# Patient Record
Sex: FEMALE | Race: WHITE | Employment: OTHER | URBAN - METROPOLITAN AREA
[De-identification: names, ages, dates, MRNs, and addresses within clinical notes are randomized per-mention and may not be internally consistent; named-entity substitution may affect disease eponyms.]

---

## 2017-02-08 ENCOUNTER — HOSPITAL ENCOUNTER (OUTPATIENT)
Age: 82
Discharge: HOME | End: 2017-02-08
Payer: MEDICARE

## 2017-02-08 LAB
INR BLD: 3.3
PROTHROMBIN TIME: 36.5 SEC (ref 9.7–12)

## 2017-02-08 PROCEDURE — 36415 COLL VENOUS BLD VENIPUNCTURE: CPT

## 2017-02-08 PROCEDURE — 85610 PROTHROMBIN TIME: CPT

## 2017-02-22 ENCOUNTER — HOSPITAL ENCOUNTER (OUTPATIENT)
Age: 82
Discharge: HOME | End: 2017-02-22
Payer: MEDICARE

## 2017-02-22 LAB
INR BLD: 2.8
PROTHROMBIN TIME: 31 SEC (ref 9.7–12)

## 2017-02-22 PROCEDURE — 36415 COLL VENOUS BLD VENIPUNCTURE: CPT

## 2017-02-22 PROCEDURE — 85610 PROTHROMBIN TIME: CPT

## 2017-03-21 ENCOUNTER — HOSPITAL ENCOUNTER (OUTPATIENT)
Age: 82
Discharge: HOME | End: 2017-03-21
Payer: MEDICARE

## 2017-03-21 LAB
INR BLD: 2.7
PROTHROMBIN TIME: 29.9 SEC (ref 9.7–12)

## 2017-03-21 PROCEDURE — 85610 PROTHROMBIN TIME: CPT

## 2017-03-21 PROCEDURE — 36415 COLL VENOUS BLD VENIPUNCTURE: CPT

## 2017-04-20 ENCOUNTER — HOSPITAL ENCOUNTER (OUTPATIENT)
Age: 82
Discharge: HOME | End: 2017-04-20
Payer: MEDICARE

## 2017-04-20 LAB
INR BLD: 2.4
PROTHROMBIN TIME: 27.1 SEC (ref 9.7–12)

## 2017-04-20 PROCEDURE — 85610 PROTHROMBIN TIME: CPT

## 2017-04-20 PROCEDURE — 36415 COLL VENOUS BLD VENIPUNCTURE: CPT

## 2017-05-25 ENCOUNTER — HOSPITAL ENCOUNTER (OUTPATIENT)
Age: 82
Discharge: HOME | End: 2017-05-25
Payer: MEDICARE

## 2017-05-25 LAB
INR BLD: 2.7
PROTHROMBIN TIME: 30.7 SEC (ref 9.7–12)

## 2017-05-25 PROCEDURE — 85610 PROTHROMBIN TIME: CPT

## 2017-05-25 PROCEDURE — 36415 COLL VENOUS BLD VENIPUNCTURE: CPT

## 2017-06-21 ENCOUNTER — HOSPITAL ENCOUNTER (OUTPATIENT)
Age: 82
Discharge: HOME | End: 2017-06-21
Payer: MEDICARE

## 2017-06-21 LAB
INR BLD: 2.6
PROTHROMBIN TIME: 29.6 SEC (ref 9.7–12)

## 2017-06-21 PROCEDURE — 36415 COLL VENOUS BLD VENIPUNCTURE: CPT

## 2017-06-21 PROCEDURE — 85610 PROTHROMBIN TIME: CPT

## 2017-06-24 ENCOUNTER — HOSPITAL ENCOUNTER (EMERGENCY)
Age: 82
Discharge: HOME | End: 2017-06-24
Attending: EMERGENCY MEDICINE
Payer: MEDICARE

## 2017-06-24 ENCOUNTER — APPOINTMENT (OUTPATIENT)
Dept: CT IMAGING | Age: 82
End: 2017-06-24
Payer: MEDICARE

## 2017-06-24 VITALS
DIASTOLIC BLOOD PRESSURE: 61 MMHG | RESPIRATION RATE: 16 BRPM | HEIGHT: 64 IN | HEART RATE: 78 BPM | TEMPERATURE: 97.5 F | WEIGHT: 158 LBS | BODY MASS INDEX: 26.98 KG/M2 | OXYGEN SATURATION: 98 % | SYSTOLIC BLOOD PRESSURE: 133 MMHG

## 2017-06-24 DIAGNOSIS — S09.90XA CLOSED HEAD INJURY, INITIAL ENCOUNTER: Primary | ICD-10-CM

## 2017-06-24 LAB
INR BLD: 1.9
PROTHROMBIN TIME: 20.6 SEC (ref 9.7–12)

## 2017-06-24 PROCEDURE — 99284 EMERGENCY DEPT VISIT MOD MDM: CPT

## 2017-06-24 PROCEDURE — 85610 PROTHROMBIN TIME: CPT

## 2017-06-24 PROCEDURE — 36415 COLL VENOUS BLD VENIPUNCTURE: CPT

## 2017-06-24 PROCEDURE — 70450 CT HEAD/BRAIN W/O DYE: CPT

## 2017-06-24 ASSESSMENT — ENCOUNTER SYMPTOMS
VOMITING: 0
NAUSEA: 0
SHORTNESS OF BREATH: 0
BACK PAIN: 0
ABDOMINAL PAIN: 0

## 2017-06-24 ASSESSMENT — PAIN DESCRIPTION - PAIN TYPE: TYPE: ACUTE PAIN;CHRONIC PAIN

## 2017-06-24 ASSESSMENT — PAIN DESCRIPTION - LOCATION: LOCATION: HEAD;NECK

## 2017-06-24 ASSESSMENT — PAIN SCALES - GENERAL: PAINLEVEL_OUTOF10: 7

## 2017-06-26 ENCOUNTER — HOSPITAL ENCOUNTER (OUTPATIENT)
Age: 82
Discharge: HOME | End: 2017-06-26
Payer: MEDICARE

## 2017-06-26 LAB
INR BLD: 1.3
PROTHROMBIN TIME: 13.8 SEC (ref 9.7–12)

## 2017-06-26 PROCEDURE — 85610 PROTHROMBIN TIME: CPT

## 2017-06-26 PROCEDURE — 36415 COLL VENOUS BLD VENIPUNCTURE: CPT

## 2017-07-03 ENCOUNTER — HOSPITAL ENCOUNTER (OUTPATIENT)
Age: 82
Discharge: HOME | End: 2017-07-03
Payer: MEDICARE

## 2017-07-03 LAB
INR BLD: 0.9
PROTHROMBIN TIME: 9.9 SEC (ref 9.7–12)

## 2017-07-03 PROCEDURE — 36415 COLL VENOUS BLD VENIPUNCTURE: CPT

## 2017-07-03 PROCEDURE — 85610 PROTHROMBIN TIME: CPT

## 2017-07-07 ENCOUNTER — HOSPITAL ENCOUNTER (OUTPATIENT)
Age: 82
Discharge: HOME | End: 2017-07-07
Payer: MEDICARE

## 2017-07-07 LAB
INR BLD: 1.3
PROTHROMBIN TIME: 14.3 SEC (ref 9.7–12)

## 2017-07-07 PROCEDURE — 36415 COLL VENOUS BLD VENIPUNCTURE: CPT

## 2017-07-07 PROCEDURE — 85610 PROTHROMBIN TIME: CPT

## 2017-07-12 ENCOUNTER — HOSPITAL ENCOUNTER (OUTPATIENT)
Age: 82
Discharge: HOME | End: 2017-07-12
Payer: MEDICARE

## 2017-07-12 LAB
INR BLD: 2.7
PROTHROMBIN TIME: 30.4 SEC (ref 9.7–12)

## 2017-07-12 PROCEDURE — 36415 COLL VENOUS BLD VENIPUNCTURE: CPT

## 2017-07-12 PROCEDURE — 85610 PROTHROMBIN TIME: CPT

## 2017-07-13 ENCOUNTER — APPOINTMENT (OUTPATIENT)
Dept: GENERAL RADIOLOGY | Age: 82
End: 2017-07-13
Payer: MEDICARE

## 2017-07-13 ENCOUNTER — HOSPITAL ENCOUNTER (EMERGENCY)
Age: 82
Discharge: HOME | End: 2017-07-13
Attending: EMERGENCY MEDICINE
Payer: MEDICARE

## 2017-07-13 VITALS
BODY MASS INDEX: 26.98 KG/M2 | HEIGHT: 64 IN | RESPIRATION RATE: 16 BRPM | DIASTOLIC BLOOD PRESSURE: 66 MMHG | TEMPERATURE: 98 F | OXYGEN SATURATION: 95 % | WEIGHT: 158 LBS | HEART RATE: 68 BPM | SYSTOLIC BLOOD PRESSURE: 136 MMHG

## 2017-07-13 DIAGNOSIS — M79.604 PAIN OF RIGHT LOWER EXTREMITY: Primary | ICD-10-CM

## 2017-07-13 PROCEDURE — 6370000000 HC RX 637 (ALT 250 FOR IP): Performed by: EMERGENCY MEDICINE

## 2017-07-13 PROCEDURE — 99283 EMERGENCY DEPT VISIT LOW MDM: CPT

## 2017-07-13 PROCEDURE — 73590 X-RAY EXAM OF LOWER LEG: CPT

## 2017-07-13 RX ORDER — CLINDAMYCIN HYDROCHLORIDE 300 MG/1
300 CAPSULE ORAL 4 TIMES DAILY
Qty: 28 CAPSULE | Refills: 0 | Status: SHIPPED | OUTPATIENT
Start: 2017-07-13 | End: 2017-07-20

## 2017-07-13 RX ORDER — OXYCODONE HYDROCHLORIDE AND ACETAMINOPHEN 5; 325 MG/1; MG/1
1 TABLET ORAL ONCE
Status: COMPLETED | OUTPATIENT
Start: 2017-07-13 | End: 2017-07-13

## 2017-07-13 RX ADMIN — OXYCODONE HYDROCHLORIDE AND ACETAMINOPHEN 1 TABLET: 5; 325 TABLET ORAL at 09:04

## 2017-07-13 ASSESSMENT — ENCOUNTER SYMPTOMS
GASTROINTESTINAL NEGATIVE: 1
SHORTNESS OF BREATH: 0
COUGH: 0
ABDOMINAL PAIN: 0
EYES NEGATIVE: 1
RESPIRATORY NEGATIVE: 1

## 2017-07-13 ASSESSMENT — PAIN DESCRIPTION - ORIENTATION: ORIENTATION: RIGHT

## 2017-07-13 ASSESSMENT — PAIN SCALES - GENERAL
PAINLEVEL_OUTOF10: 9
PAINLEVEL_OUTOF10: 9
PAINLEVEL_OUTOF10: 7

## 2017-07-13 ASSESSMENT — PAIN DESCRIPTION - LOCATION: LOCATION: ANKLE

## 2017-07-13 ASSESSMENT — PAIN DESCRIPTION - PAIN TYPE: TYPE: ACUTE PAIN

## 2017-07-14 ENCOUNTER — HOSPITAL ENCOUNTER (OUTPATIENT)
Age: 82
Discharge: HOME | End: 2017-07-14
Payer: MEDICARE

## 2017-07-14 LAB
INR BLD: 2
PROTHROMBIN TIME: 22.7 SEC (ref 9.7–12)

## 2017-07-14 PROCEDURE — 36415 COLL VENOUS BLD VENIPUNCTURE: CPT

## 2017-07-14 PROCEDURE — 85610 PROTHROMBIN TIME: CPT

## 2017-07-18 ENCOUNTER — HOSPITAL ENCOUNTER (OUTPATIENT)
Age: 82
Discharge: HOME | End: 2017-07-18
Payer: MEDICARE

## 2017-07-18 LAB
INR BLD: 2.1
PROTHROMBIN TIME: 23 SEC (ref 9.7–12)

## 2017-07-18 PROCEDURE — 36415 COLL VENOUS BLD VENIPUNCTURE: CPT

## 2017-07-18 PROCEDURE — 85610 PROTHROMBIN TIME: CPT

## 2017-07-25 ENCOUNTER — HOSPITAL ENCOUNTER (OUTPATIENT)
Age: 82
Discharge: HOME | End: 2017-07-25
Payer: MEDICARE

## 2017-07-25 LAB
INR BLD: 1.4
PROTHROMBIN TIME: 15.7 SEC (ref 9.7–12)

## 2017-07-25 PROCEDURE — 36415 COLL VENOUS BLD VENIPUNCTURE: CPT

## 2017-07-25 PROCEDURE — 85610 PROTHROMBIN TIME: CPT

## 2017-07-27 ENCOUNTER — OFFICE VISIT (OUTPATIENT)
Dept: OBGYN CLINIC | Age: 82
End: 2017-07-27
Payer: MEDICARE

## 2017-07-27 VITALS — WEIGHT: 161.6 LBS | BODY MASS INDEX: 27.59 KG/M2 | HEIGHT: 64 IN

## 2017-07-27 DIAGNOSIS — Z12.31 ENCOUNTER FOR SCREENING MAMMOGRAM FOR BREAST CANCER: Primary | ICD-10-CM

## 2017-07-27 DIAGNOSIS — Z01.419 VISIT FOR GYNECOLOGIC EXAMINATION: ICD-10-CM

## 2017-07-27 PROCEDURE — G0101 CA SCREEN;PELVIC/BREAST EXAM: HCPCS | Performed by: OBSTETRICS & GYNECOLOGY

## 2017-07-27 RX ORDER — POTASSIUM CHLORIDE 750 MG/1
TABLET, EXTENDED RELEASE ORAL
COMMUNITY
Start: 2017-07-26 | End: 2017-08-22 | Stop reason: ALTCHOICE

## 2017-07-27 RX ORDER — CLINDAMYCIN HYDROCHLORIDE 300 MG/1
CAPSULE ORAL
COMMUNITY
Start: 2017-07-13 | End: 2017-08-22 | Stop reason: ALTCHOICE

## 2017-07-27 ASSESSMENT — ENCOUNTER SYMPTOMS
SHORTNESS OF BREATH: 0
COUGH: 0
ABDOMINAL DISTENTION: 0
BACK PAIN: 0
ABDOMINAL PAIN: 0
DIARRHEA: 0
CONSTIPATION: 0

## 2017-08-01 ENCOUNTER — HOSPITAL ENCOUNTER (OUTPATIENT)
Dept: WOMENS IMAGING | Age: 82
Discharge: HOME | End: 2017-08-01
Payer: MEDICARE

## 2017-08-01 ENCOUNTER — HOSPITAL ENCOUNTER (OUTPATIENT)
Age: 82
Discharge: HOME | End: 2017-08-01
Payer: MEDICARE

## 2017-08-01 DIAGNOSIS — Z12.31 ENCOUNTER FOR SCREENING MAMMOGRAM FOR BREAST CANCER: ICD-10-CM

## 2017-08-01 LAB
INR BLD: 1.9
PROTHROMBIN TIME: 21 SEC (ref 9.7–12)

## 2017-08-01 PROCEDURE — 85610 PROTHROMBIN TIME: CPT

## 2017-08-01 PROCEDURE — 77063 BREAST TOMOSYNTHESIS BI: CPT

## 2017-08-01 PROCEDURE — 36415 COLL VENOUS BLD VENIPUNCTURE: CPT

## 2017-08-07 ENCOUNTER — OFFICE VISIT (OUTPATIENT)
Dept: ORTHOPEDIC SURGERY | Age: 82
End: 2017-08-07
Payer: MEDICARE

## 2017-08-07 VITALS
SYSTOLIC BLOOD PRESSURE: 142 MMHG | BODY MASS INDEX: 26.98 KG/M2 | HEART RATE: 88 BPM | HEIGHT: 64 IN | WEIGHT: 158 LBS | DIASTOLIC BLOOD PRESSURE: 76 MMHG

## 2017-08-07 DIAGNOSIS — M25.561 CHRONIC PAIN OF RIGHT KNEE: Primary | ICD-10-CM

## 2017-08-07 DIAGNOSIS — G89.29 CHRONIC PAIN OF RIGHT KNEE: Primary | ICD-10-CM

## 2017-08-07 PROCEDURE — 99203 OFFICE O/P NEW LOW 30 MIN: CPT | Performed by: ORTHOPAEDIC SURGERY

## 2017-08-07 PROCEDURE — 1123F ACP DISCUSS/DSCN MKR DOCD: CPT | Performed by: ORTHOPAEDIC SURGERY

## 2017-08-07 PROCEDURE — 1036F TOBACCO NON-USER: CPT | Performed by: ORTHOPAEDIC SURGERY

## 2017-08-07 PROCEDURE — 1090F PRES/ABSN URINE INCON ASSESS: CPT | Performed by: ORTHOPAEDIC SURGERY

## 2017-08-07 PROCEDURE — 4040F PNEUMOC VAC/ADMIN/RCVD: CPT | Performed by: ORTHOPAEDIC SURGERY

## 2017-08-07 PROCEDURE — G8419 CALC BMI OUT NRM PARAM NOF/U: HCPCS | Performed by: ORTHOPAEDIC SURGERY

## 2017-08-07 PROCEDURE — G8427 DOCREV CUR MEDS BY ELIG CLIN: HCPCS | Performed by: ORTHOPAEDIC SURGERY

## 2017-08-07 ASSESSMENT — PROMIS GLOBAL HEALTH SCALE
IN GENERAL, WOULD YOU SAY YOUR HEALTH IS...[ON A SCALE OF 1 (POOR) TO 5 (EXCELLENT)]: 3
TO WHAT EXTENT ARE YOU ABLE TO CARRY OUT YOUR EVERYDAY PHYSICAL ACTIVITIES SUCH AS WALKING, CLIMBING STAIRS, CARRYING GROCERIES, OR MOVING A CHAIR [ON A SCALE OF 1 (NOT AT ALL) TO 5 (COMPLETELY)]?: 2
SUM OF RESPONSES TO QUESTIONS 2, 4, 5, & 10: 14
IN GENERAL, HOW WOULD YOU RATE YOUR MENTAL HEALTH, INCLUDING YOUR MOOD AND YOUR ABILITY TO THINK [ON A SCALE OF 1 (POOR) TO 5 (EXCELLENT)]?: 3
IN GENERAL, PLEASE RATE HOW WELL YOU CARRY OUT YOUR USUAL SOCIAL ACTIVITIES (INCLUDES ACTIVITIES AT HOME, AT WORK, AND IN YOUR COMMUNITY, AND RESPONSIBILITIES AS A PARENT, CHILD, SPOUSE, EMPLOYEE, FRIEND, ETC) [ON A SCALE OF 1 (POOR) TO 5 (EXCELLENT)]?: 4
IN THE PAST 7 DAYS, HOW WOULD YOU RATE YOUR FATIGUE ON AVERAGE [ON A SCALE FROM 1 (NONE) TO 5 (VERY SEVERE)]?: 3
WHO IS THE PERSON COMPLETING THE PROMIS V1.1 SURVEY?: 0
IN GENERAL, HOW WOULD YOU RATE YOUR PHYSICAL HEALTH [ON A SCALE OF 1 (POOR) TO 5 (EXCELLENT)]?: 3
HOW IS THE PROMIS V1.1 BEING ADMINISTERED?: 0
IN THE PAST 7 DAYS, HOW OFTEN HAVE YOU BEEN BOTHERED BY EMOTIONAL PROBLEMS, SUCH AS FEELING ANXIOUS, DEPRESSED, OR IRRITABLE [ON A SCALE FROM 1 (NEVER) TO 5 (ALWAYS)]?: 3
IN GENERAL, WOULD YOU SAY YOUR QUALITY OF LIFE IS...[ON A SCALE OF 1 (POOR) TO 5 (EXCELLENT)]: 4
SUM OF RESPONSES TO QUESTIONS 3, 6, 7, & 8: 16
IN THE PAST 7 DAYS, HOW WOULD YOU RATE YOUR PAIN ON AVERAGE [ON A SCALE FROM 0 (NO PAIN) TO 10 (WORST IMAGINABLE PAIN)]?: 8
IN GENERAL, HOW WOULD YOU RATE YOUR SATISFACTION WITH YOUR SOCIAL ACTIVITIES AND RELATIONSHIPS [ON A SCALE OF 1 (POOR) TO 5 (EXCELLENT)]?: 4

## 2017-08-07 ASSESSMENT — KOOS JR
STANDING UPRIGHT: 2
HOW SEVERE IS YOUR KNEE STIFFNESS AFTER FIRST WAKING IN MORNING: 3
GOING UP OR DOWN STAIRS: 3
BENDING TO THE FLOOR TO PICK UP OBJECT: 2
RISING FROM SITTING: 3
TWISING OR PIVOTING ON KNEE: 3
STRAIGHTENING KNEE FULLY: 3

## 2017-08-14 ENCOUNTER — HOSPITAL ENCOUNTER (OUTPATIENT)
Age: 82
Discharge: HOME | End: 2017-08-14
Payer: MEDICARE

## 2017-08-14 LAB
INR BLD: 2.4
PROTHROMBIN TIME: 26.9 SEC (ref 9.7–12)

## 2017-08-14 PROCEDURE — 85610 PROTHROMBIN TIME: CPT

## 2017-08-14 PROCEDURE — 36415 COLL VENOUS BLD VENIPUNCTURE: CPT

## 2017-08-22 ENCOUNTER — HOSPITAL ENCOUNTER (OUTPATIENT)
Dept: PREADMISSION TESTING | Age: 82
Discharge: HOME | End: 2017-08-22
Payer: MEDICARE

## 2017-08-22 VITALS
HEIGHT: 64 IN | WEIGHT: 160 LBS | HEART RATE: 67 BPM | DIASTOLIC BLOOD PRESSURE: 63 MMHG | RESPIRATION RATE: 16 BRPM | SYSTOLIC BLOOD PRESSURE: 102 MMHG | TEMPERATURE: 98.8 F | BODY MASS INDEX: 27.31 KG/M2 | OXYGEN SATURATION: 96 %

## 2017-08-22 LAB
-: ABNORMAL
ABSOLUTE EOS #: 0.1 K/UL (ref 0–0.4)
ABSOLUTE LYMPH #: 1 K/UL (ref 1–4.8)
ABSOLUTE MONO #: 0.5 K/UL (ref 0.1–1.3)
AMORPHOUS: ABNORMAL
ANION GAP SERPL CALCULATED.3IONS-SCNC: 10 MMOL/L (ref 9–17)
BACTERIA: ABNORMAL
BASOPHILS # BLD: 0 %
BASOPHILS ABSOLUTE: 0 K/UL (ref 0–0.2)
BILIRUBIN URINE: NEGATIVE
BUN BLDV-MCNC: 22 MG/DL (ref 8–23)
BUN/CREAT BLD: NORMAL (ref 9–20)
CALCIUM SERPL-MCNC: 9 MG/DL (ref 8.6–10.4)
CASTS UA: ABNORMAL /LPF
CHLORIDE BLD-SCNC: 104 MMOL/L (ref 98–107)
CO2: 29 MMOL/L (ref 20–31)
COLOR: YELLOW
COMMENT UA: ABNORMAL
CREAT SERPL-MCNC: 0.74 MG/DL (ref 0.5–0.9)
CRYSTALS, UA: ABNORMAL /HPF
DIFFERENTIAL TYPE: NORMAL
EOSINOPHILS RELATIVE PERCENT: 3 %
EPITHELIAL CELLS UA: ABNORMAL /HPF
GFR AFRICAN AMERICAN: >60 ML/MIN
GFR NON-AFRICAN AMERICAN: >60 ML/MIN
GFR SERPL CREATININE-BSD FRML MDRD: NORMAL ML/MIN/{1.73_M2}
GFR SERPL CREATININE-BSD FRML MDRD: NORMAL ML/MIN/{1.73_M2}
GLUCOSE BLD-MCNC: 90 MG/DL (ref 70–99)
GLUCOSE URINE: NEGATIVE
HCT VFR BLD CALC: 40.1 % (ref 36–46)
HEMOGLOBIN: 13.1 G/DL (ref 12–16)
KETONES, URINE: NEGATIVE
LEUKOCYTE ESTERASE, URINE: NEGATIVE
LYMPHOCYTES # BLD: 23 %
MCH RBC QN AUTO: 29.5 PG (ref 26–34)
MCHC RBC AUTO-ENTMCNC: 32.7 G/DL (ref 31–37)
MCV RBC AUTO: 90.1 FL (ref 80–100)
MONOCYTES # BLD: 11 %
MUCUS: ABNORMAL
NITRITE, URINE: NEGATIVE
OTHER OBSERVATIONS UA: ABNORMAL
PDW BLD-RTO: 14 % (ref 11.5–14.9)
PH UA: 5 (ref 5–8)
PLATELET # BLD: 208 K/UL (ref 150–450)
PLATELET ESTIMATE: NORMAL
PMV BLD AUTO: 8.7 FL (ref 6–12)
POTASSIUM SERPL-SCNC: 4.6 MMOL/L (ref 3.7–5.3)
PROTEIN UA: NEGATIVE
RBC # BLD: 4.46 M/UL (ref 4–5.2)
RBC # BLD: NORMAL 10*6/UL
RBC UA: ABNORMAL /HPF
RENAL EPITHELIAL, UA: ABNORMAL /HPF
SEG NEUTROPHILS: 63 %
SEGMENTED NEUTROPHILS ABSOLUTE COUNT: 2.7 K/UL (ref 1.3–9.1)
SODIUM BLD-SCNC: 143 MMOL/L (ref 135–144)
SPECIFIC GRAVITY UA: 1.02 (ref 1–1.03)
TRICHOMONAS: ABNORMAL
TURBIDITY: CLEAR
URINE HGB: ABNORMAL
UROBILINOGEN, URINE: NORMAL
WBC # BLD: 4.4 K/UL (ref 3.5–11)
WBC # BLD: NORMAL 10*3/UL
WBC UA: ABNORMAL /HPF
YEAST: ABNORMAL

## 2017-08-22 PROCEDURE — 93005 ELECTROCARDIOGRAM TRACING: CPT

## 2017-08-22 PROCEDURE — 80048 BASIC METABOLIC PNL TOTAL CA: CPT

## 2017-08-22 PROCEDURE — 85025 COMPLETE CBC W/AUTO DIFF WBC: CPT

## 2017-08-22 PROCEDURE — 36415 COLL VENOUS BLD VENIPUNCTURE: CPT

## 2017-08-22 PROCEDURE — 81001 URINALYSIS AUTO W/SCOPE: CPT

## 2017-08-22 PROCEDURE — 87641 MR-STAPH DNA AMP PROBE: CPT

## 2017-08-22 RX ORDER — POLYETHYLENE GLYCOL 3350 17 G/17G
17 POWDER, FOR SOLUTION ORAL DAILY PRN
COMMUNITY

## 2017-08-22 ASSESSMENT — PAIN DESCRIPTION - ORIENTATION: ORIENTATION: RIGHT

## 2017-08-22 ASSESSMENT — PAIN DESCRIPTION - PAIN TYPE: TYPE: CHRONIC PAIN

## 2017-08-22 ASSESSMENT — PAIN SCALES - GENERAL: PAINLEVEL_OUTOF10: 7

## 2017-08-22 ASSESSMENT — PAIN DESCRIPTION - LOCATION: LOCATION: KNEE

## 2017-08-23 LAB
EKG ATRIAL RATE: 83 BPM
EKG P AXIS: -20 DEGREES
EKG P-R INTERVAL: 186 MS
EKG Q-T INTERVAL: 482 MS
EKG QRS DURATION: 188 MS
EKG QTC CALCULATION (BAZETT): 566 MS
EKG R AXIS: -80 DEGREES
EKG T AXIS: 78 DEGREES
EKG VENTRICULAR RATE: 83 BPM
MRSA, DNA, NASAL: NORMAL
SPECIMEN DESCRIPTION: NORMAL

## 2017-08-24 ENCOUNTER — HOSPITAL ENCOUNTER (OUTPATIENT)
Age: 82
Discharge: HOME | End: 2017-08-24
Payer: MEDICARE

## 2017-08-24 LAB
INR BLD: 2.9
PROTHROMBIN TIME: 33.7 SEC (ref 9.7–12)

## 2017-08-24 PROCEDURE — 36415 COLL VENOUS BLD VENIPUNCTURE: CPT

## 2017-08-24 PROCEDURE — 85610 PROTHROMBIN TIME: CPT

## 2017-09-02 ENCOUNTER — HOSPITAL ENCOUNTER (EMERGENCY)
Age: 82
Discharge: HOME | End: 2017-09-02
Attending: EMERGENCY MEDICINE
Payer: MEDICARE

## 2017-09-02 VITALS
HEART RATE: 82 BPM | BODY MASS INDEX: 26.98 KG/M2 | TEMPERATURE: 97.6 F | WEIGHT: 158 LBS | OXYGEN SATURATION: 98 % | RESPIRATION RATE: 16 BRPM | DIASTOLIC BLOOD PRESSURE: 77 MMHG | SYSTOLIC BLOOD PRESSURE: 155 MMHG | HEIGHT: 64 IN

## 2017-09-02 DIAGNOSIS — R58 BLEEDING: Primary | ICD-10-CM

## 2017-09-02 PROCEDURE — 99282 EMERGENCY DEPT VISIT SF MDM: CPT

## 2017-09-02 ASSESSMENT — ENCOUNTER SYMPTOMS
SORE THROAT: 0
DIARRHEA: 0
EYE REDNESS: 0
EYE DISCHARGE: 0
COLOR CHANGE: 0
SHORTNESS OF BREATH: 0
VOMITING: 0
NAUSEA: 0
COUGH: 0
RHINORRHEA: 0

## 2017-09-05 ENCOUNTER — APPOINTMENT (OUTPATIENT)
Dept: GENERAL RADIOLOGY | Age: 82
DRG: 470 | End: 2017-09-05
Attending: ORTHOPAEDIC SURGERY
Payer: MEDICARE

## 2017-09-05 ENCOUNTER — TELEPHONE (OUTPATIENT)
Dept: ORTHOPEDIC SURGERY | Age: 82
End: 2017-09-05

## 2017-09-05 ENCOUNTER — HOSPITAL ENCOUNTER (INPATIENT)
Age: 82
LOS: 2 days | Discharge: HOME HEALTH | DRG: 470 | End: 2017-09-07
Attending: ORTHOPAEDIC SURGERY | Admitting: ORTHOPAEDIC SURGERY
Payer: MEDICARE

## 2017-09-05 ENCOUNTER — ANESTHESIA (OUTPATIENT)
Dept: OPERATING ROOM | Age: 82
DRG: 470 | End: 2017-09-05
Payer: MEDICARE

## 2017-09-05 ENCOUNTER — ANESTHESIA EVENT (OUTPATIENT)
Dept: OPERATING ROOM | Age: 82
DRG: 470 | End: 2017-09-05
Payer: MEDICARE

## 2017-09-05 VITALS — DIASTOLIC BLOOD PRESSURE: 83 MMHG | OXYGEN SATURATION: 99 % | SYSTOLIC BLOOD PRESSURE: 149 MMHG | TEMPERATURE: 96.3 F

## 2017-09-05 DIAGNOSIS — Z01.818 PRE-OP EVALUATION: Primary | ICD-10-CM

## 2017-09-05 PROBLEM — Z96.651 STATUS POST TOTAL RIGHT KNEE REPLACEMENT: Status: ACTIVE | Noted: 2017-09-05

## 2017-09-05 LAB
INR BLD: 1
PROTHROMBIN TIME: 10.8 SEC (ref 9.7–12)

## 2017-09-05 PROCEDURE — 2580000003 HC RX 258: Performed by: ORTHOPAEDIC SURGERY

## 2017-09-05 PROCEDURE — 1200000000 HC SEMI PRIVATE

## 2017-09-05 PROCEDURE — 97161 PT EVAL LOW COMPLEX 20 MIN: CPT

## 2017-09-05 PROCEDURE — 97535 SELF CARE MNGMENT TRAINING: CPT

## 2017-09-05 PROCEDURE — 2500000003 HC RX 250 WO HCPCS: Performed by: ANESTHESIOLOGY

## 2017-09-05 PROCEDURE — C1713 ANCHOR/SCREW BN/BN,TIS/BN: HCPCS | Performed by: ORTHOPAEDIC SURGERY

## 2017-09-05 PROCEDURE — 7100000001 HC PACU RECOVERY - ADDTL 15 MIN: Performed by: ORTHOPAEDIC SURGERY

## 2017-09-05 PROCEDURE — 2500000003 HC RX 250 WO HCPCS: Performed by: ORTHOPAEDIC SURGERY

## 2017-09-05 PROCEDURE — 97165 OT EVAL LOW COMPLEX 30 MIN: CPT

## 2017-09-05 PROCEDURE — 6360000002 HC RX W HCPCS: Performed by: NURSE ANESTHETIST, CERTIFIED REGISTERED

## 2017-09-05 PROCEDURE — 99223 1ST HOSP IP/OBS HIGH 75: CPT | Performed by: ORTHOPAEDIC SURGERY

## 2017-09-05 PROCEDURE — 73560 X-RAY EXAM OF KNEE 1 OR 2: CPT

## 2017-09-05 PROCEDURE — 3600000015 HC SURGERY LEVEL 5 ADDTL 15MIN: Performed by: ORTHOPAEDIC SURGERY

## 2017-09-05 PROCEDURE — 64448 NJX AA&/STRD FEM NRV NFS IMG: CPT | Performed by: ANESTHESIOLOGY

## 2017-09-05 PROCEDURE — 6370000000 HC RX 637 (ALT 250 FOR IP): Performed by: ORTHOPAEDIC SURGERY

## 2017-09-05 PROCEDURE — 2720000010 HC SURG SUPPLY STERILE: Performed by: ORTHOPAEDIC SURGERY

## 2017-09-05 PROCEDURE — 7100000000 HC PACU RECOVERY - FIRST 15 MIN: Performed by: ORTHOPAEDIC SURGERY

## 2017-09-05 PROCEDURE — C1776 JOINT DEVICE (IMPLANTABLE): HCPCS | Performed by: ORTHOPAEDIC SURGERY

## 2017-09-05 PROCEDURE — 6360000002 HC RX W HCPCS: Performed by: ORTHOPAEDIC SURGERY

## 2017-09-05 PROCEDURE — 2500000003 HC RX 250 WO HCPCS: Performed by: NURSE ANESTHETIST, CERTIFIED REGISTERED

## 2017-09-05 PROCEDURE — 36415 COLL VENOUS BLD VENIPUNCTURE: CPT

## 2017-09-05 PROCEDURE — 2780000010 HC IMPLANT OTHER: Performed by: ORTHOPAEDIC SURGERY

## 2017-09-05 PROCEDURE — 3600000005 HC SURGERY LEVEL 5 BASE: Performed by: ORTHOPAEDIC SURGERY

## 2017-09-05 PROCEDURE — 94664 DEMO&/EVAL PT USE INHALER: CPT

## 2017-09-05 PROCEDURE — 6370000000 HC RX 637 (ALT 250 FOR IP): Performed by: INTERNAL MEDICINE

## 2017-09-05 PROCEDURE — A6197 ALGINATE DRSG >16 <=48 SQ IN: HCPCS | Performed by: ORTHOPAEDIC SURGERY

## 2017-09-05 PROCEDURE — 0SRC0J9 REPLACEMENT OF RIGHT KNEE JOINT WITH SYNTHETIC SUBSTITUTE, CEMENTED, OPEN APPROACH: ICD-10-PCS | Performed by: ORTHOPAEDIC SURGERY

## 2017-09-05 PROCEDURE — 99222 1ST HOSP IP/OBS MODERATE 55: CPT | Performed by: INTERNAL MEDICINE

## 2017-09-05 PROCEDURE — 3700000001 HC ADD 15 MINUTES (ANESTHESIA): Performed by: ORTHOPAEDIC SURGERY

## 2017-09-05 PROCEDURE — 3700000000 HC ANESTHESIA ATTENDED CARE: Performed by: ORTHOPAEDIC SURGERY

## 2017-09-05 PROCEDURE — 85610 PROTHROMBIN TIME: CPT

## 2017-09-05 DEVICE — DISCONTINUED USE 416978 CEMENT PALACOS R SING DOSE 40GR: Type: IMPLANTABLE DEVICE | Site: KNEE | Status: FUNCTIONAL

## 2017-09-05 DEVICE — COMPONENT PAT DIA37MM THK8.6MM THN KNEE POLY 3 PEG SER A: Type: IMPLANTABLE DEVICE | Site: KNEE | Status: FUNCTIONAL

## 2017-09-05 DEVICE — IMPLANTABLE DEVICE: Type: IMPLANTABLE DEVICE | Site: KNEE | Status: FUNCTIONAL

## 2017-09-05 DEVICE — BEARING TIB L71MM THK14MM KNEE ARCM ANT STBL MOD COMPLT: Type: IMPLANTABLE DEVICE | Site: KNEE | Status: FUNCTIONAL

## 2017-09-05 DEVICE — TRAY TIB L71MM KNEE CO CHROM FIN MOD INTLOK VANGUARD: Type: IMPLANTABLE DEVICE | Site: KNEE | Status: FUNCTIONAL

## 2017-09-05 RX ORDER — BACITRACIN 50000 [USP'U]/1
INJECTION, POWDER, LYOPHILIZED, FOR SOLUTION INTRAMUSCULAR PRN
Status: DISCONTINUED | OUTPATIENT
Start: 2017-09-05 | End: 2017-09-05 | Stop reason: HOSPADM

## 2017-09-05 RX ORDER — ROCURONIUM BROMIDE 10 MG/ML
INJECTION, SOLUTION INTRAVENOUS PRN
Status: DISCONTINUED | OUTPATIENT
Start: 2017-09-05 | End: 2017-09-05 | Stop reason: SDUPTHER

## 2017-09-05 RX ORDER — SODIUM CHLORIDE, SODIUM LACTATE, POTASSIUM CHLORIDE, CALCIUM CHLORIDE 600; 310; 30; 20 MG/100ML; MG/100ML; MG/100ML; MG/100ML
INJECTION, SOLUTION INTRAVENOUS CONTINUOUS
Status: DISCONTINUED | OUTPATIENT
Start: 2017-09-05 | End: 2017-09-07 | Stop reason: HOSPADM

## 2017-09-05 RX ORDER — TRANEXAMIC ACID 100 MG/ML
INJECTION, SOLUTION INTRAVENOUS PRN
Status: DISCONTINUED | OUTPATIENT
Start: 2017-09-05 | End: 2017-09-05 | Stop reason: SDUPTHER

## 2017-09-05 RX ORDER — WARFARIN SODIUM 5 MG/1
5 TABLET ORAL
Status: COMPLETED | OUTPATIENT
Start: 2017-09-05 | End: 2017-09-05

## 2017-09-05 RX ORDER — DIPHENHYDRAMINE HYDROCHLORIDE 50 MG/ML
12.5 INJECTION INTRAMUSCULAR; INTRAVENOUS
Status: DISCONTINUED | OUTPATIENT
Start: 2017-09-05 | End: 2017-09-05 | Stop reason: HOSPADM

## 2017-09-05 RX ORDER — AMIODARONE HYDROCHLORIDE 200 MG/1
200 TABLET ORAL 2 TIMES DAILY
Status: DISCONTINUED | OUTPATIENT
Start: 2017-09-05 | End: 2017-09-07 | Stop reason: HOSPADM

## 2017-09-05 RX ORDER — OXYCODONE HYDROCHLORIDE 5 MG/1
5 TABLET ORAL EVERY 4 HOURS PRN
Status: DISCONTINUED | OUTPATIENT
Start: 2017-09-05 | End: 2017-09-07 | Stop reason: HOSPADM

## 2017-09-05 RX ORDER — GLYCOPYRROLATE 0.2 MG/ML
INJECTION INTRAMUSCULAR; INTRAVENOUS PRN
Status: DISCONTINUED | OUTPATIENT
Start: 2017-09-05 | End: 2017-09-05 | Stop reason: SDUPTHER

## 2017-09-05 RX ORDER — ACETAMINOPHEN 500 MG
1000 TABLET ORAL EVERY 8 HOURS SCHEDULED
Status: COMPLETED | OUTPATIENT
Start: 2017-09-05 | End: 2017-09-06

## 2017-09-05 RX ORDER — ONDANSETRON 2 MG/ML
INJECTION INTRAMUSCULAR; INTRAVENOUS PRN
Status: DISCONTINUED | OUTPATIENT
Start: 2017-09-05 | End: 2017-09-05 | Stop reason: SDUPTHER

## 2017-09-05 RX ORDER — FENTANYL CITRATE 50 UG/ML
INJECTION, SOLUTION INTRAMUSCULAR; INTRAVENOUS PRN
Status: DISCONTINUED | OUTPATIENT
Start: 2017-09-05 | End: 2017-09-05 | Stop reason: SDUPTHER

## 2017-09-05 RX ORDER — ASPIRIN 81 MG/1
81 TABLET ORAL 2 TIMES DAILY
Status: DISCONTINUED | OUTPATIENT
Start: 2017-09-05 | End: 2017-09-07 | Stop reason: HOSPADM

## 2017-09-05 RX ORDER — MIDAZOLAM HYDROCHLORIDE 1 MG/ML
INJECTION INTRAMUSCULAR; INTRAVENOUS PRN
Status: DISCONTINUED | OUTPATIENT
Start: 2017-09-05 | End: 2017-09-05 | Stop reason: SDUPTHER

## 2017-09-05 RX ORDER — KETOROLAC TROMETHAMINE 30 MG/ML
15 INJECTION, SOLUTION INTRAMUSCULAR; INTRAVENOUS EVERY 8 HOURS SCHEDULED
Status: DISCONTINUED | OUTPATIENT
Start: 2017-09-05 | End: 2017-09-06

## 2017-09-05 RX ORDER — MORPHINE SULFATE 2 MG/ML
2 INJECTION, SOLUTION INTRAMUSCULAR; INTRAVENOUS EVERY 5 MIN PRN
Status: DISCONTINUED | OUTPATIENT
Start: 2017-09-05 | End: 2017-09-05 | Stop reason: HOSPADM

## 2017-09-05 RX ORDER — OXYCODONE HYDROCHLORIDE 5 MG/1
10 TABLET ORAL EVERY 4 HOURS PRN
Status: DISCONTINUED | OUTPATIENT
Start: 2017-09-05 | End: 2017-09-07 | Stop reason: HOSPADM

## 2017-09-05 RX ORDER — SODIUM CHLORIDE 0.9 % (FLUSH) 0.9 %
10 SYRINGE (ML) INJECTION EVERY 12 HOURS SCHEDULED
Status: DISCONTINUED | OUTPATIENT
Start: 2017-09-05 | End: 2017-09-07 | Stop reason: HOSPADM

## 2017-09-05 RX ORDER — DOCUSATE SODIUM 100 MG/1
100 CAPSULE, LIQUID FILLED ORAL 2 TIMES DAILY
Status: DISCONTINUED | OUTPATIENT
Start: 2017-09-05 | End: 2017-09-07 | Stop reason: HOSPADM

## 2017-09-05 RX ORDER — SODIUM CHLORIDE 0.9 % (FLUSH) 0.9 %
10 SYRINGE (ML) INJECTION PRN
Status: DISCONTINUED | OUTPATIENT
Start: 2017-09-05 | End: 2017-09-07 | Stop reason: HOSPADM

## 2017-09-05 RX ORDER — LABETALOL HYDROCHLORIDE 5 MG/ML
5 INJECTION, SOLUTION INTRAVENOUS EVERY 10 MIN PRN
Status: DISCONTINUED | OUTPATIENT
Start: 2017-09-05 | End: 2017-09-05 | Stop reason: HOSPADM

## 2017-09-05 RX ORDER — ROPINIROLE 0.25 MG/1
0.25 TABLET, FILM COATED ORAL NIGHTLY
Status: DISCONTINUED | OUTPATIENT
Start: 2017-09-05 | End: 2017-09-07 | Stop reason: HOSPADM

## 2017-09-05 RX ORDER — LORAZEPAM 0.5 MG/1
0.5 TABLET ORAL 3 TIMES DAILY PRN
Status: DISCONTINUED | OUTPATIENT
Start: 2017-09-05 | End: 2017-09-07 | Stop reason: HOSPADM

## 2017-09-05 RX ORDER — ONDANSETRON 2 MG/ML
4 INJECTION INTRAMUSCULAR; INTRAVENOUS
Status: DISCONTINUED | OUTPATIENT
Start: 2017-09-05 | End: 2017-09-05 | Stop reason: HOSPADM

## 2017-09-05 RX ORDER — ONDANSETRON 2 MG/ML
4 INJECTION INTRAMUSCULAR; INTRAVENOUS EVERY 6 HOURS PRN
Status: DISCONTINUED | OUTPATIENT
Start: 2017-09-05 | End: 2017-09-07 | Stop reason: HOSPADM

## 2017-09-05 RX ORDER — PROPOFOL 10 MG/ML
INJECTION, EMULSION INTRAVENOUS PRN
Status: DISCONTINUED | OUTPATIENT
Start: 2017-09-05 | End: 2017-09-05 | Stop reason: SDUPTHER

## 2017-09-05 RX ORDER — NEOSTIGMINE METHYLSULFATE 1 MG/ML
INJECTION, SOLUTION INTRAVENOUS PRN
Status: DISCONTINUED | OUTPATIENT
Start: 2017-09-05 | End: 2017-09-05 | Stop reason: SDUPTHER

## 2017-09-05 RX ORDER — ATORVASTATIN CALCIUM 10 MG/1
10 TABLET, FILM COATED ORAL NIGHTLY
Status: DISCONTINUED | OUTPATIENT
Start: 2017-09-05 | End: 2017-09-07 | Stop reason: HOSPADM

## 2017-09-05 RX ORDER — LIDOCAINE HYDROCHLORIDE 10 MG/ML
INJECTION, SOLUTION EPIDURAL; INFILTRATION; INTRACAUDAL; PERINEURAL PRN
Status: DISCONTINUED | OUTPATIENT
Start: 2017-09-05 | End: 2017-09-05 | Stop reason: SDUPTHER

## 2017-09-05 RX ADMIN — SODIUM CHLORIDE, POTASSIUM CHLORIDE, SODIUM LACTATE AND CALCIUM CHLORIDE: 600; 310; 30; 20 INJECTION, SOLUTION INTRAVENOUS at 13:37

## 2017-09-05 RX ADMIN — ACETAMINOPHEN 1000 MG: 500 TABLET ORAL at 21:24

## 2017-09-05 RX ADMIN — Medication 2 G: at 10:30

## 2017-09-05 RX ADMIN — ROCURONIUM BROMIDE 40 MG: 10 INJECTION INTRAVENOUS at 10:15

## 2017-09-05 RX ADMIN — MIDAZOLAM 2 MG: 1 INJECTION INTRAMUSCULAR; INTRAVENOUS at 09:50

## 2017-09-05 RX ADMIN — SODIUM CHLORIDE, POTASSIUM CHLORIDE, SODIUM LACTATE AND CALCIUM CHLORIDE: 600; 310; 30; 20 INJECTION, SOLUTION INTRAVENOUS at 13:38

## 2017-09-05 RX ADMIN — KETOROLAC TROMETHAMINE 15 MG: 30 INJECTION, SOLUTION INTRAMUSCULAR at 14:03

## 2017-09-05 RX ADMIN — AMIODARONE HYDROCHLORIDE 200 MG: 200 TABLET ORAL at 21:25

## 2017-09-05 RX ADMIN — WARFARIN SODIUM 5 MG: 5 TABLET ORAL at 21:28

## 2017-09-05 RX ADMIN — ROPINIROLE HYDROCHLORIDE 0.25 MG: 0.25 TABLET, FILM COATED ORAL at 21:31

## 2017-09-05 RX ADMIN — NEOSTIGMINE METHYLSULFATE 3 MG: 1 INJECTION, SOLUTION INTRAVENOUS at 11:20

## 2017-09-05 RX ADMIN — TRANEXAMIC ACID 1000 MG: 100 INJECTION, SOLUTION INTRAVENOUS at 11:21

## 2017-09-05 RX ADMIN — FENTANYL CITRATE 100 MCG: 50 INJECTION, SOLUTION INTRAMUSCULAR; INTRAVENOUS at 10:15

## 2017-09-05 RX ADMIN — DOCUSATE SODIUM 100 MG: 100 CAPSULE, LIQUID FILLED ORAL at 21:25

## 2017-09-05 RX ADMIN — SODIUM CHLORIDE, POTASSIUM CHLORIDE, SODIUM LACTATE AND CALCIUM CHLORIDE: 600; 310; 30; 20 INJECTION, SOLUTION INTRAVENOUS at 09:19

## 2017-09-05 RX ADMIN — ASPIRIN 81 MG: 81 TABLET, COATED ORAL at 21:24

## 2017-09-05 RX ADMIN — LIDOCAINE HYDROCHLORIDE 50 MG: 10 INJECTION, SOLUTION EPIDURAL; INFILTRATION; INTRACAUDAL; PERINEURAL at 10:15

## 2017-09-05 RX ADMIN — GLYCOPYRROLATE 0.6 MG: 0.2 INJECTION, SOLUTION INTRAMUSCULAR; INTRAVENOUS at 11:20

## 2017-09-05 RX ADMIN — KETOROLAC TROMETHAMINE 15 MG: 30 INJECTION, SOLUTION INTRAMUSCULAR at 21:25

## 2017-09-05 RX ADMIN — SODIUM CHLORIDE, POTASSIUM CHLORIDE, SODIUM LACTATE AND CALCIUM CHLORIDE: 600; 310; 30; 20 INJECTION, SOLUTION INTRAVENOUS at 10:10

## 2017-09-05 RX ADMIN — SODIUM CHLORIDE, POTASSIUM CHLORIDE, SODIUM LACTATE AND CALCIUM CHLORIDE: 600; 310; 30; 20 INJECTION, SOLUTION INTRAVENOUS at 13:36

## 2017-09-05 RX ADMIN — PROPOFOL 150 MG: 10 INJECTION, EMULSION INTRAVENOUS at 10:15

## 2017-09-05 RX ADMIN — DOCUSATE SODIUM 100 MG: 100 CAPSULE, LIQUID FILLED ORAL at 14:04

## 2017-09-05 RX ADMIN — ACETAMINOPHEN 1000 MG: 500 TABLET ORAL at 14:02

## 2017-09-05 RX ADMIN — ENOXAPARIN SODIUM 80 MG: 80 INJECTION SUBCUTANEOUS at 21:29

## 2017-09-05 RX ADMIN — ATORVASTATIN CALCIUM 10 MG: 10 TABLET, FILM COATED ORAL at 21:25

## 2017-09-05 RX ADMIN — Medication 750 ML: at 12:19

## 2017-09-05 RX ADMIN — ONDANSETRON 4 MG: 2 INJECTION INTRAMUSCULAR; INTRAVENOUS at 11:10

## 2017-09-05 RX ADMIN — TRANEXAMIC ACID 1000 MG: 100 INJECTION, SOLUTION INTRAVENOUS at 10:26

## 2017-09-05 RX ADMIN — Medication 2 G: at 17:34

## 2017-09-05 ASSESSMENT — PAIN SCALES - GENERAL
PAINLEVEL_OUTOF10: 0
PAINLEVEL_OUTOF10: 2
PAINLEVEL_OUTOF10: 0

## 2017-09-05 ASSESSMENT — ENCOUNTER SYMPTOMS: SHORTNESS OF BREATH: 1

## 2017-09-05 ASSESSMENT — PAIN - FUNCTIONAL ASSESSMENT: PAIN_FUNCTIONAL_ASSESSMENT: 0-10

## 2017-09-06 PROBLEM — R79.1 SUBTHERAPEUTIC INTERNATIONAL NORMALIZED RATIO (INR): Status: ACTIVE | Noted: 2017-09-06

## 2017-09-06 LAB
ANION GAP SERPL CALCULATED.3IONS-SCNC: 9 MMOL/L (ref 9–17)
BUN BLDV-MCNC: 12 MG/DL (ref 8–23)
BUN/CREAT BLD: ABNORMAL (ref 9–20)
CALCIUM SERPL-MCNC: 8.5 MG/DL (ref 8.6–10.4)
CHLORIDE BLD-SCNC: 103 MMOL/L (ref 98–107)
CO2: 27 MMOL/L (ref 20–31)
CREAT SERPL-MCNC: 0.61 MG/DL (ref 0.5–0.9)
GFR AFRICAN AMERICAN: >60 ML/MIN
GFR NON-AFRICAN AMERICAN: >60 ML/MIN
GFR SERPL CREATININE-BSD FRML MDRD: ABNORMAL ML/MIN/{1.73_M2}
GFR SERPL CREATININE-BSD FRML MDRD: ABNORMAL ML/MIN/{1.73_M2}
GLUCOSE BLD-MCNC: 129 MG/DL (ref 70–99)
HCT VFR BLD CALC: 30.7 % (ref 36–46)
HEMOGLOBIN: 10.1 G/DL (ref 12–16)
INR BLD: 1.1
POTASSIUM SERPL-SCNC: 4.3 MMOL/L (ref 3.7–5.3)
PROTHROMBIN TIME: 11.3 SEC (ref 9.7–12)
SODIUM BLD-SCNC: 139 MMOL/L (ref 135–144)
TSH SERPL DL<=0.05 MIU/L-ACNC: 1.64 MIU/L (ref 0.3–5)

## 2017-09-06 PROCEDURE — 6370000000 HC RX 637 (ALT 250 FOR IP): Performed by: INTERNAL MEDICINE

## 2017-09-06 PROCEDURE — 6370000000 HC RX 637 (ALT 250 FOR IP): Performed by: ORTHOPAEDIC SURGERY

## 2017-09-06 PROCEDURE — 97535 SELF CARE MNGMENT TRAINING: CPT

## 2017-09-06 PROCEDURE — 97110 THERAPEUTIC EXERCISES: CPT

## 2017-09-06 PROCEDURE — 85610 PROTHROMBIN TIME: CPT

## 2017-09-06 PROCEDURE — 85018 HEMOGLOBIN: CPT

## 2017-09-06 PROCEDURE — 97116 GAIT TRAINING THERAPY: CPT

## 2017-09-06 PROCEDURE — 1200000000 HC SEMI PRIVATE

## 2017-09-06 PROCEDURE — 97530 THERAPEUTIC ACTIVITIES: CPT

## 2017-09-06 PROCEDURE — 36415 COLL VENOUS BLD VENIPUNCTURE: CPT

## 2017-09-06 PROCEDURE — 80048 BASIC METABOLIC PNL TOTAL CA: CPT

## 2017-09-06 PROCEDURE — 2580000003 HC RX 258: Performed by: ORTHOPAEDIC SURGERY

## 2017-09-06 PROCEDURE — 85014 HEMATOCRIT: CPT

## 2017-09-06 PROCEDURE — 84443 ASSAY THYROID STIM HORMONE: CPT

## 2017-09-06 PROCEDURE — 97150 GROUP THERAPEUTIC PROCEDURES: CPT

## 2017-09-06 PROCEDURE — 99232 SBSQ HOSP IP/OBS MODERATE 35: CPT | Performed by: INTERNAL MEDICINE

## 2017-09-06 PROCEDURE — 99024 POSTOP FOLLOW-UP VISIT: CPT | Performed by: ORTHOPAEDIC SURGERY

## 2017-09-06 PROCEDURE — 6360000002 HC RX W HCPCS: Performed by: ORTHOPAEDIC SURGERY

## 2017-09-06 RX ORDER — KETOROLAC TROMETHAMINE 10 MG/1
10 TABLET, FILM COATED ORAL EVERY 6 HOURS SCHEDULED
Status: DISPENSED | OUTPATIENT
Start: 2017-09-06 | End: 2017-09-07

## 2017-09-06 RX ORDER — WARFARIN SODIUM 5 MG/1
5 TABLET ORAL
Status: COMPLETED | OUTPATIENT
Start: 2017-09-06 | End: 2017-09-06

## 2017-09-06 RX ORDER — OXYCODONE HYDROCHLORIDE 10 MG/1
10 TABLET ORAL EVERY 4 HOURS PRN
Qty: 40 TABLET | Refills: 0 | Status: SHIPPED | OUTPATIENT
Start: 2017-09-06 | End: 2017-09-13

## 2017-09-06 RX ADMIN — ACETAMINOPHEN 1000 MG: 500 TABLET ORAL at 20:22

## 2017-09-06 RX ADMIN — ACETAMINOPHEN 1000 MG: 500 TABLET ORAL at 13:05

## 2017-09-06 RX ADMIN — Medication 2 G: at 02:50

## 2017-09-06 RX ADMIN — OXYCODONE HYDROCHLORIDE 10 MG: 5 TABLET ORAL at 02:51

## 2017-09-06 RX ADMIN — DOCUSATE SODIUM 100 MG: 100 CAPSULE, LIQUID FILLED ORAL at 20:23

## 2017-09-06 RX ADMIN — KETOROLAC TROMETHAMINE 10 MG: 10 TABLET, FILM COATED ORAL at 16:03

## 2017-09-06 RX ADMIN — OXYCODONE HYDROCHLORIDE 5 MG: 5 TABLET ORAL at 13:05

## 2017-09-06 RX ADMIN — Medication 10 ML: at 08:08

## 2017-09-06 RX ADMIN — ACETAMINOPHEN 1000 MG: 500 TABLET ORAL at 05:51

## 2017-09-06 RX ADMIN — KETOROLAC TROMETHAMINE 15 MG: 30 INJECTION, SOLUTION INTRAMUSCULAR at 05:51

## 2017-09-06 RX ADMIN — WARFARIN SODIUM 5 MG: 5 TABLET ORAL at 17:55

## 2017-09-06 RX ADMIN — ASPIRIN 81 MG: 81 TABLET, COATED ORAL at 12:07

## 2017-09-06 RX ADMIN — OXYCODONE HYDROCHLORIDE 5 MG: 5 TABLET ORAL at 08:13

## 2017-09-06 RX ADMIN — ROPINIROLE HYDROCHLORIDE 0.25 MG: 0.25 TABLET, FILM COATED ORAL at 20:25

## 2017-09-06 RX ADMIN — ENOXAPARIN SODIUM 80 MG: 80 INJECTION SUBCUTANEOUS at 08:08

## 2017-09-06 RX ADMIN — AMIODARONE HYDROCHLORIDE 200 MG: 200 TABLET ORAL at 08:08

## 2017-09-06 RX ADMIN — ASPIRIN 81 MG: 81 TABLET, COATED ORAL at 20:22

## 2017-09-06 RX ADMIN — AMIODARONE HYDROCHLORIDE 200 MG: 200 TABLET ORAL at 20:23

## 2017-09-06 RX ADMIN — ATORVASTATIN CALCIUM 10 MG: 10 TABLET, FILM COATED ORAL at 20:23

## 2017-09-06 RX ADMIN — DOCUSATE SODIUM 100 MG: 100 CAPSULE, LIQUID FILLED ORAL at 08:08

## 2017-09-06 RX ADMIN — ENOXAPARIN SODIUM 80 MG: 80 INJECTION SUBCUTANEOUS at 20:23

## 2017-09-06 ASSESSMENT — PAIN SCALES - GENERAL
PAINLEVEL_OUTOF10: 5
PAINLEVEL_OUTOF10: 2
PAINLEVEL_OUTOF10: 5
PAINLEVEL_OUTOF10: 3
PAINLEVEL_OUTOF10: 0
PAINLEVEL_OUTOF10: 4
PAINLEVEL_OUTOF10: 5
PAINLEVEL_OUTOF10: 3
PAINLEVEL_OUTOF10: 5

## 2017-09-06 ASSESSMENT — PAIN DESCRIPTION - LOCATION
LOCATION: KNEE

## 2017-09-06 ASSESSMENT — PAIN DESCRIPTION - ORIENTATION
ORIENTATION: RIGHT

## 2017-09-06 ASSESSMENT — ENCOUNTER SYMPTOMS
ABDOMINAL PAIN: 0
SHORTNESS OF BREATH: 0

## 2017-09-06 ASSESSMENT — PAIN DESCRIPTION - DESCRIPTORS
DESCRIPTORS: ACHING;DISCOMFORT
DESCRIPTORS: ACHING
DESCRIPTORS: ACHING

## 2017-09-06 ASSESSMENT — PAIN DESCRIPTION - PAIN TYPE
TYPE: SURGICAL PAIN
TYPE: ACUTE PAIN;SURGICAL PAIN
TYPE: SURGICAL PAIN
TYPE: SURGICAL PAIN

## 2017-09-06 ASSESSMENT — PAIN DESCRIPTION - FREQUENCY: FREQUENCY: INTERMITTENT

## 2017-09-06 ASSESSMENT — PAIN DESCRIPTION - ONSET: ONSET: GRADUAL

## 2017-09-07 ENCOUNTER — CARE COORDINATOR VISIT (OUTPATIENT)
Dept: CASE MANAGEMENT | Age: 82
End: 2017-09-07

## 2017-09-07 VITALS
RESPIRATION RATE: 20 BRPM | DIASTOLIC BLOOD PRESSURE: 48 MMHG | HEIGHT: 64 IN | HEART RATE: 86 BPM | TEMPERATURE: 97.9 F | BODY MASS INDEX: 28.79 KG/M2 | SYSTOLIC BLOOD PRESSURE: 122 MMHG | WEIGHT: 168.65 LBS | OXYGEN SATURATION: 99 %

## 2017-09-07 LAB
ANION GAP SERPL CALCULATED.3IONS-SCNC: 10 MMOL/L (ref 9–17)
BUN BLDV-MCNC: 17 MG/DL (ref 8–23)
BUN/CREAT BLD: ABNORMAL (ref 9–20)
CALCIUM SERPL-MCNC: 8.7 MG/DL (ref 8.6–10.4)
CHLORIDE BLD-SCNC: 102 MMOL/L (ref 98–107)
CO2: 27 MMOL/L (ref 20–31)
CREAT SERPL-MCNC: 0.75 MG/DL (ref 0.5–0.9)
GFR AFRICAN AMERICAN: >60 ML/MIN
GFR NON-AFRICAN AMERICAN: >60 ML/MIN
GFR SERPL CREATININE-BSD FRML MDRD: ABNORMAL ML/MIN/{1.73_M2}
GFR SERPL CREATININE-BSD FRML MDRD: ABNORMAL ML/MIN/{1.73_M2}
GLUCOSE BLD-MCNC: 124 MG/DL (ref 70–99)
INR BLD: 1.4
POTASSIUM SERPL-SCNC: 4.6 MMOL/L (ref 3.7–5.3)
PROTHROMBIN TIME: 15.6 SEC (ref 9.7–12)
SODIUM BLD-SCNC: 139 MMOL/L (ref 135–144)

## 2017-09-07 PROCEDURE — 36415 COLL VENOUS BLD VENIPUNCTURE: CPT

## 2017-09-07 PROCEDURE — 97110 THERAPEUTIC EXERCISES: CPT

## 2017-09-07 PROCEDURE — 80048 BASIC METABOLIC PNL TOTAL CA: CPT

## 2017-09-07 PROCEDURE — 97530 THERAPEUTIC ACTIVITIES: CPT

## 2017-09-07 PROCEDURE — 85610 PROTHROMBIN TIME: CPT

## 2017-09-07 PROCEDURE — 6370000000 HC RX 637 (ALT 250 FOR IP): Performed by: ORTHOPAEDIC SURGERY

## 2017-09-07 PROCEDURE — 99024 POSTOP FOLLOW-UP VISIT: CPT | Performed by: ORTHOPAEDIC SURGERY

## 2017-09-07 PROCEDURE — 97116 GAIT TRAINING THERAPY: CPT

## 2017-09-07 PROCEDURE — 97150 GROUP THERAPEUTIC PROCEDURES: CPT

## 2017-09-07 PROCEDURE — 99232 SBSQ HOSP IP/OBS MODERATE 35: CPT | Performed by: INTERNAL MEDICINE

## 2017-09-07 PROCEDURE — 6360000002 HC RX W HCPCS: Performed by: ORTHOPAEDIC SURGERY

## 2017-09-07 PROCEDURE — 6370000000 HC RX 637 (ALT 250 FOR IP): Performed by: INTERNAL MEDICINE

## 2017-09-07 RX ORDER — WARFARIN SODIUM 5 MG/1
5 TABLET ORAL
Status: DISCONTINUED | OUTPATIENT
Start: 2017-09-07 | End: 2017-09-07 | Stop reason: HOSPADM

## 2017-09-07 RX ADMIN — AMIODARONE HYDROCHLORIDE 200 MG: 200 TABLET ORAL at 09:19

## 2017-09-07 RX ADMIN — ENOXAPARIN SODIUM 80 MG: 80 INJECTION SUBCUTANEOUS at 09:20

## 2017-09-07 RX ADMIN — KETOROLAC TROMETHAMINE 10 MG: 10 TABLET, FILM COATED ORAL at 05:46

## 2017-09-07 RX ADMIN — OXYCODONE HYDROCHLORIDE 5 MG: 5 TABLET ORAL at 13:38

## 2017-09-07 RX ADMIN — DOCUSATE SODIUM 100 MG: 100 CAPSULE, LIQUID FILLED ORAL at 09:19

## 2017-09-07 RX ADMIN — ASPIRIN 81 MG: 81 TABLET, COATED ORAL at 09:20

## 2017-09-07 RX ADMIN — OXYCODONE HYDROCHLORIDE 5 MG: 5 TABLET ORAL at 09:19

## 2017-09-07 ASSESSMENT — PAIN DESCRIPTION - LOCATION
LOCATION: KNEE
LOCATION: KNEE

## 2017-09-07 ASSESSMENT — PAIN DESCRIPTION - PAIN TYPE
TYPE: SURGICAL PAIN;ACUTE PAIN
TYPE: ACUTE PAIN;SURGICAL PAIN

## 2017-09-07 ASSESSMENT — PAIN SCALES - GENERAL
PAINLEVEL_OUTOF10: 4

## 2017-09-07 ASSESSMENT — PAIN DESCRIPTION - ORIENTATION
ORIENTATION: RIGHT
ORIENTATION: RIGHT

## 2017-09-08 ENCOUNTER — CARE COORDINATION (OUTPATIENT)
Dept: CASE MANAGEMENT | Age: 82
End: 2017-09-08

## 2017-09-11 ENCOUNTER — HOSPITAL ENCOUNTER (OUTPATIENT)
Age: 82
Setting detail: OBSERVATION
Discharge: HOME | End: 2017-09-12
Attending: EMERGENCY MEDICINE | Admitting: ORTHOPAEDIC SURGERY
Payer: COMMERCIAL

## 2017-09-11 ENCOUNTER — APPOINTMENT (OUTPATIENT)
Dept: GENERAL RADIOLOGY | Age: 82
End: 2017-09-11
Payer: COMMERCIAL

## 2017-09-11 DIAGNOSIS — I50.9 ACUTE CONGESTIVE HEART FAILURE, UNSPECIFIED CONGESTIVE HEART FAILURE TYPE: Primary | ICD-10-CM

## 2017-09-11 LAB
ABSOLUTE EOS #: 0.1 K/UL (ref 0–0.4)
ABSOLUTE LYMPH #: 0.7 K/UL (ref 1–4.8)
ABSOLUTE MONO #: 0.6 K/UL (ref 0.1–1.3)
ALBUMIN SERPL-MCNC: 3.2 G/DL (ref 3.5–5.2)
ALBUMIN/GLOBULIN RATIO: ABNORMAL (ref 1–2.5)
ALP BLD-CCNC: 63 U/L (ref 35–104)
ALT SERPL-CCNC: 24 U/L (ref 5–33)
ANION GAP SERPL CALCULATED.3IONS-SCNC: 12 MMOL/L (ref 9–17)
AST SERPL-CCNC: 27 U/L
BASOPHILS # BLD: 0 %
BASOPHILS ABSOLUTE: 0 K/UL (ref 0–0.2)
BILIRUB SERPL-MCNC: 1.13 MG/DL (ref 0.3–1.2)
BILIRUBIN DIRECT: 0.42 MG/DL
BILIRUBIN, INDIRECT: 0.71 MG/DL (ref 0–1)
BNP INTERPRETATION: ABNORMAL
BUN BLDV-MCNC: 11 MG/DL (ref 8–23)
BUN/CREAT BLD: ABNORMAL (ref 9–20)
CALCIUM SERPL-MCNC: 8.4 MG/DL (ref 8.6–10.4)
CHLORIDE BLD-SCNC: 102 MMOL/L (ref 98–107)
CO2: 28 MMOL/L (ref 20–31)
CREAT SERPL-MCNC: 0.46 MG/DL (ref 0.5–0.9)
DIFFERENTIAL TYPE: ABNORMAL
EOSINOPHILS RELATIVE PERCENT: 1 %
GFR AFRICAN AMERICAN: >60 ML/MIN
GFR NON-AFRICAN AMERICAN: >60 ML/MIN
GFR SERPL CREATININE-BSD FRML MDRD: ABNORMAL ML/MIN/{1.73_M2}
GFR SERPL CREATININE-BSD FRML MDRD: ABNORMAL ML/MIN/{1.73_M2}
GLOBULIN: ABNORMAL G/DL (ref 1.5–3.8)
GLUCOSE BLD-MCNC: 129 MG/DL (ref 70–99)
HCT VFR BLD CALC: 27.9 % (ref 36–46)
HEMOGLOBIN: 9.4 G/DL (ref 12–16)
INR BLD: 3
LACTIC ACID: 1 MMOL/L (ref 0.5–2.2)
LACTIC ACID: 1.9 MMOL/L (ref 0.5–2.2)
LIPASE: 27 U/L (ref 13–60)
LV EF: 55 %
LVEF MODALITY: NORMAL
LYMPHOCYTES # BLD: 12 %
MCH RBC QN AUTO: 30.6 PG (ref 26–34)
MCHC RBC AUTO-ENTMCNC: 33.8 G/DL (ref 31–37)
MCV RBC AUTO: 90.5 FL (ref 80–100)
MONOCYTES # BLD: 12 %
PDW BLD-RTO: 14.4 % (ref 11.5–14.9)
PLATELET # BLD: 366 K/UL (ref 150–450)
PLATELET ESTIMATE: ABNORMAL
PMV BLD AUTO: 8.1 FL (ref 6–12)
POTASSIUM SERPL-SCNC: 3.5 MMOL/L (ref 3.7–5.3)
PRO-BNP: 2900 PG/ML
PROTHROMBIN TIME: 34.5 SEC (ref 9.7–12)
RBC # BLD: 3.08 M/UL (ref 4–5.2)
RBC # BLD: ABNORMAL 10*6/UL
SEG NEUTROPHILS: 75 %
SEGMENTED NEUTROPHILS ABSOLUTE COUNT: 4.2 K/UL (ref 1.3–9.1)
SODIUM BLD-SCNC: 142 MMOL/L (ref 135–144)
TOTAL PROTEIN: 6 G/DL (ref 6.4–8.3)
TROPONIN INTERP: NORMAL
TROPONIN T: <0.03 NG/ML
WBC # BLD: 5.6 K/UL (ref 3.5–11)
WBC # BLD: ABNORMAL 10*3/UL

## 2017-09-11 PROCEDURE — 83690 ASSAY OF LIPASE: CPT

## 2017-09-11 PROCEDURE — 96374 THER/PROPH/DIAG INJ IV PUSH: CPT

## 2017-09-11 PROCEDURE — 99285 EMERGENCY DEPT VISIT HI MDM: CPT

## 2017-09-11 PROCEDURE — 2580000003 HC RX 258: Performed by: ORTHOPAEDIC SURGERY

## 2017-09-11 PROCEDURE — 96372 THER/PROPH/DIAG INJ SC/IM: CPT

## 2017-09-11 PROCEDURE — 84484 ASSAY OF TROPONIN QUANT: CPT

## 2017-09-11 PROCEDURE — 36415 COLL VENOUS BLD VENIPUNCTURE: CPT

## 2017-09-11 PROCEDURE — 85025 COMPLETE CBC W/AUTO DIFF WBC: CPT

## 2017-09-11 PROCEDURE — 83605 ASSAY OF LACTIC ACID: CPT

## 2017-09-11 PROCEDURE — G0378 HOSPITAL OBSERVATION PER HR: HCPCS

## 2017-09-11 PROCEDURE — 6370000000 HC RX 637 (ALT 250 FOR IP): Performed by: EMERGENCY MEDICINE

## 2017-09-11 PROCEDURE — 93005 ELECTROCARDIOGRAM TRACING: CPT

## 2017-09-11 PROCEDURE — 85610 PROTHROMBIN TIME: CPT

## 2017-09-11 PROCEDURE — 94664 DEMO&/EVAL PT USE INHALER: CPT

## 2017-09-11 PROCEDURE — 6360000002 HC RX W HCPCS: Performed by: INTERNAL MEDICINE

## 2017-09-11 PROCEDURE — 94640 AIRWAY INHALATION TREATMENT: CPT

## 2017-09-11 PROCEDURE — 6370000000 HC RX 637 (ALT 250 FOR IP): Performed by: ORTHOPAEDIC SURGERY

## 2017-09-11 PROCEDURE — 99223 1ST HOSP IP/OBS HIGH 75: CPT | Performed by: INTERNAL MEDICINE

## 2017-09-11 PROCEDURE — 80048 BASIC METABOLIC PNL TOTAL CA: CPT

## 2017-09-11 PROCEDURE — 71010 XR CHEST PORTABLE: CPT

## 2017-09-11 PROCEDURE — 83880 ASSAY OF NATRIURETIC PEPTIDE: CPT

## 2017-09-11 PROCEDURE — 6360000002 HC RX W HCPCS: Performed by: EMERGENCY MEDICINE

## 2017-09-11 PROCEDURE — 99024 POSTOP FOLLOW-UP VISIT: CPT | Performed by: ORTHOPAEDIC SURGERY

## 2017-09-11 PROCEDURE — 93306 TTE W/DOPPLER COMPLETE: CPT

## 2017-09-11 PROCEDURE — 6370000000 HC RX 637 (ALT 250 FOR IP): Performed by: INTERNAL MEDICINE

## 2017-09-11 PROCEDURE — 80076 HEPATIC FUNCTION PANEL: CPT

## 2017-09-11 RX ORDER — ONDANSETRON 2 MG/ML
4 INJECTION INTRAMUSCULAR; INTRAVENOUS EVERY 6 HOURS PRN
Status: DISCONTINUED | OUTPATIENT
Start: 2017-09-11 | End: 2017-09-12 | Stop reason: HOSPADM

## 2017-09-11 RX ORDER — IPRATROPIUM BROMIDE AND ALBUTEROL SULFATE 2.5; .5 MG/3ML; MG/3ML
1 SOLUTION RESPIRATORY (INHALATION) ONCE
Status: COMPLETED | OUTPATIENT
Start: 2017-09-11 | End: 2017-09-11

## 2017-09-11 RX ORDER — WARFARIN SODIUM 4 MG/1
2 TABLET ORAL
Status: COMPLETED | OUTPATIENT
Start: 2017-09-11 | End: 2017-09-11

## 2017-09-11 RX ORDER — LORAZEPAM 0.5 MG/1
0.5 TABLET ORAL 3 TIMES DAILY PRN
Status: DISCONTINUED | OUTPATIENT
Start: 2017-09-11 | End: 2017-09-12 | Stop reason: HOSPADM

## 2017-09-11 RX ORDER — CALCIUM CARBONATE/VITAMIN D3 600 MG-10
1 TABLET ORAL 2 TIMES DAILY
Status: DISCONTINUED | OUTPATIENT
Start: 2017-09-11 | End: 2017-09-12 | Stop reason: HOSPADM

## 2017-09-11 RX ORDER — SODIUM CHLORIDE 0.9 % (FLUSH) 0.9 %
10 SYRINGE (ML) INJECTION PRN
Status: DISCONTINUED | OUTPATIENT
Start: 2017-09-11 | End: 2017-09-12 | Stop reason: HOSPADM

## 2017-09-11 RX ORDER — M-VIT,TX,IRON,MINS/CALC/FOLIC 27MG-0.4MG
1 TABLET ORAL DAILY
Status: DISCONTINUED | OUTPATIENT
Start: 2017-09-11 | End: 2017-09-12 | Stop reason: HOSPADM

## 2017-09-11 RX ORDER — OXYCODONE HYDROCHLORIDE 10 MG/1
10 TABLET ORAL EVERY 4 HOURS PRN
Status: DISCONTINUED | OUTPATIENT
Start: 2017-09-11 | End: 2017-09-12 | Stop reason: HOSPADM

## 2017-09-11 RX ORDER — ALBUTEROL SULFATE 2.5 MG/3ML
2.5 SOLUTION RESPIRATORY (INHALATION) EVERY 10 MIN PRN
Status: DISCONTINUED | OUTPATIENT
Start: 2017-09-11 | End: 2017-09-12 | Stop reason: HOSPADM

## 2017-09-11 RX ORDER — SODIUM CHLORIDE 0.9 % (FLUSH) 0.9 %
10 SYRINGE (ML) INJECTION EVERY 12 HOURS SCHEDULED
Status: DISCONTINUED | OUTPATIENT
Start: 2017-09-11 | End: 2017-09-12 | Stop reason: HOSPADM

## 2017-09-11 RX ORDER — SENNA AND DOCUSATE SODIUM 50; 8.6 MG/1; MG/1
2 TABLET, FILM COATED ORAL 2 TIMES DAILY PRN
Status: DISCONTINUED | OUTPATIENT
Start: 2017-09-11 | End: 2017-09-12 | Stop reason: HOSPADM

## 2017-09-11 RX ORDER — ATORVASTATIN CALCIUM 10 MG/1
10 TABLET, FILM COATED ORAL DAILY
Status: DISCONTINUED | OUTPATIENT
Start: 2017-09-11 | End: 2017-09-12 | Stop reason: HOSPADM

## 2017-09-11 RX ORDER — ACETAMINOPHEN 325 MG/1
650 TABLET ORAL EVERY 4 HOURS PRN
Status: DISCONTINUED | OUTPATIENT
Start: 2017-09-11 | End: 2017-09-12 | Stop reason: HOSPADM

## 2017-09-11 RX ORDER — POLYETHYLENE GLYCOL 3350 17 G/17G
17 POWDER, FOR SOLUTION ORAL DAILY PRN
Status: DISCONTINUED | OUTPATIENT
Start: 2017-09-11 | End: 2017-09-12 | Stop reason: HOSPADM

## 2017-09-11 RX ORDER — AMIODARONE HYDROCHLORIDE 200 MG/1
200 TABLET ORAL 2 TIMES DAILY
Status: DISCONTINUED | OUTPATIENT
Start: 2017-09-11 | End: 2017-09-12 | Stop reason: HOSPADM

## 2017-09-11 RX ORDER — ROPINIROLE 0.25 MG/1
0.25 TABLET, FILM COATED ORAL NIGHTLY
Status: DISCONTINUED | OUTPATIENT
Start: 2017-09-11 | End: 2017-09-12 | Stop reason: HOSPADM

## 2017-09-11 RX ORDER — LATANOPROST 50 UG/ML
1 SOLUTION/ DROPS OPHTHALMIC NIGHTLY
Status: DISCONTINUED | OUTPATIENT
Start: 2017-09-11 | End: 2017-09-12 | Stop reason: HOSPADM

## 2017-09-11 RX ORDER — AMIODARONE HYDROCHLORIDE 200 MG/1
200 TABLET ORAL ONCE
Status: COMPLETED | OUTPATIENT
Start: 2017-09-11 | End: 2017-09-11

## 2017-09-11 RX ORDER — PANTOPRAZOLE SODIUM 40 MG/1
40 TABLET, DELAYED RELEASE ORAL DAILY
Status: DISCONTINUED | OUTPATIENT
Start: 2017-09-11 | End: 2017-09-12 | Stop reason: HOSPADM

## 2017-09-11 RX ORDER — POTASSIUM CHLORIDE 20 MEQ/1
40 TABLET, EXTENDED RELEASE ORAL PRN
Status: DISCONTINUED | OUTPATIENT
Start: 2017-09-11 | End: 2017-09-12 | Stop reason: HOSPADM

## 2017-09-11 RX ORDER — POTASSIUM CHLORIDE 20MEQ/15ML
40 LIQUID (ML) ORAL PRN
Status: DISCONTINUED | OUTPATIENT
Start: 2017-09-11 | End: 2017-09-12 | Stop reason: HOSPADM

## 2017-09-11 RX ORDER — FUROSEMIDE 10 MG/ML
40 INJECTION INTRAMUSCULAR; INTRAVENOUS ONCE
Status: COMPLETED | OUTPATIENT
Start: 2017-09-11 | End: 2017-09-11

## 2017-09-11 RX ORDER — ASPIRIN 81 MG/1
81 TABLET, CHEWABLE ORAL DAILY
Status: DISCONTINUED | OUTPATIENT
Start: 2017-09-11 | End: 2017-09-12 | Stop reason: HOSPADM

## 2017-09-11 RX ORDER — ASPIRIN 81 MG/1
81 TABLET ORAL 2 TIMES DAILY
Status: CANCELLED | OUTPATIENT
Start: 2017-09-11

## 2017-09-11 RX ORDER — POTASSIUM CHLORIDE 7.45 MG/ML
10 INJECTION INTRAVENOUS PRN
Status: DISCONTINUED | OUTPATIENT
Start: 2017-09-11 | End: 2017-09-12 | Stop reason: HOSPADM

## 2017-09-11 RX ORDER — SENNOSIDES 8.6 MG
650 CAPSULE ORAL EVERY 6 HOURS PRN
Status: CANCELLED | OUTPATIENT
Start: 2017-09-11

## 2017-09-11 RX ORDER — FUROSEMIDE 10 MG/ML
40 INJECTION INTRAMUSCULAR; INTRAVENOUS 2 TIMES DAILY
Status: DISCONTINUED | OUTPATIENT
Start: 2017-09-11 | End: 2017-09-12 | Stop reason: HOSPADM

## 2017-09-11 RX ORDER — OXYCODONE HYDROCHLORIDE 5 MG/1
10 TABLET ORAL EVERY 4 HOURS PRN
Status: DISCONTINUED | OUTPATIENT
Start: 2017-09-11 | End: 2017-09-11

## 2017-09-11 RX ADMIN — ATORVASTATIN CALCIUM 10 MG: 10 TABLET, FILM COATED ORAL at 14:25

## 2017-09-11 RX ADMIN — ROPINIROLE 0.25 MG: 0.25 TABLET, FILM COATED ORAL at 20:34

## 2017-09-11 RX ADMIN — AMIODARONE HYDROCHLORIDE 200 MG: 200 TABLET ORAL at 20:34

## 2017-09-11 RX ADMIN — Medication 10 ML: at 14:31

## 2017-09-11 RX ADMIN — PANTOPRAZOLE SODIUM 40 MG: 40 TABLET, DELAYED RELEASE ORAL at 14:25

## 2017-09-11 RX ADMIN — FUROSEMIDE 40 MG: 10 INJECTION, SOLUTION INTRAVENOUS at 14:30

## 2017-09-11 RX ADMIN — Medication 10 ML: at 20:34

## 2017-09-11 RX ADMIN — Medication 1 TABLET: at 20:33

## 2017-09-11 RX ADMIN — AMIODARONE HYDROCHLORIDE 200 MG: 200 TABLET ORAL at 10:44

## 2017-09-11 RX ADMIN — Medication 25 MG: at 10:44

## 2017-09-11 RX ADMIN — ASPIRIN 81 MG 81 MG: 81 TABLET ORAL at 14:30

## 2017-09-11 RX ADMIN — Medication 25 MG: at 20:34

## 2017-09-11 RX ADMIN — LATANOPROST 1 DROP: 50 SOLUTION OPHTHALMIC at 20:34

## 2017-09-11 RX ADMIN — IPRATROPIUM BROMIDE AND ALBUTEROL SULFATE 1 AMPULE: .5; 3 SOLUTION RESPIRATORY (INHALATION) at 04:32

## 2017-09-11 RX ADMIN — WARFARIN SODIUM 2 MG: 4 TABLET ORAL at 19:41

## 2017-09-11 RX ADMIN — FUROSEMIDE 40 MG: 10 INJECTION, SOLUTION INTRAVENOUS at 06:25

## 2017-09-11 RX ADMIN — Medication 10 ML: at 10:46

## 2017-09-11 ASSESSMENT — ENCOUNTER SYMPTOMS
RHINORRHEA: 0
COLOR CHANGE: 0
COUGH: 0
EYE DISCHARGE: 0
DIARRHEA: 0
EYE REDNESS: 0
NAUSEA: 0
SORE THROAT: 0
SHORTNESS OF BREATH: 1
VOMITING: 0

## 2017-09-11 ASSESSMENT — PAIN SCALES - GENERAL: PAINLEVEL_OUTOF10: 4

## 2017-09-11 ASSESSMENT — PAIN DESCRIPTION - LOCATION: LOCATION: KNEE

## 2017-09-11 ASSESSMENT — PAIN DESCRIPTION - ORIENTATION: ORIENTATION: RIGHT

## 2017-09-12 VITALS
TEMPERATURE: 97.9 F | HEART RATE: 71 BPM | OXYGEN SATURATION: 97 % | RESPIRATION RATE: 18 BRPM | HEIGHT: 64 IN | DIASTOLIC BLOOD PRESSURE: 52 MMHG | BODY MASS INDEX: 29.43 KG/M2 | SYSTOLIC BLOOD PRESSURE: 121 MMHG | WEIGHT: 172.4 LBS

## 2017-09-12 LAB
ANION GAP SERPL CALCULATED.3IONS-SCNC: 11 MMOL/L (ref 9–17)
BUN BLDV-MCNC: 13 MG/DL (ref 8–23)
BUN/CREAT BLD: ABNORMAL (ref 9–20)
CALCIUM SERPL-MCNC: 8.3 MG/DL (ref 8.6–10.4)
CHLORIDE BLD-SCNC: 100 MMOL/L (ref 98–107)
CO2: 31 MMOL/L (ref 20–31)
CREAT SERPL-MCNC: 0.48 MG/DL (ref 0.5–0.9)
GFR AFRICAN AMERICAN: >60 ML/MIN
GFR NON-AFRICAN AMERICAN: >60 ML/MIN
GFR SERPL CREATININE-BSD FRML MDRD: ABNORMAL ML/MIN/{1.73_M2}
GFR SERPL CREATININE-BSD FRML MDRD: ABNORMAL ML/MIN/{1.73_M2}
GLUCOSE BLD-MCNC: 102 MG/DL (ref 70–99)
HCT VFR BLD CALC: 25.8 % (ref 36–46)
HEMOGLOBIN: 8.7 G/DL (ref 12–16)
INR BLD: 3.1
MCH RBC QN AUTO: 30.2 PG (ref 26–34)
MCHC RBC AUTO-ENTMCNC: 33.8 G/DL (ref 31–37)
MCV RBC AUTO: 89.5 FL (ref 80–100)
PDW BLD-RTO: 14.1 % (ref 11.5–14.9)
PLATELET # BLD: 303 K/UL (ref 150–450)
PMV BLD AUTO: 7.8 FL (ref 6–12)
POTASSIUM SERPL-SCNC: 3.2 MMOL/L (ref 3.7–5.3)
PROTHROMBIN TIME: 35.6 SEC (ref 9.7–12)
RBC # BLD: 2.88 M/UL (ref 4–5.2)
SODIUM BLD-SCNC: 142 MMOL/L (ref 135–144)
WBC # BLD: 4.7 K/UL (ref 3.5–11)

## 2017-09-12 PROCEDURE — 2580000003 HC RX 258: Performed by: ORTHOPAEDIC SURGERY

## 2017-09-12 PROCEDURE — G8988 SELF CARE GOAL STATUS: HCPCS

## 2017-09-12 PROCEDURE — 96376 TX/PRO/DX INJ SAME DRUG ADON: CPT

## 2017-09-12 PROCEDURE — G8980 MOBILITY D/C STATUS: HCPCS

## 2017-09-12 PROCEDURE — 85610 PROTHROMBIN TIME: CPT

## 2017-09-12 PROCEDURE — G8978 MOBILITY CURRENT STATUS: HCPCS

## 2017-09-12 PROCEDURE — 6370000000 HC RX 637 (ALT 250 FOR IP): Performed by: ORTHOPAEDIC SURGERY

## 2017-09-12 PROCEDURE — 99232 SBSQ HOSP IP/OBS MODERATE 35: CPT | Performed by: INTERNAL MEDICINE

## 2017-09-12 PROCEDURE — 6360000002 HC RX W HCPCS: Performed by: INTERNAL MEDICINE

## 2017-09-12 PROCEDURE — G8989 SELF CARE D/C STATUS: HCPCS

## 2017-09-12 PROCEDURE — 97165 OT EVAL LOW COMPLEX 30 MIN: CPT

## 2017-09-12 PROCEDURE — G8979 MOBILITY GOAL STATUS: HCPCS

## 2017-09-12 PROCEDURE — 97110 THERAPEUTIC EXERCISES: CPT

## 2017-09-12 PROCEDURE — G0378 HOSPITAL OBSERVATION PER HR: HCPCS

## 2017-09-12 PROCEDURE — 99024 POSTOP FOLLOW-UP VISIT: CPT | Performed by: ORTHOPAEDIC SURGERY

## 2017-09-12 PROCEDURE — G8987 SELF CARE CURRENT STATUS: HCPCS

## 2017-09-12 PROCEDURE — 36415 COLL VENOUS BLD VENIPUNCTURE: CPT

## 2017-09-12 PROCEDURE — 6370000000 HC RX 637 (ALT 250 FOR IP): Performed by: INTERNAL MEDICINE

## 2017-09-12 PROCEDURE — 80048 BASIC METABOLIC PNL TOTAL CA: CPT

## 2017-09-12 PROCEDURE — 97161 PT EVAL LOW COMPLEX 20 MIN: CPT

## 2017-09-12 PROCEDURE — 85027 COMPLETE CBC AUTOMATED: CPT

## 2017-09-12 RX ORDER — FUROSEMIDE 20 MG/1
40 TABLET ORAL DAILY
Qty: 60 TABLET | Refills: 3 | Status: SHIPPED | OUTPATIENT
Start: 2017-09-12 | End: 2019-04-16 | Stop reason: DRUGHIGH

## 2017-09-12 RX ORDER — SPIRONOLACTONE 25 MG/1
25 TABLET ORAL DAILY
Qty: 30 TABLET | Refills: 3 | Status: SHIPPED | OUTPATIENT
Start: 2017-09-12 | End: 2017-09-12

## 2017-09-12 RX ORDER — WARFARIN SODIUM 1 MG/1
1 TABLET ORAL
Status: DISCONTINUED | OUTPATIENT
Start: 2017-09-12 | End: 2017-09-12 | Stop reason: HOSPADM

## 2017-09-12 RX ORDER — SPIRONOLACTONE 25 MG/1
25 TABLET ORAL DAILY
Qty: 30 TABLET | Refills: 3 | Status: SHIPPED | OUTPATIENT
Start: 2017-09-12 | End: 2018-04-12 | Stop reason: ALTCHOICE

## 2017-09-12 RX ADMIN — ATORVASTATIN CALCIUM 10 MG: 10 TABLET, FILM COATED ORAL at 09:55

## 2017-09-12 RX ADMIN — MULTIPLE VITAMINS W/ MINERALS TAB 1 TABLET: TAB at 09:53

## 2017-09-12 RX ADMIN — OXYCODONE HYDROCHLORIDE 10 MG: 10 TABLET ORAL at 10:11

## 2017-09-12 RX ADMIN — Medication 10 ML: at 09:55

## 2017-09-12 RX ADMIN — Medication 25 MG: at 09:55

## 2017-09-12 RX ADMIN — FUROSEMIDE 40 MG: 10 INJECTION, SOLUTION INTRAVENOUS at 09:53

## 2017-09-12 RX ADMIN — ASPIRIN 81 MG 81 MG: 81 TABLET ORAL at 09:53

## 2017-09-12 RX ADMIN — Medication 1 TABLET: at 09:52

## 2017-09-12 RX ADMIN — AMIODARONE HYDROCHLORIDE 200 MG: 200 TABLET ORAL at 09:55

## 2017-09-12 RX ADMIN — OXYCODONE HYDROCHLORIDE 10 MG: 10 TABLET ORAL at 01:30

## 2017-09-12 RX ADMIN — PANTOPRAZOLE SODIUM 40 MG: 40 TABLET, DELAYED RELEASE ORAL at 09:54

## 2017-09-12 ASSESSMENT — PAIN SCALES - GENERAL
PAINLEVEL_OUTOF10: 0
PAINLEVEL_OUTOF10: 4
PAINLEVEL_OUTOF10: 1
PAINLEVEL_OUTOF10: 4

## 2017-09-12 ASSESSMENT — PAIN DESCRIPTION - LOCATION: LOCATION: KNEE

## 2017-09-12 ASSESSMENT — PAIN DESCRIPTION - PAIN TYPE: TYPE: SURGICAL PAIN

## 2017-09-13 ENCOUNTER — CARE COORDINATION (OUTPATIENT)
Dept: CASE MANAGEMENT | Age: 82
End: 2017-09-13

## 2017-09-13 LAB
EKG ATRIAL RATE: 80 BPM
EKG P AXIS: 61 DEGREES
EKG Q-T INTERVAL: 448 MS
EKG QRS DURATION: 154 MS
EKG QTC CALCULATION (BAZETT): 609 MS
EKG R AXIS: -77 DEGREES
EKG T AXIS: 89 DEGREES
EKG VENTRICULAR RATE: 111 BPM

## 2017-09-17 ENCOUNTER — APPOINTMENT (OUTPATIENT)
Dept: CT IMAGING | Age: 82
End: 2017-09-17
Payer: MEDICARE

## 2017-09-17 ENCOUNTER — HOSPITAL ENCOUNTER (EMERGENCY)
Age: 82
Discharge: HOME | End: 2017-09-17
Attending: EMERGENCY MEDICINE
Payer: MEDICARE

## 2017-09-17 VITALS
DIASTOLIC BLOOD PRESSURE: 51 MMHG | TEMPERATURE: 98.5 F | HEIGHT: 64 IN | BODY MASS INDEX: 29.02 KG/M2 | RESPIRATION RATE: 25 BRPM | WEIGHT: 170 LBS | OXYGEN SATURATION: 93 % | SYSTOLIC BLOOD PRESSURE: 112 MMHG | HEART RATE: 94 BPM

## 2017-09-17 DIAGNOSIS — R53.83 FATIGUE, UNSPECIFIED TYPE: Primary | ICD-10-CM

## 2017-09-17 LAB
-: NORMAL
ABSOLUTE EOS #: 0 K/UL (ref 0–0.4)
ABSOLUTE LYMPH #: 0.08 K/UL (ref 1–4.8)
ABSOLUTE MONO #: 0.08 K/UL (ref 0.1–1.3)
AMORPHOUS: NORMAL
ANION GAP SERPL CALCULATED.3IONS-SCNC: 14 MMOL/L (ref 9–17)
BACTERIA: NORMAL
BASOPHILS # BLD: 0 %
BASOPHILS ABSOLUTE: 0 K/UL (ref 0–0.2)
BILIRUBIN URINE: ABNORMAL
BUN BLDV-MCNC: 13 MG/DL (ref 8–23)
BUN/CREAT BLD: ABNORMAL (ref 9–20)
CALCIUM SERPL-MCNC: 8.9 MG/DL (ref 8.6–10.4)
CASTS UA: NORMAL /LPF
CHLORIDE BLD-SCNC: 98 MMOL/L (ref 98–107)
CO2: 29 MMOL/L (ref 20–31)
COLOR: YELLOW
COMMENT UA: ABNORMAL
CREAT SERPL-MCNC: 0.74 MG/DL (ref 0.5–0.9)
CRYSTALS, UA: NORMAL /HPF
DIFFERENTIAL TYPE: ABNORMAL
EOSINOPHILS RELATIVE PERCENT: 0 %
EPITHELIAL CELLS UA: NORMAL /HPF
GFR AFRICAN AMERICAN: >60 ML/MIN
GFR NON-AFRICAN AMERICAN: >60 ML/MIN
GFR SERPL CREATININE-BSD FRML MDRD: ABNORMAL ML/MIN/{1.73_M2}
GFR SERPL CREATININE-BSD FRML MDRD: ABNORMAL ML/MIN/{1.73_M2}
GLUCOSE BLD-MCNC: 127 MG/DL (ref 70–99)
GLUCOSE URINE: NEGATIVE
HCT VFR BLD CALC: 31.5 % (ref 36–46)
HEMOGLOBIN: 10.4 G/DL (ref 12–16)
INR BLD: 4.5
KETONES, URINE: NEGATIVE
LEUKOCYTE ESTERASE, URINE: NEGATIVE
LYMPHOCYTES # BLD: 1 %
MCH RBC QN AUTO: 30 PG (ref 26–34)
MCHC RBC AUTO-ENTMCNC: 33 G/DL (ref 31–37)
MCV RBC AUTO: 90.7 FL (ref 80–100)
MONOCYTES # BLD: 1 %
MORPHOLOGY: NORMAL
MUCUS: NORMAL
MYOGLOBIN: <21 NG/ML (ref 25–58)
NITRITE, URINE: NEGATIVE
OTHER OBSERVATIONS UA: NORMAL
PDW BLD-RTO: 14.8 % (ref 11.5–14.9)
PH UA: 7.5 (ref 5–8)
PLATELET # BLD: 338 K/UL (ref 150–450)
PLATELET ESTIMATE: ABNORMAL
PMV BLD AUTO: 7.6 FL (ref 6–12)
POTASSIUM SERPL-SCNC: 3.7 MMOL/L (ref 3.7–5.3)
PROTEIN UA: ABNORMAL
PROTHROMBIN TIME: 52.4 SEC (ref 9.7–12)
RBC # BLD: 3.48 M/UL (ref 4–5.2)
RBC # BLD: ABNORMAL 10*6/UL
RBC UA: NORMAL /HPF
RENAL EPITHELIAL, UA: NORMAL /HPF
SEG NEUTROPHILS: 98 %
SEGMENTED NEUTROPHILS ABSOLUTE COUNT: 7.74 K/UL (ref 1.3–9.1)
SODIUM BLD-SCNC: 141 MMOL/L (ref 135–144)
SPECIFIC GRAVITY UA: 1.01 (ref 1–1.03)
TRICHOMONAS: NORMAL
TROPONIN INTERP: ABNORMAL
TROPONIN T: <0.03 NG/ML
TURBIDITY: CLEAR
URINE HGB: ABNORMAL
UROBILINOGEN, URINE: ABNORMAL
WBC # BLD: 7.9 K/UL (ref 3.5–11)
WBC # BLD: ABNORMAL 10*3/UL
WBC UA: NORMAL /HPF
YEAST: NORMAL

## 2017-09-17 PROCEDURE — 85610 PROTHROMBIN TIME: CPT

## 2017-09-17 PROCEDURE — 81001 URINALYSIS AUTO W/SCOPE: CPT

## 2017-09-17 PROCEDURE — 83874 ASSAY OF MYOGLOBIN: CPT

## 2017-09-17 PROCEDURE — 70450 CT HEAD/BRAIN W/O DYE: CPT

## 2017-09-17 PROCEDURE — 85025 COMPLETE CBC W/AUTO DIFF WBC: CPT

## 2017-09-17 PROCEDURE — 99284 EMERGENCY DEPT VISIT MOD MDM: CPT

## 2017-09-17 PROCEDURE — 93005 ELECTROCARDIOGRAM TRACING: CPT

## 2017-09-17 PROCEDURE — P9612 CATHETERIZE FOR URINE SPEC: HCPCS

## 2017-09-17 PROCEDURE — 80048 BASIC METABOLIC PNL TOTAL CA: CPT

## 2017-09-17 PROCEDURE — 36415 COLL VENOUS BLD VENIPUNCTURE: CPT

## 2017-09-17 PROCEDURE — 84484 ASSAY OF TROPONIN QUANT: CPT

## 2017-09-17 ASSESSMENT — ENCOUNTER SYMPTOMS
ABDOMINAL PAIN: 0
VOMITING: 0
EYE PAIN: 0
COUGH: 0
RHINORRHEA: 0
NAUSEA: 0
EYE REDNESS: 0
SHORTNESS OF BREATH: 0
BACK PAIN: 0

## 2017-09-18 ENCOUNTER — TELEPHONE (OUTPATIENT)
Dept: ORTHOPEDIC SURGERY | Age: 82
End: 2017-09-18

## 2017-09-20 ENCOUNTER — OFFICE VISIT (OUTPATIENT)
Dept: ORTHOPEDIC SURGERY | Age: 82
End: 2017-09-20

## 2017-09-20 DIAGNOSIS — Z48.02: ICD-10-CM

## 2017-09-20 DIAGNOSIS — Z96.651 STATUS POST TOTAL RIGHT KNEE REPLACEMENT: Primary | ICD-10-CM

## 2017-09-20 PROCEDURE — 99024 POSTOP FOLLOW-UP VISIT: CPT | Performed by: ORTHOPAEDIC SURGERY

## 2017-09-21 ENCOUNTER — HOSPITAL ENCOUNTER (OUTPATIENT)
Age: 82
Discharge: HOME | End: 2017-09-21
Payer: MEDICARE

## 2017-09-21 LAB
ANION GAP SERPL CALCULATED.3IONS-SCNC: 14 MMOL/L (ref 9–17)
BNP INTERPRETATION: ABNORMAL
BUN BLDV-MCNC: 15 MG/DL (ref 8–23)
BUN/CREAT BLD: ABNORMAL (ref 9–20)
CALCIUM SERPL-MCNC: 8.7 MG/DL (ref 8.6–10.4)
CHLORIDE BLD-SCNC: 104 MMOL/L (ref 98–107)
CO2: 27 MMOL/L (ref 20–31)
CREAT SERPL-MCNC: 0.69 MG/DL (ref 0.5–0.9)
EKG ATRIAL RATE: 86 BPM
EKG P-R INTERVAL: 184 MS
EKG Q-T INTERVAL: 454 MS
EKG QRS DURATION: 176 MS
EKG QTC CALCULATION (BAZETT): 543 MS
EKG R AXIS: -79 DEGREES
EKG T AXIS: 74 DEGREES
EKG VENTRICULAR RATE: 86 BPM
GFR AFRICAN AMERICAN: >60 ML/MIN
GFR NON-AFRICAN AMERICAN: >60 ML/MIN
GFR SERPL CREATININE-BSD FRML MDRD: ABNORMAL ML/MIN/{1.73_M2}
GFR SERPL CREATININE-BSD FRML MDRD: ABNORMAL ML/MIN/{1.73_M2}
GLUCOSE BLD-MCNC: 123 MG/DL (ref 70–99)
HCT VFR BLD CALC: 30.6 % (ref 36–46)
HEMOGLOBIN: 10.1 G/DL (ref 12–16)
POTASSIUM SERPL-SCNC: 4.1 MMOL/L (ref 3.7–5.3)
PRO-BNP: 823 PG/ML
SODIUM BLD-SCNC: 145 MMOL/L (ref 135–144)

## 2017-09-21 PROCEDURE — 83036 HEMOGLOBIN GLYCOSYLATED A1C: CPT

## 2017-09-21 PROCEDURE — 83880 ASSAY OF NATRIURETIC PEPTIDE: CPT

## 2017-09-21 PROCEDURE — 85014 HEMATOCRIT: CPT

## 2017-09-21 PROCEDURE — 80048 BASIC METABOLIC PNL TOTAL CA: CPT

## 2017-09-21 PROCEDURE — 85018 HEMOGLOBIN: CPT

## 2017-09-22 LAB
ESTIMATED AVERAGE GLUCOSE: 105 MG/DL
HBA1C MFR BLD: 5.3 % (ref 4–6)

## 2017-09-25 ENCOUNTER — HOSPITAL ENCOUNTER (OUTPATIENT)
Dept: PHYSICAL THERAPY | Age: 82
Setting detail: THERAPIES SERIES
Discharge: HOME | End: 2017-09-25
Payer: MEDICARE

## 2017-09-25 PROCEDURE — G8978 MOBILITY CURRENT STATUS: HCPCS

## 2017-09-25 PROCEDURE — G8979 MOBILITY GOAL STATUS: HCPCS

## 2017-09-25 PROCEDURE — 97162 PT EVAL MOD COMPLEX 30 MIN: CPT

## 2017-09-25 PROCEDURE — 97110 THERAPEUTIC EXERCISES: CPT

## 2017-09-25 ASSESSMENT — PAIN DESCRIPTION - LOCATION: LOCATION: KNEE

## 2017-09-25 ASSESSMENT — PAIN DESCRIPTION - FREQUENCY: FREQUENCY: CONTINUOUS

## 2017-09-25 ASSESSMENT — PAIN SCALES - GENERAL: PAINLEVEL_OUTOF10: 4

## 2017-09-25 ASSESSMENT — PAIN DESCRIPTION - ORIENTATION: ORIENTATION: RIGHT

## 2017-09-25 ASSESSMENT — PAIN DESCRIPTION - DESCRIPTORS: DESCRIPTORS: CONSTANT

## 2017-09-27 ENCOUNTER — HOSPITAL ENCOUNTER (OUTPATIENT)
Dept: PHYSICAL THERAPY | Age: 82
Setting detail: THERAPIES SERIES
Discharge: HOME | End: 2017-09-27
Payer: MEDICARE

## 2017-09-27 PROCEDURE — 97110 THERAPEUTIC EXERCISES: CPT

## 2017-09-27 ASSESSMENT — PAIN DESCRIPTION - FREQUENCY: FREQUENCY: CONTINUOUS

## 2017-09-27 ASSESSMENT — PAIN DESCRIPTION - DESCRIPTORS: DESCRIPTORS: CONSTANT

## 2017-09-27 ASSESSMENT — PAIN DESCRIPTION - LOCATION: LOCATION: KNEE

## 2017-09-27 ASSESSMENT — PAIN DESCRIPTION - ORIENTATION: ORIENTATION: RIGHT

## 2017-09-27 ASSESSMENT — PAIN SCALES - GENERAL: PAINLEVEL_OUTOF10: 3

## 2017-09-29 ENCOUNTER — HOSPITAL ENCOUNTER (OUTPATIENT)
Dept: PHYSICAL THERAPY | Age: 82
Setting detail: THERAPIES SERIES
Discharge: HOME | End: 2017-09-29
Payer: MEDICARE

## 2017-09-29 PROCEDURE — 97110 THERAPEUTIC EXERCISES: CPT

## 2017-09-29 ASSESSMENT — PAIN DESCRIPTION - ORIENTATION: ORIENTATION: RIGHT

## 2017-09-29 ASSESSMENT — PAIN DESCRIPTION - FREQUENCY: FREQUENCY: CONTINUOUS

## 2017-09-29 ASSESSMENT — PAIN DESCRIPTION - DESCRIPTORS: DESCRIPTORS: ACHING;CONSTANT

## 2017-09-29 ASSESSMENT — PAIN SCALES - GENERAL: PAINLEVEL_OUTOF10: 3

## 2017-09-29 ASSESSMENT — PAIN DESCRIPTION - LOCATION: LOCATION: KNEE

## 2017-10-02 ENCOUNTER — HOSPITAL ENCOUNTER (OUTPATIENT)
Dept: PHYSICAL THERAPY | Age: 82
Setting detail: THERAPIES SERIES
Discharge: HOME | End: 2017-10-02
Payer: MEDICARE

## 2017-10-02 PROCEDURE — 97110 THERAPEUTIC EXERCISES: CPT

## 2017-10-02 ASSESSMENT — PAIN SCALES - GENERAL: PAINLEVEL_OUTOF10: 3

## 2017-10-02 ASSESSMENT — PAIN DESCRIPTION - FREQUENCY: FREQUENCY: CONTINUOUS

## 2017-10-02 ASSESSMENT — PAIN DESCRIPTION - ORIENTATION: ORIENTATION: RIGHT

## 2017-10-02 ASSESSMENT — PAIN DESCRIPTION - DESCRIPTORS: DESCRIPTORS: ACHING;THROBBING

## 2017-10-02 ASSESSMENT — PAIN DESCRIPTION - LOCATION: LOCATION: KNEE

## 2017-10-02 NOTE — PROGRESS NOTES
Physical Therapy  Daily Treatment Note  Date: 10/2/2017  Patient Name: Reji Champion  MRN: [id number]     :   1930    Subjective:   General  Chart Reviewed: Yes  Additional Pertinent Hx: 17 RTKR  Family / Caregiver Present: No  Referring Practitioner: Mortimer Slovak  PT Visit Information  Onset Date: 07  PT Insurance Information: medicare/[employer]  Total # of Visits Approved: 12  Total # of Visits to Date: 4  Subjective  Subjective: R knee ache from healing  General Comment  Comments: Cues and demo for TKE, Calf Stretch   Pain Screening  Patient Currently in Pain: Yes  Pain Assessment  Pain Assessment: 0-10  Pain Level: 3  Pain Location: Knee  Pain Orientation: Right  Pain Descriptors: Aching; Throbbing  Pain Frequency: Continuous  Vital Signs  Patient Currently in Pain: Yes       Treatment Activities:      Exercises  Exercise 1: nustep L4 10'  Exercise 2: slant board 3x30\"  Exercise 3: step up 6\"F/L 10x  Exercise 4: Yellow Tband BLE 4 way hip 10x  Exercise 5: TKE R Yellow Tband 3x30  Exercise 6: shallow squats  Exercise 7: seated flex stretch  Exercise 8: seated ext stretch 3x30\"  Exercise 10: HEP  Exercise 11: Supine SLR   Exercise 12: SAQ/LAQ 2x10      Assessment:   Conditions Requiring Skilled Therapeutic Intervention  Body structures, Functions, Activity limitations: Decreased functional mobility ; Decreased ADL status; Decreased ROM; Decreased strength  Assessment: R knee pain limiting function  Treatment Diagnosis: stiffness/weakness R knee difficulty walking  Prognosis: Good  Patient Education: flex/wxt stretch  REQUIRES PT FOLLOW UP: Yes  Treatment Initiated : therapeutic ex to R knee  Discharge Recommendations: Home independently  Activity Tolerance  Activity Tolerance: Patient Tolerated treatment well  Comments: Patient demo mild unsteadiness with pivoting and directional changes using cane, reviewed safety and maintaining MILLA to ensure safety      Plan:  cont with POC     Timed Code Treatment Minutes: 35 Minutes  Total Treatment Time: 45     Therapy Time   Individual Concurrent Group Co-treatment   Time In  2:20         Time Out  3:05         Minutes  45         Timed Code Treatment Minutes: Zachariah Betts 92, PTA

## 2017-10-04 ENCOUNTER — HOSPITAL ENCOUNTER (OUTPATIENT)
Dept: PHYSICAL THERAPY | Age: 82
Setting detail: THERAPIES SERIES
Discharge: HOME | End: 2017-10-04
Payer: MEDICARE

## 2017-10-04 PROCEDURE — 97110 THERAPEUTIC EXERCISES: CPT

## 2017-10-04 ASSESSMENT — PAIN DESCRIPTION - LOCATION: LOCATION: KNEE

## 2017-10-04 ASSESSMENT — PAIN DESCRIPTION - FREQUENCY: FREQUENCY: CONTINUOUS

## 2017-10-04 ASSESSMENT — PAIN DESCRIPTION - ORIENTATION: ORIENTATION: RIGHT

## 2017-10-04 ASSESSMENT — PAIN SCALES - GENERAL: PAINLEVEL_OUTOF10: 2

## 2017-10-04 ASSESSMENT — PAIN DESCRIPTION - DESCRIPTORS: DESCRIPTORS: ACHING

## 2017-10-06 ENCOUNTER — HOSPITAL ENCOUNTER (OUTPATIENT)
Dept: PHYSICAL THERAPY | Age: 82
Setting detail: THERAPIES SERIES
Discharge: HOME | End: 2017-10-06
Payer: MEDICARE

## 2017-10-06 PROCEDURE — 97110 THERAPEUTIC EXERCISES: CPT

## 2017-10-06 PROCEDURE — G8979 MOBILITY GOAL STATUS: HCPCS

## 2017-10-06 PROCEDURE — G8978 MOBILITY CURRENT STATUS: HCPCS

## 2017-10-06 ASSESSMENT — PAIN DESCRIPTION - DESCRIPTORS: DESCRIPTORS: THROBBING

## 2017-10-06 ASSESSMENT — PAIN DESCRIPTION - LOCATION: LOCATION: KNEE

## 2017-10-06 ASSESSMENT — PAIN DESCRIPTION - FREQUENCY: FREQUENCY: INTERMITTENT

## 2017-10-06 ASSESSMENT — PAIN DESCRIPTION - ORIENTATION: ORIENTATION: RIGHT

## 2017-10-06 ASSESSMENT — PAIN SCALES - GENERAL: PAINLEVEL_OUTOF10: 2

## 2017-10-06 NOTE — PROGRESS NOTES
Physical Therapy Re-evaluation Note    Date: 10/6/2017  Patient Name: Debra Vera  MRN: [id number]  : 1930     Treatment Diagnosis: stiffness/weakness R knee difficulty walking    Subjective   General  Referring Practitioner: Yin Jacobo  Referral Date : 17  Diagnosis: S/P OWPT Z96.651  PT Visit Information  Onset Date: 07  PT Insurance Information: medicare/[employer]  Total # of Visits Approved: 12  Total # of Visits to Date: 6  Subjective  Subjective: pain noted on right knee when walking or going from sitting to standing  Pain Screening  Patient Currently in Pain: Yes  Pain Assessment  Pain Assessment: 0-10  Pain Level: 2  Pain Location: Knee  Pain Orientation: Right  Pain Descriptors:  Throbbing  Pain Frequency: Intermittent  Vital Signs  Patient Currently in Pain: Yes  Objective  AROM RLE (degrees)  R Knee Flexion 0-145: 107  R Knee Extension 0: 0  Strength RLE  R Hip Flexion: 4-/5  R Hip ABduction: 4+/5  R Knee Flexion: 4/5  R Knee Extension: 4/5  Ambulation 1  Surface: level tile  Device: Single point cane  Assistance: Stand by assistance  Comments: TUG 15 secs     Exercises  Exercise 1: nustep L4 10'  Exercise 2: slant board 3x30\"  Exercise 3: step up 6\"F/L 10x  Exercise 4: Red Tband BLE 4 way hip 10x  Exercise 5: TKE R Red Tband 3x30  Exercise 6: shallow squats 10x  Exercise 7: heel/toe raises 10x  Exercise 8: seated ext stretch 3x30\"  Exercise 9: PROM R Knee  Exercise 10: leg press 15# BLE 3x10  Exercise 11: leg curls BLE 15# 3x10  Exercise 12: leg ext BLE 5# 3x10    Assessment   Conditions Requiring Skilled Therapeutic Intervention  Assessment: SHORT TERM GOALS MET 2/4 LONG TERM GOAL MET 0/1  Treatment Diagnosis: stiffness/weakness R knee difficulty walking  REQUIRES PT FOLLOW UP: Yes  Activity Tolerance  Activity Tolerance: Patient Tolerated treatment well         Plan   Plan  Times per week: 3x/week  Current Treatment Recommendations: Strengthening, ROM, Home Exercise Program  Plan Comment: TO FINISH LAST 6 PT VISITS THEN DISCONTINUE PT WITH PATIENT TO CONTINUE HEP    G-Code  PT G-Codes  Functional Assessment Tool Used: TUG  Score: 15 secs  Functional Limitation: Mobility: Walking and moving around  Mobility: Walking and Moving Around Current Status ():  At least 20 percent but less than 40 percent impaired, limited or restricted  Mobility: Walking and Moving Around Goal Status (): 0 percent impaired, limited or restricted    Goals  Short term goals  Short term goal 1: decrease pain on R knee 0-1/10 so patient can tolerate walking better(partly met)  Short term goal 2: increase AROM R knee 0-110(partly met)  Short term goal 3: increase strength RLE by 1/2 grade(met)  Short term goal 4: indep with HEP(met)  Long term goals  Long term goal 1: improve TUG from 24 secs to 10 secs to prevent falls(partly met TUG 15 secs)       Therapy Time   Individual Concurrent Group Co-treatment   Time In 1520         Time Out 1550         Minutes 30         Timed Code Treatment Minutes: 30 Minutes    Electronically signed by: Mayo Chavez PT

## 2017-10-09 ENCOUNTER — HOSPITAL ENCOUNTER (OUTPATIENT)
Age: 82
Discharge: HOME | End: 2017-10-09
Payer: MEDICARE

## 2017-10-09 ENCOUNTER — HOSPITAL ENCOUNTER (OUTPATIENT)
Dept: PHYSICAL THERAPY | Age: 82
Setting detail: THERAPIES SERIES
Discharge: HOME | End: 2017-10-09
Payer: MEDICARE

## 2017-10-09 LAB
INR BLD: 4.6
PROTHROMBIN TIME: 54.4 SEC (ref 9.7–12)

## 2017-10-09 PROCEDURE — 97110 THERAPEUTIC EXERCISES: CPT

## 2017-10-09 PROCEDURE — 36415 COLL VENOUS BLD VENIPUNCTURE: CPT

## 2017-10-09 PROCEDURE — 85610 PROTHROMBIN TIME: CPT

## 2017-10-09 ASSESSMENT — PAIN DESCRIPTION - LOCATION: LOCATION: KNEE

## 2017-10-09 ASSESSMENT — PAIN SCALES - GENERAL: PAINLEVEL_OUTOF10: 1

## 2017-10-09 ASSESSMENT — PAIN DESCRIPTION - FREQUENCY: FREQUENCY: INTERMITTENT

## 2017-10-09 ASSESSMENT — PAIN DESCRIPTION - ORIENTATION: ORIENTATION: RIGHT

## 2017-10-09 ASSESSMENT — PAIN DESCRIPTION - DESCRIPTORS: DESCRIPTORS: TIGHTNESS

## 2017-10-09 NOTE — PROGRESS NOTES
Physical Therapy  [address]   Outpatient Physical Therapy  [address] [facility]. [address]  Phone: [phone]  Fax: [phone]  Daily Progress Note    Date: 10/9/17    Patient Name: Ollie Dickens        MRN: [id number]  Account: [de-identified] : 1930      General Information:  Additional Pertinent Hx: 17 RTKR  Referring Practitioner: Ina Holt  Referral Date : 17  Diagnosis: S/P RTKR A82.509  Follows Commands: Within Functional Limits  Onset Date: 07  PT Insurance Information: medicare/[employer]  Total # of Visits Approved: 12  Total # of Visits to Date: 7    Subjective:  Subjective: Patient states she felt good after last treatment and is compliant with HEP. Pain:  Patient Currently in Pain: Yes  Pain Assessment: 0-10  Pain Level: 1  Pain Location: Knee  Pain Orientation: Right  Pain Descriptors: Tightness  Pain Frequency: Intermittent       Objective: Right Knee PROM Flexion: 115 degrees  Exercise 1: nustep L4 10'  Exercise 2: slant board 3x30\"  Exercise 3: step up 6\"F/L 10x  Exercise 4: Red Tband BLE 4 way hip 10x  Exercise 5: TKE R Red Tband 3x30  Exercise 6: shallow squats 10x  Exercise 7: heel/toe raises 10x  Exercise 8: seated ext stretch 3x30\"  Exercise 9: PROM R Knee  Exercise 10: leg press 15# BLE 3x10  Exercise 11: leg curls BLE 15# 3x10  Exercise 12: leg ext BLE 5# 3x10          Comment:       Assessment: Body structures, Functions, Activity limitations: Decreased functional mobility   Assessment: Patient required multiple v/c's for correct exercise technqiue with no complaints of increased pain during treatment this date.   Treatment Diagnosis: stiffness/weakness R knee difficulty walking  Prognosis: Good  REQUIRES PT FOLLOW UP: Yes  Activity Tolerance: Patient Tolerated treatment well    Plan:  Plan: Continue with current plan    Therapy Time:  Time In: 1530  Time Out: 1620  Minutes: 50  Timed Code Treatment Minutes: 40 Minutes    Treatment Charges: Minutes Units

## 2017-10-11 ENCOUNTER — HOSPITAL ENCOUNTER (OUTPATIENT)
Dept: PHYSICAL THERAPY | Age: 82
Setting detail: THERAPIES SERIES
Discharge: HOME | End: 2017-10-11
Payer: MEDICARE

## 2017-10-11 PROCEDURE — 97110 THERAPEUTIC EXERCISES: CPT

## 2017-10-11 NOTE — PROGRESS NOTES
visit)    Therapy Time:     Timed Code Treatment Minutes: 45 Minutes    Treatment Charges: Minutes Units   []  Ultrasound     []  Electrical-Stim     []  Iontophoresis     []  Traction     []  Massage       []  Eval     []  Gait     [x]  Ther Exercise  45 3    []  Manual Therapy       []  Ther Activities       []  Aquatics     []  Neuro Re-Ed       []  Other       Total Treatment Time: 39 [place], PT

## 2017-10-13 ENCOUNTER — HOSPITAL ENCOUNTER (OUTPATIENT)
Dept: PHYSICAL THERAPY | Age: 82
Setting detail: THERAPIES SERIES
Discharge: HOME | End: 2017-10-13
Payer: MEDICARE

## 2017-10-13 PROCEDURE — 97110 THERAPEUTIC EXERCISES: CPT

## 2017-10-16 ENCOUNTER — HOSPITAL ENCOUNTER (OUTPATIENT)
Dept: PHYSICAL THERAPY | Age: 82
Setting detail: THERAPIES SERIES
Discharge: HOME | End: 2017-10-16
Payer: MEDICARE

## 2017-10-16 PROCEDURE — 97110 THERAPEUTIC EXERCISES: CPT

## 2017-10-16 ASSESSMENT — PAIN DESCRIPTION - ORIENTATION: ORIENTATION: RIGHT

## 2017-10-16 ASSESSMENT — PAIN SCALES - GENERAL: PAINLEVEL_OUTOF10: 1

## 2017-10-16 ASSESSMENT — PAIN DESCRIPTION - LOCATION: LOCATION: KNEE

## 2017-10-16 NOTE — PROGRESS NOTES
[address]   Outpatient Physical Therapy  [address] [facility]. [address]  Phone: [phone]  Fax: [phone]  Daily Progress Note    Date: 10/16/17    Patient Name: Paula Catherine        MRN: [id number]  Account: [de-identified] : 1930      General Information:  Chart Reviewed: Yes  Patient assessed for rehabilitation services?: Yes  Additional Pertinent Hx: 17 RTKR  Referring Practitioner: Shaq Lujan  Referral Date : 17  Diagnosis: S/P RTKR B44.740  Follows Commands: Within Functional Limits  Onset Date: 07  PT Insurance Information: medicare/[employer]  Total # of Visits Approved: 12  Total # of Visits to Date: 10    Subjective:  Subjective: Pt expressed concerned with incision site bruising. Pain:  Patient Currently in Pain: Yes  Pain Assessment: 0-10  Pain Level: 1  Pain Location: Knee  Pain Orientation: Right       Objective:  Exercise 1: nustep L4 10'  Exercise 3: step up 6\"F/L 10x  Exercise 10: leg press 20# BLE 3x10  Exercise 11: leg curls BLE 25# 3x10  Exercise 12: leg ext BLE 10# 4x10  Exercise 13: Heel taps 2\" (Continues needing v/c's)  Exercise 14: SLS in // bars 2x30\"  Exercise 15: Golfers lift to chair 10x  Exercise 16: Rockerboard 2 way 10x ea  Exercise 17: Tandem stance in // bars 2x30\"          Comment:  Comments: Inspected incision site secondary to patient concen. Insicion healing well, slight bruising toward distal end of scar, no s/sx of infection present this date. Assessment: Body structures, Functions, Activity limitations: Decreased functional mobility   Assessment: added stabilization and balance exercises to improve proprioception of B LE's.  Pt required SBA and UE support for all balance activities but did not c/o of any increased symptoms  REQUIRES PT FOLLOW UP: Yes  Activity Tolerance: Patient Tolerated treatment well    Plan:  Plan: Continue with current plan    Therapy Time:  Time In: 1525  Time Out: 17 [address]  Minutes: 42  Timed Code Treatment Minutes: 32 Minutes    Treatment Charges: Minutes Units   []  Ultrasound     []  Electrical-Stim     []  Iontophoresis     []  Traction     []  Massage       []  Eval     []  Gait     [x]  Ther Exercise 32 2   []  Manual Therapy       []  Ther Activities       []  Aquatics     []  Neuro Re-Ed       []  Other       Total Treatment Time: 28 2       Lux Ortiz PTA

## 2017-10-18 ENCOUNTER — HOSPITAL ENCOUNTER (OUTPATIENT)
Dept: PHYSICAL THERAPY | Age: 82
Setting detail: THERAPIES SERIES
Discharge: HOME | End: 2017-10-18
Payer: MEDICARE

## 2017-10-18 PROCEDURE — 97110 THERAPEUTIC EXERCISES: CPT

## 2017-10-18 PROCEDURE — G8978 MOBILITY CURRENT STATUS: HCPCS

## 2017-10-18 PROCEDURE — G8979 MOBILITY GOAL STATUS: HCPCS

## 2017-10-18 ASSESSMENT — PAIN DESCRIPTION - DESCRIPTORS: DESCRIPTORS: TIGHTNESS

## 2017-10-18 ASSESSMENT — PAIN DESCRIPTION - ORIENTATION: ORIENTATION: RIGHT

## 2017-10-18 ASSESSMENT — PAIN DESCRIPTION - LOCATION: LOCATION: KNEE

## 2017-10-18 ASSESSMENT — PAIN DESCRIPTION - FREQUENCY: FREQUENCY: INTERMITTENT

## 2017-10-18 ASSESSMENT — PAIN SCALES - GENERAL: PAINLEVEL_OUTOF10: 2

## 2017-10-18 NOTE — PROGRESS NOTES
restricted  Mobility: Walking and Moving Around Goal Status (): 0 percent impaired, limited or restricted     Goals  Short term goals  Short term goal 1: decrease pain on R knee 0-1/10 so patient can tolerate walking better(partly met)  Short term goal 2: increase AROM R knee 0-110(partly met)  Short term goal 3: increase strength RLE by 1/2 grade(partly met)  Short term goal 4: indep with HEP(met)  Long term goals  Long term goal 1: improve TUG from 24 secs to 10 secs to prevent falls(partly met)       Therapy Time   Individual Concurrent Group Co-treatment   Time In 1315         Time Out 1345         Minutes 30         Timed Code Treatment Minutes: 30 Minutes      Treatment Charges: Minutes Units   []  Ultrasound     []  Electrical-Stim     []  Iontophoresis     []  Traction     []  Massage       []  Eval     []  Gait     []  Ther Exercise       []  Manual Therapy       [x]  Ther Activities 30  2    []  Aquatics     []  Neuro Re-Ed       []  Other       Total Treatment Time: 30  2     Treatment Plan:  [x] Therapeutic Exercise    [] Modalities:  [] Therapeutic Activity    [] Ultrasound  [] Electrical Stimulation  [] Gait Training     []Massage       [] Lumbar/Cervical Traction  [] Neuromuscular Re-education [] Cold/hotpack [] Other:  [x] Instruction in HEP     [] Work Conditioning                                         [] Manual Therapy             [] Aquatic Therapy               [] Iontophoresis: 4 mg/mL      Dexamethasone Sodium      Phosphate 40-80mAmin     Frequency:       2    X/wk x      4    wk's      PLEASE SIGN BELOW IF OKAY TO CONTINUE PT. Medicare/Regulatory Requirements:  I have reviewed this plan of care and certify a need for   Medically necessary rehabilitation services.   [] Physician Signature    Date:     Electronically signed by: Jesusita Woodard, [address] [facility] [address] [facility]  [address] [place] [address] [address]  [place], [address] [facility] Hospital[facility]eb[facility]  Phone [phone]  Fax [phone]

## 2017-10-20 ENCOUNTER — HOSPITAL ENCOUNTER (OUTPATIENT)
Age: 82
Discharge: HOME | End: 2017-10-20
Payer: MEDICARE

## 2017-10-20 ENCOUNTER — HOSPITAL ENCOUNTER (OUTPATIENT)
Dept: PHYSICAL THERAPY | Age: 82
Setting detail: THERAPIES SERIES
Discharge: HOME | End: 2017-10-20
Payer: MEDICARE

## 2017-10-20 LAB
INR BLD: 1.9
PROTHROMBIN TIME: 20.7 SEC (ref 9.7–12)

## 2017-10-20 PROCEDURE — 85610 PROTHROMBIN TIME: CPT

## 2017-10-20 PROCEDURE — 97110 THERAPEUTIC EXERCISES: CPT

## 2017-10-20 PROCEDURE — 36415 COLL VENOUS BLD VENIPUNCTURE: CPT

## 2017-10-20 ASSESSMENT — PAIN DESCRIPTION - ORIENTATION: ORIENTATION: RIGHT

## 2017-10-20 ASSESSMENT — PAIN DESCRIPTION - LOCATION: LOCATION: KNEE

## 2017-10-20 ASSESSMENT — PAIN DESCRIPTION - DESCRIPTORS: DESCRIPTORS: TIGHTNESS

## 2017-10-20 ASSESSMENT — PAIN SCALES - GENERAL: PAINLEVEL_OUTOF10: 2

## 2017-10-20 NOTE — PROGRESS NOTES
[facility]   Outpatient Physical Therapy  [address] [facility]. [address]  Phone: [phone]  Fax: [phone]  Daily Progress Note    Date: 10/20/17    Patient Name: Maia Short        MRN: [id number]  Account: [de-identified] : 1930      General Information:  Referring Practitioner: Ludmila Montgomery  Referral Date : 17  Diagnosis: S/P RTKR Z96.651  Onset Date: 07  PT Insurance Information: medicare/C  Total # of Visits Approved: 20  Total # of Visits to Date: 12    Subjective:  Subjective: Patient continues to note pain at distal incision site, and states she has been trying to perform cross fricition massage it as instructed by PT. Pt also reports she feels her balance needs work. Pt also states she did not like walking on the treadmill last visit. Pain:  Patient Currently in Pain: Yes  Pain Assessment: 0-10  Pain Level: 2  Pain Location: Knee  Pain Orientation: Right  Pain Descriptors: Tightness       Objective:  Exercise 1: Nu Step 10'  Exercise 2: slant board 3x30\"  Exercise 3: step up 6\"F/L 10x  Exercise 5: sit to stand 10x  Exercise 8: seated ext stretch 3x30\"  Exercise 10: leg press 20# BLE 3x10  Exercise 11: leg curls BLE 25# 3x10  Exercise 12: leg ext BLE 10# 4x10  Exercise 14: SLS in // bars 2x30\"  Exercise 16: Rockerboard 2 way 10x ea  Exercise 17: Tandem stance in // bars 2x30\"          Comment:  Comments: to see  17    Assessment: Body structures, Functions, Activity limitations: Decreased functional mobility   Assessment: Patient able to complete balance activities with decreased UE support this date.    Activity Tolerance: Patient Tolerated treatment well    Plan:  Plan: Continue with current plan    Therapy Time:  Time In: 1325  Time Out: 1400  Minutes: 35  Timed Code Treatment Minutes: 25 Minutes    Treatment Charges: Minutes Units   []  Ultrasound     []  Electrical-Stim     []  Iontophoresis     []  Traction     []  Massage       []  Eval     []

## 2017-10-23 ENCOUNTER — HOSPITAL ENCOUNTER (OUTPATIENT)
Dept: PHYSICAL THERAPY | Age: 82
Setting detail: THERAPIES SERIES
Discharge: HOME | End: 2017-10-23
Payer: MEDICARE

## 2017-10-23 PROCEDURE — 97110 THERAPEUTIC EXERCISES: CPT

## 2017-10-23 PROCEDURE — 97140 MANUAL THERAPY 1/> REGIONS: CPT

## 2017-10-23 ASSESSMENT — PAIN DESCRIPTION - DESCRIPTORS: DESCRIPTORS: TIGHTNESS

## 2017-10-23 ASSESSMENT — PAIN SCALES - GENERAL: PAINLEVEL_OUTOF10: 2

## 2017-10-23 ASSESSMENT — PAIN DESCRIPTION - LOCATION: LOCATION: KNEE

## 2017-10-23 ASSESSMENT — PAIN DESCRIPTION - ORIENTATION: ORIENTATION: RIGHT

## 2017-10-23 NOTE — PROGRESS NOTES
[]  Electrical-Stim     []  Iontophoresis     []  Traction     []  Massage       []  Eval     []  Gait     [x]  Ther Exercise 34  2   [x]  Manual Therapy 8  1   []  Ther Activities       []  Aquatics     []  Neuro Re-Ed       []  Other       Total Treatment Time: 42 3       Edd Matt, PTA

## 2017-10-27 ENCOUNTER — HOSPITAL ENCOUNTER (OUTPATIENT)
Dept: PHYSICAL THERAPY | Age: 82
Setting detail: THERAPIES SERIES
Discharge: HOME | End: 2017-10-27
Payer: MEDICARE

## 2017-10-27 PROCEDURE — 97110 THERAPEUTIC EXERCISES: CPT

## 2017-10-27 ASSESSMENT — PAIN DESCRIPTION - LOCATION: LOCATION: KNEE

## 2017-10-27 ASSESSMENT — PAIN DESCRIPTION - ORIENTATION: ORIENTATION: RIGHT

## 2017-10-27 ASSESSMENT — PAIN SCALES - GENERAL: PAINLEVEL_OUTOF10: 2

## 2017-10-27 ASSESSMENT — PAIN DESCRIPTION - DESCRIPTORS: DESCRIPTORS: TIGHTNESS

## 2017-10-27 NOTE — PROGRESS NOTES
[address]   Outpatient Physical Therapy  [address] [facility]. [address]  Phone: [phone]  Fax: [phone]  Daily Progress Note    Date: 10/27/17    Patient Name: Yudelka Shaikh        MRN: [id number]  Account: [de-identified] : 1930      General Information:  Referring Practitioner: Marily Montez  Referral Date : 17  Diagnosis: S/P RTKR Z96.651  Onset Date: 07  PT Insurance Information: medicare/[employer]  Total # of Visits Approved: 20  Total # of Visits to Date: 14    Subjective:  Subjective: Pt report R knee feels tighter than usual and she had difficulty getting into a Meena kamari yesterday which is something she has not had problems doing. Pt also states Doctor reported her incision site looks good and instructed pt to resume compression garments to decrease R ankle swelling     Pain:  Patient Currently in Pain: Yes  Pain Assessment: 0-10  Pain Level: 2  Pain Location: Knee  Pain Orientation: Right  Pain Descriptors: Tightness       Objective:  Exercise 1: Bike Hole 3 5'  Exercise 2: slant board 3x30\"  Exercise 3: step up 8\"F/L 15x  Exercise 5: sit to stand 10x  Exercise 8: seated ext stretch 3x30\"  Exercise 10: leg press 25# BLE 3x10  Exercise 11: leg curls BLE 25# 3x10  Exercise 12: leg ext BLE 15# 4x10  Exercise 13: TKE 3x10 green tband  Exercise 14: SLS in // bars 2x30\"  Exercise 15: NBOS on foam with EC 2x30\"  Exercise 16: Rockerboard 2 way 10x ea  Exercise 17: Tandem stance in // bars 2x30\"  Joint mobilization: Patellar mobs  Soft Tissue Mobalization: cross friction massage to incision       Comment:  Comments: to see  17    Assessment: Body structures, Functions, Activity limitations: Decreased functional mobility   Assessment: Patient able to maintain and self right balance with better tolerance this date decreasing amount of fingtertip support required.    Patient Education: v/c's given for correct exercise technique  REQUIRES PT FOLLOW UP: Yes  Activity Tolerance:

## 2017-10-30 ENCOUNTER — HOSPITAL ENCOUNTER (OUTPATIENT)
Dept: PHYSICAL THERAPY | Age: 82
Setting detail: THERAPIES SERIES
Discharge: HOME | End: 2017-10-30
Payer: MEDICARE

## 2017-10-30 PROCEDURE — G8979 MOBILITY GOAL STATUS: HCPCS

## 2017-10-30 PROCEDURE — G8978 MOBILITY CURRENT STATUS: HCPCS

## 2017-10-30 PROCEDURE — 97110 THERAPEUTIC EXERCISES: CPT

## 2017-11-01 ENCOUNTER — OFFICE VISIT (OUTPATIENT)
Dept: ORTHOPEDIC SURGERY | Age: 82
End: 2017-11-01

## 2017-11-01 DIAGNOSIS — Z96.651 STATUS POST TOTAL RIGHT KNEE REPLACEMENT: Primary | ICD-10-CM

## 2017-11-01 PROCEDURE — 99024 POSTOP FOLLOW-UP VISIT: CPT | Performed by: ORTHOPAEDIC SURGERY

## 2017-11-01 NOTE — PROGRESS NOTES
Omkar Caceres M.D.            [address] [facility] [facility], [address] [facility]             Dept Phone: [phone]             Dept Fax:  [phone] returns today. She is 2 months status post right total knee. She is here with her daughter. She states that her activity is improved significant way. She says her pain is very minimal.  She's been her out a lot better than she was prior to surgery. She's very happy with results. Examination right knee notes her wound is healed. Her motion is 2-120 degrees. She has good stability good patellar tracking has low bit improvement in strength to work on but she doing well for age 80    No new x-rays taken today    Impression line status post right total knee ×2 months doing well    Plan  Patient was encouraged continue doing strengthening exercises. Back here in 3 months. Call any problems prior that time          Review of Systems   Constitutional: Negative. HENT: Negative. Respiratory: Negative. Cardiovascular: Negative. Musculoskeletal: Negative. Neurological: Negative.          Past Medical History:   Diagnosis Date    Arthritis     Atrial fibrillation ([address] 8/21/2016    CH[facility]est[facility] failure[facility])     Complete heart block ([address] 8/18/2016    Constipation     GERD (gastroesophageal reflux disease)     Glaucoma     mild, on eye drops    Hearing loss     bilateral hearing aids    Hiatal hernia     Hx of blood clots 07/2016    pulmonary emboli bilateral lungs after Pacemaker inserted    Hyperlipidemia     Osteopenia     Pacemaker 08/18/2016    Duo-chamber pacemaker in left upper chest    Restless leg syndrome     Right knee DJD     Wears glasses      Past Surgical History:   Procedure Laterality Date    CATARACT REMOVAL WITH IMPLANT Bilateral     Left 1/6/1998, right 8/2/1996    COLONOSCOPY      DILATION AND CURETTAGE OF UTERUS      JOINT

## 2017-11-02 ENCOUNTER — HOSPITAL ENCOUNTER (OUTPATIENT)
Dept: PHYSICAL THERAPY | Age: 82
Setting detail: THERAPIES SERIES
Discharge: HOME | End: 2017-11-02
Payer: MEDICARE

## 2017-11-02 PROCEDURE — 97110 THERAPEUTIC EXERCISES: CPT

## 2017-11-02 NOTE — PROGRESS NOTES
Physical Therapy  Daily Treatment Note  Date: 2017  Patient Name: Mark Dempsey  MRN: [id number]     :   1930    Subjective:      PT Visit Information  Onset Date: 07  PT Insurance Information: medicare/[employer]  Total # of Visits Approved: 20  Total # of Visits to Date: 16  Subjective  Subjective: no pain R knee  Pain Screening  Patient Currently in Pain: No  Vital Signs  Patient Currently in Pain: No       Treatment Activities:   Exercises  Exercise 1: Bike Hole 6 5'  Exercise 2: slant board 3x30\"  Exercise 3: step up 8\"F/L 15x  Exercise 5: sit to stand 10x  Exercise 6: Green Tband BLE 4 way hip 10x  Exercise 8: seated ext stretch 3x30\"  Exercise 10: leg press 25# BLE 3x10  Exercise 11: leg curls BLE 25# 3x10  Exercise 12: leg ext BLE 10# 3x10  Exercise 13: TKE 3x10 green tband  Exercise 14: SLS in // bars 2x30\"R only  Exercise 15: NBOS on foam with EC 2x30\"  Exercise 17: Tandem stance in // bars 2x30\"    Assessment:   Conditions Requiring Skilled Therapeutic Intervention  REQUIRES PT FOLLOW UP: Yes     Plan:    Plan  Times per week: 2x/week  Current Treatment Recommendations: Strengthening, ROM, Home Exercise Program  Plan Comment: to continue PT per POC  Timed Code Treatment Minutes: 30 Minutes   Treatment Charges: Minutes Units   []  Ultrasound     []  Electrical-Stim     []  Iontophoresis     []  Traction     []  Massage       []  Eval     []  Gait     [x]  Ther Exercise  30 2    []  Manual Therapy       []  Ther Activities       []  Aquatics     []  Neuro Re-Ed       []  Other       Total Treatment Time: 30  2       Therapy Time   Individual Concurrent Group Co-treatment   Time In 1250         Time Out 1320         Minutes 30         Timed Code Treatment Minutes: 30 Minutes    Electronically signed by: Eugenia Núñez, PT

## 2017-11-03 ENCOUNTER — HOSPITAL ENCOUNTER (OUTPATIENT)
Age: 82
Discharge: HOME | End: 2017-11-03
Payer: MEDICARE

## 2017-11-03 LAB
INR BLD: 1.9
PROTHROMBIN TIME: 21.7 SEC (ref 9.7–12)

## 2017-11-03 PROCEDURE — 36415 COLL VENOUS BLD VENIPUNCTURE: CPT

## 2017-11-03 PROCEDURE — 85610 PROTHROMBIN TIME: CPT

## 2017-11-06 ENCOUNTER — HOSPITAL ENCOUNTER (OUTPATIENT)
Dept: PHYSICAL THERAPY | Age: 82
Setting detail: THERAPIES SERIES
Discharge: HOME | End: 2017-11-06
Payer: MEDICARE

## 2017-11-06 PROCEDURE — 97110 THERAPEUTIC EXERCISES: CPT

## 2017-11-06 ASSESSMENT — PAIN DESCRIPTION - PAIN TYPE: TYPE: SURGICAL PAIN

## 2017-11-06 ASSESSMENT — PAIN DESCRIPTION - ORIENTATION: ORIENTATION: RIGHT

## 2017-11-06 ASSESSMENT — PAIN DESCRIPTION - LOCATION: LOCATION: KNEE

## 2017-11-06 ASSESSMENT — PAIN DESCRIPTION - DESCRIPTORS: DESCRIPTORS: TIGHTNESS

## 2017-11-06 ASSESSMENT — PAIN SCALES - GENERAL: PAINLEVEL_OUTOF10: 2

## 2017-11-06 ASSESSMENT — PAIN DESCRIPTION - FREQUENCY: FREQUENCY: INTERMITTENT

## 2017-11-06 NOTE — PROGRESS NOTES
Physical Therapy  Daily Treatment Note  Date: 2017  Patient Name: Tushar Bates  MRN: [id number]     :   1930    Subjective:   General  Chart Reviewed:  Yes  Additional Pertinent Hx: 17 RTKR  Family / Caregiver Present: No  Referring Practitioner: Samira Yan  PT Visit Information  Onset Date: 07  PT Insurance Information: medicare/[employer]  Total # of Visits Approved: 20  Total # of Visits to Date: 17  Subjective  Subjective: no pain R knee 1-2/10  General Comment  Comments: to see  18  Pain Screening  Patient Currently in Pain: Yes  Pain Assessment  Pain Assessment: 0-10  Pain Level: 2  Pain Type: Surgical pain  Pain Location: Knee  Pain Orientation: Right  Pain Descriptors: Tightness  Pain Frequency: Intermittent  Vital Signs  Patient Currently in Pain: Yes       Treatment Activities:                                      Exercises  Exercise 1: Bike Hole 6 5'  Exercise 2: slant board 3x30\"  Exercise 3: step up 8\"F/L 15x  Exercise 5: sit to stand 10x  Exercise 6: Green Tband BLE 4 way hip 10x  Exercise 8: seated ext stretch 3x30\"  Exercise 10: leg press 25# BLE 3x10  Exercise 11: leg curls BLE 25# 3x10  Exercise 12: leg ext BLE 10# 3x10  Exercise 13: TKE 3x10 green tband  Exercise 14: SLS in // bars 2x30\"R only  Exercise 15: NBOS on foam with EC 2x30\"  Exercise 17: Tandem stance in // bars 2x30\"                                   Assessment:   Conditions Requiring Skilled Therapeutic Intervention  Body structures, Functions, Activity limitations: Decreased functional mobility                            Goals:  Patient Goals   Patient goals : walk good and relieve pain    Plan:    Plan  Times per week: 2x/week  Plan weeks: 4 weeks  Specific instructions for Next Treatment: progress with ex as tolerated  Current Treatment Recommendations: Strengthening, ROM, Home Exercise Program  Plan Comment: to continue PT per POC        Therapy Time   Individual Concurrent Group Co-treatment   Time In [address]         Minutes [address], [place]

## 2017-11-08 ENCOUNTER — HOSPITAL ENCOUNTER (OUTPATIENT)
Dept: PHYSICAL THERAPY | Age: 82
Setting detail: THERAPIES SERIES
Discharge: HOME | End: 2017-11-08
Payer: MEDICARE

## 2017-11-08 PROCEDURE — 97110 THERAPEUTIC EXERCISES: CPT

## 2017-11-08 ASSESSMENT — PAIN DESCRIPTION - ORIENTATION: ORIENTATION: RIGHT

## 2017-11-08 ASSESSMENT — PAIN SCALES - GENERAL: PAINLEVEL_OUTOF10: 1

## 2017-11-08 ASSESSMENT — PAIN DESCRIPTION - LOCATION: LOCATION: KNEE

## 2017-11-08 NOTE — PROGRESS NOTES
Physical Therapy  [address]   Outpatient Physical Therapy  [address] [facility]. [address]  Phone: [phone]  Fax: [phone]  Daily Progress Note    Date: 17    Patient Name: Parvez Pantoja        MRN: [id number]  Account: [de-identified] : 1930      General Information:  Additional Pertinent Hx: (P) 17 RTKR  Referring Practitioner: (P) Rosalia Dickens  Referral Date : (P) 17  Diagnosis: (P) S/P RTKR Y03.934  Onset Date: (P) 07  PT Insurance Information: (P) medicare/[employer]  Total # of Visits Approved: (P) 20  Total # of Visits to Date: (P) 18    Subjective:  Subjective: (P) REPORTS THE ONLY EXERCISE THAT IS DIFFICULT IS THE BALANCING. REPORTS HER KNEE FEELS GOOD AND SHE IS ABLE TO PERFORM ADL'S BETTER THAN BEFORE SURGERY. Pain:     Pain Level: (P) 1  Pain Location: (P) Knee  Pain Orientation: (P) Right       Objective:  Exercise 1: Bike Hole 6 5'  Exercise 2: slant board 3x30\"  Exercise 3: step up 8\"F/L 15x  Exercise 5: (P) sit to stand 10x  Exercise 6: Green Tband BLE 4 way hip 10x  Exercise 8: (P) seated ext stretch 3x30\"  Exercise 10: leg press 25# BLE 3x10  Exercise 11: leg curls BLE 25# 3x10  Exercise 12: leg ext BLE 10# 3x10  Exercise 13: TKE 3x10 green tband  Exercise 14: SLS in // bars 2x30\"R only  Exercise 15: (P) STAGGERED NBOS on foam with EC 2x30\"  Exercise 17: Tandem stance in // bars 2x30\"          Comment:  Comments: (P) EXCELLENT KNEE FLEXION ROM, LACKS A FEW DEGRESES OF KNEE EXTENSION. Assessment: Body structures, Functions, Activity limitations: (P) Decreased functional mobility   Treatment Diagnosis: (P) stiffness/weakness R knee difficulty walking  Patient Education: (P) v/c's given for correct exercise technique  Activity Tolerance: (P) Patient Tolerated treatment well    Plan:  Plan: (P) Continue with current plan  Comment: (P) HAS 2 MORE SESSIONS OF RX, THEN PROBABLE D/C.     Therapy Time:  Time In: 930  Time Out: 1010  Minutes: 40

## 2017-11-13 ENCOUNTER — HOSPITAL ENCOUNTER (OUTPATIENT)
Dept: PHYSICAL THERAPY | Age: 82
Setting detail: THERAPIES SERIES
Discharge: HOME | End: 2017-11-13
Payer: MEDICARE

## 2017-11-13 PROCEDURE — 97110 THERAPEUTIC EXERCISES: CPT

## 2017-11-14 ENCOUNTER — TELEPHONE (OUTPATIENT)
Dept: ORTHOPEDIC SURGERY | Age: 82
End: 2017-11-14

## 2017-11-14 RX ORDER — AMOXICILLIN 500 MG/1
CAPSULE ORAL
Qty: 6 CAPSULE | Refills: 0 | Status: SHIPPED | OUTPATIENT
Start: 2017-11-14 | End: 2017-12-04 | Stop reason: SDUPTHER

## 2017-11-15 ENCOUNTER — HOSPITAL ENCOUNTER (OUTPATIENT)
Dept: PHYSICAL THERAPY | Age: 82
Setting detail: THERAPIES SERIES
Discharge: HOME | End: 2017-11-15
Payer: MEDICARE

## 2017-11-15 PROCEDURE — G8980 MOBILITY D/C STATUS: HCPCS

## 2017-11-15 PROCEDURE — 97110 THERAPEUTIC EXERCISES: CPT

## 2017-11-15 PROCEDURE — G8979 MOBILITY GOAL STATUS: HCPCS

## 2017-11-15 NOTE — PROGRESS NOTES
Physical Therapy Discharge Note    Date: 11/15/2017  Patient Name: Yudelka Shaikh  MRN: [id number]  : 1930     Treatment Diagnosis: stiffness/weakness R knee difficulty walking    Subjective   General  Referring Practitioner: Marily Montez  Referral Date : 17  Diagnosis: S/P RTKR Z96.651  General Comment  Comments: reportedly fell 11/10/17  PT Visit Information  Onset Date: 07  PT Insurance Information: medicare/[employer]  Total # of Visits Approved: 20  Total # of Visits to Date: 20  Subjective  Subjective: no pain complained on R knee today  Pain Screening  Patient Currently in Pain: No  Vital Signs  Patient Currently in Pain: No    Objective  AROM RLE (degrees)  R Knee Flexion 0-145: 5-110  R Knee Extension 0: 5  Strength RLE  R Hip Flexion: 4+/5  R Hip ABduction: 4+/5  R Knee Flexion: 4+/5  R Knee Extension: 4+/5  Ambulation 1  Device: No Device  Assistance: Independent  Comments: TUG 11 secs     Exercises  Exercise 1: bike hole 4 10'  Exercise 2: slant board 3x30\"  Exercise 3: step up 8\"F/L 15x  Exercise 5: sit to stand 10x  Exercise 6: Green Tband BLE 4 way hip 10x  Exercise 8: seated ext stretch 3x30\"  Exercise 10: leg press 25# BLE 3x10  Exercise 11: leg curls BLE 25# 3x10  Exercise 12: leg ext BLE 10# 3x10  Exercise 13: TKE 3x10 green tband  Exercise 14: SLS in // bars 2x30\"R only  Exercise 15: STAGGERED NBOS on foam with EO 2x30\"  Exercise 17: Tandem stance in // bars 2x30\"     Assessment   Conditions Requiring Skilled Therapeutic Intervention  Assessment: SHORT TERM GOALS MET 3/4 LONG TERM GOAL MET 0/1  Treatment Diagnosis: stiffness/weakness R knee difficulty walking  REQUIRES PT FOLLOW UP: No  Activity Tolerance  Activity Tolerance: Patient Tolerated treatment well         Plan   Plan  Current Treatment Recommendations: Strengthening, ROM, Home Exercise Program  Plan Comment: TO DISCONTINUE PT AND PATIENT TO CONTINUE HEP    G-Code  PT G-Codes  Functional Assessment Tool Used: TUG  Score: 11 secs  Functional Limitation: Mobility: Walking and moving around  Mobility: Walking and Moving Around Goal Status (): 0 percent impaired, limited or restricted  Mobility: Walking and Moving Around Discharge Status ():  At least 1 percent but less than 20 percent impaired, limited or restricted    Goals  Short term goals  Short term goal 1: decrease pain on R knee 0-1/10 so patient can tolerate walking better(met)  Short term goal 2: increase AROM R knee 0-110(partly met)  Short term goal 3: increase strength RLE by 1/2 grade(met)  Short term goal 4: indep with HEP(met)  Long term goals  Long term goal 1: improve TUG from 24 secs to 9 secs to prevent falls(partly met)      Treatment Charges: Minutes Units   []  Ultrasound     []  Electrical-Stim     []  Iontophoresis     []  Traction     []  Massage       []  Eval     []  Gait     [x]  Ther Exercise 30   2   []  Manual Therapy       []  Ther Activities       []  Aquatics     []  Neuro Re-Ed       []  Other       Total Treatment Time: 30  2         Therapy Time   Individual Concurrent Group Co-treatment   Time In 1000         Time Out 1030         Minutes 30         Timed Code Treatment Minutes: 30 Minutes     Electronically signed by: Talia Burks, PT

## 2017-11-16 ENCOUNTER — CARE COORDINATION (OUTPATIENT)
Dept: CASE MANAGEMENT | Age: 82
End: 2017-11-16

## 2017-11-16 NOTE — CARE COORDINATION
Spoke with Perez Medina    Incision status: healing up well    Edema/Swelling no swelling    Pain level and status: not much pain    Use of pain medications: if needed    Bowels:     [address] active:     Outpatient therapy: finished yesterday    Do you have all of your medications: yes    Changes in medications:   Patient doing really well, will follow//JU  Follow up appointments:    Future Appointments  Date Time Provider Simin Mackey   2/1/2018 1:40 PM Poly Ferguson MD [address] [place] [address]

## 2017-12-04 ENCOUNTER — TELEPHONE (OUTPATIENT)
Dept: ORTHOPEDIC SURGERY | Age: 82
End: 2017-12-04

## 2017-12-04 ENCOUNTER — HOSPITAL ENCOUNTER (OUTPATIENT)
Age: 82
Discharge: HOME | End: 2017-12-04
Payer: MEDICARE

## 2017-12-04 RX ORDER — AMOXICILLIN 500 MG/1
CAPSULE ORAL
Qty: 6 CAPSULE | Refills: 0 | Status: SHIPPED | OUTPATIENT
Start: 2017-12-04 | End: 2018-04-12 | Stop reason: ALTCHOICE

## 2017-12-13 ENCOUNTER — HOSPITAL ENCOUNTER (OUTPATIENT)
Age: 82
Discharge: HOME | End: 2017-12-13
Payer: MEDICARE

## 2017-12-13 LAB
INR BLD: 2
PROTHROMBIN TIME: 21.8 SEC (ref 9.7–12)

## 2017-12-13 PROCEDURE — 85610 PROTHROMBIN TIME: CPT

## 2017-12-13 PROCEDURE — 36415 COLL VENOUS BLD VENIPUNCTURE: CPT

## 2017-12-16 ENCOUNTER — CARE COORDINATION (OUTPATIENT)
Dept: CASE MANAGEMENT | Age: 82
End: 2017-12-16

## 2017-12-16 NOTE — CARE COORDINATION
Attempt to reach patient for final CCJR call. [facility] requesting return call if there are needs/concerns. Contact information provided. Episode resolved.

## 2018-01-03 ENCOUNTER — HOSPITAL ENCOUNTER (OUTPATIENT)
Age: 83
Discharge: HOME | End: 2018-01-03
Payer: MEDICARE

## 2018-01-03 LAB
INR BLD: 2
PROTHROMBIN TIME: 21.8 SEC (ref 9.7–12)

## 2018-01-03 PROCEDURE — 85610 PROTHROMBIN TIME: CPT

## 2018-01-03 PROCEDURE — 36415 COLL VENOUS BLD VENIPUNCTURE: CPT

## 2018-02-01 ENCOUNTER — HOSPITAL ENCOUNTER (OUTPATIENT)
Dept: PULMONOLOGY | Age: 83
Discharge: HOME | End: 2018-02-01
Payer: MEDICARE

## 2018-02-01 ENCOUNTER — OFFICE VISIT (OUTPATIENT)
Dept: ORTHOPEDIC SURGERY | Age: 83
End: 2018-02-01
Payer: MEDICARE

## 2018-02-01 VITALS
SYSTOLIC BLOOD PRESSURE: 113 MMHG | BODY MASS INDEX: 26.98 KG/M2 | HEIGHT: 64 IN | DIASTOLIC BLOOD PRESSURE: 60 MMHG | HEART RATE: 81 BPM | WEIGHT: 158 LBS

## 2018-02-01 DIAGNOSIS — Z96.651 STATUS POST TOTAL RIGHT KNEE REPLACEMENT: Primary | ICD-10-CM

## 2018-02-01 PROCEDURE — 99212 OFFICE O/P EST SF 10 MIN: CPT | Performed by: ORTHOPAEDIC SURGERY

## 2018-02-01 PROCEDURE — 94727 GAS DIL/WSHOT DETER LNG VOL: CPT

## 2018-02-01 PROCEDURE — 4040F PNEUMOC VAC/ADMIN/RCVD: CPT | Performed by: ORTHOPAEDIC SURGERY

## 2018-02-01 PROCEDURE — 1036F TOBACCO NON-USER: CPT | Performed by: ORTHOPAEDIC SURGERY

## 2018-02-01 PROCEDURE — G8484 FLU IMMUNIZE NO ADMIN: HCPCS | Performed by: ORTHOPAEDIC SURGERY

## 2018-02-01 PROCEDURE — 6360000002 HC RX W HCPCS: Performed by: INTERNAL MEDICINE

## 2018-02-01 PROCEDURE — 94728 AIRWY RESIST BY OSCILLOMETRY: CPT

## 2018-02-01 PROCEDURE — G8427 DOCREV CUR MEDS BY ELIG CLIN: HCPCS | Performed by: ORTHOPAEDIC SURGERY

## 2018-02-01 PROCEDURE — G8419 CALC BMI OUT NRM PARAM NOF/U: HCPCS | Performed by: ORTHOPAEDIC SURGERY

## 2018-02-01 PROCEDURE — 1090F PRES/ABSN URINE INCON ASSESS: CPT | Performed by: ORTHOPAEDIC SURGERY

## 2018-02-01 PROCEDURE — 94729 DIFFUSING CAPACITY: CPT

## 2018-02-01 PROCEDURE — 1123F ACP DISCUSS/DSCN MKR DOCD: CPT | Performed by: ORTHOPAEDIC SURGERY

## 2018-02-01 PROCEDURE — 94726 PLETHYSMOGRAPHY LUNG VOLUMES: CPT

## 2018-02-01 PROCEDURE — 94060 EVALUATION OF WHEEZING: CPT

## 2018-02-01 RX ORDER — ALBUTEROL SULFATE 2.5 MG/3ML
2.5 SOLUTION RESPIRATORY (INHALATION) ONCE
Status: COMPLETED | OUTPATIENT
Start: 2018-02-01 | End: 2018-02-01

## 2018-02-01 RX ADMIN — ALBUTEROL SULFATE 2.5 MG: 2.5 SOLUTION RESPIRATORY (INHALATION) at 10:02

## 2018-02-01 NOTE — PROGRESS NOTES
Oskar Omar ODIN            [address] [place] [address]., [address] [facility] [address]             Dept Phone: [phone]             Dept Fax:  [phone] returns today status post right total knee on 9/5/17. She states overall her pain is much better. She still having some issues regarding dizziness balance but these are not related to her knee    Examination of her knee notes her wound is pristine. Motion 01 25 good stability throughout good strength throughout overall doing well. No new x-rays taken today    Impression  Status post right total knee #2017    Plan  Patient to get is encouraged to do more exercises. She is also encouraged to follow-up with her primary care physician to help her with her dizziness. Told her we really wish to avoid having any falls any associated fractures. Back here in September 2018          Review of Systems   Constitutional: Negative. HENT: Negative. Respiratory: Negative. Cardiovascular: Negative. Musculoskeletal: Negative. Neurological: Negative.          Past Medical History:   Diagnosis Date    Arthritis     Atrial fibrillation ([address] 8/21/2016    CH[facility]congest[facility]     Complete heart block ([address] 8/18/2016    Constipation     GERD (gastroesophageal reflux disease)     Glaucoma     mild, on eye drops    Hearing loss     bilateral hearing aids    Hiatal hernia     Hx of blood clots 07/2016    pulmonary emboli bilateral lungs after Pacemaker inserted    Hyperlipidemia     Osteopenia     Pacemaker 08/18/2016    Duo-chamber pacemaker in left upper chest    Restless leg syndrome     Right knee DJD     Wears glasses      Past Surgical History:   Procedure Laterality Date    CATARACT REMOVAL WITH IMPLANT Bilateral     Left 1/6/1998, right 8/2/1996    COLONOSCOPY      DILATION AND CURETTAGE OF UTERUS      JOINT REPLACEMENT Right 09/05/2017    TKA   

## 2018-02-26 ENCOUNTER — HOSPITAL ENCOUNTER (OUTPATIENT)
Age: 83
Discharge: HOME | End: 2018-02-26
Payer: MEDICARE

## 2018-02-26 LAB
INR BLD: 2
PROTHROMBIN TIME: 19.9 SEC (ref 9.7–12)

## 2018-02-26 PROCEDURE — 85610 PROTHROMBIN TIME: CPT

## 2018-02-26 PROCEDURE — 36415 COLL VENOUS BLD VENIPUNCTURE: CPT

## 2018-03-19 ENCOUNTER — HOSPITAL ENCOUNTER (OUTPATIENT)
Age: 83
Discharge: HOME | End: 2018-03-19
Payer: MEDICARE

## 2018-03-19 LAB
INR BLD: 1.8
PROTHROMBIN TIME: 18.6 SEC (ref 9.7–12)

## 2018-03-19 PROCEDURE — 36415 COLL VENOUS BLD VENIPUNCTURE: CPT

## 2018-03-19 PROCEDURE — 85610 PROTHROMBIN TIME: CPT

## 2018-04-10 ENCOUNTER — HOSPITAL ENCOUNTER (OUTPATIENT)
Age: 83
Discharge: HOME | End: 2018-04-10
Payer: MEDICARE

## 2018-04-10 LAB
INR BLD: 3.7
PROTHROMBIN TIME: 35.9 SEC (ref 9.7–12)

## 2018-04-10 PROCEDURE — 85610 PROTHROMBIN TIME: CPT

## 2018-04-10 PROCEDURE — 36415 COLL VENOUS BLD VENIPUNCTURE: CPT

## 2018-04-12 ENCOUNTER — OFFICE VISIT (OUTPATIENT)
Dept: UROLOGY | Age: 83
End: 2018-04-12
Payer: MEDICARE

## 2018-04-12 VITALS — HEART RATE: 73 BPM | SYSTOLIC BLOOD PRESSURE: 116 MMHG | DIASTOLIC BLOOD PRESSURE: 67 MMHG | TEMPERATURE: 98.1 F

## 2018-04-12 DIAGNOSIS — R31.0 GROSS HEMATURIA: Primary | ICD-10-CM

## 2018-04-12 PROCEDURE — 4040F PNEUMOC VAC/ADMIN/RCVD: CPT | Performed by: UROLOGY

## 2018-04-12 PROCEDURE — 1123F ACP DISCUSS/DSCN MKR DOCD: CPT | Performed by: UROLOGY

## 2018-04-12 PROCEDURE — G8419 CALC BMI OUT NRM PARAM NOF/U: HCPCS | Performed by: UROLOGY

## 2018-04-12 PROCEDURE — G8427 DOCREV CUR MEDS BY ELIG CLIN: HCPCS | Performed by: UROLOGY

## 2018-04-12 PROCEDURE — 1090F PRES/ABSN URINE INCON ASSESS: CPT | Performed by: UROLOGY

## 2018-04-12 PROCEDURE — 99204 OFFICE O/P NEW MOD 45 MIN: CPT | Performed by: UROLOGY

## 2018-04-12 PROCEDURE — 1036F TOBACCO NON-USER: CPT | Performed by: UROLOGY

## 2018-04-12 RX ORDER — POTASSIUM CHLORIDE 750 MG/1
10 CAPSULE, EXTENDED RELEASE ORAL DAILY PRN
Status: ON HOLD | COMMUNITY
End: 2019-10-03 | Stop reason: HOSPADM

## 2018-04-12 RX ORDER — OXYCODONE HYDROCHLORIDE AND ACETAMINOPHEN 5; 325 MG/1; MG/1
1 TABLET ORAL EVERY 4 HOURS PRN
COMMUNITY
End: 2019-03-07 | Stop reason: ALTCHOICE

## 2018-04-12 ASSESSMENT — ENCOUNTER SYMPTOMS
COLOR CHANGE: 1
EYE PAIN: 0
WHEEZING: 0
ABDOMINAL PAIN: 0
BACK PAIN: 1
SHORTNESS OF BREATH: 0
EYE REDNESS: 0
NAUSEA: 0
VOMITING: 0
COUGH: 1

## 2018-04-16 ENCOUNTER — HOSPITAL ENCOUNTER (OUTPATIENT)
Dept: CT IMAGING | Facility: CLINIC | Age: 83
Discharge: HOME | End: 2018-04-18
Payer: MEDICARE

## 2018-04-16 ENCOUNTER — HOSPITAL ENCOUNTER (OUTPATIENT)
Age: 83
Setting detail: SPECIMEN
Discharge: HOME | End: 2018-04-16
Payer: MEDICARE

## 2018-04-16 DIAGNOSIS — R31.0 GROSS HEMATURIA: ICD-10-CM

## 2018-04-16 LAB
ANION GAP SERPL CALCULATED.3IONS-SCNC: 15 MMOL/L (ref 9–17)
BUN BLDV-MCNC: 16 MG/DL (ref 8–23)
BUN/CREAT BLD: ABNORMAL (ref 9–20)
CALCIUM SERPL-MCNC: 9.6 MG/DL (ref 8.6–10.4)
CHLORIDE BLD-SCNC: 97 MMOL/L (ref 98–107)
CO2: 27 MMOL/L (ref 20–31)
CREAT SERPL-MCNC: 0.75 MG/DL (ref 0.5–0.9)
GFR AFRICAN AMERICAN: >60 ML/MIN
GFR NON-AFRICAN AMERICAN: >60 ML/MIN
GFR SERPL CREATININE-BSD FRML MDRD: ABNORMAL ML/MIN/{1.73_M2}
GFR SERPL CREATININE-BSD FRML MDRD: ABNORMAL ML/MIN/{1.73_M2}
GLUCOSE BLD-MCNC: 95 MG/DL (ref 70–99)
POC CREATININE: 1 MG/DL (ref 0.6–1.4)
POTASSIUM SERPL-SCNC: 3.6 MMOL/L (ref 3.7–5.3)
SODIUM BLD-SCNC: 139 MMOL/L (ref 135–144)

## 2018-04-16 PROCEDURE — 2580000003 HC RX 258: Performed by: UROLOGY

## 2018-04-16 PROCEDURE — 6360000004 HC RX CONTRAST MEDICATION: Performed by: UROLOGY

## 2018-04-16 PROCEDURE — 74178 CT ABD&PLV WO CNTR FLWD CNTR: CPT

## 2018-04-16 RX ORDER — 0.9 % SODIUM CHLORIDE 0.9 %
115 INTRAVENOUS SOLUTION INTRAVENOUS ONCE
Status: COMPLETED | OUTPATIENT
Start: 2018-04-16 | End: 2018-04-16

## 2018-04-16 RX ORDER — SODIUM CHLORIDE 0.9 % (FLUSH) 0.9 %
10 SYRINGE (ML) INJECTION PRN
Status: COMPLETED | OUTPATIENT
Start: 2018-04-16 | End: 2018-04-16

## 2018-04-16 RX ADMIN — Medication 10 ML: at 10:13

## 2018-04-16 RX ADMIN — IOPAMIDOL 120 ML: 755 INJECTION, SOLUTION INTRAVENOUS at 10:13

## 2018-04-16 RX ADMIN — SODIUM CHLORIDE 115 ML: 9 INJECTION, SOLUTION INTRAVENOUS at 10:13

## 2018-04-18 DIAGNOSIS — N20.0 KIDNEY STONES: Primary | ICD-10-CM

## 2018-04-23 ENCOUNTER — HOSPITAL ENCOUNTER (OUTPATIENT)
Age: 83
Discharge: HOME | End: 2018-04-23
Payer: MEDICARE

## 2018-04-23 ENCOUNTER — HOSPITAL ENCOUNTER (OUTPATIENT)
Age: 83
Discharge: HOME | End: 2018-04-25
Payer: MEDICARE

## 2018-04-23 ENCOUNTER — HOSPITAL ENCOUNTER (OUTPATIENT)
Dept: GENERAL RADIOLOGY | Age: 83
Discharge: HOME | End: 2018-04-25
Payer: MEDICARE

## 2018-04-23 DIAGNOSIS — N20.0 KIDNEY STONES: ICD-10-CM

## 2018-04-23 LAB
INR BLD: 1.7
PROTHROMBIN TIME: 17.5 SEC (ref 9.7–12)

## 2018-04-23 PROCEDURE — 85610 PROTHROMBIN TIME: CPT

## 2018-04-23 PROCEDURE — 74018 RADEX ABDOMEN 1 VIEW: CPT

## 2018-04-23 PROCEDURE — 36415 COLL VENOUS BLD VENIPUNCTURE: CPT

## 2018-04-26 ENCOUNTER — PROCEDURE VISIT (OUTPATIENT)
Dept: UROLOGY | Age: 83
End: 2018-04-26
Payer: MEDICARE

## 2018-04-26 VITALS
DIASTOLIC BLOOD PRESSURE: 70 MMHG | HEART RATE: 82 BPM | BODY MASS INDEX: 25.95 KG/M2 | HEIGHT: 64 IN | SYSTOLIC BLOOD PRESSURE: 132 MMHG | WEIGHT: 152 LBS | TEMPERATURE: 97.8 F

## 2018-04-26 DIAGNOSIS — N20.0 KIDNEY STONES: ICD-10-CM

## 2018-04-26 DIAGNOSIS — R31.0 GROSS HEMATURIA: Primary | ICD-10-CM

## 2018-04-26 PROCEDURE — 99999 PR OFFICE/OUTPT VISIT,PROCEDURE ONLY: CPT | Performed by: UROLOGY

## 2018-04-26 PROCEDURE — 52000 CYSTOURETHROSCOPY: CPT | Performed by: UROLOGY

## 2018-04-30 ENCOUNTER — HOSPITAL ENCOUNTER (OUTPATIENT)
Age: 83
Discharge: HOME | End: 2018-04-30
Payer: MEDICARE

## 2018-04-30 LAB
INR BLD: 2.3
PROTHROMBIN TIME: 22.7 SEC (ref 9.7–12)

## 2018-04-30 PROCEDURE — 36415 COLL VENOUS BLD VENIPUNCTURE: CPT

## 2018-04-30 PROCEDURE — 85610 PROTHROMBIN TIME: CPT

## 2018-05-31 DIAGNOSIS — Z12.31 ENCOUNTER FOR SCREENING MAMMOGRAM FOR MALIGNANT NEOPLASM OF BREAST: Primary | ICD-10-CM

## 2018-06-04 ENCOUNTER — HOSPITAL ENCOUNTER (OUTPATIENT)
Age: 83
Discharge: HOME | End: 2018-06-04
Payer: MEDICARE

## 2018-06-04 LAB
INR BLD: 1.9
PROTHROMBIN TIME: 19.5 SEC (ref 9.7–12)

## 2018-06-04 PROCEDURE — 36415 COLL VENOUS BLD VENIPUNCTURE: CPT

## 2018-06-04 PROCEDURE — 85610 PROTHROMBIN TIME: CPT

## 2018-07-02 ENCOUNTER — HOSPITAL ENCOUNTER (OUTPATIENT)
Dept: GENERAL RADIOLOGY | Age: 83
Discharge: HOME | End: 2018-07-04
Payer: MEDICARE

## 2018-07-02 ENCOUNTER — HOSPITAL ENCOUNTER (OUTPATIENT)
Dept: GENERAL RADIOLOGY | Age: 83
End: 2018-07-02
Payer: MEDICARE

## 2018-07-02 ENCOUNTER — HOSPITAL ENCOUNTER (OUTPATIENT)
Age: 83
Discharge: HOME | End: 2018-07-02
Payer: MEDICARE

## 2018-07-02 DIAGNOSIS — R13.10 PROBLEMS WITH SWALLOWING: ICD-10-CM

## 2018-07-02 DIAGNOSIS — R13.10 DYSPHAGIA, UNSPECIFIED TYPE: ICD-10-CM

## 2018-07-02 LAB
ALBUMIN SERPL-MCNC: 3.7 G/DL (ref 3.5–5.2)
ALBUMIN/GLOBULIN RATIO: NORMAL (ref 1–2.5)
ALP BLD-CCNC: 79 U/L (ref 35–104)
ALT SERPL-CCNC: 17 U/L (ref 5–33)
AST SERPL-CCNC: 26 U/L
BILIRUB SERPL-MCNC: 0.55 MG/DL (ref 0.3–1.2)
BILIRUBIN DIRECT: 0.16 MG/DL
BILIRUBIN, INDIRECT: 0.39 MG/DL (ref 0–1)
GLOBULIN: NORMAL G/DL (ref 1.5–3.8)
INR BLD: 2.1
LIPASE: 50 U/L (ref 13–60)
PROTHROMBIN TIME: 20.7 SEC (ref 9.7–12)
TOTAL PROTEIN: 6.8 G/DL (ref 6.4–8.3)

## 2018-07-02 PROCEDURE — 80076 HEPATIC FUNCTION PANEL: CPT

## 2018-07-02 PROCEDURE — 85610 PROTHROMBIN TIME: CPT

## 2018-07-02 PROCEDURE — G8998 SWALLOW D/C STATUS: HCPCS

## 2018-07-02 PROCEDURE — 74230 X-RAY XM SWLNG FUNCJ C+: CPT

## 2018-07-02 PROCEDURE — 92611 MOTION FLUOROSCOPY/SWALLOW: CPT

## 2018-07-02 PROCEDURE — G8997 SWALLOW GOAL STATUS: HCPCS

## 2018-07-02 PROCEDURE — G8996 SWALLOW CURRENT STATUS: HCPCS

## 2018-07-02 PROCEDURE — 83690 ASSAY OF LIPASE: CPT

## 2018-07-02 PROCEDURE — 36415 COLL VENOUS BLD VENIPUNCTURE: CPT

## 2018-07-30 ENCOUNTER — HOSPITAL ENCOUNTER (OUTPATIENT)
Age: 83
Discharge: HOME | End: 2018-07-30
Payer: MEDICARE

## 2018-07-30 LAB
ALT SERPL-CCNC: 17 U/L (ref 5–33)
AST SERPL-CCNC: 27 U/L
INR BLD: 2.2
PROTHROMBIN TIME: 22.4 SEC (ref 9.7–12)
T3 FREE: 2.06 PG/ML (ref 2.02–4.43)
THYROXINE, FREE: 1.89 NG/DL (ref 0.93–1.7)
TSH SERPL DL<=0.05 MIU/L-ACNC: 2.34 MIU/L (ref 0.3–5)

## 2018-07-30 PROCEDURE — 84481 FREE ASSAY (FT-3): CPT

## 2018-07-30 PROCEDURE — 85610 PROTHROMBIN TIME: CPT

## 2018-07-30 PROCEDURE — 84450 TRANSFERASE (AST) (SGOT): CPT

## 2018-07-30 PROCEDURE — 84439 ASSAY OF FREE THYROXINE: CPT

## 2018-07-30 PROCEDURE — 80299 QUANTITATIVE ASSAY DRUG: CPT

## 2018-07-30 PROCEDURE — 84460 ALANINE AMINO (ALT) (SGPT): CPT

## 2018-07-30 PROCEDURE — 36415 COLL VENOUS BLD VENIPUNCTURE: CPT

## 2018-07-30 PROCEDURE — 84443 ASSAY THYROID STIM HORMONE: CPT

## 2018-08-02 LAB
AMIODARONE LEVEL: 1 UG/ML (ref 0.5–2)
N-DESETHYL-AMIODARONE: 1.2 UG/ML

## 2018-08-07 ENCOUNTER — HOSPITAL ENCOUNTER (OUTPATIENT)
Dept: NON INVASIVE DIAGNOSTICS | Age: 83
Discharge: HOME | End: 2018-08-07
Payer: MEDICARE

## 2018-08-07 LAB
LV EF: 65 %
LVEF MODALITY: NORMAL

## 2018-08-07 PROCEDURE — 93306 TTE W/DOPPLER COMPLETE: CPT

## 2018-08-15 ENCOUNTER — HOSPITAL ENCOUNTER (OUTPATIENT)
Dept: WOMENS IMAGING | Age: 83
Discharge: HOME | End: 2018-08-17
Payer: MEDICARE

## 2018-08-15 DIAGNOSIS — Z12.31 ENCOUNTER FOR SCREENING MAMMOGRAM FOR MALIGNANT NEOPLASM OF BREAST: ICD-10-CM

## 2018-08-15 PROCEDURE — 77063 BREAST TOMOSYNTHESIS BI: CPT

## 2018-09-05 ENCOUNTER — TELEPHONE (OUTPATIENT)
Dept: CASE MANAGEMENT | Age: 83
End: 2018-09-05

## 2018-09-05 ENCOUNTER — OFFICE VISIT (OUTPATIENT)
Dept: ORTHOPEDIC SURGERY | Age: 83
End: 2018-09-05
Payer: MEDICARE

## 2018-09-05 VITALS — HEART RATE: 85 BPM | OXYGEN SATURATION: 97 %

## 2018-09-05 DIAGNOSIS — Z96.651 HISTORY OF TOTAL RIGHT KNEE REPLACEMENT: Primary | ICD-10-CM

## 2018-09-05 PROCEDURE — 1101F PT FALLS ASSESS-DOCD LE1/YR: CPT | Performed by: ORTHOPAEDIC SURGERY

## 2018-09-05 PROCEDURE — G8427 DOCREV CUR MEDS BY ELIG CLIN: HCPCS | Performed by: ORTHOPAEDIC SURGERY

## 2018-09-05 PROCEDURE — G8419 CALC BMI OUT NRM PARAM NOF/U: HCPCS | Performed by: ORTHOPAEDIC SURGERY

## 2018-09-05 PROCEDURE — 1123F ACP DISCUSS/DSCN MKR DOCD: CPT | Performed by: ORTHOPAEDIC SURGERY

## 2018-09-05 PROCEDURE — 4040F PNEUMOC VAC/ADMIN/RCVD: CPT | Performed by: ORTHOPAEDIC SURGERY

## 2018-09-05 PROCEDURE — 1036F TOBACCO NON-USER: CPT | Performed by: ORTHOPAEDIC SURGERY

## 2018-09-05 PROCEDURE — 1090F PRES/ABSN URINE INCON ASSESS: CPT | Performed by: ORTHOPAEDIC SURGERY

## 2018-09-05 PROCEDURE — 99213 OFFICE O/P EST LOW 20 MIN: CPT | Performed by: ORTHOPAEDIC SURGERY

## 2018-09-05 ASSESSMENT — PROMIS GLOBAL HEALTH SCALE
IN GENERAL, PLEASE RATE HOW WELL YOU CARRY OUT YOUR USUAL SOCIAL ACTIVITIES (INCLUDES ACTIVITIES AT HOME, AT WORK, AND IN YOUR COMMUNITY, AND RESPONSIBILITIES AS A PARENT, CHILD, SPOUSE, EMPLOYEE, FRIEND, ETC) [ON A SCALE OF 1 (POOR) TO 5 (EXCELLENT)]?: 4
HOW IS THE PROMIS V1.1 BEING ADMINISTERED?: 1
SUM OF RESPONSES TO QUESTIONS 3, 6, 7, & 8: 13
WHO IS THE PERSON COMPLETING THE PROMIS V1.1 SURVEY?: 0
IN GENERAL, HOW WOULD YOU RATE YOUR SATISFACTION WITH YOUR SOCIAL ACTIVITIES AND RELATIONSHIPS [ON A SCALE OF 1 (POOR) TO 5 (EXCELLENT)]?: 4
IN GENERAL, WOULD YOU SAY YOUR QUALITY OF LIFE IS...[ON A SCALE OF 1 (POOR) TO 5 (EXCELLENT)]: 4
IN GENERAL, HOW WOULD YOU RATE YOUR MENTAL HEALTH, INCLUDING YOUR MOOD AND YOUR ABILITY TO THINK [ON A SCALE OF 1 (POOR) TO 5 (EXCELLENT)]?: 3
IN GENERAL, HOW WOULD YOU RATE YOUR PHYSICAL HEALTH [ON A SCALE OF 1 (POOR) TO 5 (EXCELLENT)]?: 3
IN GENERAL, WOULD YOU SAY YOUR HEALTH IS...[ON A SCALE OF 1 (POOR) TO 5 (EXCELLENT)]: 3
TO WHAT EXTENT ARE YOU ABLE TO CARRY OUT YOUR EVERYDAY PHYSICAL ACTIVITIES SUCH AS WALKING, CLIMBING STAIRS, CARRYING GROCERIES, OR MOVING A CHAIR [ON A SCALE OF 1 (NOT AT ALL) TO 5 (COMPLETELY)]?: 4
SUM OF RESPONSES TO QUESTIONS 2, 4, 5, & 10: 14
IN THE PAST 7 DAYS, HOW OFTEN HAVE YOU BEEN BOTHERED BY EMOTIONAL PROBLEMS, SUCH AS FEELING ANXIOUS, DEPRESSED, OR IRRITABLE [ON A SCALE FROM 1 (NEVER) TO 5 (ALWAYS)]?: 3
IN THE PAST 7 DAYS, HOW WOULD YOU RATE YOUR FATIGUE ON AVERAGE [ON A SCALE FROM 1 (NONE) TO 5 (VERY SEVERE)]?: 4
IN THE PAST 7 DAYS, HOW WOULD YOU RATE YOUR PAIN ON AVERAGE [ON A SCALE FROM 0 (NO PAIN) TO 10 (WORST IMAGINABLE PAIN)]?: 2

## 2018-09-05 ASSESSMENT — KOOS JR
STANDING UPRIGHT: 0
STRAIGHTENING KNEE FULLY: 0
HOW SEVERE IS YOUR KNEE STIFFNESS AFTER FIRST WAKING IN MORNING: 1
TWISING OR PIVOTING ON KNEE: 0
BENDING TO THE FLOOR TO PICK UP OBJECT: 0
GOING UP OR DOWN STAIRS: 1
RISING FROM SITTING: 1

## 2018-09-05 NOTE — PROGRESS NOTES
Grace Jiménez M.D.            [address] [place] [address]., [address] [facility],[address], [address] [facility]             Dept Phone: [phone]             Dept Fax:  [phone] returns today status post right total knee arthroplasty in September 2017 for severe valgus gonarthrosis. She states that her knee pain is very minimal.  She states that in the day and feels type otherwise she is very happy out of feels    Examination of her right knee notes her incision is pristine. Alignment is excellent. Motion 0135 degrees. Good varus and valgus stability in mid flexion. Good patellar tracking    X-rays taken today reviewed by me show the right total knee next the position without interval change    Impression  1 year status post right total knee    Plan line patient is very happy the results. She's very active 80. We'll see her back here in 2 years. Call this any problems prior to that time          Review of Systems   Constitutional: Negative. HENT: Negative. Respiratory: Negative. Cardiovascular: Negative. Musculoskeletal: Negative. Neurological: Negative.          Past Medical History:   Diagnosis Date    Arthritis     Atrial fibrillation ([address] 8/21/2016    CH[facility]congestive heart failure) ([facility])     Complete heart block ([address] 8/18/2016    Constipation     GERD (gastroesophageal reflux disease)     Glaucoma     mild, on eye drops    Hearing loss     bilateral hearing aids    Hiatal hernia     Hx of blood clots 07/2016    pulmonary emboli bilateral lungs after Pacemaker inserted    Hyperlipidemia     Osteopenia     Pacemaker 08/18/2016    Duo-chamber pacemaker in left upper chest    Restless leg syndrome     Right knee DJD     Wears glasses      Past Surgical History:   Procedure Laterality Date    CATARACT REMOVAL WITH IMPLANT Bilateral     Left 1/6/1998, right 8/2/1996    COLONOSCOPY      DILATION AND CURETTAGE OF UTERUS      JOINT REPLACEMENT Right 09/05/2017    TKA    PACEMAKER INSERTION  08/18/2016    connected to patient's I-phone    TONSILLECTOMY AND ADENOIDECTOMY      as a child    TOTAL KNEE ARTHROPLASTY Right 9/5/2017    KNEE TOTAL ARTHROPLASTY RIGHT WITH BIOMET AND GPS PRODUCT APPLICATION performed by Yanna Mane MD at [address]  06/29/2017    polyp removed from small intestine near stomach, benign. done at [facility]     Family History   Problem Relation Age of Onset    Diabetes Sister     Heart Attack Sister     Osteoporosis Sister     Heart Disease Mother     Dementia Mother     Tuberculosis Father            Orders Placed This Encounter   Procedures    XR Knee Bilateral Standing     Standing Status:   Future     Number of Occurrences:   1     Standing Expiration Date:   9/5/2019       1. History of total right knee replacement        Yanna Mane MD    Please note that this chart was generated using voice recognition Dragon dictation software. Although every effort was made to ensure the accuracy of this automated transcription, some errors in transcription may have occurred.

## 2018-09-11 ENCOUNTER — HOSPITAL ENCOUNTER (OUTPATIENT)
Age: 83
Discharge: HOME | End: 2018-09-11
Payer: MEDICARE

## 2018-09-11 LAB
INR BLD: 1
PROTHROMBIN TIME: 10.7 SEC (ref 9.7–12)

## 2018-09-11 PROCEDURE — 85610 PROTHROMBIN TIME: CPT

## 2018-09-11 PROCEDURE — 36415 COLL VENOUS BLD VENIPUNCTURE: CPT

## 2018-09-12 ENCOUNTER — HOSPITAL ENCOUNTER (OUTPATIENT)
Dept: ULTRASOUND IMAGING | Age: 83
Setting detail: OUTPATIENT SURGERY
Discharge: HOME | End: 2018-09-14
Attending: INTERNAL MEDICINE
Payer: MEDICARE

## 2018-09-12 ENCOUNTER — ANESTHESIA (OUTPATIENT)
Dept: ENDOSCOPY | Age: 83
End: 2018-09-12
Payer: MEDICARE

## 2018-09-12 ENCOUNTER — HOSPITAL ENCOUNTER (OUTPATIENT)
Age: 83
Setting detail: OUTPATIENT SURGERY
Discharge: HOME | End: 2018-09-12
Attending: INTERNAL MEDICINE | Admitting: INTERNAL MEDICINE
Payer: MEDICARE

## 2018-09-12 ENCOUNTER — ANESTHESIA EVENT (OUTPATIENT)
Dept: ENDOSCOPY | Age: 83
End: 2018-09-12
Payer: MEDICARE

## 2018-09-12 VITALS
DIASTOLIC BLOOD PRESSURE: 50 MMHG | WEIGHT: 152 LBS | OXYGEN SATURATION: 98 % | RESPIRATION RATE: 16 BRPM | BODY MASS INDEX: 25.95 KG/M2 | TEMPERATURE: 95.7 F | HEIGHT: 64 IN | HEART RATE: 60 BPM | SYSTOLIC BLOOD PRESSURE: 105 MMHG

## 2018-09-12 VITALS
SYSTOLIC BLOOD PRESSURE: 90 MMHG | RESPIRATION RATE: 28 BRPM | OXYGEN SATURATION: 95 % | DIASTOLIC BLOOD PRESSURE: 53 MMHG

## 2018-09-12 DIAGNOSIS — R79.89 ELEVATED LFTS: ICD-10-CM

## 2018-09-12 PROCEDURE — 7100000010 HC PHASE II RECOVERY - FIRST 15 MIN: Performed by: INTERNAL MEDICINE

## 2018-09-12 PROCEDURE — 3609018700 HC ENDOSCOPIC ULTRASOUND: Performed by: INTERNAL MEDICINE

## 2018-09-12 PROCEDURE — 2500000003 HC RX 250 WO HCPCS: Performed by: NURSE ANESTHETIST, CERTIFIED REGISTERED

## 2018-09-12 PROCEDURE — 3700000001 HC ADD 15 MINUTES (ANESTHESIA): Performed by: INTERNAL MEDICINE

## 2018-09-12 PROCEDURE — 88342 IMHCHEM/IMCYTCHM 1ST ANTB: CPT

## 2018-09-12 PROCEDURE — 76937 US GUIDE VASCULAR ACCESS: CPT

## 2018-09-12 PROCEDURE — 2709999900 HC NON-CHARGEABLE SUPPLY: Performed by: INTERNAL MEDICINE

## 2018-09-12 PROCEDURE — 2580000003 HC RX 258: Performed by: INTERNAL MEDICINE

## 2018-09-12 PROCEDURE — 7100000011 HC PHASE II RECOVERY - ADDTL 15 MIN: Performed by: INTERNAL MEDICINE

## 2018-09-12 PROCEDURE — 3609012400 HC EGD TRANSORAL BIOPSY SINGLE/MULTIPLE: Performed by: INTERNAL MEDICINE

## 2018-09-12 PROCEDURE — 6360000002 HC RX W HCPCS: Performed by: NURSE ANESTHETIST, CERTIFIED REGISTERED

## 2018-09-12 PROCEDURE — 88305 TISSUE EXAM BY PATHOLOGIST: CPT

## 2018-09-12 PROCEDURE — 76975 GI ENDOSCOPIC ULTRASOUND: CPT

## 2018-09-12 PROCEDURE — 3700000000 HC ANESTHESIA ATTENDED CARE: Performed by: INTERNAL MEDICINE

## 2018-09-12 RX ORDER — GLYCOPYRROLATE 1 MG/5 ML
SYRINGE (ML) INTRAVENOUS PRN
Status: DISCONTINUED | OUTPATIENT
Start: 2018-09-12 | End: 2018-09-12 | Stop reason: SDUPTHER

## 2018-09-12 RX ORDER — PROPOFOL 10 MG/ML
INJECTION, EMULSION INTRAVENOUS CONTINUOUS PRN
Status: DISCONTINUED | OUTPATIENT
Start: 2018-09-12 | End: 2018-09-12 | Stop reason: SDUPTHER

## 2018-09-12 RX ORDER — SODIUM CHLORIDE 9 MG/ML
INJECTION, SOLUTION INTRAVENOUS CONTINUOUS
Status: DISCONTINUED | OUTPATIENT
Start: 2018-09-12 | End: 2018-09-12 | Stop reason: HOSPADM

## 2018-09-12 RX ORDER — PROPOFOL 10 MG/ML
INJECTION, EMULSION INTRAVENOUS PRN
Status: DISCONTINUED | OUTPATIENT
Start: 2018-09-12 | End: 2018-09-12 | Stop reason: SDUPTHER

## 2018-09-12 RX ADMIN — Medication 0.2 MG: at 11:00

## 2018-09-12 RX ADMIN — SODIUM CHLORIDE: 9 INJECTION, SOLUTION INTRAVENOUS at 10:39

## 2018-09-12 RX ADMIN — PROPOFOL 150 MCG/KG/MIN: 10 INJECTION, EMULSION INTRAVENOUS at 10:50

## 2018-09-12 RX ADMIN — PHENYLEPHRINE HYDROCHLORIDE 100 MCG: 10 INJECTION INTRAVENOUS at 11:29

## 2018-09-12 RX ADMIN — PHENYLEPHRINE HYDROCHLORIDE 100 MCG: 10 INJECTION INTRAVENOUS at 11:45

## 2018-09-12 RX ADMIN — Medication 0.1 MG: at 11:25

## 2018-09-12 RX ADMIN — PROPOFOL 70 MG: 10 INJECTION, EMULSION INTRAVENOUS at 10:50

## 2018-09-12 ASSESSMENT — PAIN SCALES - GENERAL: PAINLEVEL_OUTOF10: 0

## 2018-09-12 ASSESSMENT — ENCOUNTER SYMPTOMS: SHORTNESS OF BREATH: 1

## 2018-09-12 ASSESSMENT — PAIN - FUNCTIONAL ASSESSMENT: PAIN_FUNCTIONAL_ASSESSMENT: 0-10

## 2018-09-12 ASSESSMENT — LIFESTYLE VARIABLES: SMOKING_STATUS: 0

## 2018-09-12 NOTE — OP NOTE
mm.  Gallbladder: Extensive sludge present  Ampulla: Normal.  Left lobe of liver: Hyperechoic consistent with fatty infiltration. Lymphadenopathy: No pathologic lymphadenopathy seen in upper abdomen. COMMENT: Dilated CBD and PD in the head/uncinate process area most likely secondary to abnormal anatomy secondary to intra diverticulum ampulla and narcotic use. Extensive sludge in the gall bladder and sludge also seen in the CBD . Patient with no abdominal pain and normal LFT's. Risk's and benefits if pursuing ERCP needs to be weighed. Recommendations: Follow with the biopsy results                                      Dedicated CT pancreas in 2-4 weeks                                      Follow up at the GI office.     Electronically signed by Ruchi Landaverde MD  on 9/12/2018 at 11:54 AM

## 2018-09-12 NOTE — ANESTHESIA PRE PROCEDURE
Department of Anesthesiology  Preprocedure Note       Name:  Cam Parisi   Age:  80 y.o.  :  1930                                          MRN:  [id number]         Date:  2018      Surgeon: Akhil Browne):  Stephanie Chamorro MD    Procedure: Procedure(s):  ENDOSCOPIC ULTRASOUND, PATHOLOGY REQUESTED, PAT NOTIFIED    Medications prior to admission:   Prior to Admission medications    Medication Sig Start Date End Date Taking? Authorizing Provider   polyethylene glycol (MIRALAX) powder Take 17 g by mouth daily as needed   Yes Historical Provider, MD   metoprolol tartrate (LOPRESSOR) 25 MG tablet Take 1 tablet by mouth 2 times daily 16  Yes Key Machado DO   amiodarone (CORDARONE) 200 MG tablet Take 1 tablet by mouth 2 times daily 16  Yes ALEXSANDRA Langford - CNP   Calcium Carb-Cholecalciferol (CALTRATE 600+D) 600-800 MG-UNIT TABS Take 1 tablet by mouth 2 times daily    Yes Historical Provider, MD   atorvastatin (LIPITOR) 10 MG tablet Take 10 mg by mouth daily  10/2/13  Yes Historical Provider, MD   latanoprost (XALATAN) 0.005 % ophthalmic solution Place 1 drop into both eyes nightly  13  Yes Historical Provider, MD   oxyCODONE-acetaminophen (PERCOCET) 5-325 MG per tablet Take 1 tablet by mouth every 4 hours as needed for Pain. Je Neal     Historical Provider, MD   potassium chloride (MICRO-K) 10 MEQ extended release capsule Take 10 mEq by mouth as needed    Historical Provider, MD   furosemide (LASIX) 20 MG tablet Take 2 tablets by mouth daily  Patient taking differently: Take 40 mg by mouth as needed  17   Milady Wright MD   senna-docusate (PERICOLACE) 8.6-50 MG per tablet Take 2 tablets by mouth 2 times daily as needed for Constipation    Historical Provider, MD   LORazepam (ATIVAN) 0.5 MG tablet Take 0.5 mg by mouth 3 times daily as needed for Anxiety    Historical Provider, MD   warfarin (COUMADIN) 4 MG tablet Take 1 tablet by mouth daily Call for dose adjustment after labs/INR Hearing loss     bilateral hearing aids    Hiatal hernia     Hx of blood clots 07/2016    pulmonary emboli bilateral lungs after Pacemaker inserted    Hyperlipidemia     Osteopenia     Pacemaker 08/18/2016    Duo-chamber pacemaker in left upper chest    Restless leg syndrome     Right knee DJD     Wears glasses        Past Surgical History:        Procedure Laterality Date    CATARACT REMOVAL WITH IMPLANT Bilateral     Left 1/6/1998, right 8/2/1996    COLONOSCOPY      DILATION AND CURETTAGE OF UTERUS      JOINT REPLACEMENT Right 09/05/2017    TKA    PACEMAKER INSERTION  08/18/2016    connected to patient's I-phone    TONSILLECTOMY AND ADENOIDECTOMY      as a child    TOTAL KNEE ARTHROPLASTY Right 9/5/2017    KNEE TOTAL ARTHROPLASTY RIGHT WITH BIOMET AND GPS PRODUCT APPLICATION performed by Chet Rose MD at [address] [facility] [address]  06/29/2017    polyp removed from small intestine near stomach, benign. done at [facility] History:    Social History   Substance Use Topics    Smoking status: Never Smoker    Smokeless tobacco: Never Used    Alcohol use No                                Counseling given: Not Answered      Vital Signs (Current):   Vitals:    09/12/18 0847   BP: (!) 151/77   Pulse: 79   Resp: 16   Temp: 36.8 °C (98.2 °F)   TempSrc: Oral   SpO2: 97%   Weight: 152 lb (68.9 kg)   Height: 5' 4\" (1.626 m)                                              BP Readings from Last 3 Encounters:   09/12/18 (!) 151/77   04/26/18 132/70   04/12/18 116/67       NPO Status: Time of last liquid consumption: 2200                        Time of last solid consumption: 2200                        Date of last liquid consumption: 09/11/18                        Date of last solid food consumption: 09/11/18    BMI:   Wt Readings from Last 3 Encounters:   09/12/18 152 lb (68.9 kg)   04/26/18 152 lb (68.9 kg)   02/01/18 158 lb (71.7 kg)     Body mass index is 26.09 kg/m².     CBC: Lab Results   Component Value Date    WBC 7.9 09/17/2017    RBC 3.48 09/17/2017    HGB 10.1 09/21/2017    HCT 30.6 09/21/2017    MCV 90.7 09/17/2017    RDW 14.8 09/17/2017     09/17/2017       CMP:   Lab Results   Component Value Date     04/16/2018    K 3.6 04/16/2018    CL 97 04/16/2018    CO2 27 04/16/2018    BUN 16 04/16/2018    CREATININE 0.75 04/16/2018    GFRAA >60 04/16/2018    LABGLOM >60 04/16/2018    GLUCOSE 95 04/16/2018    GLUCOSE 102 08/29/2011    PROT 6.8 07/02/2018    CALCIUM 9.6 04/16/2018    BILITOT 0.55 07/02/2018    ALKPHOS 79 07/02/2018    AST 27 07/30/2018    ALT 17 07/30/2018       POC Tests: No results for input(s): POCGLU, POCNA, POCK, POCCL, POCBUN, POCHEMO, POCHCT in the last 72 hours. Coags:   Lab Results   Component Value Date    PROTIME 10.7 09/11/2018    INR 1.0 09/11/2018    APTT 117.5 09/17/2016       HCG (If Applicable): No results found for: PREGTESTUR, PREGSERUM, HCG, HCGQUANT     ABGs: No results found for: PHART, PO2ART, ELY7FDK, JWL1KMJ, BEART, B3QVVINN     Type & Screen (If Applicable):  No results found for: LABABO, [address]    Anesthesia Evaluation  Patient summary reviewed and Nursing notes reviewed no history of anesthetic complications:   Airway: Mallampati: II  TM distance: >3 FB   Neck ROM: full  Mouth opening: > = 3 FB Dental: normal exam         Pulmonary:   (+) shortness of breath:      (-) not a current smoker                           Cardiovascular:    (+) pacemaker: pacemaker, dysrhythmias: atrial fibrillation, CHF:, hyperlipidemia    (-) valvular problems/murmurs, past MI, CABG/stent and  angina             ROS comment: EKG 2017   Atrial-sensed ventricular-paced rhythm  Abnormal ECG  When compared with ECG of 11-SEP-2017 04:15,  Vent. rate has decreased BY  25 BPM      Echo 8/2018  Left ventricle is normal in size Global left ventricular systolic function  is normal Estimated ejection fraction is 65 % .   Mild left ventricular hypertrophy. Evidence of mild diastolic dysfunction. Pacemaker / ICD lead seen in right ventricle. Mild aortic insufficiency. Mild aortic stenosis. Mitral annular calcification is seen. Thickened mitral valve leaflets. Mild mitral regurgitation. Mild mitral stenosis. Mild to moderate tricuspid regurgitation. Trivial pulmonic insufficiency. Neuro/Psych:   (+) neuromuscular disease:,             GI/Hepatic/Renal:   (+) hiatal hernia, GERD: well controlled,      (-) liver disease and no renal disease       Endo/Other:        (-) diabetes mellitus, hypothyroidism, hyperthyroidism, blood dyscrasia               Abdominal:           Vascular:                                        Anesthesia Plan      MAC and general     ASA 3     (Afib, CHF, GERD, Pacer)  Induction: intravenous. Anesthetic plan and risks discussed with patient. Plan discussed with attending.                   Mikey Prado, ALEXSANDRA - CRNA   9/12/2018

## 2018-09-12 NOTE — H&P
Used    Alcohol use No    Drug use: No    Sexual activity: Not on file     Other Topics Concern    Not on file     Social History Narrative    No narrative on file                Hobbies: TV game shows    OBJECTIVE:   VITALS:  height is 5' 4\" (1.626 m) and weight is 152 lb (68.9 kg). Her oral temperature is 98.2 °F (36.8 °C). Her blood pressure is 151/77 (abnormal) and her pulse is 79. Her respiration is 16 and oxygen saturation is 97%. CONSTITUTIONAL: Alert and oriented times 3, no acute distress and cooperative to examination. friendly and pleasant     SKIN: rash No    HEENT: Head is normocephalic, atraumatic. EOMI, PERRLA    Oral air way :slightly narrow Yes    NECK: neck supple, no lymphadenopathy noted, trachea midline and straight       2+ carotid, no bruit    LUNGS: Chest expands equally bilaterally upon respiration, no accessory muscle used. Ausculation reveals no adventitious breath sounds. CARDIOVASCULAR: \"Heart sounds are normal.  Regular rate and rhythm WITH Positive soft murmur,    ABDOMEN: Bowel sounds are present in all four quadrants      GENATALIA:Deferred. NEUROLOGIC: \"CN II-XII are grossly intact.        EXTREMITIES: Pitting edema:  No,  Varicose veins: No , positive spider veins   Dorsal pedal/posterior tibial pulses palpable: Yes         Strength:  Normal       Patient Active Problem List   Diagnosis    Arthritis    Osteopenia    Complete heart block (HCC)    Syncope and collapse    Hyperlipidemia    Contusion of scalp    Chest pain in adult    Pericardial effusion    Paroxysmal atrial fibrillation (HCC)    Sinus arrest    Knee effusion, right    Shortness of breath    Pulmonary embolism with acute cor pulmonale (HCC)    Pleural effusion    Sick sinus syndrome (HCC)    Hemorrhagic pericardial effusion    Pre-op evaluation    Status post total right knee replacement    Subtherapeutic international normalized ratio (INR)    Acute congestive heart failure ([facility] IMPRESSIONS:   1. Abnormal finding on abdominal CT scan   2.  does not have any pertinent problems on file.     Nandini Zucker [facility]  Electronically signed 9/12/2018 at 9:16 AM       Scheduled for:EUS

## 2018-09-14 ENCOUNTER — HOSPITAL ENCOUNTER (OUTPATIENT)
Age: 83
Discharge: HOME | End: 2018-09-14
Payer: MEDICARE

## 2018-09-14 LAB
INR BLD: 1
PROTHROMBIN TIME: 10 SEC (ref 9.7–12)
SURGICAL PATHOLOGY REPORT: NORMAL

## 2018-09-14 PROCEDURE — 85610 PROTHROMBIN TIME: CPT

## 2018-09-14 PROCEDURE — 36415 COLL VENOUS BLD VENIPUNCTURE: CPT

## 2018-09-17 ENCOUNTER — HOSPITAL ENCOUNTER (OUTPATIENT)
Age: 83
Discharge: HOME | End: 2018-09-17
Payer: MEDICARE

## 2018-09-17 LAB
INR BLD: 1
PROTHROMBIN TIME: 10.7 SEC (ref 9.7–12)

## 2018-09-17 PROCEDURE — 85610 PROTHROMBIN TIME: CPT

## 2018-09-17 PROCEDURE — 36415 COLL VENOUS BLD VENIPUNCTURE: CPT

## 2018-09-19 ENCOUNTER — HOSPITAL ENCOUNTER (OUTPATIENT)
Age: 83
Discharge: HOME | End: 2018-09-19
Payer: MEDICARE

## 2018-09-19 LAB
INR BLD: 1.2
PROTHROMBIN TIME: 12 SEC (ref 9.7–12)

## 2018-09-19 PROCEDURE — 36415 COLL VENOUS BLD VENIPUNCTURE: CPT

## 2018-09-19 PROCEDURE — 85610 PROTHROMBIN TIME: CPT

## 2018-09-21 ENCOUNTER — HOSPITAL ENCOUNTER (OUTPATIENT)
Age: 83
Discharge: HOME | End: 2018-09-21
Payer: MEDICARE

## 2018-09-21 LAB
INR BLD: 1.3
PROTHROMBIN TIME: 13.4 SEC (ref 9.7–12)

## 2018-09-21 PROCEDURE — 36415 COLL VENOUS BLD VENIPUNCTURE: CPT

## 2018-09-21 PROCEDURE — 85610 PROTHROMBIN TIME: CPT

## 2018-09-24 ENCOUNTER — HOSPITAL ENCOUNTER (OUTPATIENT)
Age: 83
Discharge: HOME | End: 2018-09-24
Payer: MEDICARE

## 2018-09-24 LAB
INR BLD: 1.4
PROTHROMBIN TIME: 14.8 SEC (ref 9.7–12)

## 2018-09-24 PROCEDURE — 85610 PROTHROMBIN TIME: CPT

## 2018-09-24 PROCEDURE — 36415 COLL VENOUS BLD VENIPUNCTURE: CPT

## 2018-09-26 ENCOUNTER — HOSPITAL ENCOUNTER (OUTPATIENT)
Age: 83
Discharge: HOME | End: 2018-09-26
Payer: MEDICARE

## 2018-09-27 ENCOUNTER — HOSPITAL ENCOUNTER (OUTPATIENT)
Age: 83
Discharge: HOME | End: 2018-09-27
Payer: MEDICARE

## 2018-09-27 LAB
INR BLD: 2.6
PROTHROMBIN TIME: 25.5 SEC (ref 9.7–12)

## 2018-09-27 PROCEDURE — 36415 COLL VENOUS BLD VENIPUNCTURE: CPT

## 2018-09-27 PROCEDURE — 85610 PROTHROMBIN TIME: CPT

## 2018-10-11 ENCOUNTER — HOSPITAL ENCOUNTER (OUTPATIENT)
Dept: CT IMAGING | Age: 83
Discharge: HOME | End: 2018-10-13
Payer: MEDICARE

## 2018-10-11 DIAGNOSIS — R93.5 ABNORMAL CT OF THE ABDOMEN: ICD-10-CM

## 2018-10-11 LAB
BUN BLDV-MCNC: 19 MG/DL (ref 8–23)
CREAT SERPL-MCNC: 0.83 MG/DL (ref 0.5–0.9)
GFR AFRICAN AMERICAN: >60 ML/MIN
GFR NON-AFRICAN AMERICAN: >60 ML/MIN
GFR SERPL CREATININE-BSD FRML MDRD: NORMAL ML/MIN/{1.73_M2}
GFR SERPL CREATININE-BSD FRML MDRD: NORMAL ML/MIN/{1.73_M2}

## 2018-10-11 PROCEDURE — 36415 COLL VENOUS BLD VENIPUNCTURE: CPT

## 2018-10-11 PROCEDURE — 2580000003 HC RX 258: Performed by: INTERNAL MEDICINE

## 2018-10-11 PROCEDURE — 6360000004 HC RX CONTRAST MEDICATION: Performed by: INTERNAL MEDICINE

## 2018-10-11 PROCEDURE — 82565 ASSAY OF CREATININE: CPT

## 2018-10-11 PROCEDURE — 74170 CT ABD WO CNTRST FLWD CNTRST: CPT

## 2018-10-11 PROCEDURE — 84520 ASSAY OF UREA NITROGEN: CPT

## 2018-10-11 RX ORDER — 0.9 % SODIUM CHLORIDE 0.9 %
80 INTRAVENOUS SOLUTION INTRAVENOUS ONCE
Status: COMPLETED | OUTPATIENT
Start: 2018-10-11 | End: 2018-10-11

## 2018-10-11 RX ORDER — SODIUM CHLORIDE 0.9 % (FLUSH) 0.9 %
10 SYRINGE (ML) INJECTION PRN
Status: DISCONTINUED | OUTPATIENT
Start: 2018-10-11 | End: 2018-10-14 | Stop reason: HOSPADM

## 2018-10-11 RX ADMIN — IOHEXOL 50 ML: 240 INJECTION, SOLUTION INTRATHECAL; INTRAVASCULAR; INTRAVENOUS; ORAL at 09:46

## 2018-10-11 RX ADMIN — Medication 10 ML: at 09:47

## 2018-10-11 RX ADMIN — IOPAMIDOL 75 ML: 755 INJECTION, SOLUTION INTRAVENOUS at 09:47

## 2018-10-11 RX ADMIN — SODIUM CHLORIDE 80 ML: 9 INJECTION, SOLUTION INTRAVENOUS at 09:46

## 2018-10-16 ENCOUNTER — HOSPITAL ENCOUNTER (OUTPATIENT)
Age: 83
Discharge: HOME | End: 2018-10-16
Payer: MEDICARE

## 2018-10-16 ENCOUNTER — OFFICE VISIT (OUTPATIENT)
Dept: UROLOGY | Age: 83
End: 2018-10-16
Payer: MEDICARE

## 2018-10-16 VITALS
DIASTOLIC BLOOD PRESSURE: 62 MMHG | WEIGHT: 162.4 LBS | BODY MASS INDEX: 29.88 KG/M2 | SYSTOLIC BLOOD PRESSURE: 114 MMHG | HEIGHT: 62 IN | TEMPERATURE: 97.5 F | HEART RATE: 69 BPM

## 2018-10-16 DIAGNOSIS — R35.1 NOCTURIA: ICD-10-CM

## 2018-10-16 DIAGNOSIS — N20.0 KIDNEY STONE: ICD-10-CM

## 2018-10-16 DIAGNOSIS — R31.9 HEMATURIA, UNSPECIFIED TYPE: Primary | ICD-10-CM

## 2018-10-16 LAB
BILIRUBIN, POC: ABNORMAL
BLOOD URINE, POC: ABNORMAL
CLARITY, POC: ABNORMAL
COLOR, POC: YELLOW
GLUCOSE URINE, POC: ABNORMAL
INR BLD: 1.7
KETONES, POC: ABNORMAL
LEUKOCYTE EST, POC: ABNORMAL
NITRITE, POC: ABNORMAL
PH, POC: ABNORMAL
PROTEIN, POC: ABNORMAL
PROTHROMBIN TIME: 17.7 SEC (ref 9.7–12)
SPECIFIC GRAVITY, POC: ABNORMAL
UROBILINOGEN, POC: ABNORMAL

## 2018-10-16 PROCEDURE — 1123F ACP DISCUSS/DSCN MKR DOCD: CPT | Performed by: UROLOGY

## 2018-10-16 PROCEDURE — 1036F TOBACCO NON-USER: CPT | Performed by: UROLOGY

## 2018-10-16 PROCEDURE — 1101F PT FALLS ASSESS-DOCD LE1/YR: CPT | Performed by: UROLOGY

## 2018-10-16 PROCEDURE — G8427 DOCREV CUR MEDS BY ELIG CLIN: HCPCS | Performed by: UROLOGY

## 2018-10-16 PROCEDURE — 81002 URINALYSIS NONAUTO W/O SCOPE: CPT | Performed by: UROLOGY

## 2018-10-16 PROCEDURE — 36415 COLL VENOUS BLD VENIPUNCTURE: CPT

## 2018-10-16 PROCEDURE — 1090F PRES/ABSN URINE INCON ASSESS: CPT | Performed by: UROLOGY

## 2018-10-16 PROCEDURE — G8484 FLU IMMUNIZE NO ADMIN: HCPCS | Performed by: UROLOGY

## 2018-10-16 PROCEDURE — 85610 PROTHROMBIN TIME: CPT

## 2018-10-16 PROCEDURE — G8419 CALC BMI OUT NRM PARAM NOF/U: HCPCS | Performed by: UROLOGY

## 2018-10-16 PROCEDURE — 99214 OFFICE O/P EST MOD 30 MIN: CPT | Performed by: UROLOGY

## 2018-10-16 PROCEDURE — 4040F PNEUMOC VAC/ADMIN/RCVD: CPT | Performed by: UROLOGY

## 2018-10-16 RX ORDER — CEPHALEXIN 500 MG/1
500 CAPSULE ORAL 4 TIMES DAILY
COMMUNITY
End: 2019-02-28 | Stop reason: ALTCHOICE

## 2018-10-16 ASSESSMENT — ENCOUNTER SYMPTOMS
VOMITING: 0
WHEEZING: 0
BACK PAIN: 0
EYE PAIN: 0
ABDOMINAL PAIN: 0
COLOR CHANGE: 0
NAUSEA: 0
SHORTNESS OF BREATH: 0
EYE REDNESS: 0
COUGH: 0

## 2018-10-16 NOTE — PROGRESS NOTES
[facility]  [facility] - [place]  [address]  [address]  [address] [facility] [address]  Dept: [address] [facility] Urology Office Note - Established    Patient:  Truong Dobbins  YOB: 1930  Date: 10/16/2018    The patient is a 80 y.o. female whopresents today for evaluation of the following problems:   Chief Complaint   Patient presents with    Hematuria     6 mo follow up        HPI  Dong ok, no hematuria, no flank pain, urinating ok, no dysuria, no recent uti's, nocturia x 2, kub shows left lower pole stone    Summary of old records: N/A    Additional History: N/A    Procedures Today: N/A    Urinalysis today:  Results for POC orders placed in visit on 10/16/18   POCT Urinalysis no Micro   Result Value Ref Range    Color, UA yellow     Clarity, UA clear5     Glucose, UA POC neg     Bilirubin, UA      Ketones, UA      Spec Grav, UA      Blood, UA POC +     pH, UA      Protein, UA POC trace     Urobilinogen, UA      Leukocytes, UA trace     Nitrite, UA neg        Imaging Reviewed during this Office Visit: none  (results were independently reviewed by physician and radiology report verified)    AUA Symptom Score (10/16/2018):   INCOMPLETE EMPTYING: How often have you had the sensation of not emptying your bladder?: Not at all  FREQUENCY: How often do you have to urinate less than every two hours?: Not at all  INTERMITTENCY: How often have you found you stopped and started again several times when you urinated?: Not at all  URGENCY: How often have you found it difficult to postpone urination?: Not at all  WEAK STREAM: How often have you had a weak urinary stream?: Not at all  STRAINING: How often have you had to strain to start  urination?: Not at all  @(5505)@  TOTAL I-PSS SCORE[de-identified] 2  How would you feel if you were to spend the rest of your life with your urinary condition?: Mostly Satisfied    Last BUN and creatinine:  Lab Results   Component Value Date    BUN 19 10/11/2018

## 2018-10-19 ENCOUNTER — HOSPITAL ENCOUNTER (OUTPATIENT)
Age: 83
Discharge: HOME | End: 2018-10-19
Payer: MEDICARE

## 2018-10-19 LAB
INR BLD: 1.5
PROTHROMBIN TIME: 15.4 SEC (ref 9.7–12)

## 2018-10-19 PROCEDURE — 36415 COLL VENOUS BLD VENIPUNCTURE: CPT

## 2018-10-19 PROCEDURE — 85610 PROTHROMBIN TIME: CPT

## 2018-10-22 ENCOUNTER — HOSPITAL ENCOUNTER (OUTPATIENT)
Age: 83
Discharge: HOME | End: 2018-10-22
Payer: MEDICARE

## 2018-10-22 LAB
INR BLD: 1.9
PROTHROMBIN TIME: 19.6 SEC (ref 9.7–12)

## 2018-10-22 PROCEDURE — 85610 PROTHROMBIN TIME: CPT

## 2018-10-22 PROCEDURE — 36415 COLL VENOUS BLD VENIPUNCTURE: CPT

## 2018-10-30 ENCOUNTER — HOSPITAL ENCOUNTER (OUTPATIENT)
Age: 83
Discharge: HOME | End: 2018-10-30
Payer: MEDICARE

## 2018-10-31 ENCOUNTER — HOSPITAL ENCOUNTER (OUTPATIENT)
Age: 83
Discharge: HOME | End: 2018-10-31
Payer: MEDICARE

## 2018-10-31 LAB
INR BLD: 1.5
PROTHROMBIN TIME: 15.7 SEC (ref 9.7–12)

## 2018-10-31 PROCEDURE — 85610 PROTHROMBIN TIME: CPT

## 2018-10-31 PROCEDURE — 36415 COLL VENOUS BLD VENIPUNCTURE: CPT

## 2018-11-14 ENCOUNTER — HOSPITAL ENCOUNTER (OUTPATIENT)
Age: 83
Discharge: HOME | End: 2018-11-14
Payer: MEDICARE

## 2018-11-14 LAB
INR BLD: 2.3
PROTHROMBIN TIME: 23 SEC (ref 9.7–12)

## 2018-11-14 PROCEDURE — 85610 PROTHROMBIN TIME: CPT

## 2018-11-14 PROCEDURE — 36415 COLL VENOUS BLD VENIPUNCTURE: CPT

## 2018-12-12 ENCOUNTER — HOSPITAL ENCOUNTER (OUTPATIENT)
Age: 83
Discharge: HOME | End: 2018-12-12
Payer: MEDICARE

## 2018-12-12 ENCOUNTER — TELEPHONE (OUTPATIENT)
Dept: OBGYN CLINIC | Age: 83
End: 2018-12-12

## 2018-12-12 LAB
ABSOLUTE EOS #: 0.1 K/UL (ref 0–0.4)
ABSOLUTE IMMATURE GRANULOCYTE: ABNORMAL K/UL (ref 0–0.3)
ABSOLUTE LYMPH #: 1.2 K/UL (ref 1–4.8)
ABSOLUTE MONO #: 0.5 K/UL (ref 0.1–1.3)
ALBUMIN SERPL-MCNC: 4.1 G/DL (ref 3.5–5.2)
ALBUMIN/GLOBULIN RATIO: ABNORMAL (ref 1–2.5)
ALP BLD-CCNC: 81 U/L (ref 35–104)
ALT SERPL-CCNC: 19 U/L (ref 5–33)
ANION GAP SERPL CALCULATED.3IONS-SCNC: 9 MMOL/L (ref 9–17)
AST SERPL-CCNC: 26 U/L
BASOPHILS # BLD: 1 % (ref 0–2)
BASOPHILS ABSOLUTE: 0 K/UL (ref 0–0.2)
BILIRUB SERPL-MCNC: 0.39 MG/DL (ref 0.3–1.2)
BUN BLDV-MCNC: 16 MG/DL (ref 8–23)
BUN/CREAT BLD: ABNORMAL (ref 9–20)
CALCIUM SERPL-MCNC: 9.3 MG/DL (ref 8.6–10.4)
CHLORIDE BLD-SCNC: 106 MMOL/L (ref 98–107)
CO2: 29 MMOL/L (ref 20–31)
CREAT SERPL-MCNC: 0.64 MG/DL (ref 0.5–0.9)
DIFFERENTIAL TYPE: ABNORMAL
EOSINOPHILS RELATIVE PERCENT: 3 % (ref 0–4)
GFR AFRICAN AMERICAN: >60 ML/MIN
GFR NON-AFRICAN AMERICAN: >60 ML/MIN
GFR SERPL CREATININE-BSD FRML MDRD: ABNORMAL ML/MIN/{1.73_M2}
GFR SERPL CREATININE-BSD FRML MDRD: ABNORMAL ML/MIN/{1.73_M2}
GLUCOSE BLD-MCNC: 105 MG/DL (ref 70–99)
HCT VFR BLD CALC: 37.8 % (ref 36–46)
HEMOGLOBIN: 12.3 G/DL (ref 12–16)
IMMATURE GRANULOCYTES: ABNORMAL %
INR BLD: 3.1
LYMPHOCYTES # BLD: 29 % (ref 24–44)
MCH RBC QN AUTO: 28.4 PG (ref 26–34)
MCHC RBC AUTO-ENTMCNC: 32.5 G/DL (ref 31–37)
MCV RBC AUTO: 87.6 FL (ref 80–100)
MONOCYTES # BLD: 12 % (ref 1–7)
NRBC AUTOMATED: ABNORMAL PER 100 WBC
PDW BLD-RTO: 15.2 % (ref 11.5–14.9)
PLATELET # BLD: 252 K/UL (ref 150–450)
PLATELET ESTIMATE: ABNORMAL
PMV BLD AUTO: 8 FL (ref 6–12)
POTASSIUM SERPL-SCNC: 4.2 MMOL/L (ref 3.7–5.3)
PROTHROMBIN TIME: 30.1 SEC (ref 9.7–12)
RBC # BLD: 4.32 M/UL (ref 4–5.2)
RBC # BLD: ABNORMAL 10*6/UL
SEG NEUTROPHILS: 55 % (ref 36–66)
SEGMENTED NEUTROPHILS ABSOLUTE COUNT: 2.3 K/UL (ref 1.3–9.1)
SODIUM BLD-SCNC: 144 MMOL/L (ref 135–144)
TOTAL PROTEIN: 7.1 G/DL (ref 6.4–8.3)
TSH SERPL DL<=0.05 MIU/L-ACNC: 3.42 MIU/L (ref 0.3–5)
VITAMIN B-12: 380 PG/ML (ref 232–1245)
WBC # BLD: 4.2 K/UL (ref 3.5–11)
WBC # BLD: ABNORMAL 10*3/UL

## 2018-12-12 PROCEDURE — 36415 COLL VENOUS BLD VENIPUNCTURE: CPT

## 2018-12-12 PROCEDURE — 85025 COMPLETE CBC W/AUTO DIFF WBC: CPT

## 2018-12-12 PROCEDURE — 82607 VITAMIN B-12: CPT

## 2018-12-12 PROCEDURE — 85610 PROTHROMBIN TIME: CPT

## 2018-12-12 PROCEDURE — 80053 COMPREHEN METABOLIC PANEL: CPT

## 2018-12-12 PROCEDURE — 84443 ASSAY THYROID STIM HORMONE: CPT

## 2018-12-12 PROCEDURE — 82306 VITAMIN D 25 HYDROXY: CPT

## 2018-12-13 LAB — VITAMIN D 25-HYDROXY: 57.2 NG/ML (ref 30–100)

## 2018-12-26 ENCOUNTER — HOSPITAL ENCOUNTER (OUTPATIENT)
Age: 83
Discharge: HOME | End: 2018-12-26
Payer: MEDICARE

## 2018-12-26 LAB
INR BLD: 2.5
PROTHROMBIN TIME: 24.9 SEC (ref 9.7–12)

## 2018-12-26 PROCEDURE — 36415 COLL VENOUS BLD VENIPUNCTURE: CPT

## 2018-12-26 PROCEDURE — 85610 PROTHROMBIN TIME: CPT

## 2019-01-08 ENCOUNTER — HOSPITAL ENCOUNTER (OUTPATIENT)
Age: 84
Discharge: HOME | End: 2019-01-08
Payer: MEDICARE

## 2019-01-08 ENCOUNTER — HOSPITAL ENCOUNTER (OUTPATIENT)
Age: 84
Discharge: HOME | End: 2019-01-10
Payer: MEDICARE

## 2019-01-08 ENCOUNTER — HOSPITAL ENCOUNTER (OUTPATIENT)
Dept: GENERAL RADIOLOGY | Age: 84
Discharge: HOME | End: 2019-01-10
Payer: MEDICARE

## 2019-01-08 DIAGNOSIS — Z79.899 NEED FOR PROPHYLACTIC CHEMOTHERAPY: ICD-10-CM

## 2019-01-08 LAB
ALT SERPL-CCNC: 21 U/L (ref 5–33)
AST SERPL-CCNC: 30 U/L
T3 FREE: 2.25 PG/ML (ref 2.02–4.43)
THYROXINE, FREE: 1.94 NG/DL (ref 0.93–1.7)
TSH SERPL DL<=0.05 MIU/L-ACNC: 3.42 MIU/L (ref 0.3–5)

## 2019-01-08 PROCEDURE — 71046 X-RAY EXAM CHEST 2 VIEWS: CPT

## 2019-01-08 PROCEDURE — 84439 ASSAY OF FREE THYROXINE: CPT

## 2019-01-08 PROCEDURE — 84450 TRANSFERASE (AST) (SGOT): CPT

## 2019-01-08 PROCEDURE — 84443 ASSAY THYROID STIM HORMONE: CPT

## 2019-01-08 PROCEDURE — 36415 COLL VENOUS BLD VENIPUNCTURE: CPT

## 2019-01-08 PROCEDURE — 84460 ALANINE AMINO (ALT) (SGPT): CPT

## 2019-01-08 PROCEDURE — 84481 FREE ASSAY (FT-3): CPT

## 2019-01-11 ENCOUNTER — TELEPHONE (OUTPATIENT)
Dept: OBGYN CLINIC | Age: 84
End: 2019-01-11

## 2019-01-11 ENCOUNTER — PROCEDURE VISIT (OUTPATIENT)
Dept: OBGYN CLINIC | Age: 84
End: 2019-01-11
Payer: MEDICARE

## 2019-01-11 DIAGNOSIS — R93.89 THICKENED ENDOMETRIUM: ICD-10-CM

## 2019-01-11 PROCEDURE — 76830 TRANSVAGINAL US NON-OB: CPT | Performed by: OBSTETRICS & GYNECOLOGY

## 2019-01-11 PROCEDURE — 76856 US EXAM PELVIC COMPLETE: CPT | Performed by: OBSTETRICS & GYNECOLOGY

## 2019-01-17 ENCOUNTER — HOSPITAL ENCOUNTER (OUTPATIENT)
Dept: PULMONOLOGY | Age: 84
Discharge: HOME | End: 2019-01-17
Payer: MEDICARE

## 2019-01-17 PROCEDURE — 94664 DEMO&/EVAL PT USE INHALER: CPT

## 2019-01-17 PROCEDURE — 94728 AIRWY RESIST BY OSCILLOMETRY: CPT

## 2019-01-17 PROCEDURE — 94060 EVALUATION OF WHEEZING: CPT

## 2019-01-17 PROCEDURE — 94726 PLETHYSMOGRAPHY LUNG VOLUMES: CPT

## 2019-01-17 PROCEDURE — 94727 GAS DIL/WSHOT DETER LNG VOL: CPT

## 2019-01-17 PROCEDURE — 6360000002 HC RX W HCPCS: Performed by: INTERNAL MEDICINE

## 2019-01-17 PROCEDURE — 94729 DIFFUSING CAPACITY: CPT

## 2019-01-17 RX ORDER — ALBUTEROL SULFATE 2.5 MG/3ML
2.5 SOLUTION RESPIRATORY (INHALATION) ONCE
Status: COMPLETED | OUTPATIENT
Start: 2019-01-17 | End: 2019-01-17

## 2019-01-17 RX ADMIN — ALBUTEROL SULFATE 2.5 MG: 2.5 SOLUTION RESPIRATORY (INHALATION) at 11:50

## 2019-01-25 ENCOUNTER — HOSPITAL ENCOUNTER (OUTPATIENT)
Age: 84
Discharge: HOME | End: 2019-01-25
Payer: MEDICARE

## 2019-01-25 LAB
INR BLD: 2.8
PROTHROMBIN TIME: 29.5 SEC (ref 11.8–14.6)

## 2019-01-25 PROCEDURE — 36415 COLL VENOUS BLD VENIPUNCTURE: CPT

## 2019-01-25 PROCEDURE — 85610 PROTHROMBIN TIME: CPT

## 2019-01-28 ENCOUNTER — OFFICE VISIT (OUTPATIENT)
Dept: OBGYN CLINIC | Age: 84
End: 2019-01-28
Payer: MEDICARE

## 2019-01-28 VITALS
SYSTOLIC BLOOD PRESSURE: 128 MMHG | WEIGHT: 163 LBS | HEIGHT: 62 IN | DIASTOLIC BLOOD PRESSURE: 70 MMHG | BODY MASS INDEX: 30 KG/M2

## 2019-01-28 DIAGNOSIS — N85.9 FLUID IN ENDOMETRIAL CAVITY: Primary | ICD-10-CM

## 2019-01-28 PROCEDURE — G8427 DOCREV CUR MEDS BY ELIG CLIN: HCPCS | Performed by: OBSTETRICS & GYNECOLOGY

## 2019-01-28 PROCEDURE — 4040F PNEUMOC VAC/ADMIN/RCVD: CPT | Performed by: OBSTETRICS & GYNECOLOGY

## 2019-01-28 PROCEDURE — 1090F PRES/ABSN URINE INCON ASSESS: CPT | Performed by: OBSTETRICS & GYNECOLOGY

## 2019-01-28 PROCEDURE — 99212 OFFICE O/P EST SF 10 MIN: CPT | Performed by: OBSTETRICS & GYNECOLOGY

## 2019-01-28 PROCEDURE — G8419 CALC BMI OUT NRM PARAM NOF/U: HCPCS | Performed by: OBSTETRICS & GYNECOLOGY

## 2019-01-28 PROCEDURE — G8484 FLU IMMUNIZE NO ADMIN: HCPCS | Performed by: OBSTETRICS & GYNECOLOGY

## 2019-01-28 PROCEDURE — 1101F PT FALLS ASSESS-DOCD LE1/YR: CPT | Performed by: OBSTETRICS & GYNECOLOGY

## 2019-01-28 PROCEDURE — 1036F TOBACCO NON-USER: CPT | Performed by: OBSTETRICS & GYNECOLOGY

## 2019-01-28 PROCEDURE — 1123F ACP DISCUSS/DSCN MKR DOCD: CPT | Performed by: OBSTETRICS & GYNECOLOGY

## 2019-02-21 ENCOUNTER — HOSPITAL ENCOUNTER (OUTPATIENT)
Age: 84
Discharge: HOME | End: 2019-02-21
Payer: MEDICARE

## 2019-02-21 LAB
INR BLD: 3.9
PROTHROMBIN TIME: 38.7 SEC (ref 11.8–14.6)

## 2019-02-21 PROCEDURE — 85610 PROTHROMBIN TIME: CPT

## 2019-02-21 PROCEDURE — 36415 COLL VENOUS BLD VENIPUNCTURE: CPT

## 2019-02-26 ENCOUNTER — HOSPITAL ENCOUNTER (OUTPATIENT)
Dept: SPEECH THERAPY | Age: 84
Setting detail: THERAPIES SERIES
Discharge: HOME | End: 2019-02-26
Payer: MEDICARE

## 2019-02-26 PROCEDURE — 92610 EVALUATE SWALLOWING FUNCTION: CPT

## 2019-02-28 ENCOUNTER — OFFICE VISIT (OUTPATIENT)
Dept: OBGYN CLINIC | Age: 84
End: 2019-02-28
Payer: MEDICARE

## 2019-02-28 VITALS — BODY MASS INDEX: 30 KG/M2 | WEIGHT: 163 LBS | HEIGHT: 62 IN

## 2019-02-28 DIAGNOSIS — N95.0 PMB (POSTMENOPAUSAL BLEEDING): Primary | ICD-10-CM

## 2019-02-28 DIAGNOSIS — R93.89 THICKENED ENDOMETRIUM: ICD-10-CM

## 2019-02-28 PROCEDURE — G8419 CALC BMI OUT NRM PARAM NOF/U: HCPCS | Performed by: OBSTETRICS & GYNECOLOGY

## 2019-02-28 PROCEDURE — 1123F ACP DISCUSS/DSCN MKR DOCD: CPT | Performed by: OBSTETRICS & GYNECOLOGY

## 2019-02-28 PROCEDURE — 1101F PT FALLS ASSESS-DOCD LE1/YR: CPT | Performed by: OBSTETRICS & GYNECOLOGY

## 2019-02-28 PROCEDURE — G8427 DOCREV CUR MEDS BY ELIG CLIN: HCPCS | Performed by: OBSTETRICS & GYNECOLOGY

## 2019-02-28 PROCEDURE — 4040F PNEUMOC VAC/ADMIN/RCVD: CPT | Performed by: OBSTETRICS & GYNECOLOGY

## 2019-02-28 PROCEDURE — 99213 OFFICE O/P EST LOW 20 MIN: CPT | Performed by: OBSTETRICS & GYNECOLOGY

## 2019-02-28 PROCEDURE — 1090F PRES/ABSN URINE INCON ASSESS: CPT | Performed by: OBSTETRICS & GYNECOLOGY

## 2019-02-28 PROCEDURE — G8484 FLU IMMUNIZE NO ADMIN: HCPCS | Performed by: OBSTETRICS & GYNECOLOGY

## 2019-02-28 PROCEDURE — 1036F TOBACCO NON-USER: CPT | Performed by: OBSTETRICS & GYNECOLOGY

## 2019-03-04 ENCOUNTER — HOSPITAL ENCOUNTER (OUTPATIENT)
Age: 84
Discharge: HOME | End: 2019-03-04
Payer: MEDICARE

## 2019-03-04 LAB
INR BLD: 2.5
PROTHROMBIN TIME: 26.9 SEC (ref 11.8–14.6)

## 2019-03-04 PROCEDURE — 85610 PROTHROMBIN TIME: CPT

## 2019-03-04 PROCEDURE — 36415 COLL VENOUS BLD VENIPUNCTURE: CPT

## 2019-03-07 ENCOUNTER — HOSPITAL ENCOUNTER (OUTPATIENT)
Dept: PREADMISSION TESTING | Age: 84
Discharge: HOME | End: 2019-03-11
Payer: MEDICARE

## 2019-03-07 VITALS
DIASTOLIC BLOOD PRESSURE: 78 MMHG | TEMPERATURE: 98.2 F | RESPIRATION RATE: 16 BRPM | SYSTOLIC BLOOD PRESSURE: 149 MMHG | OXYGEN SATURATION: 98 % | WEIGHT: 162 LBS | BODY MASS INDEX: 29.81 KG/M2 | HEIGHT: 62 IN | HEART RATE: 83 BPM

## 2019-03-07 LAB
-: ABNORMAL
ABO/RH: NORMAL
AMORPHOUS: ABNORMAL
ANION GAP SERPL CALCULATED.3IONS-SCNC: 14 MMOL/L (ref 9–17)
ANTIBODY SCREEN: NEGATIVE
ARM BAND NUMBER: NORMAL
BACTERIA: ABNORMAL
BILIRUBIN URINE: NEGATIVE
BUN BLDV-MCNC: 19 MG/DL (ref 8–23)
CASTS UA: ABNORMAL /LPF (ref 0–8)
CHLORIDE BLD-SCNC: 106 MMOL/L (ref 98–107)
CO2: 25 MMOL/L (ref 20–31)
COLOR: YELLOW
COMMENT UA: ABNORMAL
CREAT SERPL-MCNC: 0.67 MG/DL (ref 0.5–0.9)
CRYSTALS, UA: ABNORMAL /HPF
EPITHELIAL CELLS UA: ABNORMAL /HPF (ref 0–5)
EXPIRATION DATE: NORMAL
GFR AFRICAN AMERICAN: >60 ML/MIN
GFR NON-AFRICAN AMERICAN: >60 ML/MIN
GFR SERPL CREATININE-BSD FRML MDRD: NORMAL ML/MIN/{1.73_M2}
GFR SERPL CREATININE-BSD FRML MDRD: NORMAL ML/MIN/{1.73_M2}
GLUCOSE BLD-MCNC: 97 MG/DL (ref 70–99)
GLUCOSE URINE: NEGATIVE
HCT VFR BLD CALC: 38 % (ref 36.3–47.1)
HEMOGLOBIN: 12 G/DL (ref 11.9–15.1)
INR BLD: 2.7
KETONES, URINE: NEGATIVE
LEUKOCYTE ESTERASE, URINE: ABNORMAL
MCH RBC QN AUTO: 28.4 PG (ref 25.2–33.5)
MCHC RBC AUTO-ENTMCNC: 31.6 G/DL (ref 28.4–34.8)
MCV RBC AUTO: 90 FL (ref 82.6–102.9)
MUCUS: ABNORMAL
NITRITE, URINE: POSITIVE
NRBC AUTOMATED: 0 PER 100 WBC
OTHER OBSERVATIONS UA: ABNORMAL
PDW BLD-RTO: 14.8 % (ref 11.8–14.4)
PH UA: 5 (ref 5–8)
PLATELET # BLD: 216 K/UL (ref 138–453)
PMV BLD AUTO: 9.7 FL (ref 8.1–13.5)
POTASSIUM SERPL-SCNC: 4.1 MMOL/L (ref 3.7–5.3)
PROTEIN UA: NEGATIVE
PROTHROMBIN TIME: 26.3 SEC (ref 9–12)
RBC # BLD: 4.22 M/UL (ref 3.95–5.11)
RBC UA: ABNORMAL /HPF (ref 0–4)
RENAL EPITHELIAL, UA: ABNORMAL /HPF
SODIUM BLD-SCNC: 145 MMOL/L (ref 135–144)
SPECIFIC GRAVITY UA: 1.02 (ref 1–1.03)
TRICHOMONAS: ABNORMAL
TURBIDITY: CLEAR
URINE HGB: ABNORMAL
UROBILINOGEN, URINE: NORMAL
WBC # BLD: 4.4 K/UL (ref 3.5–11.3)
WBC UA: ABNORMAL /HPF (ref 0–5)
YEAST: ABNORMAL

## 2019-03-07 PROCEDURE — 36415 COLL VENOUS BLD VENIPUNCTURE: CPT

## 2019-03-07 PROCEDURE — 86901 BLOOD TYPING SEROLOGIC RH(D): CPT

## 2019-03-07 PROCEDURE — 86900 BLOOD TYPING SEROLOGIC ABO: CPT

## 2019-03-07 PROCEDURE — 80051 ELECTROLYTE PANEL: CPT

## 2019-03-07 PROCEDURE — 82565 ASSAY OF CREATININE: CPT

## 2019-03-07 PROCEDURE — 86850 RBC ANTIBODY SCREEN: CPT

## 2019-03-07 PROCEDURE — 84520 ASSAY OF UREA NITROGEN: CPT

## 2019-03-07 PROCEDURE — 85027 COMPLETE CBC AUTOMATED: CPT

## 2019-03-07 PROCEDURE — 81001 URINALYSIS AUTO W/SCOPE: CPT

## 2019-03-07 PROCEDURE — 93005 ELECTROCARDIOGRAM TRACING: CPT

## 2019-03-07 PROCEDURE — 85610 PROTHROMBIN TIME: CPT

## 2019-03-07 PROCEDURE — 82947 ASSAY GLUCOSE BLOOD QUANT: CPT

## 2019-03-07 RX ORDER — SODIUM CHLORIDE, SODIUM LACTATE, POTASSIUM CHLORIDE, CALCIUM CHLORIDE 600; 310; 30; 20 MG/100ML; MG/100ML; MG/100ML; MG/100ML
1000 INJECTION, SOLUTION INTRAVENOUS CONTINUOUS
Status: CANCELLED | OUTPATIENT
Start: 2019-03-07

## 2019-03-07 RX ORDER — SENNA AND DOCUSATE SODIUM 50; 8.6 MG/1; MG/1
2 TABLET, FILM COATED ORAL DAILY PRN
Status: ON HOLD | COMMUNITY
End: 2019-03-12

## 2019-03-08 DIAGNOSIS — N30.00 ACUTE CYSTITIS WITHOUT HEMATURIA: Primary | ICD-10-CM

## 2019-03-08 LAB
EKG ATRIAL RATE: 73 BPM
EKG P AXIS: 55 DEGREES
EKG P-R INTERVAL: 222 MS
EKG Q-T INTERVAL: 448 MS
EKG QRS DURATION: 148 MS
EKG QTC CALCULATION (BAZETT): 493 MS
EKG R AXIS: -81 DEGREES
EKG T AXIS: 83 DEGREES
EKG VENTRICULAR RATE: 73 BPM

## 2019-03-08 RX ORDER — CEPHALEXIN 500 MG/1
500 CAPSULE ORAL 2 TIMES DAILY
Qty: 14 CAPSULE | Refills: 0 | Status: SHIPPED | OUTPATIENT
Start: 2019-03-08 | End: 2019-03-15

## 2019-03-12 ENCOUNTER — ANESTHESIA (OUTPATIENT)
Dept: OPERATING ROOM | Age: 84
End: 2019-03-12
Payer: MEDICARE

## 2019-03-12 ENCOUNTER — HOSPITAL ENCOUNTER (OUTPATIENT)
Age: 84
Setting detail: OUTPATIENT SURGERY
Discharge: HOME | End: 2019-03-12
Attending: OBSTETRICS & GYNECOLOGY | Admitting: OBSTETRICS & GYNECOLOGY
Payer: MEDICARE

## 2019-03-12 ENCOUNTER — ANESTHESIA EVENT (OUTPATIENT)
Dept: OPERATING ROOM | Age: 84
End: 2019-03-12
Payer: MEDICARE

## 2019-03-12 VITALS — OXYGEN SATURATION: 100 % | DIASTOLIC BLOOD PRESSURE: 63 MMHG | SYSTOLIC BLOOD PRESSURE: 107 MMHG | TEMPERATURE: 96.2 F

## 2019-03-12 VITALS
DIASTOLIC BLOOD PRESSURE: 56 MMHG | TEMPERATURE: 97.3 F | RESPIRATION RATE: 18 BRPM | WEIGHT: 162 LBS | BODY MASS INDEX: 29.81 KG/M2 | SYSTOLIC BLOOD PRESSURE: 116 MMHG | HEIGHT: 62 IN | HEART RATE: 61 BPM | OXYGEN SATURATION: 97 %

## 2019-03-12 LAB
POC INR: 1
PROTHROMBIN TIME, POC: 12 SEC (ref 10.4–14.2)

## 2019-03-12 PROCEDURE — 3600000014 HC SURGERY LEVEL 4 ADDTL 15MIN: Performed by: OBSTETRICS & GYNECOLOGY

## 2019-03-12 PROCEDURE — 58558 HYSTEROSCOPY BIOPSY: CPT | Performed by: OBSTETRICS & GYNECOLOGY

## 2019-03-12 PROCEDURE — 85610 PROTHROMBIN TIME: CPT

## 2019-03-12 PROCEDURE — 2720000010 HC SURG SUPPLY STERILE: Performed by: OBSTETRICS & GYNECOLOGY

## 2019-03-12 PROCEDURE — 2580000003 HC RX 258: Performed by: NURSE ANESTHETIST, CERTIFIED REGISTERED

## 2019-03-12 PROCEDURE — 3700000000 HC ANESTHESIA ATTENDED CARE: Performed by: OBSTETRICS & GYNECOLOGY

## 2019-03-12 PROCEDURE — 3600000004 HC SURGERY LEVEL 4 BASE: Performed by: OBSTETRICS & GYNECOLOGY

## 2019-03-12 PROCEDURE — 3700000001 HC ADD 15 MINUTES (ANESTHESIA): Performed by: OBSTETRICS & GYNECOLOGY

## 2019-03-12 PROCEDURE — 2580000003 HC RX 258: Performed by: ANESTHESIOLOGY

## 2019-03-12 PROCEDURE — 7100000040 HC SPAR PHASE II RECOVERY - FIRST 15 MIN: Performed by: OBSTETRICS & GYNECOLOGY

## 2019-03-12 PROCEDURE — 88305 TISSUE EXAM BY PATHOLOGIST: CPT

## 2019-03-12 PROCEDURE — 7100000001 HC PACU RECOVERY - ADDTL 15 MIN: Performed by: OBSTETRICS & GYNECOLOGY

## 2019-03-12 PROCEDURE — 2500000003 HC RX 250 WO HCPCS: Performed by: NURSE ANESTHETIST, CERTIFIED REGISTERED

## 2019-03-12 PROCEDURE — 2580000003 HC RX 258: Performed by: OBSTETRICS & GYNECOLOGY

## 2019-03-12 PROCEDURE — 2709999900 HC NON-CHARGEABLE SUPPLY: Performed by: OBSTETRICS & GYNECOLOGY

## 2019-03-12 PROCEDURE — 6360000002 HC RX W HCPCS: Performed by: NURSE ANESTHETIST, CERTIFIED REGISTERED

## 2019-03-12 PROCEDURE — 7100000000 HC PACU RECOVERY - FIRST 15 MIN: Performed by: OBSTETRICS & GYNECOLOGY

## 2019-03-12 RX ORDER — SODIUM CHLORIDE, SODIUM LACTATE, POTASSIUM CHLORIDE, CALCIUM CHLORIDE 600; 310; 30; 20 MG/100ML; MG/100ML; MG/100ML; MG/100ML
INJECTION, SOLUTION INTRAVENOUS CONTINUOUS PRN
Status: DISCONTINUED | OUTPATIENT
Start: 2019-03-12 | End: 2019-03-12 | Stop reason: SDUPTHER

## 2019-03-12 RX ORDER — OXYCODONE HYDROCHLORIDE AND ACETAMINOPHEN 5; 325 MG/1; MG/1
2 TABLET ORAL PRN
Status: DISCONTINUED | OUTPATIENT
Start: 2019-03-12 | End: 2019-03-12 | Stop reason: HOSPADM

## 2019-03-12 RX ORDER — FENTANYL CITRATE 50 UG/ML
INJECTION, SOLUTION INTRAMUSCULAR; INTRAVENOUS PRN
Status: DISCONTINUED | OUTPATIENT
Start: 2019-03-12 | End: 2019-03-12 | Stop reason: SDUPTHER

## 2019-03-12 RX ORDER — ONDANSETRON 2 MG/ML
INJECTION INTRAMUSCULAR; INTRAVENOUS PRN
Status: DISCONTINUED | OUTPATIENT
Start: 2019-03-12 | End: 2019-03-12 | Stop reason: SDUPTHER

## 2019-03-12 RX ORDER — SODIUM CHLORIDE, SODIUM LACTATE, POTASSIUM CHLORIDE, CALCIUM CHLORIDE 600; 310; 30; 20 MG/100ML; MG/100ML; MG/100ML; MG/100ML
1000 INJECTION, SOLUTION INTRAVENOUS CONTINUOUS
Status: DISCONTINUED | OUTPATIENT
Start: 2019-03-12 | End: 2019-03-12 | Stop reason: HOSPADM

## 2019-03-12 RX ORDER — OXYCODONE HYDROCHLORIDE AND ACETAMINOPHEN 5; 325 MG/1; MG/1
1 TABLET ORAL PRN
Status: DISCONTINUED | OUTPATIENT
Start: 2019-03-12 | End: 2019-03-12 | Stop reason: HOSPADM

## 2019-03-12 RX ORDER — LABETALOL HYDROCHLORIDE 5 MG/ML
5 INJECTION, SOLUTION INTRAVENOUS EVERY 10 MIN PRN
Status: DISCONTINUED | OUTPATIENT
Start: 2019-03-12 | End: 2019-03-12 | Stop reason: HOSPADM

## 2019-03-12 RX ORDER — IBUPROFEN 600 MG/1
600 TABLET ORAL EVERY 6 HOURS PRN
Qty: 30 TABLET | Refills: 0 | Status: SHIPPED | OUTPATIENT
Start: 2019-03-12 | End: 2019-10-04

## 2019-03-12 RX ORDER — MAGNESIUM HYDROXIDE 1200 MG/15ML
LIQUID ORAL CONTINUOUS PRN
Status: COMPLETED | OUTPATIENT
Start: 2019-03-12 | End: 2019-03-12

## 2019-03-12 RX ORDER — MORPHINE SULFATE 2 MG/ML
2 INJECTION, SOLUTION INTRAMUSCULAR; INTRAVENOUS EVERY 5 MIN PRN
Status: DISCONTINUED | OUTPATIENT
Start: 2019-03-12 | End: 2019-03-12 | Stop reason: HOSPADM

## 2019-03-12 RX ORDER — PROPOFOL 10 MG/ML
INJECTION, EMULSION INTRAVENOUS PRN
Status: DISCONTINUED | OUTPATIENT
Start: 2019-03-12 | End: 2019-03-12 | Stop reason: SDUPTHER

## 2019-03-12 RX ORDER — FENTANYL CITRATE 50 UG/ML
25 INJECTION, SOLUTION INTRAMUSCULAR; INTRAVENOUS EVERY 5 MIN PRN
Status: DISCONTINUED | OUTPATIENT
Start: 2019-03-12 | End: 2019-03-12 | Stop reason: HOSPADM

## 2019-03-12 RX ORDER — LIDOCAINE HYDROCHLORIDE 10 MG/ML
INJECTION, SOLUTION EPIDURAL; INFILTRATION; INTRACAUDAL; PERINEURAL PRN
Status: DISCONTINUED | OUTPATIENT
Start: 2019-03-12 | End: 2019-03-12 | Stop reason: SDUPTHER

## 2019-03-12 RX ORDER — DIPHENHYDRAMINE HYDROCHLORIDE 50 MG/ML
12.5 INJECTION INTRAMUSCULAR; INTRAVENOUS
Status: DISCONTINUED | OUTPATIENT
Start: 2019-03-12 | End: 2019-03-12 | Stop reason: HOSPADM

## 2019-03-12 RX ORDER — ONDANSETRON 2 MG/ML
4 INJECTION INTRAMUSCULAR; INTRAVENOUS
Status: DISCONTINUED | OUTPATIENT
Start: 2019-03-12 | End: 2019-03-12 | Stop reason: HOSPADM

## 2019-03-12 RX ADMIN — ONDANSETRON 4 MG: 2 INJECTION, SOLUTION INTRAMUSCULAR; INTRAVENOUS at 09:42

## 2019-03-12 RX ADMIN — LIDOCAINE HYDROCHLORIDE 50 MG: 10 INJECTION, SOLUTION EPIDURAL; INFILTRATION; INTRACAUDAL; PERINEURAL at 08:55

## 2019-03-12 RX ADMIN — FENTANYL CITRATE 25 MCG: 50 INJECTION INTRAMUSCULAR; INTRAVENOUS at 09:13

## 2019-03-12 RX ADMIN — FENTANYL CITRATE 25 MCG: 50 INJECTION INTRAMUSCULAR; INTRAVENOUS at 09:05

## 2019-03-12 RX ADMIN — PHENYLEPHRINE HYDROCHLORIDE 100 MCG: 10 INJECTION INTRAVENOUS at 09:27

## 2019-03-12 RX ADMIN — SODIUM CHLORIDE, POTASSIUM CHLORIDE, SODIUM LACTATE AND CALCIUM CHLORIDE 1000 ML: 600; 310; 30; 20 INJECTION, SOLUTION INTRAVENOUS at 08:12

## 2019-03-12 RX ADMIN — SODIUM CHLORIDE, POTASSIUM CHLORIDE, SODIUM LACTATE AND CALCIUM CHLORIDE: 600; 310; 30; 20 INJECTION, SOLUTION INTRAVENOUS at 08:46

## 2019-03-12 RX ADMIN — PHENYLEPHRINE HYDROCHLORIDE 100 MCG: 10 INJECTION INTRAVENOUS at 09:20

## 2019-03-12 RX ADMIN — PROPOFOL 150 MG: 10 INJECTION, EMULSION INTRAVENOUS at 08:55

## 2019-03-12 ASSESSMENT — PULMONARY FUNCTION TESTS
PIF_VALUE: 18
PIF_VALUE: 12
PIF_VALUE: 15
PIF_VALUE: 25
PIF_VALUE: 16
PIF_VALUE: 13
PIF_VALUE: 1
PIF_VALUE: 33
PIF_VALUE: 15
PIF_VALUE: 12
PIF_VALUE: 1
PIF_VALUE: 16
PIF_VALUE: 13
PIF_VALUE: 1
PIF_VALUE: 18
PIF_VALUE: 1
PIF_VALUE: 16
PIF_VALUE: 1
PIF_VALUE: 18
PIF_VALUE: 12
PIF_VALUE: 12
PIF_VALUE: 18
PIF_VALUE: 0
PIF_VALUE: 1
PIF_VALUE: 18
PIF_VALUE: 18
PIF_VALUE: 2
PIF_VALUE: 13
PIF_VALUE: 18
PIF_VALUE: 18
PIF_VALUE: 15
PIF_VALUE: 16
PIF_VALUE: 0
PIF_VALUE: 18
PIF_VALUE: 13
PIF_VALUE: 12
PIF_VALUE: 16
PIF_VALUE: 15
PIF_VALUE: 12
PIF_VALUE: 15
PIF_VALUE: 18
PIF_VALUE: 18
PIF_VALUE: 16
PIF_VALUE: 18
PIF_VALUE: 16
PIF_VALUE: 4
PIF_VALUE: 4
PIF_VALUE: 18
PIF_VALUE: 1
PIF_VALUE: 2
PIF_VALUE: 18
PIF_VALUE: 16
PIF_VALUE: 18

## 2019-03-12 ASSESSMENT — PAIN SCALES - GENERAL
PAINLEVEL_OUTOF10: 0

## 2019-03-12 ASSESSMENT — PAIN - FUNCTIONAL ASSESSMENT: PAIN_FUNCTIONAL_ASSESSMENT: 0-10

## 2019-03-12 ASSESSMENT — LIFESTYLE VARIABLES: SMOKING_STATUS: 0

## 2019-03-13 LAB — SURGICAL PATHOLOGY REPORT: NORMAL

## 2019-03-14 ENCOUNTER — HOSPITAL ENCOUNTER (OUTPATIENT)
Age: 84
Discharge: HOME | End: 2019-03-14
Payer: MEDICARE

## 2019-03-14 LAB
INR BLD: 0.9
PROTHROMBIN TIME: 12.5 SEC (ref 11.8–14.6)

## 2019-03-14 PROCEDURE — 85610 PROTHROMBIN TIME: CPT

## 2019-03-14 PROCEDURE — 36415 COLL VENOUS BLD VENIPUNCTURE: CPT

## 2019-03-18 ENCOUNTER — HOSPITAL ENCOUNTER (OUTPATIENT)
Age: 84
Discharge: HOME | End: 2019-03-18
Payer: MEDICARE

## 2019-03-18 ENCOUNTER — TELEPHONE (OUTPATIENT)
Dept: OBGYN CLINIC | Age: 84
End: 2019-03-18

## 2019-03-18 LAB
INR BLD: 1.2
PROTHROMBIN TIME: 15 SEC (ref 11.8–14.6)

## 2019-03-18 PROCEDURE — 36415 COLL VENOUS BLD VENIPUNCTURE: CPT

## 2019-03-18 PROCEDURE — 85610 PROTHROMBIN TIME: CPT

## 2019-03-20 ENCOUNTER — HOSPITAL ENCOUNTER (OUTPATIENT)
Age: 84
Discharge: HOME | End: 2019-03-20
Payer: MEDICARE

## 2019-03-20 LAB
INR BLD: 1.3
PROTHROMBIN TIME: 15.9 SEC (ref 11.8–14.6)

## 2019-03-20 PROCEDURE — 36415 COLL VENOUS BLD VENIPUNCTURE: CPT

## 2019-03-20 PROCEDURE — 85610 PROTHROMBIN TIME: CPT

## 2019-03-22 ENCOUNTER — HOSPITAL ENCOUNTER (OUTPATIENT)
Age: 84
Discharge: HOME | End: 2019-03-22
Payer: MEDICARE

## 2019-03-22 LAB
INR BLD: 1.6
PROTHROMBIN TIME: 18.9 SEC (ref 11.8–14.6)

## 2019-03-22 PROCEDURE — 36415 COLL VENOUS BLD VENIPUNCTURE: CPT

## 2019-03-22 PROCEDURE — 85610 PROTHROMBIN TIME: CPT

## 2019-03-24 ENCOUNTER — TELEPHONE (OUTPATIENT)
Dept: OBGYN CLINIC | Age: 84
End: 2019-03-24

## 2019-03-25 ENCOUNTER — HOSPITAL ENCOUNTER (OUTPATIENT)
Age: 84
Discharge: HOME | End: 2019-03-25
Payer: MEDICARE

## 2019-03-25 ENCOUNTER — HOSPITAL ENCOUNTER (OUTPATIENT)
Dept: SPEECH THERAPY | Age: 84
Setting detail: THERAPIES SERIES
Discharge: HOME | End: 2019-03-25
Payer: MEDICARE

## 2019-03-25 LAB
INR BLD: 1.7
PROTHROMBIN TIME: 20.3 SEC (ref 11.8–14.6)

## 2019-03-25 PROCEDURE — 92526 ORAL FUNCTION THERAPY: CPT

## 2019-03-25 PROCEDURE — 85610 PROTHROMBIN TIME: CPT

## 2019-03-25 PROCEDURE — 36415 COLL VENOUS BLD VENIPUNCTURE: CPT

## 2019-03-26 ENCOUNTER — HOSPITAL ENCOUNTER (EMERGENCY)
Age: 84
Discharge: HOME | End: 2019-03-26
Attending: EMERGENCY MEDICINE
Payer: MEDICARE

## 2019-03-26 VITALS
SYSTOLIC BLOOD PRESSURE: 131 MMHG | HEIGHT: 62 IN | WEIGHT: 160 LBS | RESPIRATION RATE: 16 BRPM | TEMPERATURE: 97.6 F | HEART RATE: 85 BPM | DIASTOLIC BLOOD PRESSURE: 66 MMHG | BODY MASS INDEX: 29.44 KG/M2 | OXYGEN SATURATION: 98 %

## 2019-03-26 DIAGNOSIS — S80.12XA LEG HEMATOMA, LEFT, INITIAL ENCOUNTER: Primary | ICD-10-CM

## 2019-03-26 LAB
ABSOLUTE EOS #: 0.2 K/UL (ref 0–0.4)
ABSOLUTE IMMATURE GRANULOCYTE: ABNORMAL K/UL (ref 0–0.3)
ABSOLUTE LYMPH #: 1.1 K/UL (ref 1–4.8)
ABSOLUTE MONO #: 0.6 K/UL (ref 0.1–1.3)
ANION GAP SERPL CALCULATED.3IONS-SCNC: 13 MMOL/L (ref 9–17)
BASOPHILS # BLD: 1 % (ref 0–2)
BASOPHILS ABSOLUTE: 0 K/UL (ref 0–0.2)
BUN BLDV-MCNC: 16 MG/DL (ref 8–23)
BUN/CREAT BLD: ABNORMAL (ref 9–20)
CALCIUM SERPL-MCNC: 8.9 MG/DL (ref 8.6–10.4)
CHLORIDE BLD-SCNC: 105 MMOL/L (ref 98–107)
CO2: 25 MMOL/L (ref 20–31)
CREAT SERPL-MCNC: 0.7 MG/DL (ref 0.5–0.9)
DIFFERENTIAL TYPE: ABNORMAL
EOSINOPHILS RELATIVE PERCENT: 3 % (ref 0–4)
GFR AFRICAN AMERICAN: >60 ML/MIN
GFR NON-AFRICAN AMERICAN: >60 ML/MIN
GFR SERPL CREATININE-BSD FRML MDRD: ABNORMAL ML/MIN/{1.73_M2}
GFR SERPL CREATININE-BSD FRML MDRD: ABNORMAL ML/MIN/{1.73_M2}
GLUCOSE BLD-MCNC: 120 MG/DL (ref 70–99)
HCT VFR BLD CALC: 32.2 % (ref 36–46)
HEMOGLOBIN: 10.5 G/DL (ref 12–16)
IMMATURE GRANULOCYTES: ABNORMAL %
INR BLD: 1.7
LYMPHOCYTES # BLD: 26 % (ref 24–44)
MCH RBC QN AUTO: 28.5 PG (ref 26–34)
MCHC RBC AUTO-ENTMCNC: 32.5 G/DL (ref 31–37)
MCV RBC AUTO: 87.6 FL (ref 80–100)
MONOCYTES # BLD: 13 % (ref 1–7)
NRBC AUTOMATED: ABNORMAL PER 100 WBC
PARTIAL THROMBOPLASTIN TIME: 35.2 SEC (ref 24–36)
PDW BLD-RTO: 15.4 % (ref 11.5–14.9)
PLATELET # BLD: 260 K/UL (ref 150–450)
PLATELET ESTIMATE: ABNORMAL
PMV BLD AUTO: 8.3 FL (ref 6–12)
POTASSIUM SERPL-SCNC: 3.7 MMOL/L (ref 3.7–5.3)
PROTHROMBIN TIME: 19.8 SEC (ref 11.8–14.6)
RBC # BLD: 3.68 M/UL (ref 4–5.2)
RBC # BLD: ABNORMAL 10*6/UL
SEG NEUTROPHILS: 57 % (ref 36–66)
SEGMENTED NEUTROPHILS ABSOLUTE COUNT: 2.6 K/UL (ref 1.3–9.1)
SODIUM BLD-SCNC: 143 MMOL/L (ref 135–144)
WBC # BLD: 4.5 K/UL (ref 3.5–11)
WBC # BLD: ABNORMAL 10*3/UL

## 2019-03-26 PROCEDURE — 85025 COMPLETE CBC W/AUTO DIFF WBC: CPT

## 2019-03-26 PROCEDURE — 80048 BASIC METABOLIC PNL TOTAL CA: CPT

## 2019-03-26 PROCEDURE — 85610 PROTHROMBIN TIME: CPT

## 2019-03-26 PROCEDURE — 85730 THROMBOPLASTIN TIME PARTIAL: CPT

## 2019-03-26 PROCEDURE — 36415 COLL VENOUS BLD VENIPUNCTURE: CPT

## 2019-03-26 PROCEDURE — 93971 EXTREMITY STUDY: CPT

## 2019-03-26 PROCEDURE — 99284 EMERGENCY DEPT VISIT MOD MDM: CPT

## 2019-03-26 ASSESSMENT — ENCOUNTER SYMPTOMS
COLOR CHANGE: 1
NAUSEA: 0
DIARRHEA: 0
CONSTIPATION: 0
VOMITING: 0
SORE THROAT: 0
ABDOMINAL PAIN: 0
BACK PAIN: 0
BLOOD IN STOOL: 0
TROUBLE SWALLOWING: 0
COUGH: 0
SHORTNESS OF BREATH: 0

## 2019-03-26 ASSESSMENT — PAIN DESCRIPTION - ORIENTATION: ORIENTATION: LEFT

## 2019-03-26 ASSESSMENT — PAIN SCALES - GENERAL: PAINLEVEL_OUTOF10: 2

## 2019-03-26 ASSESSMENT — PAIN DESCRIPTION - LOCATION: LOCATION: LEG

## 2019-03-27 ENCOUNTER — HOSPITAL ENCOUNTER (OUTPATIENT)
Dept: SPEECH THERAPY | Age: 84
Setting detail: THERAPIES SERIES
Discharge: HOME | End: 2019-03-27
Payer: MEDICARE

## 2019-03-27 ENCOUNTER — OFFICE VISIT (OUTPATIENT)
Dept: OBGYN CLINIC | Age: 84
End: 2019-03-27
Payer: MEDICARE

## 2019-03-27 VITALS
SYSTOLIC BLOOD PRESSURE: 136 MMHG | BODY MASS INDEX: 29.74 KG/M2 | WEIGHT: 161.6 LBS | HEIGHT: 62 IN | DIASTOLIC BLOOD PRESSURE: 70 MMHG

## 2019-03-27 DIAGNOSIS — Z09 POSTOP CHECK: Primary | ICD-10-CM

## 2019-03-27 PROCEDURE — G8484 FLU IMMUNIZE NO ADMIN: HCPCS | Performed by: OBSTETRICS & GYNECOLOGY

## 2019-03-27 PROCEDURE — 1090F PRES/ABSN URINE INCON ASSESS: CPT | Performed by: OBSTETRICS & GYNECOLOGY

## 2019-03-27 PROCEDURE — 1123F ACP DISCUSS/DSCN MKR DOCD: CPT | Performed by: OBSTETRICS & GYNECOLOGY

## 2019-03-27 PROCEDURE — 92526 ORAL FUNCTION THERAPY: CPT

## 2019-03-27 PROCEDURE — G8427 DOCREV CUR MEDS BY ELIG CLIN: HCPCS | Performed by: OBSTETRICS & GYNECOLOGY

## 2019-03-27 PROCEDURE — G8419 CALC BMI OUT NRM PARAM NOF/U: HCPCS | Performed by: OBSTETRICS & GYNECOLOGY

## 2019-03-27 PROCEDURE — 99212 OFFICE O/P EST SF 10 MIN: CPT | Performed by: OBSTETRICS & GYNECOLOGY

## 2019-03-27 PROCEDURE — 1036F TOBACCO NON-USER: CPT | Performed by: OBSTETRICS & GYNECOLOGY

## 2019-03-27 PROCEDURE — 4040F PNEUMOC VAC/ADMIN/RCVD: CPT | Performed by: OBSTETRICS & GYNECOLOGY

## 2019-04-01 ENCOUNTER — HOSPITAL ENCOUNTER (OUTPATIENT)
Dept: SPEECH THERAPY | Age: 84
Setting detail: THERAPIES SERIES
Discharge: HOME | End: 2019-04-01
Payer: MEDICARE

## 2019-04-01 PROCEDURE — 92526 ORAL FUNCTION THERAPY: CPT

## 2019-04-01 NOTE — PROGRESS NOTES
Speech Language Pathology    [facility]) at [facility]  [address] [facility], [address]  [place], [address] [facility]  Office: [phone]  Fax: [phone]     Speech Therapy Progress Note              Nickie Palma  Date:  4/1/19                  Time: 230-300                   Tx # 3/6   Complaints of pain:   no  yes  Did pain affect tx:   no  yes   If yes, action taken:   Pt. behavior:   cooperative   aggressive      Charges:  CPS X_____  27532   [id number]   [id number]                                NO SHOW/CANCEL   Diagnosis:  Dysphagia    SLP:  Shirin Saenz MA CCC/SLP     GOAL CURRENT STATUS   Goal 1: Pt will complete laryngeal strengthening exercises 10x each with returned demo at 100%         Pt completed laryngeal strengthening home exercise program 10x each with mod cues provided. Significant pt ed provided re technique of Mendalsohn maneuver to improve hyolaryngeal elevation. Pt continues to demonstrate difficulty with task. Thermal tactile stim provided to improve timing of volitional swallow throughout session. Goal 2: Pt will tolerate LRD with no overt s/s of aspiration 100% of the time   Therapeutic feeding trials of thin liquids provided (via cup). No overt s/s of aspiration 6/6 trials. Pt reports tolerating diet well at home. Goal 3: Pt will complete follow up MBSS to further assess swallow function following end of POC. NT   COMMENTS:     Pt ed provided re importance of small sips to decrease penetration of thin liquids. Pt verbalized understanding.     Pt/Caregiver Education:  Verbal/Demo/Written  Comprehension of Education:  Yes/Needs Review    Continue Current Tx:      yes     no: __________        ______________________________  Delilah Ulloa CCC-SLP

## 2019-04-02 ENCOUNTER — HOSPITAL ENCOUNTER (OUTPATIENT)
Dept: SPEECH THERAPY | Age: 84
Setting detail: THERAPIES SERIES
Discharge: HOME | End: 2019-04-02
Payer: MEDICARE

## 2019-04-02 PROCEDURE — 92526 ORAL FUNCTION THERAPY: CPT

## 2019-04-02 NOTE — PROGRESS NOTES
Speech Language Pathology    [facility]) at [facility]  [address] [facility], [address]  [place], [address] [facility]  Office: [phone]  Fax: [phone]     Speech Therapy Progress Note              Danis Lang  Date:  4/2/19                  Time: 315-400                  Tx # 4/6   Complaints of pain:   no  yes  Did pain affect tx:   no  yes   If yes, action taken:   Pt. behavior:   cooperative   aggressive      Charges:  CPS X_____  [id number]   [id number]                                NO SHOW/CANCEL   Diagnosis:  Dysphagia    SLP:  Daniel Stark MA CCC/SLP     GOAL CURRENT STATUS   Goal 1: Pt will complete laryngeal strengthening exercises 10x each with returned demo at 100%         Pt completed laryngeal strengthening home exercise program 10x each with mod cues provided. Continued pt ed provided re technique of Mendalsohn maneuver technique. Handout provided. Pt verbalized understanding. Pt continues to demonstrate difficulty with Mendelsohn maneuver but is completing all other exercises accurately   Goal 2: Pt will tolerate LRD with no overt s/s of aspiration 100% of the time   Therapeutic feeding trials of thin liquids provided (via cup). No overt s/s of aspiration 8/8 trials. Small sips encouraged. Pt reports tolerating diet well at home. Goal 3: Pt will complete follow up MBSS to further assess swallow function following end of POC. NT   COMMENTS:     Pt ed provided re importance of small sips to decrease penetration of thin liquids. Pt verbalized understanding.       Pt/Caregiver Education:  Verbal/Demo/Written  Comprehension of Education:  Yes/Needs Review    Continue Current Tx:      yes     no: __________        ______________________________  Marley Hogue CCC-SLP

## 2019-04-03 ENCOUNTER — APPOINTMENT (OUTPATIENT)
Dept: SPEECH THERAPY | Age: 84
End: 2019-04-03
Payer: MEDICARE

## 2019-04-04 ENCOUNTER — OFFICE VISIT (OUTPATIENT)
Dept: OBGYN CLINIC | Age: 84
End: 2019-04-04
Payer: MEDICARE

## 2019-04-04 VITALS
WEIGHT: 160 LBS | DIASTOLIC BLOOD PRESSURE: 60 MMHG | BODY MASS INDEX: 29.44 KG/M2 | SYSTOLIC BLOOD PRESSURE: 96 MMHG | HEIGHT: 62 IN

## 2019-04-04 DIAGNOSIS — Z09 POSTOP CHECK: Primary | ICD-10-CM

## 2019-04-04 PROCEDURE — G8427 DOCREV CUR MEDS BY ELIG CLIN: HCPCS | Performed by: OBSTETRICS & GYNECOLOGY

## 2019-04-04 PROCEDURE — G8419 CALC BMI OUT NRM PARAM NOF/U: HCPCS | Performed by: OBSTETRICS & GYNECOLOGY

## 2019-04-04 PROCEDURE — 1123F ACP DISCUSS/DSCN MKR DOCD: CPT | Performed by: OBSTETRICS & GYNECOLOGY

## 2019-04-04 PROCEDURE — 1090F PRES/ABSN URINE INCON ASSESS: CPT | Performed by: OBSTETRICS & GYNECOLOGY

## 2019-04-04 PROCEDURE — 99212 OFFICE O/P EST SF 10 MIN: CPT | Performed by: OBSTETRICS & GYNECOLOGY

## 2019-04-04 PROCEDURE — 4040F PNEUMOC VAC/ADMIN/RCVD: CPT | Performed by: OBSTETRICS & GYNECOLOGY

## 2019-04-04 PROCEDURE — 1036F TOBACCO NON-USER: CPT | Performed by: OBSTETRICS & GYNECOLOGY

## 2019-04-04 NOTE — PROGRESS NOTES
Direct 0.16 <0.31 mg/dL    Bilirubin, Indirect 0.39 0.00 - 1.00 mg/dL    Total Protein 6.8 6.4 - 8.3 g/dL    Globulin NOT REPORTED 1.5 - 3.8 g/dL    Albumin/Globulin Ratio NOT REPORTED 1.0 - 2.5   Protime-INR    Collection Time: 07/02/18  8:25 AM   Result Value Ref Range    Protime 20.7 (H) 9.7 - 12.0 sec    INR 2.1    Protime-INR    Collection Time: 07/30/18  9:32 AM   Result Value Ref Range    Protime 22.4 (H) 9.7 - 12.0 sec    INR 2.2    Amiodarone Level    Collection Time: 07/30/18  9:42 AM   Result Value Ref Range    Amiodarone Lvl 1.0 0.5 - 2.0 ug/mL    N-Desethyl-Amiodarone 1.2 ug/mL   ALT    Collection Time: 07/30/18  9:42 AM   Result Value Ref Range    ALT 17 5 - 33 U/L   AST    Collection Time: 07/30/18  9:42 AM   Result Value Ref Range    AST 27 <32 U/L   T3, Free    Collection Time: 07/30/18  9:42 AM   Result Value Ref Range    T3, Free 2.06 2.02 - 4.43 pg/mL   T4, Free    Collection Time: 07/30/18  9:42 AM   Result Value Ref Range    Thyroxine, Free 1.89 (H) 0.93 - 1.70 ng/dL   TSH without Reflex    Collection Time: 07/30/18  9:42 AM   Result Value Ref Range    TSH 2.34 0.30 - 5.00 mIU/L   Protime-INR    Collection Time: 09/11/18  8:35 AM   Result Value Ref Range    Protime 10.7 9.7 - 12.0 sec    INR 1.0    Surgical Pathology    Collection Time: 09/12/18 11:52 AM   Result Value Ref Range    Surgical Pathology Report       (NOTE)  [id number]  [facility]  ANATOMIC PATHOLOGY  [address] [place] [address]. [facility], [address]  [phone]  Fax: [phone] [address] [facility]    Patient Name: Pritesh Stein. Med Rec: [id number]  Path Number: [id number]  Collected: 9/12/2018  Received: 9/12/2018  Reported: 9/14/2018 10:12    -- Diagnosis --    Stomach, biopsy:         Mild chronic gastritis. Negative for Helicobacter.         CHAPITO Lai  **Electronically Signed Out**         rdd/9/13/2018      Clinical Information  Pre-op Diagnosis: ABNORMAL LFT'S, ABNORMAL CT OF STOMACH   Operative Findings:  STOMACH  Operation Performed:  ENDOSCOPIC ULTRASOUND    Source of Specimen  1: STOMACH    Gross Description  \"TOYA VU, STOMACH\" Two tan-white tissue fragments, each 0.4 cm and  are 0.8 x 0.2 x 0.1 cm in aggregate. Entirely 1cs. rh tm      Microscopic Description  Antral mucosa shows mild patchy increase in lamina propria lymphocytes  and plasma  cells. Architecture is intact and there is no intestinal  metaplasia and no dysplasia. There is no malignancy. Because of the  inflammation, staining for Helicobacter detection is performed. The  immunostain for H. pylori antigen is negative. The stain control is  appropriately reactive.      Protime-INR    Collection Time: 09/14/18  8:35 AM   Result Value Ref Range    Protime 10.0 9.7 - 12.0 sec    INR 1.0    Protime-INR    Collection Time: 09/17/18  8:05 AM   Result Value Ref Range    Protime 10.7 9.7 - 12.0 sec    INR 1.0    Protime-INR    Collection Time: 09/19/18  8:12 AM   Result Value Ref Range    Protime 12.0 9.7 - 12.0 sec    INR 1.2    Protime-INR    Collection Time: 09/21/18  8:20 AM   Result Value Ref Range    Protime 13.4 (H) 9.7 - 12.0 sec    INR 1.3    Protime-INR    Collection Time: 09/24/18  8:28 AM   Result Value Ref Range    Protime 14.8 (H) 9.7 - 12.0 sec    INR 1.4    Protime-INR    Collection Time: 09/27/18  8:23 AM   Result Value Ref Range    Protime 25.5 (H) 9.7 - 12.0 sec    INR 2.6    BUN & Creatinine    Collection Time: 10/11/18  8:20 AM   Result Value Ref Range    BUN 19 8 - 23 mg/dL    CREATININE 0.83 0.50 - 0.90 mg/dL    GFR Non-African American >60 >60 mL/min    GFR African American >60 >60 mL/min    GFR Comment          GFR Staging NOT REPORTED    Protime-INR    Collection Time: 10/16/18 11:21 AM   Result Value Ref Range    Protime 17.7 (H) 9.7 - 12.0 sec    INR 1.7    POCT Urinalysis no Micro    Collection Time: 10/16/18  1:18 PM   Result Value Ref Range    Color, UA yellow Clarity, UA clear5     Glucose, UA POC neg     Bilirubin, UA      Ketones, UA      Spec Grav, UA      Blood, UA POC +     pH, UA      Protein, UA POC trace     Urobilinogen, UA      Leukocytes, UA trace     Nitrite, UA neg    Protime-INR    Collection Time: 10/19/18 11:46 AM   Result Value Ref Range    Protime 15.4 (H) 9.7 - 12.0 sec    INR 1.5    Protime-INR    Collection Time: 10/22/18  8:44 AM   Result Value Ref Range    Protime 19.6 (H) 9.7 - 12.0 sec    INR 1.9    Protime-INR    Collection Time: 10/31/18  8:47 AM   Result Value Ref Range    Protime 15.7 (H) 9.7 - 12.0 sec    INR 1.5    Protime-INR    Collection Time: 11/14/18  8:12 AM   Result Value Ref Range    Protime 23.0 (H) 9.7 - 12.0 sec    INR 2.3    Vitamin B12    Collection Time: 12/12/18  8:29 AM   Result Value Ref Range    Vitamin B-12 380 232 - 1245 pg/mL   CBC Auto Differential    Collection Time: 12/12/18  8:29 AM   Result Value Ref Range    WBC 4.2 3.5 - 11.0 k/uL    RBC 4.32 4.0 - 5.2 m/uL    Hemoglobin 12.3 12.0 - 16.0 g/dL    Hematocrit 37.8 36 - 46 %    MCV 87.6 80 - 100 fL    MCH 28.4 26 - 34 pg    MCHC 32.5 31 - 37 g/dL    RDW 15.2 (H) 11.5 - 14.9 %    Platelets 147 126 - 342 k/uL    MPV 8.0 6.0 - 12.0 fL    NRBC Automated NOT REPORTED per 100 WBC    Differential Type NOT REPORTED     Seg Neutrophils 55 36 - 66 %    Lymphocytes 29 24 - 44 %    Monocytes 12 (H) 1 - 7 %    Eosinophils % 3 0 - 4 %    Basophils 1 0 - 2 %    Immature Granulocytes NOT REPORTED 0 %    Segs Absolute 2.30 1.3 - 9.1 k/uL    Absolute Lymph # 1.20 1.0 - 4.8 k/uL    Absolute Mono # 0.50 0.1 - 1.3 k/uL    Absolute Eos # 0.10 0.0 - 0.4 k/uL    Basophils # 0.00 0.0 - 0.2 k/uL    Absolute Immature Granulocyte NOT REPORTED 0.00 - 0.30 k/uL    WBC Morphology NOT REPORTED     RBC Morphology NOT REPORTED     Platelet Estimate NOT REPORTED    Comprehensive Metabolic Panel    Collection Time: 12/12/18  8:29 AM   Result Value Ref Range    Glucose 105 (H) 70 - 99 mg/dL    BUN 16 8 - 23 mg/dL    CREATININE 0.64 0.50 - 0.90 mg/dL    Bun/Cre Ratio NOT REPORTED 9 - 20    Calcium 9.3 8.6 - 10.4 mg/dL    Sodium 144 135 - 144 mmol/L    Potassium 4.2 3.7 - 5.3 mmol/L    Chloride 106 98 - 107 mmol/L    CO2 29 20 - 31 mmol/L    Anion Gap 9 9 - 17 mmol/L    Alkaline Phosphatase 81 35 - 104 U/L    ALT 19 5 - 33 U/L    AST 26 <32 U/L    Total Bilirubin 0.39 0.3 - 1.2 mg/dL    Total Protein 7.1 6.4 - 8.3 g/dL    Alb 4.1 3.5 - 5.2 g/dL    Albumin/Globulin Ratio NOT REPORTED 1.0 - 2.5    GFR Non-African American >60 >60 mL/min    GFR African American >60 >60 mL/min    GFR Comment          GFR Staging NOT REPORTED    Protime-INR    Collection Time: 12/12/18  8:29 AM   Result Value Ref Range    Protime 30.1 (H) 9.7 - 12.0 sec    INR 3.1    TSH without Reflex    Collection Time: 12/12/18  8:29 AM   Result Value Ref Range    TSH 3.42 0.30 - 5.00 mIU/L   Vitamin D 25 Hydroxy    Collection Time: 12/12/18  8:29 AM   Result Value Ref Range    Vit D, 25-Hydroxy 57.2 30.0 - 100.0 ng/mL   Protime-INR    Collection Time: 12/26/18  8:32 AM   Result Value Ref Range    Protime 24.9 (H) 9.7 - 12.0 sec    INR 2.5    ALT    Collection Time: 01/08/19  9:10 AM   Result Value Ref Range    ALT 21 5 - 33 U/L   AST    Collection Time: 01/08/19  9:10 AM   Result Value Ref Range    AST 30 <32 U/L   T3, Free    Collection Time: 01/08/19  9:10 AM   Result Value Ref Range    T3, Free 2.25 2.02 - 4.43 pg/mL   T4, Free    Collection Time: 01/08/19  9:10 AM   Result Value Ref Range    Thyroxine, Free 1.94 (H) 0.93 - 1.70 ng/dL   TSH without Reflex    Collection Time: 01/08/19  9:10 AM   Result Value Ref Range    TSH 3.42 0.30 - 5.00 mIU/L   Protime-INR    Collection Time: 01/25/19  8:51 AM   Result Value Ref Range    Protime 29.5 (H) 11.8 - 14.6 sec    INR 2.8    Protime-INR    Collection Time: 02/21/19  8:41 AM   Result Value Ref Range    Protime 38.7 (H) 11.8 - 14.6 sec    INR 3.9    Protime-INR    Collection Time: 03/04/19  9:52 AM Result Value Ref Range    Protime 26.9 (H) 11.8 - 14.6 sec    INR 2.5    Urinalysis    Collection Time: 03/07/19 10:07 AM   Result Value Ref Range    Color, UA YELLOW YELLOW    Turbidity UA CLEAR CLEAR    Glucose, Ur NEGATIVE NEGATIVE    Bilirubin Urine NEGATIVE NEGATIVE    Ketones, Urine NEGATIVE NEGATIVE    Specific Gravity, UA 1.018 1.005 - 1.030    Urine Hgb TRACE (A) NEGATIVE    pH, UA 5.0 5.0 - 8.0    Protein, UA NEGATIVE NEGATIVE    Urobilinogen, Urine Normal Normal    Nitrite, Urine POSITIVE (A) NEGATIVE    Leukocyte Esterase, Urine SMALL (A) NEGATIVE    Urinalysis Comments NOT REPORTED    Microscopic Urinalysis    Collection Time: 03/07/19 10:07 AM   Result Value Ref Range    -          WBC, UA 10 TO 20 0 - 5 /HPF    RBC, UA 0 TO 2 0 - 4 /HPF    Casts UA  0 - 8 /LPF     2 TO 5 HYALINE Reference range defined for non-centrifuged specimen. Crystals UA NOT REPORTED None /HPF    Epithelial Cells UA 5 TO 10 0 - 5 /HPF    Renal Epithelial, Urine NOT REPORTED 0 /HPF    Bacteria, UA FEW (A) None    Mucus, UA NOT REPORTED None    Trichomonas, UA NOT REPORTED None    Amorphous, UA NOT REPORTED None    Other Observations UA NOT REPORTED NOT REQ.     Yeast, UA NOT REPORTED None   EKG 12 Lead    Collection Time: 03/07/19 10:10 AM   Result Value Ref Range    Ventricular Rate 73 BPM    Atrial Rate 73 BPM    P-R Interval 222 ms    QRS Duration 148 ms    Q-T Interval 448 ms    QTc Calculation (Bazett) 493 ms    P Axis 55 degrees    R Axis -81 degrees    T Axis 83 degrees   CBC    Collection Time: 03/07/19 10:34 AM   Result Value Ref Range    WBC 4.4 3.5 - 11.3 k/uL    RBC 4.22 3.95 - 5.11 m/uL    Hemoglobin 12.0 11.9 - 15.1 g/dL    Hematocrit 38.0 36.3 - 47.1 %    MCV 90.0 82.6 - 102.9 fL    MCH 28.4 25.2 - 33.5 pg    MCHC 31.6 28.4 - 34.8 g/dL    RDW 14.8 (H) 11.8 - 14.4 %    Platelets 319 621 - 919 k/uL    MPV 9.7 8.1 - 13.5 fL    NRBC Automated 0.0 0.0 per 100 WBC   TYPE AND SCREEN    Collection Time: 03/07/19 10:34 examination performed.       Protime-INR    Collection Time: 03/14/19  8:31 AM   Result Value Ref Range    Protime 12.5 11.8 - 14.6 sec    INR 0.9    Protime-INR    Collection Time: 03/18/19  8:16 AM   Result Value Ref Range    Protime 15.0 (H) 11.8 - 14.6 sec    INR 1.2    Protime-INR    Collection Time: 03/20/19  8:04 AM   Result Value Ref Range    Protime 15.9 (H) 11.8 - 14.6 sec    INR 1.3    Protime-INR    Collection Time: 03/22/19  6:55 AM   Result Value Ref Range    Protime 18.9 (H) 11.8 - 14.6 sec    INR 1.6    Protime-INR    Collection Time: 03/25/19  8:47 AM   Result Value Ref Range    Protime 20.3 (H) 11.8 - 14.6 sec    INR 1.7    CBC Auto Differential    Collection Time: 03/26/19  7:20 PM   Result Value Ref Range    WBC 4.5 3.5 - 11.0 k/uL    RBC 3.68 (L) 4.0 - 5.2 m/uL    Hemoglobin 10.5 (L) 12.0 - 16.0 g/dL    Hematocrit 32.2 (L) 36 - 46 %    MCV 87.6 80 - 100 fL    MCH 28.5 26 - 34 pg    MCHC 32.5 31 - 37 g/dL    RDW 15.4 (H) 11.5 - 14.9 %    Platelets 457 764 - 632 k/uL    MPV 8.3 6.0 - 12.0 fL    NRBC Automated NOT REPORTED per 100 WBC    Differential Type NOT REPORTED     Seg Neutrophils 57 36 - 66 %    Lymphocytes 26 24 - 44 %    Monocytes 13 (H) 1 - 7 %    Eosinophils % 3 0 - 4 %    Basophils 1 0 - 2 %    Immature Granulocytes NOT REPORTED 0 %    Segs Absolute 2.60 1.3 - 9.1 k/uL    Absolute Lymph # 1.10 1.0 - 4.8 k/uL    Absolute Mono # 0.60 0.1 - 1.3 k/uL    Absolute Eos # 0.20 0.0 - 0.4 k/uL    Basophils # 0.00 0.0 - 0.2 k/uL    Absolute Immature Granulocyte NOT REPORTED 0.00 - 0.30 k/uL    WBC Morphology NOT REPORTED     RBC Morphology NOT REPORTED     Platelet Estimate NOT REPORTED    Basic Metabolic Panel    Collection Time: 03/26/19  7:20 PM   Result Value Ref Range    Glucose 120 (H) 70 - 99 mg/dL    BUN 16 8 - 23 mg/dL    CREATININE 0.70 0.50 - 0.90 mg/dL    Bun/Cre Ratio NOT REPORTED 9 - 20    Calcium 8.9 8.6 - 10.4 mg/dL    Sodium 143 135 - 144 mmol/L    Potassium 3.7 3.7 - 5.3 mmol/L Chloride 105 98 - 107 mmol/L    CO2 25 20 - 31 mmol/L    Anion Gap 13 9 - 17 mmol/L    GFR Non-African American >60 >60 mL/min    GFR African American >60 >60 mL/min    GFR Comment          GFR Staging NOT REPORTED    Protime-INR    Collection Time: 03/26/19  7:20 PM   Result Value Ref Range    Protime 19.8 (H) 11.8 - 14.6 sec    INR 1.7    APTT    Collection Time: 03/26/19  7:20 PM   Result Value Ref Range    PTT 35.2 24.0 - 36.0 sec       Dr. Eber Silva took her off of all of her blood thinners. She reports the vaginal bleeding has gotten much less and is dark brown now, which means it is just old blood.   The mass behind the knee is slowly resolving    IMP: Given that her pathology was benign, I think that we need no further attention to this bleeding    PLAN: Return visit as needed

## 2019-04-09 ENCOUNTER — HOSPITAL ENCOUNTER (OUTPATIENT)
Dept: SPEECH THERAPY | Age: 84
Setting detail: THERAPIES SERIES
Discharge: HOME | End: 2019-04-09
Payer: MEDICARE

## 2019-04-09 PROCEDURE — 92526 ORAL FUNCTION THERAPY: CPT

## 2019-04-09 NOTE — PROGRESS NOTES
Speech Language Pathology    [address] at [facility]  [address] [facility],  [address]  Office: [phone]  Fax: [phone]     Speech Therapy Progress Note              Uri Del Cid  Date:  4/9/19                  Time: 225-305                Tx # 5/6   Complaints of pain:   no  yes  Did pain affect tx:   no  yes   If yes, action taken:   Pt. behavior:   cooperative   aggressive      Charges:  CPS X_____  [id number]   [id number]                                NO SHOW/CANCEL   Diagnosis:  Dysphagia    SLP:  John Andrews MA CCC/SLP     GOAL CURRENT STATUS   Goal 1: Pt will complete laryngeal strengthening exercises 10x each with returned demo at 100%         Pt completed laryngeal strengthening exercise program 10x each with mod cues provided (focusing on mendalsohn, amalia, and effortful swallow). Continued pt ed provided re technique of Mendalsohn maneuver technique. Pt verbalized understanding but continues to demonstrate difficulty with exercise     Goal 2: Pt will tolerate LRD with no overt s/s of aspiration 100% of the time   Therapeutic feeding trials of thin liquids provided (via cup). No overt s/s of aspiration 6/6 trials. Small sips encouraged. Pt reports tolerating diet well at home. Goal 3: Pt will complete follow up MBSS to further assess swallow function following end of POC. NT   COMMENTS:     Pt ed provided re importance of small sips to decrease penetration of thin liquids. Pt verbalized understanding.       Pt/Caregiver Education:  Verbal/Demo/Written  Comprehension of Education:  Yes/Needs Review    Continue Current Tx:      yes     no: __________        ______________________________  Faylene Galeazzi., CCC-SLP

## 2019-04-11 ENCOUNTER — HOSPITAL ENCOUNTER (OUTPATIENT)
Dept: SPEECH THERAPY | Age: 84
Setting detail: THERAPIES SERIES
Discharge: HOME | End: 2019-04-11
Payer: MEDICARE

## 2019-04-11 PROCEDURE — 92526 ORAL FUNCTION THERAPY: CPT

## 2019-04-11 NOTE — PROGRESS NOTES
Speech Language Pathology      [facility]) at [facility]  [address] [facility], [address], [address] [facility]  Office: [phone]  Fax: [phone]      DISCHARGE SUMMARY  [address] [facility] Pathology Treatment    Raquel Recinos     The above patient is [facility] due to:    _____ Pt. is not attending and/or not calling to cancel therapy visits/requesting discharge.    _____  Insurance denied coverage for therapy.    _____ Insurance has not contacted us to re-authorize therapy. A new prescription and insurance authorization is required to re-open the case. __X___ Goals have been met and/or pt. has returned to baseline:      _____ Progress has reached a plateau.    _____ No change in swallow function after 12 treatments of VitalStim.    _____ 12 treatments of VitalStim therapy have been completed:      ___ __ Other: Pt. was admitted into the hospital and will need a new speech therapy script if pt. wishes to return. Should you have any questions or concerns about this case, you may reach me at   [phone]. Thank you for this referral.        _____________________________________   __________________  Simeon Nazario M.A.  CCC-SLP     Date    _____________________________________   __________________  Referring Physican      Date

## 2019-04-11 NOTE — PROGRESS NOTES
Speech Language Pathology            [facility]) at [facility]  [address] [facility], [address]  [place], [address] [facility]  Office: [phone]  Fax: [phone]                                          Progress Summary- Speech/Language Therapy        NAME: Maryuri Bates  : 1930  MRN: [id number]     ADMISSION DATE: 2019        Clinical Treatment:     xCognition    xHome Exercise Program         x Pt Education     GOALS Progress Toward Goals/Assessment   Short Term Goals:     Goal 1: Pt will complete laryngeal strengthening exercises 10x each with returned demo at 100%   Returned demo at 100% independently     Goal 2: Pt will tolerate LRD with no overt s/s of aspiration 100% of the time     Pt tolerating trials of thin liquids and reg food (crackers) with no overt s/s of aspiration 100% of the time. Goal 3: Pt will complete follow up MBSS to further assess swallow function following end of POC. Long-term Goals   Pt has decided not to complete follow up MBSS at this time as she is tolerating current diet at home with no complaints. Pt states she will pursue swallow study should that change in the future. Recommendations/Plan:    Pt. continues to be cooperative and eager to improve swallowing deficits during all treatment sessions. Pt has 0 cancellations in POC. Pt reports improved swallow function at home evidenced by decreased coughing/throat clearing when consuming thin liquids. Pt has been tolerating diet well during therapy sessions as well and is recalling swallowing strategies appropriately to minimize aspiration risk (small sips of thin liquids). Recommend pt d/c from speech therapy at this time as pt has met all stated goals and is completing laryngeal strengthening exercises correctly and independently with returned demo at 100%. Pt verbalized understanding of discharge and all discharge instructions. D/C summary sent to referring physician and PCP.      Thank you for the opportunity to participate in your patients care, and for your continued support of our services.   Please contact me with any questions at [phone].    _________________________________________________                    _____________  John Andrews M.A. CHRISTIAN/SLP     Date   CC: Referring Physician                                **Please sign for approval for additional visits**

## 2019-04-16 ENCOUNTER — APPOINTMENT (OUTPATIENT)
Dept: GENERAL RADIOLOGY | Age: 84
DRG: 193 | End: 2019-04-16
Payer: MEDICARE

## 2019-04-16 ENCOUNTER — HOSPITAL ENCOUNTER (INPATIENT)
Age: 84
LOS: 3 days | Discharge: HOME | DRG: 193 | End: 2019-04-19
Attending: EMERGENCY MEDICINE | Admitting: INTERNAL MEDICINE
Payer: MEDICARE

## 2019-04-16 ENCOUNTER — APPOINTMENT (OUTPATIENT)
Dept: CT IMAGING | Age: 84
DRG: 193 | End: 2019-04-16
Payer: MEDICARE

## 2019-04-16 DIAGNOSIS — J96.01 ACUTE RESPIRATORY FAILURE WITH HYPOXIA (HCC): Primary | ICD-10-CM

## 2019-04-16 LAB
ABSOLUTE EOS #: 0 K/UL (ref 0–0.4)
ABSOLUTE IMMATURE GRANULOCYTE: ABNORMAL K/UL (ref 0–0.3)
ABSOLUTE LYMPH #: 0.6 K/UL (ref 1–4.8)
ABSOLUTE MONO #: 0.7 K/UL (ref 0.1–1.3)
ALBUMIN SERPL-MCNC: 4.2 G/DL (ref 3.5–5.2)
ALBUMIN/GLOBULIN RATIO: ABNORMAL (ref 1–2.5)
ALLEN TEST: ABNORMAL
ALP BLD-CCNC: 66 U/L (ref 35–104)
ALT SERPL-CCNC: 21 U/L (ref 5–33)
ANION GAP SERPL CALCULATED.3IONS-SCNC: 13 MMOL/L (ref 9–17)
AST SERPL-CCNC: 25 U/L
BASOPHILS # BLD: 0 % (ref 0–2)
BASOPHILS ABSOLUTE: 0 K/UL (ref 0–0.2)
BILIRUB SERPL-MCNC: 0.41 MG/DL (ref 0.3–1.2)
BNP INTERPRETATION: ABNORMAL
BUN BLDV-MCNC: 15 MG/DL (ref 8–23)
BUN/CREAT BLD: ABNORMAL (ref 9–20)
CALCIUM SERPL-MCNC: 8.9 MG/DL (ref 8.6–10.4)
CARBOXYHEMOGLOBIN: 0.3 % (ref 0–5)
CHLORIDE BLD-SCNC: 105 MMOL/L (ref 98–107)
CO2: 26 MMOL/L (ref 20–31)
CREAT SERPL-MCNC: 0.53 MG/DL (ref 0.5–0.9)
DIFFERENTIAL TYPE: ABNORMAL
DIRECT EXAM: NORMAL
EOSINOPHILS RELATIVE PERCENT: 1 % (ref 0–4)
FIO2: 50
GFR AFRICAN AMERICAN: >60 ML/MIN
GFR NON-AFRICAN AMERICAN: >60 ML/MIN
GFR SERPL CREATININE-BSD FRML MDRD: ABNORMAL ML/MIN/{1.73_M2}
GFR SERPL CREATININE-BSD FRML MDRD: ABNORMAL ML/MIN/{1.73_M2}
GLUCOSE BLD-MCNC: 144 MG/DL (ref 70–99)
HCO3 ARTERIAL: 26.8 MMOL/L (ref 22–26)
HCT VFR BLD CALC: 33.9 % (ref 36–46)
HEMOGLOBIN: 11 G/DL (ref 12–16)
IMMATURE GRANULOCYTES: ABNORMAL %
INR BLD: 1
LACTIC ACID: 1 MMOL/L (ref 0.5–2.2)
LYMPHOCYTES # BLD: 7 % (ref 24–44)
Lab: NORMAL
MCH RBC QN AUTO: 27.6 PG (ref 26–34)
MCHC RBC AUTO-ENTMCNC: 32.4 G/DL (ref 31–37)
MCV RBC AUTO: 85.1 FL (ref 80–100)
METHEMOGLOBIN: 0.5 % (ref 0–1.9)
MODE: ABNORMAL
MONOCYTES # BLD: 9 % (ref 1–7)
NEGATIVE BASE EXCESS, ART: ABNORMAL MMOL/L (ref 0–2)
NOTIFICATION TIME: ABNORMAL
NOTIFICATION: ABNORMAL
NRBC AUTOMATED: ABNORMAL PER 100 WBC
O2 DEVICE/FLOW/%: ABNORMAL
O2 SAT, ARTERIAL: 92.2 % (ref 95–98)
OXYHEMOGLOBIN: ABNORMAL % (ref 95–98)
PATIENT TEMP: 37
PCO2 ARTERIAL: 47.1 MMHG (ref 35–45)
PCO2, ART, TEMP ADJ: ABNORMAL (ref 35–45)
PDW BLD-RTO: 14.6 % (ref 11.5–14.9)
PEEP/CPAP: ABNORMAL
PH ARTERIAL: 7.36 (ref 7.35–7.45)
PH, ART, TEMP ADJ: ABNORMAL (ref 7.35–7.45)
PLATELET # BLD: 228 K/UL (ref 150–450)
PLATELET ESTIMATE: ABNORMAL
PMV BLD AUTO: 8 FL (ref 6–12)
PO2 ARTERIAL: 73.2 MMHG (ref 80–100)
PO2, ART, TEMP ADJ: ABNORMAL MMHG (ref 80–100)
POSITIVE BASE EXCESS, ART: 1.4 MMOL/L (ref 0–2)
POTASSIUM SERPL-SCNC: 3.6 MMOL/L (ref 3.7–5.3)
PRO-BNP: 911 PG/ML
PROTHROMBIN TIME: 13.2 SEC (ref 11.8–14.6)
PSV: ABNORMAL
PT. POSITION: ABNORMAL
RBC # BLD: 3.98 M/UL (ref 4–5.2)
RBC # BLD: ABNORMAL 10*6/UL
RESPIRATORY RATE: 22
SAMPLE SITE: ABNORMAL
SEG NEUTROPHILS: 83 % (ref 36–66)
SEGMENTED NEUTROPHILS ABSOLUTE COUNT: 7 K/UL (ref 1.3–9.1)
SET RATE: ABNORMAL
SODIUM BLD-SCNC: 144 MMOL/L (ref 135–144)
SPECIMEN DESCRIPTION: NORMAL
TEXT FOR RESPIRATORY: ABNORMAL
TOTAL HB: ABNORMAL G/DL (ref 12–16)
TOTAL PROTEIN: 7.1 G/DL (ref 6.4–8.3)
TOTAL RATE: ABNORMAL
TROPONIN INTERP: NORMAL
TROPONIN INTERP: NORMAL
TROPONIN T: NORMAL NG/ML
TROPONIN T: NORMAL NG/ML
TROPONIN, HIGH SENSITIVITY: 11 NG/L (ref 0–14)
TROPONIN, HIGH SENSITIVITY: 11 NG/L (ref 0–14)
VT: ABNORMAL
WBC # BLD: 8.4 K/UL (ref 3.5–11)
WBC # BLD: ABNORMAL 10*3/UL

## 2019-04-16 PROCEDURE — 83880 ASSAY OF NATRIURETIC PEPTIDE: CPT

## 2019-04-16 PROCEDURE — 6370000000 HC RX 637 (ALT 250 FOR IP): Performed by: NURSE PRACTITIONER

## 2019-04-16 PROCEDURE — 82805 BLOOD GASES W/O2 SATURATION: CPT

## 2019-04-16 PROCEDURE — 94762 N-INVAS EAR/PLS OXIMTRY CONT: CPT

## 2019-04-16 PROCEDURE — 6370000000 HC RX 637 (ALT 250 FOR IP): Performed by: EMERGENCY MEDICINE

## 2019-04-16 PROCEDURE — 84484 ASSAY OF TROPONIN QUANT: CPT

## 2019-04-16 PROCEDURE — 2700000000 HC OXYGEN THERAPY PER DAY

## 2019-04-16 PROCEDURE — 36415 COLL VENOUS BLD VENIPUNCTURE: CPT

## 2019-04-16 PROCEDURE — 85025 COMPLETE CBC W/AUTO DIFF WBC: CPT

## 2019-04-16 PROCEDURE — 2580000003 HC RX 258: Performed by: NURSE PRACTITIONER

## 2019-04-16 PROCEDURE — 36600 WITHDRAWAL OF ARTERIAL BLOOD: CPT

## 2019-04-16 PROCEDURE — 83605 ASSAY OF LACTIC ACID: CPT

## 2019-04-16 PROCEDURE — 99285 EMERGENCY DEPT VISIT HI MDM: CPT

## 2019-04-16 PROCEDURE — 6360000004 HC RX CONTRAST MEDICATION: Performed by: EMERGENCY MEDICINE

## 2019-04-16 PROCEDURE — 2060000000 HC ICU INTERMEDIATE R&B

## 2019-04-16 PROCEDURE — 93005 ELECTROCARDIOGRAM TRACING: CPT

## 2019-04-16 PROCEDURE — 87205 SMEAR GRAM STAIN: CPT

## 2019-04-16 PROCEDURE — 6360000002 HC RX W HCPCS: Performed by: EMERGENCY MEDICINE

## 2019-04-16 PROCEDURE — 71260 CT THORAX DX C+: CPT

## 2019-04-16 PROCEDURE — 71046 X-RAY EXAM CHEST 2 VIEWS: CPT

## 2019-04-16 PROCEDURE — 87804 INFLUENZA ASSAY W/OPTIC: CPT

## 2019-04-16 PROCEDURE — 94640 AIRWAY INHALATION TREATMENT: CPT

## 2019-04-16 PROCEDURE — 87070 CULTURE OTHR SPECIMN AEROBIC: CPT

## 2019-04-16 PROCEDURE — 2580000003 HC RX 258: Performed by: EMERGENCY MEDICINE

## 2019-04-16 PROCEDURE — 6360000002 HC RX W HCPCS: Performed by: INTERNAL MEDICINE

## 2019-04-16 PROCEDURE — 85610 PROTHROMBIN TIME: CPT

## 2019-04-16 PROCEDURE — 87040 BLOOD CULTURE FOR BACTERIA: CPT

## 2019-04-16 PROCEDURE — 80053 COMPREHEN METABOLIC PANEL: CPT

## 2019-04-16 RX ORDER — ASPIRIN 81 MG/1
TABLET, CHEWABLE ORAL
Status: DISCONTINUED
Start: 2019-04-16 | End: 2019-04-16 | Stop reason: WASHOUT

## 2019-04-16 RX ORDER — DOXYCYCLINE 100 MG/1
100 CAPSULE ORAL EVERY 12 HOURS SCHEDULED
Status: DISCONTINUED | OUTPATIENT
Start: 2019-04-16 | End: 2019-04-17

## 2019-04-16 RX ORDER — PREDNISONE 20 MG/1
40 TABLET ORAL 2 TIMES DAILY
Status: DISCONTINUED | OUTPATIENT
Start: 2019-04-16 | End: 2019-04-16

## 2019-04-16 RX ORDER — SODIUM CHLORIDE 0.9 % (FLUSH) 0.9 %
10 SYRINGE (ML) INJECTION PRN
Status: DISCONTINUED | OUTPATIENT
Start: 2019-04-16 | End: 2019-04-19 | Stop reason: HOSPADM

## 2019-04-16 RX ORDER — NICOTINE POLACRILEX 2 MG
1 GUM BUCCAL DAILY
COMMUNITY
End: 2019-12-16

## 2019-04-16 RX ORDER — ALBUTEROL SULFATE 2.5 MG/3ML
2.5 SOLUTION RESPIRATORY (INHALATION)
Status: DISCONTINUED | OUTPATIENT
Start: 2019-04-16 | End: 2019-04-19 | Stop reason: HOSPADM

## 2019-04-16 RX ORDER — ACETAMINOPHEN 325 MG/1
650 TABLET ORAL EVERY 4 HOURS PRN
Status: DISCONTINUED | OUTPATIENT
Start: 2019-04-16 | End: 2019-04-19 | Stop reason: HOSPADM

## 2019-04-16 RX ORDER — SODIUM CHLORIDE 9 MG/ML
INJECTION, SOLUTION INTRAVENOUS CONTINUOUS
Status: DISCONTINUED | OUTPATIENT
Start: 2019-04-16 | End: 2019-04-17

## 2019-04-16 RX ORDER — CALCIUM CARBONATE/VITAMIN D3 600 MG-10
1 TABLET ORAL 2 TIMES DAILY
Status: DISCONTINUED | OUTPATIENT
Start: 2019-04-16 | End: 2019-04-19 | Stop reason: CLARIF

## 2019-04-16 RX ORDER — POLYETHYLENE GLYCOL 3350 17 G/17G
17 POWDER, FOR SOLUTION ORAL DAILY PRN
Status: DISCONTINUED | OUTPATIENT
Start: 2019-04-16 | End: 2019-04-19 | Stop reason: HOSPADM

## 2019-04-16 RX ORDER — ASPIRIN 81 MG/1
81 TABLET ORAL 2 TIMES DAILY
Status: DISCONTINUED | OUTPATIENT
Start: 2019-04-16 | End: 2019-04-19 | Stop reason: HOSPADM

## 2019-04-16 RX ORDER — 0.9 % SODIUM CHLORIDE 0.9 %
80 INTRAVENOUS SOLUTION INTRAVENOUS ONCE
Status: COMPLETED | OUTPATIENT
Start: 2019-04-16 | End: 2019-04-16

## 2019-04-16 RX ORDER — AMIODARONE HYDROCHLORIDE 200 MG/1
200 TABLET ORAL DAILY
COMMUNITY

## 2019-04-16 RX ORDER — ROPINIROLE 0.25 MG/1
0.25 TABLET, FILM COATED ORAL NIGHTLY
Status: DISCONTINUED | OUTPATIENT
Start: 2019-04-16 | End: 2019-04-19 | Stop reason: HOSPADM

## 2019-04-16 RX ORDER — SENNA AND DOCUSATE SODIUM 50; 8.6 MG/1; MG/1
2 TABLET, FILM COATED ORAL DAILY PRN
Status: DISCONTINUED | OUTPATIENT
Start: 2019-04-16 | End: 2019-04-19 | Stop reason: HOSPADM

## 2019-04-16 RX ORDER — ATORVASTATIN CALCIUM 10 MG/1
10 TABLET, FILM COATED ORAL DAILY
Status: DISCONTINUED | OUTPATIENT
Start: 2019-04-16 | End: 2019-04-19 | Stop reason: HOSPADM

## 2019-04-16 RX ORDER — IPRATROPIUM BROMIDE AND ALBUTEROL SULFATE 2.5; .5 MG/3ML; MG/3ML
1 SOLUTION RESPIRATORY (INHALATION)
Status: DISCONTINUED | OUTPATIENT
Start: 2019-04-16 | End: 2019-04-17

## 2019-04-16 RX ORDER — OXYCODONE HYDROCHLORIDE AND ACETAMINOPHEN 5; 325 MG/1; MG/1
1 TABLET ORAL EVERY 4 HOURS PRN
COMMUNITY
End: 2019-04-16 | Stop reason: ALTCHOICE

## 2019-04-16 RX ORDER — ONDANSETRON 2 MG/ML
4 INJECTION INTRAMUSCULAR; INTRAVENOUS EVERY 6 HOURS PRN
Status: DISCONTINUED | OUTPATIENT
Start: 2019-04-16 | End: 2019-04-19 | Stop reason: HOSPADM

## 2019-04-16 RX ORDER — SENNA AND DOCUSATE SODIUM 50; 8.6 MG/1; MG/1
2 TABLET, FILM COATED ORAL DAILY
COMMUNITY
End: 2019-04-16

## 2019-04-16 RX ORDER — FUROSEMIDE 20 MG/1
20 TABLET ORAL DAILY PRN
Status: ON HOLD | COMMUNITY
End: 2019-10-18 | Stop reason: SDUPTHER

## 2019-04-16 RX ORDER — FAMOTIDINE 20 MG/1
20 TABLET, FILM COATED ORAL 2 TIMES DAILY
Status: DISCONTINUED | OUTPATIENT
Start: 2019-04-16 | End: 2019-04-19 | Stop reason: HOSPADM

## 2019-04-16 RX ORDER — DOXYCYCLINE 100 MG/1
100 CAPSULE ORAL ONCE
Status: COMPLETED | OUTPATIENT
Start: 2019-04-16 | End: 2019-04-16

## 2019-04-16 RX ORDER — SODIUM CHLORIDE 0.9 % (FLUSH) 0.9 %
10 SYRINGE (ML) INJECTION EVERY 12 HOURS SCHEDULED
Status: DISCONTINUED | OUTPATIENT
Start: 2019-04-16 | End: 2019-04-19 | Stop reason: HOSPADM

## 2019-04-16 RX ORDER — WARFARIN SODIUM 3 MG/1
6 TABLET ORAL
Status: COMPLETED | OUTPATIENT
Start: 2019-04-16 | End: 2019-04-16

## 2019-04-16 RX ORDER — GUAIFENESIN 600 MG/1
600 TABLET, EXTENDED RELEASE ORAL 2 TIMES DAILY
Status: DISCONTINUED | OUTPATIENT
Start: 2019-04-16 | End: 2019-04-19 | Stop reason: HOSPADM

## 2019-04-16 RX ORDER — PREDNISONE 20 MG/1
40 TABLET ORAL ONCE
Status: COMPLETED | OUTPATIENT
Start: 2019-04-16 | End: 2019-04-16

## 2019-04-16 RX ORDER — IPRATROPIUM BROMIDE AND ALBUTEROL SULFATE 2.5; .5 MG/3ML; MG/3ML
1 SOLUTION RESPIRATORY (INHALATION) PRN
Status: DISCONTINUED | OUTPATIENT
Start: 2019-04-16 | End: 2019-04-19 | Stop reason: HOSPADM

## 2019-04-16 RX ORDER — LATANOPROST 50 UG/ML
1 SOLUTION/ DROPS OPHTHALMIC NIGHTLY
Status: DISCONTINUED | OUTPATIENT
Start: 2019-04-16 | End: 2019-04-19 | Stop reason: HOSPADM

## 2019-04-16 RX ORDER — FUROSEMIDE 40 MG/1
40 TABLET ORAL DAILY
Status: DISCONTINUED | OUTPATIENT
Start: 2019-04-16 | End: 2019-04-18

## 2019-04-16 RX ORDER — LORAZEPAM 1 MG/1
0.5 TABLET ORAL ONCE
Status: DISCONTINUED | OUTPATIENT
Start: 2019-04-16 | End: 2019-04-19 | Stop reason: HOSPADM

## 2019-04-16 RX ORDER — SODIUM CHLORIDE 0.9 % (FLUSH) 0.9 %
10 SYRINGE (ML) INJECTION PRN
Status: DISCONTINUED | OUTPATIENT
Start: 2019-04-16 | End: 2019-04-16

## 2019-04-16 RX ORDER — LORAZEPAM 2 MG/ML
0.5 INJECTION INTRAMUSCULAR ONCE
Status: COMPLETED | OUTPATIENT
Start: 2019-04-16 | End: 2019-04-16

## 2019-04-16 RX ORDER — AMIODARONE HYDROCHLORIDE 200 MG/1
200 TABLET ORAL 2 TIMES DAILY
Status: DISCONTINUED | OUTPATIENT
Start: 2019-04-16 | End: 2019-04-19 | Stop reason: HOSPADM

## 2019-04-16 RX ORDER — PREDNISONE 20 MG/1
40 TABLET ORAL DAILY
Status: DISCONTINUED | OUTPATIENT
Start: 2019-04-17 | End: 2019-04-17

## 2019-04-16 RX ADMIN — SODIUM CHLORIDE 80 ML: 9 INJECTION, SOLUTION INTRAVENOUS at 05:38

## 2019-04-16 RX ADMIN — IPRATROPIUM BROMIDE AND ALBUTEROL SULFATE 1 AMPULE: .5; 3 SOLUTION RESPIRATORY (INHALATION) at 20:58

## 2019-04-16 RX ADMIN — ASPIRIN 81 MG: 81 TABLET, COATED ORAL at 20:11

## 2019-04-16 RX ADMIN — WARFARIN SODIUM 6 MG: 3 TABLET ORAL at 16:48

## 2019-04-16 RX ADMIN — METOPROLOL TARTRATE 25 MG: 25 TABLET, FILM COATED ORAL at 20:11

## 2019-04-16 RX ADMIN — DOXYCYCLINE 100 MG: 100 CAPSULE ORAL at 06:48

## 2019-04-16 RX ADMIN — ATORVASTATIN CALCIUM 10 MG: 10 TABLET, FILM COATED ORAL at 09:59

## 2019-04-16 RX ADMIN — SODIUM CHLORIDE: 9 INJECTION, SOLUTION INTRAVENOUS at 09:32

## 2019-04-16 RX ADMIN — IPRATROPIUM BROMIDE AND ALBUTEROL SULFATE 1 AMPULE: .5; 3 SOLUTION RESPIRATORY (INHALATION) at 15:30

## 2019-04-16 RX ADMIN — LORAZEPAM 0.5 MG: 2 INJECTION INTRAMUSCULAR; INTRAVENOUS at 06:48

## 2019-04-16 RX ADMIN — METOPROLOL TARTRATE 25 MG: 25 TABLET, FILM COATED ORAL at 13:10

## 2019-04-16 RX ADMIN — IOPAMIDOL 75 ML: 755 INJECTION, SOLUTION INTRAVENOUS at 05:38

## 2019-04-16 RX ADMIN — FAMOTIDINE 20 MG: 20 TABLET ORAL at 09:32

## 2019-04-16 RX ADMIN — GUAIFENESIN 600 MG: 600 TABLET, EXTENDED RELEASE ORAL at 13:10

## 2019-04-16 RX ADMIN — GUAIFENESIN 600 MG: 600 TABLET, EXTENDED RELEASE ORAL at 20:11

## 2019-04-16 RX ADMIN — ASPIRIN 81 MG: 81 TABLET, COATED ORAL at 09:59

## 2019-04-16 RX ADMIN — CALCIUM CARBONATE 600 MG (1,500 MG)-VITAMIN D3 400 UNIT TABLET 1 TABLET: at 09:59

## 2019-04-16 RX ADMIN — AMIODARONE HYDROCHLORIDE 200 MG: 200 TABLET ORAL at 09:42

## 2019-04-16 RX ADMIN — ENOXAPARIN SODIUM 40 MG: 100 INJECTION SUBCUTANEOUS at 16:48

## 2019-04-16 RX ADMIN — Medication 10 ML: at 05:38

## 2019-04-16 RX ADMIN — FUROSEMIDE 40 MG: 40 TABLET ORAL at 09:32

## 2019-04-16 RX ADMIN — IPRATROPIUM BROMIDE AND ALBUTEROL SULFATE 1 AMPULE: .5; 3 SOLUTION RESPIRATORY (INHALATION) at 05:54

## 2019-04-16 RX ADMIN — ROPINIROLE HYDROCHLORIDE 0.25 MG: 0.25 TABLET, FILM COATED ORAL at 22:35

## 2019-04-16 RX ADMIN — FAMOTIDINE 20 MG: 20 TABLET ORAL at 20:11

## 2019-04-16 RX ADMIN — DOXYCYCLINE 100 MG: 100 CAPSULE ORAL at 16:48

## 2019-04-16 RX ADMIN — AMIODARONE HYDROCHLORIDE 200 MG: 200 TABLET ORAL at 20:11

## 2019-04-16 RX ADMIN — LATANOPROST 1 DROP: 50 SOLUTION OPHTHALMIC at 22:35

## 2019-04-16 RX ADMIN — Medication 10 ML: at 20:11

## 2019-04-16 RX ADMIN — PREDNISONE 40 MG: 20 TABLET ORAL at 06:48

## 2019-04-16 RX ADMIN — CALCIUM CARBONATE 600 MG (1,500 MG)-VITAMIN D3 400 UNIT TABLET 1 TABLET: at 22:34

## 2019-04-16 ASSESSMENT — ENCOUNTER SYMPTOMS
NAUSEA: 0
VOMITING: 0
ABDOMINAL PAIN: 0
SHORTNESS OF BREATH: 1
COUGH: 1
WHEEZING: 0

## 2019-04-16 ASSESSMENT — PAIN DESCRIPTION - PAIN TYPE: TYPE: ACUTE PAIN

## 2019-04-16 ASSESSMENT — PAIN SCALES - GENERAL
PAINLEVEL_OUTOF10: 0
PAINLEVEL_OUTOF10: 8

## 2019-04-16 ASSESSMENT — PAIN DESCRIPTION - LOCATION: LOCATION: HEAD

## 2019-04-16 ASSESSMENT — PAIN DESCRIPTION - FREQUENCY: FREQUENCY: CONTINUOUS

## 2019-04-16 NOTE — ED NOTES
RN into pt bedside for alarming pulse ox. Pt was 82% with 3L NC. RT at bedside for eval and treat.       Elkin Anne RN  04/16/19 [id number]

## 2019-04-16 NOTE — PROGRESS NOTES
79 yo female presented with dyspnea and cough, productive, yellow sputum. Onset was yesterday and has been worsening. She denies any fever, chills, chest pain, or palpitations. Upon my initial assessment she just returned from CT scan and has  tachypnea with increased work to breath congested cough with spo2 78% on 2L. O2 was increased and arrival of respiratory therapy for nebulizer treatment. Breath sounds are coarse over all lung fields. Plan pulmonary consult and admit to ICU.

## 2019-04-16 NOTE — PROGRESS NOTES
Patient transferred from ICU via w/c. A&Ox3, son at bedside, telemetry applied, no distress noted, bed locked in lowest position and call light within reach.

## 2019-04-16 NOTE — PLAN OF CARE
Problem: Breathing Pattern - Ineffective:  Goal: Ability to achieve and maintain a regular respiratory rate will improve  Description  Ability to achieve and maintain a regular respiratory rate will improve  Outcome: Ongoing  Note:   Pt respiratory status improved, Patient on room air by end of shift     Problem: OXYGENATION/RESPIRATORY FUNCTION  Goal: Patient will maintain patent airway  Outcome: Ongoing  Note:   Pt maintained airway this shift     Problem: SKIN INTEGRITY  Goal: Skin integrity is maintained or improved  Outcome: Ongoing  Note:   Pt ambulates, turns self, skin integrity maintained this shift

## 2019-04-16 NOTE — ED PROVIDER NOTES
[address]  eMERGENCY dEPARTMENT eNCOUnter      Pt Name: Galilea Gracia  MRN: [id number]  [place] 12/12/1930  Date of evaluation: 4/16/19      CHIEF COMPLAINT       Chief Complaint   Patient presents with    Shortness of Breath     HISTORY OF PRESENT ILLNESS   HPI 80 y.o. female  presents with complaints of cough and shortness of breath. Symptoms progressively worsening over the last day. She reports phlegm productive of yellow sputum. She rates her breathing difficulties as severe in intensity, constant in duration, progressively worsening. She woke up this evening severely short of breath and so her family brought her to the emergency department. She reports a retrosternal chest discomfort, when asked to describe it she states \"it just doesn't feel right\", she also reports congestion and rhinorrhea. No lower extremity edema. REVIEW OF SYSTEMS       Review of Systems   Constitutional: Positive for activity change, appetite change and fatigue. Negative for chills and fever. HENT: Positive for congestion and postnasal drip. Eyes: Negative for visual disturbance. Respiratory: Positive for cough and shortness of breath. Negative for wheezing. Cardiovascular: Positive for chest pain. Negative for leg swelling. Gastrointestinal: Negative for abdominal pain, nausea and vomiting. Genitourinary: Negative for dysuria. Musculoskeletal: Negative for arthralgias. Skin: Negative for rash. Neurological: Negative for light-headedness. Hematological: Negative for adenopathy.      PAST MEDICAL HISTORY     Past Medical History:   Diagnosis Date    Arthritis     Atrial fibrillation ([address] 8/21/2016    CHF (congestive heart failure) (HCC)     Complete heart block ([address] 8/18/2016    Constipation     GERD (gastroesophageal reflux disease)     Glaucoma     mild, on eye drops    Hearing loss     bilateral hearing aids    Hiatal hernia     Hx of blood clots 07/2016    pulmonary emboli bilateral IBUPROFEN (ADVIL;MOTRIN) 600 MG TABLET    Take 1 tablet by mouth every 6 hours as needed for Pain    LATANOPROST (XALATAN) 0.005 % OPHTHALMIC SOLUTION    Place 1 drop into both eyes nightly     METOPROLOL TARTRATE (LOPRESSOR) 25 MG TABLET    Take 1 tablet by mouth 2 times daily    POLYETHYLENE GLYCOL (MIRALAX) POWDER    Take 17 g by mouth daily as needed    POTASSIUM CHLORIDE (MICRO-K) 10 MEQ EXTENDED RELEASE CAPSULE    Take 10 mEq by mouth as needed    ROPINIROLE (REQUIP) 0.25 MG TABLET    Take 0.25 mg by mouth nightly     SENNOSIDES-DOCUSATE SODIUM (SENOKOT-S) 8.6-50 MG TABLET    Take 2 tablets by mouth daily    WARFARIN (COUMADIN) 4 MG TABLET    Take 1 tablet by mouth daily Call for dose adjustment after labs/INR draws       ALLERGIES     is allergic to sulfa antibiotics. FAMILY HISTORY     indicated that her mother is . She indicated that her father is . She indicated that the status of her sister is unknown. SOCIAL HISTORY      reports that she has never smoked. She has never used smokeless tobacco. She reports that she does not drink alcohol or use drugs. PHYSICAL EXAM     INITIAL VITALS: BP (!) 181/69   Pulse 72   Temp 97.6 °F (36.4 °C) (Oral)   Resp (!) 34   Ht 5' 2\" (1.575 m)   Wt 160 lb (72.6 kg)   SpO2 (!) 87%   BMI 29.26 kg/m²   Gen. : Appears uncomfortable   Head: Normocephalic, atraumatic  Eye: Pupils equal round reactive to light, no conjunctivitis  Neck: No JVD   Heart: Regular rate and rhythm no murmurs  Lungs:Rhonchorous breath sounds bilaterally,  Chest wall: No crepitus, no tenderness palpation  Abdomen: Soft, nontender, nondistended, with no peritoneal signs  MSK: No CVA tenderness   Neurologic: Patient is alert and oriented x3, motor and sensation is intact in all 4 extremities, cerebellar function is normal  Extremities:No leg edema   MEDICAL DECISION MAKING:     MDM  26-year-old female presenting with cough shortness of breath. Concern for pneumonia.   Will rule out any congestive heart failure, we'll rule out any atypical presentation of ACS. We'll get a chest x-ray to make sure there is no lung mass or pneumothorax. Initial hypoxia and 85%. Patient was placed on nasal cannula oxygen. Hospital course:    EKG shows no acute ischemic changes. Laboratory studies reviewed  Influenza negative  WBC 8.4  Hemoglobin 11  Lactic acid 1  Troponin 11  , this appears to be around her baseline    The chest x-ray showed emphysematous changes, but no obvious infiltrate, lung mass, or pneumothorax. A CT scan of the chest was obtained, this showed no evidence of pneumonia, pulmonary embolism, or overt heart failure. The patient's breathing worsened, on nasal cannula oxygen, her O2 sat decreased to 80%. She was started on high flow nasal cannula. An ABG obtained on 50% high flow nasal cannula showed a PaO2 of 73  PH of 7.36  CO2 of 47      Patient's been treated with steroids, nebulization treatments, and doxycycline. Patient is being admitted to the intensive care unit    D/w Dr. Victor Manuel Nunes (pulmonary medicine) and  internal medicine service. DIAGNOSTIC RESULTS     EKG: All EKG's are interpreted by the Emergency Department Physician who either signs or Co-signs this chart in the absence of a cardiologist.    EKG shows a atrial sensed, ventricularly paced rhythm. HR is 71, , , , no HELEN, No STD, No TWI, the axis is normal.      RADIOLOGY:All plain film, CT, MRI, and formal ultrasound images (except ED bedside ultrasound) are read by the radiologist and the images and interpretations are directly viewed by the emergency physician. CT Chest Pulmonary Embolism W Contrast   Preliminary Result   No evidence of pulmonary embolism or acute pulmonary abnormality. 5 mm noncalcified nodule in the right lower lobe has not significantly   changed in size since 09/11/2016. Given stability, nodule is favored to be   benign.   No dedicated imaging follow-up is required. RECOMMENDATIONS:   Fleischner Society guidelines for follow-up and management of incidentally   detected pulmonary nodules:      Single Solid Nodule:      Nodule size less than 6 mm   In a low-risk patient, no routine follow-up. In a high-risk patient, optional CT at 12 months (completed). -Low risk patients include individuals with minimal or absent history of   smoking and other known risk factors. - High risk patients include individuals with a history or smoking or known   risk factors. Radiology 2017 http://pubs. rsna.org/doi/full/10.1148/radiol. 0058347130         XR CHEST STANDARD (2 VW)   Final Result   No acute cardiopulmonary process. COPD. LABS: All lab results were reviewed by myself, and all abnormals are listed below.   Labs Reviewed   CBC WITH AUTO DIFFERENTIAL - Abnormal; Notable for the following components:       Result Value    RBC 3.98 (*)     Hemoglobin 11.0 (*)     Hematocrit 33.9 (*)     Seg Neutrophils 83 (*)     Lymphocytes 7 (*)     Monocytes 9 (*)     Absolute Lymph # 0.60 (*)     All other components within normal limits   COMPREHENSIVE METABOLIC PANEL - Abnormal; Notable for the following components:    Glucose 144 (*)     Potassium 3.6 (*)     All other components within normal limits   BRAIN NATRIURETIC PEPTIDE - Abnormal; Notable for the following components:    Pro- (*)     All other components within normal limits   RAPID INFLUENZA A/B ANTIGENS   CULTURE BLOOD #1   CULTURE BLOOD #1   LACTIC ACID   TROPONIN   TROPONIN   BLOOD GAS, ARTERIAL       EMERGENCY DEPARTMENT COURSE:   Vitals:    Vitals:    04/16/19 0350 04/16/19 0500 04/16/19 0554 04/16/19 0610   BP:  (!) 181/69     Pulse:  72     Resp:  19 (!) 32 (!) 34   Temp:       TempSrc:       SpO2:  96% (!) 82% (!) 87%   Weight: 160 lb (72.6 kg)      Height: 5' 2\" (1.575 m)          The patient was given the following medications while in the emergency department:  Orders Placed This Encounter   Medications    ipratropium-albuterol (DUONEB) nebulizer solution 1 ampule    predniSONE (DELTASONE) tablet 40 mg    doxycycline monohydrate (MONODOX) capsule 100 mg    0.9 % sodium chloride bolus    iopamidol (ISOVUE-370) 76 % injection 75 mL    sodium chloride flush 0.9 % injection 10 mL    LORazepam (ATIVAN) tablet 0.5 mg    LORazepam (ATIVAN) injection 0.5 mg     -------------------------  CRITICAL CARE:   CONSULTS: IP CONSULT TO PULMONOLOGY  IP CONSULT TO INTERNAL MEDICINE  PHARMACY TO DOSE WARFARIN  PROCEDURES: Critical Care  Performed by: Kailee Fofana MD  Authorized by: Kailee Fofana MD     Critical care provider statement:     Critical care time (minutes):  40    Critical care was necessary to treat or prevent imminent or life-threatening deterioration of the following conditions:  Respiratory failure    Critical care was time spent personally by me on the following activities:  Ordering and performing treatments and interventions, ordering and review of laboratory studies, ordering and review of radiographic studies, pulse oximetry, re-evaluation of patient's condition, review of old charts, discussions with consultants, evaluation of patient's response to treatment and examination of patient         FINAL IMPRESSION      1. Acute respiratory failure with hypoxia ([facility] [address]          DISPOSITION/PLAN   DISPOSITION        PATIENT REFERRED TO:  Chacho Wilson MD  [address]  [phone]            DISCHARGE MEDICATIONS:  New Prescriptions    No medications on file         Kailee Fofana MD  Attending Emergency Physician                      Kailee Fofana MD  04/16/19 2120

## 2019-04-16 NOTE — PROGRESS NOTES
Medication History completed:    New medications: biotin    Medications discontinued: none    Changes to dosing:   Amiodarone changed to once daily  Furosemide and potassium chloride changed to as needed    Stated allergies: As listed    Other pertinent information: Medications confirmed with Ivana Wolfe. Both her aspirin and warfarin have been on hold for several weeks due to prolonged bleeding after a procedure. She will be restarting these medications but is unsure when they plan to restart her.      Thank you,  Brian White, PharmD  [phone]

## 2019-04-16 NOTE — ED NOTES
Report given to Sondra Urbina RN from [facility]. Report method in person   The following was reviewed with receiving RN:   Current vital signs:  /68   Pulse 77   Temp 97.4 °F (36.3 °C) (Oral)   Resp 21   Ht 5' 2\" (1.575 m)   Wt 160 lb (72.6 kg)   SpO2 98%   BMI 29.26 kg/m²                MEWS Score: 2     Any medication or safety alerts were reviewed. Any pending diagnostics and notifications were also reviewed, as well as any safety concerns or issues, abnormal labs, abnormal imaging, and abnormal assessment findings. Questions were answered.           Chelsea Nyhan, RN  04/16/19 7645

## 2019-04-16 NOTE — PROGRESS NOTES
Pharmacy Note  Warfarin Consult    Daphnie Viveros is a 80 y.o. female for whom pharmacy has been consulted to manage warfarin therapy. Consulting Physician: Alisa Hi  Reason for Admission: acute respiratory failure    Warfarin dose prior to admission: 4 mg Sunday/Wednesday; 2 mg all other days  Warfarin indication: a.fib  Target INR range: 2-3     Past Medical History:   Diagnosis Date    Arthritis     Atrial fibrillation ([facility] [address] 8/21/2016    CHF (congestive heart failure) (HCC)     Complete heart block ([facility] [address] 8/18/2016    Constipation     GERD (gastroesophageal reflux disease)     Glaucoma     mild, on eye drops    Hearing loss     bilateral hearing aids    Hiatal hernia     Hx of blood clots 07/2016    pulmonary emboli bilateral lungs after Pacemaker inserted    Hyperlipidemia     Osteopenia     Pacemaker 08/18/2016    Duo-chamber pacemaker in left upper chest;  DR. María Elena Weinstein    Restless leg syndrome     Right knee DJD     Wears glasses     Wears hearing aid in both ears                 Recent Labs     04/16/19  1205   INR 1.0     Recent Labs     04/16/19  0444   HGB 11.0*   HCT 33.9*          Current warfarin drug-drug interactions: amiodarone, aspirin, lipitor, doxycycline, pepcid, prednisone, requip    Date             INR        Dose   4/16/2019            1       6 mg    Daily PT/INR while inpatient. Thank you for the consult. Will continue to follow.     Mnoty Briseno, JoeD, [facility]  4/16/2019   1:26 PM

## 2019-04-17 ENCOUNTER — APPOINTMENT (OUTPATIENT)
Dept: GENERAL RADIOLOGY | Age: 84
DRG: 193 | End: 2019-04-17
Payer: MEDICARE

## 2019-04-17 LAB
ANION GAP SERPL CALCULATED.3IONS-SCNC: 14 MMOL/L (ref 9–17)
BUN BLDV-MCNC: 19 MG/DL (ref 8–23)
BUN/CREAT BLD: ABNORMAL (ref 9–20)
CALCIUM SERPL-MCNC: 8.8 MG/DL (ref 8.6–10.4)
CHLORIDE BLD-SCNC: 104 MMOL/L (ref 98–107)
CO2: 27 MMOL/L (ref 20–31)
CREAT SERPL-MCNC: 0.67 MG/DL (ref 0.5–0.9)
GFR AFRICAN AMERICAN: >60 ML/MIN
GFR NON-AFRICAN AMERICAN: >60 ML/MIN
GFR SERPL CREATININE-BSD FRML MDRD: ABNORMAL ML/MIN/{1.73_M2}
GFR SERPL CREATININE-BSD FRML MDRD: ABNORMAL ML/MIN/{1.73_M2}
GLUCOSE BLD-MCNC: 130 MG/DL (ref 70–99)
HCT VFR BLD CALC: 32.5 % (ref 36–46)
HEMOGLOBIN: 10.3 G/DL (ref 12–16)
INR BLD: 1
LV EF: 75 %
LVEF MODALITY: NORMAL
MAGNESIUM: 1.7 MG/DL (ref 1.6–2.6)
MCH RBC QN AUTO: 27 PG (ref 26–34)
MCHC RBC AUTO-ENTMCNC: 31.7 G/DL (ref 31–37)
MCV RBC AUTO: 85.1 FL (ref 80–100)
NRBC AUTOMATED: ABNORMAL PER 100 WBC
PDW BLD-RTO: 14.8 % (ref 11.5–14.9)
PLATELET # BLD: 222 K/UL (ref 150–450)
PMV BLD AUTO: 8.2 FL (ref 6–12)
POTASSIUM SERPL-SCNC: 3.2 MMOL/L (ref 3.7–5.3)
PROTHROMBIN TIME: 13.6 SEC (ref 11.8–14.6)
RBC # BLD: 3.82 M/UL (ref 4–5.2)
SODIUM BLD-SCNC: 145 MMOL/L (ref 135–144)
WBC # BLD: 8.4 K/UL (ref 3.5–11)

## 2019-04-17 PROCEDURE — 2580000003 HC RX 258: Performed by: NURSE PRACTITIONER

## 2019-04-17 PROCEDURE — 83735 ASSAY OF MAGNESIUM: CPT

## 2019-04-17 PROCEDURE — 6370000000 HC RX 637 (ALT 250 FOR IP): Performed by: NURSE PRACTITIONER

## 2019-04-17 PROCEDURE — 94761 N-INVAS EAR/PLS OXIMETRY MLT: CPT

## 2019-04-17 PROCEDURE — 71045 X-RAY EXAM CHEST 1 VIEW: CPT

## 2019-04-17 PROCEDURE — G8987 SELF CARE CURRENT STATUS: HCPCS

## 2019-04-17 PROCEDURE — 93306 TTE W/DOPPLER COMPLETE: CPT

## 2019-04-17 PROCEDURE — 85610 PROTHROMBIN TIME: CPT

## 2019-04-17 PROCEDURE — 6360000002 HC RX W HCPCS: Performed by: INTERNAL MEDICINE

## 2019-04-17 PROCEDURE — 71046 X-RAY EXAM CHEST 2 VIEWS: CPT

## 2019-04-17 PROCEDURE — 85027 COMPLETE CBC AUTOMATED: CPT

## 2019-04-17 PROCEDURE — 2060000000 HC ICU INTERMEDIATE R&B

## 2019-04-17 PROCEDURE — 2700000000 HC OXYGEN THERAPY PER DAY

## 2019-04-17 PROCEDURE — 94760 N-INVAS EAR/PLS OXIMETRY 1: CPT

## 2019-04-17 PROCEDURE — 6370000000 HC RX 637 (ALT 250 FOR IP): Performed by: INTERNAL MEDICINE

## 2019-04-17 PROCEDURE — 80048 BASIC METABOLIC PNL TOTAL CA: CPT

## 2019-04-17 PROCEDURE — 94667 MNPJ CHEST WALL 1ST: CPT

## 2019-04-17 PROCEDURE — 97166 OT EVAL MOD COMPLEX 45 MIN: CPT

## 2019-04-17 PROCEDURE — 99223 1ST HOSP IP/OBS HIGH 75: CPT | Performed by: INTERNAL MEDICINE

## 2019-04-17 PROCEDURE — 94640 AIRWAY INHALATION TREATMENT: CPT

## 2019-04-17 PROCEDURE — 97162 PT EVAL MOD COMPLEX 30 MIN: CPT

## 2019-04-17 PROCEDURE — G8988 SELF CARE GOAL STATUS: HCPCS

## 2019-04-17 PROCEDURE — 97535 SELF CARE MNGMENT TRAINING: CPT

## 2019-04-17 PROCEDURE — 97116 GAIT TRAINING THERAPY: CPT

## 2019-04-17 PROCEDURE — 36415 COLL VENOUS BLD VENIPUNCTURE: CPT

## 2019-04-17 RX ORDER — POTASSIUM CHLORIDE 20 MEQ/1
40 TABLET, EXTENDED RELEASE ORAL PRN
Status: DISCONTINUED | OUTPATIENT
Start: 2019-04-17 | End: 2019-04-19 | Stop reason: HOSPADM

## 2019-04-17 RX ORDER — WARFARIN SODIUM 2 MG/1
4 TABLET ORAL
Status: COMPLETED | OUTPATIENT
Start: 2019-04-17 | End: 2019-04-17

## 2019-04-17 RX ORDER — BENZONATATE 100 MG/1
200 CAPSULE ORAL 3 TIMES DAILY PRN
Status: DISCONTINUED | OUTPATIENT
Start: 2019-04-17 | End: 2019-04-19 | Stop reason: HOSPADM

## 2019-04-17 RX ORDER — POTASSIUM CHLORIDE 7.45 MG/ML
10 INJECTION INTRAVENOUS PRN
Status: DISCONTINUED | OUTPATIENT
Start: 2019-04-17 | End: 2019-04-19 | Stop reason: HOSPADM

## 2019-04-17 RX ORDER — ALBUTEROL SULFATE 2.5 MG/3ML
2.5 SOLUTION RESPIRATORY (INHALATION)
Status: DISCONTINUED | OUTPATIENT
Start: 2019-04-17 | End: 2019-04-19 | Stop reason: HOSPADM

## 2019-04-17 RX ORDER — WARFARIN SODIUM 3 MG/1
6 TABLET ORAL
Status: DISCONTINUED | OUTPATIENT
Start: 2019-04-17 | End: 2019-04-17

## 2019-04-17 RX ORDER — LEVOFLOXACIN 5 MG/ML
500 INJECTION, SOLUTION INTRAVENOUS EVERY 24 HOURS
Status: DISCONTINUED | OUTPATIENT
Start: 2019-04-17 | End: 2019-04-18

## 2019-04-17 RX ORDER — METHYLPREDNISOLONE SODIUM SUCCINATE 40 MG/ML
40 INJECTION, POWDER, LYOPHILIZED, FOR SOLUTION INTRAMUSCULAR; INTRAVENOUS EVERY 6 HOURS
Status: DISCONTINUED | OUTPATIENT
Start: 2019-04-17 | End: 2019-04-18

## 2019-04-17 RX ORDER — FUROSEMIDE 10 MG/ML
20 INJECTION INTRAMUSCULAR; INTRAVENOUS ONCE
Status: COMPLETED | OUTPATIENT
Start: 2019-04-17 | End: 2019-04-17

## 2019-04-17 RX ADMIN — ATORVASTATIN CALCIUM 10 MG: 10 TABLET, FILM COATED ORAL at 09:41

## 2019-04-17 RX ADMIN — GUAIFENESIN 600 MG: 600 TABLET, EXTENDED RELEASE ORAL at 20:36

## 2019-04-17 RX ADMIN — CALCIUM CARBONATE 600 MG (1,500 MG)-VITAMIN D3 400 UNIT TABLET 1 TABLET: at 20:39

## 2019-04-17 RX ADMIN — METOPROLOL TARTRATE 25 MG: 25 TABLET, FILM COATED ORAL at 09:41

## 2019-04-17 RX ADMIN — ENOXAPARIN SODIUM 70 MG: 80 INJECTION SUBCUTANEOUS at 20:39

## 2019-04-17 RX ADMIN — DOXYCYCLINE 100 MG: 100 CAPSULE ORAL at 09:41

## 2019-04-17 RX ADMIN — AMIODARONE HYDROCHLORIDE 200 MG: 200 TABLET ORAL at 20:36

## 2019-04-17 RX ADMIN — METHYLPREDNISOLONE SODIUM SUCCINATE 40 MG: 40 INJECTION, POWDER, FOR SOLUTION INTRAMUSCULAR; INTRAVENOUS at 23:16

## 2019-04-17 RX ADMIN — IPRATROPIUM BROMIDE AND ALBUTEROL SULFATE 1 AMPULE: .5; 3 SOLUTION RESPIRATORY (INHALATION) at 16:04

## 2019-04-17 RX ADMIN — METHYLPREDNISOLONE SODIUM SUCCINATE 40 MG: 40 INJECTION, POWDER, FOR SOLUTION INTRAMUSCULAR; INTRAVENOUS at 17:08

## 2019-04-17 RX ADMIN — WARFARIN SODIUM 4 MG: 2 TABLET ORAL at 18:28

## 2019-04-17 RX ADMIN — LATANOPROST 1 DROP: 50 SOLUTION OPHTHALMIC at 20:39

## 2019-04-17 RX ADMIN — FUROSEMIDE 20 MG: 10 INJECTION, SOLUTION INTRAMUSCULAR; INTRAVENOUS at 12:46

## 2019-04-17 RX ADMIN — FUROSEMIDE 40 MG: 40 TABLET ORAL at 09:41

## 2019-04-17 RX ADMIN — ASPIRIN 81 MG: 81 TABLET, COATED ORAL at 09:41

## 2019-04-17 RX ADMIN — ENOXAPARIN SODIUM 30 MG: 30 INJECTION SUBCUTANEOUS at 12:45

## 2019-04-17 RX ADMIN — METHYLPREDNISOLONE SODIUM SUCCINATE 40 MG: 40 INJECTION, POWDER, FOR SOLUTION INTRAMUSCULAR; INTRAVENOUS at 12:46

## 2019-04-17 RX ADMIN — ALBUTEROL SULFATE 2.5 MG: 2.5 SOLUTION RESPIRATORY (INHALATION) at 19:36

## 2019-04-17 RX ADMIN — GUAIFENESIN 600 MG: 600 TABLET, EXTENDED RELEASE ORAL at 09:41

## 2019-04-17 RX ADMIN — FAMOTIDINE 20 MG: 20 TABLET ORAL at 09:41

## 2019-04-17 RX ADMIN — Medication 10 ML: at 20:37

## 2019-04-17 RX ADMIN — ENOXAPARIN SODIUM 40 MG: 100 INJECTION SUBCUTANEOUS at 09:41

## 2019-04-17 RX ADMIN — ASPIRIN 81 MG: 81 TABLET, COATED ORAL at 20:36

## 2019-04-17 RX ADMIN — ROPINIROLE HYDROCHLORIDE 0.25 MG: 0.25 TABLET, FILM COATED ORAL at 20:39

## 2019-04-17 RX ADMIN — LEVOFLOXACIN 500 MG: 5 INJECTION, SOLUTION INTRAVENOUS at 12:46

## 2019-04-17 RX ADMIN — POTASSIUM CHLORIDE 40 MEQ: 1500 TABLET, EXTENDED RELEASE ORAL at 10:38

## 2019-04-17 RX ADMIN — AMIODARONE HYDROCHLORIDE 200 MG: 200 TABLET ORAL at 09:41

## 2019-04-17 RX ADMIN — IPRATROPIUM BROMIDE AND ALBUTEROL SULFATE 1 AMPULE: .5; 3 SOLUTION RESPIRATORY (INHALATION) at 08:32

## 2019-04-17 RX ADMIN — PREDNISONE 40 MG: 20 TABLET ORAL at 09:41

## 2019-04-17 RX ADMIN — IPRATROPIUM BROMIDE AND ALBUTEROL SULFATE 1 AMPULE: .5; 3 SOLUTION RESPIRATORY (INHALATION) at 11:14

## 2019-04-17 RX ADMIN — FAMOTIDINE 20 MG: 20 TABLET ORAL at 20:36

## 2019-04-17 RX ADMIN — BENZONATATE 200 MG: 100 CAPSULE ORAL at 02:45

## 2019-04-17 RX ADMIN — CALCIUM CARBONATE 600 MG (1,500 MG)-VITAMIN D3 400 UNIT TABLET 1 TABLET: at 10:38

## 2019-04-17 ASSESSMENT — PAIN SCALES - GENERAL: PAINLEVEL_OUTOF10: 0

## 2019-04-17 NOTE — H&P
taking differently: Take by mouth See Admin Instructions Indications: (Currently on hold at this visit) 4 mg on Sunday and Wednesday. Then takes 1/2 tab (2 mg) on Monday, Tuesday, Thursday, Friday, and Saturday 9/17/16  Yes Stephenie Alvarado,    metoprolol tartrate (LOPRESSOR) 25 MG tablet Take 1 tablet by mouth 2 times daily 9/17/16  Yes Stephenie Alvarado DO   rOPINIRole (REQUIP) 0.25 MG tablet Take 0.25 mg by mouth nightly  5/9/16  Yes Historical Provider, MD   Calcium Carb-Cholecalciferol (CALTRATE 600+D) 600-800 MG-UNIT TABS Take 1 tablet by mouth 2 times daily    Yes Historical Provider, MD   atorvastatin (LIPITOR) 10 MG tablet Take 10 mg by mouth daily  10/2/13  Yes Historical Provider, MD   latanoprost (XALATAN) 0.005 % ophthalmic solution Place 1 drop into both eyes nightly  11/14/13  Yes Historical Provider, MD   Acetaminophen (TYLENOL ARTHRITIS PAIN PO) Take 2 tablets by mouth every 6 hours as needed    Yes Historical Provider, MD   aspirin 81 MG EC tablet Take 81 mg by mouth 2 times daily. Yes Historical Provider, MD        Allergies:     Sulfa antibiotics    Social History:     Tobacco:    reports that she has never smoked. She has never used smokeless tobacco.  Alcohol:      reports that she does not drink alcohol. Drug Use:  reports that she does not use drugs. Family History:     Family History   Problem Relation Age of Onset    Diabetes Sister     Heart Attack Sister     Osteoporosis Sister     Heart Disease Mother     Dementia Mother     Tuberculosis Father        Review of Systems:     Positive and Negative as described in HPI. CONSTITUTIONAL:  negative for fevers, chills, sweats, fatigue, weight loss  HEENT:  negative for vision, hearing changes, runny nose, throat pain  RESPIRATORY:  + for shortness of breath, cough, congestion, wheezing. CARDIOVASCULAR:  negative for chest pain, palpitations.   GASTROINTESTINAL:  negative for nausea, vomiting, diarrhea, constipation, change in bowel habits, abdominal pain   GENITOURINARY:  negative for difficulty of urination, burning with urination, frequency   INTEGUMENT:  negative for rash, skin lesions, easy bruising   HEMATOLOGIC/LYMPHATIC:  negative for swelling/edema   ALLERGIC/IMMUNOLOGIC:  negative for urticaria , itching  ENDOCRINE:  negative increase in drinking, increase in urination, hot or cold intolerance  MUSCULOSKELETAL:  negative joint pains, muscle aches, swelling of joints  NEUROLOGICAL:  negative for headaches, dizziness, lightheadedness, numbness, pain, tingling extremities  BEHAVIOR/PSYCH:  negative for depression, anxiety    Physical Exam:   BP (!) 159/73   Pulse 77   Temp 98.6 °F (37 °C) (Oral)   Resp 18   Ht 5' 2\" (1.575 m)   Wt 157 lb 6.5 oz (71.4 kg)   SpO2 96%   BMI 28.79 kg/m²   No results for input(s): POCGLU in the last 72 hours. General Appearance:  alert, well appearing, and in no acute distress  Mental status: oriented to person, place, and time with normal affect  Head:  normocephalic, atraumatic. Eye: no icterus, redness, pupils equal and reactive, extraocular eye movements intact, conjunctiva clear  Mouth: mucous membranes moist  Neck: supple, no carotid bruits, thyroid not palpable  Lungs: Decreased air entry, no wheezing  Cardiovascular: normal rate, regular rhythm, no murmur, gallop, rub.   Abdomen: Soft, nontender, nondistended, normal bowel sounds, no hepatomegaly or splenomegaly  Neurologic: There are no new focal motor or sensory deficits, normal muscle tone and bulk, no abnormal sensation, normal speech, cranial nerves II through XII grossly intact  Skin: No gross lesions, rashes, bruising or bleeding on exposed skin area  Extremities:  peripheral pulses palpable, no pedal edema or calf pain with palpation      Investigations:      Laboratory Testing:  Recent Results (from the past 24 hour(s))   PROTIME-INR    Collection Time: 04/16/19 12:05 PM   Result Value Ref Range    Protime 13.2 11.8 - 14.6 sec INR 1.0    Basic Metabolic Panel w/ Reflex to MG    Collection Time: 04/17/19  5:21 AM   Result Value Ref Range    Glucose 130 (H) 70 - 99 mg/dL    BUN 19 8 - 23 mg/dL    CREATININE 0.67 0.50 - 0.90 mg/dL    Bun/Cre Ratio NOT REPORTED 9 - 20    Calcium 8.8 8.6 - 10.4 mg/dL    Sodium 145 (H) 135 - 144 mmol/L    Potassium 3.2 (L) 3.7 - 5.3 mmol/L    Chloride 104 98 - 107 mmol/L    CO2 27 20 - 31 mmol/L    Anion Gap 14 9 - 17 mmol/L    GFR Non-African American >60 >60 mL/min    GFR African American >60 >60 mL/min    GFR Comment          GFR Staging NOT REPORTED    CBC    Collection Time: 04/17/19  5:21 AM   Result Value Ref Range    WBC 8.4 3.5 - 11.0 k/uL    RBC 3.82 (L) 4.0 - 5.2 m/uL    Hemoglobin 10.3 (L) 12.0 - 16.0 g/dL    Hematocrit 32.5 (L) 36 - 46 %    MCV 85.1 80 - 100 fL    MCH 27.0 26 - 34 pg    MCHC 31.7 31 - 37 g/dL    RDW 14.8 11.5 - 14.9 %    Platelets 200 196 - 535 k/uL    MPV 8.2 6.0 - 12.0 fL    NRBC Automated NOT REPORTED per 100 WBC   PROTIME-INR    Collection Time: 04/17/19  5:21 AM   Result Value Ref Range    Protime 13.6 11.8 - 14.6 sec    INR 1.0    Magnesium    Collection Time: 04/17/19  5:21 AM   Result Value Ref Range    Magnesium 1.7 1.6 - 2.6 mg/dL       Recent Labs     04/17/19  0521  04/16/19  0444 03/26/19  1920   HGB 10.3*  --  11.0* 10.5*   HCT 32.5*  --  33.9* 32.2*   WBC 8.4  --  8.4 4.5   MCV 85.1  --  85.1 87.6   *  --  144 143   K 3.2*  --  3.6* 3.7     --  105 105   CO2 27  --  26 25   BUN 19  --  15 16   CREATININE 0.67  --  0.53 0.70   GLUCOSE 130*  --  144* 120*   INR 1.0   < >  --  1.7   PROTIME 13.6   < >  --  19.8*   APTT  --   --   --  35.2   AST  --   --  25  --    ALT  --   --  21  --    LABALBU  --   --  4.2  --     < > = values in this interval not displayed.        Hematology:  Recent Labs     04/16/19  0444 04/16/19  1205 04/17/19  0521   WBC 8.4  --  8.4   RBC 3.98*  --  3.82*   HGB 11.0*  --  10.3*   HCT 33.9*  --  32.5*   MCV 85.1  --  85.1   MCH 27. 6  --  27.0   MCHC 32.4  --  31.7   RDW 14.6  --  14.8     --  222   MPV 8.0  --  8.2   INR  --  1.0 1.0     Chemistry:  Recent Labs     04/16/19  0444 04/16/19  0652 04/17/19  0521     --  145*   K 3.6*  --  3.2*     --  104   CO2 26  --  27   GLUCOSE 144*  --  130*   BUN 15  --  19   CREATININE 0.53  --  0.67   MG  --   --  1.7   ANIONGAP 13  --  14   LABGLOM >60  --  >60   GFRAA >60  --  >60   CALCIUM 8.9  --  8.8   PROBNP 911*  --   --    TROPONINT NOT REPORTED NOT REPORTED  --      Recent Labs     04/16/19 0444   PROT 7.1   LABALBU 4.2   AST 25   ALT 21   ALKPHOS 66   BILITOT 0.41       Imaging/Diagnostics:       Xr Chest Standard (2 Vw)    Result Date: 4/16/2019  EXAMINATION: TWO VIEWS OF THE CHEST 4/16/2019 4:53 am COMPARISON: Chest radiograph 01/08/2019 HISTORY: ORDERING SYSTEM PROVIDED HISTORY: cough, sob, hypoxia TECHNOLOGIST PROVIDED HISTORY: cough, sob, hypoxia Ordering Physician Provided Reason for Exam: Pt c/o cough, wheezing, shortness of breath for 1 day. H/O CHF, a-fib. Acuity: Acute Type of Exam: Initial Additional signs and symptoms: Pt c/o cough, wheezing, shortness of breath for 1 day. H/O CHF, a-fib. FINDINGS: Left-sided cardiac device with leads in unchanged position. Normal heart size. Lungs are hyperinflated, consistent with COPD. There is no focal consolidation. No evidence of pleural effusion or pneumothorax. No acute osseous abnormality. No acute cardiopulmonary process. COPD. Xr Chest Portable    Result Date: 4/17/2019  EXAMINATION: SINGLE XRAY VIEW OF THE CHEST 4/17/2019 7:15 am COMPARISON: April 16, 2019, January 8, 2019 HISTORY: ORDERING SYSTEM PROVIDED HISTORY: AECOPD TECHNOLOGIST PROVIDED HISTORY: AECOPD Ordering Physician Provided Reason for Exam: AECOPD Acuity: Acute Type of Exam: Initial FINDINGS: Mild cardiac silhouette enlargement again noted. Left subclavian dual lead pacer in place.   Chronic appearing interstitial findings in the lungs without acute airspace disease identified. No pneumothorax or effusion. No acute osseous abnormality identified. No acute airspace disease identified. Ct Chest Pulmonary Embolism W Contrast    Result Date: 4/16/2019  EXAMINATION: CTA OF THE CHEST 4/16/2019 5:24 am TECHNIQUE: CTA of the chest was performed after the administration of intravenous contrast.  Multiplanar reformatted images are provided for review. MIP images are provided for review. Dose modulation, iterative reconstruction, and/or weight based adjustment of the mA/kV was utilized to reduce the radiation dose to as low as reasonably achievable. COMPARISON: CT chest 09/11/2016 HISTORY: ORDERING SYSTEM PROVIDED HISTORY: sob, hypoxia TECHNOLOGIST PROVIDED HISTORY: Ordering Physician Provided Reason for Exam: SOB and cough for past couple days Acuity: Acute Type of Exam: Unknown Relevant Medical/Surgical History: Pt has a hx of PE, surgeries to area of interest include pacemaker placement FINDINGS: Pulmonary Arteries: Pulmonary arteries are adequately opacified for evaluation, though evaluation of the subsegmental pulmonary arteries is degraded by respiratory motion. No evidence of intraluminal filling defect to suggest pulmonary embolism. Main pulmonary artery is normal in caliber. Mediastinum: No evidence of mediastinal lymphadenopathy. The heart and pericardium demonstrate no acute abnormality. There is no acute abnormality of the thoracic aorta. Lungs/pleura: The lungs are without acute process. A 5 mm noncalcified pleural-based nodule in the right lower lobe (series 4, image 97) has not significantly changed since 09/11/2016. No focal consolidation or pulmonary edema. No evidence of pleural effusion or pneumothorax. Upper Abdomen: Limited images of the upper abdomen with redemonstration of a left renal cyst. Soft Tissues/Bones: No acute bone or soft tissue abnormality.      No evidence of pulmonary embolism or acute pulmonary abnormality. A 5 mm noncalcified nodule in the right lower lobe has not significantly changed in size since 09/11/2016. Given stability, nodule is favored to be benign. No dedicated imaging follow-up is required. RECOMMENDATIONS: Fleischner Society guidelines for follow-up and management of incidentally detected pulmonary nodules: Single Solid Nodule: Nodule size less than 6 mm In a low-risk patient, no routine follow-up. In a high-risk patient, optional CT at 12 months (completed). -Low risk patients include individuals with minimal or absent history of smoking and other known risk factors. - High risk patients include individuals with a history or smoking or known risk factors. Radiology 2017 http://pubs. rsna.org/doi/full/10.1148/radiol. 8957859105     Vl Dup Lower Extremity Venous Left    Result Date: 3/26/2019    [facility]  Vascular Lower Extremities DVT Study Procedure   Patient Name   HORACE BERTRAND Date of Study           03/26/2019   Date of Birth  12/12/1930  Gender                  Female   Age            80 year(s)  Race                       Room Number    02   Corporate ID # [id number]   Patient Acct # [de-identified]   MR #           [id number]      Sonographer             [facility]   Accession #    [id number]   Interpreting Physician  Angelic Bolton   Referring                  Referring Physician     Kirti Wilkes DO  Nurse  Practitioner  Procedure Type of Study:   Veins: Lower Extremities DVT Study, Venous Scan Lower Left. Indications for Study:Swelling. Patient Status:Out Patient. Technical Quality:Adequate visualization. Conclusions   Summary   No evidence of superficial or deep venous thrombosis in the left lower  extremity and left common femoral vein. Incidental note is made ofmass on the left lower thigh,medially.    Signature   ----------------------------------------------------------------  Electronically signed by Howie Vasquez(Sonographer) on  03/26/2019 09:41 PM ----------------------------------------------------------------   ----------------------------------------------------------------  Electronically signed by Dana Decreakanksha De La Rosa(Interpreting  physician) on 03/26/2019 10:50 PM  ----------------------------------------------------------------  Findings:   Right Impression:       Left Impression:  The common femoral vein The common femoral, femoral, popliteal and tibial  demonstrates normal     veins demonstrate normal compressibility and  compressibility and     augmentation. augmentation. Normal compressibility of the great saphenous                          vein. Normal compressibility of the small saphenous                          vein. Incidental finding of a hypoechoic structure                          without color flow or Doppler signals in the                          medial aspect of the distal thigh/knee. It sits on                          top of the GSV but does not compress or interfere                          with normal color flow or Doppler responses in the                          GSV at knee level. Risk Factors History +---------+----------+-----------------------------------------------------+ ! Diagnosis! Date      ! Comments                                             ! +---------+----------+-----------------------------------------------------+ ! Other    !09/06/2016! CT: bilateral PE                                     ! +---------+----------+-----------------------------------------------------+ Allergies   - Allergy:Penicillin(Drug). - Allergy:Sulfa(Drug). Velocities are measured in cm/s ; Diameters are measured in cm Right Lower Extremities DVT Study Measurements Right 2D Measurements +------------------------------------+----------+---------------+----------+ ! Location                            ! Visualized! Compressibility! Thrombosis! +------------------------------------+----------+---------------+----------+ ! Common Femoral                      !Yes       ! Yes            ! None      ! +------------------------------------+----------+---------------+----------+ Right Doppler Measurements +----------------------------+------+------+-------------------------------+ ! Location                    ! Signal!Reflux! Reflux (msec)                  ! +----------------------------+------+------+-------------------------------+ ! Common Femoral              !Phasic!      !                               ! +----------------------------+------+------+-------------------------------+ Left Lower Extremities DVT Study Measurements Left 2D Measurements +------------------------------------+----------+---------------+----------+ ! Location                            ! Visualized! Compressibility! Thrombosis! +------------------------------------+----------+---------------+----------+ ! Common Femoral                      !Yes       ! Yes            ! None      ! +------------------------------------+----------+---------------+----------+ ! Prox Femoral                        !Yes       ! Yes            ! None      ! +------------------------------------+----------+---------------+----------+ ! Mid Femoral                         !Yes       ! Yes            ! None      ! +------------------------------------+----------+---------------+----------+ ! Dist Femoral                        !Yes       ! Yes            ! None      ! +------------------------------------+----------+---------------+----------+ ! Popliteal                           !Yes       ! Yes            ! None      ! +------------------------------------+----------+---------------+----------+ ! Sapheno Femoral Junction            ! Yes       ! Yes            ! None      ! +------------------------------------+----------+---------------+----------+ ! PTV                                 ! Yes       ! Yes            ! None      ! +------------------------------------+----------+---------------+----------+ ! Peroneal                            !Yes       ! Yes            ! None      ! +------------------------------------+----------+---------------+----------+ ! Gastroc                             ! Yes       ! Yes            ! None      ! +------------------------------------+----------+---------------+----------+ ! GSV Thigh                           ! Yes       ! Yes            ! None      ! +------------------------------------+----------+---------------+----------+ ! GSV Knee                            ! Yes       ! Yes            ! None      ! +------------------------------------+----------+---------------+----------+ ! GSV Ankle                           ! Yes       ! Yes            ! None      ! +------------------------------------+----------+---------------+----------+ ! SSV                                 ! Yes       ! Yes            ! None      ! +------------------------------------+----------+---------------+----------+ Left Doppler Measurements +---------------------------+------+------+--------------------------------+ ! Location                   ! Signal!Reflux! Reflux (msec)                   ! +---------------------------+------+------+--------------------------------+ ! Common Femoral             !Phasic!      !                                ! +---------------------------+------+------+--------------------------------+ ! Prox Femoral               !Phasic!      !                                ! +---------------------------+------+------+--------------------------------+ ! Popliteal                  !Phasic!      !                                ! +---------------------------+------+------+--------------------------------+           Impressions :      1. Principal Problem:    Acute respiratory failure with hypoxia ([address]  Resolved Problems:    * No resolved hospital problems.  *  Acute bronchitis  Hypoxic respiratory failure  Non smoker  Poor air entry  ? Pulm fibrosis  CT scan negative for new PE  No infiltrate  PCO2 47, ph normal, paO2 72  Mild respiratory acidosis, possibly related to bronchitis  , will repeat CXR tomorrow morning  Repeat echo  20mg IV lasix once  Stop IVF  CBC, BMP    2.  has a past medical history of Arthritis, Atrial fibrillation ([facility] [address] (8/21/2016), CHF (congestive heart failure) ([facility] [address] Complete heart block ([facility] [address] (8/18/2016), Constipation, GERD (gastroesophageal reflux disease), Glaucoma, Hearing loss, Hiatal hernia, blood clots (07/2016), Hyperlipidemia, Osteopenia, Pacemaker (08/18/2016), Restless leg syndrome, Right knee DJD, Wears glasses, and Wears hearing aid in both ears. Plans:     1.  Change to IV levaquin an steroids  Continue duoneb  Bridge INR with lovenox  Will need PFT and possibly high res CT once improved    Current Facility-Administered Medications   Medication Dose Route Frequency Provider Last Rate Last Dose    benzonatate (TESSALON) capsule 200 mg  200 mg Oral TID PRN Elly Betts APRN - CNP   200 mg at 04/17/19 0245    warfarin (COUMADIN) tablet 6 mg  6 mg Oral Once ALEXSANDRA Mullins CNP        potassium chloride (KLOR-CON M) extended release tablet 40 mEq  40 mEq Oral PRN Janeth Garcia MD   40 mEq at 04/17/19 1038    Or    potassium bicarb-citric acid (EFFER-K) effervescent tablet 40 mEq  40 mEq Oral PRN Janeth Garcia MD        Or   Chad Parikh potassium chloride 10 mEq/100 mL IVPB (Peripheral Line)  10 mEq Intravenous PRN Janeth Garcia MD        ipratropium-albuterol (DUONEB) nebulizer solution 1 ampule  1 ampule Inhalation PRN Ramone Cazares MD   1 ampule at 04/16/19 0554    amiodarone (CORDARONE) tablet 200 mg  200 mg Oral BID Althia Alpesh APRN - CNP   200 mg at 04/17/19 0941    aspirin EC tablet 81 mg  81 mg Oral BID Althia Alpesh, APRN - CNP   81 mg at 04/17/19 0941    atorvastatin (LIPITOR) tablet 10 mg  10 mg Oral Daily Althia Alpesh, APRN - CNP   10 mg at 04/17/19 0941    calcium carb-cholecalciferol 600-400 MG-UNIT per tab 1 tablet  1 tablet Oral BID Geryl Tania, APRN - CNP   1 tablet at 04/17/19 1038    latanoprost (XALATAN) 0.005 % ophthalmic solution 1 drop  1 drop Both Eyes Nightly Geryl Tania, APRN - CNP   1 drop at 04/16/19 2235    metoprolol tartrate (LOPRESSOR) tablet 25 mg  25 mg Oral BID Geryl Tania, APRN - CNP   25 mg at 04/17/19 0941    polyethylene glycol (GLYCOLAX) packet 17 g  17 g Oral Daily PRN Geryl Tania, APRN - CNP        rOPINIRole (REQUIP) tablet 0.25 mg  0.25 mg Oral Nightly Geryl Tania, APRN - CNP   0.25 mg at 04/16/19 2235    sennosides-docusate sodium (SENOKOT-S) 8.6-50 MG tablet 2 tablet  2 tablet Oral Daily PRN Geryl Tania, APRN - CNP        furosemide (LASIX) tablet 40 mg  40 mg Oral Daily Geryl Tania, APRN - CNP   40 mg at 04/17/19 0941    0.9 % sodium chloride infusion   Intravenous Continuous Geryl Tania, APRN - CNP 50 mL/hr at 04/16/19 0932      sodium chloride flush 0.9 % injection 10 mL  10 mL Intravenous 2 times per day Geryl Tania, APRN - CNP   10 mL at 04/16/19 2011    sodium chloride flush 0.9 % injection 10 mL  10 mL Intravenous PRN Geryl Tania, APRN - CNP        magnesium hydroxide (MILK OF MAGNESIA) 400 MG/5ML suspension 30 mL  30 mL Oral Daily PRN Geryl Tania, APRN - CNP        [facility]ron [facility]) injection 4 mg  4 mg Intravenous Q6H PRN Geryl Tania, APRN - CNP        famotidine (PEPCID) tablet 20 mg  20 mg Oral BID Geryl Tania, APRN - CNP   20 mg at 04/17/19 0941    albuterol (PROVENTIL) nebulizer solution 2.5 mg  2.5 mg Nebulization Q2H PRN Geryl Tania, APRN - CNP        ipratropium-albuterol (DUONEB) nebulizer solution 1 ampule  1 ampule Inhalation Q4H WA Geryl Tania, APRN - CNP   1 ampule at 04/17/19 6326    acetaminophen (TYLENOL) tablet 650 mg  650 mg Oral Q4H PRN ALEXSANDRA Collins CNP        guaiFENesin [facility]) extended release tablet 600 mg  600 mg Oral BID ALEXSANDRA Collins CNP   600 mg at 04/17/19 3197    LORazepam (ATIVAN) tablet 0.5 mg  0.5 mg Oral Once Zaid Segal MD        doxycycline monohydrate (MONODOX) capsule 100 mg  100 mg Oral 2 times per day ALEXSANDRA Pope CNP   100 mg at 04/17/19 8976    warfarin (COUMADIN) daily dosing (placeholder)   Other RX Placeholder ALEXSANDRA Pope CNP        enoxaparin (LOVENOX) injection 40 mg  40 mg Subcutaneous Daily Louretta Meckel, MD   40 mg at 04/17/19 0941    predniSONE (DELTASONE) tablet 40 mg  40 mg Oral Daily Louretta Meckel, MD   40 mg at 04/17/19 0941          Allie Gleason MD  4/17/2019  10:55 AM

## 2019-04-17 NOTE — PLAN OF CARE
Problem: Breathing Pattern - Ineffective:  Goal: Ability to achieve and maintain a regular respiratory rate will improve  Description  Ability to achieve and maintain a regular respiratory rate will improve  4/17/2019 1639 by Adeline Alberto RN  Outcome: Ongoing  Note:   Patients respiratory status stable at this time. No signs or symptoms of distress noted. Respirations non-labored and regular. Nasal canula 02 in place as needed to maintain sp02>90% or per md order. Continuous SpO2 monitoring in place. Problem: SKIN INTEGRITY  Goal: Skin integrity is maintained or improved  4/17/2019 1639 by Adeline Alberto RN  Outcome: Ongoing  Note:   Skin assessment complete. Pt turned and repositioned per self. Area kept free from moisture. Proper nourishment and fluids encouraged, as appropriate. Skin remains clean, dry, and intact. Will continue to monitor for additional needs and changes in skin breakdown. Problem: Falls - Risk of:  Goal: Will remain free from falls  Description  Will remain free from falls  Outcome: Ongoing  Note:   The patient remained free from falls this shift, call light within reach, bed in locked and lowest position. Side rails up x2. Continue to monitor closely. Problem: Pain:  Goal: Pain level will decrease  Description  Pain level will decrease  Outcome: Ongoing  Note:   Pt medicated with pain medication prn. Assessed all pain characteristics including level, type, location, frequency, and onset. Non-pharmacologic interventions offered to pt as well. Pt states pain is tolerable at this time.  Will continue to monitor

## 2019-04-17 NOTE — PROGRESS NOTES
Physical Therapy    Facility/Department: [facility] PROGRESSIVE CARE  Initial Assessment    NAME: Bala Bain  : 1930  MRN: [id number]    Date of Service: 2019    Discharge Recommendations:  Home with assist PRN   PT Equipment Recommendations  Equipment Needed: No    Assessment   Body structures, Functions, Activity limitations: Decreased functional mobility ; Decreased strength;Decreased endurance;Decreased safe awareness;Decreased balance  Assessment: continue per POC to maxmize potential for safe D/C  Treatment Diagnosis: impaired mobility  Specific instructions for Next Treatment: 19 HOME W/ ASSIST, gait w/ w walker 10' x 2 and 7' x 2 w/ SBA x 1 and O2 at 3 L  Prognosis: Good  Decision Making: Medium Complexity  History: admitted due difficulty breathing  Exam: ROM, MMT, balance and mobility assessments  Clinical Presentation: gait w/ w walker 10' x 2 and 7' x 2 w/ SBA x 1 and O2 at 3 L  Patient Education: POC  Barriers to Learning: none  REQUIRES PT FOLLOW UP: Yes  Activity Tolerance  Activity Tolerance: Patient limited by fatigue;Patient limited by endurance       Patient Diagnosis(es): The encounter diagnosis was Acute respiratory failure with hypoxia ([address] has a past medical history of Arthritis, Atrial fibrillation ([address] CHF (congestive heart failure) ([address] Complete heart block ([address] Constipation, GERD (gastroesophageal reflux disease), Glaucoma, Hearing loss, Hiatal hernia, Hx of blood clots, Hyperlipidemia, Osteopenia, Pacemaker, Restless leg syndrome, Right knee DJD, Wears glasses, and Wears hearing aid in both ears. has a past surgical history that includes Dilation and curettage of uterus; Tonsillectomy and adenoidectomy; Colonoscopy; Pacemaker insertion (2016); Upper gastrointestinal endoscopy (2017); Cataract removal with implant (Bilateral); Total knee arthroplasty (Right, 2017); pr gi endoscopic ultrasound (N/A, 2018);  Upper gastrointestinal endoscopy (9/12/2018); Dilation and curettage of uterus (03/12/2019); and Dilation and curettage of uterus (N/A, 3/12/2019). Restrictions  Restrictions/Precautions  Restrictions/Precautions: General Precautions, Fall Risk, Up as Tolerated  Required Braces or Orthoses?: No  Implants present? : Pacemaker, Metal implants(right TKA 2 years ago)  Position Activity Restriction  Other position/activity restrictions: telesitter in place  Vision/Hearing  Vision: Impaired  Vision Exceptions: Wears glasses at all times  Hearing: Exceptions to [facility]  Hearing Exceptions: Bilateral hearing aid;Hard of hearing/hearing concerns     Subjective  General  Patient assessed for rehabilitation services?: Yes  Response To Previous Treatment: Not applicable  Family / Caregiver Present: No  Referring Practitioner: Becky Cagle CNP  Referral Date : 04/16/19  Diagnosis: acute respiratory failure w/ hypoxia  Follows Commands: Within Functional Limits  General Comment  Comments: CT chest is negative. Per chart, pt had low O2 sats upon admit to ER- pt 78% on 2 L and 82% on 3 L so placed on high flow.   Subjective  Subjective: pt admitted due to C/O difficulty breathing  Pain Screening  Patient Currently in Pain: Denies  Vital Signs  Patient Currently in Pain: Denies       Orientation  Orientation  Overall Orientation Status: Within Normal Limits  Social/Functional History  Social/Functional History  Lives With: Son  Type of Home: House  Home Layout: One level  Home Access: Stairs to enter with rails, Ramped entrance  Entrance Stairs - Number of Steps: 2-3 in front, has ramp built inside the garage- uses ramp mainly  Bathroom Shower/Tub: Tub/Shower unit  Bathroom Toilet: Handicap height(uses wall for support during transfer)  Dev Electric: Grab bars in shower, Tub transfer bench  Home Equipment: Rolling walker, Wheelchair-manual, Cane, Oxygen  ADL Assistance: Independent  Homemaking Assistance: Needs assistance(son assists with heavy household chores and grocery shopping)  Homemaking Responsibilities: Yes  Ambulation Assistance: Independent  Transfer Assistance: Independent  Active : Yes  Mode of Transportation: Car  Occupation: Retired  Additional Comments: son works throughout the day and is home in the evening  Cognition   Cognition  Overall Cognitive Status: WFL    Objective     Observation/Palpation  Observation: O2 at 3 L, telesitter    AROM RLE (degrees)  RLE AROM: WFL  AROM LLE (degrees)  LLE AROM : WFL  AROM RUE (degrees)  RUE General AROM: see OT for UE assessment  AROM LUE (degrees)  LUE General AROM: see OT for UE assessment  Strength RLE  Comment: grossly 4-/5  Strength LLE  Comment: grossly 4-/5  Strength RUE  Comment: see OT for UE assessment  Strength LUE  Comment: see OT for UE assessment     Sensation  Overall Sensation Status: WFL(denies)  Bed mobility  Comment: pt sitting in bedside chair when therapists entered room  Transfers  Sit to Stand: Stand by assistance  Stand to sit: Stand by assistance  Stand Pivot Transfers: Stand by assistance(used w walker)  Comment: commode transfer  Ambulation  Ambulation?: Yes  Ambulation 1  Surface: level tile  Device: Rolling Walker  Other Apparatus: O2(3 L)  Assistance: Stand by assistance  Distance: 10' x 2 and 7' forward and back  Comments: O2 sats dropped to 90% after MMT bilateral UEs and rebounded to 93%; O2 sats post gait 90-93%  Stairs/Curb  Stairs?: No     Balance  Sitting - Static: Good  Sitting - Dynamic: Good  Standing - Static: Good(used w walker)  Standing - Dynamic: Good;-(used w walker)        Plan   Plan  Times per week: 5-7 treatments/ week  Times per day: (5-7 treatments/ week)  Specific instructions for Next Treatment: 4-17-19 HOME W/ ASSIST, gait w/ w walker 10' x 2 and 7' x 2 w/ SBA x 1 and O2 at 3 L  Current Treatment Recommendations: Strengthening, Functional Mobility Training, ROM, Transfer Training, Gait Training, Safety Education & Training, Balance Training, Endurance Training, Patient/Caregiver Education & Training  Safety Devices  Type of devices:  All fall risk precautions in place, Left in chair, Call light within reach, Patient at risk for falls, Gait belt, Nurse notified(nurse Aguirre)    G-Code       OutComes Score                                                  AM-PAC Score     AM-PAC Inpatient Mobility without Stair Climbing Raw Score : 11  AM-PAC Inpatient without Stair Climbing T-Scale Score : 35.66  Mobility Inpatient CMS 0-100% Score: 67.36  Mobility Inpatient without Stair CMS G-Code Modifier : CL       Goals  Short term goals  Time Frame for Short term goals: 5-7 treatments/ week  Short term goal 1: pt to demonstrate independent bed mobility  Short term goal 2: pt to demonstrate MOD I transfers using w walker  Short term goal 3: pt to demonstrate MOD I gait using w walker 50-80' for household distances  Short term goal 4: pt to demonstrate good tecnique for LE strengthening and energy conservation techniques  Patient Goals   Patient goals : return home w/ son       Therapy Time   Individual Concurrent Group Co-treatment   Time In 0951         Time Out 1017         Minutes 26         Timed Code Treatment Minutes: [facility], PT

## 2019-04-17 NOTE — FLOWSHEET NOTE
04/17/19 1508   Encounter Summary   Services provided to: Patient   Referral/Consult From: Rajni   Continue Visiting   (4/17/19V)   Volunteer Visit Yes   Complexity of Encounter Low   Length of Encounter 15 minutes   Routine   Type Follow up   Spiritual/Hoahaoism   Type Spiritual support   Intervention [address] Patient received communion

## 2019-04-17 NOTE — FLOWSHEET NOTE
04/17/19 1535   Provider Notification   Reason for Communication Review case   Provider Name Dr. Salvatore Lawson   Provider Notification Physician   Method of Communication Secure Message   Response No new orders   Notification Time [id number] [phone] [id number]     Summary  Left ventricle is normal in size and wall thickness. Hyperdynamic left ventricular function. Estimated LV EF 75 %. Evidence of moderate (grade II) diastolic dysfunction. Left atrium is moderately dilated. Difficult visualization of the right heart chambers. TAPSE value suggests  normal RV systolic function. Calcified Aortic Valve. Mild aortic insufficiency. Heavily calcified Mitral Valve leaflets with restricted mobility. Moderate to severe mitral stenosis, Mean Gradient 9 mmHg. Mild mitral regurgitation. Mild tricuspid regurgitation. Mild pulmonary hypertension. Estimated right ventricular systolic pressure  is 50CEHA. Dr. Salvatore Lawson was sent the above results of pts ECHO. No orders given at this time.      Electronically signed by Raymon Calixto RN on 4/17/2019 at 3:37 PM

## 2019-04-17 NOTE — PROGRESS NOTES
Pulmonary Progress Note  Pulmonary and Critical Care Specialists      Patient - Waylon Foster,  Age - 80 y.o.    - 1930      Room Number - 2116/2116-01   N -  [id number]   [facility]  Date of Admission -  2019  3:43 AM    Dacia Sal MD  Primary Care Physician - Yesenia Andrade MD     SUBJECTIVE    had issues with dyspnea earlier today  Felt better with neb treatments and IV steroids      OBJECTIVE   VITALS    height is 5' 2\" (1.575 m) and weight is 157 lb 6.5 oz (71.4 kg). Her oral temperature is 98.1 °F (36.7 °C). Her blood pressure is 123/55 (abnormal) and her pulse is 75. Her respiration is 17 and oxygen saturation is 100%. Body mass index is 28.79 kg/m². Temperature Range: Temp: 98.1 °F (36.7 °C) Temp  Av.3 °F (36.8 °C)  Min: 98.1 °F (36.7 °C)  Max: 98.6 °F (37 °C)  BP Range:  Systolic (54JMV), XPN:547 , Min:123 , DD     Diastolic (67TNV), NBD:49, Min:49, Max:73    Pulse Range: Pulse  Av.4  Min: 71  Max: 77  Respiration Range: Resp  Av.6  Min: 16  Max: 20  Current Pulse Ox[de-identified]  SpO2: 100 %  24HR Pulse Ox Range:  SpO2  Av.4 %  Min: 83 %  Max: 100 %  Oxygen Amount and Delivery: O2 Flow Rate (L/min): 3 L/min    Wt Readings from Last 3 Encounters:   19 157 lb 6.5 oz (71.4 kg)   19 160 lb (72.6 kg)   19 161 lb 9.6 oz (73.3 kg)       I/O (24 Hours)    Intake/Output Summary (Last 24 hours) at 2019 1722  Last data filed at 2019 0546  Gross per 24 hour   Intake 600 ml   Output --   Net 600 ml       EXAM     General Appearance  Awake, alert, oriented, in no acute distress  HEENT - normocephalic, atraumatic. Neck - Supple,  trachea midline   Lungs - coarse  Heart Exam:PMI normal. No lifts, heaves, or thrills. RRR. No murmurs, clicks, gallops, or rubs  Abdomen Exam: Abdomen soft, non-tender.  BS normal. No masses,   Extremity Exam: no edema    MEDS      levofloxacin  500 mg Intravenous Q24H    Value Date    APTT 35.2 03/26/2019         RADIOLOGY     (See actual reports for details)    ASSESSMENT/PLAN     Patient Active Problem List   Diagnosis    Arthritis    Osteopenia    Complete heart block (HCC)    Syncope and collapse    Hyperlipidemia    Contusion of scalp    Chest pain in adult    Pericardial effusion    Paroxysmal atrial fibrillation (HCC)    Sinus arrest    Knee effusion, right    Shortness of breath    Pulmonary embolism with acute cor pulmonale (HCC)    Pleural effusion    Sick sinus syndrome (HCC)    Hemorrhagic pericardial effusion    Status post total right knee replacement    Subtherapeutic international normalized ratio (INR)    Acute congestive heart failure (HCC)    Thickened endometrium    Acute respiratory failure with hypoxia (HCC)     Bronchospasm from a bronchitis  Will change duoneb to albuterol   On IV steroids    Electronically signed by Laurel Jung MD on 4/17/2019 at 5:22 PM

## 2019-04-17 NOTE — PROGRESS NOTES
[address]    Occupational Therapy Evaluation  Date: 19  Patient Name: Marjan May       Room:   MRN: [id number]  Account: [de-identified]   : 1930  (80 y.o.) Gender: female     Discharge Recommendations:  Home with assist PRN     Referring Practitioner: Dr. Yefri Hayes  Diagnosis: Acute respiratory failure with hypoxia   Pertinent medical Hx: Arthritis, A-fib, CHF     Treatment Diagnosis: impaired self-care and functional mobility      Past Medical History:  has a past medical history of Arthritis, Atrial fibrillation ([address] CHF (congestive heart failure) ([address] Complete heart block ([address] Constipation, GERD (gastroesophageal reflux disease), Glaucoma, Hearing loss, Hiatal hernia, Hx of blood clots, Hyperlipidemia, Osteopenia, Pacemaker, Restless leg syndrome, Right knee DJD, Wears glasses, and Wears hearing aid in both ears. Past Surgical History:   has a past surgical history that includes Dilation and curettage of uterus; Tonsillectomy and adenoidectomy; Colonoscopy; Pacemaker insertion (2016); Upper gastrointestinal endoscopy (2017); Cataract removal with implant (Bilateral); Total knee arthroplasty (Right, 2017); pr gi endoscopic ultrasound (N/A, 2018); Upper gastrointestinal endoscopy (2018); Dilation and curettage of uterus (2019); and Dilation and curettage of uterus (N/A, 3/12/2019).     Restrictions  Restrictions/Precautions: General Precautions, Fall Risk, Up as Tolerated  Implants present? : Pacemaker, Metal implants(right TKA 2 years ago)  Other position/activity restrictions: tele sitter in place  Required Braces or Orthoses?: No     Vitals  Temp: 98.1 °F (36.7 °C)  Pulse: 75  Resp: 16  BP: (!) 123/55  Height: 5' 2\" (157.5 cm)  Weight: 157 lb 6.5 oz (71.4 kg)  BMI (Calculated): 28.9  Oxygen Therapy  SpO2: 100 %  Pulse Oximeter Device Mode: Intermittent  Pulse Oximeter Device Location: Finger  O2 Device: Nasal cannula  FiO2 : 54 %(decreased to 50%)  O2 Flow Rate (L/min): 3 L/min(denies home O2 use)      Subjective  Subjective: Pt denies pain, however reports being an annoying feeling in her chest when attempting to breathe  Comments: RN velia'taye pt for therapy this am. Pt seated in chair upon arrival. Pt agreeable and cooperative throughout    Overall Orientation Status: Within Functional Limits     Vision  Vision: Impaired  Vision Exceptions: Wears glasses at all times  Hearing  Hearing: Exceptions to [facility]  Hearing Exceptions: Bilateral hearing aid, Hard of hearing/hearing concerns    Social/Functional History  Lives With: Son  Type of Home: House  Home Layout: One level  Home Access: Stairs to enter with rails, Ramped entrance  Entrance Stairs - Number of Steps: 2-3 in front, has ramp built inside the garage- uses ramp mainly  Bathroom Shower/Tub: Tub/Shower unit  Bathroom Toilet: Handicap height(uses wall for support during transfer)  Bathroom Equipment: Grab bars in shower, Tub transfer bench  Home Equipment: Rolling walker, [employer], Oxygen  ADL Assistance: Independent  Homemaking Assistance: Needs assistance(son assists with heavy household chores and grocery shopping)  Homemaking Responsibilities: Yes  Ambulation Assistance: Independent  Transfer Assistance: Independent  Active : Yes  Mode of Transportation: Car  Occupation: Retired  Additional Comments: son works throughout the day and is home in the evening    Pain Assessment  Pain Assessment: 0-10  Pain Level: 0    Objective  Cognition  Overall Cognitive Status: WFL   Sensation  Overall Sensation Status: WFL(pt denied )     ADL  Feeding: Independent  Grooming: Setup  UE Bathing: Setup  LE Bathing: Setup  UE Dressing: Setup  LE Dressing: Setup(pt donned and doffed socks bilaterally while seated in chair)  Toileting: Supervision (pt demo'd toileting and toilet hygiene.)  Additional Comments: ADLs assessed through clinical observation and reasoning. ADL function assessed as if pt is seated in chair unless otherwise noted, likely SBA with RW if standing sink side for ADLs. UE Function  LUE Strength  Gross LUE Strength: WFL  L Hand Grasp: 4/5  LUE Strength Comment: 4/5 shoulder flex, elbow flex/ext, denies UE pain with MMT     LUE Tone: Normotonic     LUE AROM (degrees)  LUE AROM : WFL     Left Hand AROM (degrees)  Left Hand AROM: WFL  RUE Strength  Gross RUE Strength: WFL  R Hand Grasp: 4/5  RUE Strength Comment: 4/5 shoulder flex, elbow flex/ext, denies UE pain with MMT, O2 sat at 90-93 after UE MMT      RUE Tone: Normotonic     RUE AROM (degrees)  RUE AROM : WFL     Right Hand AROM (degrees)  Right Hand AROM: WFL    Fine Motor Skills  Coordination  Movements Are Fluid And Coordinated: Yes     Mobility  Balance  Standing Balance: Stand by assistance  Standing Balance  Time: 5 minutes  Activity: functional mobility/static standing  Comment: with RW. Pt steady during static standing    Functional Mobility  Functional - Mobility Device: Rolling Walker  Activity: To/from bathroom, Other  Assist Level: Stand by assistance  Functional Mobility Comments: pt demo'd functional mobility to/from bathroom and within room to replicate household distances. Pt slightly unsteady throughout with RW. Pt demo'd increased SOB after activity. O2 sats 93-95 after returning to chair. Bed mobility  Comment: did not assess. pt seated in chair upon entry     Transfers  Stand Pivot Transfers: Stand by assistance  Sit to stand: Stand by assistance  Stand to sit: Stand by assistance  Transfer Comments: transfers completed with RW. Demo'd good technique and safety awareness.     Toilet Transfers  Toilet - Technique: Stand pivot  Equipment Used: Raised toilet seat with rails  Toilet Transfer: Stand by assistance  Toilet Transfers Comments: completed transfer with RW      Assessment  Performance deficits / Impairments: Decreased functional mobility , Decreased ADL status, Decreased endurance, Decreased balance, Decreased high-level IADLs  Assessment: pt completed toileting/toilet hygiene after initial evaluation portion  Treatment Diagnosis: impaired self-care and functional mobility   Prognosis: Good  Decision Making: Medium Complexity  Patient Education: OT services/POC, safety during walker mobility/transfers, good return  REQUIRES OT FOLLOW UP: Yes  Discharge Recommendations: Home with assist PRN    Activity Tolerance: pt limited by SOB and fluctuating O2 sats, however tolerated treatment well. O2 sats remained between 90-95 throughout    Functional Outcome Measures  AM-PAC Daily Activity Inpatient   How much help for putting on and taking off regular lower body clothing?: A Little  How much help for Bathing?: A Little  How much help for Toileting?: A Little  How much help for putting on and taking off regular upper body clothing?: A Little  How much help for taking care of personal grooming?: A Little  How much help for eating meals?: None  AM-[facility] Inpatient Daily Activity Raw Score: 19  AM-PAC Inpatient ADL T-Scale Score : 40.22  ADL Inpatient CMS 0-100% Score: 42.8  ADL Inpatient CMS G-Code Modifier : CK  OT G-codes  Functional Assessment Tool Used: [facility] ([facility])  Score: 19/24  Functional Limitation: Self care  Self Care Current Status (): At least 40 percent but less than 60 percent impaired, limited or restricted  Self Care Goal Status (): At least 20 percent but less than 40 percent impaired, limited or restricted    Goals  Short term goals  Time Frame for Short term goals: by d/c pt will  Short term goal 1: demo ADLs Mod I, using AE as needed  Short term goal 2: demo pursed lip breathing technique independently as needed to control SOB.   Short term goal 3: demo all functional transfers Mod I, using least restrictive device  Short term goal 4: demo safety awareness during all functional mobility/ADL tasks  Short term goal 5: increase activity tolerance to 20+ minutes to facilitate participation in ADLs/functional mobility     Plan  Safety Devices  Safety Devices in place: Yes  Type of devices: Call light within reach, Chair alarm in place, Patient at risk for falls, Left in chair, All fall risk precautions in place     Plan  Times per week: 5-6x  Times per day: Daily  Current Treatment Recommendations: Strengthening, Balance Training, Endurance Training, Safety Education & Training, Patient/Caregiver Education & Training, Self-Care / ADL       OT Individual Minutes  Time In: 3801  Time Out: 1019  Minutes: 28    Electronically signed by Ryan Jordan on 4/17/19 at 12:26 PM

## 2019-04-17 NOTE — CARE COORDINATION
CASE MANAGEMENT NOTE:    Admission Date:  4/16/2019 Estevan Jesus is a 80 y.o.  female    Admitted for : Acute respiratory failure with hypoxia ([facility] [J96.01]    Met with:  Patient and Family - Daughter at bedside    PCP:  Dr. Soliz Public:  Medicare      Current Residence/ Living Arrangements:  independently at home             Current Services PTA:  No    Is patient agreeable to VNS: Yes    Freedom of choice provided: Yes    List of [address] provided: Yes    VNS chosen:  Yes - Has had [facility] in the past and would like them again    DME:  straight cane, walker and shower chair, GB    Home Oxygen: No    Nebulizer: No    CPAP/BIPAP: No    Supplier: N/A    Potential Assistance Needed: No    SNF needed: No    Pharmacy:  [address] [place] [address]       Is the Patient an [employer] with Readmission Risk Score greater than 14%? No  If yes, pt needs a follow up appointment made within 7 days. Does Patient want to use MEDS to BEDS? Yes    Family Members/Caregivers that pt would like involved in their care:    Yes    If yes, list name here:  Daughter Kate Shin:  Patient and Family             Is patient in Isolation/One on One/Altered Mental Status? No  If yes, skip next question. If no, would they like an I-Pad to  use? No  If yes, call [phone]. Discharge Plan:  4/17: MEDICARE - Patient is from 1-story home with ramp. DME - HENOK Duran, RICO [place]. Would like [facility]. Would also like meds to beds. On IV levaquin and steroids 40Q6. Follow for possible Lovenox for coumadin bridge.  LUIS CARLOS NEEDS SIGNED/COMPLETED. Tay Whiteside                 Electronically signed by: Renata Corea RN on 4/17/2019 at 3:22 PM

## 2019-04-17 NOTE — PROGRESS NOTES
Pharmacy Note  Warfarin Consult follow-up      Recent Labs     04/16/19  1205 04/17/19  0521   INR 1.0 1.0     Recent Labs     04/16/19  0444 04/17/19  0521   HGB 11.0* 10.3*   HCT 33.9* 32.5*    222       Significant Drug-Drug Interactions:  New warfarin drug-drug interactions: none  Discontinued drug-drug interactions: none  Current warfarin drug-drug interactions: amiodarone, atorvastatin, amiodarone      Date             INR        Dose given previous day  Dose scheduled for today  4/17/2019            1       6 mg         4 mg        Notes:                   INR = 1, Dr Kaz Castillo starting Levaquin now, will give 4 mg today  Daily PT/INR while inpatient. Marcy Rose, Pharm. [facility]

## 2019-04-17 NOTE — FLOWSHEET NOTE
04/17/19 0815 04/17/19 0817 04/17/19 0818   Oxygen Therapy   SpO2 (!) 83 % (!) 87 % (!) 89 %   O2 Device None (Room air) Nasal cannula Nasal cannula   O2 Flow Rate (L/min)  --  3 L/min 3 L/min      04/17/19 0819   Oxygen Therapy   SpO2 92 %   O2 Device Nasal cannula   O2 Flow Rate (L/min) 3 L/min     Pt needed to be placed back on NC this AM. Pt has rhonchi throughout and has a very moist, frequent cough. Pts CXR this AM was essentially negative, though pt sounds bad. Pt reports that she does not feel well this AM. Audible wheezes can be heard without stethoscope. Pt up in chair again as she says it helps her breathe better. Pt had to sleep upright in the chair last night in order to breathe well enough for her to fall asleep. Will discuss this with pulmonology and RN will continue to monitor closely.      Electronically signed by Lynsey Wilcox RN on 4/17/2019 at 8:21 AM

## 2019-04-17 NOTE — PLAN OF CARE
Problem: Breathing Pattern - Ineffective:  Goal: Ability to achieve and maintain a regular respiratory rate will improve  Description  Ability to achieve and maintain a regular respiratory rate will improve  4/17/2019 0438 by Bishop Preston RN  Outcome: Met This Shift     Problem: OXYGENATION/RESPIRATORY FUNCTION  Goal: Patient will maintain patent airway  4/17/2019 0438 by Bishop Preston RN  Outcome: Met This Shift     Problem: SKIN INTEGRITY  Goal: Skin integrity is maintained or improved  4/17/2019 0438 by Bishop Preston RN  Outcome: Met This Shift

## 2019-04-18 LAB
ANION GAP SERPL CALCULATED.3IONS-SCNC: 11 MMOL/L (ref 9–17)
BUN BLDV-MCNC: 19 MG/DL (ref 8–23)
BUN/CREAT BLD: ABNORMAL (ref 9–20)
CALCIUM SERPL-MCNC: 8.7 MG/DL (ref 8.6–10.4)
CHLORIDE BLD-SCNC: 101 MMOL/L (ref 98–107)
CO2: 27 MMOL/L (ref 20–31)
CREAT SERPL-MCNC: 0.61 MG/DL (ref 0.5–0.9)
CULTURE: ABNORMAL
DIRECT EXAM: ABNORMAL
GFR AFRICAN AMERICAN: >60 ML/MIN
GFR NON-AFRICAN AMERICAN: >60 ML/MIN
GFR SERPL CREATININE-BSD FRML MDRD: ABNORMAL ML/MIN/{1.73_M2}
GFR SERPL CREATININE-BSD FRML MDRD: ABNORMAL ML/MIN/{1.73_M2}
GLUCOSE BLD-MCNC: 154 MG/DL (ref 70–99)
HCT VFR BLD CALC: 31 % (ref 36–46)
HEMOGLOBIN: 9.8 G/DL (ref 12–16)
INR BLD: 1.2
Lab: ABNORMAL
MAGNESIUM: 1.7 MG/DL (ref 1.6–2.6)
MCH RBC QN AUTO: 26.7 PG (ref 26–34)
MCHC RBC AUTO-ENTMCNC: 31.5 G/DL (ref 31–37)
MCV RBC AUTO: 84.8 FL (ref 80–100)
NRBC AUTOMATED: ABNORMAL PER 100 WBC
PDW BLD-RTO: 15 % (ref 11.5–14.9)
PLATELET # BLD: 202 K/UL (ref 150–450)
PMV BLD AUTO: 8.6 FL (ref 6–12)
POTASSIUM SERPL-SCNC: 3.4 MMOL/L (ref 3.7–5.3)
PROTHROMBIN TIME: 15.3 SEC (ref 11.8–14.6)
RBC # BLD: 3.65 M/UL (ref 4–5.2)
SODIUM BLD-SCNC: 139 MMOL/L (ref 135–144)
SPECIMEN DESCRIPTION: ABNORMAL
WBC # BLD: 5.8 K/UL (ref 3.5–11)

## 2019-04-18 PROCEDURE — 85027 COMPLETE CBC AUTOMATED: CPT

## 2019-04-18 PROCEDURE — 2580000003 HC RX 258: Performed by: NURSE PRACTITIONER

## 2019-04-18 PROCEDURE — 97110 THERAPEUTIC EXERCISES: CPT

## 2019-04-18 PROCEDURE — 94761 N-INVAS EAR/PLS OXIMETRY MLT: CPT

## 2019-04-18 PROCEDURE — 97535 SELF CARE MNGMENT TRAINING: CPT

## 2019-04-18 PROCEDURE — 6370000000 HC RX 637 (ALT 250 FOR IP): Performed by: INTERNAL MEDICINE

## 2019-04-18 PROCEDURE — 36415 COLL VENOUS BLD VENIPUNCTURE: CPT

## 2019-04-18 PROCEDURE — 97530 THERAPEUTIC ACTIVITIES: CPT

## 2019-04-18 PROCEDURE — 2060000000 HC ICU INTERMEDIATE R&B

## 2019-04-18 PROCEDURE — 80048 BASIC METABOLIC PNL TOTAL CA: CPT

## 2019-04-18 PROCEDURE — 85610 PROTHROMBIN TIME: CPT

## 2019-04-18 PROCEDURE — 6370000000 HC RX 637 (ALT 250 FOR IP): Performed by: NURSE PRACTITIONER

## 2019-04-18 PROCEDURE — 94640 AIRWAY INHALATION TREATMENT: CPT

## 2019-04-18 PROCEDURE — 6360000002 HC RX W HCPCS: Performed by: INTERNAL MEDICINE

## 2019-04-18 PROCEDURE — 99233 SBSQ HOSP IP/OBS HIGH 50: CPT | Performed by: INTERNAL MEDICINE

## 2019-04-18 PROCEDURE — 97116 GAIT TRAINING THERAPY: CPT

## 2019-04-18 PROCEDURE — 83735 ASSAY OF MAGNESIUM: CPT

## 2019-04-18 PROCEDURE — 2700000000 HC OXYGEN THERAPY PER DAY

## 2019-04-18 RX ORDER — WARFARIN SODIUM 3 MG/1
3 TABLET ORAL
Status: COMPLETED | OUTPATIENT
Start: 2019-04-18 | End: 2019-04-18

## 2019-04-18 RX ORDER — LEVOFLOXACIN 500 MG/1
250 TABLET, FILM COATED ORAL DAILY
Status: DISCONTINUED | OUTPATIENT
Start: 2019-04-18 | End: 2019-04-19 | Stop reason: HOSPADM

## 2019-04-18 RX ADMIN — LEVOFLOXACIN 250 MG: 500 TABLET, FILM COATED ORAL at 11:48

## 2019-04-18 RX ADMIN — FAMOTIDINE 20 MG: 20 TABLET ORAL at 08:17

## 2019-04-18 RX ADMIN — ENOXAPARIN SODIUM 70 MG: 80 INJECTION SUBCUTANEOUS at 22:02

## 2019-04-18 RX ADMIN — LATANOPROST 1 DROP: 50 SOLUTION OPHTHALMIC at 22:02

## 2019-04-18 RX ADMIN — Medication 10 ML: at 22:10

## 2019-04-18 RX ADMIN — AMIODARONE HYDROCHLORIDE 200 MG: 200 TABLET ORAL at 08:17

## 2019-04-18 RX ADMIN — FUROSEMIDE 40 MG: 40 TABLET ORAL at 08:17

## 2019-04-18 RX ADMIN — ENOXAPARIN SODIUM 70 MG: 80 INJECTION SUBCUTANEOUS at 08:19

## 2019-04-18 RX ADMIN — ALBUTEROL SULFATE 2.5 MG: 2.5 SOLUTION RESPIRATORY (INHALATION) at 07:18

## 2019-04-18 RX ADMIN — ROPINIROLE HYDROCHLORIDE 0.25 MG: 0.25 TABLET, FILM COATED ORAL at 22:02

## 2019-04-18 RX ADMIN — METHYLPREDNISOLONE SODIUM SUCCINATE 40 MG: 40 INJECTION, POWDER, FOR SOLUTION INTRAMUSCULAR; INTRAVENOUS at 05:33

## 2019-04-18 RX ADMIN — ATORVASTATIN CALCIUM 10 MG: 10 TABLET, FILM COATED ORAL at 08:18

## 2019-04-18 RX ADMIN — CALCIUM CARBONATE 600 MG (1,500 MG)-VITAMIN D3 400 UNIT TABLET 1 TABLET: at 08:19

## 2019-04-18 RX ADMIN — Medication 10 ML: at 08:19

## 2019-04-18 RX ADMIN — ALBUTEROL SULFATE 2.5 MG: 2.5 SOLUTION RESPIRATORY (INHALATION) at 14:46

## 2019-04-18 RX ADMIN — POTASSIUM CHLORIDE 40 MEQ: 1500 TABLET, EXTENDED RELEASE ORAL at 08:17

## 2019-04-18 RX ADMIN — ALBUTEROL SULFATE 2.5 MG: 2.5 SOLUTION RESPIRATORY (INHALATION) at 19:58

## 2019-04-18 RX ADMIN — GUAIFENESIN 600 MG: 600 TABLET, EXTENDED RELEASE ORAL at 22:03

## 2019-04-18 RX ADMIN — WARFARIN SODIUM 3 MG: 3 TABLET ORAL at 22:02

## 2019-04-18 RX ADMIN — ASPIRIN 81 MG: 81 TABLET, COATED ORAL at 08:18

## 2019-04-18 RX ADMIN — METOPROLOL TARTRATE 25 MG: 25 TABLET, FILM COATED ORAL at 08:17

## 2019-04-18 RX ADMIN — METOPROLOL TARTRATE 25 MG: 25 TABLET, FILM COATED ORAL at 22:05

## 2019-04-18 RX ADMIN — AMIODARONE HYDROCHLORIDE 200 MG: 200 TABLET ORAL at 22:03

## 2019-04-18 RX ADMIN — ALBUTEROL SULFATE 2.5 MG: 2.5 SOLUTION RESPIRATORY (INHALATION) at 10:37

## 2019-04-18 RX ADMIN — FAMOTIDINE 20 MG: 20 TABLET ORAL at 22:03

## 2019-04-18 RX ADMIN — ASPIRIN 81 MG: 81 TABLET, COATED ORAL at 22:03

## 2019-04-18 RX ADMIN — GUAIFENESIN 600 MG: 600 TABLET, EXTENDED RELEASE ORAL at 08:17

## 2019-04-18 NOTE — PLAN OF CARE
Problem: Pain:  Goal: Pain level will decrease  Description  Pain level will decrease  4/18/2019 1649 by Dominick Abrams RN  Outcome: Met This Shift  Note:   Pt medicated with pain medication prn. Assessed all pain characteristics including level, type, location, frequency, and onset. Non-pharmacologic interventions offered to pt as well. Pt states pain is tolerable at this time. Will continue to monitor       Problem: Breathing Pattern - Ineffective:  Goal: Ability to achieve and maintain a regular respiratory rate will improve  Description  Ability to achieve and maintain a regular respiratory rate will improve  4/18/2019 1649 by Dominick Abrams RN  Outcome: Ongoing  Note:   Patients respiratory status stable at this time. No signs or symptoms of distress noted. Respirations non-labored and regular. Nasal canula 02 in place as needed to maintain sp02>90% or per md order. Continuous SpO2 monitoring in place. Problem: OXYGENATION/RESPIRATORY FUNCTION  Goal: Patient will maintain patent airway  4/18/2019 1649 by Dominick Abrams RN  Outcome: Ongoing  Note:   Patients respiratory status stable at this time. No signs or symptoms of distress noted. Respirations non-labored and regular. Nasal canula 02 in place as needed to maintain sp02>90% or per md order. Continuous SpO2 monitoring in place. Problem: SKIN INTEGRITY  Goal: Skin integrity is maintained or improved  4/18/2019 1649 by Dominick Abrams RN  Outcome: Ongoing  Note:   Skin assessment complete. Pt turned and repositioned per self. Area kept free from moisture. Proper nourishment and fluids encouraged, as appropriate. Skin remains clean, dry, and intact. Will continue to monitor for additional needs and changes in skin breakdown.        Problem: Falls - Risk of:  Goal: Will remain free from falls  Description  Will remain free from falls  4/18/2019 1649 by Dominick Abrams RN  Outcome: Ongoing  Note:   The patient remained free from falls this shift, call light within reach, bed in locked and lowest position. Side rails up x2. Continue to monitor closely.

## 2019-04-18 NOTE — FLOWSHEET NOTE
impulsive and eager to get up and move, on room air and SpO2 = 95% post ambulation w/ no SOB, telesitter removed    Vital Signs  Pulse: 88(post-ambulation of ~200ft)  Heart Rate Source: Monitor  Patient Position: Sitting  Patient Currently in Pain: Denies        Oxygen Therapy  SpO2: 95 %  Pulse Oximeter Device Mode: Intermittent  Pulse Oximeter Device Location: Finger  O2 Device: None (Room air)  Patient Observation  Observations: Pt initially shakey during first ambulation w/ RW, but shakiness decreased and gait improved markedly by second ambulation       Bed Mobility:   Bed Mobility  Comment: pt in chair on arrival/left in chair at end of session    Transfers:  Sit to Stand: Stand by assistance;Contact guard assistance(first sit>stand CGA for safety, but SBA after pt demos)  Stand to sit: Stand by assistance     Stand Pivot Transfers: Stand by assistance(w/ RW)           Ambulation 1  Surface: level tile  Device: Rolling Walker  Assistance: Stand by assistance;Contact guard assistance(for the first 50ft CGA until pt demos stable gait then SBA)  Quality of Gait: pt slightly shakey and vc's to bring RW slighty closer for safety  Distance: 200ft  Comments: Pt on room air maintained SpO2 at 95% after ambulating, HR at 88BPM  Ambulation 2  Surface - 2: level tile  Device 2: Rolling Walker  Assistance 2: Stand by assistance  Quality of Gait 2: improved from first ambulation, no shaking, moderate MILLA, consistant pace and good control of RW   Distance: 300ft  Comments: notable improvement in fluidity of gait (more natural) with detection of obstacles and maneuverability through narrow space     Stairs/Curb  Stairs?: No                                                     Posture: Good  Sitting - Static: Good  Sitting - Dynamic: Good  Standing - Static: Good  Standing - Dynamic: Good  Comments: standing w/ RW     Other exercises?: Yes  Other exercises 1: dangle Edge of Chair with back and arms unsupported x15min  Other exercises 2: seated bilat LE therex 5-10reps  Other exercises 3: sit<>stand x2 each           Activity Tolerance: Patient Tolerated treatment well  Activity Tolerance: no SOB after 2 rounds of ambulation w/ RW  PT Equipment Recommendations  Equipment Needed: No       Assessment  Activity Tolerance: Patient Tolerated treatment well   Body structures, Functions, Activity limitations: Decreased functional mobility ; Decreased strength;Decreased endurance;Decreased safe awareness;Decreased balance  Specific instructions for Next Treatment: 4-17-19 HOME W/ ASSIST, gait w/ w walker 10' x 2 and 7' x 2 w/ SBA x 1 and O2 at 3 L  Prognosis: Good  Discharge Recommendations: Home with assist PRN     Type of devices: Call light within reach; Chair alarm in place;Gait belt;Left in chair;Sitter present;Nurse notified     Plan  Times per week: 5-7 treatments/ week  Times per day: (5-7 treatments/ week)  Current Treatment Recommendations: Strengthening, Functional Mobility Training, ROM, Transfer Training, Gait Training, Safety Education & Training, Balance Training, Endurance Training, Patient/Caregiver Education & Training    Patient Education  New Education Provided: Bolivar Messina and patient  Method: demonstration and explanation       Outcome: demonstrated understanding and needs reinforcement     Goals  Short term goals  Time Frame for Short term goals: 5-7 treatments/ week  Short term goal 1: pt to demonstrate independent bed mobility  Short term goal 2: pt to demonstrate MOD I transfers using w walker  Short term goal 3: pt to demonstrate MOD I gait using w walker 50-80' for household distances  Short term goal 4: pt to demonstrate good tecnique for LE strengthening and energy conservation techniques    PT Individual Minutes  Time In: 1985  Time Out: 1337  Minutes: 38    Electronically signed by ANTONIO Garcia// on 4/18/19 at [address] [place] [address]      The above documentation completed by Student Physical Therapist Assistant was provided under the direct line of sight by supervision. Documentation was reviewed and accepted by supervising Certified Clinical Instructor, Alok Camarillo. Mario Cheney PTA.

## 2019-04-18 NOTE — PROGRESS NOTES
History and Physical Service  [facility]            Date:   4/18/2019  Patient name:  Aung Avelar  MRN:   [id number]  Account:  [de-identified]  YOB: 1930  PCP:    Moise Dong MD  Code Status:    Full Code    Chief Complaint:     Chief Complaint   Patient presents with    Shortness of Breath         History Obtained From:     patient    History of Present Illness: The patient is a 80 y.o. Non-/non  female who presents HPI 80 y.o. female  presents with complaints of cough and shortness of breath. Symptoms progressively worsening over the last day. She reports phlegm productive of yellow sputum. She rates her breathing difficulties as severe in intensity, constant in duration, progressively worsening. She woke up this evening severely short of breath and so her family brought her to the emergency department. She reports a retrosternal chest discomfort, when asked to describe it she states \"it just doesn't feel right\", she also reports congestion and rhinorrhea. No lower extremity edema.      4/18/2019  SOB same  Echo noted  Clinically no CHF  Feeling better        Past Medical History:     Past Medical History:   Diagnosis Date    Arthritis     Atrial fibrillation ([address] 8/21/2016    CHF (congestive heart failure) (HCC)     Complete heart block ([address] 8/18/2016    Constipation     GERD (gastroesophageal reflux disease)     Glaucoma     mild, on eye drops    Hearing loss     bilateral hearing aids    Hiatal hernia     Hx of blood clots 07/2016    pulmonary emboli bilateral lungs after Pacemaker inserted    Hyperlipidemia     Osteopenia     Pacemaker 08/18/2016    Duo-chamber pacemaker in left upper chest;  DR. Audra Green    Restless leg syndrome     Right knee DJD     Wears glasses     Wears hearing aid in both ears         Past Surgical History:     Past Surgical History:   Procedure Laterality Date    CATARACT REMOVAL WITH IMPLANT Bilateral     Left 1/6/1998, right 8/2/1996    COLONOSCOPY      DILATION AND CURETTAGE OF UTERUS      DILATION AND CURETTAGE OF UTERUS  03/12/2019    DILATATION AND CURETTAGE, HYSTEROSCOPY, MYOSURE     DILATION AND CURETTAGE OF UTERUS N/A 3/12/2019    DILATATION AND CURETTAGE, HYSTEROSCOPY, MYOSURE performed by Debbie Mendez MD at [facility]  08/18/2016    connected to patient's I-phone    VT GI ENDOSCOPIC ULTRASOUND N/A 9/12/2018    ENDOSCOPIC ULTRASOUND, PATHOLOGY REQUESTED, PAT NOTIFIED performed by Anastacia Sanchez MD at [facility]      as a child   [address] Right 9/5/2017    KNEE TOTAL ARTHROPLASTY RIGHT WITH BIOMET AND GPS PRODUCT APPLICATION performed by Valerie Wesley MD at [place] [address]  06/29/2017    polyp removed from small intestine near stomach, benign. done at [address]  9/12/2018    EGD BIOPSY performed by Anastacia Sanchez MD at [facility]        Medications Prior to Admission:     Prior to Admission medications    Medication Sig Start Date End Date Taking?  Authorizing Provider   furosemide (LASIX) 20 MG tablet Take 40 mg by mouth daily as needed (swelling)   Yes Historical Provider, MD   Biotin 1 MG CAPS Take 1 mg by mouth daily   Yes Historical Provider, MD   amiodarone (CORDARONE) 200 MG tablet Take 200 mg by mouth daily   Yes Historical Provider, MD   ibuprofen (ADVIL;MOTRIN) 600 MG tablet Take 1 tablet by mouth every 6 hours as needed for Pain 3/12/19  Yes Mercy Avendano DO   potassium chloride (MICRO-K) 10 MEQ extended release capsule Take 10 mEq by mouth daily as needed (if furosemide has been taken)    Yes Historical Provider, MD   polyethylene glycol (MIRALAX) powder Take 17 g by mouth daily as needed   Yes Historical Provider, MD   warfarin (COUMADIN) 4 MG tablet Take 1 tablet by mouth daily Call for dose adjustment after labs/INR draws  Patient taking differently: Take by mouth See Admin Instructions Indications: (Currently on hold at this visit) 4 mg on Sunday and Wednesday. Then takes 1/2 tab (2 mg) on Monday, Tuesday, Thursday, Friday, and Saturday 9/17/16  Yes Erica Weiss DO   metoprolol tartrate (LOPRESSOR) 25 MG tablet Take 1 tablet by mouth 2 times daily 9/17/16  Yes Erica Weiss DO   rOPINIRole (REQUIP) 0.25 MG tablet Take 0.25 mg by mouth nightly  5/9/16  Yes Historical Provider, MD   Calcium Carb-Cholecalciferol (CALTRATE 600+D) 600-800 MG-UNIT TABS Take 1 tablet by mouth 2 times daily    Yes Historical Provider, MD   atorvastatin (LIPITOR) 10 MG tablet Take 10 mg by mouth daily  10/2/13  Yes Historical Provider, MD   latanoprost (XALATAN) 0.005 % ophthalmic solution Place 1 drop into both eyes nightly  11/14/13  Yes Historical Provider, MD   Acetaminophen (TYLENOL ARTHRITIS PAIN PO) Take 2 tablets by mouth every 6 hours as needed    Yes Historical Provider, MD   aspirin 81 MG EC tablet Take 81 mg by mouth 2 times daily. Yes Historical Provider, MD        Allergies:     Sulfa antibiotics    Social History:     Tobacco:    reports that she has never smoked. She has never used smokeless tobacco.  Alcohol:      reports that she does not drink alcohol. Drug Use:  reports that she does not use drugs. Family History:     Family History   Problem Relation Age of Onset    Diabetes Sister     Heart Attack Sister     Osteoporosis Sister     Heart Disease Mother     Dementia Mother     Tuberculosis Father        Review of Systems:     Positive and Negative as described in HPI. CONSTITUTIONAL:  negative for fevers, chills, sweats, fatigue, weight loss  HEENT:  negative for vision, hearing changes, runny nose, throat pain  RESPIRATORY:  + for shortness of breath, cough, congestion, wheezing. CARDIOVASCULAR:  negative for chest pain, palpitations.   GASTROINTESTINAL:  negative for nausea, vomiting, diarrhea, constipation, change in bowel habits, abdominal pain   GENITOURINARY:  negative for difficulty of urination, burning with urination, frequency   INTEGUMENT:  negative for rash, skin lesions, easy bruising   HEMATOLOGIC/LYMPHATIC:  negative for swelling/edema   ALLERGIC/IMMUNOLOGIC:  negative for urticaria , itching  ENDOCRINE:  negative increase in drinking, increase in urination, hot or cold intolerance  MUSCULOSKELETAL:  negative joint pains, muscle aches, swelling of joints  NEUROLOGICAL:  negative for headaches, dizziness, lightheadedness, numbness, pain, tingling extremities  BEHAVIOR/PSYCH:  negative for depression, anxiety    Physical Exam:   /82   Pulse 80   Temp 97.5 °F (36.4 °C) (Oral)   Resp 18   Ht 5' 2\" (1.575 m)   Wt 159 lb 6.3 oz (72.3 kg)   SpO2 96%   BMI 29.15 kg/m²   No results for input(s): POCGLU in the last 72 hours. General Appearance:  alert, well appearing, and in no acute distress  Mental status: oriented to person, place, and time with normal affect  Head:  normocephalic, atraumatic. Eye: no icterus, redness, pupils equal and reactive, extraocular eye movements intact, conjunctiva clear  Mouth: mucous membranes moist  Neck: supple, no carotid bruits, thyroid not palpable  Lungs: Decreased air entry, no wheezing  Cardiovascular: normal rate, regular rhythm, no murmur, gallop, rub.   Abdomen: Soft, nontender, nondistended, normal bowel sounds, no hepatomegaly or splenomegaly  Neurologic: There are no new focal motor or sensory deficits, normal muscle tone and bulk, no abnormal sensation, normal speech, cranial nerves II through XII grossly intact  Skin: No gross lesions, rashes, bruising or bleeding on exposed skin area  Extremities:  peripheral pulses palpable, no pedal edema or calf pain with palpation      Investigations:      Laboratory Testing:  Recent Results (from the past 24 hour(s))   Basic Metabolic Panel w/ Reflex to MG    Collection Time: 04/18/19  5:12 AM   Result Value Ref Range    Glucose 154 (H) 70 - 99 mg/dL    BUN 19 8 - 23 mg/dL    CREATININE 0.61 0.50 - 0.90 mg/dL    Bun/Cre Ratio NOT REPORTED 9 - 20    Calcium 8.7 8.6 - 10.4 mg/dL    Sodium 139 135 - 144 mmol/L    Potassium 3.4 (L) 3.7 - 5.3 mmol/L    Chloride 101 98 - 107 mmol/L    CO2 27 20 - 31 mmol/L    Anion Gap 11 9 - 17 mmol/L    GFR Non-African American >60 >60 mL/min    GFR African American >60 >60 mL/min    GFR Comment          GFR Staging NOT REPORTED    CBC    Collection Time: 04/18/19  5:12 AM   Result Value Ref Range    WBC 5.8 3.5 - 11.0 k/uL    RBC 3.65 (L) 4.0 - 5.2 m/uL    Hemoglobin 9.8 (L) 12.0 - 16.0 g/dL    Hematocrit 31.0 (L) 36 - 46 %    MCV 84.8 80 - 100 fL    MCH 26.7 26 - 34 pg    MCHC 31.5 31 - 37 g/dL    RDW 15.0 (H) 11.5 - 14.9 %    Platelets 671 913 - 769 k/uL    MPV 8.6 6.0 - 12.0 fL    NRBC Automated NOT REPORTED per 100 WBC   PROTIME-INR    Collection Time: 04/18/19  5:12 AM   Result Value Ref Range    Protime 15.3 (H) 11.8 - 14.6 sec    INR 1.2    Magnesium    Collection Time: 04/18/19  5:12 AM   Result Value Ref Range    Magnesium 1.7 1.6 - 2.6 mg/dL       Recent Labs     04/18/19  0512  04/16/19  0444 03/26/19  1920   HGB 9.8*   < > 11.0* 10.5*   HCT 31.0*   < > 33.9* 32.2*   WBC 5.8   < > 8.4 4.5   MCV 84.8   < > 85.1 87.6      < > 144 143   K 3.4*   < > 3.6* 3.7      < > 105 105   CO2 27   < > 26 25   BUN 19   < > 15 16   CREATININE 0.61   < > 0.53 0.70   GLUCOSE 154*   < > 144* 120*   INR 1.2   < >  --  1.7   PROTIME 15.3*   < >  --  19.8*   APTT  --   --   --  35.2   AST  --   --  25  --    ALT  --   --  21  --    LABALBU  --   --  4.2  --     < > = values in this interval not displayed.        Hematology:  Recent Labs     04/16/19  0444 04/16/19  1205 04/17/19  0521 04/18/19  0512   WBC 8.4  --  8.4 5.8   RBC 3.98*  --  3.82* 3.65*   HGB 11.0*  --  10.3* 9.8*   HCT 33.9*  --  32.5* 31.0*   MCV 85.1  --  85.1 84.8   MCH 27.6  --  27.0 26.7   MCHC 32.4  --  31.7 31.5 Chronic appearing interstitial findings in the lungs without acute airspace disease identified. No pneumothorax or effusion. No acute osseous abnormality identified. No acute airspace disease identified. Ct Chest Pulmonary Embolism W Contrast    Result Date: 4/16/2019  EXAMINATION: CTA OF THE CHEST 4/16/2019 5:24 am TECHNIQUE: CTA of the chest was performed after the administration of intravenous contrast.  Multiplanar reformatted images are provided for review. MIP images are provided for review. Dose modulation, iterative reconstruction, and/or weight based adjustment of the mA/kV was utilized to reduce the radiation dose to as low as reasonably achievable. COMPARISON: CT chest 09/11/2016 HISTORY: ORDERING SYSTEM PROVIDED HISTORY: sob, hypoxia TECHNOLOGIST PROVIDED HISTORY: Ordering Physician Provided Reason for Exam: SOB and cough for past couple days Acuity: Acute Type of Exam: Unknown Relevant Medical/Surgical History: Pt has a hx of PE, surgeries to area of interest include pacemaker placement FINDINGS: Pulmonary Arteries: Pulmonary arteries are adequately opacified for evaluation, though evaluation of the subsegmental pulmonary arteries is degraded by respiratory motion. No evidence of intraluminal filling defect to suggest pulmonary embolism. Main pulmonary artery is normal in caliber. Mediastinum: No evidence of mediastinal lymphadenopathy. The heart and pericardium demonstrate no acute abnormality. There is no acute abnormality of the thoracic aorta. Lungs/pleura: The lungs are without acute process. A 5 mm noncalcified pleural-based nodule in the right lower lobe (series 4, image 97) has not significantly changed since 09/11/2016. No focal consolidation or pulmonary edema. No evidence of pleural effusion or pneumothorax. Upper Abdomen: Limited images of the upper abdomen with redemonstration of a left renal cyst. Soft Tissues/Bones: No acute bone or soft tissue abnormality.      No evidence of pulmonary embolism or acute pulmonary abnormality. A 5 mm noncalcified nodule in the right lower lobe has not significantly changed in size since 09/11/2016. Given stability, nodule is favored to be benign. No dedicated imaging follow-up is required. RECOMMENDATIONS: Fleischner Society guidelines for follow-up and management of incidentally detected pulmonary nodules: Single Solid Nodule: Nodule size less than 6 mm In a low-risk patient, no routine follow-up. In a high-risk patient, optional CT at 12 months (completed). -Low risk patients include individuals with minimal or absent history of smoking and other known risk factors. - High risk patients include individuals with a history or smoking or known risk factors. Radiology 2017 http://pubs. rsna.org/doi/full/10.1148/radiol. 9337437437     Vl Dup Lower Extremity Venous Left    Result Date: 3/26/2019    [facility]  Vascular Lower Extremities DVT Study Procedure   Patient Name   HORACE BERTRAND Date of Study           03/26/2019   Date of Birth  12/12/1930  Gender                  Female   Age            80 year(s)  Race                       Room Number    02   Corporate ID # [id number]   Patient Acct # [de-identified]   MR #           [id number]      Sonographer             Phillip Diaz   Accession #    [id number]   Interpreting Physician  Zenon Melissa   Referring                  Referring Physician     Wallace Sam DO  Nurse  Practitioner  Procedure Type of Study:   Veins: Lower Extremities DVT Study, Venous Scan Lower Left. Indications for Study:Swelling. Patient Status:Out Patient. Technical Quality:Adequate visualization. Conclusions   Summary   No evidence of superficial or deep venous thrombosis in the left lower  extremity and left common femoral vein. Incidental note is made ofmass on the left lower thigh,medially.    Signature   ----------------------------------------------------------------  Electronically signed by !Yes            !None      ! +------------------------------------+----------+---------------+----------+ ! Peroneal                            !Yes       ! Yes            ! None      ! +------------------------------------+----------+---------------+----------+ ! Gastroc                             ! Yes       ! Yes            ! None      ! +------------------------------------+----------+---------------+----------+ ! GSV Thigh                           ! Yes       ! Yes            ! None      ! +------------------------------------+----------+---------------+----------+ ! GSV Knee                            ! Yes       ! Yes            ! None      ! +------------------------------------+----------+---------------+----------+ ! GSV Ankle                           ! Yes       ! Yes            ! None      ! +------------------------------------+----------+---------------+----------+ ! SSV                                 ! Yes       ! Yes            ! None      ! +------------------------------------+----------+---------------+----------+ Left Doppler Measurements +---------------------------+------+------+--------------------------------+ ! Location                   ! Signal!Reflux! Reflux (msec)                   ! +---------------------------+------+------+--------------------------------+ ! Common Femoral             !Phasic!      !                                ! +---------------------------+------+------+--------------------------------+ ! Prox Femoral               !Phasic!      !                                ! +---------------------------+------+------+--------------------------------+ ! Popliteal                  !Phasic!      !                                ! +---------------------------+------+------+--------------------------------+           Impressions :      1. Principal Problem:    Acute respiratory failure with hypoxia ([address]  Resolved Problems:    * No resolved hospital problems.  *  Acute bronchitis  Hypoxic respiratory failure  Non smoker  Poor air entry  ? Pulm fibrosis  CT scan negative for new PE  No infiltrate  PCO2 47, ph normal, paO2 72  Mild respiratory acidosis, possibly related to bronchitis  , will repeat CXR tomorrow morning  Repeat echo  20mg IV lasix once  Stop IVF  CBC, BMP    2.  has a past medical history of Arthritis, Atrial fibrillation ([facility] (8/21/2016), CHF (congestive heart failure) ([address] [address] Complete heart block ([address] (8/18/2016), Constipation, GERD (gastroesophageal reflux disease), Glaucoma, Hearing loss, Hiatal hernia, blood clots (07/2016), Hyperlipidemia, Osteopenia, Pacemaker (08/18/2016), Restless leg syndrome, Right knee DJD, Wears glasses, and Wears hearing aid in both ears. Plans:     1.  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  Most likely cause of her dyspnea presenting as cardiac asthma  , no A fib  Consult cardiology  Change antibiotic to oral  D/C steroids  Continue aerosols      Current Facility-Administered Medications   Medication Dose Route Frequency Provider Last Rate Last Dose    warfarin (COUMADIN) tablet 3 mg  3 mg Oral Once Julius Ch MD        benzonatate (TESSALON) capsule 200 mg  200 mg Oral TID PRN ALEXSANDRA Medrano - CNP   200 mg at 04/17/19 0245    potassium chloride (KLOR-CON M) extended release tablet 40 mEq  40 mEq Oral PRN Julius Ch MD   40 mEq at 04/18/19 7291    Or    potassium bicarb-citric acid (EFFER-K) effervescent tablet 40 mEq  40 mEq Oral PRN Julius Ch MD        Or   [facility] potassium chloride 10 mEq/100 mL IVPB (Peripheral Line)  10 mEq Intravenous PRN Julius Ch MD        levofloxacin (LEVAQUIN) 500 MG/100ML infusion 500 mg  500 mg Intravenous Q24H Julius Ch MD   Stopped at 04/17/19 1346    methylPREDNISolone sodium (SOLU-MEDROL) injection 40 mg  40 mg Intravenous Q6H Oscar MEJIA MD Berta   40 mg at 04/18/19 0533    enoxaparin (LOVENOX) injection 70 mg  1 mg/kg Subcutaneous BID Elvira Hampton MD   70 mg at 04/18/19 0819    perflutren lipid microspheres (DEFINITY) injection 2.2 mg  2 mL Intravenous ONCE PRN Stephanie SAUCEDO Trev,         albuterol (PROVENTIL) nebulizer solution 2.5 mg  2.5 mg Nebulization Q4H WA Ellen Quiroz MD   2.5 mg at 04/18/19 1037    ipratropium-albuterol (DUONEB) nebulizer solution 1 ampule  1 ampule Inhalation PRN Telma Godinez MD   1 ampule at 04/16/19 0554    amiodarone (CORDARONE) tablet 200 mg  200 mg Oral BID ALEXSANDRA Khoury - CNP   200 mg at 04/18/19 0674    aspirin EC tablet 81 mg  81 mg Oral BID Gwyn Griffiths APRN - CNP   81 mg at 04/18/19 0818    atorvastatin (LIPITOR) tablet 10 mg  10 mg Oral Daily ALEXSANDRA Khoury - CNP   10 mg at 04/18/19 0818    calcium carb-cholecalciferol 600-400 MG-UNIT per tab 1 tablet  1 tablet Oral BID ALEXSANDRA Khoury - CNP   1 tablet at 04/18/19 0819    latanoprost (XALATAN) 0.005 % ophthalmic solution 1 drop  1 drop Both Eyes Nightly ALEXSANDRA Khoury - CNP   1 drop at 04/17/19 2039    metoprolol tartrate (LOPRESSOR) tablet 25 mg  25 mg Oral BID ALEXSANDRA Khoury - CNP   25 mg at 04/18/19 6002    polyethylene glycol (GLYCOLAX) packet 17 g  17 g Oral Daily PRN Gwyn Griffiths APRN - CNP        rOPINIRole (REQUIP) tablet 0.25 mg  0.25 mg Oral Nightly ALEXSANDRA Khoury - CNP   0.25 mg at 04/17/19 2039    sennosides-docusate sodium (SENOKOT-S) 8.6-50 MG tablet 2 tablet  2 tablet Oral Daily PRN Gwyn Griffiths APRN - CNP        furosemide (LASIX) tablet 40 mg  40 mg Oral Daily Gwyn Griffiths APRN - CNP   40 mg at 04/18/19 0418    sodium chloride flush 0.9 % injection 10 mL  10 mL Intravenous 2 times per day ALEXSANDRA Khoury CNP   10 mL at 04/18/19 2071    sodium chloride flush 0.9 % injection 10 mL  10 mL Intravenous PRN ALEXSANDRA Khoury CNP        magnesium hydroxide (MILK OF MAGNESIA) 400 MG/5ML suspension 30 mL  30 mL Oral Daily PRN ALEXSANDRA Bhandari CNP        ondansetron [facility]) injection 4 mg  4 mg Intravenous Q6H PRN ALEXSANDRA Bhandari CNP        famotidine (PEPCID) tablet 20 mg  20 mg Oral BID ALEXSANDRA Bhandari - CNP   20 mg at 04/18/19 0817    albuterol (PROVENTIL) nebulizer solution 2.5 mg  2.5 mg Nebulization Q2H PRN ALEXSANDRA Bhandari CNP        acetaminophen (TYLENOL) tablet 650 mg  650 mg Oral Q4H PRN ALEXSANDRA Bhandari CNP        guaiFENesin [facility]) extended release tablet 600 mg  600 mg Oral BID ALEXSANDRA Bhandari CNP   600 mg at 04/18/19 8245    LORazepam (ATIVAN) tablet 0.5 mg  0.5 mg Oral Once Lake Diaz MD        warfarin (COUMADIN) daily dosing (placeholder)   Other RX Michael Taylor MD  4/18/2019  10:45 AM

## 2019-04-18 NOTE — PROGRESS NOTES
[address] [facility]    INPATIENT OCCUPATIONAL THERAPY  PROGRESS NOTE  Date: 2019  Patient Name: José Luis Nugent      Room: 6/2116-01  MRN: [id number]    : 1930  (80 y.o.) Gender: female     Discharge Recommendations:  Home with assist PRN       Referring Practitioner: Dr. Karina Bach  Diagnosis: Acute respiratory failure with hypoxia  General  Patient assessed for rehabilitation services?: Yes  Family / Caregiver Present: Yes  Referring Practitioner: Dr. Karina Bach  Diagnosis: Acute respiratory failure with hypoxia    Restrictions  Restrictions/Precautions: General Precautions, Fall Risk, Up as Tolerated  Implants present? : Pacemaker, Metal implants(right TKA 2 years ago)  Other position/activity restrictions: telesitter in place  Required Braces or Orthoses?: No      Subjective  Subjective: Pt reports becomes SOB walking up her drive way. Comments: Pt pleasant; son present during treatment. Son supportive and encouraging, V he will encourage and A pt through pursed lip breathing exercises. Patient Currently in Pain: Denies  Overall Orientation Status: Within Functional Limits          Objective        Balance  Standing Balance: Supervision  Standing Balance  Time: 1-2min  Activity: to/from bathroom  Comment: c RW; minimal unsteadiness noted, slow pace  Functional Mobility  Functional - Mobility Device: Rolling Walker  Activity: To/from bathroom; Other  Assist Level: Supervision  Functional Mobility Comments: pt demo'd functional mobility to/from bathroom and within room, Pt slightly unsteady throughout. Pt demo'd increased SOB after activity.  utilized pursed lip breathing tech c f return   ADL  Feeding: Independent  Grooming: Setup  UE Bathing: Setup  LE Bathing: Setup  UE Dressing: Setup  LE Dressing: Setup  Toileting: Supervision  Additional Comments: LHS sponge provided for EC/WS purpose with G insight noted from patient; pt additionall agreeable to utilize shower chair at home for EC/WS purpose place  Plan  Times per week: 5-6x  Times per day: Daily  Current Treatment Recommendations: Strengthening, Balance Training, Endurance Training, Safety Education & Training, Patient/Caregiver Education & Training, Self-Care / ADL      Goals  Short term goals  Time Frame for Short term goals: by d/c pt will  Short term goal 1: demo ADLs Mod I, using AE as needed  Short term goal 2: demo pursed lip breathing technique independently as needed to control SOB.   Short term goal 3: demo all functional transfers Mod I, using least restrictive device  Short term goal 4: demo safety awareness during all functional mobility/ADL tasks  Short term goal 5: increase activity tolerance to 20+ minutes to facilitate participation in ADLs/functional mobility     OT Individual Minutes  Time In: 1308  Time Out: 1332  Minutes: 24      Electronically signed by ZENOBIA Guerrero on 4/18/19 at 3:23 PM

## 2019-04-18 NOTE — PROGRESS NOTES
Pharmacy Note  Warfarin Consult follow-up      Recent Labs     04/16/19  1205 04/17/19  0521 04/18/19  0512   INR 1.0 1.0 1.2     Recent Labs     04/16/19  0444 04/17/19  0521 04/18/19  0512   HGB 11.0* 10.3* 9.8*   HCT 33.9* 32.5* 31.0*    222 202       Significant Drug-Drug Interactions:  New warfarin drug-drug interactions: none  Discontinued drug-drug interactions: none  Current warfarin drug-drug interactions: amiodarone, atorvastatin, levofloxacin      Date             INR        Dose given previous day  Dose scheduled for today  4/18/2019            1.2      4 mg          3 mg        Notes:                   INR = 1.2, will give warfarin 3 mg today. Daily PT/INR while inpatient. Joe Rojas. [facility]

## 2019-04-18 NOTE — PLAN OF CARE
Problem: Breathing Pattern - Ineffective:  Goal: Ability to achieve and maintain a regular respiratory rate will improve  Description  Ability to achieve and maintain a regular respiratory rate will improve  4/18/2019 0428 by Myrtle Quiñonez RN  Outcome: Met This Shift     Problem: OXYGENATION/RESPIRATORY FUNCTION  Goal: Patient will maintain patent airway  Outcome: Met This Shift     Problem: SKIN INTEGRITY  Goal: Skin integrity is maintained or improved  4/18/2019 0428 by Myrtle Quiñonez RN  Outcome: Met This Shift     Problem: Falls - Risk of:  Goal: Will remain free from falls  Description  Will remain free from falls  4/18/2019 0428 by Myrtle Quiñonez RN  Outcome: Met This Shift     Problem: Pain:  Goal: Pain level will decrease  Description  Pain level will decrease  4/18/2019 0428 by Myrtle Quiñonez RN  Outcome: Met This Shift

## 2019-04-18 NOTE — PROGRESS NOTES
Pulmonary Progress Note  [facility]      Patient - Alfie Chase,  Age - 80 y.o.    - 1930      Room Number - 2116/2116-01   N -  [id number]   [facility]  Date of Admission -  2019  3:43 AM    Consulting Tristan Obrien MD  Primary Care Physician - Avery Teran MD     SUBJECTIVE   Remains in fair condition    OBJECTIVE   VITALS    height is 5' 2\" (1.575 m) and weight is 159 lb 6.3 oz (72.3 kg). Her oral temperature is 98.1 °F (36.7 °C). Her blood pressure is 110/52 (abnormal) and her pulse is 81. Her respiration is 18 and oxygen saturation is 94%. Body mass index is 29.15 kg/m². Temperature Range: Temp: 98.1 °F (36.7 °C) Temp  Av.8 °F (36.6 °C)  Min: 97.3 °F (36.3 °C)  Max: 98.1 °F (36.7 °C)  BP Range:  Systolic (92OIC), SRV:246 , Min:99 , IUP:496     Diastolic (31QCZ), SZU:09, Min:52, Max:82    Pulse Range: Pulse  Av.8  Min: 70  Max: 81  Respiration Range: Resp  Av.1  Min: 16  Max: 20  Current Pulse Ox[de-identified]  SpO2: 94 %  24HR Pulse Ox Range:  SpO2  Av.9 %  Min: 94 %  Max: 100 %  Oxygen Amount and Delivery: O2 Flow Rate (L/min): 3 L/min    Wt Readings from Last 3 Encounters:   19 159 lb 6.3 oz (72.3 kg)   19 160 lb (72.6 kg)   19 161 lb 9.6 oz (73.3 kg)       I/O (24 Hours)    Intake/Output Summary (Last 24 hours) at 2019 1533  Last data filed at 2019 0825  Gross per 24 hour   Intake 480 ml   Output 1050 ml   Net -570 ml       EXAM     General Appearance  Awake, alert, oriented, in no acute distress  HEENT - normocephalic, atraumatic. Neck - Supple,  trachea midline   Lungs - no distress  Heart Exam:PMI normal. No lifts, heaves,  No murmurs, clicks, gallops, or rubs  Abdomen Exam: Abdomen soft, non-tender.    Extremity Exam: no edema    MEDS      warfarin  3 mg Oral Once    levofloxacin  250 mg Oral Daily    enoxaparin  1 mg/kg Subcutaneous BID    albuterol  2.5 mg Nebulization heart block (HCC)    Syncope and collapse    Hyperlipidemia    Contusion of scalp    Chest pain in adult    Pericardial effusion    Paroxysmal atrial fibrillation (HCC)    Sinus arrest    Knee effusion, right    Shortness of breath    Pulmonary embolism with acute cor pulmonale (HCC)    Pleural effusion    Sick sinus syndrome (HCC)    Hemorrhagic pericardial effusion    Status post total right knee replacement    Subtherapeutic international normalized ratio (INR)    Acute congestive heart failure (HCC)    Thickened endometrium    Acute respiratory failure with hypoxia (HCC)     Bronchospasm from bronchitis  Agree with stopping steroids--- not in bronchosapsm  On PO Levaquin  On Proventil neb treatments    Electronically signed by Jerica Elias MD on 4/18/2019 at 3:33 PM

## 2019-04-19 VITALS
WEIGHT: 159.39 LBS | DIASTOLIC BLOOD PRESSURE: 59 MMHG | HEART RATE: 65 BPM | RESPIRATION RATE: 16 BRPM | BODY MASS INDEX: 29.33 KG/M2 | TEMPERATURE: 97.6 F | OXYGEN SATURATION: 93 % | SYSTOLIC BLOOD PRESSURE: 129 MMHG | HEIGHT: 62 IN

## 2019-04-19 LAB
ANION GAP SERPL CALCULATED.3IONS-SCNC: 12 MMOL/L
BUN BLDV-MCNC: 28 MG/DL (ref 8–23)
BUN/CREAT BLD: ABNORMAL (ref 9–20)
CALCIUM SERPL-MCNC: 8.6 MG/DL (ref 8.6–10.4)
CHLORIDE BLD-SCNC: 102 MMOL/L (ref 98–107)
CO2: 26 MMOL/L (ref 20–31)
CREAT SERPL-MCNC: 0.99 MG/DL (ref 0.5–0.9)
GFR AFRICAN AMERICAN: >60 ML/MIN
GFR NON-AFRICAN AMERICAN: 53 ML/MIN
GFR SERPL CREATININE-BSD FRML MDRD: ABNORMAL ML/MIN/{1.73_M2}
GFR SERPL CREATININE-BSD FRML MDRD: ABNORMAL ML/MIN/{1.73_M2}
GLUCOSE BLD-MCNC: 110 MG/DL (ref 70–99)
HCT VFR BLD CALC: 30.3 % (ref 36–46)
HEMOGLOBIN: 9.9 G/DL (ref 12–16)
INR BLD: 1.3
MCH RBC QN AUTO: 27.5 PG (ref 26–34)
MCHC RBC AUTO-ENTMCNC: 32.7 G/DL (ref 31–37)
MCV RBC AUTO: 84 FL (ref 80–100)
NRBC AUTOMATED: ABNORMAL PER 100 WBC
PDW BLD-RTO: 14.8 % (ref 11.5–14.9)
PLATELET # BLD: 231 K/UL (ref 150–450)
PMV BLD AUTO: 8.4 FL (ref 6–12)
POTASSIUM SERPL-SCNC: 4.2 MMOL/L (ref 3.7–5.3)
PROTHROMBIN TIME: 16.4 SEC (ref 11.8–14.6)
RBC # BLD: 3.61 M/UL (ref 4–5.2)
SODIUM BLD-SCNC: 140 MMOL/L (ref 135–144)
WBC # BLD: 6.8 K/UL (ref 3.5–11)

## 2019-04-19 PROCEDURE — 85610 PROTHROMBIN TIME: CPT

## 2019-04-19 PROCEDURE — 85027 COMPLETE CBC AUTOMATED: CPT

## 2019-04-19 PROCEDURE — 6360000002 HC RX W HCPCS: Performed by: INTERNAL MEDICINE

## 2019-04-19 PROCEDURE — 6370000000 HC RX 637 (ALT 250 FOR IP): Performed by: NURSE PRACTITIONER

## 2019-04-19 PROCEDURE — 36415 COLL VENOUS BLD VENIPUNCTURE: CPT

## 2019-04-19 PROCEDURE — 94761 N-INVAS EAR/PLS OXIMETRY MLT: CPT

## 2019-04-19 PROCEDURE — 6370000000 HC RX 637 (ALT 250 FOR IP): Performed by: INTERNAL MEDICINE

## 2019-04-19 PROCEDURE — 99239 HOSP IP/OBS DSCHRG MGMT >30: CPT | Performed by: INTERNAL MEDICINE

## 2019-04-19 PROCEDURE — 80048 BASIC METABOLIC PNL TOTAL CA: CPT

## 2019-04-19 PROCEDURE — 94640 AIRWAY INHALATION TREATMENT: CPT

## 2019-04-19 PROCEDURE — 2580000003 HC RX 258: Performed by: NURSE PRACTITIONER

## 2019-04-19 RX ORDER — GUAIFENESIN 600 MG/1
600 TABLET, EXTENDED RELEASE ORAL 2 TIMES DAILY
Qty: 60 TABLET | Refills: 0 | Status: SHIPPED | OUTPATIENT
Start: 2019-04-19 | End: 2019-04-19

## 2019-04-19 RX ORDER — GUAIFENESIN 600 MG/1
600 TABLET, EXTENDED RELEASE ORAL 2 TIMES DAILY
Qty: 60 TABLET | Refills: 0 | Status: SHIPPED | OUTPATIENT
Start: 2019-04-19

## 2019-04-19 RX ORDER — WARFARIN SODIUM 5 MG/1
5 TABLET ORAL
Status: DISCONTINUED | OUTPATIENT
Start: 2019-04-19 | End: 2019-04-19 | Stop reason: HOSPADM

## 2019-04-19 RX ORDER — BENZONATATE 200 MG/1
200 CAPSULE ORAL 3 TIMES DAILY PRN
Qty: 100 CAPSULE | Refills: 0 | Status: SHIPPED | OUTPATIENT
Start: 2019-04-19 | End: 2019-04-26

## 2019-04-19 RX ORDER — AMIODARONE HYDROCHLORIDE 200 MG/1
200 TABLET ORAL 2 TIMES DAILY
Qty: 30 TABLET | Refills: 3 | Status: SHIPPED | OUTPATIENT
Start: 2019-04-19 | End: 2019-05-22

## 2019-04-19 RX ORDER — LEVOFLOXACIN 250 MG/1
250 TABLET ORAL DAILY
Qty: 10 TABLET | Refills: 0 | Status: SHIPPED | OUTPATIENT
Start: 2019-04-20 | End: 2019-04-30

## 2019-04-19 RX ORDER — SENNA AND DOCUSATE SODIUM 50; 8.6 MG/1; MG/1
2 TABLET, FILM COATED ORAL DAILY
Qty: 60 TABLET | Refills: 0 | Status: SHIPPED | OUTPATIENT
Start: 2019-04-19 | End: 2019-10-04

## 2019-04-19 RX ADMIN — ENOXAPARIN SODIUM 70 MG: 80 INJECTION SUBCUTANEOUS at 10:16

## 2019-04-19 RX ADMIN — METOPROLOL TARTRATE 25 MG: 25 TABLET, FILM COATED ORAL at 07:57

## 2019-04-19 RX ADMIN — AMIODARONE HYDROCHLORIDE 200 MG: 200 TABLET ORAL at 07:57

## 2019-04-19 RX ADMIN — Medication 1 TABLET: at 10:23

## 2019-04-19 RX ADMIN — ALBUTEROL SULFATE 2.5 MG: 2.5 SOLUTION RESPIRATORY (INHALATION) at 07:36

## 2019-04-19 RX ADMIN — ATORVASTATIN CALCIUM 10 MG: 10 TABLET, FILM COATED ORAL at 07:55

## 2019-04-19 RX ADMIN — BENZONATATE 200 MG: 100 CAPSULE ORAL at 07:55

## 2019-04-19 RX ADMIN — LEVOFLOXACIN 250 MG: 500 TABLET, FILM COATED ORAL at 07:56

## 2019-04-19 RX ADMIN — GUAIFENESIN 600 MG: 600 TABLET, EXTENDED RELEASE ORAL at 07:56

## 2019-04-19 RX ADMIN — FAMOTIDINE 20 MG: 20 TABLET ORAL at 07:57

## 2019-04-19 RX ADMIN — ALBUTEROL SULFATE 2.5 MG: 2.5 SOLUTION RESPIRATORY (INHALATION) at 10:59

## 2019-04-19 RX ADMIN — ALBUTEROL SULFATE 2.5 MG: 2.5 SOLUTION RESPIRATORY (INHALATION) at 14:54

## 2019-04-19 RX ADMIN — Medication 10 ML: at 07:59

## 2019-04-19 NOTE — DISCHARGE INSTR - COC
Continuity of Care Form    Patient Name: Parisa Coles   :  1930  MRN:  [id number]    Admit date:  2019  Discharge date:  2019    Code Status Order: Full Code   Advance Directives:   [facility] Documentation     Date/Time Healthcare Directive Type of Healthcare Directive Copy in [address] [facility] [address] Agent's Name Healthcare Agent's Phone Number    19 1300  Yes, patient has an advance directive for healthcare treatment  --  --  Healthcare power of   Lora   [phone]          Admitting Physician:  Cecilia Lamas MD  PCP: Aj Shelton MD    Discharging Nurse: Karen Cruz Unit/Room#: 2116/2116-01  Discharging Unit Phone Number: [phone]    Emergency Contact:   Extended Emergency Contact Information  Primary Emergency Contact: [address] Phone: [phone]  Work Phone: [phone]  Mobile Phone: [phone]  Relation: Child  Secondary Emergency Contact: Kajal Lainez  Address: [address] Phone: [phone]  Mobile Phone: [phone]  Relation: Child    Past Surgical History:  Past Surgical History:   Procedure Laterality Date    CATARACT REMOVAL WITH IMPLANT Bilateral     Left 1998, right 1996    COLONOSCOPY      DILATION AND CURETTAGE OF UTERUS      DILATION AND CURETTAGE OF UTERUS  2019    DILATATION AND CURETTAGE, HYSTEROSCOPY, MYOSURE     DILATION AND CURETTAGE OF UTERUS N/A 3/12/2019    DILATATION AND CURETTAGE, HYSTEROSCOPY, MYOSURE performed by Oleg Serrano MD at [address] [place] [address]  2016    connected to patient's I-phone    DE GI ENDOSCOPIC ULTRASOUND N/A 2018    ENDOSCOPIC ULTRASOUND, PATHOLOGY REQUESTED, PAT NOTIFIED performed by Damaso Lundy MD at [facility]      as a child    TOTAL KNEE ARTHROPLASTY Right 2017 KNEE TOTAL ARTHROPLASTY RIGHT WITH BIOMET AND GPS PRODUCT APPLICATION performed by Rdaha Arteaga MD at [facility]  06/29/2017    polyp removed from small intestine near stomach, benign. done at [address]  9/12/2018    EGD BIOPSY performed by Cornel Suazo MD at [facility]       Immunization History: There is no immunization history for the selected administration types on file for this patient. Active Problems:  Patient Active Problem List   Diagnosis Code    Arthritis M19.90    Osteopenia M85.80    Complete heart block (HCC) I44.2    Syncope and collapse R55    Hyperlipidemia E78.5    Contusion of scalp S00. 03XA    Chest pain in adult R07.9    Pericardial effusion I31.3    Paroxysmal atrial fibrillation (HCC) I48.0    Sinus arrest I45.5    Knee effusion, right M25.461    Shortness of breath R06.02    Pulmonary embolism with acute cor pulmonale (HCC) I26.09    Pleural effusion J90    Sick sinus syndrome (HCC) I49.5    Hemorrhagic pericardial effusion I31.2    Status post total right knee replacement Z96.651    Subtherapeutic international normalized ratio (INR) R79.1    Acute congestive heart failure (HCC) I50.9    Thickened endometrium R93.89    Acute respiratory failure with hypoxia ([facility]) J96.01       Isolation/Infection:   Isolation          No Isolation            Nurse Assessment:  Last Vital Signs: BP (!) 129/59   Pulse 65   Temp 97.6 °F (36.4 °C) (Oral)   Resp 16   Ht 5' 2\" (1.575 m)   Wt 159 lb 6.3 oz (72.3 kg)   SpO2 94%   BMI 29.15 kg/m²     Last documented pain score (0-10 scale): Pain Level: 0  Last Weight:   Wt Readings from Last 1 Encounters:   04/18/19 159 lb 6.3 oz (72.3 kg)     Mental Status:  oriented and alert    IV Access:  - None    Nursing Mobility/ADLs:  Walking   Independent  Transfer  Independent  Bathing  Independent  Dressing  Independent  Toileting  Independent  Feeding Independent  Med Admin  Independent  Med Delivery   whole    Wound Care Documentation and Therapy:  Incision 09/05/17 Knee Right (Active)   Number of days: 590        Elimination:  Continence:   · Bowel: Yes  · Bladder: Yes  Urinary Catheter: None   Colostomy/Ileostomy/Ileal Conduit: No       Date of Last BM: 4/19/2019    Intake/Output Summary (Last 24 hours) at 4/19/2019 1000  Last data filed at 4/19/2019 0938  Gross per 24 hour   Intake 480 ml   Output --   Net 480 ml     I/O last 3 completed shifts: In: 240 [P.O.:240]  Out: -     Safety Concerns: At Risk for Falls    Impairments/Disabilities:      Vision and Hearing    Nutrition Therapy:  Current Nutrition Therapy:   - Oral Diet:  Cardiac    Routes of Feeding: Oral  Liquids: No Restrictions  Daily Fluid Restriction: no  Last Modified Barium Swallow with Video (Video Swallowing Test): not done    Treatments at the Time of Hospital Discharge:   Respiratory Treatments: N/A  Oxygen Therapy:  is not on home oxygen therapy.   Ventilator:    - No ventilator support    Rehab Therapies: Physical Therapy and Occupational Therapy  Weight Bearing Status/Restrictions: No weight bearing restirctions  Other Medical Equipment (for information only, NOT a DME order):  N/A  Other Treatments: Skilled Nursing Assessment, Medication Education and Monitoring    Patient's personal belongings (please select all that are sent with patient):  Glasses    RN SIGNATURE:  Electronically signed by Deshawn Casiano RN on 4/19/19 at 11:25 AM    CASE MANAGEMENT/SOCIAL WORK SECTION    Inpatient Status Date: 4/16/2019    Readmission Risk Assessment Score:  Readmission Risk              Risk of Unplanned Readmission:        13           Discharging to Facility/ [address]  Phone [phone]  Fax [phone]      / signature: Electronically signed by Deshawn Casiano RN on 4/19/19 at 11:25 AM    PHYSICIAN SECTION    Prognosis: Fair    Condition at Discharge: Stable    Rehab Potential (if transferring to Rehab): Good    Recommended Labs or Other Treatments After Discharge: N/A    Physician Certification: I certify the above information and transfer of Mariza Cano  is necessary for the continuing treatment of the diagnosis listed and that she requires Home Care for greater 30 days.      Update Admission H&P: No change in H&P    PHYSICIAN SIGNATURE:  Electronically signed by Rod Catalan MD on 4/19/19 at 10:00 AM

## 2019-04-19 NOTE — PLAN OF CARE
Problem: Breathing Pattern - Ineffective:  Goal: Ability to achieve and maintain a regular respiratory rate will improve  Description  Ability to achieve and maintain a regular respiratory rate will improve  4/19/2019 0636 by Angelica Cueto RN  Outcome: Met This Shift  4/18/2019 1649 by Regulo Rooney RN  Outcome: Ongoing  Note:   Patients respiratory status stable at this time. No signs or symptoms of distress noted. Respirations non-labored and regular. Nasal canula 02 in place as needed to maintain sp02>90% or per md order. Continuous SpO2 monitoring in place. Problem: OXYGENATION/RESPIRATORY FUNCTION  Goal: Patient will maintain patent airway  4/19/2019 0636 by Angelica Cueto RN  Outcome: Met This Shift  4/18/2019 1649 by Regulo Rooney RN  Outcome: Ongoing  Note:   Patients respiratory status stable at this time. No signs or symptoms of distress noted. Respirations non-labored and regular. Nasal canula 02 in place as needed to maintain sp02>90% or per md order. Continuous SpO2 monitoring in place. Problem: SKIN INTEGRITY  Goal: Skin integrity is maintained or improved  4/19/2019 0636 by Angelica Cueto RN  Outcome: Met This Shift  4/18/2019 1649 by Regulo Rooney RN  Outcome: Ongoing  Note:   Skin assessment complete. Pt turned and repositioned per self. Area kept free from moisture. Proper nourishment and fluids encouraged, as appropriate. Skin remains clean, dry, and intact. Will continue to monitor for additional needs and changes in skin breakdown. Problem: Falls - Risk of:  Goal: Will remain free from falls  Description  Will remain free from falls  4/19/2019 0636 by Angelica Cueto RN  Outcome: Met This Shift  4/18/2019 1649 by Regulo Rooney RN  Outcome: Ongoing  Note:   The patient remained free from falls this shift, call light within reach, bed in locked and lowest position. Side rails up x2. Continue to monitor closely.     Goal: Absence of physical injury  Description  Absence of physical injury  Outcome: Met This Shift     Problem: Pain:  Goal: Pain level will decrease  Description  Pain level will decrease  4/19/2019 0636 by Sharonda Zarate RN  Outcome: Met This Shift  4/18/2019 1649 by Lilia Campos RN  Outcome: Met This Shift  Note:   Pt medicated with pain medication prn. Assessed all pain characteristics including level, type, location, frequency, and onset. Non-pharmacologic interventions offered to pt as well. Pt states pain is tolerable at this time.  Will continue to monitor    Goal: Control of acute pain  Description  Control of acute pain  Outcome: Met This Shift  Goal: Control of chronic pain  Description  Control of chronic pain  Outcome: Met This Shift     Problem: Musculor/Skeletal Functional Status  Goal: Highest potential functional level  Outcome: Met This Shift  Goal: Absence of falls  Outcome: Met This Shift

## 2019-04-19 NOTE — PROGRESS NOTES
Pulmonary Progress Note  Pulmonary and Critical Care Specialists      Patient - Emely Smith,  Age - 80 y.o.    - 1930      Room Number - 2116/2116-01   N -  [id number]   [facility]  Date of Admission -  2019  3:43 AM    Consulting Abby Vazquez MD  Primary Care Physician - Rishi Ni MD     SUBJECTIVE   No distress  Wants to go home    OBJECTIVE   VITALS    height is 5' 2\" (1.575 m) and weight is 159 lb 6.3 oz (72.3 kg). Her oral temperature is 97.6 °F (36.4 °C). Her blood pressure is 129/59 (abnormal) and her pulse is 65. Her respiration is 16 and oxygen saturation is 94%. Body mass index is 29.15 kg/m². Temperature Range: Temp: 97.6 °F (36.4 °C) Temp  Av °F (36.7 °C)  Min: 97.5 °F (36.4 °C)  Max: 98.6 °F (37 °C)  BP Range:  Systolic (15GHY), ECV:010 , Min:110 , NHM:237     Diastolic (88EPS), QSO:91, Min:52, Max:59    Pulse Range: Pulse  Av.6  Min: 65  Max: 88  Respiration Range: Resp  Av  Min: 14  Max: 20  Current Pulse Ox[de-identified]  SpO2: 94 %  24HR Pulse Ox Range:  SpO2  Av.8 %  Min: 93 %  Max: 97 %  Oxygen Amount and Delivery: O2 Flow Rate (L/min): 3 L/min    Wt Readings from Last 3 Encounters:   19 159 lb 6.3 oz (72.3 kg)   19 160 lb (72.6 kg)   19 161 lb 9.6 oz (73.3 kg)       I/O (24 Hours)    Intake/Output Summary (Last 24 hours) at 2019 1339  Last data filed at 2019 0819  Gross per 24 hour   Intake 480 ml   Output --   Net 480 ml       EXAM     General Appearance  Awake, alert, oriented, in no acute distress  HEENT - normocephalic, atraumatic. Neck - Supple,  trachea midline   Lungs - coarse, no wheezes  Heart Exam:PMI normal. No lifts, heaves, or thrills. RRR. No murmurs, clicks, gallops, or rubs  Abdomen Exam: Abdomen soft, non-tender.    Extremity Exam:  No edema    MEDS      warfarin  5 mg Oral Once    calcium carbonate-vitamin D  1 tablet Oral BID    levofloxacin  250 mg Oral Daily    enoxaparin  1 mg/kg Subcutaneous BID    albuterol  2.5 mg Nebulization Q4H WA    amiodarone  200 mg Oral BID    aspirin  81 mg Oral BID    atorvastatin  10 mg Oral Daily    latanoprost  1 drop Both Eyes Nightly    metoprolol tartrate  25 mg Oral BID    rOPINIRole  0.25 mg Oral Nightly    sodium chloride flush  10 mL Intravenous 2 times per day    famotidine  20 mg Oral BID    guaiFENesin  600 mg Oral BID    LORazepam  0.5 mg Oral Once    warfarin (COUMADIN) daily dosing (placeholder)   Other RX Placeholder       benzonatate, potassium chloride **OR** potassium alternative oral replacement **OR** potassium chloride, perflutren lipid microspheres, ipratropium-albuterol, polyethylene glycol, sennosides-docusate sodium, sodium chloride flush, magnesium hydroxide, ondansetron, albuterol, acetaminophen    LABS   CBC   Recent Labs     04/19/19  0512   WBC 6.8   HGB 9.9*   HCT 30.3*   MCV 84.0        BMP:   Lab Results   Component Value Date     04/19/2019    K 4.2 04/19/2019     04/19/2019    CO2 26 04/19/2019    BUN 28 04/19/2019    LABALBU 4.2 04/16/2019    LABALBU 4.2 08/29/2011    CREATININE 0.99 04/19/2019    CALCIUM 8.6 04/19/2019    GFRAA >60 04/19/2019    LABGLOM 53 04/19/2019     ABGs:  Lab Results   Component Value Date    PHART 7.363 04/16/2019    PO2ART 73.2 04/16/2019    ZHD7TAZ 47.1 04/16/2019      Lab Results   Component Value Date    MODE NOT REPORTED 04/16/2019     Ionized Calcium:  No results found for: IONCA  Magnesium:    Lab Results   Component Value Date    MG 1.7 04/18/2019     Phosphorus:  No results found for: PHOS     LIVER PROFILE No results for input(s): AST, ALT, LIPASE, BILIDIR, BILITOT, ALKPHOS in the last 72 hours. Invalid input(s):   AMYLASE,  ALB  INR   Recent Labs     04/19/19  0512   INR 1.3     PTT   Lab Results   Component Value Date    APTT 35.2 03/26/2019         RADIOLOGY     (See actual reports for details)    ASSESSMENT/PLAN     Patient Active Problem List   Diagnosis    Arthritis    Osteopenia    Complete heart block (HCC)    Syncope and collapse    Hyperlipidemia    Contusion of scalp    Chest pain in adult    Pericardial effusion    Paroxysmal atrial fibrillation (HCC)    Sinus arrest    Knee effusion, right    Shortness of breath    Pulmonary embolism with acute cor pulmonale (HCC)    Pleural effusion    Sick sinus syndrome (HCC)    Hemorrhagic pericardial effusion    Status post total right knee replacement    Subtherapeutic international normalized ratio (INR)    Acute congestive heart failure (HCC)    Thickened endometrium    Acute respiratory failure with hypoxia (HCC)     Bronchospasm resolved  Asked RN to call admitting team if pt needs to be bridged with Lovenox until INR is therapeutic.  RN to call  Pt sees Dr Mariia Suggs in 2 weeks    Electronically signed by Pineda Pimentel MD on 4/19/2019 at 1:39 PM

## 2019-04-19 NOTE — FLOWSHEET NOTE
04/19/19 1035   Encounter Summary   Services provided to: Patient   Referral/Consult From: Rajni   Continue Visiting   (4/19/19)   Volunteer Visit Yes   Complexity of Encounter Low   Length of Encounter 15 minutes   Routine   Type Follow up   Spiritual/Restoration   Type Spiritual support   Intervention Communion   Sacraments   Communion Patient received communion

## 2019-04-19 NOTE — PROGRESS NOTES
History and Physical Service  [facility] Internal Medicine            Date:   4/19/2019  Patient name:  Marisol Hussein  MRN:   [id number]  Account:  [de-identified]  YOB: 1930  PCP:    Elisabeth Yeung MD  Code Status:    Full Code    Chief Complaint:     Chief Complaint   Patient presents with    Shortness of Breath         History Obtained From:     patient    History of Present Illness: The patient is a 80 y.o. Non-/non  female who presents HPI 80 y.o. female  presents with complaints of cough and shortness of breath. Symptoms progressively worsening over the last day. She reports phlegm productive of yellow sputum. She rates her breathing difficulties as severe in intensity, constant in duration, progressively worsening. She woke up this evening severely short of breath and so her family brought her to the emergency department. She reports a retrosternal chest discomfort, when asked to describe it she states \"it just doesn't feel right\", she also reports congestion and rhinorrhea. No lower extremity edema.      4/19/2019  SOB same  Echo noted  Clinically no CHF  Feeling better     4/19     cough   sob better   no cp           Past Medical History:     Past Medical History:   Diagnosis Date    Arthritis     Atrial fibrillation ([address] 8/21/2016    CHF (congestive heart failure) (HCC)     Complete heart block ([address] 8/18/2016    Constipation     GERD (gastroesophageal reflux disease)     Glaucoma     mild, on eye drops    Hearing loss     bilateral hearing aids    Hiatal hernia     Hx of blood clots 07/2016    pulmonary emboli bilateral lungs after Pacemaker inserted    Hyperlipidemia     Osteopenia     Pacemaker 08/18/2016    Duo-chamber pacemaker in left upper chest;  DR. Gibson Nurse    Restless leg syndrome     Right knee DJD     Wears glasses     Wears hearing aid in both ears         Past Surgical History:     Past Surgical History:   Procedure Laterality Date    CATARACT REMOVAL WITH IMPLANT Bilateral     Left 1/6/1998, right 8/2/1996    COLONOSCOPY      DILATION AND CURETTAGE OF UTERUS      DILATION AND CURETTAGE OF UTERUS  03/12/2019    DILATATION AND CURETTAGE, HYSTEROSCOPY, MYOSURE     DILATION AND CURETTAGE OF UTERUS N/A 3/12/2019    DILATATION AND CURETTAGE, HYSTEROSCOPY, MYOSURE performed by Leo Diane MD at [facility]  08/18/2016    connected to patient's I-phone    [place] GI ENDOSCOPIC ULTRASOUND N/A 9/12/2018    ENDOSCOPIC ULTRASOUND, PATHOLOGY REQUESTED, PAT NOTIFIED performed by Onelia Mireles MD at [facility]      as a child    TOTAL KNEE ARTHROPLASTY Right 9/5/2017    KNEE TOTAL ARTHROPLASTY RIGHT WITH BIOMET AND GPS PRODUCT APPLICATION performed by Pedrito Spence MD at [address] [place] [address]  06/29/2017    polyp removed from small intestine near stomach, benign. done at [address]  9/12/2018    EGD BIOPSY performed by Onelia Mireles MD at [facility]        Medications Prior to Admission:     Prior to Admission medications    Medication Sig Start Date End Date Taking?  Authorizing Provider   furosemide (LASIX) 20 MG tablet Take 40 mg by mouth daily as needed (swelling)   Yes Historical Provider, MD   Biotin 1 MG CAPS Take 1 mg by mouth daily   Yes Historical Provider, MD   amiodarone (CORDARONE) 200 MG tablet Take 200 mg by mouth daily   Yes Historical Provider, MD   ibuprofen (ADVIL;MOTRIN) 600 MG tablet Take 1 tablet by mouth every 6 hours as needed for Pain 3/12/19  Yes Mercy Avendano DO   potassium chloride (MICRO-K) 10 MEQ extended release capsule Take 10 mEq by mouth daily as needed (if furosemide has been taken)    Yes Historical Provider, MD   polyethylene glycol (MIRALAX) powder Take 17 g by mouth daily as needed   Yes Historical Provider, MD   warfarin (COUMADIN) 4 MG tablet Take 1 tablet by mouth daily Call for dose adjustment after labs/INR draws  Patient taking differently: Take by mouth See Admin Instructions Indications: (Currently on hold at this visit) 4 mg on Sunday and Wednesday. Then takes 1/2 tab (2 mg) on Monday, Tuesday, Thursday, Friday, and Saturday 9/17/16  Yes María Breaker, DO   metoprolol tartrate (LOPRESSOR) 25 MG tablet Take 1 tablet by mouth 2 times daily 9/17/16  Yes María Breaker, DO   rOPINIRole (REQUIP) 0.25 MG tablet Take 0.25 mg by mouth nightly  5/9/16  Yes Historical Provider, MD   Calcium Carb-Cholecalciferol (CALTRATE 600+D) 600-800 MG-UNIT TABS Take 1 tablet by mouth 2 times daily    Yes Historical Provider, MD   atorvastatin (LIPITOR) 10 MG tablet Take 10 mg by mouth daily  10/2/13  Yes Historical Provider, MD   latanoprost (XALATAN) 0.005 % ophthalmic solution Place 1 drop into both eyes nightly  11/14/13  Yes Historical Provider, MD   Acetaminophen (TYLENOL ARTHRITIS PAIN PO) Take 2 tablets by mouth every 6 hours as needed    Yes Historical Provider, MD   aspirin 81 MG EC tablet Take 81 mg by mouth 2 times daily. Yes Historical Provider, MD        Allergies:     Sulfa antibiotics    Social History:     Tobacco:    reports that she has never smoked. She has never used smokeless tobacco.  Alcohol:      reports that she does not drink alcohol. Drug Use:  reports that she does not use drugs. Family History:     Family History   Problem Relation Age of Onset    Diabetes Sister     Heart Attack Sister     Osteoporosis Sister     Heart Disease Mother     Dementia Mother     Tuberculosis Father        Review of Systems:     Positive and Negative as described in HPI. CONSTITUTIONAL:  negative for fevers, chills, sweats, fatigue, weight loss  HEENT:  negative for vision, hearing changes, runny nose, throat pain  RESPIRATORY:  + for shortness of breath, cough, congestion, wheezing. CARDIOVASCULAR:  negative for chest pain, palpitations.   GASTROINTESTINAL: negative for nausea, vomiting, diarrhea, constipation, change in bowel habits, abdominal pain   GENITOURINARY:  negative for difficulty of urination, burning with urination, frequency   INTEGUMENT:  negative for rash, skin lesions, easy bruising   HEMATOLOGIC/LYMPHATIC:  negative for swelling/edema   ALLERGIC/IMMUNOLOGIC:  negative for urticaria , itching  ENDOCRINE:  negative increase in drinking, increase in urination, hot or cold intolerance  MUSCULOSKELETAL:  negative joint pains, muscle aches, swelling of joints  NEUROLOGICAL:  negative for headaches, dizziness, lightheadedness, numbness, pain, tingling extremities  BEHAVIOR/PSYCH:  negative for depression, anxiety    Physical Exam:   BP (!) 129/59   Pulse 65   Temp 97.6 °F (36.4 °C) (Oral)   Resp 16   Ht 5' 2\" (1.575 m)   Wt 159 lb 6.3 oz (72.3 kg)   SpO2 94%   BMI 29.15 kg/m²   No results for input(s): POCGLU in the last 72 hours. General Appearance:  alert, well appearing, and in no acute distress  Mental status: oriented to person, place, and time with normal affect  Head:  normocephalic, atraumatic. Eye: no icterus, redness, pupils equal and reactive, extraocular eye movements intact, conjunctiva clear  Mouth: mucous membranes moist  Neck: supple, no carotid bruits, thyroid not palpable  Lungs: Decreased air entry, no wheezing  Cardiovascular: normal rate, regular rhythm, no murmur, gallop, rub.   Abdomen: Soft, nontender, nondistended, normal bowel sounds, no hepatomegaly or splenomegaly  Neurologic: There are no new focal motor or sensory deficits, normal muscle tone and bulk, no abnormal sensation, normal speech, cranial nerves II through XII grossly intact  Skin: No gross lesions, rashes, bruising or bleeding on exposed skin area  Extremities:  peripheral pulses palpable, no pedal edema or calf pain with palpation      Investigations:      Laboratory Testing:  Recent Results (from the past 24 hour(s))   Basic Metabolic Panel w/ Reflex to MG Collection Time: 04/19/19  5:12 AM   Result Value Ref Range    Glucose 110 (H) 70 - 99 mg/dL    BUN 28 (H) 8 - 23 mg/dL    CREATININE 0.99 (H) 0.50 - 0.90 mg/dL    Bun/Cre Ratio NOT REPORTED 9 - 20    Calcium 8.6 8.6 - 10.4 mg/dL    Sodium 140 135 - 144 mmol/L    Potassium 4.2 3.7 - 5.3 mmol/L    Chloride 102 98 - 107 mmol/L    CO2 26 20 - 31 mmol/L    Anion Gap 12 mmol/L    GFR Non-African American 53 (L) >60 mL/min    GFR African American >60 >60 mL/min    GFR Comment          GFR Staging NOT REPORTED    CBC    Collection Time: 04/19/19  5:12 AM   Result Value Ref Range    WBC 6.8 3.5 - 11.0 k/uL    RBC 3.61 (L) 4.0 - 5.2 m/uL    Hemoglobin 9.9 (L) 12.0 - 16.0 g/dL    Hematocrit 30.3 (L) 36 - 46 %    MCV 84.0 80 - 100 fL    MCH 27.5 26 - 34 pg    MCHC 32.7 31 - 37 g/dL    RDW 14.8 11.5 - 14.9 %    Platelets 510 759 - 792 k/uL    MPV 8.4 6.0 - 12.0 fL    NRBC Automated NOT REPORTED per 100 WBC   PROTIME-INR    Collection Time: 04/19/19  5:12 AM   Result Value Ref Range    Protime 16.4 (H) 11.8 - 14.6 sec    INR 1.3        Recent Labs     04/19/19  0512  04/16/19  0444 03/26/19  1920   HGB 9.9*   < > 11.0* 10.5*   HCT 30.3*   < > 33.9* 32.2*   WBC 6.8   < > 8.4 4.5   MCV 84.0   < > 85.1 87.6      < > 144 143   K 4.2   < > 3.6* 3.7      < > 105 105   CO2 26   < > 26 25   BUN 28*   < > 15 16   CREATININE 0.99*   < > 0.53 0.70   GLUCOSE 110*   < > 144* 120*   INR 1.3   < >  --  1.7   PROTIME 16.4*   < >  --  19.8*   APTT  --   --   --  35.2   AST  --   --  25  --    ALT  --   --  21  --    LABALBU  --   --  4.2  --     < > = values in this interval not displayed.        Hematology:  Recent Labs     04/17/19  0521 04/18/19  0512 04/19/19 0512   WBC 8.4 5.8 6.8   RBC 3.82* 3.65* 3.61*   HGB 10.3* 9.8* 9.9*   HCT 32.5* 31.0* 30.3*   MCV 85.1 84.8 84.0   MCH 27.0 26.7 27.5   MCHC 31.7 31.5 32.7   RDW 14.8 15.0* 14.8    202 231   MPV 8.2 8.6 8.4   INR 1.0 1.2 1.3     Chemistry:  Recent Labs 04/17/19  0521 04/18/19  0512 04/19/19  0512   * 139 140   K 3.2* 3.4* 4.2    101 102   CO2 27 27 26   GLUCOSE 130* 154* 110*   BUN 19 19 28*   CREATININE 0.67 0.61 0.99*   MG 1.7 1.7  --    ANIONGAP 14 11 12   LABGLOM >60 >60 53*   GFRAA >60 >60 >60   CALCIUM 8.8 8.7 8.6     No results for input(s): PROT, LABALBU, LABA1C, J3IMVDU, R9IWILY, FT4, TSH, AST, ALT, LDH, GGT, ALKPHOS, LABGGT, BILITOT, BILIDIR, AMMONIA, AMYLASE, LIPASE, LACTATE, CHOL, HDL, LDLCHOLESTEROL, CHOLHDLRATIO, TRIG, VLDL, SIF44YC, PHENYTOIN, PHENYF, URICACID, POCGLU in the last 72 hours. Imaging/Diagnostics:       Xr Chest Standard (2 Vw)    Result Date: 4/16/2019  EXAMINATION: TWO VIEWS OF THE CHEST 4/16/2019 4:53 am COMPARISON: Chest radiograph 01/08/2019 HISTORY: ORDERING SYSTEM PROVIDED HISTORY: cough, sob, hypoxia TECHNOLOGIST PROVIDED HISTORY: cough, sob, hypoxia Ordering Physician Provided Reason for Exam: Pt c/o cough, wheezing, shortness of breath for 1 day. H/O CHF, a-fib. Acuity: Acute Type of Exam: Initial Additional signs and symptoms: Pt c/o cough, wheezing, shortness of breath for 1 day. H/O CHF, a-fib. FINDINGS: Left-sided cardiac device with leads in unchanged position. Normal heart size. Lungs are hyperinflated, consistent with COPD. There is no focal consolidation. No evidence of pleural effusion or pneumothorax. No acute osseous abnormality. No acute cardiopulmonary process. COPD. Xr Chest Portable    Result Date: 4/17/2019  EXAMINATION: SINGLE XRAY VIEW OF THE CHEST 4/17/2019 7:15 am COMPARISON: April 16, 2019, January 8, 2019 HISTORY: ORDERING SYSTEM PROVIDED HISTORY: AECOPD TECHNOLOGIST PROVIDED HISTORY: AECOPD Ordering Physician Provided Reason for Exam: AECOPD Acuity: Acute Type of Exam: Initial FINDINGS: Mild cardiac silhouette enlargement again noted. Left subclavian dual lead pacer in place. Chronic appearing interstitial findings in the lungs without acute airspace disease identified.   No pneumothorax or effusion. No acute osseous abnormality identified. No acute airspace disease identified. Ct Chest Pulmonary Embolism W Contrast    Result Date: 4/16/2019  EXAMINATION: CTA OF THE CHEST 4/16/2019 5:24 am TECHNIQUE: CTA of the chest was performed after the administration of intravenous contrast.  Multiplanar reformatted images are provided for review. MIP images are provided for review. Dose modulation, iterative reconstruction, and/or weight based adjustment of the mA/kV was utilized to reduce the radiation dose to as low as reasonably achievable. COMPARISON: CT chest 09/11/2016 HISTORY: ORDERING SYSTEM PROVIDED HISTORY: sob, hypoxia TECHNOLOGIST PROVIDED HISTORY: Ordering Physician Provided Reason for Exam: SOB and cough for past couple days Acuity: Acute Type of Exam: Unknown Relevant Medical/Surgical History: Pt has a hx of PE, surgeries to area of interest include pacemaker placement FINDINGS: Pulmonary Arteries: Pulmonary arteries are adequately opacified for evaluation, though evaluation of the subsegmental pulmonary arteries is degraded by respiratory motion. No evidence of intraluminal filling defect to suggest pulmonary embolism. Main pulmonary artery is normal in caliber. Mediastinum: No evidence of mediastinal lymphadenopathy. The heart and pericardium demonstrate no acute abnormality. There is no acute abnormality of the thoracic aorta. Lungs/pleura: The lungs are without acute process. A 5 mm noncalcified pleural-based nodule in the right lower lobe (series 4, image 97) has not significantly changed since 09/11/2016. No focal consolidation or pulmonary edema. No evidence of pleural effusion or pneumothorax. Upper Abdomen: Limited images of the upper abdomen with redemonstration of a left renal cyst. Soft Tissues/Bones: No acute bone or soft tissue abnormality. No evidence of pulmonary embolism or acute pulmonary abnormality.  A 5 mm noncalcified nodule in the right lower lobe has not significantly changed in size since 09/11/2016. Given stability, nodule is favored to be benign. No dedicated imaging follow-up is required. RECOMMENDATIONS: Fleischner Society guidelines for follow-up and management of incidentally detected pulmonary nodules: Single Solid Nodule: Nodule size less than 6 mm In a low-risk patient, no routine follow-up. In a high-risk patient, optional CT at 12 months (completed). -Low risk patients include individuals with minimal or absent history of smoking and other known risk factors. - High risk patients include individuals with a history or smoking or known risk factors. Radiology 2017 http://pubs. rsna.org/doi/full/10.1148/radiol. 5202339893     Vl Dup Lower Extremity Venous Left    Result Date: 3/26/2019    [facility] [place], [facility]  Vascular Lower Extremities DVT Study Procedure   Patient Name   HORACE BERTRAND Date of Study           03/26/2019   Date of Birth  12/12/1930  Gender                  Female   Age            80 year(s)  Race                       Room Number    02   Corporate ID # [id number]   Patient Acct # [de-identified]   MR #           [id number]      Sonographer             Valentina Kingstondara   Accession #    [id number]   Interpreting Physician  Ruben Garcia   Referring                  Referring Physician     Shwetha Morris DO  Nurse  Practitioner  Procedure Type of Study:   Veins: Lower Extremities DVT Study, Venous Scan Lower Left. Indications for Study:Swelling. Patient Status:Out Patient. Technical Quality:Adequate visualization. Conclusions   Summary   No evidence of superficial or deep venous thrombosis in the left lower  extremity and left common femoral vein. Incidental note is made ofmass on the left lower thigh,medially.    Signature   ----------------------------------------------------------------  Electronically signed by Margret Phalen, Nick(Sonographer) on  03/26/2019 09:41 PM ----------------------------------------------------------------   ----------------------------------------------------------------  Electronically signed by Mina De La Rosa(Interpreting  physician) on 03/26/2019 10:50 PM  ----------------------------------------------------------------  Findings:   Right Impression:       Left Impression:  The common femoral vein The common femoral, femoral, popliteal and tibial  demonstrates normal     veins demonstrate normal compressibility and  compressibility and     augmentation. augmentation. Normal compressibility of the great saphenous                          vein. Normal compressibility of the small saphenous                          vein. Incidental finding of a hypoechoic structure                          without color flow or Doppler signals in the                          medial aspect of the distal thigh/knee. It sits on                          top of the GSV but does not compress or interfere                          with normal color flow or Doppler responses in the                          GSV at knee level. Risk Factors History +---------+----------+-----------------------------------------------------+ ! Diagnosis! Date      ! Comments                                             ! +---------+----------+-----------------------------------------------------+ ! Other    !09/06/2016! CT: bilateral PE                                     ! +---------+----------+-----------------------------------------------------+ Allergies   - Allergy:Penicillin(Drug). - Allergy:Sulfa(Drug). Velocities are measured in cm/s ; Diameters are measured in cm Right Lower Extremities DVT Study Measurements Right 2D Measurements +------------------------------------+----------+---------------+----------+ ! Location                            ! Visualized! Compressibility! Thrombosis! +------------------------------------+----------+---------------+----------+ ! Common Femoral                      !Yes       ! Yes            ! None      ! +------------------------------------+----------+---------------+----------+ Right Doppler Measurements +----------------------------+------+------+-------------------------------+ ! Location                    ! Signal!Reflux! Reflux (msec)                  ! +----------------------------+------+------+-------------------------------+ ! Common Femoral              !Phasic!      !                               ! +----------------------------+------+------+-------------------------------+ Left Lower Extremities DVT Study Measurements Left 2D Measurements +------------------------------------+----------+---------------+----------+ ! Location                            ! Visualized! Compressibility! Thrombosis! +------------------------------------+----------+---------------+----------+ ! Common Femoral                      !Yes       ! Yes            ! None      ! +------------------------------------+----------+---------------+----------+ ! Prox Femoral                        !Yes       ! Yes            ! None      ! +------------------------------------+----------+---------------+----------+ ! Mid Femoral                         !Yes       ! Yes            ! None      ! +------------------------------------+----------+---------------+----------+ ! Dist Femoral                        !Yes       ! Yes            ! None      ! +------------------------------------+----------+---------------+----------+ ! Popliteal                           !Yes       ! Yes            ! None      ! +------------------------------------+----------+---------------+----------+ ! Sapheno Femoral Junction            ! Yes       ! Yes            ! None      ! +------------------------------------+----------+---------------+----------+ ! PTV                                 ! Yes       ! Yes            ! None      ! +------------------------------------+----------+---------------+----------+ ! Peroneal                            !Yes       ! Yes            ! None      ! +------------------------------------+----------+---------------+----------+ ! Gastroc                             ! Yes       ! Yes            ! None      ! +------------------------------------+----------+---------------+----------+ ! GSV Thigh                           ! Yes       ! Yes            ! None      ! +------------------------------------+----------+---------------+----------+ ! GSV Knee                            ! Yes       ! Yes            ! None      ! +------------------------------------+----------+---------------+----------+ ! GSV Ankle                           ! Yes       ! Yes            ! None      ! +------------------------------------+----------+---------------+----------+ ! SSV                                 ! Yes       ! Yes            ! None      ! +------------------------------------+----------+---------------+----------+ Left Doppler Measurements +---------------------------+------+------+--------------------------------+ ! Location                   ! Signal!Reflux! Reflux (msec)                   ! +---------------------------+------+------+--------------------------------+ ! Common Femoral             !Phasic!      !                                ! +---------------------------+------+------+--------------------------------+ ! Prox Femoral               !Phasic!      !                                ! +---------------------------+------+------+--------------------------------+ ! Popliteal                  !Phasic!      !                                ! +---------------------------+------+------+--------------------------------+           Impressions :      1. Principal Problem:    Acute respiratory failure with hypoxia ([address]  Resolved Problems:    * No resolved hospital problems.  *  Acute bronchitis  Hypoxic respiratory failure  Non smoker  Poor air entry  ? Pulm fibrosis  CT scan negative for new PE  No infiltrate  PCO2 47, ph normal, paO2 72  Mild respiratory acidosis, possibly related to bronchitis  , will repeat CXR tomorrow morning  Repeat echo  20mg IV lasix once  Stop IVF  CBC, BMP    2.  has a past medical history of Arthritis, Atrial fibrillation ([facility] (8/21/2016), CHF (congestive heart failure) ([facility] Complete heart block ([facility] (8/18/2016), Constipation, GERD (gastroesophageal reflux disease), Glaucoma, Hearing loss, Hiatal hernia, blood clots (07/2016), Hyperlipidemia, Osteopenia, Pacemaker (08/18/2016), Restless leg syndrome, Right knee DJD, Wears glasses, and Wears hearing aid in both ears. Plans:     1.  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  Most likely cause of her dyspnea presenting as cardiac asthma  , no A fib  Consult cardiology  Change antibiotic to oral  D/C steroids  Continue aerosolsbb      4/19     less sob    rll pneumonia better   coughing   cont tessalom   levaquin/ mucinex     f/u cardio as out pt       [id number]  [phone]  4/19 4/19     less sob   still cough   cont p/    Current Facility-Administered Medications   Medication Dose Route Frequency Provider Last Rate Last Dose    warfarin (COUMADIN) tablet 5 mg  5 mg Oral Once ALEXSANDRA Pope - CNP        levofloxacin [facility]) tablet 250 mg  250 mg Oral Daily Allie Gleason MD   250 mg at 04/19/19 0756    benzonatate (TESSALON) capsule 200 mg  200 mg Oral TID PRN ALEXSANDRA Pope - CNP   200 mg at 04/19/19 0755    potassium chloride (KLOR-CON M) extended release tablet 40 mEq  40 mEq Oral PRN Allie Gleason MD   40 mEq at 04/18/19 0817    Or    potassium bicarb-citric acid (EFFER-K) effervescent tablet 40 mEq  40 mEq Oral PRN Allie Gleason MD        Or    potassium chloride 10 mEq/100 mL IVPB (Peripheral Line)  10 mEq Intravenous PRN Tessa Douglas MD        enoxaparin (LOVENOX) injection 70 mg  1 mg/kg Subcutaneous BID Tessa Douglas MD   70 mg at 04/18/19 2202    perflutren lipid microspheres (DEFINITY) injection 2.2 mg  2 mL Intravenous ONCE PRN Stephanie Parsonso,         albuterol (PROVENTIL) nebulizer solution 2.5 mg  2.5 mg Nebulization Q4H WA Rashel Ortiz MD   2.5 mg at 04/19/19 0736    ipratropium-albuterol (DUONEB) nebulizer solution 1 ampule  1 ampule Inhalation PRN Ash Hurd MD   1 ampule at 04/16/19 0554    amiodarone (CORDARONE) tablet 200 mg  200 mg Oral BID Raf Laity, APRN - CNP   200 mg at 04/19/19 0757    aspirin EC tablet 81 mg  81 mg Oral BID Raf Laity, APRN - CNP   81 mg at 04/18/19 2203    atorvastatin (LIPITOR) tablet 10 mg  10 mg Oral Daily Raf Laity, APRN - CNP   10 mg at 04/19/19 0755    calcium carb-cholecalciferol 600-400 MG-UNIT per tab 1 tablet  1 tablet Oral BID Raf Laity, APRN - CNP   1 tablet at 04/18/19 0819    latanoprost (XALATAN) 0.005 % ophthalmic solution 1 drop  1 drop Both Eyes Nightly Raf Laity, APRN - CNP   1 drop at 04/18/19 2202    metoprolol tartrate (LOPRESSOR) tablet 25 mg  25 mg Oral BID Raf Laity, APRN - CNP   25 mg at 04/19/19 0757    polyethylene glycol (GLYCOLAX) packet 17 g  17 g Oral Daily PRN Raf Laity, APRN - CNP        rOPINIRole (REQUIP) tablet 0.25 mg  0.25 mg Oral Nightly Raf Laity, APRN - CNP   0.25 mg at 04/18/19 2202    sennosides-docusate sodium (SENOKOT-S) 8.6-50 MG tablet 2 tablet  2 tablet Oral Daily PRN Raf Laity, APRN - CNP        sodium chloride flush 0.9 % injection 10 mL  10 mL Intravenous 2 times per day Raf Laity, APRN - CNP   10 mL at 04/19/19 0759    sodium chloride flush 0.9 % injection 10 mL  10 mL Intravenous PRN Raf Mitchell, ALEXSANDRA - CNP        magnesium hydroxide (MILK OF MAGNESIA) 400 MG/5ML suspension 30 mL  30 mL Oral Daily PRN Raf Mitchell, APRN - CNP        ondansetron (ZOFRAN) injection 4 mg  4 mg Intravenous Q6H PRN Shona Riling, APRN - CNP        famotidine (PEPCID) tablet 20 mg  20 mg Oral BID Shona Riling, APRN - CNP   20 mg at 04/19/19 0757    albuterol (PROVENTIL) nebulizer solution 2.5 mg  2.5 mg Nebulization Q2H PRN Shona Riling, APRN - CNP        acetaminophen (TYLENOL) tablet 650 mg  650 mg Oral Q4H PRN Shona Riling, APRN - CNP        guaiFENesin [facility]) extended release tablet 600 mg  600 mg Oral BID Shona Riling, APRN - CNP   600 mg at 04/19/19 0756    LORazepam (ATIVAN) tablet 0.5 mg  0.5 mg Oral Once Dorrine MD Sarai        warfarin (COUMADIN) daily dosing (placeholder)   Other RX ALEXSANDRA Jaffe MD  4/19/2019  9:54 AM

## 2019-04-19 NOTE — CARE COORDINATION
Continuity of Care Form    Patient Name: Mariposa Sands   :  1930  MRN:  [id number]    Admit date:  2019  Discharge date:  2019    Code Status Order: Full Code   Advance Directives:   [facility] Documentation     Date/Time Healthcare Directive Type of Healthcare Directive Copy in [facility] [address] Agent's Name Healthcare Agent's Phone Number    19 1300  Yes, patient has an advance directive for healthcare treatment  --  --  Healthcare power of   Josepao Mu  [phone]          Admitting Physician:  Nathan Prieto MD  PCP: Cortney Mohr MD    [facility]116[facility]01  Discharging Unit Phone Number: [phone]    Emergency Contact:   Extended Emergency Contact Information  Primary Emergency Contact: [facility] [address] Phone: [phone]  Work Phone: [phone]  Mobile Phone: [phone]  Relation: Child  Secondary Emergency Contact: Kajal Lainez  Address: [address], [address] [place] [address] [address] Phone: [phone]  Mobile Phone: [phone]  Relation: Child    Past Surgical History:  Past Surgical History:   Procedure Laterality Date    CATARACT REMOVAL WITH IMPLANT Bilateral     Left 1998, right 1996    COLONOSCOPY      DILATION AND CURETTAGE OF UTERUS      DILATION AND CURETTAGE OF UTERUS  2019    DILATATION AND CURETTAGE, HYSTEROSCOPY, MYOSURE     DILATION AND CURETTAGE OF UTERUS N/A 3/12/2019    DILATATION AND CURETTAGE, HYSTEROSCOPY, MYOSURE performed by Talia Castro MD at [address] [place] [address]  2016    connected to patient's I-phone    AR GI ENDOSCOPIC ULTRASOUND N/A 2018    ENDOSCOPIC ULTRASOUND, PATHOLOGY REQUESTED, PAT NOTIFIED performed by Ilia Campbell MD at [facility]      as a child    TOTAL KNEE ARTHROPLASTY Right 2017 KNEE TOTAL ARTHROPLASTY RIGHT WITH BIOMET AND GPS PRODUCT APPLICATION performed by David Maxwell MD at [address]  06/29/2017    polyp removed from small intestine near stomach, benign. done at [facility]  9/12/2018    EGD BIOPSY performed by Rich Cortes MD at [facility]       Immunization History: There is no immunization history for the selected administration types on file for this patient. Active Problems:  Patient Active Problem List   Diagnosis Code    Arthritis M19.90    Osteopenia M85.80    Complete heart block (HCC) I44.2    Syncope and collapse R55    Hyperlipidemia E78.5    Contusion of scalp S00. 03XA    Chest pain in adult R07.9    Pericardial effusion I31.3    Paroxysmal atrial fibrillation (HCC) I48.0    Sinus arrest I45.5    Knee effusion, right M25.461    Shortness of breath R06.02    Pulmonary embolism with acute cor pulmonale (HCC) I26.09    Pleural effusion J90    Sick sinus syndrome (HCC) I49.5    Hemorrhagic pericardial effusion I31.2    Status post total right knee replacement Z96.651    Subtherapeutic international normalized ratio (INR) R79.1    Acute congestive heart failure (HCC) I50.9    Thickened endometrium R93.89    Acute respiratory failure with hypoxia ([facility]) J96.01       Isolation/Infection:   Isolation          No Isolation            Nurse Assessment:  Last Vital Signs: BP (!) 129/59   Pulse 65   Temp 97.6 °F (36.4 °C) (Oral)   Resp 16   Ht 5' 2\" (1.575 m)   Wt 159 lb 6.3 oz (72.3 kg)   SpO2 94%   BMI 29.15 kg/m²     Last documented pain score (0-10 scale): Pain Level: 0  Last Weight:   Wt Readings from Last 1 Encounters:   04/18/19 159 lb 6.3 oz (72.3 kg)     Mental Status:  oriented and alert    IV Access:  - None    Nursing Mobility/ADLs:  Walking   Independent  Transfer  Independent  Bathing  Independent  Dressing  Independent  Toileting  Independent  Feeding Independent  Med Admin  Independent  Med Delivery   whole    Wound Care Documentation and Therapy:  Incision 09/05/17 Knee Right (Active)   Number of days: 590        Elimination:  Continence:   · Bowel: Yes  · Bladder: Yes  Urinary Catheter: None   Colostomy/Ileostomy/Ileal Conduit: No       Date of Last BM: 4/19/2019    Intake/Output Summary (Last 24 hours) at 4/19/2019 1000  Last data filed at 4/19/2019 0938  Gross per 24 hour   Intake 480 ml   Output --   Net 480 ml     I/O last 3 completed shifts: In: 240 [P.O.:240]  Out: -     Safety Concerns: At Risk for Falls    Impairments/Disabilities:      Vision and Hearing    Nutrition Therapy:  Current Nutrition Therapy:   - Oral Diet:  Cardiac    Routes of Feeding: Oral  Liquids: No Restrictions  Daily Fluid Restriction: no  Last Modified Barium Swallow with Video (Video Swallowing Test): not done    Treatments at the Time of Hospital Discharge:   Respiratory Treatments: N/A  Oxygen Therapy:  is not on home oxygen therapy.   Ventilator:    - No ventilator support    Rehab Therapies: Physical Therapy and Occupational Therapy  Weight Bearing Status/Restrictions: No weight bearing restirctions  Other Medical Equipment (for information only, NOT a DME order):  N/A  Other Treatments: Skilled Nursing Assessment, Medication Education and Monitoring    Patient's personal belongings (please select all that are sent with patient):  Glasses    RN SIGNATURE:  Electronically signed by Alfie Mckenna RN on 4/19/19 at 11:25 AM    CASE MANAGEMENT/SOCIAL WORK SECTION    Inpatient Status Date: 4/16/2019    Readmission Risk Assessment Score:  Readmission Risk              Risk of Unplanned Readmission:        13           Discharging to Facility/ [address]  Phone [phone]  Fax [phone]      / signature: Electronically signed by Alfie Mckenna RN on 4/19/19 at 11:25 AM    PHYSICIAN SECTION    Prognosis: Fair    Condition at Discharge: Stable    Rehab Potential (if transferring to Rehab): Good    Recommended Labs or Other Treatments After Discharge: N/A    Physician Certification: I certify the above information and transfer of Waylon Foster  is necessary for the continuing treatment of the diagnosis listed and that she requires Home Care for greater 30 days. Update Admission H&P: No change in H&P    PHYSICIAN SIGNATURE:  Electronically signed by Roz Kowalski MD on 4/19/19 at 10:00 AM                      Current Discharge Medication List     START taking these medications     Medication Dose   benzonatate (TESSALON) 200 MG capsule 200 mg   Take 1 capsule by mouth 3 times daily as needed for Cough   Quantity: 100 capsule Refills: 0       guaiFENesin (MUCINEX) 600 MG extended release tablet 600 mg   Take 1 tablet by mouth 2 times daily   Quantity: 60 tablet Refills: 0       levofloxacin (LEVAQUIN) 250 MG tablet 250 mg   Take 1 tablet by mouth daily for 10 days   Quantity: 10 tablet Refills: 0           CONTINUE these medications which have CHANGED or have new prescriptions     Medication Dose   !! amiodarone (CORDARONE) 200 MG tablet 200 mg   Take 1 tablet by mouth 2 times daily   Quantity: 30 tablet Refills: 3       sennosides-docusate sodium (SENOKOT-S) 8.6-50 MG tablet 2 tablets   Take 2 tablets by mouth daily   Quantity: 60 tablet Refills: 0        !! Potential duplicate medications found. Please discuss with provider.    CONTINUE these medications which have NOT CHANGED     Medication Dose   furosemide (LASIX) 20 MG tablet 40 mg   Take 40 mg by mouth daily as needed (swelling)       Biotin 1 MG CAPS 1 mg   Take 1 mg by mouth daily       !! amiodarone (CORDARONE) 200 MG tablet 200 mg   Take 200 mg by mouth daily       ibuprofen (ADVIL;MOTRIN) 600 MG tablet 600 mg   Take 1 tablet by mouth every 6 hours as needed for Pain   Quantity: 30 tablet Refills: 0       potassium chloride (MICRO-K) 10 MEQ extended release capsule 10 mEq Take 10 mEq by mouth daily as needed (if furosemide has been taken)        polyethylene glycol (MIRALAX) powder 17 g   Take 17 g by mouth daily as needed       warfarin (COUMADIN) 4 MG tablet 4 mg   Take 1 tablet by mouth daily Call for dose adjustment after labs/INR draws   Quantity: 30 tablet Refills: 0       metoprolol tartrate (LOPRESSOR) 25 MG tablet 25 mg   Take 1 tablet by mouth 2 times daily   Quantity: 60 tablet Refills: 3       rOPINIRole (REQUIP) 0.25 MG tablet 0.25 mg   Take 0.25 mg by mouth nightly        Calcium Carb-Cholecalciferol (CALTRATE 600+D) 600-800 MG-UNIT TABS 1 tablet   Take 1 tablet by mouth 2 times daily        atorvastatin (LIPITOR) 10 MG tablet 10 mg   Take 10 mg by mouth daily        latanoprost (XALATAN) 0.005 % ophthalmic solution 1 drop   Place 1 drop into both eyes nightly        Acetaminophen (TYLENOL ARTHRITIS PAIN PO) 2 tablets   Take 2 tablets by mouth every 6 hours as needed        aspirin 81 MG EC tablet 81 mg   Take 81 mg by mouth 2 times daily.  !! Potential duplicate medications found. Please discuss with provider.    STOP taking these previous medications     Medication Dose Reason for Stopping Comments   (STOP TAKING) Enoxaparin Sodium (LOVENOX SC)

## 2019-04-19 NOTE — DISCHARGE SUMMARY
[facility] SERVICE       Discharge Summary     Patient ID: Linda Gordillo  :  1930   MRN: [id number]     ACCOUNT:  [de-identified]   Patient's PCP: Ellie Rosas MD  Admit Date: 2019   Discharge Date: 2019     Length of Stay: 3  Code Status:  Full Code  Admitting Physician: June Matt MD  Discharge Physician: Barbara Fofana MD     Active Discharge Diagnoses:     Hospital Problem Lists:  Principal Problem:    Acute respiratory failure with hypoxia ([address]  Resolved Problems:    * No resolved hospital problems. *      Admission Condition:  fair     Discharged Condition: fair    Hospital Stay:     Hospital Course:  Linda Gordillo is a 80 y.o. female who was admitted for the management of   Acute respiratory failure with hypoxia ([address] , presented to ER with Shortness of Breath  The patient is a 80 y.o. Non-/non  female who presents HPI 80 y. o. female  presents with complaints of cough and shortness of breath.  Symptoms progressively worsening over the last day.  She reports phlegm productive of yellow sputum.  She rates her breathing difficulties as severe in intensity, constant in duration, progressively worsening.  She woke up this evening severely short of breath and so her family brought her to the emergency department. Analia De Guzman reports a retrosternal chest discomfort, when asked to describe it she states \"it just doesn't feel right\", she also reports congestion and rhinorrhea.  No lower extremity edema.      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  Most likely cause of her dyspnea presenting as cardiac asthma  , no A fib  Consult cardiology  Change antibiotic to oral  D/C steroids  Continue aerosolsbb              less sob    rll pneumonia better   coughing   cont tessalom   levaquin/ mucinex      f/u cardio as out pt     Cont wrfarin   asa     inr 1.3   f/u warfarin clinic   af vr controlled   chf compensated     cont levaquin 7 days             Significant therapeutic interventions:     Significant Diagnostic Studies:   Labs / Micro:  CBC:   Lab Results   Component Value Date    WBC 6.8 04/19/2019    RBC 3.61 04/19/2019    HGB 9.9 04/19/2019    HCT 30.3 04/19/2019    MCV 84.0 04/19/2019    MCH 27.5 04/19/2019    MCHC 32.7 04/19/2019    RDW 14.8 04/19/2019     04/19/2019     BMP:    Lab Results   Component Value Date    GLUCOSE 110 04/19/2019    GLUCOSE 102 08/29/2011     04/19/2019    K 4.2 04/19/2019     04/19/2019    CO2 26 04/19/2019    ANIONGAP 12 04/19/2019    BUN 28 04/19/2019    CREATININE 0.99 04/19/2019    BUNCRER NOT REPORTED 04/19/2019    CALCIUM 8.6 04/19/2019    LABGLOM 53 04/19/2019    GFRAA >60 04/19/2019    GFR      04/19/2019    GFR NOT REPORTED 04/19/2019             Radiology:    Xr Chest Standard (2 Vw)    Result Date: 4/17/2019  EXAMINATION: TWO VIEWS OF THE CHEST 4/17/2019 12:37 pm COMPARISON: April 17, 2019 HISTORY: ORDERING SYSTEM PROVIDED HISTORY: HYPOXIA TECHNOLOGIST PROVIDED HISTORY: HYPOXIA Ordering Physician Provided Reason for Exam: PT hypoxia X several hours with SOB and cough. PT states she feels fatigued. Acuity: Chronic Type of Exam: Ongoing FINDINGS: The left chest wall pacemaker and leads are stable. The cardiomediastinal silhouette is stable. There are increased lung markings at the bilateral infrahilar regions, likely related to mild bronchitis. There is no pleural effusion. There is no pneumothorax. There is no acute osseous abnormality. Increased lung markings at the bilateral infrahilar regions, may be related to mild bronchitis.      Xr Chest Standard (2 Vw)    Result Date: 4/16/2019  EXAMINATION: TWO VIEWS OF THE CHEST 4/16/2019 4:53 am COMPARISON: Chest radiograph 01/08/2019 HISTORY: ORDERING SYSTEM PROVIDED HISTORY: cough, sob, hypoxia TECHNOLOGIST PROVIDED HISTORY: cough, sob, hypoxia Ordering Physician Provided Reason for Exam: Pt c/o cough, wheezing, shortness of breath for 1 day. H/O CHF, a-fib. Acuity: Acute Type of Exam: Initial Additional signs and symptoms: Pt c/o cough, wheezing, shortness of breath for 1 day. H/O CHF, a-fib. FINDINGS: Left-sided cardiac device with leads in unchanged position. Normal heart size. Lungs are hyperinflated, consistent with COPD. There is no focal consolidation. No evidence of pleural effusion or pneumothorax. No acute osseous abnormality. No acute cardiopulmonary process. COPD. Xr Chest Portable    Result Date: 4/17/2019  EXAMINATION: SINGLE XRAY VIEW OF THE CHEST 4/17/2019 7:15 am COMPARISON: April 16, 2019, January 8, 2019 HISTORY: ORDERING SYSTEM PROVIDED HISTORY: AECOPD TECHNOLOGIST PROVIDED HISTORY: AECOPD Ordering Physician Provided Reason for Exam: AECOPD Acuity: Acute Type of Exam: Initial FINDINGS: Mild cardiac silhouette enlargement again noted. Left subclavian dual lead pacer in place. Chronic appearing interstitial findings in the lungs without acute airspace disease identified. No pneumothorax or effusion. No acute osseous abnormality identified. No acute airspace disease identified. Ct Chest Pulmonary Embolism W Contrast    Result Date: 4/18/2019  EXAMINATION: CTA OF THE CHEST 4/16/2019 5:24 am TECHNIQUE: CTA of the chest was performed after the administration of intravenous contrast.  Multiplanar reformatted images are provided for review. MIP images are provided for review. Dose modulation, iterative reconstruction, and/or weight based adjustment of the mA/kV was utilized to reduce the radiation dose to as low as reasonably achievable.  COMPARISON: CT chest 09/11/2016 HISTORY: ORDERING SYSTEM PROVIDED HISTORY: sob, hypoxia TECHNOLOGIST PROVIDED HISTORY: Ordering Physician Provided Reason for Exam: SOB and cough for past couple days Acuity: Acute Type of Exam: Unknown Relevant Medical/Surgical History: Pt has a hx of PE, surgeries to area of interest include pacemaker placement FINDINGS: Pulmonary Arteries: Pulmonary arteries are adequately opacified for evaluation, though evaluation of the subsegmental pulmonary arteries is degraded by respiratory motion. No evidence of intraluminal filling defect to suggest pulmonary embolism. Main pulmonary artery is normal in caliber. Mediastinum: No evidence of mediastinal lymphadenopathy. The heart and pericardium demonstrate no acute abnormality. There is no acute abnormality of the thoracic aorta. Lungs/pleura: The lungs are without acute process. A 5 mm noncalcified pleural-based nodule in the right lower lobe (series 4, image 97) has not significantly changed since 09/11/2016. No focal consolidation or pulmonary edema. No evidence of pleural effusion or pneumothorax. Upper Abdomen: Limited images of the upper abdomen with redemonstration of a left renal cyst. Soft Tissues/Bones: No acute bone or soft tissue abnormality. No evidence of pulmonary embolism or acute pulmonary abnormality. 5 mm noncalcified nodule in the right lower lobe has not significantly changed in size since 09/11/2016. Given stability, nodule is favored to be benign. No dedicated imaging follow-up is required. RECOMMENDATIONS: Fleischner Society guidelines for follow-up and management of incidentally detected pulmonary nodules: Single Solid Nodule: Nodule size less than 6 mm In a low-risk patient, no routine follow-up. In a high-risk patient, optional CT at 12 months (completed). -Low risk patients include individuals with minimal or absent history of smoking and other known risk factors. - High risk patients include individuals with a history or smoking or known risk factors. Radiology 2017 http://pubs. rsna.org/doi/full/10.1148/radiol. 5391068767     Vl Dup Lower Extremity Venous Left    Result Date: 3/26/2019    [facility]  Vascular Lower Extremities DVT Study Procedure   Patient Name   HORACE BERTRAND Date of Study           03/26/2019   Date of Birth  12/12/1930  Gender                  Female   Age            80 year(s)  Race                       Room Number    02   Corporate ID # [id number]   Patient Acct # [de-identified]   MR #           [id number]      Sonographer             Vita Baldwin   Accession #    [id number]   Interpreting Physician  Herman Victoria   Referring                  Referring Physician     Edson Gabriel DO  Nurse  Practitioner  Procedure Type of Study:   Veins: Lower Extremities DVT Study, Venous Scan Lower Left. Indications for Study:Swelling. Patient Status:Out Patient. Technical Quality:Adequate visualization. Conclusions   Summary   No evidence of superficial or deep venous thrombosis in the left lower  extremity and left common femoral vein. Incidental note is made ofmass on the left lower thigh,medially. Signature   ----------------------------------------------------------------  Electronically signed by Howie Floyd(Sonographer) on  03/26/2019 09:41 PM  ----------------------------------------------------------------   ----------------------------------------------------------------  Electronically signed by Helena De La Rosa(Interpreting  physician) on 03/26/2019 10:50 PM  ----------------------------------------------------------------  Findings:   Right Impression:       Left Impression:  The common femoral vein The common femoral, femoral, popliteal and tibial  demonstrates normal     veins demonstrate normal compressibility and  compressibility and     augmentation. augmentation. Normal compressibility of the great saphenous                          vein. Normal compressibility of the small saphenous                          vein.                            Incidental finding of a hypoechoic structure                          without color flow or Doppler signals in the medial aspect of the distal thigh/knee. It sits on                          top of the GSV but does not compress or interfere                          with normal color flow or Doppler responses in the                          GSV at knee level. Risk Factors History +---------+----------+-----------------------------------------------------+ ! Diagnosis! Date      ! Comments                                             ! +---------+----------+-----------------------------------------------------+ ! Other    !09/06/2016! CT: bilateral PE                                     ! +---------+----------+-----------------------------------------------------+ Allergies   - Allergy:Penicillin(Drug). - Allergy:Sulfa(Drug). Velocities are measured in cm/s ; Diameters are measured in cm Right Lower Extremities DVT Study Measurements Right 2D Measurements +------------------------------------+----------+---------------+----------+ ! Location                            ! Visualized! Compressibility! Thrombosis! +------------------------------------+----------+---------------+----------+ ! Common Femoral                      !Yes       ! Yes            ! None      ! +------------------------------------+----------+---------------+----------+ Right Doppler Measurements +----------------------------+------+------+-------------------------------+ ! Location                    ! Signal!Reflux! Reflux (msec)                  ! +----------------------------+------+------+-------------------------------+ ! Common Femoral              !Phasic!      !                               ! +----------------------------+------+------+-------------------------------+ Left Lower Extremities DVT Study Measurements Left 2D Measurements +------------------------------------+----------+---------------+----------+ ! Location                            ! Visualized! Compressibility! Thrombosis! !Yes       !Yes            ! None      ! +------------------------------------+----------+---------------+----------+ Left Doppler Measurements +---------------------------+------+------+--------------------------------+ ! Location                   ! Signal!Reflux! Reflux (msec)                   ! +---------------------------+------+------+--------------------------------+ ! Common Femoral             !Phasic!      !                                ! +---------------------------+------+------+--------------------------------+ ! Prox Femoral               !Phasic!      !                                ! +---------------------------+------+------+--------------------------------+ ! Popliteal                  !Phasic!      !                                ! +---------------------------+------+------+--------------------------------+        Consultations:    Consults:     Final Specialist Recommendations/Findings:   IP CONSULT TO PULMONOLOGY  IP CONSULT TO INTERNAL MEDICINE  PHARMACY TO DOSE WARFARIN  IP CONSULT TO CARDIOLOGY      The patient was seen and examined on day of discharge and this discharge summary is in conjunction with any daily progress note from day of discharge. Discharge plan:     Disposition: Home    Physician Follow Up:     Destinee Osorio MD  [address]  [phone]             Requiring Further Evaluation/Follow Up POST HOSPITALIZATION/Incidental Findings:     Diet: cardiac diet    Activity: As tolerated    Instructions to Patient:     Discharge Medications:      Medication List      START taking these medications    benzonatate 200 MG capsule  Commonly known as:  TESSALON  Take 1 capsule by mouth 3 times daily as needed for Cough     guaiFENesin 600 MG extended release tablet  Commonly known as:  MUCINEX  Take 1 tablet by mouth 2 times daily     levofloxacin 250 MG tablet  Commonly known as:  LEVAQUIN  Take 1 tablet by mouth daily for 10 days  Start [address] [facility] [address]    Phone:  [phone]   · amiodarone 200 MG tablet  · benzonatate 200 MG capsule  · guaiFENesin 600 MG extended release tablet  · levofloxacin 250 MG tablet  · sennosides-docusate sodium 8.6-50 MG tablet         Time Spent on discharge is 30 to 74 minutes in patient examination, evaluation, counseling as well as medication reconciliation, prescriptions for required medications, discharge plan and follow up. Electronically signed by   Marni Daley MD  4/19/2019  9:58 AM      Thank you Dr. Reji Monique MD for the opportunity to be involved in this patient's care.

## 2019-04-22 LAB
CULTURE: NORMAL
CULTURE: NORMAL
Lab: NORMAL
Lab: NORMAL
SPECIMEN DESCRIPTION: NORMAL
SPECIMEN DESCRIPTION: NORMAL

## 2019-05-15 LAB
EKG ATRIAL RATE: 71 BPM
EKG P AXIS: 49 DEGREES
EKG P-R INTERVAL: 182 MS
EKG Q-T INTERVAL: 516 MS
EKG QRS DURATION: 200 MS
EKG QTC CALCULATION (BAZETT): 560 MS
EKG R AXIS: -77 DEGREES
EKG T AXIS: 84 DEGREES
EKG VENTRICULAR RATE: 71 BPM

## 2019-05-22 ENCOUNTER — CLINICAL DOCUMENTATION (OUTPATIENT)
Dept: CASE MANAGEMENT | Age: 84
End: 2019-05-22

## 2019-05-22 ENCOUNTER — CARE COORDINATION (OUTPATIENT)
Dept: CASE MANAGEMENT | Age: 84
End: 2019-05-22

## 2019-05-22 DIAGNOSIS — M19.90 ARTHRITIS: Primary | ICD-10-CM

## 2019-05-22 PROCEDURE — 1111F DSCHRG MED/CURRENT MED MERGE: CPT

## 2019-05-22 NOTE — CARE COORDINATION
[address] for Care Improvement ([facility]) Follow Up Call  Qualifying Diagnosis of Pneumonia. 5/22/2019  Patient Name:  Mirlande Irvin   YOB: 1930  Discharge Date:  4/19/19  RARS:  Readmission Risk Score: 14    PCP:  Nick Hung MD    Assessment:     Raine Cortes of Breath:     Cough Frequency, Productive/Color:  [facility] Appetite:    Sleep pattern:    Thinking clearly:    Tired/weakness:   Fever, Chills Sweating:   Headaches:  [address],  Active: [facility] assists with lab draws for Coumadin Management   Medication Needs/Questions: denies. Medications reviewed, 1111F orders entered.  Transportation Need:  denies   Live Alone: son lives her temporarily   Ability to [employer], [place]:   Do you feel like you have everything you need to stay well at home?  Follow Up Appointment: completed   Teaching:  Call physician's office with any needs, concerns. Agreeable to continued communication per [facility] program 90 day follow up. No future appointments.     Care Transitions will continue to follow per [facility] Program.  Tien Haley RN, CTC

## 2019-06-05 ENCOUNTER — CARE COORDINATION (OUTPATIENT)
Dept: CASE MANAGEMENT | Age: 84
End: 2019-06-05

## 2019-06-10 ENCOUNTER — HOSPITAL ENCOUNTER (OUTPATIENT)
Age: 84
Discharge: HOME | End: 2019-06-10
Payer: MEDICARE

## 2019-06-10 LAB
INR BLD: 2.3
PROTHROMBIN TIME: 25.4 SEC (ref 11.8–14.6)

## 2019-06-10 PROCEDURE — 36415 COLL VENOUS BLD VENIPUNCTURE: CPT

## 2019-06-10 PROCEDURE — 85610 PROTHROMBIN TIME: CPT

## 2019-07-08 ENCOUNTER — HOSPITAL ENCOUNTER (OUTPATIENT)
Age: 84
Discharge: HOME | End: 2019-07-08
Payer: MEDICARE

## 2019-07-08 LAB
ABSOLUTE EOS #: 0.05 K/UL (ref 0–0.4)
ABSOLUTE IMMATURE GRANULOCYTE: ABNORMAL K/UL (ref 0–0.3)
ABSOLUTE LYMPH #: 1.1 K/UL (ref 1–4.8)
ABSOLUTE MONO #: 0.58 K/UL (ref 0.1–1.3)
BASOPHILS # BLD: 0 % (ref 0–2)
BASOPHILS ABSOLUTE: 0 K/UL (ref 0–0.2)
DIFFERENTIAL TYPE: ABNORMAL
EOSINOPHILS RELATIVE PERCENT: 1 % (ref 0–4)
HCT VFR BLD CALC: 34.5 % (ref 36–46)
HEMOGLOBIN: 10.7 G/DL (ref 12–16)
IMMATURE GRANULOCYTES: ABNORMAL %
INR BLD: 3
LYMPHOCYTES # BLD: 23 % (ref 24–44)
MCH RBC QN AUTO: 24.3 PG (ref 26–34)
MCHC RBC AUTO-ENTMCNC: 31 G/DL (ref 31–37)
MCV RBC AUTO: 78.5 FL (ref 80–100)
MONOCYTES # BLD: 12 % (ref 1–7)
MORPHOLOGY: ABNORMAL
NRBC AUTOMATED: ABNORMAL PER 100 WBC
PDW BLD-RTO: 16.5 % (ref 11.5–14.9)
PLATELET # BLD: 259 K/UL (ref 150–450)
PLATELET ESTIMATE: ABNORMAL
PMV BLD AUTO: 8 FL (ref 6–12)
PROTHROMBIN TIME: 31.7 SEC (ref 11.8–14.6)
RBC # BLD: 4.39 M/UL (ref 4–5.2)
RBC # BLD: ABNORMAL 10*6/UL
SEG NEUTROPHILS: 64 % (ref 36–66)
SEGMENTED NEUTROPHILS ABSOLUTE COUNT: 3.07 K/UL (ref 1.3–9.1)
WBC # BLD: 4.8 K/UL (ref 3.5–11)
WBC # BLD: ABNORMAL 10*3/UL

## 2019-07-08 PROCEDURE — 85610 PROTHROMBIN TIME: CPT

## 2019-07-08 PROCEDURE — 85025 COMPLETE CBC W/AUTO DIFF WBC: CPT

## 2019-07-08 PROCEDURE — 36415 COLL VENOUS BLD VENIPUNCTURE: CPT

## 2019-07-11 ENCOUNTER — CARE COORDINATION (OUTPATIENT)
Dept: CASE MANAGEMENT | Age: 84
End: 2019-07-11

## 2019-07-24 ENCOUNTER — CARE COORDINATION (OUTPATIENT)
Dept: CASE MANAGEMENT | Age: 84
End: 2019-07-24

## 2019-09-16 ENCOUNTER — HOSPITAL ENCOUNTER (OUTPATIENT)
Age: 84
Setting detail: SPECIMEN
Discharge: HOME | End: 2019-09-16
Payer: MEDICARE

## 2019-09-18 LAB
CULTURE: ABNORMAL
Lab: ABNORMAL
SPECIMEN DESCRIPTION: ABNORMAL

## 2019-09-28 ENCOUNTER — APPOINTMENT (OUTPATIENT)
Dept: GENERAL RADIOLOGY | Age: 84
DRG: 306 | End: 2019-09-28
Payer: MEDICARE

## 2019-09-28 ENCOUNTER — HOSPITAL ENCOUNTER (INPATIENT)
Age: 84
LOS: 3 days | Discharge: OTHER INSTITUTION | DRG: 306 | End: 2019-10-03
Attending: EMERGENCY MEDICINE | Admitting: SURGERY
Payer: MEDICARE

## 2019-09-28 ENCOUNTER — APPOINTMENT (OUTPATIENT)
Dept: CT IMAGING | Age: 84
DRG: 306 | End: 2019-09-28
Payer: MEDICARE

## 2019-09-28 DIAGNOSIS — S52.532A CLOSED COLLES' FRACTURE OF LEFT RADIUS, INITIAL ENCOUNTER: ICD-10-CM
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
## 2019-09-28 DIAGNOSIS — S01.01XA LACERATION OF SCALP, INITIAL ENCOUNTER: ICD-10-CM

## 2019-09-28 PROBLEM — S09.90XA HEAD INJURY WITHOUT CONCUSSION OR INTRACRANIAL HEMORRHAGE: Status: ACTIVE | Noted: 2019-09-28

## 2019-09-28 LAB
ABSOLUTE EOS #: 0.1 K/UL (ref 0–0.4)
ABSOLUTE IMMATURE GRANULOCYTE: ABNORMAL K/UL (ref 0–0.3)
ABSOLUTE LYMPH #: 1 K/UL (ref 1–4.8)
ABSOLUTE MONO #: 0.4 K/UL (ref 0.1–1.3)
ANION GAP SERPL CALCULATED.3IONS-SCNC: 14 MMOL/L (ref 9–17)
BASOPHILS # BLD: 1 % (ref 0–2)
BASOPHILS ABSOLUTE: 0 K/UL (ref 0–0.2)
BUN BLDV-MCNC: 17 MG/DL (ref 8–23)
BUN/CREAT BLD: ABNORMAL (ref 9–20)
CALCIUM SERPL-MCNC: 9.7 MG/DL (ref 8.6–10.4)
CHLORIDE BLD-SCNC: 100 MMOL/L (ref 98–107)
CO2: 26 MMOL/L (ref 20–31)
CREAT SERPL-MCNC: 0.85 MG/DL (ref 0.5–0.9)
DIFFERENTIAL TYPE: ABNORMAL
EOSINOPHILS RELATIVE PERCENT: 3 % (ref 0–4)
GFR AFRICAN AMERICAN: >60 ML/MIN
GFR NON-AFRICAN AMERICAN: >60 ML/MIN
GFR SERPL CREATININE-BSD FRML MDRD: ABNORMAL ML/MIN/{1.73_M2}
GFR SERPL CREATININE-BSD FRML MDRD: ABNORMAL ML/MIN/{1.73_M2}
GLUCOSE BLD-MCNC: 118 MG/DL (ref 70–99)
HCT VFR BLD CALC: 40.8 % (ref 36–46)
HEMOGLOBIN: 13.3 G/DL (ref 12–16)
IMMATURE GRANULOCYTES: ABNORMAL %
INR BLD: 1.2
LYMPHOCYTES # BLD: 24 % (ref 24–44)
MCH RBC QN AUTO: 29.2 PG (ref 26–34)
MCHC RBC AUTO-ENTMCNC: 32.7 G/DL (ref 31–37)
MCV RBC AUTO: 89.4 FL (ref 80–100)
MONOCYTES # BLD: 10 % (ref 1–7)
NRBC AUTOMATED: ABNORMAL PER 100 WBC
PDW BLD-RTO: 18.9 % (ref 11.5–14.9)
PLATELET # BLD: 248 K/UL (ref 150–450)
PLATELET ESTIMATE: ABNORMAL
PMV BLD AUTO: 7.7 FL (ref 6–12)
POTASSIUM SERPL-SCNC: 4.3 MMOL/L (ref 3.7–5.3)
PROTHROMBIN TIME: 15.5 SEC (ref 11.8–14.6)
RBC # BLD: 4.57 M/UL (ref 4–5.2)
RBC # BLD: ABNORMAL 10*6/UL
SEG NEUTROPHILS: 62 % (ref 36–66)
SEGMENTED NEUTROPHILS ABSOLUTE COUNT: 2.7 K/UL (ref 1.3–9.1)
SODIUM BLD-SCNC: 140 MMOL/L (ref 135–144)
WBC # BLD: 4.3 K/UL (ref 3.5–11)
WBC # BLD: ABNORMAL 10*3/UL

## 2019-09-28 PROCEDURE — G0378 HOSPITAL OBSERVATION PER HR: HCPCS

## 2019-09-28 PROCEDURE — 36415 COLL VENOUS BLD VENIPUNCTURE: CPT

## 2019-09-28 PROCEDURE — 80048 BASIC METABOLIC PNL TOTAL CA: CPT

## 2019-09-28 PROCEDURE — 99285 EMERGENCY DEPT VISIT HI MDM: CPT

## 2019-09-28 PROCEDURE — 73110 X-RAY EXAM OF WRIST: CPT

## 2019-09-28 PROCEDURE — 73100 X-RAY EXAM OF WRIST: CPT

## 2019-09-28 PROCEDURE — 6360000002 HC RX W HCPCS: Performed by: EMERGENCY MEDICINE

## 2019-09-28 PROCEDURE — 85025 COMPLETE CBC W/AUTO DIFF WBC: CPT

## 2019-09-28 PROCEDURE — 70450 CT HEAD/BRAIN W/O DYE: CPT

## 2019-09-28 PROCEDURE — 71045 X-RAY EXAM CHEST 1 VIEW: CPT

## 2019-09-28 PROCEDURE — 85610 PROTHROMBIN TIME: CPT

## 2019-09-28 RX ORDER — PROPOFOL 10 MG/ML
200 INJECTION, EMULSION INTRAVENOUS ONCE
Status: DISCONTINUED | OUTPATIENT
Start: 2019-09-28 | End: 2019-10-03 | Stop reason: HOSPADM

## 2019-09-28 RX ORDER — PROPOFOL 10 MG/ML
INJECTION, EMULSION INTRAVENOUS CONTINUOUS PRN
Status: COMPLETED | OUTPATIENT
Start: 2019-09-28 | End: 2019-09-28

## 2019-09-28 RX ORDER — BUPIVACAINE HYDROCHLORIDE 5 MG/ML
30 INJECTION, SOLUTION EPIDURAL; INTRACAUDAL ONCE
Status: DISCONTINUED | OUTPATIENT
Start: 2019-09-28 | End: 2019-10-03 | Stop reason: HOSPADM

## 2019-09-28 RX ORDER — FENTANYL CITRATE 50 UG/ML
25 INJECTION, SOLUTION INTRAMUSCULAR; INTRAVENOUS ONCE
Status: COMPLETED | OUTPATIENT
Start: 2019-09-28 | End: 2019-09-28

## 2019-09-28 RX ADMIN — PROPOFOL 80 MG: 10 INJECTION, EMULSION INTRAVENOUS at 22:04

## 2019-09-28 RX ADMIN — FENTANYL CITRATE 25 MCG: 50 INJECTION INTRAMUSCULAR; INTRAVENOUS at 22:26

## 2019-09-28 ASSESSMENT — ENCOUNTER SYMPTOMS
SHORTNESS OF BREATH: 0
SORE THROAT: 0
DIARRHEA: 0
BACK PAIN: 0
COLOR CHANGE: 0
EYE REDNESS: 0
EYE PAIN: 0
TROUBLE SWALLOWING: 0
CONSTIPATION: 0
SINUS PRESSURE: 0
NAUSEA: 0
FACIAL SWELLING: 0
CHEST TIGHTNESS: 0
COUGH: 0
BLOOD IN STOOL: 0
ABDOMINAL PAIN: 0
EYE DISCHARGE: 0
RHINORRHEA: 0
WHEEZING: 0
VOMITING: 0

## 2019-09-28 ASSESSMENT — PAIN DESCRIPTION - ORIENTATION
ORIENTATION: LEFT
ORIENTATION: LEFT

## 2019-09-28 ASSESSMENT — PAIN DESCRIPTION - PROGRESSION: CLINICAL_PROGRESSION: NOT CHANGED

## 2019-09-28 ASSESSMENT — PAIN SCALES - GENERAL
PAINLEVEL_OUTOF10: 8
PAINLEVEL_OUTOF10: 9
PAINLEVEL_OUTOF10: 8

## 2019-09-28 ASSESSMENT — PAIN DESCRIPTION - LOCATION
LOCATION: WRIST
LOCATION: WRIST

## 2019-09-28 ASSESSMENT — PAIN DESCRIPTION - PAIN TYPE
TYPE: ACUTE PAIN
TYPE: ACUTE PAIN

## 2019-09-28 ASSESSMENT — PAIN DESCRIPTION - ONSET: ONSET: GRADUAL

## 2019-09-28 ASSESSMENT — PAIN DESCRIPTION - DESCRIPTORS: DESCRIPTORS: ACHING

## 2019-09-28 ASSESSMENT — PAIN DESCRIPTION - FREQUENCY: FREQUENCY: CONTINUOUS

## 2019-09-28 ASSESSMENT — PAIN - FUNCTIONAL ASSESSMENT: PAIN_FUNCTIONAL_ASSESSMENT: PREVENTS OR INTERFERES SOME ACTIVE ACTIVITIES AND ADLS

## 2019-09-29 PROCEDURE — 2580000003 HC RX 258: Performed by: SURGERY

## 2019-09-29 PROCEDURE — 96376 TX/PRO/DX INJ SAME DRUG ADON: CPT

## 2019-09-29 PROCEDURE — 6370000000 HC RX 637 (ALT 250 FOR IP): Performed by: INTERNAL MEDICINE

## 2019-09-29 PROCEDURE — 25600 CLTX DST RDL FX/EPHYS SEP WO: CPT | Performed by: ORTHOPAEDIC SURGERY

## 2019-09-29 PROCEDURE — 6370000000 HC RX 637 (ALT 250 FOR IP): Performed by: SURGERY

## 2019-09-29 PROCEDURE — 99221 1ST HOSP IP/OBS SF/LOW 40: CPT | Performed by: ORTHOPAEDIC SURGERY

## 2019-09-29 PROCEDURE — 96374 THER/PROPH/DIAG INJ IV PUSH: CPT

## 2019-09-29 PROCEDURE — G0378 HOSPITAL OBSERVATION PER HR: HCPCS

## 2019-09-29 PROCEDURE — 6360000002 HC RX W HCPCS: Performed by: SURGERY

## 2019-09-29 PROCEDURE — 99222 1ST HOSP IP/OBS MODERATE 55: CPT | Performed by: INTERNAL MEDICINE

## 2019-09-29 RX ORDER — ROPINIROLE 0.5 MG/1
0.25 TABLET, FILM COATED ORAL NIGHTLY
Status: DISCONTINUED | OUTPATIENT
Start: 2019-09-29 | End: 2019-09-29

## 2019-09-29 RX ORDER — ATORVASTATIN CALCIUM 10 MG/1
10 TABLET, FILM COATED ORAL DAILY
Status: DISCONTINUED | OUTPATIENT
Start: 2019-09-29 | End: 2019-10-03 | Stop reason: HOSPADM

## 2019-09-29 RX ORDER — SENNA AND DOCUSATE SODIUM 50; 8.6 MG/1; MG/1
2 TABLET, FILM COATED ORAL DAILY
Status: DISCONTINUED | OUTPATIENT
Start: 2019-09-29 | End: 2019-10-03 | Stop reason: HOSPADM

## 2019-09-29 RX ORDER — ROPINIROLE 0.25 MG/1
0.25 TABLET, FILM COATED ORAL NIGHTLY
Status: DISCONTINUED | OUTPATIENT
Start: 2019-09-29 | End: 2019-10-03 | Stop reason: HOSPADM

## 2019-09-29 RX ORDER — SODIUM CHLORIDE 0.9 % (FLUSH) 0.9 %
10 SYRINGE (ML) INJECTION EVERY 12 HOURS SCHEDULED
Status: DISCONTINUED | OUTPATIENT
Start: 2019-09-29 | End: 2019-10-03 | Stop reason: HOSPADM

## 2019-09-29 RX ORDER — TRAMADOL HYDROCHLORIDE 50 MG/1
50 TABLET ORAL EVERY 6 HOURS PRN
Status: DISCONTINUED | OUTPATIENT
Start: 2019-09-29 | End: 2019-10-03 | Stop reason: HOSPADM

## 2019-09-29 RX ORDER — GUAIFENESIN 600 MG/1
600 TABLET, EXTENDED RELEASE ORAL 2 TIMES DAILY
Status: DISCONTINUED | OUTPATIENT
Start: 2019-09-29 | End: 2019-09-29

## 2019-09-29 RX ORDER — HYDROCODONE BITARTRATE AND ACETAMINOPHEN 5; 325 MG/1; MG/1
1 TABLET ORAL EVERY 4 HOURS PRN
Status: DISCONTINUED | OUTPATIENT
Start: 2019-09-29 | End: 2019-10-03 | Stop reason: HOSPADM

## 2019-09-29 RX ORDER — ATORVASTATIN CALCIUM 10 MG/1
10 TABLET, FILM COATED ORAL DAILY
Status: DISCONTINUED | OUTPATIENT
Start: 2019-09-29 | End: 2019-09-29

## 2019-09-29 RX ORDER — AMIODARONE HYDROCHLORIDE 200 MG/1
200 TABLET ORAL DAILY
Status: DISCONTINUED | OUTPATIENT
Start: 2019-09-29 | End: 2019-09-29

## 2019-09-29 RX ORDER — ASPIRIN 81 MG/1
81 TABLET ORAL 2 TIMES DAILY
Status: DISCONTINUED | OUTPATIENT
Start: 2019-09-29 | End: 2019-09-29

## 2019-09-29 RX ORDER — ACETAMINOPHEN 325 MG/1
650 TABLET ORAL EVERY 4 HOURS PRN
Status: DISCONTINUED | OUTPATIENT
Start: 2019-09-29 | End: 2019-10-03 | Stop reason: HOSPADM

## 2019-09-29 RX ORDER — POLYETHYLENE GLYCOL 3350 17 G/17G
17 POWDER, FOR SOLUTION ORAL DAILY
Status: DISCONTINUED | OUTPATIENT
Start: 2019-09-29 | End: 2019-10-03 | Stop reason: HOSPADM

## 2019-09-29 RX ORDER — MORPHINE SULFATE 2 MG/ML
1 INJECTION, SOLUTION INTRAMUSCULAR; INTRAVENOUS
Status: DISCONTINUED | OUTPATIENT
Start: 2019-09-29 | End: 2019-10-03 | Stop reason: HOSPADM

## 2019-09-29 RX ORDER — LATANOPROST 50 UG/ML
1 SOLUTION/ DROPS OPHTHALMIC NIGHTLY
Status: DISCONTINUED | OUTPATIENT
Start: 2019-09-29 | End: 2019-10-03 | Stop reason: HOSPADM

## 2019-09-29 RX ORDER — NICOTINE POLACRILEX 2 MG
1 GUM BUCCAL DAILY
Status: DISCONTINUED | OUTPATIENT
Start: 2019-09-29 | End: 2019-09-29

## 2019-09-29 RX ORDER — AMIODARONE HYDROCHLORIDE 200 MG/1
200 TABLET ORAL DAILY
Status: DISCONTINUED | OUTPATIENT
Start: 2019-09-29 | End: 2019-10-03 | Stop reason: HOSPADM

## 2019-09-29 RX ORDER — SODIUM CHLORIDE 0.9 % (FLUSH) 0.9 %
10 SYRINGE (ML) INJECTION PRN
Status: DISCONTINUED | OUTPATIENT
Start: 2019-09-29 | End: 2019-10-03 | Stop reason: HOSPADM

## 2019-09-29 RX ADMIN — Medication 10 ML: at 21:32

## 2019-09-29 RX ADMIN — Medication 25 MG: at 21:28

## 2019-09-29 RX ADMIN — ATORVASTATIN CALCIUM 10 MG: 10 TABLET, FILM COATED ORAL at 13:49

## 2019-09-29 RX ADMIN — ROPINIROLE 0.25 MG: 0.25 TABLET, FILM COATED ORAL at 21:28

## 2019-09-29 RX ADMIN — Medication 25 MG: at 13:48

## 2019-09-29 RX ADMIN — LATANOPROST 1 DROP: 50 SOLUTION OPHTHALMIC at 21:31

## 2019-09-29 RX ADMIN — HYDROCODONE BITARTRATE AND ACETAMINOPHEN 1 TABLET: 5; 325 TABLET ORAL at 13:45

## 2019-09-29 RX ADMIN — AMIODARONE HYDROCHLORIDE 200 MG: 200 TABLET ORAL at 13:48

## 2019-09-29 RX ADMIN — MORPHINE SULFATE 1 MG: 2 INJECTION, SOLUTION INTRAMUSCULAR; INTRAVENOUS at 09:55

## 2019-09-29 RX ADMIN — Medication 10 ML: at 07:55

## 2019-09-29 RX ADMIN — HYDROCODONE BITARTRATE AND ACETAMINOPHEN 1 TABLET: 5; 325 TABLET ORAL at 18:18

## 2019-09-29 RX ADMIN — MORPHINE SULFATE 1 MG: 2 INJECTION, SOLUTION INTRAMUSCULAR; INTRAVENOUS at 04:04

## 2019-09-29 ASSESSMENT — PAIN DESCRIPTION - PAIN TYPE
TYPE: ACUTE PAIN
TYPE: ACUTE PAIN

## 2019-09-29 ASSESSMENT — PAIN SCALES - GENERAL
PAINLEVEL_OUTOF10: 7
PAINLEVEL_OUTOF10: 8
PAINLEVEL_OUTOF10: 7
PAINLEVEL_OUTOF10: 7
PAINLEVEL_OUTOF10: 5

## 2019-09-29 ASSESSMENT — PAIN DESCRIPTION - LOCATION
LOCATION: WRIST
LOCATION: WRIST

## 2019-09-30 LAB
-: ABNORMAL
AMORPHOUS: ABNORMAL
BACTERIA: ABNORMAL
BILIRUBIN URINE: NEGATIVE
CASTS UA: ABNORMAL /LPF
COLOR: YELLOW
COMMENT UA: ABNORMAL
CRYSTALS, UA: ABNORMAL /HPF
EPITHELIAL CELLS UA: ABNORMAL /HPF
GLUCOSE URINE: NEGATIVE
KETONES, URINE: NEGATIVE
LEUKOCYTE ESTERASE, URINE: ABNORMAL
LV EF: 55 %
LVEF MODALITY: NORMAL
MUCUS: ABNORMAL
NITRITE, URINE: NEGATIVE
OTHER OBSERVATIONS UA: ABNORMAL
PH UA: 6 (ref 5–8)
PROTEIN UA: NEGATIVE
RBC UA: ABNORMAL /HPF
RENAL EPITHELIAL, UA: ABNORMAL /HPF
SPECIFIC GRAVITY UA: 1.01 (ref 1–1.03)
TRICHOMONAS: ABNORMAL
TURBIDITY: CLEAR
URINE HGB: NEGATIVE
UROBILINOGEN, URINE: NORMAL
WBC UA: ABNORMAL /HPF
YEAST: ABNORMAL

## 2019-09-30 PROCEDURE — 81001 URINALYSIS AUTO W/SCOPE: CPT

## 2019-09-30 PROCEDURE — 87086 URINE CULTURE/COLONY COUNT: CPT

## 2019-09-30 PROCEDURE — 6370000000 HC RX 637 (ALT 250 FOR IP): Performed by: SURGERY

## 2019-09-30 PROCEDURE — 87186 SC STD MICRODIL/AGAR DIL: CPT

## 2019-09-30 PROCEDURE — 87077 CULTURE AEROBIC IDENTIFY: CPT

## 2019-09-30 PROCEDURE — 6370000000 HC RX 637 (ALT 250 FOR IP): Performed by: INTERNAL MEDICINE

## 2019-09-30 PROCEDURE — 99232 SBSQ HOSP IP/OBS MODERATE 35: CPT | Performed by: INTERNAL MEDICINE

## 2019-09-30 PROCEDURE — 93306 TTE W/DOPPLER COMPLETE: CPT

## 2019-09-30 PROCEDURE — 2580000003 HC RX 258: Performed by: SURGERY

## 2019-09-30 PROCEDURE — 1200000000 HC SEMI PRIVATE

## 2019-09-30 RX ADMIN — POLYETHYLENE GLYCOL 3350 17 G: 17 POWDER, FOR SOLUTION ORAL at 09:14

## 2019-09-30 RX ADMIN — Medication 25 MG: at 20:39

## 2019-09-30 RX ADMIN — Medication 10 ML: at 09:13

## 2019-09-30 RX ADMIN — LATANOPROST 1 DROP: 50 SOLUTION OPHTHALMIC at 20:38

## 2019-09-30 RX ADMIN — AMIODARONE HYDROCHLORIDE 200 MG: 200 TABLET ORAL at 09:13

## 2019-09-30 RX ADMIN — ATORVASTATIN CALCIUM 10 MG: 10 TABLET, FILM COATED ORAL at 09:13

## 2019-09-30 RX ADMIN — Medication 25 MG: at 09:13

## 2019-09-30 RX ADMIN — Medication 10 ML: at 20:43

## 2019-09-30 RX ADMIN — ROPINIROLE 0.25 MG: 0.25 TABLET, FILM COATED ORAL at 20:38

## 2019-09-30 RX ADMIN — ACETAMINOPHEN 650 MG: 325 TABLET ORAL at 20:39

## 2019-09-30 ASSESSMENT — PAIN SCALES - GENERAL: PAINLEVEL_OUTOF10: 6

## 2019-10-01 LAB
ABSOLUTE EOS #: 0.2 K/UL (ref 0–0.4)
ABSOLUTE IMMATURE GRANULOCYTE: ABNORMAL K/UL (ref 0–0.3)
ABSOLUTE LYMPH #: 0.7 K/UL (ref 1–4.8)
ABSOLUTE MONO #: 0.6 K/UL (ref 0.1–1.3)
ANION GAP SERPL CALCULATED.3IONS-SCNC: 9 MMOL/L (ref 9–17)
BASOPHILS # BLD: 1 % (ref 0–2)
BASOPHILS ABSOLUTE: 0 K/UL (ref 0–0.2)
BUN BLDV-MCNC: 10 MG/DL (ref 8–23)
BUN/CREAT BLD: ABNORMAL (ref 9–20)
CALCIUM SERPL-MCNC: 9 MG/DL (ref 8.6–10.4)
CHLORIDE BLD-SCNC: 109 MMOL/L (ref 98–107)
CO2: 25 MMOL/L (ref 20–31)
CREAT SERPL-MCNC: 0.51 MG/DL (ref 0.5–0.9)
DIFFERENTIAL TYPE: ABNORMAL
EOSINOPHILS RELATIVE PERCENT: 4 % (ref 0–4)
GFR AFRICAN AMERICAN: >60 ML/MIN
GFR NON-AFRICAN AMERICAN: >60 ML/MIN
GFR SERPL CREATININE-BSD FRML MDRD: ABNORMAL ML/MIN/{1.73_M2}
GFR SERPL CREATININE-BSD FRML MDRD: ABNORMAL ML/MIN/{1.73_M2}
GLUCOSE BLD-MCNC: 104 MG/DL (ref 70–99)
HCT VFR BLD CALC: 35.5 % (ref 36–46)
HEMOGLOBIN: 11.7 G/DL (ref 12–16)
IMMATURE GRANULOCYTES: ABNORMAL %
LYMPHOCYTES # BLD: 14 % (ref 24–44)
MCH RBC QN AUTO: 28.8 PG (ref 26–34)
MCHC RBC AUTO-ENTMCNC: 32.8 G/DL (ref 31–37)
MCV RBC AUTO: 87.7 FL (ref 80–100)
MONOCYTES # BLD: 12 % (ref 1–7)
NRBC AUTOMATED: ABNORMAL PER 100 WBC
PDW BLD-RTO: 19.2 % (ref 11.5–14.9)
PLATELET # BLD: 220 K/UL (ref 150–450)
PLATELET ESTIMATE: ABNORMAL
PMV BLD AUTO: 8 FL (ref 6–12)
POTASSIUM SERPL-SCNC: 4.2 MMOL/L (ref 3.7–5.3)
RBC # BLD: 4.05 M/UL (ref 4–5.2)
RBC # BLD: ABNORMAL 10*6/UL
SEG NEUTROPHILS: 69 % (ref 36–66)
SEGMENTED NEUTROPHILS ABSOLUTE COUNT: 3.6 K/UL (ref 1.3–9.1)
SODIUM BLD-SCNC: 143 MMOL/L (ref 135–144)
WBC # BLD: 5.2 K/UL (ref 3.5–11)
WBC # BLD: ABNORMAL 10*3/UL

## 2019-10-01 PROCEDURE — 6370000000 HC RX 637 (ALT 250 FOR IP): Performed by: INTERNAL MEDICINE

## 2019-10-01 PROCEDURE — 97116 GAIT TRAINING THERAPY: CPT

## 2019-10-01 PROCEDURE — 97162 PT EVAL MOD COMPLEX 30 MIN: CPT

## 2019-10-01 PROCEDURE — 2580000003 HC RX 258: Performed by: SURGERY

## 2019-10-01 PROCEDURE — 6370000000 HC RX 637 (ALT 250 FOR IP): Performed by: SURGERY

## 2019-10-01 PROCEDURE — 80048 BASIC METABOLIC PNL TOTAL CA: CPT

## 2019-10-01 PROCEDURE — 99232 SBSQ HOSP IP/OBS MODERATE 35: CPT | Performed by: INTERNAL MEDICINE

## 2019-10-01 PROCEDURE — 1200000000 HC SEMI PRIVATE

## 2019-10-01 PROCEDURE — 85025 COMPLETE CBC W/AUTO DIFF WBC: CPT

## 2019-10-01 PROCEDURE — 97530 THERAPEUTIC ACTIVITIES: CPT

## 2019-10-01 PROCEDURE — 36415 COLL VENOUS BLD VENIPUNCTURE: CPT

## 2019-10-01 PROCEDURE — 97166 OT EVAL MOD COMPLEX 45 MIN: CPT

## 2019-10-01 RX ORDER — CEPHALEXIN 250 MG/1
250 CAPSULE ORAL 2 TIMES DAILY
Status: DISCONTINUED | OUTPATIENT
Start: 2019-10-01 | End: 2019-10-03 | Stop reason: HOSPADM

## 2019-10-01 RX ORDER — FUROSEMIDE 20 MG/1
20 TABLET ORAL DAILY
Status: DISCONTINUED | OUTPATIENT
Start: 2019-10-01 | End: 2019-10-01

## 2019-10-01 RX ORDER — ASPIRIN 81 MG/1
81 TABLET ORAL DAILY
Status: DISCONTINUED | OUTPATIENT
Start: 2019-10-01 | End: 2019-10-03 | Stop reason: HOSPADM

## 2019-10-01 RX ORDER — FUROSEMIDE 20 MG/1
20 TABLET ORAL DAILY PRN
Status: DISCONTINUED | OUTPATIENT
Start: 2019-10-01 | End: 2019-10-03 | Stop reason: HOSPADM

## 2019-10-01 RX ORDER — METOPROLOL TARTRATE 50 MG/1
50 TABLET, FILM COATED ORAL 2 TIMES DAILY
Status: DISCONTINUED | OUTPATIENT
Start: 2019-10-01 | End: 2019-10-01

## 2019-10-01 RX ADMIN — METOPROLOL TARTRATE 25 MG: 25 TABLET, FILM COATED ORAL at 20:55

## 2019-10-01 RX ADMIN — Medication 25 MG: at 07:45

## 2019-10-01 RX ADMIN — ROPINIROLE 0.25 MG: 0.25 TABLET, FILM COATED ORAL at 20:55

## 2019-10-01 RX ADMIN — ASPIRIN 81 MG: 81 TABLET, COATED ORAL at 15:02

## 2019-10-01 RX ADMIN — AMIODARONE HYDROCHLORIDE 200 MG: 200 TABLET ORAL at 07:45

## 2019-10-01 RX ADMIN — LATANOPROST 1 DROP: 50 SOLUTION OPHTHALMIC at 20:57

## 2019-10-01 RX ADMIN — Medication 10 ML: at 20:55

## 2019-10-01 RX ADMIN — Medication 10 ML: at 07:45

## 2019-10-01 RX ADMIN — ATORVASTATIN CALCIUM 10 MG: 10 TABLET, FILM COATED ORAL at 07:45

## 2019-10-01 RX ADMIN — CEPHALEXIN 250 MG: 250 CAPSULE ORAL at 20:55

## 2019-10-01 RX ADMIN — ACETAMINOPHEN 650 MG: 325 TABLET ORAL at 09:04

## 2019-10-01 ASSESSMENT — PAIN DESCRIPTION - LOCATION
LOCATION: NECK;WRIST
LOCATION: WRIST

## 2019-10-01 ASSESSMENT — PAIN DESCRIPTION - DESCRIPTORS: DESCRIPTORS: ACHING

## 2019-10-01 ASSESSMENT — PAIN DESCRIPTION - ORIENTATION
ORIENTATION: LEFT

## 2019-10-01 ASSESSMENT — PAIN DESCRIPTION - PROGRESSION
CLINICAL_PROGRESSION: GRADUALLY IMPROVING

## 2019-10-01 ASSESSMENT — PAIN DESCRIPTION - ONSET
ONSET: GRADUAL
ONSET: GRADUAL

## 2019-10-01 ASSESSMENT — PAIN SCALES - GENERAL
PAINLEVEL_OUTOF10: 5
PAINLEVEL_OUTOF10: 4

## 2019-10-01 ASSESSMENT — PAIN DESCRIPTION - FREQUENCY
FREQUENCY: INTERMITTENT

## 2019-10-01 ASSESSMENT — PAIN DESCRIPTION - PAIN TYPE
TYPE: ACUTE PAIN

## 2019-10-02 PROCEDURE — 99231 SBSQ HOSP IP/OBS SF/LOW 25: CPT | Performed by: INTERNAL MEDICINE

## 2019-10-02 PROCEDURE — 6370000000 HC RX 637 (ALT 250 FOR IP): Performed by: INTERNAL MEDICINE

## 2019-10-02 PROCEDURE — 97530 THERAPEUTIC ACTIVITIES: CPT

## 2019-10-02 PROCEDURE — 97110 THERAPEUTIC EXERCISES: CPT

## 2019-10-02 PROCEDURE — 97116 GAIT TRAINING THERAPY: CPT

## 2019-10-02 PROCEDURE — 97535 SELF CARE MNGMENT TRAINING: CPT

## 2019-10-02 PROCEDURE — 1200000000 HC SEMI PRIVATE

## 2019-10-02 RX ORDER — GUAIFENESIN/DEXTROMETHORPHAN 100-10MG/5
5 SYRUP ORAL EVERY 4 HOURS PRN
Status: DISCONTINUED | OUTPATIENT
Start: 2019-10-02 | End: 2019-10-03 | Stop reason: HOSPADM

## 2019-10-02 RX ADMIN — ROPINIROLE 0.25 MG: 0.25 TABLET, FILM COATED ORAL at 19:56

## 2019-10-02 RX ADMIN — AMIODARONE HYDROCHLORIDE 200 MG: 200 TABLET ORAL at 08:14

## 2019-10-02 RX ADMIN — CEPHALEXIN 250 MG: 250 CAPSULE ORAL at 19:55

## 2019-10-02 RX ADMIN — ASPIRIN 81 MG: 81 TABLET, COATED ORAL at 08:14

## 2019-10-02 RX ADMIN — LATANOPROST 1 DROP: 50 SOLUTION OPHTHALMIC at 19:56

## 2019-10-02 RX ADMIN — METOPROLOL TARTRATE 25 MG: 25 TABLET, FILM COATED ORAL at 19:56

## 2019-10-02 RX ADMIN — METOPROLOL TARTRATE 25 MG: 25 TABLET, FILM COATED ORAL at 08:14

## 2019-10-02 RX ADMIN — ATORVASTATIN CALCIUM 10 MG: 10 TABLET, FILM COATED ORAL at 08:14

## 2019-10-02 RX ADMIN — CEPHALEXIN 250 MG: 250 CAPSULE ORAL at 08:14

## 2019-10-02 ASSESSMENT — PAIN DESCRIPTION - LOCATION
LOCATION: RIB CAGE
LOCATION: CHEST;RIB CAGE

## 2019-10-02 ASSESSMENT — PAIN DESCRIPTION - PAIN TYPE: TYPE: ACUTE PAIN

## 2019-10-02 ASSESSMENT — PAIN DESCRIPTION - DIRECTION
RADIATING_TOWARDS: FROM COUGHING
RADIATING_TOWARDS: FROM COUGHING

## 2019-10-02 ASSESSMENT — PAIN SCALES - GENERAL
PAINLEVEL_OUTOF10: 4
PAINLEVEL_OUTOF10: 4

## 2019-10-02 ASSESSMENT — PAIN DESCRIPTION - PROGRESSION: CLINICAL_PROGRESSION: GRADUALLY IMPROVING

## 2019-10-03 ENCOUNTER — APPOINTMENT (OUTPATIENT)
Dept: GENERAL RADIOLOGY | Age: 84
DRG: 306 | End: 2019-10-03
Payer: MEDICARE

## 2019-10-03 VITALS
TEMPERATURE: 98 F | RESPIRATION RATE: 20 BRPM | HEIGHT: 64 IN | HEART RATE: 72 BPM | OXYGEN SATURATION: 98 % | DIASTOLIC BLOOD PRESSURE: 59 MMHG | BODY MASS INDEX: 26.57 KG/M2 | SYSTOLIC BLOOD PRESSURE: 122 MMHG | WEIGHT: 155.65 LBS

## 2019-10-03 LAB
CULTURE: ABNORMAL
Lab: ABNORMAL
SPECIMEN DESCRIPTION: ABNORMAL

## 2019-10-03 PROCEDURE — 6370000000 HC RX 637 (ALT 250 FOR IP): Performed by: INTERNAL MEDICINE

## 2019-10-03 PROCEDURE — 93971 EXTREMITY STUDY: CPT

## 2019-10-03 PROCEDURE — 6370000000 HC RX 637 (ALT 250 FOR IP): Performed by: SURGERY

## 2019-10-03 PROCEDURE — 97110 THERAPEUTIC EXERCISES: CPT

## 2019-10-03 PROCEDURE — 97116 GAIT TRAINING THERAPY: CPT

## 2019-10-03 PROCEDURE — 73110 X-RAY EXAM OF WRIST: CPT

## 2019-10-03 RX ORDER — CEPHALEXIN 250 MG/1
250 CAPSULE ORAL 2 TIMES DAILY
Qty: 10 CAPSULE | Refills: 0 | Status: ON HOLD | OUTPATIENT
Start: 2019-10-03 | End: 2019-10-18 | Stop reason: HOSPADM

## 2019-10-03 RX ADMIN — ASPIRIN 81 MG: 81 TABLET, COATED ORAL at 08:57

## 2019-10-03 RX ADMIN — CEPHALEXIN 250 MG: 250 CAPSULE ORAL at 08:57

## 2019-10-03 RX ADMIN — GUAIFENESIN AND DEXTROMETHORPHAN 5 ML: 100; 10 SYRUP ORAL at 16:45

## 2019-10-03 RX ADMIN — SENNOSIDES, DOCUSATE SODIUM 2 TABLET: 50; 8.6 TABLET, FILM COATED ORAL at 08:57

## 2019-10-03 RX ADMIN — AMIODARONE HYDROCHLORIDE 200 MG: 200 TABLET ORAL at 08:57

## 2019-10-03 RX ADMIN — GUAIFENESIN AND DEXTROMETHORPHAN 5 ML: 100; 10 SYRUP ORAL at 09:05

## 2019-10-03 RX ADMIN — ACETAMINOPHEN 650 MG: 325 TABLET ORAL at 09:11

## 2019-10-03 RX ADMIN — METOPROLOL TARTRATE 25 MG: 25 TABLET, FILM COATED ORAL at 08:57

## 2019-10-03 RX ADMIN — ATORVASTATIN CALCIUM 10 MG: 10 TABLET, FILM COATED ORAL at 08:58

## 2019-10-03 ASSESSMENT — PAIN SCALES - GENERAL
PAINLEVEL_OUTOF10: 4
PAINLEVEL_OUTOF10: 4

## 2019-10-03 ASSESSMENT — PAIN DESCRIPTION - PAIN TYPE: TYPE: ACUTE PAIN

## 2019-10-03 ASSESSMENT — PAIN DESCRIPTION - ORIENTATION: ORIENTATION: LEFT

## 2019-10-03 ASSESSMENT — PAIN DESCRIPTION - LOCATION: LOCATION: RIB CAGE

## 2019-10-04 ENCOUNTER — APPOINTMENT (OUTPATIENT)
Dept: GENERAL RADIOLOGY | Age: 84
DRG: 314 | End: 2019-10-04
Payer: MEDICARE

## 2019-10-04 ENCOUNTER — HOSPITAL ENCOUNTER (INPATIENT)
Age: 84
LOS: 14 days | Discharge: NURSING, REHAB OR LONG-TERM CARE | DRG: 314 | End: 2019-10-18
Attending: EMERGENCY MEDICINE | Admitting: INTERNAL MEDICINE
Payer: MEDICARE

## 2019-10-04 DIAGNOSIS — N30.01 ACUTE CYSTITIS WITH HEMATURIA: Primary | ICD-10-CM

## 2019-10-04 PROBLEM — N39.0 URINARY TRACT INFECTION: Status: ACTIVE | Noted: 2019-10-04

## 2019-10-04 LAB
-: ABNORMAL
-: NORMAL
ABSOLUTE BANDS #: 1.4 K/UL (ref 0–1)
ABSOLUTE EOS #: 0 K/UL (ref 0–0.4)
ABSOLUTE IMMATURE GRANULOCYTE: ABNORMAL K/UL (ref 0–0.3)
ABSOLUTE LYMPH #: 0.32 K/UL (ref 1–4.8)
ABSOLUTE MONO #: 0.54 K/UL (ref 0.1–1.3)
ALBUMIN SERPL-MCNC: 2.8 G/DL (ref 3.5–5.2)
ALBUMIN/GLOBULIN RATIO: ABNORMAL (ref 1–2.5)
ALP BLD-CCNC: 71 U/L (ref 35–104)
ALT SERPL-CCNC: 19 U/L (ref 5–33)
AMORPHOUS: ABNORMAL
ANION GAP SERPL CALCULATED.3IONS-SCNC: 11 MMOL/L (ref 9–17)
AST SERPL-CCNC: 26 U/L
BACTERIA: ABNORMAL
BANDS: 13 % (ref 0–10)
BASOPHILS # BLD: 0 % (ref 0–2)
BASOPHILS ABSOLUTE: 0 K/UL (ref 0–0.2)
BILIRUB SERPL-MCNC: 0.61 MG/DL (ref 0.3–1.2)
BILIRUBIN URINE: ABNORMAL
BNP INTERPRETATION: ABNORMAL
BUN BLDV-MCNC: 14 MG/DL (ref 8–23)
BUN/CREAT BLD: ABNORMAL (ref 9–20)
CALCIUM SERPL-MCNC: 8.8 MG/DL (ref 8.6–10.4)
CASTS UA: ABNORMAL /LPF
CHLORIDE BLD-SCNC: 102 MMOL/L (ref 98–107)
CO2: 23 MMOL/L (ref 20–31)
COLOR: YELLOW
COMMENT UA: ABNORMAL
CREAT SERPL-MCNC: 0.55 MG/DL (ref 0.5–0.9)
CRYSTALS, UA: ABNORMAL /HPF
DIFFERENTIAL TYPE: ABNORMAL
EOSINOPHILS RELATIVE PERCENT: 0 % (ref 0–4)
EPITHELIAL CELLS UA: ABNORMAL /HPF
GFR AFRICAN AMERICAN: >60 ML/MIN
GFR NON-AFRICAN AMERICAN: >60 ML/MIN
GFR SERPL CREATININE-BSD FRML MDRD: ABNORMAL ML/MIN/{1.73_M2}
GFR SERPL CREATININE-BSD FRML MDRD: ABNORMAL ML/MIN/{1.73_M2}
GLUCOSE BLD-MCNC: 126 MG/DL (ref 70–99)
GLUCOSE URINE: NEGATIVE
HCT VFR BLD CALC: 34.2 % (ref 36–46)
HEMOGLOBIN: 11.5 G/DL (ref 12–16)
IMMATURE GRANULOCYTES: ABNORMAL %
KETONES, URINE: NEGATIVE
LACTIC ACID: 1.7 MMOL/L (ref 0.5–2.2)
LEUKOCYTE ESTERASE, URINE: ABNORMAL
LYMPHOCYTES # BLD: 3 % (ref 24–44)
MCH RBC QN AUTO: 29.4 PG (ref 26–34)
MCHC RBC AUTO-ENTMCNC: 33.5 G/DL (ref 31–37)
MCV RBC AUTO: 87.7 FL (ref 80–100)
MONOCYTES # BLD: 5 % (ref 1–7)
MORPHOLOGY: ABNORMAL
MUCUS: ABNORMAL
NITRITE, URINE: NEGATIVE
NRBC AUTOMATED: ABNORMAL PER 100 WBC
OTHER OBSERVATIONS UA: ABNORMAL
PDW BLD-RTO: 18.9 % (ref 11.5–14.9)
PH UA: 5 (ref 5–8)
PLATELET # BLD: 242 K/UL (ref 150–450)
PLATELET ESTIMATE: ABNORMAL
PMV BLD AUTO: 8.2 FL (ref 6–12)
POTASSIUM SERPL-SCNC: 4 MMOL/L (ref 3.7–5.3)
PRO-BNP: 1476 PG/ML
PROTEIN UA: ABNORMAL
RBC # BLD: 3.9 M/UL (ref 4–5.2)
RBC # BLD: ABNORMAL 10*6/UL
RBC UA: ABNORMAL /HPF
REASON FOR REJECTION: NORMAL
RENAL EPITHELIAL, UA: ABNORMAL /HPF
SEG NEUTROPHILS: 79 % (ref 36–66)
SEGMENTED NEUTROPHILS ABSOLUTE COUNT: 8.54 K/UL (ref 1.3–9.1)
SODIUM BLD-SCNC: 136 MMOL/L (ref 135–144)
SPECIFIC GRAVITY UA: 1.02 (ref 1–1.03)
TOTAL PROTEIN: 6.3 G/DL (ref 6.4–8.3)
TRICHOMONAS: ABNORMAL
TROPONIN INTERP: ABNORMAL
TROPONIN T: ABNORMAL NG/ML
TROPONIN, HIGH SENSITIVITY: 26 NG/L (ref 0–14)
TROPONIN, HIGH SENSITIVITY: 36 NG/L (ref 0–14)
TROPONIN, HIGH SENSITIVITY: 46 NG/L (ref 0–14)
TROPONIN, HIGH SENSITIVITY: 79 NG/L (ref 0–14)
TURBIDITY: ABNORMAL
URINE HGB: ABNORMAL
UROBILINOGEN, URINE: NORMAL
WBC # BLD: 10.8 K/UL (ref 3.5–11)
WBC # BLD: ABNORMAL 10*3/UL
WBC UA: ABNORMAL /HPF
YEAST: ABNORMAL
ZZ NTE CLEAN UP: ORDERED TEST: NORMAL
ZZ NTE WITH NAME CLEAN UP: SPECIMEN SOURCE: NORMAL

## 2019-10-04 PROCEDURE — 6370000000 HC RX 637 (ALT 250 FOR IP): Performed by: STUDENT IN AN ORGANIZED HEALTH CARE EDUCATION/TRAINING PROGRAM

## 2019-10-04 PROCEDURE — 87205 SMEAR GRAM STAIN: CPT

## 2019-10-04 PROCEDURE — 83605 ASSAY OF LACTIC ACID: CPT

## 2019-10-04 PROCEDURE — 87088 URINE BACTERIA CULTURE: CPT

## 2019-10-04 PROCEDURE — 99223 1ST HOSP IP/OBS HIGH 75: CPT | Performed by: INTERNAL MEDICINE

## 2019-10-04 PROCEDURE — 99285 EMERGENCY DEPT VISIT HI MDM: CPT

## 2019-10-04 PROCEDURE — 84484 ASSAY OF TROPONIN QUANT: CPT

## 2019-10-04 PROCEDURE — 87086 URINE CULTURE/COLONY COUNT: CPT

## 2019-10-04 PROCEDURE — 87150 DNA/RNA AMPLIFIED PROBE: CPT

## 2019-10-04 PROCEDURE — 71046 X-RAY EXAM CHEST 2 VIEWS: CPT

## 2019-10-04 PROCEDURE — 93005 ELECTROCARDIOGRAM TRACING: CPT | Performed by: EMERGENCY MEDICINE

## 2019-10-04 PROCEDURE — 2580000003 HC RX 258: Performed by: EMERGENCY MEDICINE

## 2019-10-04 PROCEDURE — 36415 COLL VENOUS BLD VENIPUNCTURE: CPT

## 2019-10-04 PROCEDURE — 86403 PARTICLE AGGLUT ANTBDY SCRN: CPT

## 2019-10-04 PROCEDURE — 87186 SC STD MICRODIL/AGAR DIL: CPT

## 2019-10-04 PROCEDURE — 81001 URINALYSIS AUTO W/SCOPE: CPT

## 2019-10-04 PROCEDURE — 2580000003 HC RX 258: Performed by: INTERNAL MEDICINE

## 2019-10-04 PROCEDURE — 6360000002 HC RX W HCPCS: Performed by: INTERNAL MEDICINE

## 2019-10-04 PROCEDURE — 96365 THER/PROPH/DIAG IV INF INIT: CPT

## 2019-10-04 PROCEDURE — 83880 ASSAY OF NATRIURETIC PEPTIDE: CPT

## 2019-10-04 PROCEDURE — 85025 COMPLETE CBC W/AUTO DIFF WBC: CPT

## 2019-10-04 PROCEDURE — 2060000000 HC ICU INTERMEDIATE R&B

## 2019-10-04 PROCEDURE — 6360000002 HC RX W HCPCS: Performed by: STUDENT IN AN ORGANIZED HEALTH CARE EDUCATION/TRAINING PROGRAM

## 2019-10-04 PROCEDURE — 6360000002 HC RX W HCPCS: Performed by: EMERGENCY MEDICINE

## 2019-10-04 PROCEDURE — 87040 BLOOD CULTURE FOR BACTERIA: CPT

## 2019-10-04 PROCEDURE — 80053 COMPREHEN METABOLIC PANEL: CPT

## 2019-10-04 RX ORDER — ONDANSETRON 2 MG/ML
4 INJECTION INTRAMUSCULAR; INTRAVENOUS EVERY 6 HOURS PRN
Status: DISCONTINUED | OUTPATIENT
Start: 2019-10-04 | End: 2019-10-18 | Stop reason: HOSPADM

## 2019-10-04 RX ORDER — POTASSIUM CHLORIDE 20 MEQ/1
40 TABLET, EXTENDED RELEASE ORAL PRN
Status: DISCONTINUED | OUTPATIENT
Start: 2019-10-04 | End: 2019-10-18 | Stop reason: HOSPADM

## 2019-10-04 RX ORDER — SODIUM CHLORIDE 0.9 % (FLUSH) 0.9 %
10 SYRINGE (ML) INJECTION EVERY 12 HOURS SCHEDULED
Status: DISCONTINUED | OUTPATIENT
Start: 2019-10-04 | End: 2019-10-05 | Stop reason: SDUPTHER

## 2019-10-04 RX ORDER — ACETAMINOPHEN 325 MG/1
650 TABLET ORAL EVERY 4 HOURS PRN
Status: DISCONTINUED | OUTPATIENT
Start: 2019-10-04 | End: 2019-10-18 | Stop reason: HOSPADM

## 2019-10-04 RX ORDER — ASPIRIN 81 MG/1
81 TABLET ORAL 2 TIMES DAILY
Status: DISCONTINUED | OUTPATIENT
Start: 2019-10-04 | End: 2019-10-05

## 2019-10-04 RX ORDER — SODIUM CHLORIDE 0.9 % (FLUSH) 0.9 %
10 SYRINGE (ML) INJECTION PRN
Status: DISCONTINUED | OUTPATIENT
Start: 2019-10-04 | End: 2019-10-05 | Stop reason: SDUPTHER

## 2019-10-04 RX ORDER — ROPINIROLE 0.5 MG/1
0.25 TABLET, FILM COATED ORAL NIGHTLY
Status: DISCONTINUED | OUTPATIENT
Start: 2019-10-04 | End: 2019-10-18 | Stop reason: HOSPADM

## 2019-10-04 RX ORDER — POTASSIUM CHLORIDE 7.45 MG/ML
10 INJECTION INTRAVENOUS PRN
Status: DISCONTINUED | OUTPATIENT
Start: 2019-10-04 | End: 2019-10-18 | Stop reason: HOSPADM

## 2019-10-04 RX ORDER — LATANOPROST 50 UG/ML
1 SOLUTION/ DROPS OPHTHALMIC NIGHTLY
Status: DISCONTINUED | OUTPATIENT
Start: 2019-10-04 | End: 2019-10-18 | Stop reason: HOSPADM

## 2019-10-04 RX ORDER — AMIODARONE HYDROCHLORIDE 200 MG/1
200 TABLET ORAL DAILY
Status: DISCONTINUED | OUTPATIENT
Start: 2019-10-04 | End: 2019-10-18 | Stop reason: HOSPADM

## 2019-10-04 RX ORDER — SODIUM CHLORIDE 0.9 % (FLUSH) 0.9 %
10 SYRINGE (ML) INJECTION PRN
Status: DISCONTINUED | OUTPATIENT
Start: 2019-10-04 | End: 2019-10-18 | Stop reason: HOSPADM

## 2019-10-04 RX ORDER — FUROSEMIDE 10 MG/ML
20 INJECTION INTRAMUSCULAR; INTRAVENOUS ONCE
Status: COMPLETED | OUTPATIENT
Start: 2019-10-04 | End: 2019-10-04

## 2019-10-04 RX ORDER — CIPROFLOXACIN 2 MG/ML
400 INJECTION, SOLUTION INTRAVENOUS EVERY 12 HOURS
Status: DISCONTINUED | OUTPATIENT
Start: 2019-10-04 | End: 2019-10-05

## 2019-10-04 RX ORDER — ONDANSETRON 2 MG/ML
4 INJECTION INTRAMUSCULAR; INTRAVENOUS EVERY 8 HOURS PRN
Status: DISCONTINUED | OUTPATIENT
Start: 2019-10-04 | End: 2019-10-04 | Stop reason: SDUPTHER

## 2019-10-04 RX ORDER — ATORVASTATIN CALCIUM 10 MG/1
10 TABLET, FILM COATED ORAL DAILY
Status: DISCONTINUED | OUTPATIENT
Start: 2019-10-04 | End: 2019-10-18 | Stop reason: HOSPADM

## 2019-10-04 RX ORDER — SODIUM CHLORIDE 0.9 % (FLUSH) 0.9 %
10 SYRINGE (ML) INJECTION EVERY 12 HOURS SCHEDULED
Status: DISCONTINUED | OUTPATIENT
Start: 2019-10-04 | End: 2019-10-18 | Stop reason: HOSPADM

## 2019-10-04 RX ADMIN — ASPIRIN 81 MG: 81 TABLET, COATED ORAL at 21:11

## 2019-10-04 RX ADMIN — ATORVASTATIN CALCIUM 10 MG: 10 TABLET, FILM COATED ORAL at 16:29

## 2019-10-04 RX ADMIN — AMIODARONE HYDROCHLORIDE 200 MG: 200 TABLET ORAL at 16:29

## 2019-10-04 RX ADMIN — METOPROLOL TARTRATE 25 MG: 25 TABLET ORAL at 21:11

## 2019-10-04 RX ADMIN — CEFTRIAXONE SODIUM 1 G: 1 INJECTION, POWDER, FOR SOLUTION INTRAMUSCULAR; INTRAVENOUS at 13:31

## 2019-10-04 RX ADMIN — CIPROFLOXACIN 400 MG: 2 INJECTION, SOLUTION INTRAVENOUS at 16:41

## 2019-10-04 RX ADMIN — ROPINIROLE HYDROCHLORIDE 0.25 MG: 0.5 TABLET, FILM COATED ORAL at 21:11

## 2019-10-04 RX ADMIN — FUROSEMIDE 20 MG: 10 INJECTION, SOLUTION INTRAMUSCULAR; INTRAVENOUS at 16:29

## 2019-10-04 RX ADMIN — Medication 10 ML: at 21:11

## 2019-10-04 RX ADMIN — ENOXAPARIN SODIUM 40 MG: 40 INJECTION SUBCUTANEOUS at 16:30

## 2019-10-04 RX ADMIN — LATANOPROST 1 DROP: 50 SOLUTION OPHTHALMIC at 21:17

## 2019-10-04 ASSESSMENT — ENCOUNTER SYMPTOMS
WHEEZING: 0
SHORTNESS OF BREATH: 0
SINUS PAIN: 0
BACK PAIN: 0
RHINORRHEA: 0
VOMITING: 0
DIARRHEA: 0
ABDOMINAL PAIN: 0
CHEST TIGHTNESS: 0
SHORTNESS OF BREATH: 1
COUGH: 1
ABDOMINAL DISTENTION: 0
NAUSEA: 1
NAUSEA: 0

## 2019-10-04 ASSESSMENT — PAIN DESCRIPTION - ORIENTATION: ORIENTATION: LEFT

## 2019-10-04 ASSESSMENT — PAIN DESCRIPTION - PAIN TYPE: TYPE: ACUTE PAIN

## 2019-10-04 ASSESSMENT — PAIN DESCRIPTION - LOCATION: LOCATION: ARM

## 2019-10-05 LAB
ABSOLUTE BANDS #: 0.12 K/UL (ref 0–1)
ABSOLUTE EOS #: 0 K/UL (ref 0–0.4)
ABSOLUTE IMMATURE GRANULOCYTE: ABNORMAL K/UL (ref 0–0.3)
ABSOLUTE LYMPH #: 0.36 K/UL (ref 1–4.8)
ABSOLUTE MONO #: 0.48 K/UL (ref 0.1–1.3)
ALBUMIN SERPL-MCNC: 2.9 G/DL (ref 3.5–5.2)
ALBUMIN/GLOBULIN RATIO: ABNORMAL (ref 1–2.5)
ALP BLD-CCNC: 79 U/L (ref 35–104)
ALT SERPL-CCNC: 23 U/L (ref 5–33)
ANION GAP SERPL CALCULATED.3IONS-SCNC: 16 MMOL/L (ref 9–17)
AST SERPL-CCNC: 31 U/L
BANDS: 1 % (ref 0–10)
BASOPHILS # BLD: 0 % (ref 0–2)
BASOPHILS ABSOLUTE: 0 K/UL (ref 0–0.2)
BILIRUB SERPL-MCNC: 0.71 MG/DL (ref 0.3–1.2)
BUN BLDV-MCNC: 15 MG/DL (ref 8–23)
BUN/CREAT BLD: ABNORMAL (ref 9–20)
CALCIUM SERPL-MCNC: 8.7 MG/DL (ref 8.6–10.4)
CHLORIDE BLD-SCNC: 96 MMOL/L (ref 98–107)
CO2: 23 MMOL/L (ref 20–31)
CREAT SERPL-MCNC: 0.68 MG/DL (ref 0.5–0.9)
CULTURE: ABNORMAL
DIFFERENTIAL TYPE: ABNORMAL
EKG ATRIAL RATE: 102 BPM
EKG P AXIS: 14 DEGREES
EKG P-R INTERVAL: 172 MS
EKG Q-T INTERVAL: 414 MS
EKG QRS DURATION: 174 MS
EKG QTC CALCULATION (BAZETT): 539 MS
EKG R AXIS: -80 DEGREES
EKG T AXIS: 76 DEGREES
EKG VENTRICULAR RATE: 102 BPM
EOSINOPHILS RELATIVE PERCENT: 0 % (ref 0–4)
GFR AFRICAN AMERICAN: >60 ML/MIN
GFR NON-AFRICAN AMERICAN: >60 ML/MIN
GFR SERPL CREATININE-BSD FRML MDRD: ABNORMAL ML/MIN/{1.73_M2}
GFR SERPL CREATININE-BSD FRML MDRD: ABNORMAL ML/MIN/{1.73_M2}
GLUCOSE BLD-MCNC: 162 MG/DL (ref 70–99)
HCT VFR BLD CALC: 34.3 % (ref 36–46)
HEMOGLOBIN: 11.3 G/DL (ref 12–16)
IMMATURE GRANULOCYTES: ABNORMAL %
LYMPHOCYTES # BLD: 3 % (ref 24–44)
Lab: ABNORMAL
MCH RBC QN AUTO: 28.7 PG (ref 26–34)
MCHC RBC AUTO-ENTMCNC: 33.1 G/DL (ref 31–37)
MCV RBC AUTO: 86.8 FL (ref 80–100)
MONOCYTES # BLD: 4 % (ref 1–7)
MORPHOLOGY: ABNORMAL
NRBC AUTOMATED: ABNORMAL PER 100 WBC
PDW BLD-RTO: 18.5 % (ref 11.5–14.9)
PLATELET # BLD: 230 K/UL (ref 150–450)
PLATELET ESTIMATE: ABNORMAL
PMV BLD AUTO: 8.4 FL (ref 6–12)
POTASSIUM SERPL-SCNC: 3.6 MMOL/L (ref 3.7–5.3)
RBC # BLD: 3.95 M/UL (ref 4–5.2)
RBC # BLD: ABNORMAL 10*6/UL
SEG NEUTROPHILS: 92 % (ref 36–66)
SEGMENTED NEUTROPHILS ABSOLUTE COUNT: 11.14 K/UL (ref 1.3–9.1)
SODIUM BLD-SCNC: 135 MMOL/L (ref 135–144)
SPECIMEN DESCRIPTION: ABNORMAL
TOTAL PROTEIN: 6.2 G/DL (ref 6.4–8.3)
WBC # BLD: 12.1 K/UL (ref 3.5–11)
WBC # BLD: ABNORMAL 10*3/UL

## 2019-10-05 PROCEDURE — 2000000000 HC ICU R&B

## 2019-10-05 PROCEDURE — 99291 CRITICAL CARE FIRST HOUR: CPT | Performed by: INTERNAL MEDICINE

## 2019-10-05 PROCEDURE — 6360000002 HC RX W HCPCS: Performed by: STUDENT IN AN ORGANIZED HEALTH CARE EDUCATION/TRAINING PROGRAM

## 2019-10-05 PROCEDURE — 97162 PT EVAL MOD COMPLEX 30 MIN: CPT

## 2019-10-05 PROCEDURE — 97530 THERAPEUTIC ACTIVITIES: CPT

## 2019-10-05 PROCEDURE — 6370000000 HC RX 637 (ALT 250 FOR IP): Performed by: STUDENT IN AN ORGANIZED HEALTH CARE EDUCATION/TRAINING PROGRAM

## 2019-10-05 PROCEDURE — 36415 COLL VENOUS BLD VENIPUNCTURE: CPT

## 2019-10-05 PROCEDURE — 6370000000 HC RX 637 (ALT 250 FOR IP): Performed by: INTERNAL MEDICINE

## 2019-10-05 PROCEDURE — 93010 ELECTROCARDIOGRAM REPORT: CPT | Performed by: INTERNAL MEDICINE

## 2019-10-05 PROCEDURE — 2580000003 HC RX 258: Performed by: STUDENT IN AN ORGANIZED HEALTH CARE EDUCATION/TRAINING PROGRAM

## 2019-10-05 PROCEDURE — 85025 COMPLETE CBC W/AUTO DIFF WBC: CPT

## 2019-10-05 PROCEDURE — 80053 COMPREHEN METABOLIC PANEL: CPT

## 2019-10-05 PROCEDURE — 6360000002 HC RX W HCPCS: Performed by: INTERNAL MEDICINE

## 2019-10-05 RX ORDER — POTASSIUM CHLORIDE 20 MEQ/1
20 TABLET, EXTENDED RELEASE ORAL ONCE
Status: COMPLETED | OUTPATIENT
Start: 2019-10-05 | End: 2019-10-05

## 2019-10-05 RX ORDER — FUROSEMIDE 20 MG/1
20 TABLET ORAL DAILY
Status: DISCONTINUED | OUTPATIENT
Start: 2019-10-05 | End: 2019-10-06

## 2019-10-05 RX ORDER — ASPIRIN 81 MG/1
81 TABLET ORAL DAILY
Status: DISCONTINUED | OUTPATIENT
Start: 2019-10-06 | End: 2019-10-13

## 2019-10-05 RX ADMIN — CIPROFLOXACIN 400 MG: 2 INJECTION, SOLUTION INTRAVENOUS at 05:05

## 2019-10-05 RX ADMIN — POTASSIUM CHLORIDE 20 MEQ: 1500 TABLET, EXTENDED RELEASE ORAL at 11:26

## 2019-10-05 RX ADMIN — METOPROLOL TARTRATE 25 MG: 25 TABLET ORAL at 19:50

## 2019-10-05 RX ADMIN — ROPINIROLE HYDROCHLORIDE 0.25 MG: 0.5 TABLET, FILM COATED ORAL at 19:50

## 2019-10-05 RX ADMIN — ASPIRIN 81 MG: 81 TABLET, COATED ORAL at 08:34

## 2019-10-05 RX ADMIN — METOPROLOL TARTRATE 25 MG: 25 TABLET ORAL at 08:34

## 2019-10-05 RX ADMIN — FUROSEMIDE 20 MG: 20 TABLET ORAL at 11:26

## 2019-10-05 RX ADMIN — AMIODARONE HYDROCHLORIDE 200 MG: 200 TABLET ORAL at 08:34

## 2019-10-05 RX ADMIN — ACETAMINOPHEN 650 MG: 325 TABLET, FILM COATED ORAL at 08:34

## 2019-10-05 RX ADMIN — ENOXAPARIN SODIUM 40 MG: 40 INJECTION SUBCUTANEOUS at 08:35

## 2019-10-05 RX ADMIN — PIPERACILLIN AND TAZOBACTAM 3.38 G: 3; .375 INJECTION, POWDER, FOR SOLUTION INTRAVENOUS at 15:24

## 2019-10-05 RX ADMIN — ATORVASTATIN CALCIUM 10 MG: 10 TABLET, FILM COATED ORAL at 08:34

## 2019-10-05 RX ADMIN — ACETAMINOPHEN 650 MG: 325 TABLET, FILM COATED ORAL at 15:24

## 2019-10-05 RX ADMIN — LATANOPROST 1 DROP: 50 SOLUTION OPHTHALMIC at 19:49

## 2019-10-05 RX ADMIN — PIPERACILLIN AND TAZOBACTAM 3.38 G: 3; .375 INJECTION, POWDER, FOR SOLUTION INTRAVENOUS at 23:41

## 2019-10-05 RX ADMIN — VANCOMYCIN HYDROCHLORIDE 1000 MG: 1 INJECTION, POWDER, LYOPHILIZED, FOR SOLUTION INTRAVENOUS at 11:43

## 2019-10-05 ASSESSMENT — ENCOUNTER SYMPTOMS
DIARRHEA: 0
CHEST TIGHTNESS: 0
ABDOMINAL DISTENTION: 0
SHORTNESS OF BREATH: 0
CONSTIPATION: 0
ABDOMINAL PAIN: 0
NAUSEA: 0
APNEA: 0
BACK PAIN: 0
VOMITING: 0

## 2019-10-05 ASSESSMENT — PAIN SCALES - GENERAL
PAINLEVEL_OUTOF10: 3
PAINLEVEL_OUTOF10: 0
PAINLEVEL_OUTOF10: 0

## 2019-10-06 ENCOUNTER — APPOINTMENT (OUTPATIENT)
Dept: GENERAL RADIOLOGY | Age: 84
DRG: 314 | End: 2019-10-06
Payer: MEDICARE

## 2019-10-06 LAB
ABSOLUTE BANDS #: 0.12 K/UL (ref 0–1)
ABSOLUTE EOS #: 0 K/UL (ref 0–0.4)
ABSOLUTE IMMATURE GRANULOCYTE: ABNORMAL K/UL (ref 0–0.3)
ABSOLUTE LYMPH #: 0.46 K/UL (ref 1–4.8)
ABSOLUTE MONO #: 0.23 K/UL (ref 0.1–1.3)
ALBUMIN SERPL-MCNC: 2.4 G/DL (ref 3.5–5.2)
ALBUMIN/GLOBULIN RATIO: ABNORMAL (ref 1–2.5)
ALP BLD-CCNC: 76 U/L (ref 35–104)
ALT SERPL-CCNC: 41 U/L (ref 5–33)
ANION GAP SERPL CALCULATED.3IONS-SCNC: 12 MMOL/L (ref 9–17)
AST SERPL-CCNC: 56 U/L
BANDS: 1 % (ref 0–10)
BASOPHILS # BLD: 0 % (ref 0–2)
BASOPHILS ABSOLUTE: 0 K/UL (ref 0–0.2)
BILIRUB SERPL-MCNC: 0.52 MG/DL (ref 0.3–1.2)
BNP INTERPRETATION: ABNORMAL
BUN BLDV-MCNC: 21 MG/DL (ref 8–23)
BUN/CREAT BLD: ABNORMAL (ref 9–20)
CALCIUM SERPL-MCNC: 8.8 MG/DL (ref 8.6–10.4)
CHLORIDE BLD-SCNC: 99 MMOL/L (ref 98–107)
CO2: 23 MMOL/L (ref 20–31)
CREAT SERPL-MCNC: 0.74 MG/DL (ref 0.5–0.9)
CULTURE: ABNORMAL
DIFFERENTIAL TYPE: ABNORMAL
EOSINOPHILS RELATIVE PERCENT: 0 % (ref 0–4)
GFR AFRICAN AMERICAN: >60 ML/MIN
GFR NON-AFRICAN AMERICAN: >60 ML/MIN
GFR SERPL CREATININE-BSD FRML MDRD: ABNORMAL ML/MIN/{1.73_M2}
GFR SERPL CREATININE-BSD FRML MDRD: ABNORMAL ML/MIN/{1.73_M2}
GLUCOSE BLD-MCNC: 153 MG/DL (ref 70–99)
HCT VFR BLD CALC: 34.6 % (ref 36–46)
HEMOGLOBIN: 11.4 G/DL (ref 12–16)
IMMATURE GRANULOCYTES: ABNORMAL %
LYMPHOCYTES # BLD: 4 % (ref 24–44)
Lab: ABNORMAL
Lab: ABNORMAL
MCH RBC QN AUTO: 28.9 PG (ref 26–34)
MCHC RBC AUTO-ENTMCNC: 32.9 G/DL (ref 31–37)
MCV RBC AUTO: 88 FL (ref 80–100)
MONOCYTES # BLD: 2 % (ref 1–7)
MORPHOLOGY: ABNORMAL
NRBC AUTOMATED: ABNORMAL PER 100 WBC
PDW BLD-RTO: 19.5 % (ref 11.5–14.9)
PLATELET # BLD: 205 K/UL (ref 150–450)
PLATELET ESTIMATE: ABNORMAL
PMV BLD AUTO: 8 FL (ref 6–12)
POTASSIUM SERPL-SCNC: 3.8 MMOL/L (ref 3.7–5.3)
PRO-BNP: ABNORMAL PG/ML
RBC # BLD: 3.94 M/UL (ref 4–5.2)
RBC # BLD: ABNORMAL 10*6/UL
SEG NEUTROPHILS: 93 % (ref 36–66)
SEGMENTED NEUTROPHILS ABSOLUTE COUNT: 10.79 K/UL (ref 1.3–9.1)
SODIUM BLD-SCNC: 134 MMOL/L (ref 135–144)
SPECIMEN DESCRIPTION: ABNORMAL
SPECIMEN DESCRIPTION: ABNORMAL
TOTAL PROTEIN: 6.2 G/DL (ref 6.4–8.3)
WBC # BLD: 11.6 K/UL (ref 3.5–11)
WBC # BLD: ABNORMAL 10*3/UL

## 2019-10-06 PROCEDURE — 6370000000 HC RX 637 (ALT 250 FOR IP): Performed by: INTERNAL MEDICINE

## 2019-10-06 PROCEDURE — 6360000002 HC RX W HCPCS: Performed by: INTERNAL MEDICINE

## 2019-10-06 PROCEDURE — 2580000003 HC RX 258: Performed by: STUDENT IN AN ORGANIZED HEALTH CARE EDUCATION/TRAINING PROGRAM

## 2019-10-06 PROCEDURE — 97166 OT EVAL MOD COMPLEX 45 MIN: CPT

## 2019-10-06 PROCEDURE — 2580000003 HC RX 258: Performed by: INTERNAL MEDICINE

## 2019-10-06 PROCEDURE — 99233 SBSQ HOSP IP/OBS HIGH 50: CPT | Performed by: INTERNAL MEDICINE

## 2019-10-06 PROCEDURE — 71045 X-RAY EXAM CHEST 1 VIEW: CPT

## 2019-10-06 PROCEDURE — 36415 COLL VENOUS BLD VENIPUNCTURE: CPT

## 2019-10-06 PROCEDURE — 99222 1ST HOSP IP/OBS MODERATE 55: CPT | Performed by: INTERNAL MEDICINE

## 2019-10-06 PROCEDURE — 6370000000 HC RX 637 (ALT 250 FOR IP): Performed by: STUDENT IN AN ORGANIZED HEALTH CARE EDUCATION/TRAINING PROGRAM

## 2019-10-06 PROCEDURE — 85025 COMPLETE CBC W/AUTO DIFF WBC: CPT

## 2019-10-06 PROCEDURE — 80053 COMPREHEN METABOLIC PANEL: CPT

## 2019-10-06 PROCEDURE — 2060000000 HC ICU INTERMEDIATE R&B

## 2019-10-06 PROCEDURE — 83880 ASSAY OF NATRIURETIC PEPTIDE: CPT

## 2019-10-06 PROCEDURE — 6360000002 HC RX W HCPCS: Performed by: STUDENT IN AN ORGANIZED HEALTH CARE EDUCATION/TRAINING PROGRAM

## 2019-10-06 PROCEDURE — 97530 THERAPEUTIC ACTIVITIES: CPT

## 2019-10-06 PROCEDURE — 94761 N-INVAS EAR/PLS OXIMETRY MLT: CPT

## 2019-10-06 RX ORDER — FUROSEMIDE 10 MG/ML
20 INJECTION INTRAMUSCULAR; INTRAVENOUS 2 TIMES DAILY
Status: DISCONTINUED | OUTPATIENT
Start: 2019-10-06 | End: 2019-10-06

## 2019-10-06 RX ADMIN — CEFEPIME HYDROCHLORIDE 2 G: 2 INJECTION, POWDER, FOR SOLUTION INTRAVENOUS at 20:47

## 2019-10-06 RX ADMIN — ACETAMINOPHEN 650 MG: 325 TABLET, FILM COATED ORAL at 20:46

## 2019-10-06 RX ADMIN — FUROSEMIDE 20 MG: 20 TABLET ORAL at 08:06

## 2019-10-06 RX ADMIN — METOPROLOL TARTRATE 25 MG: 25 TABLET ORAL at 08:06

## 2019-10-06 RX ADMIN — METOPROLOL TARTRATE 25 MG: 25 TABLET ORAL at 20:47

## 2019-10-06 RX ADMIN — Medication 10 ML: at 20:47

## 2019-10-06 RX ADMIN — ENOXAPARIN SODIUM 40 MG: 40 INJECTION SUBCUTANEOUS at 08:06

## 2019-10-06 RX ADMIN — ASPIRIN 81 MG: 81 TABLET, COATED ORAL at 08:06

## 2019-10-06 RX ADMIN — Medication 10 ML: at 08:06

## 2019-10-06 RX ADMIN — AMIODARONE HYDROCHLORIDE 200 MG: 200 TABLET ORAL at 08:06

## 2019-10-06 RX ADMIN — ATORVASTATIN CALCIUM 10 MG: 10 TABLET, FILM COATED ORAL at 08:06

## 2019-10-06 RX ADMIN — PIPERACILLIN AND TAZOBACTAM 3.38 G: 3; .375 INJECTION, POWDER, FOR SOLUTION INTRAVENOUS at 16:57

## 2019-10-06 RX ADMIN — PIPERACILLIN AND TAZOBACTAM 3.38 G: 3; .375 INJECTION, POWDER, FOR SOLUTION INTRAVENOUS at 08:06

## 2019-10-06 RX ADMIN — LATANOPROST 1 DROP: 50 SOLUTION OPHTHALMIC at 20:47

## 2019-10-06 RX ADMIN — ROPINIROLE HYDROCHLORIDE 0.25 MG: 0.5 TABLET, FILM COATED ORAL at 20:46

## 2019-10-06 ASSESSMENT — ENCOUNTER SYMPTOMS
SINUS PAIN: 0
ABDOMINAL DISTENTION: 0
ABDOMINAL PAIN: 0
EYE REDNESS: 0
RHINORRHEA: 0
CHEST TIGHTNESS: 0
SHORTNESS OF BREATH: 0

## 2019-10-06 ASSESSMENT — PAIN SCALES - GENERAL
PAINLEVEL_OUTOF10: 4
PAINLEVEL_OUTOF10: 0
PAINLEVEL_OUTOF10: 0

## 2019-10-06 ASSESSMENT — PAIN DESCRIPTION - PAIN TYPE: TYPE: ACUTE PAIN

## 2019-10-06 ASSESSMENT — PAIN DESCRIPTION - ORIENTATION: ORIENTATION: LEFT

## 2019-10-06 ASSESSMENT — PAIN DESCRIPTION - LOCATION: LOCATION: ARM

## 2019-10-06 ASSESSMENT — PAIN DESCRIPTION - DESCRIPTORS: DESCRIPTORS: PATIENT UNABLE TO DESCRIBE

## 2019-10-06 ASSESSMENT — PAIN SCALES - WONG BAKER
WONGBAKER_NUMERICALRESPONSE: 0
WONGBAKER_NUMERICALRESPONSE: 4

## 2019-10-07 ENCOUNTER — APPOINTMENT (OUTPATIENT)
Dept: GENERAL RADIOLOGY | Age: 84
DRG: 314 | End: 2019-10-07
Payer: MEDICARE

## 2019-10-07 LAB
ABSOLUTE EOS #: 0.2 K/UL (ref 0–0.4)
ABSOLUTE IMMATURE GRANULOCYTE: ABNORMAL K/UL (ref 0–0.3)
ABSOLUTE LYMPH #: 0.6 K/UL (ref 1–4.8)
ABSOLUTE MONO #: 1 K/UL (ref 0.1–1.3)
ALBUMIN SERPL-MCNC: 2.6 G/DL (ref 3.5–5.2)
ALBUMIN/GLOBULIN RATIO: ABNORMAL (ref 1–2.5)
ALP BLD-CCNC: 74 U/L (ref 35–104)
ALT SERPL-CCNC: 61 U/L (ref 5–33)
ANION GAP SERPL CALCULATED.3IONS-SCNC: 11 MMOL/L (ref 9–17)
AST SERPL-CCNC: 69 U/L
BASOPHILS # BLD: 0 % (ref 0–2)
BASOPHILS ABSOLUTE: 0 K/UL (ref 0–0.2)
BILIRUB SERPL-MCNC: 0.46 MG/DL (ref 0.3–1.2)
BUN BLDV-MCNC: 22 MG/DL (ref 8–23)
BUN/CREAT BLD: ABNORMAL (ref 9–20)
CALCIUM SERPL-MCNC: 8.8 MG/DL (ref 8.6–10.4)
CHLORIDE BLD-SCNC: 99 MMOL/L (ref 98–107)
CO2: 26 MMOL/L (ref 20–31)
CREAT SERPL-MCNC: 0.59 MG/DL (ref 0.5–0.9)
DIFFERENTIAL TYPE: ABNORMAL
EOSINOPHILS RELATIVE PERCENT: 3 % (ref 0–4)
GFR AFRICAN AMERICAN: >60 ML/MIN
GFR NON-AFRICAN AMERICAN: >60 ML/MIN
GFR SERPL CREATININE-BSD FRML MDRD: ABNORMAL ML/MIN/{1.73_M2}
GFR SERPL CREATININE-BSD FRML MDRD: ABNORMAL ML/MIN/{1.73_M2}
GLUCOSE BLD-MCNC: 122 MG/DL (ref 70–99)
HAV IGM SER IA-ACNC: NONREACTIVE
HCT VFR BLD CALC: 33 % (ref 36–46)
HEMOGLOBIN: 11 G/DL (ref 12–16)
HEPATITIS B CORE IGM ANTIBODY: NONREACTIVE
HEPATITIS B SURFACE ANTIGEN: NONREACTIVE
HEPATITIS C ANTIBODY: NONREACTIVE
IMMATURE GRANULOCYTES: ABNORMAL %
LYMPHOCYTES # BLD: 6 % (ref 24–44)
MAGNESIUM: 2.2 MG/DL (ref 1.6–2.6)
MCH RBC QN AUTO: 29.1 PG (ref 26–34)
MCHC RBC AUTO-ENTMCNC: 33.5 G/DL (ref 31–37)
MCV RBC AUTO: 87 FL (ref 80–100)
MONOCYTES # BLD: 11 % (ref 1–7)
NRBC AUTOMATED: ABNORMAL PER 100 WBC
PDW BLD-RTO: 19.1 % (ref 11.5–14.9)
PLATELET # BLD: 208 K/UL (ref 150–450)
PLATELET ESTIMATE: ABNORMAL
PMV BLD AUTO: 7.8 FL (ref 6–12)
POTASSIUM SERPL-SCNC: 3.5 MMOL/L (ref 3.7–5.3)
RBC # BLD: 3.79 M/UL (ref 4–5.2)
RBC # BLD: ABNORMAL 10*6/UL
SEG NEUTROPHILS: 80 % (ref 36–66)
SEGMENTED NEUTROPHILS ABSOLUTE COUNT: 6.9 K/UL (ref 1.3–9.1)
SODIUM BLD-SCNC: 136 MMOL/L (ref 135–144)
TOTAL PROTEIN: 6.1 G/DL (ref 6.4–8.3)
WBC # BLD: 8.7 K/UL (ref 3.5–11)
WBC # BLD: ABNORMAL 10*3/UL

## 2019-10-07 PROCEDURE — 80074 ACUTE HEPATITIS PANEL: CPT

## 2019-10-07 PROCEDURE — 6370000000 HC RX 637 (ALT 250 FOR IP): Performed by: INTERNAL MEDICINE

## 2019-10-07 PROCEDURE — 99233 SBSQ HOSP IP/OBS HIGH 50: CPT | Performed by: INTERNAL MEDICINE

## 2019-10-07 PROCEDURE — 2580000003 HC RX 258: Performed by: STUDENT IN AN ORGANIZED HEALTH CARE EDUCATION/TRAINING PROGRAM

## 2019-10-07 PROCEDURE — 2060000000 HC ICU INTERMEDIATE R&B

## 2019-10-07 PROCEDURE — 74230 X-RAY XM SWLNG FUNCJ C+: CPT

## 2019-10-07 PROCEDURE — 85025 COMPLETE CBC W/AUTO DIFF WBC: CPT

## 2019-10-07 PROCEDURE — 87150 DNA/RNA AMPLIFIED PROBE: CPT

## 2019-10-07 PROCEDURE — 6360000002 HC RX W HCPCS: Performed by: INTERNAL MEDICINE

## 2019-10-07 PROCEDURE — 6370000000 HC RX 637 (ALT 250 FOR IP): Performed by: STUDENT IN AN ORGANIZED HEALTH CARE EDUCATION/TRAINING PROGRAM

## 2019-10-07 PROCEDURE — 36415 COLL VENOUS BLD VENIPUNCTURE: CPT

## 2019-10-07 PROCEDURE — 6360000002 HC RX W HCPCS: Performed by: STUDENT IN AN ORGANIZED HEALTH CARE EDUCATION/TRAINING PROGRAM

## 2019-10-07 PROCEDURE — 87040 BLOOD CULTURE FOR BACTERIA: CPT

## 2019-10-07 PROCEDURE — 2580000003 HC RX 258: Performed by: INTERNAL MEDICINE

## 2019-10-07 PROCEDURE — 83735 ASSAY OF MAGNESIUM: CPT

## 2019-10-07 PROCEDURE — 87205 SMEAR GRAM STAIN: CPT

## 2019-10-07 PROCEDURE — 87186 SC STD MICRODIL/AGAR DIL: CPT

## 2019-10-07 PROCEDURE — 97530 THERAPEUTIC ACTIVITIES: CPT

## 2019-10-07 PROCEDURE — 92611 MOTION FLUOROSCOPY/SWALLOW: CPT

## 2019-10-07 PROCEDURE — 97110 THERAPEUTIC EXERCISES: CPT

## 2019-10-07 PROCEDURE — 80053 COMPREHEN METABOLIC PANEL: CPT

## 2019-10-07 RX ADMIN — ATORVASTATIN CALCIUM 10 MG: 10 TABLET, FILM COATED ORAL at 17:49

## 2019-10-07 RX ADMIN — ROPINIROLE HYDROCHLORIDE 0.25 MG: 0.5 TABLET, FILM COATED ORAL at 20:45

## 2019-10-07 RX ADMIN — CEFEPIME HYDROCHLORIDE 2 G: 2 INJECTION, POWDER, FOR SOLUTION INTRAVENOUS at 18:04

## 2019-10-07 RX ADMIN — VANCOMYCIN HYDROCHLORIDE 1000 MG: 1 INJECTION, POWDER, LYOPHILIZED, FOR SOLUTION INTRAVENOUS at 13:08

## 2019-10-07 RX ADMIN — Medication 10 ML: at 07:38

## 2019-10-07 RX ADMIN — METOPROLOL TARTRATE 25 MG: 25 TABLET ORAL at 20:45

## 2019-10-07 RX ADMIN — POTASSIUM CHLORIDE: 2 INJECTION, SOLUTION, CONCENTRATE INTRAVENOUS at 21:54

## 2019-10-07 RX ADMIN — AMIODARONE HYDROCHLORIDE 200 MG: 200 TABLET ORAL at 17:49

## 2019-10-07 RX ADMIN — ENOXAPARIN SODIUM 40 MG: 40 INJECTION SUBCUTANEOUS at 13:08

## 2019-10-07 RX ADMIN — CEFEPIME HYDROCHLORIDE 2 G: 2 INJECTION, POWDER, FOR SOLUTION INTRAVENOUS at 06:09

## 2019-10-07 RX ADMIN — LATANOPROST 1 DROP: 50 SOLUTION OPHTHALMIC at 20:46

## 2019-10-07 RX ADMIN — ASPIRIN 81 MG: 81 TABLET, COATED ORAL at 17:49

## 2019-10-07 ASSESSMENT — PAIN SCALES - GENERAL
PAINLEVEL_OUTOF10: 0

## 2019-10-07 ASSESSMENT — ENCOUNTER SYMPTOMS
NAUSEA: 0
SHORTNESS OF BREATH: 0
ABDOMINAL PAIN: 0
SINUS PAIN: 0
ABDOMINAL DISTENTION: 0
COUGH: 1
RHINORRHEA: 0
CHEST TIGHTNESS: 0
EYE PAIN: 0

## 2019-10-08 ENCOUNTER — APPOINTMENT (OUTPATIENT)
Dept: GENERAL RADIOLOGY | Age: 84
DRG: 314 | End: 2019-10-08
Payer: MEDICARE

## 2019-10-08 LAB
ABSOLUTE EOS #: 0.5 K/UL (ref 0–0.4)
ABSOLUTE IMMATURE GRANULOCYTE: ABNORMAL K/UL (ref 0–0.3)
ABSOLUTE LYMPH #: 0.6 K/UL (ref 1–4.8)
ABSOLUTE MONO #: 0.8 K/UL (ref 0.1–1.3)
ALBUMIN SERPL-MCNC: 2.1 G/DL (ref 3.5–5.2)
ALBUMIN/GLOBULIN RATIO: ABNORMAL (ref 1–2.5)
ALP BLD-CCNC: 69 U/L (ref 35–104)
ALT SERPL-CCNC: 54 U/L (ref 5–33)
ANION GAP SERPL CALCULATED.3IONS-SCNC: 7 MMOL/L (ref 9–17)
AST SERPL-CCNC: 47 U/L
BASOPHILS # BLD: 1 % (ref 0–2)
BASOPHILS ABSOLUTE: 0 K/UL (ref 0–0.2)
BILIRUB SERPL-MCNC: 0.38 MG/DL (ref 0.3–1.2)
BUN BLDV-MCNC: 18 MG/DL (ref 8–23)
BUN/CREAT BLD: ABNORMAL (ref 9–20)
CALCIUM SERPL-MCNC: 8.7 MG/DL (ref 8.6–10.4)
CHLORIDE BLD-SCNC: 104 MMOL/L (ref 98–107)
CO2: 26 MMOL/L (ref 20–31)
CREAT SERPL-MCNC: 0.5 MG/DL (ref 0.5–0.9)
DIFFERENTIAL TYPE: ABNORMAL
EOSINOPHILS RELATIVE PERCENT: 7 % (ref 0–4)
GFR AFRICAN AMERICAN: >60 ML/MIN
GFR NON-AFRICAN AMERICAN: >60 ML/MIN
GFR SERPL CREATININE-BSD FRML MDRD: ABNORMAL ML/MIN/{1.73_M2}
GFR SERPL CREATININE-BSD FRML MDRD: ABNORMAL ML/MIN/{1.73_M2}
GLUCOSE BLD-MCNC: 107 MG/DL (ref 70–99)
HCT VFR BLD CALC: 32.9 % (ref 36–46)
HEMOGLOBIN: 10.9 G/DL (ref 12–16)
IMMATURE GRANULOCYTES: ABNORMAL %
LYMPHOCYTES # BLD: 9 % (ref 24–44)
MCH RBC QN AUTO: 29.3 PG (ref 26–34)
MCHC RBC AUTO-ENTMCNC: 33.1 G/DL (ref 31–37)
MCV RBC AUTO: 88.5 FL (ref 80–100)
MONOCYTES # BLD: 11 % (ref 1–7)
NRBC AUTOMATED: ABNORMAL PER 100 WBC
PDW BLD-RTO: 18.7 % (ref 11.5–14.9)
PLATELET # BLD: 217 K/UL (ref 150–450)
PLATELET ESTIMATE: ABNORMAL
PMV BLD AUTO: 8.1 FL (ref 6–12)
POTASSIUM SERPL-SCNC: 4.2 MMOL/L (ref 3.7–5.3)
RBC # BLD: 3.71 M/UL (ref 4–5.2)
RBC # BLD: ABNORMAL 10*6/UL
SEG NEUTROPHILS: 72 % (ref 36–66)
SEGMENTED NEUTROPHILS ABSOLUTE COUNT: 5.2 K/UL (ref 1.3–9.1)
SODIUM BLD-SCNC: 137 MMOL/L (ref 135–144)
TOTAL PROTEIN: 5.5 G/DL (ref 6.4–8.3)
VANCOMYCIN TROUGH DATE LAST DOSE: ABNORMAL
VANCOMYCIN TROUGH DOSE AMOUNT: 1000
VANCOMYCIN TROUGH TIME LAST DOSE: 1308
VANCOMYCIN TROUGH: <4 UG/ML (ref 10–20)
WBC # BLD: 7.2 K/UL (ref 3.5–11)
WBC # BLD: ABNORMAL 10*3/UL

## 2019-10-08 PROCEDURE — 2580000003 HC RX 258: Performed by: INTERNAL MEDICINE

## 2019-10-08 PROCEDURE — 6370000000 HC RX 637 (ALT 250 FOR IP): Performed by: INTERNAL MEDICINE

## 2019-10-08 PROCEDURE — 94640 AIRWAY INHALATION TREATMENT: CPT

## 2019-10-08 PROCEDURE — 6370000000 HC RX 637 (ALT 250 FOR IP): Performed by: STUDENT IN AN ORGANIZED HEALTH CARE EDUCATION/TRAINING PROGRAM

## 2019-10-08 PROCEDURE — 2580000003 HC RX 258: Performed by: STUDENT IN AN ORGANIZED HEALTH CARE EDUCATION/TRAINING PROGRAM

## 2019-10-08 PROCEDURE — 71045 X-RAY EXAM CHEST 1 VIEW: CPT

## 2019-10-08 PROCEDURE — 6360000002 HC RX W HCPCS: Performed by: INTERNAL MEDICINE

## 2019-10-08 PROCEDURE — 80202 ASSAY OF VANCOMYCIN: CPT

## 2019-10-08 PROCEDURE — 80053 COMPREHEN METABOLIC PANEL: CPT

## 2019-10-08 PROCEDURE — 94760 N-INVAS EAR/PLS OXIMETRY 1: CPT

## 2019-10-08 PROCEDURE — 99233 SBSQ HOSP IP/OBS HIGH 50: CPT | Performed by: INTERNAL MEDICINE

## 2019-10-08 PROCEDURE — 85025 COMPLETE CBC W/AUTO DIFF WBC: CPT

## 2019-10-08 PROCEDURE — 2060000000 HC ICU INTERMEDIATE R&B

## 2019-10-08 PROCEDURE — 94664 DEMO&/EVAL PT USE INHALER: CPT

## 2019-10-08 PROCEDURE — 2700000000 HC OXYGEN THERAPY PER DAY

## 2019-10-08 PROCEDURE — 36415 COLL VENOUS BLD VENIPUNCTURE: CPT

## 2019-10-08 PROCEDURE — 93308 TTE F-UP OR LMTD: CPT

## 2019-10-08 PROCEDURE — 6360000002 HC RX W HCPCS: Performed by: STUDENT IN AN ORGANIZED HEALTH CARE EDUCATION/TRAINING PROGRAM

## 2019-10-08 PROCEDURE — 97530 THERAPEUTIC ACTIVITIES: CPT

## 2019-10-08 RX ORDER — IPRATROPIUM BROMIDE AND ALBUTEROL SULFATE 2.5; .5 MG/3ML; MG/3ML
1 SOLUTION RESPIRATORY (INHALATION)
Status: DISCONTINUED | OUTPATIENT
Start: 2019-10-08 | End: 2019-10-09

## 2019-10-08 RX ADMIN — IPRATROPIUM BROMIDE AND ALBUTEROL SULFATE 1 AMPULE: .5; 3 SOLUTION RESPIRATORY (INHALATION) at 15:27

## 2019-10-08 RX ADMIN — AMIODARONE HYDROCHLORIDE 200 MG: 200 TABLET ORAL at 07:56

## 2019-10-08 RX ADMIN — ENOXAPARIN SODIUM 40 MG: 40 INJECTION SUBCUTANEOUS at 09:51

## 2019-10-08 RX ADMIN — IPRATROPIUM BROMIDE AND ALBUTEROL SULFATE 1 AMPULE: .5; 3 SOLUTION RESPIRATORY (INHALATION) at 21:04

## 2019-10-08 RX ADMIN — CEFEPIME HYDROCHLORIDE 2 G: 2 INJECTION, POWDER, FOR SOLUTION INTRAVENOUS at 07:55

## 2019-10-08 RX ADMIN — ATORVASTATIN CALCIUM 10 MG: 10 TABLET, FILM COATED ORAL at 07:56

## 2019-10-08 RX ADMIN — CEFEPIME HYDROCHLORIDE 2 G: 2 INJECTION, POWDER, FOR SOLUTION INTRAVENOUS at 21:20

## 2019-10-08 RX ADMIN — ASPIRIN 81 MG: 81 TABLET, COATED ORAL at 07:56

## 2019-10-08 RX ADMIN — LATANOPROST 1 DROP: 50 SOLUTION OPHTHALMIC at 21:28

## 2019-10-08 RX ADMIN — VANCOMYCIN HYDROCHLORIDE 1000 MG: 1 INJECTION, POWDER, LYOPHILIZED, FOR SOLUTION INTRAVENOUS at 14:24

## 2019-10-08 RX ADMIN — METOPROLOL TARTRATE 25 MG: 25 TABLET ORAL at 21:28

## 2019-10-08 RX ADMIN — ROPINIROLE HYDROCHLORIDE 0.25 MG: 0.5 TABLET, FILM COATED ORAL at 21:28

## 2019-10-08 RX ADMIN — Medication 10 ML: at 07:55

## 2019-10-08 RX ADMIN — METOPROLOL TARTRATE 25 MG: 25 TABLET ORAL at 07:56

## 2019-10-08 ASSESSMENT — ENCOUNTER SYMPTOMS
RHINORRHEA: 0
WHEEZING: 0
SINUS PAIN: 0
SHORTNESS OF BREATH: 0
EYE REDNESS: 0
VOMITING: 0
NAUSEA: 0
ABDOMINAL PAIN: 0
CHEST TIGHTNESS: 0
ABDOMINAL DISTENTION: 0

## 2019-10-08 ASSESSMENT — PAIN SCALES - GENERAL: PAINLEVEL_OUTOF10: 0

## 2019-10-08 ASSESSMENT — PAIN DESCRIPTION - LOCATION: LOCATION: BACK

## 2019-10-08 ASSESSMENT — PAIN DESCRIPTION - PAIN TYPE: TYPE: ACUTE PAIN

## 2019-10-09 ENCOUNTER — ANESTHESIA (OUTPATIENT)
Dept: OPERATING ROOM | Age: 84
DRG: 314 | End: 2019-10-09
Payer: MEDICARE

## 2019-10-09 ENCOUNTER — ANESTHESIA EVENT (OUTPATIENT)
Dept: OPERATING ROOM | Age: 84
DRG: 314 | End: 2019-10-09
Payer: MEDICARE

## 2019-10-09 VITALS — DIASTOLIC BLOOD PRESSURE: 70 MMHG | TEMPERATURE: 97.7 F | OXYGEN SATURATION: 98 % | SYSTOLIC BLOOD PRESSURE: 124 MMHG

## 2019-10-09 LAB
ABSOLUTE EOS #: 0.1 K/UL (ref 0–0.4)
ABSOLUTE IMMATURE GRANULOCYTE: ABNORMAL K/UL (ref 0–0.3)
ABSOLUTE LYMPH #: 0.8 K/UL (ref 1–4.8)
ABSOLUTE MONO #: 1.1 K/UL (ref 0.1–1.3)
ALBUMIN SERPL-MCNC: 2.4 G/DL (ref 3.5–5.2)
ALBUMIN/GLOBULIN RATIO: ABNORMAL (ref 1–2.5)
ALP BLD-CCNC: 80 U/L (ref 35–104)
ALT SERPL-CCNC: 46 U/L (ref 5–33)
ANION GAP SERPL CALCULATED.3IONS-SCNC: 11 MMOL/L (ref 9–17)
AST SERPL-CCNC: 29 U/L
BASOPHILS # BLD: 0 % (ref 0–2)
BASOPHILS ABSOLUTE: 0 K/UL (ref 0–0.2)
BILIRUB SERPL-MCNC: 0.49 MG/DL (ref 0.3–1.2)
BNP INTERPRETATION: ABNORMAL
BUN BLDV-MCNC: 15 MG/DL (ref 8–23)
BUN/CREAT BLD: ABNORMAL (ref 9–20)
CALCIUM SERPL-MCNC: 8.8 MG/DL (ref 8.6–10.4)
CHLORIDE BLD-SCNC: 101 MMOL/L (ref 98–107)
CO2: 24 MMOL/L (ref 20–31)
CREAT SERPL-MCNC: 0.45 MG/DL (ref 0.5–0.9)
CULTURE: ABNORMAL
DIFFERENTIAL TYPE: ABNORMAL
EOSINOPHILS RELATIVE PERCENT: 1 % (ref 0–4)
GFR AFRICAN AMERICAN: >60 ML/MIN
GFR NON-AFRICAN AMERICAN: >60 ML/MIN
GFR SERPL CREATININE-BSD FRML MDRD: ABNORMAL ML/MIN/{1.73_M2}
GFR SERPL CREATININE-BSD FRML MDRD: ABNORMAL ML/MIN/{1.73_M2}
GLUCOSE BLD-MCNC: 140 MG/DL (ref 70–99)
HCT VFR BLD CALC: 35 % (ref 36–46)
HEMOGLOBIN: 11.5 G/DL (ref 12–16)
IMMATURE GRANULOCYTES: ABNORMAL %
LV EF: 53 %
LVEF MODALITY: NORMAL
LYMPHOCYTES # BLD: 8 % (ref 24–44)
Lab: ABNORMAL
MCH RBC QN AUTO: 29 PG (ref 26–34)
MCHC RBC AUTO-ENTMCNC: 32.7 G/DL (ref 31–37)
MCV RBC AUTO: 88.7 FL (ref 80–100)
MONOCYTES # BLD: 11 % (ref 1–7)
NRBC AUTOMATED: ABNORMAL PER 100 WBC
PDW BLD-RTO: 19.4 % (ref 11.5–14.9)
PLATELET # BLD: 249 K/UL (ref 150–450)
PLATELET ESTIMATE: ABNORMAL
PMV BLD AUTO: 7.9 FL (ref 6–12)
POTASSIUM SERPL-SCNC: 4.2 MMOL/L (ref 3.7–5.3)
PRO-BNP: 2734 PG/ML
RBC # BLD: 3.95 M/UL (ref 4–5.2)
RBC # BLD: ABNORMAL 10*6/UL
SEG NEUTROPHILS: 80 % (ref 36–66)
SEGMENTED NEUTROPHILS ABSOLUTE COUNT: 8.1 K/UL (ref 1.3–9.1)
SODIUM BLD-SCNC: 136 MMOL/L (ref 135–144)
SPECIMEN DESCRIPTION: ABNORMAL
TOTAL PROTEIN: 6 G/DL (ref 6.4–8.3)
WBC # BLD: 10.2 K/UL (ref 3.5–11)
WBC # BLD: ABNORMAL 10*3/UL

## 2019-10-09 PROCEDURE — 85025 COMPLETE CBC W/AUTO DIFF WBC: CPT

## 2019-10-09 PROCEDURE — 2580000003 HC RX 258: Performed by: INTERNAL MEDICINE

## 2019-10-09 PROCEDURE — 3600000011 HC SURGERY LEVEL 1  ADDTL 15MIN: Performed by: INTERNAL MEDICINE

## 2019-10-09 PROCEDURE — 2500000003 HC RX 250 WO HCPCS: Performed by: NURSE ANESTHETIST, CERTIFIED REGISTERED

## 2019-10-09 PROCEDURE — 97530 THERAPEUTIC ACTIVITIES: CPT

## 2019-10-09 PROCEDURE — 7100000001 HC PACU RECOVERY - ADDTL 15 MIN: Performed by: INTERNAL MEDICINE

## 2019-10-09 PROCEDURE — 2709999900 HC NON-CHARGEABLE SUPPLY: Performed by: INTERNAL MEDICINE

## 2019-10-09 PROCEDURE — 6360000002 HC RX W HCPCS: Performed by: INTERNAL MEDICINE

## 2019-10-09 PROCEDURE — 80053 COMPREHEN METABOLIC PANEL: CPT

## 2019-10-09 PROCEDURE — 6360000002 HC RX W HCPCS: Performed by: NURSE ANESTHETIST, CERTIFIED REGISTERED

## 2019-10-09 PROCEDURE — 2580000003 HC RX 258: Performed by: ANESTHESIOLOGY

## 2019-10-09 PROCEDURE — 3700000000 HC ANESTHESIA ATTENDED CARE: Performed by: INTERNAL MEDICINE

## 2019-10-09 PROCEDURE — 2060000000 HC ICU INTERMEDIATE R&B

## 2019-10-09 PROCEDURE — 99233 SBSQ HOSP IP/OBS HIGH 50: CPT | Performed by: INTERNAL MEDICINE

## 2019-10-09 PROCEDURE — 93312 ECHO TRANSESOPHAGEAL: CPT

## 2019-10-09 PROCEDURE — 83880 ASSAY OF NATRIURETIC PEPTIDE: CPT

## 2019-10-09 PROCEDURE — 97110 THERAPEUTIC EXERCISES: CPT

## 2019-10-09 PROCEDURE — 7100000000 HC PACU RECOVERY - FIRST 15 MIN: Performed by: INTERNAL MEDICINE

## 2019-10-09 PROCEDURE — 94760 N-INVAS EAR/PLS OXIMETRY 1: CPT

## 2019-10-09 PROCEDURE — 6360000002 HC RX W HCPCS: Performed by: STUDENT IN AN ORGANIZED HEALTH CARE EDUCATION/TRAINING PROGRAM

## 2019-10-09 PROCEDURE — 2580000003 HC RX 258: Performed by: STUDENT IN AN ORGANIZED HEALTH CARE EDUCATION/TRAINING PROGRAM

## 2019-10-09 PROCEDURE — 3700000001 HC ADD 15 MINUTES (ANESTHESIA): Performed by: INTERNAL MEDICINE

## 2019-10-09 PROCEDURE — B24BZZ4 ULTRASONOGRAPHY OF HEART WITH AORTA, TRANSESOPHAGEAL: ICD-10-PCS | Performed by: INTERNAL MEDICINE

## 2019-10-09 PROCEDURE — 6370000000 HC RX 637 (ALT 250 FOR IP): Performed by: INTERNAL MEDICINE

## 2019-10-09 PROCEDURE — 97535 SELF CARE MNGMENT TRAINING: CPT

## 2019-10-09 PROCEDURE — 94640 AIRWAY INHALATION TREATMENT: CPT

## 2019-10-09 PROCEDURE — 36415 COLL VENOUS BLD VENIPUNCTURE: CPT

## 2019-10-09 PROCEDURE — 31720 CLEARANCE OF AIRWAYS: CPT

## 2019-10-09 PROCEDURE — 3600000001 HC SURGERY LEVEL 1  BASE: Performed by: INTERNAL MEDICINE

## 2019-10-09 RX ORDER — GLYCOPYRROLATE 1 MG/5 ML
SYRINGE (ML) INTRAVENOUS PRN
Status: DISCONTINUED | OUTPATIENT
Start: 2019-10-09 | End: 2019-10-09 | Stop reason: SDUPTHER

## 2019-10-09 RX ORDER — SODIUM CHLORIDE 9 MG/ML
INJECTION, SOLUTION INTRAVENOUS CONTINUOUS
Status: DISCONTINUED | OUTPATIENT
Start: 2019-10-09 | End: 2019-10-09

## 2019-10-09 RX ORDER — ALBUTEROL SULFATE 2.5 MG/3ML
2.5 SOLUTION RESPIRATORY (INHALATION) EVERY 4 HOURS PRN
Status: DISCONTINUED | OUTPATIENT
Start: 2019-10-09 | End: 2019-10-18 | Stop reason: HOSPADM

## 2019-10-09 RX ORDER — MIDAZOLAM HYDROCHLORIDE 1 MG/ML
INJECTION INTRAMUSCULAR; INTRAVENOUS PRN
Status: DISCONTINUED | OUTPATIENT
Start: 2019-10-09 | End: 2019-10-09 | Stop reason: SDUPTHER

## 2019-10-09 RX ORDER — KETAMINE HYDROCHLORIDE 10 MG/ML
INJECTION, SOLUTION INTRAMUSCULAR; INTRAVENOUS PRN
Status: DISCONTINUED | OUTPATIENT
Start: 2019-10-09 | End: 2019-10-09 | Stop reason: SDUPTHER

## 2019-10-09 RX ADMIN — IPRATROPIUM BROMIDE AND ALBUTEROL SULFATE 1 AMPULE: .5; 3 SOLUTION RESPIRATORY (INHALATION) at 07:56

## 2019-10-09 RX ADMIN — METOPROLOL TARTRATE 25 MG: 25 TABLET ORAL at 22:12

## 2019-10-09 RX ADMIN — VANCOMYCIN HYDROCHLORIDE 1000 MG: 1 INJECTION, POWDER, LYOPHILIZED, FOR SOLUTION INTRAVENOUS at 14:36

## 2019-10-09 RX ADMIN — SODIUM CHLORIDE: 9 INJECTION, SOLUTION INTRAVENOUS at 10:57

## 2019-10-09 RX ADMIN — Medication 0.2 MG: at 11:28

## 2019-10-09 RX ADMIN — KETAMINE HYDROCHLORIDE 10 MG: 10 INJECTION, SOLUTION INTRAMUSCULAR; INTRAVENOUS at 11:30

## 2019-10-09 RX ADMIN — Medication 10 ML: at 21:00

## 2019-10-09 RX ADMIN — MIDAZOLAM 0.5 MG: 1 INJECTION INTRAMUSCULAR; INTRAVENOUS at 11:30

## 2019-10-09 RX ADMIN — IPRATROPIUM BROMIDE AND ALBUTEROL SULFATE 1 AMPULE: .5; 3 SOLUTION RESPIRATORY (INHALATION) at 15:36

## 2019-10-09 RX ADMIN — ACETAMINOPHEN 650 MG: 325 TABLET, FILM COATED ORAL at 14:36

## 2019-10-09 RX ADMIN — ALBUTEROL SULFATE 2.5 MG: 2.5 SOLUTION RESPIRATORY (INHALATION) at 21:12

## 2019-10-09 RX ADMIN — CEFEPIME HYDROCHLORIDE 2 G: 2 INJECTION, POWDER, FOR SOLUTION INTRAVENOUS at 06:23

## 2019-10-09 RX ADMIN — ROPINIROLE HYDROCHLORIDE 0.25 MG: 0.5 TABLET, FILM COATED ORAL at 22:12

## 2019-10-09 RX ADMIN — CEFEPIME HYDROCHLORIDE 2 G: 2 INJECTION, POWDER, FOR SOLUTION INTRAVENOUS at 21:41

## 2019-10-09 RX ADMIN — VANCOMYCIN HYDROCHLORIDE 1000 MG: 1 INJECTION, POWDER, LYOPHILIZED, FOR SOLUTION INTRAVENOUS at 00:52

## 2019-10-09 ASSESSMENT — PAIN DESCRIPTION - LOCATION
LOCATION: KNEE

## 2019-10-09 ASSESSMENT — PAIN DESCRIPTION - PAIN TYPE
TYPE: CHRONIC PAIN
TYPE: ACUTE PAIN
TYPE: ACUTE PAIN;CHRONIC PAIN

## 2019-10-09 ASSESSMENT — PAIN SCALES - GENERAL
PAINLEVEL_OUTOF10: 2
PAINLEVEL_OUTOF10: 0
PAINLEVEL_OUTOF10: 5

## 2019-10-09 ASSESSMENT — PULMONARY FUNCTION TESTS
PIF_VALUE: 1
PIF_VALUE: 25
PIF_VALUE: 1
PIF_VALUE: 25
PIF_VALUE: 1
PIF_VALUE: 5
PIF_VALUE: 1
PIF_VALUE: 5
PIF_VALUE: 1
PIF_VALUE: 1

## 2019-10-09 ASSESSMENT — ENCOUNTER SYMPTOMS
ABDOMINAL DISTENTION: 0
ABDOMINAL PAIN: 0
CHEST TIGHTNESS: 0
SHORTNESS OF BREATH: 0
SINUS PRESSURE: 0
SHORTNESS OF BREATH: 1

## 2019-10-09 ASSESSMENT — PAIN DESCRIPTION - ORIENTATION
ORIENTATION: LEFT
ORIENTATION: LEFT

## 2019-10-09 ASSESSMENT — LIFESTYLE VARIABLES: SMOKING_STATUS: 0

## 2019-10-09 ASSESSMENT — PAIN SCALES - WONG BAKER
WONGBAKER_NUMERICALRESPONSE: 4;6
WONGBAKER_NUMERICALRESPONSE: 4;6

## 2019-10-10 LAB
ABSOLUTE EOS #: 0.2 K/UL (ref 0–0.4)
ABSOLUTE IMMATURE GRANULOCYTE: ABNORMAL K/UL (ref 0–0.3)
ABSOLUTE LYMPH #: 0.9 K/UL (ref 1–4.8)
ABSOLUTE MONO #: 1.2 K/UL (ref 0.1–1.3)
ALBUMIN SERPL-MCNC: 2.2 G/DL (ref 3.5–5.2)
ALBUMIN/GLOBULIN RATIO: ABNORMAL (ref 1–2.5)
ALP BLD-CCNC: 81 U/L (ref 35–104)
ALT SERPL-CCNC: 36 U/L (ref 5–33)
ANION GAP SERPL CALCULATED.3IONS-SCNC: 9 MMOL/L (ref 9–17)
AST SERPL-CCNC: 26 U/L
BASOPHILS # BLD: 1 % (ref 0–2)
BASOPHILS ABSOLUTE: 0.1 K/UL (ref 0–0.2)
BILIRUB SERPL-MCNC: 0.46 MG/DL (ref 0.3–1.2)
BUN BLDV-MCNC: 13 MG/DL (ref 8–23)
BUN/CREAT BLD: ABNORMAL (ref 9–20)
CALCIUM SERPL-MCNC: 8.8 MG/DL (ref 8.6–10.4)
CHLORIDE BLD-SCNC: 102 MMOL/L (ref 98–107)
CO2: 24 MMOL/L (ref 20–31)
CREAT SERPL-MCNC: 0.52 MG/DL (ref 0.5–0.9)
DIFFERENTIAL TYPE: ABNORMAL
EOSINOPHILS RELATIVE PERCENT: 2 % (ref 0–4)
GFR AFRICAN AMERICAN: >60 ML/MIN
GFR NON-AFRICAN AMERICAN: >60 ML/MIN
GFR SERPL CREATININE-BSD FRML MDRD: ABNORMAL ML/MIN/{1.73_M2}
GFR SERPL CREATININE-BSD FRML MDRD: ABNORMAL ML/MIN/{1.73_M2}
GLUCOSE BLD-MCNC: 149 MG/DL (ref 70–99)
HCT VFR BLD CALC: 32.4 % (ref 36–46)
HEMOGLOBIN: 10.5 G/DL (ref 12–16)
IMMATURE GRANULOCYTES: ABNORMAL %
LYMPHOCYTES # BLD: 8 % (ref 24–44)
MCH RBC QN AUTO: 29 PG (ref 26–34)
MCHC RBC AUTO-ENTMCNC: 32.5 G/DL (ref 31–37)
MCV RBC AUTO: 89.2 FL (ref 80–100)
MONOCYTES # BLD: 10 % (ref 1–7)
NRBC AUTOMATED: ABNORMAL PER 100 WBC
PDW BLD-RTO: 19.8 % (ref 11.5–14.9)
PLATELET # BLD: 281 K/UL (ref 150–450)
PLATELET ESTIMATE: ABNORMAL
PMV BLD AUTO: 7.8 FL (ref 6–12)
POTASSIUM SERPL-SCNC: 4 MMOL/L (ref 3.7–5.3)
RBC # BLD: 3.63 M/UL (ref 4–5.2)
RBC # BLD: ABNORMAL 10*6/UL
SEG NEUTROPHILS: 79 % (ref 36–66)
SEGMENTED NEUTROPHILS ABSOLUTE COUNT: 9.8 K/UL (ref 1.3–9.1)
SODIUM BLD-SCNC: 135 MMOL/L (ref 135–144)
TOTAL PROTEIN: 5.9 G/DL (ref 6.4–8.3)
VANCOMYCIN TROUGH DATE LAST DOSE: NORMAL
VANCOMYCIN TROUGH DOSE AMOUNT: NORMAL
VANCOMYCIN TROUGH TIME LAST DOSE: NORMAL
VANCOMYCIN TROUGH: 13.5 UG/ML (ref 10–20)
WBC # BLD: 12.3 K/UL (ref 3.5–11)
WBC # BLD: ABNORMAL 10*3/UL

## 2019-10-10 PROCEDURE — 92526 ORAL FUNCTION THERAPY: CPT

## 2019-10-10 PROCEDURE — 6370000000 HC RX 637 (ALT 250 FOR IP): Performed by: INTERNAL MEDICINE

## 2019-10-10 PROCEDURE — 87040 BLOOD CULTURE FOR BACTERIA: CPT

## 2019-10-10 PROCEDURE — 6360000002 HC RX W HCPCS: Performed by: INTERNAL MEDICINE

## 2019-10-10 PROCEDURE — 2060000000 HC ICU INTERMEDIATE R&B

## 2019-10-10 PROCEDURE — 2580000003 HC RX 258: Performed by: INTERNAL MEDICINE

## 2019-10-10 PROCEDURE — 99233 SBSQ HOSP IP/OBS HIGH 50: CPT | Performed by: INTERNAL MEDICINE

## 2019-10-10 PROCEDURE — 36415 COLL VENOUS BLD VENIPUNCTURE: CPT

## 2019-10-10 PROCEDURE — 80202 ASSAY OF VANCOMYCIN: CPT

## 2019-10-10 PROCEDURE — 85025 COMPLETE CBC W/AUTO DIFF WBC: CPT

## 2019-10-10 PROCEDURE — 80053 COMPREHEN METABOLIC PANEL: CPT

## 2019-10-10 RX ADMIN — ASPIRIN 81 MG: 81 TABLET, COATED ORAL at 10:44

## 2019-10-10 RX ADMIN — ROPINIROLE HYDROCHLORIDE 0.25 MG: 0.5 TABLET, FILM COATED ORAL at 19:50

## 2019-10-10 RX ADMIN — ATORVASTATIN CALCIUM 10 MG: 10 TABLET, FILM COATED ORAL at 10:44

## 2019-10-10 RX ADMIN — CEFEPIME HYDROCHLORIDE 2 G: 2 INJECTION, POWDER, FOR SOLUTION INTRAVENOUS at 07:38

## 2019-10-10 RX ADMIN — Medication 1250 MG: at 17:21

## 2019-10-10 RX ADMIN — Medication 1250 MG: at 04:58

## 2019-10-10 RX ADMIN — LATANOPROST 1 DROP: 50 SOLUTION OPHTHALMIC at 19:51

## 2019-10-10 RX ADMIN — METOPROLOL TARTRATE 25 MG: 25 TABLET ORAL at 19:50

## 2019-10-10 RX ADMIN — Medication 10 ML: at 10:45

## 2019-10-10 RX ADMIN — METOPROLOL TARTRATE 25 MG: 25 TABLET ORAL at 10:44

## 2019-10-10 RX ADMIN — AMIODARONE HYDROCHLORIDE 200 MG: 200 TABLET ORAL at 10:44

## 2019-10-10 RX ADMIN — ENOXAPARIN SODIUM 40 MG: 40 INJECTION SUBCUTANEOUS at 10:43

## 2019-10-10 ASSESSMENT — ENCOUNTER SYMPTOMS
COUGH: 0
SHORTNESS OF BREATH: 0
ABDOMINAL PAIN: 0
ABDOMINAL DISTENTION: 0
CHEST TIGHTNESS: 0

## 2019-10-11 LAB
ABSOLUTE EOS #: 0.11 K/UL (ref 0–0.4)
ABSOLUTE IMMATURE GRANULOCYTE: ABNORMAL K/UL (ref 0–0.3)
ABSOLUTE LYMPH #: 0.76 K/UL (ref 1–4.8)
ABSOLUTE MONO #: 0.76 K/UL (ref 0.1–1.3)
ALBUMIN SERPL-MCNC: 2.6 G/DL (ref 3.5–5.2)
ALBUMIN/GLOBULIN RATIO: ABNORMAL (ref 1–2.5)
ALP BLD-CCNC: 90 U/L (ref 35–104)
ALT SERPL-CCNC: 53 U/L (ref 5–33)
ANION GAP SERPL CALCULATED.3IONS-SCNC: 11 MMOL/L (ref 9–17)
ANTI-XA UNFRAC HEPARIN: <0.1 IU/L (ref 0.3–0.7)
AST SERPL-CCNC: 46 U/L
BASOPHILS # BLD: 0 % (ref 0–2)
BASOPHILS ABSOLUTE: 0 K/UL (ref 0–0.2)
BILIRUB SERPL-MCNC: 0.51 MG/DL (ref 0.3–1.2)
BUN BLDV-MCNC: 16 MG/DL (ref 8–23)
BUN/CREAT BLD: ABNORMAL (ref 9–20)
CALCIUM SERPL-MCNC: 8.8 MG/DL (ref 8.6–10.4)
CHLORIDE BLD-SCNC: 101 MMOL/L (ref 98–107)
CO2: 23 MMOL/L (ref 20–31)
CREAT SERPL-MCNC: 0.48 MG/DL (ref 0.5–0.9)
DIFFERENTIAL TYPE: ABNORMAL
EOSINOPHILS RELATIVE PERCENT: 1 % (ref 0–4)
GFR AFRICAN AMERICAN: >60 ML/MIN
GFR NON-AFRICAN AMERICAN: >60 ML/MIN
GFR SERPL CREATININE-BSD FRML MDRD: ABNORMAL ML/MIN/{1.73_M2}
GFR SERPL CREATININE-BSD FRML MDRD: ABNORMAL ML/MIN/{1.73_M2}
GLUCOSE BLD-MCNC: 146 MG/DL (ref 70–99)
HCT VFR BLD CALC: 33.5 % (ref 36–46)
HEMOGLOBIN: 11.1 G/DL (ref 12–16)
IMMATURE GRANULOCYTES: ABNORMAL %
LYMPHOCYTES # BLD: 7 % (ref 24–44)
MCH RBC QN AUTO: 29 PG (ref 26–34)
MCHC RBC AUTO-ENTMCNC: 33 G/DL (ref 31–37)
MCV RBC AUTO: 87.9 FL (ref 80–100)
MONOCYTES # BLD: 7 % (ref 1–7)
MORPHOLOGY: ABNORMAL
NRBC AUTOMATED: ABNORMAL PER 100 WBC
PDW BLD-RTO: 19.3 % (ref 11.5–14.9)
PLATELET # BLD: 330 K/UL (ref 150–450)
PLATELET ESTIMATE: ABNORMAL
PMV BLD AUTO: 8.1 FL (ref 6–12)
POTASSIUM SERPL-SCNC: 3.9 MMOL/L (ref 3.7–5.3)
RBC # BLD: 3.81 M/UL (ref 4–5.2)
RBC # BLD: ABNORMAL 10*6/UL
SEG NEUTROPHILS: 85 % (ref 36–66)
SEGMENTED NEUTROPHILS ABSOLUTE COUNT: 9.27 K/UL (ref 1.3–9.1)
SODIUM BLD-SCNC: 135 MMOL/L (ref 135–144)
TOTAL PROTEIN: 6.3 G/DL (ref 6.4–8.3)
TROPONIN INTERP: ABNORMAL
TROPONIN INTERP: ABNORMAL
TROPONIN T: ABNORMAL NG/ML
TROPONIN T: ABNORMAL NG/ML
TROPONIN, HIGH SENSITIVITY: 214 NG/L (ref 0–14)
TROPONIN, HIGH SENSITIVITY: 221 NG/L (ref 0–14)
VANCOMYCIN TROUGH DATE LAST DOSE: NORMAL
VANCOMYCIN TROUGH DOSE AMOUNT: 1250
VANCOMYCIN TROUGH TIME LAST DOSE: 650
VANCOMYCIN TROUGH: 17.4 UG/ML (ref 10–20)
WBC # BLD: 10.9 K/UL (ref 3.5–11)
WBC # BLD: ABNORMAL 10*3/UL

## 2019-10-11 PROCEDURE — 36415 COLL VENOUS BLD VENIPUNCTURE: CPT

## 2019-10-11 PROCEDURE — 6370000000 HC RX 637 (ALT 250 FOR IP): Performed by: INTERNAL MEDICINE

## 2019-10-11 PROCEDURE — 80202 ASSAY OF VANCOMYCIN: CPT

## 2019-10-11 PROCEDURE — 99233 SBSQ HOSP IP/OBS HIGH 50: CPT | Performed by: INTERNAL MEDICINE

## 2019-10-11 PROCEDURE — 94761 N-INVAS EAR/PLS OXIMETRY MLT: CPT

## 2019-10-11 PROCEDURE — 84484 ASSAY OF TROPONIN QUANT: CPT

## 2019-10-11 PROCEDURE — 92526 ORAL FUNCTION THERAPY: CPT

## 2019-10-11 PROCEDURE — 2060000000 HC ICU INTERMEDIATE R&B

## 2019-10-11 PROCEDURE — 6370000000 HC RX 637 (ALT 250 FOR IP): Performed by: STUDENT IN AN ORGANIZED HEALTH CARE EDUCATION/TRAINING PROGRAM

## 2019-10-11 PROCEDURE — 85025 COMPLETE CBC W/AUTO DIFF WBC: CPT

## 2019-10-11 PROCEDURE — 80053 COMPREHEN METABOLIC PANEL: CPT

## 2019-10-11 PROCEDURE — 85520 HEPARIN ASSAY: CPT

## 2019-10-11 PROCEDURE — 6360000002 HC RX W HCPCS: Performed by: INTERNAL MEDICINE

## 2019-10-11 RX ORDER — LIDOCAINE 4 G/G
1 PATCH TOPICAL DAILY
Status: DISCONTINUED | OUTPATIENT
Start: 2019-10-11 | End: 2019-10-18 | Stop reason: HOSPADM

## 2019-10-11 RX ORDER — HEPARIN SODIUM 10000 [USP'U]/100ML
18 INJECTION, SOLUTION INTRAVENOUS CONTINUOUS
Status: DISCONTINUED | OUTPATIENT
Start: 2019-10-11 | End: 2019-10-11

## 2019-10-11 RX ORDER — HYDROCORTISONE 0.5 %
CREAM (GRAM) TOPICAL 3 TIMES DAILY PRN
Status: DISCONTINUED | OUTPATIENT
Start: 2019-10-11 | End: 2019-10-18 | Stop reason: HOSPADM

## 2019-10-11 RX ORDER — HEPARIN SODIUM 5000 [USP'U]/ML
80 INJECTION, SOLUTION INTRAVENOUS; SUBCUTANEOUS PRN
Status: DISCONTINUED | OUTPATIENT
Start: 2019-10-11 | End: 2019-10-11

## 2019-10-11 RX ORDER — HEPARIN SODIUM 5000 [USP'U]/ML
40 INJECTION, SOLUTION INTRAVENOUS; SUBCUTANEOUS PRN
Status: DISCONTINUED | OUTPATIENT
Start: 2019-10-11 | End: 2019-10-11

## 2019-10-11 RX ORDER — HEPARIN SODIUM 5000 [USP'U]/ML
80 INJECTION, SOLUTION INTRAVENOUS; SUBCUTANEOUS ONCE
Status: DISCONTINUED | OUTPATIENT
Start: 2019-10-11 | End: 2019-10-11

## 2019-10-11 RX ORDER — MORPHINE SULFATE 2 MG/ML
2 INJECTION, SOLUTION INTRAMUSCULAR; INTRAVENOUS EVERY 4 HOURS PRN
Status: DISCONTINUED | OUTPATIENT
Start: 2019-10-11 | End: 2019-10-18 | Stop reason: HOSPADM

## 2019-10-11 RX ADMIN — METOPROLOL TARTRATE 25 MG: 25 TABLET ORAL at 21:24

## 2019-10-11 RX ADMIN — ATORVASTATIN CALCIUM 10 MG: 10 TABLET, FILM COATED ORAL at 09:13

## 2019-10-11 RX ADMIN — LATANOPROST 1 DROP: 50 SOLUTION OPHTHALMIC at 21:24

## 2019-10-11 RX ADMIN — ROPINIROLE HYDROCHLORIDE 0.25 MG: 0.5 TABLET, FILM COATED ORAL at 21:24

## 2019-10-11 RX ADMIN — ASPIRIN 81 MG: 81 TABLET, COATED ORAL at 09:13

## 2019-10-11 RX ADMIN — Medication 1250 MG: at 21:29

## 2019-10-11 RX ADMIN — Medication 1250 MG: at 06:54

## 2019-10-11 RX ADMIN — ENOXAPARIN SODIUM 40 MG: 40 INJECTION SUBCUTANEOUS at 09:12

## 2019-10-11 RX ADMIN — AMIODARONE HYDROCHLORIDE 200 MG: 200 TABLET ORAL at 09:13

## 2019-10-11 RX ADMIN — METOPROLOL TARTRATE 25 MG: 25 TABLET ORAL at 09:13

## 2019-10-11 ASSESSMENT — ENCOUNTER SYMPTOMS
NAUSEA: 0
CHEST TIGHTNESS: 0
SHORTNESS OF BREATH: 0
COUGH: 0
ABDOMINAL DISTENTION: 0
EYE PAIN: 0
VOMITING: 0
SINUS PAIN: 0
APNEA: 0
ABDOMINAL PAIN: 0

## 2019-10-12 ENCOUNTER — APPOINTMENT (OUTPATIENT)
Dept: GENERAL RADIOLOGY | Age: 84
DRG: 314 | End: 2019-10-12
Payer: MEDICARE

## 2019-10-12 LAB
ABSOLUTE EOS #: 0.1 K/UL (ref 0–0.4)
ABSOLUTE IMMATURE GRANULOCYTE: ABNORMAL K/UL (ref 0–0.3)
ABSOLUTE LYMPH #: 0.89 K/UL (ref 1–4.8)
ABSOLUTE MONO #: 0.99 K/UL (ref 0.1–1.3)
ALBUMIN SERPL-MCNC: 2.4 G/DL (ref 3.5–5.2)
ALBUMIN/GLOBULIN RATIO: ABNORMAL (ref 1–2.5)
ALP BLD-CCNC: 91 U/L (ref 35–104)
ALT SERPL-CCNC: 67 U/L (ref 5–33)
ANION GAP SERPL CALCULATED.3IONS-SCNC: 11 MMOL/L (ref 9–17)
ANTI-XA UNFRAC HEPARIN: 0.13 IU/L (ref 0.3–0.7)
ANTI-XA UNFRAC HEPARIN: 0.38 IU/L (ref 0.3–0.7)
ANTI-XA UNFRAC HEPARIN: <0.1 IU/L (ref 0.3–0.7)
AST SERPL-CCNC: 61 U/L
BASOPHILS # BLD: 0 % (ref 0–2)
BASOPHILS ABSOLUTE: 0 K/UL (ref 0–0.2)
BILIRUB SERPL-MCNC: 0.45 MG/DL (ref 0.3–1.2)
BUN BLDV-MCNC: 20 MG/DL (ref 8–23)
BUN/CREAT BLD: ABNORMAL (ref 9–20)
CALCIUM SERPL-MCNC: 8.8 MG/DL (ref 8.6–10.4)
CHLORIDE BLD-SCNC: 101 MMOL/L (ref 98–107)
CO2: 24 MMOL/L (ref 20–31)
CREAT SERPL-MCNC: 0.55 MG/DL (ref 0.5–0.9)
DIFFERENTIAL TYPE: ABNORMAL
EOSINOPHILS RELATIVE PERCENT: 1 % (ref 0–4)
GFR AFRICAN AMERICAN: >60 ML/MIN
GFR NON-AFRICAN AMERICAN: >60 ML/MIN
GFR SERPL CREATININE-BSD FRML MDRD: ABNORMAL ML/MIN/{1.73_M2}
GFR SERPL CREATININE-BSD FRML MDRD: ABNORMAL ML/MIN/{1.73_M2}
GLUCOSE BLD-MCNC: 135 MG/DL (ref 70–99)
HCT VFR BLD CALC: 32.6 % (ref 36–46)
HEMOGLOBIN: 10.7 G/DL (ref 12–16)
IMMATURE GRANULOCYTES: ABNORMAL %
LYMPHOCYTES # BLD: 9 % (ref 24–44)
MCH RBC QN AUTO: 29 PG (ref 26–34)
MCHC RBC AUTO-ENTMCNC: 33 G/DL (ref 31–37)
MCV RBC AUTO: 88.1 FL (ref 80–100)
MONOCYTES # BLD: 10 % (ref 1–7)
MORPHOLOGY: ABNORMAL
NRBC AUTOMATED: ABNORMAL PER 100 WBC
PDW BLD-RTO: 19.3 % (ref 11.5–14.9)
PLATELET # BLD: 363 K/UL (ref 150–450)
PLATELET ESTIMATE: ABNORMAL
PMV BLD AUTO: 8.1 FL (ref 6–12)
POTASSIUM SERPL-SCNC: 4 MMOL/L (ref 3.7–5.3)
RBC # BLD: 3.7 M/UL (ref 4–5.2)
RBC # BLD: ABNORMAL 10*6/UL
SEG NEUTROPHILS: 80 % (ref 36–66)
SEGMENTED NEUTROPHILS ABSOLUTE COUNT: 7.92 K/UL (ref 1.3–9.1)
SODIUM BLD-SCNC: 136 MMOL/L (ref 135–144)
TOTAL PROTEIN: 6.2 G/DL (ref 6.4–8.3)
WBC # BLD: 9.9 K/UL (ref 3.5–11)
WBC # BLD: ABNORMAL 10*3/UL

## 2019-10-12 PROCEDURE — 99232 SBSQ HOSP IP/OBS MODERATE 35: CPT | Performed by: NURSE PRACTITIONER

## 2019-10-12 PROCEDURE — 6360000002 HC RX W HCPCS: Performed by: INTERNAL MEDICINE

## 2019-10-12 PROCEDURE — 80053 COMPREHEN METABOLIC PANEL: CPT

## 2019-10-12 PROCEDURE — 94761 N-INVAS EAR/PLS OXIMETRY MLT: CPT

## 2019-10-12 PROCEDURE — 36415 COLL VENOUS BLD VENIPUNCTURE: CPT

## 2019-10-12 PROCEDURE — 6370000000 HC RX 637 (ALT 250 FOR IP): Performed by: STUDENT IN AN ORGANIZED HEALTH CARE EDUCATION/TRAINING PROGRAM

## 2019-10-12 PROCEDURE — 6370000000 HC RX 637 (ALT 250 FOR IP): Performed by: INTERNAL MEDICINE

## 2019-10-12 PROCEDURE — 99232 SBSQ HOSP IP/OBS MODERATE 35: CPT | Performed by: INTERNAL MEDICINE

## 2019-10-12 PROCEDURE — 85025 COMPLETE CBC W/AUTO DIFF WBC: CPT

## 2019-10-12 PROCEDURE — 85520 HEPARIN ASSAY: CPT

## 2019-10-12 PROCEDURE — 2580000003 HC RX 258: Performed by: INTERNAL MEDICINE

## 2019-10-12 PROCEDURE — 2060000000 HC ICU INTERMEDIATE R&B

## 2019-10-12 PROCEDURE — 2500000003 HC RX 250 WO HCPCS: Performed by: INTERNAL MEDICINE

## 2019-10-12 PROCEDURE — 71045 X-RAY EXAM CHEST 1 VIEW: CPT

## 2019-10-12 RX ORDER — GUAIFENESIN 600 MG/1
600 TABLET, EXTENDED RELEASE ORAL 2 TIMES DAILY
Status: DISCONTINUED | OUTPATIENT
Start: 2019-10-12 | End: 2019-10-12

## 2019-10-12 RX ADMIN — ATORVASTATIN CALCIUM 10 MG: 10 TABLET, FILM COATED ORAL at 08:54

## 2019-10-12 RX ADMIN — Medication 10 ML: at 20:41

## 2019-10-12 RX ADMIN — ASPIRIN 81 MG: 81 TABLET, COATED ORAL at 08:54

## 2019-10-12 RX ADMIN — Medication 1250 MG: at 20:44

## 2019-10-12 RX ADMIN — Medication 1250 MG: at 11:30

## 2019-10-12 RX ADMIN — LATANOPROST 1 DROP: 50 SOLUTION OPHTHALMIC at 20:42

## 2019-10-12 RX ADMIN — ROPINIROLE HYDROCHLORIDE 0.25 MG: 0.5 TABLET, FILM COATED ORAL at 20:40

## 2019-10-12 RX ADMIN — ENOXAPARIN SODIUM 40 MG: 40 INJECTION SUBCUTANEOUS at 08:53

## 2019-10-12 RX ADMIN — GUAIFENESIN 200 MG: 200 SOLUTION ORAL at 23:34

## 2019-10-12 RX ADMIN — AMIODARONE HYDROCHLORIDE 200 MG: 200 TABLET ORAL at 08:54

## 2019-10-12 RX ADMIN — METOPROLOL TARTRATE 25 MG: 25 TABLET ORAL at 08:54

## 2019-10-12 RX ADMIN — ACETAMINOPHEN 650 MG: 325 TABLET, FILM COATED ORAL at 23:34

## 2019-10-12 ASSESSMENT — ENCOUNTER SYMPTOMS
BACK PAIN: 0
CHOKING: 0
VOMITING: 0
SHORTNESS OF BREATH: 0
NAUSEA: 0
ALLERGIC/IMMUNOLOGIC NEGATIVE: 1
DIARRHEA: 0
EYE DISCHARGE: 0
EYE ITCHING: 0
SORE THROAT: 0

## 2019-10-12 ASSESSMENT — PAIN SCALES - GENERAL
PAINLEVEL_OUTOF10: 0
PAINLEVEL_OUTOF10: 0
PAINLEVEL_OUTOF10: 3

## 2019-10-13 ENCOUNTER — APPOINTMENT (OUTPATIENT)
Dept: CT IMAGING | Age: 84
DRG: 314 | End: 2019-10-13
Payer: MEDICARE

## 2019-10-13 ENCOUNTER — APPOINTMENT (OUTPATIENT)
Dept: GENERAL RADIOLOGY | Age: 84
DRG: 314 | End: 2019-10-13
Payer: MEDICARE

## 2019-10-13 LAB
ABSOLUTE EOS #: 0.2 K/UL (ref 0–0.4)
ABSOLUTE IMMATURE GRANULOCYTE: ABNORMAL K/UL (ref 0–0.3)
ABSOLUTE LYMPH #: 1 K/UL (ref 1–4.8)
ABSOLUTE MONO #: 0.8 K/UL (ref 0.1–1.3)
ALBUMIN SERPL-MCNC: 2.3 G/DL (ref 3.5–5.2)
ALBUMIN/GLOBULIN RATIO: ABNORMAL (ref 1–2.5)
ALP BLD-CCNC: 93 U/L (ref 35–104)
ALT SERPL-CCNC: 74 U/L (ref 5–33)
AMMONIA: 31 UMOL/L (ref 11–51)
ANION GAP SERPL CALCULATED.3IONS-SCNC: 11 MMOL/L (ref 9–17)
AST SERPL-CCNC: 58 U/L
BASOPHILS # BLD: 0 % (ref 0–2)
BASOPHILS ABSOLUTE: 0 K/UL (ref 0–0.2)
BILIRUB SERPL-MCNC: 0.57 MG/DL (ref 0.3–1.2)
BUN BLDV-MCNC: 26 MG/DL (ref 8–23)
BUN/CREAT BLD: ABNORMAL (ref 9–20)
CALCIUM SERPL-MCNC: 8.8 MG/DL (ref 8.6–10.4)
CHLORIDE BLD-SCNC: 104 MMOL/L (ref 98–107)
CO2: 25 MMOL/L (ref 20–31)
CREAT SERPL-MCNC: 0.68 MG/DL (ref 0.5–0.9)
CULTURE: NORMAL
DIFFERENTIAL TYPE: ABNORMAL
EOSINOPHILS RELATIVE PERCENT: 2 % (ref 0–4)
GFR AFRICAN AMERICAN: >60 ML/MIN
GFR NON-AFRICAN AMERICAN: >60 ML/MIN
GFR SERPL CREATININE-BSD FRML MDRD: ABNORMAL ML/MIN/{1.73_M2}
GFR SERPL CREATININE-BSD FRML MDRD: ABNORMAL ML/MIN/{1.73_M2}
GLUCOSE BLD-MCNC: 111 MG/DL (ref 70–99)
GLUCOSE BLD-MCNC: 133 MG/DL (ref 65–105)
HCT VFR BLD CALC: 30.6 % (ref 36–46)
HEMOGLOBIN: 10.2 G/DL (ref 12–16)
IMMATURE GRANULOCYTES: ABNORMAL %
LYMPHOCYTES # BLD: 11 % (ref 24–44)
Lab: NORMAL
MCH RBC QN AUTO: 29 PG (ref 26–34)
MCHC RBC AUTO-ENTMCNC: 33.5 G/DL (ref 31–37)
MCV RBC AUTO: 86.6 FL (ref 80–100)
MONOCYTES # BLD: 9 % (ref 1–7)
NRBC AUTOMATED: ABNORMAL PER 100 WBC
PDW BLD-RTO: 18.7 % (ref 11.5–14.9)
PLATELET # BLD: 367 K/UL (ref 150–450)
PLATELET ESTIMATE: ABNORMAL
PMV BLD AUTO: 8 FL (ref 6–12)
POTASSIUM SERPL-SCNC: 4 MMOL/L (ref 3.7–5.3)
RBC # BLD: 3.53 M/UL (ref 4–5.2)
RBC # BLD: ABNORMAL 10*6/UL
SEG NEUTROPHILS: 78 % (ref 36–66)
SEGMENTED NEUTROPHILS ABSOLUTE COUNT: 6.9 K/UL (ref 1.3–9.1)
SODIUM BLD-SCNC: 140 MMOL/L (ref 135–144)
SPECIMEN DESCRIPTION: NORMAL
TOTAL PROTEIN: 6 G/DL (ref 6.4–8.3)
WBC # BLD: 9 K/UL (ref 3.5–11)
WBC # BLD: ABNORMAL 10*3/UL

## 2019-10-13 PROCEDURE — 71045 X-RAY EXAM CHEST 1 VIEW: CPT

## 2019-10-13 PROCEDURE — 6370000000 HC RX 637 (ALT 250 FOR IP): Performed by: INTERNAL MEDICINE

## 2019-10-13 PROCEDURE — 6360000002 HC RX W HCPCS: Performed by: STUDENT IN AN ORGANIZED HEALTH CARE EDUCATION/TRAINING PROGRAM

## 2019-10-13 PROCEDURE — 80053 COMPREHEN METABOLIC PANEL: CPT

## 2019-10-13 PROCEDURE — 85025 COMPLETE CBC W/AUTO DIFF WBC: CPT

## 2019-10-13 PROCEDURE — 82947 ASSAY GLUCOSE BLOOD QUANT: CPT

## 2019-10-13 PROCEDURE — 6360000004 HC RX CONTRAST MEDICATION: Performed by: PSYCHIATRY & NEUROLOGY

## 2019-10-13 PROCEDURE — 36415 COLL VENOUS BLD VENIPUNCTURE: CPT

## 2019-10-13 PROCEDURE — 99232 SBSQ HOSP IP/OBS MODERATE 35: CPT | Performed by: INTERNAL MEDICINE

## 2019-10-13 PROCEDURE — 94640 AIRWAY INHALATION TREATMENT: CPT

## 2019-10-13 PROCEDURE — 6360000002 HC RX W HCPCS: Performed by: INTERNAL MEDICINE

## 2019-10-13 PROCEDURE — 70450 CT HEAD/BRAIN W/O DYE: CPT

## 2019-10-13 PROCEDURE — 70498 CT ANGIOGRAPHY NECK: CPT

## 2019-10-13 PROCEDURE — 99222 1ST HOSP IP/OBS MODERATE 55: CPT | Performed by: PSYCHIATRY & NEUROLOGY

## 2019-10-13 PROCEDURE — 2000000000 HC ICU R&B

## 2019-10-13 PROCEDURE — 82140 ASSAY OF AMMONIA: CPT

## 2019-10-13 PROCEDURE — 6370000000 HC RX 637 (ALT 250 FOR IP): Performed by: STUDENT IN AN ORGANIZED HEALTH CARE EDUCATION/TRAINING PROGRAM

## 2019-10-13 PROCEDURE — 2580000003 HC RX 258: Performed by: PSYCHIATRY & NEUROLOGY

## 2019-10-13 RX ORDER — ALBUTEROL SULFATE 2.5 MG/3ML
2.5 SOLUTION RESPIRATORY (INHALATION) 4 TIMES DAILY
Status: DISCONTINUED | OUTPATIENT
Start: 2019-10-13 | End: 2019-10-17

## 2019-10-13 RX ORDER — FUROSEMIDE 10 MG/ML
40 INJECTION INTRAMUSCULAR; INTRAVENOUS ONCE
Status: COMPLETED | OUTPATIENT
Start: 2019-10-13 | End: 2019-10-13

## 2019-10-13 RX ORDER — 0.9 % SODIUM CHLORIDE 0.9 %
80 INTRAVENOUS SOLUTION INTRAVENOUS ONCE
Status: COMPLETED | OUTPATIENT
Start: 2019-10-13 | End: 2019-10-13

## 2019-10-13 RX ORDER — SODIUM CHLORIDE 0.9 % (FLUSH) 0.9 %
10 SYRINGE (ML) INJECTION PRN
Status: DISCONTINUED | OUTPATIENT
Start: 2019-10-13 | End: 2019-10-18 | Stop reason: HOSPADM

## 2019-10-13 RX ORDER — ASPIRIN 81 MG/1
81 TABLET, CHEWABLE ORAL DAILY
Status: DISCONTINUED | OUTPATIENT
Start: 2019-10-13 | End: 2019-10-18 | Stop reason: HOSPADM

## 2019-10-13 RX ORDER — IPRATROPIUM BROMIDE AND ALBUTEROL SULFATE 2.5; .5 MG/3ML; MG/3ML
1 SOLUTION RESPIRATORY (INHALATION)
Status: DISCONTINUED | OUTPATIENT
Start: 2019-10-13 | End: 2019-10-13

## 2019-10-13 RX ORDER — ALBUTEROL SULFATE 2.5 MG/3ML
2.5 SOLUTION RESPIRATORY (INHALATION) EVERY 6 HOURS PRN
Status: DISCONTINUED | OUTPATIENT
Start: 2019-10-13 | End: 2019-10-13

## 2019-10-13 RX ADMIN — ENOXAPARIN SODIUM 40 MG: 40 INJECTION SUBCUTANEOUS at 10:30

## 2019-10-13 RX ADMIN — FUROSEMIDE 40 MG: 10 INJECTION, SOLUTION INTRAMUSCULAR; INTRAVENOUS at 13:42

## 2019-10-13 RX ADMIN — METOPROLOL TARTRATE 25 MG: 25 TABLET ORAL at 10:30

## 2019-10-13 RX ADMIN — ALBUTEROL SULFATE 2.5 MG: 2.5 SOLUTION RESPIRATORY (INHALATION) at 15:34

## 2019-10-13 RX ADMIN — Medication 1250 MG: at 10:31

## 2019-10-13 RX ADMIN — IOVERSOL 75 ML: 741 INJECTION INTRA-ARTERIAL; INTRAVENOUS at 22:44

## 2019-10-13 RX ADMIN — Medication 10 ML: at 22:44

## 2019-10-13 RX ADMIN — ATORVASTATIN CALCIUM 10 MG: 10 TABLET, FILM COATED ORAL at 10:29

## 2019-10-13 RX ADMIN — SODIUM CHLORIDE 80 ML: 9 INJECTION, SOLUTION INTRAVENOUS at 22:44

## 2019-10-13 RX ADMIN — AMIODARONE HYDROCHLORIDE 200 MG: 200 TABLET ORAL at 10:30

## 2019-10-13 RX ADMIN — ALBUTEROL SULFATE 2.5 MG: 2.5 SOLUTION RESPIRATORY (INHALATION) at 19:09

## 2019-10-13 ASSESSMENT — ENCOUNTER SYMPTOMS
VOMITING: 0
ABDOMINAL DISTENTION: 0
ABDOMINAL PAIN: 0
APNEA: 0
SINUS PAIN: 0
COUGH: 0
EYE PAIN: 0
CHEST TIGHTNESS: 0
NAUSEA: 0
SHORTNESS OF BREATH: 0

## 2019-10-13 ASSESSMENT — PAIN SCALES - GENERAL: PAINLEVEL_OUTOF10: 0

## 2019-10-14 ENCOUNTER — APPOINTMENT (OUTPATIENT)
Dept: CT IMAGING | Age: 84
DRG: 314 | End: 2019-10-14
Payer: MEDICARE

## 2019-10-14 LAB
ABSOLUTE EOS #: 0.1 K/UL (ref 0–0.4)
ABSOLUTE IMMATURE GRANULOCYTE: ABNORMAL K/UL (ref 0–0.3)
ABSOLUTE LYMPH #: 0.7 K/UL (ref 1–4.8)
ABSOLUTE MONO #: 0.8 K/UL (ref 0.1–1.3)
ALBUMIN SERPL-MCNC: 2.3 G/DL (ref 3.5–5.2)
ALBUMIN/GLOBULIN RATIO: ABNORMAL (ref 1–2.5)
ALP BLD-CCNC: 90 U/L (ref 35–104)
ALT SERPL-CCNC: 74 U/L (ref 5–33)
ANION GAP SERPL CALCULATED.3IONS-SCNC: 12 MMOL/L (ref 9–17)
AST SERPL-CCNC: 57 U/L
BASOPHILS # BLD: 0 % (ref 0–2)
BASOPHILS ABSOLUTE: 0 K/UL (ref 0–0.2)
BILIRUB SERPL-MCNC: 0.52 MG/DL (ref 0.3–1.2)
BNP INTERPRETATION: ABNORMAL
BUN BLDV-MCNC: 30 MG/DL (ref 8–23)
BUN/CREAT BLD: ABNORMAL (ref 9–20)
CALCIUM SERPL-MCNC: 8.5 MG/DL (ref 8.6–10.4)
CHLORIDE BLD-SCNC: 101 MMOL/L (ref 98–107)
CO2: 25 MMOL/L (ref 20–31)
CREAT SERPL-MCNC: 0.8 MG/DL (ref 0.5–0.9)
DIFFERENTIAL TYPE: ABNORMAL
EOSINOPHILS RELATIVE PERCENT: 1 % (ref 0–4)
GFR AFRICAN AMERICAN: >60 ML/MIN
GFR NON-AFRICAN AMERICAN: >60 ML/MIN
GFR SERPL CREATININE-BSD FRML MDRD: ABNORMAL ML/MIN/{1.73_M2}
GFR SERPL CREATININE-BSD FRML MDRD: ABNORMAL ML/MIN/{1.73_M2}
GLUCOSE BLD-MCNC: 129 MG/DL (ref 70–99)
HCT VFR BLD CALC: 30.4 % (ref 36–46)
HCT VFR BLD CALC: 30.8 % (ref 36–46)
HEMOGLOBIN: 10.1 G/DL (ref 12–16)
HEMOGLOBIN: 10.4 G/DL (ref 12–16)
IMMATURE GRANULOCYTES: ABNORMAL %
LYMPHOCYTES # BLD: 7 % (ref 24–44)
MCH RBC QN AUTO: 28.6 PG (ref 26–34)
MCH RBC QN AUTO: 29.3 PG (ref 26–34)
MCHC RBC AUTO-ENTMCNC: 32.6 G/DL (ref 31–37)
MCHC RBC AUTO-ENTMCNC: 34 G/DL (ref 31–37)
MCV RBC AUTO: 86 FL (ref 80–100)
MCV RBC AUTO: 87.5 FL (ref 80–100)
MONOCYTES # BLD: 8 % (ref 1–7)
NRBC AUTOMATED: ABNORMAL PER 100 WBC
NRBC AUTOMATED: ABNORMAL PER 100 WBC
PDW BLD-RTO: 18.7 % (ref 11.5–14.9)
PDW BLD-RTO: 18.9 % (ref 11.5–14.9)
PLATELET # BLD: 348 K/UL (ref 150–450)
PLATELET # BLD: 374 K/UL (ref 150–450)
PLATELET ESTIMATE: ABNORMAL
PMV BLD AUTO: 7.5 FL (ref 6–12)
PMV BLD AUTO: 8.2 FL (ref 6–12)
POTASSIUM SERPL-SCNC: 3.9 MMOL/L (ref 3.7–5.3)
PRO-BNP: 1711 PG/ML
RBC # BLD: 3.52 M/UL (ref 4–5.2)
RBC # BLD: 3.54 M/UL (ref 4–5.2)
RBC # BLD: ABNORMAL 10*6/UL
SEG NEUTROPHILS: 84 % (ref 36–66)
SEGMENTED NEUTROPHILS ABSOLUTE COUNT: 8.4 K/UL (ref 1.3–9.1)
SODIUM BLD-SCNC: 138 MMOL/L (ref 135–144)
TOTAL PROTEIN: 6 G/DL (ref 6.4–8.3)
VANCOMYCIN TROUGH DATE LAST DOSE: ABNORMAL
VANCOMYCIN TROUGH DOSE AMOUNT: ABNORMAL
VANCOMYCIN TROUGH TIME LAST DOSE: 121
VANCOMYCIN TROUGH: 27.5 UG/ML (ref 10–20)
WBC # BLD: 10 K/UL (ref 3.5–11)
WBC # BLD: 10.3 K/UL (ref 3.5–11)
WBC # BLD: ABNORMAL 10*3/UL

## 2019-10-14 PROCEDURE — 6360000002 HC RX W HCPCS: Performed by: INTERNAL MEDICINE

## 2019-10-14 PROCEDURE — 2000000000 HC ICU R&B

## 2019-10-14 PROCEDURE — 85027 COMPLETE CBC AUTOMATED: CPT

## 2019-10-14 PROCEDURE — 6370000000 HC RX 637 (ALT 250 FOR IP): Performed by: INTERNAL MEDICINE

## 2019-10-14 PROCEDURE — 80202 ASSAY OF VANCOMYCIN: CPT

## 2019-10-14 PROCEDURE — 94640 AIRWAY INHALATION TREATMENT: CPT

## 2019-10-14 PROCEDURE — 83880 ASSAY OF NATRIURETIC PEPTIDE: CPT

## 2019-10-14 PROCEDURE — 72125 CT NECK SPINE W/O DYE: CPT

## 2019-10-14 PROCEDURE — 85025 COMPLETE CBC W/AUTO DIFF WBC: CPT

## 2019-10-14 PROCEDURE — 72128 CT CHEST SPINE W/O DYE: CPT

## 2019-10-14 PROCEDURE — 2580000003 HC RX 258: Performed by: STUDENT IN AN ORGANIZED HEALTH CARE EDUCATION/TRAINING PROGRAM

## 2019-10-14 PROCEDURE — 99233 SBSQ HOSP IP/OBS HIGH 50: CPT | Performed by: INTERNAL MEDICINE

## 2019-10-14 PROCEDURE — 99233 SBSQ HOSP IP/OBS HIGH 50: CPT | Performed by: PSYCHIATRY & NEUROLOGY

## 2019-10-14 PROCEDURE — 80053 COMPREHEN METABOLIC PANEL: CPT

## 2019-10-14 PROCEDURE — 97530 THERAPEUTIC ACTIVITIES: CPT

## 2019-10-14 PROCEDURE — 36415 COLL VENOUS BLD VENIPUNCTURE: CPT

## 2019-10-14 PROCEDURE — 97164 PT RE-EVAL EST PLAN CARE: CPT

## 2019-10-14 PROCEDURE — 95816 EEG AWAKE AND DROWSY: CPT | Performed by: PSYCHIATRY & NEUROLOGY

## 2019-10-14 PROCEDURE — 6370000000 HC RX 637 (ALT 250 FOR IP): Performed by: STUDENT IN AN ORGANIZED HEALTH CARE EDUCATION/TRAINING PROGRAM

## 2019-10-14 PROCEDURE — 95819 EEG AWAKE AND ASLEEP: CPT

## 2019-10-14 PROCEDURE — 2580000003 HC RX 258: Performed by: INTERNAL MEDICINE

## 2019-10-14 RX ORDER — SODIUM CHLORIDE, SODIUM LACTATE, POTASSIUM CHLORIDE, CALCIUM CHLORIDE 600; 310; 30; 20 MG/100ML; MG/100ML; MG/100ML; MG/100ML
INJECTION, SOLUTION INTRAVENOUS CONTINUOUS
Status: DISCONTINUED | OUTPATIENT
Start: 2019-10-14 | End: 2019-10-15

## 2019-10-14 RX ORDER — FUROSEMIDE 40 MG/1
40 TABLET ORAL 2 TIMES DAILY
Status: DISCONTINUED | OUTPATIENT
Start: 2019-10-14 | End: 2019-10-15

## 2019-10-14 RX ADMIN — ASPIRIN 81 MG 81 MG: 81 TABLET ORAL at 08:53

## 2019-10-14 RX ADMIN — ALBUTEROL SULFATE 2.5 MG: 2.5 SOLUTION RESPIRATORY (INHALATION) at 07:11

## 2019-10-14 RX ADMIN — FUROSEMIDE 40 MG: 40 TABLET ORAL at 20:13

## 2019-10-14 RX ADMIN — ENOXAPARIN SODIUM 40 MG: 40 INJECTION SUBCUTANEOUS at 08:53

## 2019-10-14 RX ADMIN — SODIUM CHLORIDE, POTASSIUM CHLORIDE, SODIUM LACTATE AND CALCIUM CHLORIDE: 600; 310; 30; 20 INJECTION, SOLUTION INTRAVENOUS at 17:30

## 2019-10-14 RX ADMIN — ALBUTEROL SULFATE 2.5 MG: 2.5 SOLUTION RESPIRATORY (INHALATION) at 15:13

## 2019-10-14 RX ADMIN — ALBUTEROL SULFATE 2.5 MG: 2.5 SOLUTION RESPIRATORY (INHALATION) at 20:03

## 2019-10-14 RX ADMIN — Medication 1250 MG: at 01:21

## 2019-10-14 RX ADMIN — ROPINIROLE HYDROCHLORIDE 0.25 MG: 0.5 TABLET, FILM COATED ORAL at 21:06

## 2019-10-14 RX ADMIN — AMIODARONE HYDROCHLORIDE 200 MG: 200 TABLET ORAL at 08:53

## 2019-10-14 RX ADMIN — ALBUTEROL SULFATE 2.5 MG: 2.5 SOLUTION RESPIRATORY (INHALATION) at 11:27

## 2019-10-14 RX ADMIN — Medication 10 ML: at 08:58

## 2019-10-14 RX ADMIN — Medication 10 ML: at 20:17

## 2019-10-14 RX ADMIN — ATORVASTATIN CALCIUM 10 MG: 10 TABLET, FILM COATED ORAL at 08:53

## 2019-10-14 RX ADMIN — LATANOPROST 1 DROP: 50 SOLUTION OPHTHALMIC at 20:17

## 2019-10-14 RX ADMIN — METOPROLOL TARTRATE 25 MG: 25 TABLET ORAL at 08:53

## 2019-10-15 ENCOUNTER — APPOINTMENT (OUTPATIENT)
Dept: GENERAL RADIOLOGY | Age: 84
DRG: 314 | End: 2019-10-15
Payer: MEDICARE

## 2019-10-15 ENCOUNTER — APPOINTMENT (OUTPATIENT)
Dept: INTERVENTIONAL RADIOLOGY/VASCULAR | Age: 84
DRG: 314 | End: 2019-10-15
Payer: MEDICARE

## 2019-10-15 LAB
ABSOLUTE EOS #: 0.2 K/UL (ref 0–0.4)
ABSOLUTE IMMATURE GRANULOCYTE: ABNORMAL K/UL (ref 0–0.3)
ABSOLUTE LYMPH #: 0.7 K/UL (ref 1–4.8)
ABSOLUTE MONO #: 0.7 K/UL (ref 0.1–1.3)
ALBUMIN SERPL-MCNC: 2.1 G/DL (ref 3.5–5.2)
ALBUMIN/GLOBULIN RATIO: ABNORMAL (ref 1–2.5)
ALP BLD-CCNC: 89 U/L (ref 35–104)
ALT SERPL-CCNC: 62 U/L (ref 5–33)
ANION GAP SERPL CALCULATED.3IONS-SCNC: 11 MMOL/L (ref 9–17)
AST SERPL-CCNC: 46 U/L
BASOPHILS # BLD: 0 % (ref 0–2)
BASOPHILS ABSOLUTE: 0 K/UL (ref 0–0.2)
BILIRUB SERPL-MCNC: 0.51 MG/DL (ref 0.3–1.2)
BUN BLDV-MCNC: 24 MG/DL (ref 8–23)
BUN/CREAT BLD: ABNORMAL (ref 9–20)
CALCIUM SERPL-MCNC: 8.7 MG/DL (ref 8.6–10.4)
CHLORIDE BLD-SCNC: 103 MMOL/L (ref 98–107)
CO2: 26 MMOL/L (ref 20–31)
CREAT SERPL-MCNC: 0.66 MG/DL (ref 0.5–0.9)
DIFFERENTIAL TYPE: ABNORMAL
EOSINOPHILS RELATIVE PERCENT: 2 % (ref 0–4)
GFR AFRICAN AMERICAN: >60 ML/MIN
GFR NON-AFRICAN AMERICAN: >60 ML/MIN
GFR SERPL CREATININE-BSD FRML MDRD: ABNORMAL ML/MIN/{1.73_M2}
GFR SERPL CREATININE-BSD FRML MDRD: ABNORMAL ML/MIN/{1.73_M2}
GLUCOSE BLD-MCNC: 112 MG/DL (ref 70–99)
HCT VFR BLD CALC: 30.9 % (ref 36–46)
HEMOGLOBIN: 10 G/DL (ref 12–16)
IMMATURE GRANULOCYTES: ABNORMAL %
LYMPHOCYTES # BLD: 7 % (ref 24–44)
MCH RBC QN AUTO: 28.3 PG (ref 26–34)
MCHC RBC AUTO-ENTMCNC: 32.2 G/DL (ref 31–37)
MCV RBC AUTO: 87.8 FL (ref 80–100)
MONOCYTES # BLD: 7 % (ref 1–7)
NRBC AUTOMATED: ABNORMAL PER 100 WBC
PDW BLD-RTO: 19.1 % (ref 11.5–14.9)
PLATELET # BLD: 351 K/UL (ref 150–450)
PLATELET ESTIMATE: ABNORMAL
PMV BLD AUTO: 8 FL (ref 6–12)
POTASSIUM SERPL-SCNC: 3.8 MMOL/L (ref 3.7–5.3)
RBC # BLD: 3.52 M/UL (ref 4–5.2)
RBC # BLD: ABNORMAL 10*6/UL
SEG NEUTROPHILS: 84 % (ref 36–66)
SEGMENTED NEUTROPHILS ABSOLUTE COUNT: 8 K/UL (ref 1.3–9.1)
SODIUM BLD-SCNC: 140 MMOL/L (ref 135–144)
TOTAL PROTEIN: 5.9 G/DL (ref 6.4–8.3)
WBC # BLD: 9.6 K/UL (ref 3.5–11)
WBC # BLD: ABNORMAL 10*3/UL

## 2019-10-15 PROCEDURE — 2060000000 HC ICU INTERMEDIATE R&B

## 2019-10-15 PROCEDURE — 99233 SBSQ HOSP IP/OBS HIGH 50: CPT | Performed by: INTERNAL MEDICINE

## 2019-10-15 PROCEDURE — 6370000000 HC RX 637 (ALT 250 FOR IP): Performed by: INTERNAL MEDICINE

## 2019-10-15 PROCEDURE — 2580000003 HC RX 258: Performed by: STUDENT IN AN ORGANIZED HEALTH CARE EDUCATION/TRAINING PROGRAM

## 2019-10-15 PROCEDURE — 6360000002 HC RX W HCPCS: Performed by: INTERNAL MEDICINE

## 2019-10-15 PROCEDURE — 36415 COLL VENOUS BLD VENIPUNCTURE: CPT

## 2019-10-15 PROCEDURE — 97535 SELF CARE MNGMENT TRAINING: CPT

## 2019-10-15 PROCEDURE — 99233 SBSQ HOSP IP/OBS HIGH 50: CPT | Performed by: PSYCHIATRY & NEUROLOGY

## 2019-10-15 PROCEDURE — 97530 THERAPEUTIC ACTIVITIES: CPT

## 2019-10-15 PROCEDURE — 94640 AIRWAY INHALATION TREATMENT: CPT

## 2019-10-15 PROCEDURE — 97110 THERAPEUTIC EXERCISES: CPT

## 2019-10-15 PROCEDURE — 94761 N-INVAS EAR/PLS OXIMETRY MLT: CPT

## 2019-10-15 PROCEDURE — 92526 ORAL FUNCTION THERAPY: CPT

## 2019-10-15 PROCEDURE — 6370000000 HC RX 637 (ALT 250 FOR IP): Performed by: STUDENT IN AN ORGANIZED HEALTH CARE EDUCATION/TRAINING PROGRAM

## 2019-10-15 PROCEDURE — 85025 COMPLETE CBC W/AUTO DIFF WBC: CPT

## 2019-10-15 PROCEDURE — 2580000003 HC RX 258: Performed by: INTERNAL MEDICINE

## 2019-10-15 PROCEDURE — 80053 COMPREHEN METABOLIC PANEL: CPT

## 2019-10-15 PROCEDURE — 71045 X-RAY EXAM CHEST 1 VIEW: CPT

## 2019-10-15 RX ORDER — FUROSEMIDE 20 MG/1
20 TABLET ORAL 2 TIMES DAILY
Status: DISCONTINUED | OUTPATIENT
Start: 2019-10-15 | End: 2019-10-18 | Stop reason: HOSPADM

## 2019-10-15 RX ADMIN — SODIUM CHLORIDE, POTASSIUM CHLORIDE, SODIUM LACTATE AND CALCIUM CHLORIDE: 600; 310; 30; 20 INJECTION, SOLUTION INTRAVENOUS at 04:02

## 2019-10-15 RX ADMIN — Medication 10 ML: at 08:34

## 2019-10-15 RX ADMIN — LATANOPROST 1 DROP: 50 SOLUTION OPHTHALMIC at 20:22

## 2019-10-15 RX ADMIN — ALBUTEROL SULFATE 2.5 MG: 2.5 SOLUTION RESPIRATORY (INHALATION) at 20:46

## 2019-10-15 RX ADMIN — ROPINIROLE HYDROCHLORIDE 0.25 MG: 0.5 TABLET, FILM COATED ORAL at 20:22

## 2019-10-15 RX ADMIN — ATORVASTATIN CALCIUM 10 MG: 10 TABLET, FILM COATED ORAL at 08:32

## 2019-10-15 RX ADMIN — Medication 1250 MG: at 08:32

## 2019-10-15 RX ADMIN — ALBUTEROL SULFATE 2.5 MG: 2.5 SOLUTION RESPIRATORY (INHALATION) at 14:37

## 2019-10-15 RX ADMIN — AMIODARONE HYDROCHLORIDE 200 MG: 200 TABLET ORAL at 08:32

## 2019-10-15 RX ADMIN — METOPROLOL TARTRATE 25 MG: 25 TABLET ORAL at 08:32

## 2019-10-15 RX ADMIN — ALBUTEROL SULFATE 2.5 MG: 2.5 SOLUTION RESPIRATORY (INHALATION) at 07:28

## 2019-10-15 RX ADMIN — FUROSEMIDE 20 MG: 20 TABLET ORAL at 17:46

## 2019-10-15 RX ADMIN — ASPIRIN 81 MG 81 MG: 81 TABLET ORAL at 08:32

## 2019-10-15 RX ADMIN — FUROSEMIDE 40 MG: 40 TABLET ORAL at 08:32

## 2019-10-15 RX ADMIN — Medication 10 ML: at 20:23

## 2019-10-15 ASSESSMENT — PAIN SCALES - WONG BAKER
WONGBAKER_NUMERICALRESPONSE: 0

## 2019-10-15 ASSESSMENT — PAIN SCALES - GENERAL
PAINLEVEL_OUTOF10: 0

## 2019-10-16 ENCOUNTER — APPOINTMENT (OUTPATIENT)
Dept: GENERAL RADIOLOGY | Age: 84
DRG: 314 | End: 2019-10-16
Payer: MEDICARE

## 2019-10-16 ENCOUNTER — APPOINTMENT (OUTPATIENT)
Dept: INTERVENTIONAL RADIOLOGY/VASCULAR | Age: 84
DRG: 314 | End: 2019-10-16
Payer: MEDICARE

## 2019-10-16 LAB
ABSOLUTE EOS #: 0.1 K/UL (ref 0–0.4)
ABSOLUTE IMMATURE GRANULOCYTE: ABNORMAL K/UL (ref 0–0.3)
ABSOLUTE LYMPH #: 0.6 K/UL (ref 1–4.8)
ABSOLUTE MONO #: 0.7 K/UL (ref 0.1–1.3)
ALBUMIN SERPL-MCNC: 2.4 G/DL (ref 3.5–5.2)
ALBUMIN/GLOBULIN RATIO: ABNORMAL (ref 1–2.5)
ALP BLD-CCNC: 95 U/L (ref 35–104)
ALT SERPL-CCNC: 61 U/L (ref 5–33)
ANION GAP SERPL CALCULATED.3IONS-SCNC: 12 MMOL/L (ref 9–17)
AST SERPL-CCNC: 41 U/L
BASOPHILS # BLD: 0 % (ref 0–2)
BASOPHILS ABSOLUTE: 0 K/UL (ref 0–0.2)
BILIRUB SERPL-MCNC: 0.67 MG/DL (ref 0.3–1.2)
BNP INTERPRETATION: ABNORMAL
BUN BLDV-MCNC: 23 MG/DL (ref 8–23)
BUN/CREAT BLD: ABNORMAL (ref 9–20)
CALCIUM SERPL-MCNC: 9.1 MG/DL (ref 8.6–10.4)
CHLORIDE BLD-SCNC: 98 MMOL/L (ref 98–107)
CO2: 26 MMOL/L (ref 20–31)
CREAT SERPL-MCNC: 0.71 MG/DL (ref 0.5–0.9)
CULTURE: NORMAL
CULTURE: NORMAL
DIFFERENTIAL TYPE: ABNORMAL
EOSINOPHILS RELATIVE PERCENT: 1 % (ref 0–4)
GFR AFRICAN AMERICAN: >60 ML/MIN
GFR NON-AFRICAN AMERICAN: >60 ML/MIN
GFR SERPL CREATININE-BSD FRML MDRD: ABNORMAL ML/MIN/{1.73_M2}
GFR SERPL CREATININE-BSD FRML MDRD: ABNORMAL ML/MIN/{1.73_M2}
GLUCOSE BLD-MCNC: 123 MG/DL (ref 70–99)
HCT VFR BLD CALC: 33.3 % (ref 36–46)
HEMOGLOBIN: 11 G/DL (ref 12–16)
IMMATURE GRANULOCYTES: ABNORMAL %
LYMPHOCYTES # BLD: 5 % (ref 24–44)
Lab: NORMAL
Lab: NORMAL
MCH RBC QN AUTO: 28.7 PG (ref 26–34)
MCHC RBC AUTO-ENTMCNC: 33.1 G/DL (ref 31–37)
MCV RBC AUTO: 86.7 FL (ref 80–100)
MONOCYTES # BLD: 6 % (ref 1–7)
NRBC AUTOMATED: ABNORMAL PER 100 WBC
PDW BLD-RTO: 18.6 % (ref 11.5–14.9)
PLATELET # BLD: 365 K/UL (ref 150–450)
PLATELET ESTIMATE: ABNORMAL
PMV BLD AUTO: 7.9 FL (ref 6–12)
POTASSIUM SERPL-SCNC: 3.6 MMOL/L (ref 3.7–5.3)
PRO-BNP: 1447 PG/ML
RBC # BLD: 3.84 M/UL (ref 4–5.2)
RBC # BLD: ABNORMAL 10*6/UL
SEG NEUTROPHILS: 88 % (ref 36–66)
SEGMENTED NEUTROPHILS ABSOLUTE COUNT: 10 K/UL (ref 1.3–9.1)
SODIUM BLD-SCNC: 136 MMOL/L (ref 135–144)
SPECIMEN DESCRIPTION: NORMAL
SPECIMEN DESCRIPTION: NORMAL
TOTAL PROTEIN: 6.5 G/DL (ref 6.4–8.3)
WBC # BLD: 11.5 K/UL (ref 3.5–11)
WBC # BLD: ABNORMAL 10*3/UL

## 2019-10-16 PROCEDURE — 6360000002 HC RX W HCPCS: Performed by: INTERNAL MEDICINE

## 2019-10-16 PROCEDURE — 99233 SBSQ HOSP IP/OBS HIGH 50: CPT | Performed by: PSYCHIATRY & NEUROLOGY

## 2019-10-16 PROCEDURE — 97110 THERAPEUTIC EXERCISES: CPT

## 2019-10-16 PROCEDURE — 6370000000 HC RX 637 (ALT 250 FOR IP): Performed by: INTERNAL MEDICINE

## 2019-10-16 PROCEDURE — 80053 COMPREHEN METABOLIC PANEL: CPT

## 2019-10-16 PROCEDURE — 99233 SBSQ HOSP IP/OBS HIGH 50: CPT | Performed by: INTERNAL MEDICINE

## 2019-10-16 PROCEDURE — 94761 N-INVAS EAR/PLS OXIMETRY MLT: CPT

## 2019-10-16 PROCEDURE — 36415 COLL VENOUS BLD VENIPUNCTURE: CPT

## 2019-10-16 PROCEDURE — 94640 AIRWAY INHALATION TREATMENT: CPT

## 2019-10-16 PROCEDURE — 6370000000 HC RX 637 (ALT 250 FOR IP): Performed by: STUDENT IN AN ORGANIZED HEALTH CARE EDUCATION/TRAINING PROGRAM

## 2019-10-16 PROCEDURE — 2060000000 HC ICU INTERMEDIATE R&B

## 2019-10-16 PROCEDURE — 97530 THERAPEUTIC ACTIVITIES: CPT

## 2019-10-16 PROCEDURE — 85025 COMPLETE CBC W/AUTO DIFF WBC: CPT

## 2019-10-16 PROCEDURE — 83880 ASSAY OF NATRIURETIC PEPTIDE: CPT

## 2019-10-16 PROCEDURE — 02HV33Z INSERTION OF INFUSION DEVICE INTO SUPERIOR VENA CAVA, PERCUTANEOUS APPROACH: ICD-10-PCS | Performed by: RADIOLOGY

## 2019-10-16 PROCEDURE — 2709999900 IR FLUORO GUIDED CVA DEVICE PLMT/REPLACE/REMOVAL

## 2019-10-16 PROCEDURE — 73090 X-RAY EXAM OF FOREARM: CPT

## 2019-10-16 PROCEDURE — 2580000003 HC RX 258: Performed by: INTERNAL MEDICINE

## 2019-10-16 PROCEDURE — 36573 INSJ PICC RS&I 5 YR+: CPT | Performed by: RADIOLOGY

## 2019-10-16 RX ADMIN — AMIODARONE HYDROCHLORIDE 200 MG: 200 TABLET ORAL at 09:54

## 2019-10-16 RX ADMIN — Medication 10 ML: at 09:56

## 2019-10-16 RX ADMIN — ASPIRIN 81 MG 81 MG: 81 TABLET ORAL at 09:54

## 2019-10-16 RX ADMIN — Medication 10 ML: at 20:46

## 2019-10-16 RX ADMIN — FUROSEMIDE 20 MG: 20 TABLET ORAL at 09:55

## 2019-10-16 RX ADMIN — METOPROLOL TARTRATE 25 MG: 25 TABLET ORAL at 09:54

## 2019-10-16 RX ADMIN — ALBUTEROL SULFATE 2.5 MG: 2.5 SOLUTION RESPIRATORY (INHALATION) at 15:40

## 2019-10-16 RX ADMIN — METOPROLOL TARTRATE 25 MG: 25 TABLET ORAL at 20:46

## 2019-10-16 RX ADMIN — Medication 1250 MG: at 10:24

## 2019-10-16 RX ADMIN — ALBUTEROL SULFATE 2.5 MG: 2.5 SOLUTION RESPIRATORY (INHALATION) at 11:38

## 2019-10-16 RX ADMIN — ALBUTEROL SULFATE 2.5 MG: 2.5 SOLUTION RESPIRATORY (INHALATION) at 07:52

## 2019-10-16 RX ADMIN — ROPINIROLE HYDROCHLORIDE 0.25 MG: 0.5 TABLET, FILM COATED ORAL at 20:46

## 2019-10-16 RX ADMIN — LATANOPROST 1 DROP: 50 SOLUTION OPHTHALMIC at 20:46

## 2019-10-16 RX ADMIN — ATORVASTATIN CALCIUM 10 MG: 10 TABLET, FILM COATED ORAL at 09:54

## 2019-10-16 RX ADMIN — FUROSEMIDE 20 MG: 20 TABLET ORAL at 18:04

## 2019-10-16 ASSESSMENT — PAIN SCALES - WONG BAKER
WONGBAKER_NUMERICALRESPONSE: 0

## 2019-10-17 PROBLEM — S52.532D CLOSED COLLES' FRACTURE OF LEFT RADIUS WITH ROUTINE HEALING: Status: ACTIVE | Noted: 2019-10-17

## 2019-10-17 LAB
ABSOLUTE EOS #: 0.2 K/UL (ref 0–0.4)
ABSOLUTE IMMATURE GRANULOCYTE: ABNORMAL K/UL (ref 0–0.3)
ABSOLUTE LYMPH #: 0.7 K/UL (ref 1–4.8)
ABSOLUTE MONO #: 0.7 K/UL (ref 0.1–1.3)
ALBUMIN SERPL-MCNC: 2.2 G/DL (ref 3.5–5.2)
ALBUMIN/GLOBULIN RATIO: ABNORMAL (ref 1–2.5)
ALP BLD-CCNC: 81 U/L (ref 35–104)
ALT SERPL-CCNC: 46 U/L (ref 5–33)
ANION GAP SERPL CALCULATED.3IONS-SCNC: 12 MMOL/L (ref 9–17)
AST SERPL-CCNC: 34 U/L
BASOPHILS # BLD: 0 % (ref 0–2)
BASOPHILS ABSOLUTE: 0 K/UL (ref 0–0.2)
BILIRUB SERPL-MCNC: 0.54 MG/DL (ref 0.3–1.2)
BUN BLDV-MCNC: 24 MG/DL (ref 8–23)
BUN/CREAT BLD: ABNORMAL (ref 9–20)
CALCIUM SERPL-MCNC: 8.4 MG/DL (ref 8.6–10.4)
CHLORIDE BLD-SCNC: 99 MMOL/L (ref 98–107)
CO2: 26 MMOL/L (ref 20–31)
CREAT SERPL-MCNC: 0.71 MG/DL (ref 0.5–0.9)
DIFFERENTIAL TYPE: ABNORMAL
EOSINOPHILS RELATIVE PERCENT: 2 % (ref 0–4)
GFR AFRICAN AMERICAN: >60 ML/MIN
GFR NON-AFRICAN AMERICAN: >60 ML/MIN
GFR SERPL CREATININE-BSD FRML MDRD: ABNORMAL ML/MIN/{1.73_M2}
GFR SERPL CREATININE-BSD FRML MDRD: ABNORMAL ML/MIN/{1.73_M2}
GLUCOSE BLD-MCNC: 104 MG/DL (ref 70–99)
HCT VFR BLD CALC: 29.7 % (ref 36–46)
HCT VFR BLD CALC: 31.1 % (ref 36–46)
HEMOGLOBIN: 10.1 G/DL (ref 12–16)
HEMOGLOBIN: 9.6 G/DL (ref 12–16)
IMMATURE GRANULOCYTES: ABNORMAL %
LYMPHOCYTES # BLD: 8 % (ref 24–44)
MAGNESIUM: 2.2 MG/DL (ref 1.6–2.6)
MCH RBC QN AUTO: 28.5 PG (ref 26–34)
MCH RBC QN AUTO: 28.6 PG (ref 26–34)
MCHC RBC AUTO-ENTMCNC: 32.4 G/DL (ref 31–37)
MCHC RBC AUTO-ENTMCNC: 32.4 G/DL (ref 31–37)
MCV RBC AUTO: 87.8 FL (ref 80–100)
MCV RBC AUTO: 88.1 FL (ref 80–100)
MONOCYTES # BLD: 8 % (ref 1–7)
NRBC AUTOMATED: ABNORMAL PER 100 WBC
NRBC AUTOMATED: ABNORMAL PER 100 WBC
PDW BLD-RTO: 18.3 % (ref 11.5–14.9)
PDW BLD-RTO: 18.8 % (ref 11.5–14.9)
PLATELET # BLD: 277 K/UL (ref 150–450)
PLATELET # BLD: 289 K/UL (ref 150–450)
PLATELET ESTIMATE: ABNORMAL
PMV BLD AUTO: 7.9 FL (ref 6–12)
PMV BLD AUTO: 8 FL (ref 6–12)
POTASSIUM SERPL-SCNC: 3.5 MMOL/L (ref 3.7–5.3)
RBC # BLD: 3.38 M/UL (ref 4–5.2)
RBC # BLD: 3.54 M/UL (ref 4–5.2)
RBC # BLD: ABNORMAL 10*6/UL
SEG NEUTROPHILS: 82 % (ref 36–66)
SEGMENTED NEUTROPHILS ABSOLUTE COUNT: 6.9 K/UL (ref 1.3–9.1)
SODIUM BLD-SCNC: 137 MMOL/L (ref 135–144)
TOTAL PROTEIN: 5.6 G/DL (ref 6.4–8.3)
VANCOMYCIN TROUGH DATE LAST DOSE: NORMAL
VANCOMYCIN TROUGH DOSE AMOUNT: NORMAL
VANCOMYCIN TROUGH TIME LAST DOSE: 1024
VANCOMYCIN TROUGH: 14.4 UG/ML (ref 10–20)
WBC # BLD: 12 K/UL (ref 3.5–11)
WBC # BLD: 8.5 K/UL (ref 3.5–11)
WBC # BLD: ABNORMAL 10*3/UL

## 2019-10-17 PROCEDURE — 6370000000 HC RX 637 (ALT 250 FOR IP): Performed by: INTERNAL MEDICINE

## 2019-10-17 PROCEDURE — 83735 ASSAY OF MAGNESIUM: CPT

## 2019-10-17 PROCEDURE — 80053 COMPREHEN METABOLIC PANEL: CPT

## 2019-10-17 PROCEDURE — 36415 COLL VENOUS BLD VENIPUNCTURE: CPT

## 2019-10-17 PROCEDURE — 99232 SBSQ HOSP IP/OBS MODERATE 35: CPT | Performed by: INTERNAL MEDICINE

## 2019-10-17 PROCEDURE — 6370000000 HC RX 637 (ALT 250 FOR IP): Performed by: STUDENT IN AN ORGANIZED HEALTH CARE EDUCATION/TRAINING PROGRAM

## 2019-10-17 PROCEDURE — 99222 1ST HOSP IP/OBS MODERATE 55: CPT | Performed by: ORTHOPAEDIC SURGERY

## 2019-10-17 PROCEDURE — 99232 SBSQ HOSP IP/OBS MODERATE 35: CPT | Performed by: PSYCHIATRY & NEUROLOGY

## 2019-10-17 PROCEDURE — 2580000003 HC RX 258: Performed by: INTERNAL MEDICINE

## 2019-10-17 PROCEDURE — 85025 COMPLETE CBC W/AUTO DIFF WBC: CPT

## 2019-10-17 PROCEDURE — 94761 N-INVAS EAR/PLS OXIMETRY MLT: CPT

## 2019-10-17 PROCEDURE — 85027 COMPLETE CBC AUTOMATED: CPT

## 2019-10-17 PROCEDURE — 99239 HOSP IP/OBS DSCHRG MGMT >30: CPT | Performed by: INTERNAL MEDICINE

## 2019-10-17 PROCEDURE — 2060000000 HC ICU INTERMEDIATE R&B

## 2019-10-17 PROCEDURE — 94640 AIRWAY INHALATION TREATMENT: CPT

## 2019-10-17 PROCEDURE — 6360000002 HC RX W HCPCS: Performed by: INTERNAL MEDICINE

## 2019-10-17 PROCEDURE — 80202 ASSAY OF VANCOMYCIN: CPT

## 2019-10-17 RX ORDER — DOCUSATE SODIUM 100 MG/1
100 CAPSULE, LIQUID FILLED ORAL 2 TIMES DAILY
Status: DISCONTINUED | OUTPATIENT
Start: 2019-10-17 | End: 2019-10-18 | Stop reason: HOSPADM

## 2019-10-17 RX ORDER — ALBUTEROL SULFATE 2.5 MG/3ML
2.5 SOLUTION RESPIRATORY (INHALATION) EVERY 4 HOURS PRN
Status: DISCONTINUED | OUTPATIENT
Start: 2019-10-17 | End: 2019-10-18 | Stop reason: HOSPADM

## 2019-10-17 RX ORDER — CIPROFLOXACIN HYDROCHLORIDE 3.5 MG/ML
1 SOLUTION/ DROPS TOPICAL
Status: DISCONTINUED | OUTPATIENT
Start: 2019-10-17 | End: 2019-10-18 | Stop reason: HOSPADM

## 2019-10-17 RX ORDER — SENNA PLUS 8.6 MG/1
1 TABLET ORAL NIGHTLY
Status: DISCONTINUED | OUTPATIENT
Start: 2019-10-17 | End: 2019-10-18 | Stop reason: HOSPADM

## 2019-10-17 RX ADMIN — ALBUTEROL SULFATE 2.5 MG: 2.5 SOLUTION RESPIRATORY (INHALATION) at 07:58

## 2019-10-17 RX ADMIN — ASPIRIN 81 MG 81 MG: 81 TABLET ORAL at 09:11

## 2019-10-17 RX ADMIN — Medication 10 ML: at 12:46

## 2019-10-17 RX ADMIN — FUROSEMIDE 20 MG: 20 TABLET ORAL at 09:11

## 2019-10-17 RX ADMIN — Medication 10 ML: at 22:38

## 2019-10-17 RX ADMIN — LATANOPROST 1 DROP: 50 SOLUTION OPHTHALMIC at 22:38

## 2019-10-17 RX ADMIN — CIPROFLOXACIN 1 DROP: 3 SOLUTION OPHTHALMIC at 22:38

## 2019-10-17 RX ADMIN — ROPINIROLE HYDROCHLORIDE 0.25 MG: 0.5 TABLET, FILM COATED ORAL at 22:37

## 2019-10-17 RX ADMIN — DOCUSATE SODIUM 100 MG: 100 CAPSULE, LIQUID FILLED ORAL at 09:26

## 2019-10-17 RX ADMIN — CIPROFLOXACIN 1 DROP: 3 SOLUTION OPHTHALMIC at 18:15

## 2019-10-17 RX ADMIN — METOPROLOL TARTRATE 25 MG: 25 TABLET ORAL at 22:37

## 2019-10-17 RX ADMIN — CIPROFLOXACIN 1 DROP: 3 SOLUTION OPHTHALMIC at 19:40

## 2019-10-17 RX ADMIN — METOPROLOL TARTRATE 25 MG: 25 TABLET ORAL at 09:11

## 2019-10-17 RX ADMIN — Medication 1250 MG: at 12:23

## 2019-10-17 RX ADMIN — AMIODARONE HYDROCHLORIDE 200 MG: 200 TABLET ORAL at 09:11

## 2019-10-17 RX ADMIN — ATORVASTATIN CALCIUM 10 MG: 10 TABLET, FILM COATED ORAL at 09:11

## 2019-10-17 RX ADMIN — DOCUSATE SODIUM 100 MG: 100 CAPSULE, LIQUID FILLED ORAL at 22:38

## 2019-10-17 RX ADMIN — CIPROFLOXACIN 1 DROP: 3 SOLUTION OPHTHALMIC at 12:54

## 2019-10-17 RX ADMIN — POTASSIUM CHLORIDE 40 MEQ: 1500 TABLET, EXTENDED RELEASE ORAL at 09:26

## 2019-10-17 RX ADMIN — FUROSEMIDE 20 MG: 20 TABLET ORAL at 22:38

## 2019-10-17 RX ADMIN — SENNOSIDES 8.6 MG: 8.6 TABLET ORAL at 22:37

## 2019-10-17 RX ADMIN — ALBUTEROL SULFATE 2.5 MG: 2.5 SOLUTION RESPIRATORY (INHALATION) at 11:08

## 2019-10-17 ASSESSMENT — PAIN SCALES - WONG BAKER

## 2019-10-18 ENCOUNTER — APPOINTMENT (OUTPATIENT)
Dept: INTERVENTIONAL RADIOLOGY/VASCULAR | Age: 84
DRG: 314 | End: 2019-10-18
Payer: MEDICARE

## 2019-10-18 VITALS
WEIGHT: 161.38 LBS | HEART RATE: 69 BPM | OXYGEN SATURATION: 98 % | BODY MASS INDEX: 29.7 KG/M2 | TEMPERATURE: 97.7 F | DIASTOLIC BLOOD PRESSURE: 51 MMHG | SYSTOLIC BLOOD PRESSURE: 107 MMHG | RESPIRATION RATE: 20 BRPM | HEIGHT: 62 IN

## 2019-10-18 LAB
ABSOLUTE EOS #: 0.1 K/UL (ref 0–0.4)
ABSOLUTE IMMATURE GRANULOCYTE: ABNORMAL K/UL (ref 0–0.3)
ABSOLUTE LYMPH #: 0.7 K/UL (ref 1–4.8)
ABSOLUTE MONO #: 0.7 K/UL (ref 0.1–1.3)
ALBUMIN SERPL-MCNC: 2.4 G/DL (ref 3.5–5.2)
ALBUMIN/GLOBULIN RATIO: ABNORMAL (ref 1–2.5)
ALP BLD-CCNC: 86 U/L (ref 35–104)
ALT SERPL-CCNC: 45 U/L (ref 5–33)
ANION GAP SERPL CALCULATED.3IONS-SCNC: 12 MMOL/L (ref 9–17)
AST SERPL-CCNC: 36 U/L
BASOPHILS # BLD: 0 % (ref 0–2)
BASOPHILS ABSOLUTE: 0 K/UL (ref 0–0.2)
BILIRUB SERPL-MCNC: 0.6 MG/DL (ref 0.3–1.2)
BUN BLDV-MCNC: 22 MG/DL (ref 8–23)
BUN/CREAT BLD: ABNORMAL (ref 9–20)
CALCIUM SERPL-MCNC: 8.5 MG/DL (ref 8.6–10.4)
CHLORIDE BLD-SCNC: 100 MMOL/L (ref 98–107)
CO2: 26 MMOL/L (ref 20–31)
CREAT SERPL-MCNC: 0.7 MG/DL (ref 0.5–0.9)
DIFFERENTIAL TYPE: ABNORMAL
EOSINOPHILS RELATIVE PERCENT: 1 % (ref 0–4)
GFR AFRICAN AMERICAN: >60 ML/MIN
GFR NON-AFRICAN AMERICAN: >60 ML/MIN
GFR SERPL CREATININE-BSD FRML MDRD: ABNORMAL ML/MIN/{1.73_M2}
GFR SERPL CREATININE-BSD FRML MDRD: ABNORMAL ML/MIN/{1.73_M2}
GLUCOSE BLD-MCNC: 117 MG/DL (ref 70–99)
HCT VFR BLD CALC: 31.1 % (ref 36–46)
HEMOGLOBIN: 10.3 G/DL (ref 12–16)
IMMATURE GRANULOCYTES: ABNORMAL %
LYMPHOCYTES # BLD: 8 % (ref 24–44)
MAGNESIUM: 2 MG/DL (ref 1.6–2.6)
MCH RBC QN AUTO: 29 PG (ref 26–34)
MCHC RBC AUTO-ENTMCNC: 33 G/DL (ref 31–37)
MCV RBC AUTO: 87.8 FL (ref 80–100)
MONOCYTES # BLD: 8 % (ref 1–7)
NRBC AUTOMATED: ABNORMAL PER 100 WBC
PDW BLD-RTO: 18.7 % (ref 11.5–14.9)
PLATELET # BLD: 261 K/UL (ref 150–450)
PLATELET ESTIMATE: ABNORMAL
PMV BLD AUTO: 7.7 FL (ref 6–12)
POTASSIUM SERPL-SCNC: 3.4 MMOL/L (ref 3.7–5.3)
RBC # BLD: 3.54 M/UL (ref 4–5.2)
RBC # BLD: ABNORMAL 10*6/UL
SEG NEUTROPHILS: 83 % (ref 36–66)
SEGMENTED NEUTROPHILS ABSOLUTE COUNT: 7.7 K/UL (ref 1.3–9.1)
SODIUM BLD-SCNC: 138 MMOL/L (ref 135–144)
TOTAL PROTEIN: 6.2 G/DL (ref 6.4–8.3)
WBC # BLD: 9.3 K/UL (ref 3.5–11)
WBC # BLD: ABNORMAL 10*3/UL

## 2019-10-18 PROCEDURE — 6370000000 HC RX 637 (ALT 250 FOR IP): Performed by: INTERNAL MEDICINE

## 2019-10-18 PROCEDURE — 36573 INSJ PICC RS&I 5 YR+: CPT

## 2019-10-18 PROCEDURE — 02HV33Z INSERTION OF INFUSION DEVICE INTO SUPERIOR VENA CAVA, PERCUTANEOUS APPROACH: ICD-10-PCS | Performed by: RADIOLOGY

## 2019-10-18 PROCEDURE — 36415 COLL VENOUS BLD VENIPUNCTURE: CPT

## 2019-10-18 PROCEDURE — 99232 SBSQ HOSP IP/OBS MODERATE 35: CPT | Performed by: PSYCHIATRY & NEUROLOGY

## 2019-10-18 PROCEDURE — 6370000000 HC RX 637 (ALT 250 FOR IP): Performed by: STUDENT IN AN ORGANIZED HEALTH CARE EDUCATION/TRAINING PROGRAM

## 2019-10-18 PROCEDURE — 80053 COMPREHEN METABOLIC PANEL: CPT

## 2019-10-18 PROCEDURE — 36573 INSJ PICC RS&I 5 YR+: CPT | Performed by: RADIOLOGY

## 2019-10-18 PROCEDURE — 83735 ASSAY OF MAGNESIUM: CPT

## 2019-10-18 PROCEDURE — 99232 SBSQ HOSP IP/OBS MODERATE 35: CPT | Performed by: INTERNAL MEDICINE

## 2019-10-18 PROCEDURE — 99239 HOSP IP/OBS DSCHRG MGMT >30: CPT | Performed by: INTERNAL MEDICINE

## 2019-10-18 PROCEDURE — 2580000003 HC RX 258: Performed by: INTERNAL MEDICINE

## 2019-10-18 PROCEDURE — 85025 COMPLETE CBC W/AUTO DIFF WBC: CPT

## 2019-10-18 PROCEDURE — 2709999900 IR FLUORO GUIDED CVA DEVICE PLMT/REPLACE/REMOVAL

## 2019-10-18 RX ORDER — FUROSEMIDE 20 MG/1
20 TABLET ORAL 2 TIMES DAILY
Qty: 60 TABLET | Refills: 3 | Status: ON HOLD | OUTPATIENT
Start: 2019-10-18 | End: 2020-07-29 | Stop reason: HOSPADM

## 2019-10-18 RX ORDER — ASPIRIN 81 MG/1
81 TABLET, CHEWABLE ORAL DAILY
Qty: 30 TABLET | Refills: 3 | Status: SHIPPED | OUTPATIENT
Start: 2019-10-19

## 2019-10-18 RX ADMIN — ATORVASTATIN CALCIUM 10 MG: 10 TABLET, FILM COATED ORAL at 08:33

## 2019-10-18 RX ADMIN — ASPIRIN 81 MG 81 MG: 81 TABLET ORAL at 08:34

## 2019-10-18 RX ADMIN — DOCUSATE SODIUM 100 MG: 100 CAPSULE, LIQUID FILLED ORAL at 08:34

## 2019-10-18 RX ADMIN — CIPROFLOXACIN 1 DROP: 3 SOLUTION OPHTHALMIC at 06:07

## 2019-10-18 RX ADMIN — POTASSIUM CHLORIDE 40 MEQ: 1500 TABLET, EXTENDED RELEASE ORAL at 08:40

## 2019-10-18 RX ADMIN — CIPROFLOXACIN 1 DROP: 3 SOLUTION OPHTHALMIC at 09:37

## 2019-10-18 RX ADMIN — CIPROFLOXACIN 1 DROP: 3 SOLUTION OPHTHALMIC at 08:34

## 2019-10-18 RX ADMIN — Medication 10 ML: at 08:38

## 2019-10-18 RX ADMIN — FUROSEMIDE 20 MG: 20 TABLET ORAL at 08:34

## 2019-10-18 RX ADMIN — METOPROLOL TARTRATE 25 MG: 25 TABLET ORAL at 08:34

## 2019-10-18 RX ADMIN — AMIODARONE HYDROCHLORIDE 200 MG: 200 TABLET ORAL at 08:33

## 2019-10-18 ASSESSMENT — PAIN SCALES - WONG BAKER

## 2019-10-18 ASSESSMENT — ENCOUNTER SYMPTOMS
ABDOMINAL DISTENTION: 0
SHORTNESS OF BREATH: 0
CHEST TIGHTNESS: 0
PHOTOPHOBIA: 0
ABDOMINAL PAIN: 0

## 2019-10-19 ENCOUNTER — HOSPITAL ENCOUNTER (OUTPATIENT)
Age: 84
Setting detail: SPECIMEN
Discharge: HOME | End: 2019-10-19
Payer: MEDICARE

## 2019-10-19 LAB
VANCOMYCIN PEAK DATE LAST DOSE: ABNORMAL
VANCOMYCIN PEAK DOSE AMOUNT: ABNORMAL
VANCOMYCIN PEAK TIME LAST DOSE: ABNORMAL
VANCOMYCIN PEAK: 24.6 UG/ML (ref 30–40)
VANCOMYCIN TROUGH DATE LAST DOSE: ABNORMAL
VANCOMYCIN TROUGH DOSE AMOUNT: ABNORMAL
VANCOMYCIN TROUGH TIME LAST DOSE: ABNORMAL
VANCOMYCIN TROUGH: 8.1 UG/ML (ref 10–20)

## 2019-10-19 PROCEDURE — 36415 COLL VENOUS BLD VENIPUNCTURE: CPT

## 2019-10-19 PROCEDURE — 80202 ASSAY OF VANCOMYCIN: CPT

## 2019-10-19 PROCEDURE — P9603 ONE-WAY ALLOW PRORATED MILES: HCPCS

## 2019-10-20 ENCOUNTER — HOSPITAL ENCOUNTER (OUTPATIENT)
Age: 84
Setting detail: SPECIMEN
Discharge: HOME | End: 2019-10-20
Payer: MEDICARE

## 2019-10-20 LAB
VANCOMYCIN TROUGH DATE LAST DOSE: NORMAL
VANCOMYCIN TROUGH DOSE AMOUNT: NORMAL
VANCOMYCIN TROUGH TIME LAST DOSE: NORMAL
VANCOMYCIN TROUGH: 11.2 UG/ML (ref 10–20)

## 2019-10-20 PROCEDURE — 36415 COLL VENOUS BLD VENIPUNCTURE: CPT

## 2019-10-20 PROCEDURE — 80202 ASSAY OF VANCOMYCIN: CPT

## 2019-10-20 PROCEDURE — P9603 ONE-WAY ALLOW PRORATED MILES: HCPCS

## 2019-10-23 ENCOUNTER — HOSPITAL ENCOUNTER (OUTPATIENT)
Age: 84
Setting detail: SPECIMEN
Discharge: HOME | End: 2019-10-23
Payer: MEDICARE

## 2019-10-23 LAB
CREAT SERPL-MCNC: 0.69 MG/DL (ref 0.5–0.9)
GFR AFRICAN AMERICAN: >60 ML/MIN
GFR NON-AFRICAN AMERICAN: >60 ML/MIN
GFR SERPL CREATININE-BSD FRML MDRD: NORMAL ML/MIN/{1.73_M2}
GFR SERPL CREATININE-BSD FRML MDRD: NORMAL ML/MIN/{1.73_M2}
VANCOMYCIN TROUGH DATE LAST DOSE: NORMAL
VANCOMYCIN TROUGH DOSE AMOUNT: NORMAL
VANCOMYCIN TROUGH TIME LAST DOSE: NORMAL
VANCOMYCIN TROUGH: 13.4 UG/ML (ref 10–20)

## 2019-10-23 PROCEDURE — 82565 ASSAY OF CREATININE: CPT

## 2019-10-23 PROCEDURE — 80202 ASSAY OF VANCOMYCIN: CPT

## 2019-10-23 PROCEDURE — 36415 COLL VENOUS BLD VENIPUNCTURE: CPT

## 2019-10-23 PROCEDURE — P9603 ONE-WAY ALLOW PRORATED MILES: HCPCS

## 2019-10-25 DIAGNOSIS — S52.532D CLOSED COLLES' FRACTURE OF LEFT RADIUS WITH ROUTINE HEALING: Primary | ICD-10-CM

## 2019-10-28 ENCOUNTER — OFFICE VISIT (OUTPATIENT)
Dept: ORTHOPEDIC SURGERY | Age: 84
End: 2019-10-28

## 2019-10-28 ENCOUNTER — HOSPITAL ENCOUNTER (OUTPATIENT)
Age: 84
Setting detail: SPECIMEN
Discharge: HOME | End: 2019-10-28
Payer: MEDICARE

## 2019-10-28 VITALS — HEIGHT: 63 IN | BODY MASS INDEX: 26.75 KG/M2 | WEIGHT: 151 LBS

## 2019-10-28 DIAGNOSIS — S52.532D CLOSED COLLES' FRACTURE OF LEFT RADIUS WITH ROUTINE HEALING: Primary | ICD-10-CM

## 2019-10-28 LAB
CREAT SERPL-MCNC: 0.55 MG/DL (ref 0.5–0.9)
GFR AFRICAN AMERICAN: >60 ML/MIN
GFR NON-AFRICAN AMERICAN: >60 ML/MIN
GFR SERPL CREATININE-BSD FRML MDRD: NORMAL ML/MIN/{1.73_M2}
GFR SERPL CREATININE-BSD FRML MDRD: NORMAL ML/MIN/{1.73_M2}
VANCOMYCIN TROUGH DATE LAST DOSE: NORMAL
VANCOMYCIN TROUGH DOSE AMOUNT: NORMAL
VANCOMYCIN TROUGH TIME LAST DOSE: NORMAL
VANCOMYCIN TROUGH: 16.9 UG/ML (ref 10–20)

## 2019-10-28 PROCEDURE — P9603 ONE-WAY ALLOW PRORATED MILES: HCPCS

## 2019-10-28 PROCEDURE — 36415 COLL VENOUS BLD VENIPUNCTURE: CPT

## 2019-10-28 PROCEDURE — 99024 POSTOP FOLLOW-UP VISIT: CPT | Performed by: ORTHOPAEDIC SURGERY

## 2019-10-28 PROCEDURE — 82565 ASSAY OF CREATININE: CPT

## 2019-10-28 PROCEDURE — 80202 ASSAY OF VANCOMYCIN: CPT

## 2019-11-01 ENCOUNTER — HOSPITAL ENCOUNTER (OUTPATIENT)
Age: 84
Setting detail: SPECIMEN
Discharge: HOME | End: 2019-11-01
Payer: MEDICARE

## 2019-11-01 LAB
VANCOMYCIN TROUGH DATE LAST DOSE: NORMAL
VANCOMYCIN TROUGH DOSE AMOUNT: NORMAL
VANCOMYCIN TROUGH TIME LAST DOSE: NORMAL
VANCOMYCIN TROUGH: 19.4 UG/ML (ref 10–20)

## 2019-11-01 PROCEDURE — 36415 COLL VENOUS BLD VENIPUNCTURE: CPT

## 2019-11-01 PROCEDURE — P9603 ONE-WAY ALLOW PRORATED MILES: HCPCS

## 2019-11-01 PROCEDURE — 80202 ASSAY OF VANCOMYCIN: CPT

## 2019-11-04 ENCOUNTER — HOSPITAL ENCOUNTER (OUTPATIENT)
Age: 84
Setting detail: SPECIMEN
Discharge: HOME | End: 2019-11-04
Payer: MEDICARE

## 2019-11-04 PROBLEM — N39.0 URINARY TRACT INFECTION: Status: RESOLVED | Noted: 2019-10-04 | Resolved: 2019-11-04

## 2019-11-04 LAB
ABSOLUTE EOS #: 0.25 K/UL (ref 0–0.4)
ABSOLUTE IMMATURE GRANULOCYTE: 0.08 K/UL (ref 0–0.3)
ABSOLUTE LYMPH #: 0.66 K/UL (ref 1–4.8)
ABSOLUTE MONO #: 0.66 K/UL (ref 0.2–0.8)
ALBUMIN SERPL-MCNC: 2.5 G/DL (ref 3.5–5.2)
ALBUMIN/GLOBULIN RATIO: ABNORMAL (ref 1–2.5)
ALP BLD-CCNC: 70 U/L (ref 35–104)
ALT SERPL-CCNC: 25 U/L (ref 5–33)
ANION GAP SERPL CALCULATED.3IONS-SCNC: 12 MMOL/L (ref 9–17)
AST SERPL-CCNC: 31 U/L
BASOPHILS # BLD: 1 %
BASOPHILS ABSOLUTE: 0.08 K/UL (ref 0–0.2)
BILIRUB SERPL-MCNC: 0.49 MG/DL (ref 0.3–1.2)
BUN BLDV-MCNC: 12 MG/DL (ref 8–23)
BUN/CREAT BLD: 24 (ref 9–20)
CALCIUM SERPL-MCNC: 8.2 MG/DL (ref 8.6–10.4)
CHLORIDE BLD-SCNC: 96 MMOL/L (ref 98–107)
CO2: 33 MMOL/L (ref 20–31)
CREAT SERPL-MCNC: 0.51 MG/DL (ref 0.5–0.9)
DIFFERENTIAL TYPE: ABNORMAL
EOSINOPHILS RELATIVE PERCENT: 3 % (ref 1–4)
GFR AFRICAN AMERICAN: >60 ML/MIN
GFR NON-AFRICAN AMERICAN: >60 ML/MIN
GFR SERPL CREATININE-BSD FRML MDRD: ABNORMAL ML/MIN/{1.73_M2}
GFR SERPL CREATININE-BSD FRML MDRD: ABNORMAL ML/MIN/{1.73_M2}
GLUCOSE BLD-MCNC: 125 MG/DL (ref 70–99)
HCT VFR BLD CALC: 31.2 % (ref 36.3–47.1)
HEMOGLOBIN: 9.6 G/DL (ref 11.9–15.1)
IMMATURE GRANULOCYTES: 1 %
LYMPHOCYTES # BLD: 8 % (ref 24–44)
MCH RBC QN AUTO: 27.5 PG (ref 25.2–33.5)
MCHC RBC AUTO-ENTMCNC: 30.8 G/DL (ref 28.4–34.8)
MCV RBC AUTO: 89.4 FL (ref 82.6–102.9)
MONOCYTES # BLD: 8 % (ref 1–7)
NRBC AUTOMATED: 0 PER 100 WBC
PDW BLD-RTO: 16.4 % (ref 11.8–14.4)
PLATELET # BLD: 269 K/UL (ref 138–453)
PLATELET ESTIMATE: ABNORMAL
PMV BLD AUTO: 9.8 FL (ref 8.1–13.5)
POTASSIUM SERPL-SCNC: 2.3 MMOL/L (ref 3.7–5.3)
POTASSIUM SERPL-SCNC: 2.6 MMOL/L (ref 3.7–5.3)
POTASSIUM SERPL-SCNC: 2.8 MMOL/L (ref 3.7–5.3)
RBC # BLD: 3.49 M/UL (ref 3.95–5.11)
RBC # BLD: ABNORMAL 10*6/UL
SEG NEUTROPHILS: 79 % (ref 36–66)
SEGMENTED NEUTROPHILS ABSOLUTE COUNT: 6.47 K/UL (ref 1.8–7.7)
SODIUM BLD-SCNC: 141 MMOL/L (ref 135–144)
TOTAL PROTEIN: 5.3 G/DL (ref 6.4–8.3)
TSH SERPL DL<=0.05 MIU/L-ACNC: 2.46 MIU/L (ref 0.3–5)
WBC # BLD: 8.2 K/UL (ref 3.5–11.3)
WBC # BLD: ABNORMAL 10*3/UL

## 2019-11-04 PROCEDURE — 80053 COMPREHEN METABOLIC PANEL: CPT

## 2019-11-04 PROCEDURE — 36415 COLL VENOUS BLD VENIPUNCTURE: CPT

## 2019-11-04 PROCEDURE — 84132 ASSAY OF SERUM POTASSIUM: CPT

## 2019-11-04 PROCEDURE — 85025 COMPLETE CBC W/AUTO DIFF WBC: CPT

## 2019-11-04 PROCEDURE — P9603 ONE-WAY ALLOW PRORATED MILES: HCPCS

## 2019-11-04 PROCEDURE — 84443 ASSAY THYROID STIM HORMONE: CPT

## 2019-11-05 ENCOUNTER — HOSPITAL ENCOUNTER (OUTPATIENT)
Age: 84
Setting detail: SPECIMEN
Discharge: HOME | End: 2019-11-05
Payer: MEDICARE

## 2019-11-05 LAB
POTASSIUM SERPL-SCNC: 2.8 MMOL/L (ref 3.7–5.3)
POTASSIUM SERPL-SCNC: 3.6 MMOL/L (ref 3.7–5.3)

## 2019-11-05 PROCEDURE — P9603 ONE-WAY ALLOW PRORATED MILES: HCPCS

## 2019-11-05 PROCEDURE — 84132 ASSAY OF SERUM POTASSIUM: CPT

## 2019-11-05 PROCEDURE — 36415 COLL VENOUS BLD VENIPUNCTURE: CPT

## 2019-11-06 ENCOUNTER — HOSPITAL ENCOUNTER (OUTPATIENT)
Age: 84
Setting detail: SPECIMEN
Discharge: HOME | End: 2019-11-06
Payer: MEDICARE

## 2019-11-06 LAB
POTASSIUM SERPL-SCNC: 2.5 MMOL/L (ref 3.7–5.3)
POTASSIUM SERPL-SCNC: 3.3 MMOL/L (ref 3.7–5.3)
VANCOMYCIN RANDOM DATE LAST DOSE: NORMAL
VANCOMYCIN RANDOM DOSE AMOUNT: NORMAL
VANCOMYCIN RANDOM TIME LAST DOSE: NORMAL
VANCOMYCIN RANDOM: 19.7 UG/ML

## 2019-11-06 PROCEDURE — 84132 ASSAY OF SERUM POTASSIUM: CPT

## 2019-11-06 PROCEDURE — P9603 ONE-WAY ALLOW PRORATED MILES: HCPCS

## 2019-11-06 PROCEDURE — 80202 ASSAY OF VANCOMYCIN: CPT

## 2019-11-06 PROCEDURE — 36415 COLL VENOUS BLD VENIPUNCTURE: CPT

## 2019-11-08 ENCOUNTER — HOSPITAL ENCOUNTER (OUTPATIENT)
Age: 84
Setting detail: SPECIMEN
Discharge: HOME | End: 2019-11-08
Payer: MEDICARE

## 2019-11-08 ENCOUNTER — APPOINTMENT (OUTPATIENT)
Dept: CT IMAGING | Age: 84
DRG: 040 | End: 2019-11-08
Payer: MEDICARE

## 2019-11-08 ENCOUNTER — HOSPITAL ENCOUNTER (INPATIENT)
Age: 84
LOS: 11 days | Discharge: NURSING, REHAB OR LONG-TERM CARE | DRG: 040 | End: 2019-11-19
Attending: EMERGENCY MEDICINE | Admitting: INTERNAL MEDICINE
Payer: MEDICARE

## 2019-11-08 DIAGNOSIS — I26.99 ACUTE PULMONARY EMBOLISM WITHOUT ACUTE COR PULMONALE, UNSPECIFIED PULMONARY EMBOLISM TYPE (HCC): ICD-10-CM

## 2019-11-08 DIAGNOSIS — I63.9 CEREBROVASCULAR ACCIDENT (CVA), UNSPECIFIED MECHANISM (HCC): Primary | ICD-10-CM

## 2019-11-08 LAB
ABSOLUTE EOS #: 0.18 K/UL (ref 0–0.44)
ABSOLUTE IMMATURE GRANULOCYTE: 0.05 K/UL (ref 0–0.3)
ABSOLUTE LYMPH #: 1.18 K/UL (ref 1.1–3.7)
ABSOLUTE MONO #: 0.74 K/UL (ref 0.1–1.2)
ANION GAP SERPL CALCULATED.3IONS-SCNC: 13 MMOL/L (ref 9–17)
BASOPHILS # BLD: 0 % (ref 0–2)
BASOPHILS ABSOLUTE: 0.03 K/UL (ref 0–0.2)
BUN BLDV-MCNC: 8 MG/DL (ref 8–23)
BUN/CREAT BLD: ABNORMAL (ref 9–20)
CALCIUM SERPL-MCNC: 8.2 MG/DL (ref 8.6–10.4)
CHLORIDE BLD-SCNC: 94 MMOL/L (ref 98–107)
CO2: 32 MMOL/L (ref 20–31)
CREAT SERPL-MCNC: 0.54 MG/DL (ref 0.5–0.9)
DIFFERENTIAL TYPE: ABNORMAL
EOSINOPHILS RELATIVE PERCENT: 2 % (ref 1–4)
GFR AFRICAN AMERICAN: >60 ML/MIN
GFR NON-AFRICAN AMERICAN: >60 ML/MIN
GFR SERPL CREATININE-BSD FRML MDRD: ABNORMAL ML/MIN/{1.73_M2}
GFR SERPL CREATININE-BSD FRML MDRD: ABNORMAL ML/MIN/{1.73_M2}
GLUCOSE BLD-MCNC: 92 MG/DL (ref 70–99)
HCT VFR BLD CALC: 34.8 % (ref 36.3–47.1)
HEMOGLOBIN: 11.1 G/DL (ref 11.9–15.1)
IMMATURE GRANULOCYTES: 1 %
LYMPHOCYTES # BLD: 15 % (ref 24–43)
MCH RBC QN AUTO: 28 PG (ref 25.2–33.5)
MCHC RBC AUTO-ENTMCNC: 31.9 G/DL (ref 28.4–34.8)
MCV RBC AUTO: 87.7 FL (ref 82.6–102.9)
MONOCYTES # BLD: 9 % (ref 3–12)
NRBC AUTOMATED: 0 PER 100 WBC
PDW BLD-RTO: 16.3 % (ref 11.8–14.4)
PLATELET # BLD: 279 K/UL (ref 138–453)
PLATELET ESTIMATE: ABNORMAL
PMV BLD AUTO: 9.3 FL (ref 8.1–13.5)
POTASSIUM SERPL-SCNC: 2.8 MMOL/L (ref 3.7–5.3)
POTASSIUM SERPL-SCNC: 3.1 MMOL/L (ref 3.7–5.3)
POTASSIUM SERPL-SCNC: 3.2 MMOL/L (ref 3.7–5.3)
RBC # BLD: 3.97 M/UL (ref 3.95–5.11)
RBC # BLD: ABNORMAL 10*6/UL
SEG NEUTROPHILS: 73 % (ref 36–65)
SEGMENTED NEUTROPHILS ABSOLUTE COUNT: 5.72 K/UL (ref 1.5–8.1)
SODIUM BLD-SCNC: 139 MMOL/L (ref 135–144)
TROPONIN INTERP: ABNORMAL
TROPONIN INTERP: ABNORMAL
TROPONIN T: ABNORMAL NG/ML
TROPONIN T: ABNORMAL NG/ML
TROPONIN, HIGH SENSITIVITY: 44 NG/L (ref 0–14)
TROPONIN, HIGH SENSITIVITY: 48 NG/L (ref 0–14)
WBC # BLD: 7.9 K/UL (ref 3.5–11.3)
WBC # BLD: ABNORMAL 10*3/UL

## 2019-11-08 PROCEDURE — 80048 BASIC METABOLIC PNL TOTAL CA: CPT

## 2019-11-08 PROCEDURE — 0042T CT BRAIN PERFUSION: CPT

## 2019-11-08 PROCEDURE — 71260 CT THORAX DX C+: CPT

## 2019-11-08 PROCEDURE — 93005 ELECTROCARDIOGRAM TRACING: CPT | Performed by: STUDENT IN AN ORGANIZED HEALTH CARE EDUCATION/TRAINING PROGRAM

## 2019-11-08 PROCEDURE — 70496 CT ANGIOGRAPHY HEAD: CPT

## 2019-11-08 PROCEDURE — 6360000004 HC RX CONTRAST MEDICATION: Performed by: STUDENT IN AN ORGANIZED HEALTH CARE EDUCATION/TRAINING PROGRAM

## 2019-11-08 PROCEDURE — 99285 EMERGENCY DEPT VISIT HI MDM: CPT

## 2019-11-08 PROCEDURE — 99222 1ST HOSP IP/OBS MODERATE 55: CPT | Performed by: PSYCHIATRY & NEUROLOGY

## 2019-11-08 PROCEDURE — P9603 ONE-WAY ALLOW PRORATED MILES: HCPCS

## 2019-11-08 PROCEDURE — 2060000000 HC ICU INTERMEDIATE R&B

## 2019-11-08 PROCEDURE — 84484 ASSAY OF TROPONIN QUANT: CPT

## 2019-11-08 PROCEDURE — 84132 ASSAY OF SERUM POTASSIUM: CPT

## 2019-11-08 PROCEDURE — 36415 COLL VENOUS BLD VENIPUNCTURE: CPT

## 2019-11-08 PROCEDURE — 70450 CT HEAD/BRAIN W/O DYE: CPT

## 2019-11-08 PROCEDURE — 85025 COMPLETE CBC W/AUTO DIFF WBC: CPT

## 2019-11-08 PROCEDURE — 6360000004 HC RX CONTRAST MEDICATION: Performed by: EMERGENCY MEDICINE

## 2019-11-08 PROCEDURE — 6370000000 HC RX 637 (ALT 250 FOR IP): Performed by: STUDENT IN AN ORGANIZED HEALTH CARE EDUCATION/TRAINING PROGRAM

## 2019-11-08 RX ORDER — POTASSIUM CHLORIDE 750 MG/1
30 CAPSULE, EXTENDED RELEASE ORAL DAILY
COMMUNITY

## 2019-11-08 RX ORDER — ACETAMINOPHEN 325 MG/1
650 TABLET ORAL ONCE
Status: COMPLETED | OUTPATIENT
Start: 2019-11-08 | End: 2019-11-08

## 2019-11-08 RX ADMIN — ACETAMINOPHEN 650 MG: 325 TABLET ORAL at 20:28

## 2019-11-08 RX ADMIN — IOHEXOL 75 ML: 350 INJECTION, SOLUTION INTRAVENOUS at 22:54

## 2019-11-08 RX ADMIN — IOHEXOL 140 ML: 350 INJECTION, SOLUTION INTRAVENOUS at 20:55

## 2019-11-08 ASSESSMENT — ENCOUNTER SYMPTOMS
NAUSEA: 0
DIARRHEA: 0
ABDOMINAL PAIN: 0
SHORTNESS OF BREATH: 0
VOMITING: 0
CONSTIPATION: 0
TROUBLE SWALLOWING: 1

## 2019-11-08 ASSESSMENT — PAIN DESCRIPTION - PAIN TYPE: TYPE: CHRONIC PAIN

## 2019-11-08 ASSESSMENT — PAIN SCALES - GENERAL
PAINLEVEL_OUTOF10: 6
PAINLEVEL_OUTOF10: 5

## 2019-11-08 ASSESSMENT — PAIN DESCRIPTION - LOCATION: LOCATION: BACK

## 2019-11-09 ENCOUNTER — APPOINTMENT (OUTPATIENT)
Dept: CT IMAGING | Age: 84
DRG: 040 | End: 2019-11-09
Payer: MEDICARE

## 2019-11-09 ENCOUNTER — APPOINTMENT (OUTPATIENT)
Dept: GENERAL RADIOLOGY | Age: 84
DRG: 040 | End: 2019-11-09
Payer: MEDICARE

## 2019-11-09 PROBLEM — I63.511 ACUTE ISCHEMIC RIGHT MCA STROKE (HCC): Status: ACTIVE | Noted: 2019-11-08

## 2019-11-09 PROBLEM — I26.99 PULMONARY EMBOLISM (HCC): Status: ACTIVE | Noted: 2019-11-09

## 2019-11-09 LAB
ANION GAP SERPL CALCULATED.3IONS-SCNC: 16 MMOL/L (ref 9–17)
BNP INTERPRETATION: ABNORMAL
BUN BLDV-MCNC: 7 MG/DL (ref 8–23)
BUN/CREAT BLD: ABNORMAL (ref 9–20)
CALCIUM SERPL-MCNC: 8.6 MG/DL (ref 8.6–10.4)
CHLORIDE BLD-SCNC: 98 MMOL/L (ref 98–107)
CHOLESTEROL/HDL RATIO: 2.5
CHOLESTEROL: 107 MG/DL
CO2: 31 MMOL/L (ref 20–31)
CREAT SERPL-MCNC: 0.46 MG/DL (ref 0.5–0.9)
EKG ATRIAL RATE: 70 BPM
EKG P AXIS: 20 DEGREES
EKG P-R INTERVAL: 168 MS
EKG Q-T INTERVAL: 466 MS
EKG QRS DURATION: 182 MS
EKG QTC CALCULATION (BAZETT): 503 MS
EKG R AXIS: -76 DEGREES
EKG T AXIS: 81 DEGREES
EKG VENTRICULAR RATE: 70 BPM
GFR AFRICAN AMERICAN: >60 ML/MIN
GFR NON-AFRICAN AMERICAN: >60 ML/MIN
GFR SERPL CREATININE-BSD FRML MDRD: ABNORMAL ML/MIN/{1.73_M2}
GFR SERPL CREATININE-BSD FRML MDRD: ABNORMAL ML/MIN/{1.73_M2}
GLUCOSE BLD-MCNC: 92 MG/DL (ref 70–99)
HCT VFR BLD CALC: 30.9 % (ref 36.3–47.1)
HCT VFR BLD CALC: 32.5 % (ref 36.3–47.1)
HDLC SERPL-MCNC: 43 MG/DL
HEMOGLOBIN: 10.1 G/DL (ref 11.9–15.1)
HEMOGLOBIN: 9.6 G/DL (ref 11.9–15.1)
LDL CHOLESTEROL: 52 MG/DL (ref 0–130)
MCH RBC QN AUTO: 27.7 PG (ref 25.2–33.5)
MCH RBC QN AUTO: 27.8 PG (ref 25.2–33.5)
MCHC RBC AUTO-ENTMCNC: 31.1 G/DL (ref 28.4–34.8)
MCHC RBC AUTO-ENTMCNC: 31.1 G/DL (ref 28.4–34.8)
MCV RBC AUTO: 89.3 FL (ref 82.6–102.9)
MCV RBC AUTO: 89.5 FL (ref 82.6–102.9)
NRBC AUTOMATED: 0 PER 100 WBC
NRBC AUTOMATED: 0 PER 100 WBC
PARTIAL THROMBOPLASTIN TIME: 20.9 SEC (ref 20.5–30.5)
PARTIAL THROMBOPLASTIN TIME: 25.2 SEC (ref 20.5–30.5)
PDW BLD-RTO: 16.4 % (ref 11.8–14.4)
PDW BLD-RTO: 16.4 % (ref 11.8–14.4)
PLATELET # BLD: 276 K/UL (ref 138–453)
PLATELET # BLD: 316 K/UL (ref 138–453)
PMV BLD AUTO: 9.4 FL (ref 8.1–13.5)
PMV BLD AUTO: 9.5 FL (ref 8.1–13.5)
POTASSIUM SERPL-SCNC: 3 MMOL/L (ref 3.7–5.3)
PRO-BNP: 4707 PG/ML
RBC # BLD: 3.46 M/UL (ref 3.95–5.11)
RBC # BLD: 3.63 M/UL (ref 3.95–5.11)
SODIUM BLD-SCNC: 145 MMOL/L (ref 135–144)
TRIGL SERPL-MCNC: 62 MG/DL
VANCOMYCIN RANDOM DATE LAST DOSE: NORMAL
VANCOMYCIN RANDOM DOSE AMOUNT: NORMAL
VANCOMYCIN RANDOM TIME LAST DOSE: NORMAL
VANCOMYCIN RANDOM: 15.2 UG/ML
VLDLC SERPL CALC-MCNC: NORMAL MG/DL (ref 1–30)
WBC # BLD: 7.2 K/UL (ref 3.5–11.3)
WBC # BLD: 8.1 K/UL (ref 3.5–11.3)

## 2019-11-09 PROCEDURE — 80202 ASSAY OF VANCOMYCIN: CPT

## 2019-11-09 PROCEDURE — 93970 EXTREMITY STUDY: CPT

## 2019-11-09 PROCEDURE — 92610 EVALUATE SWALLOWING FUNCTION: CPT

## 2019-11-09 PROCEDURE — 6360000002 HC RX W HCPCS: Performed by: STUDENT IN AN ORGANIZED HEALTH CARE EDUCATION/TRAINING PROGRAM

## 2019-11-09 PROCEDURE — 80048 BASIC METABOLIC PNL TOTAL CA: CPT

## 2019-11-09 PROCEDURE — 2580000003 HC RX 258: Performed by: STUDENT IN AN ORGANIZED HEALTH CARE EDUCATION/TRAINING PROGRAM

## 2019-11-09 PROCEDURE — 36415 COLL VENOUS BLD VENIPUNCTURE: CPT

## 2019-11-09 PROCEDURE — 85730 THROMBOPLASTIN TIME PARTIAL: CPT

## 2019-11-09 PROCEDURE — 99223 1ST HOSP IP/OBS HIGH 75: CPT | Performed by: PSYCHIATRY & NEUROLOGY

## 2019-11-09 PROCEDURE — 70450 CT HEAD/BRAIN W/O DYE: CPT

## 2019-11-09 PROCEDURE — 92611 MOTION FLUOROSCOPY/SWALLOW: CPT

## 2019-11-09 PROCEDURE — 85027 COMPLETE CBC AUTOMATED: CPT

## 2019-11-09 PROCEDURE — 2060000000 HC ICU INTERMEDIATE R&B

## 2019-11-09 PROCEDURE — 74230 X-RAY XM SWLNG FUNCJ C+: CPT

## 2019-11-09 PROCEDURE — 80061 LIPID PANEL: CPT

## 2019-11-09 PROCEDURE — 6370000000 HC RX 637 (ALT 250 FOR IP): Performed by: STUDENT IN AN ORGANIZED HEALTH CARE EDUCATION/TRAINING PROGRAM

## 2019-11-09 PROCEDURE — 83880 ASSAY OF NATRIURETIC PEPTIDE: CPT

## 2019-11-09 PROCEDURE — 99223 1ST HOSP IP/OBS HIGH 75: CPT | Performed by: INTERNAL MEDICINE

## 2019-11-09 RX ORDER — ONDANSETRON 2 MG/ML
4 INJECTION INTRAMUSCULAR; INTRAVENOUS EVERY 8 HOURS PRN
Status: DISCONTINUED | OUTPATIENT
Start: 2019-11-09 | End: 2019-11-19 | Stop reason: HOSPADM

## 2019-11-09 RX ORDER — ACETAMINOPHEN 325 MG/1
650 TABLET ORAL EVERY 4 HOURS PRN
Status: DISCONTINUED | OUTPATIENT
Start: 2019-11-09 | End: 2019-11-19 | Stop reason: HOSPADM

## 2019-11-09 RX ORDER — HEPARIN SODIUM 1000 [USP'U]/ML
4000 INJECTION, SOLUTION INTRAVENOUS; SUBCUTANEOUS PRN
Status: DISCONTINUED | OUTPATIENT
Start: 2019-11-09 | End: 2019-11-14

## 2019-11-09 RX ORDER — SODIUM CHLORIDE 0.9 % (FLUSH) 0.9 %
10 SYRINGE (ML) INJECTION EVERY 12 HOURS SCHEDULED
Status: DISCONTINUED | OUTPATIENT
Start: 2019-11-09 | End: 2019-11-19 | Stop reason: HOSPADM

## 2019-11-09 RX ORDER — POTASSIUM CHLORIDE 7.45 MG/ML
10 INJECTION INTRAVENOUS PRN
Status: DISCONTINUED | OUTPATIENT
Start: 2019-11-09 | End: 2019-11-19 | Stop reason: HOSPADM

## 2019-11-09 RX ORDER — HEPARIN SODIUM 10000 [USP'U]/100ML
12 INJECTION, SOLUTION INTRAVENOUS CONTINUOUS
Status: DISPENSED | OUTPATIENT
Start: 2019-11-09 | End: 2019-11-14

## 2019-11-09 RX ORDER — SODIUM CHLORIDE 0.9 % (FLUSH) 0.9 %
10 SYRINGE (ML) INJECTION PRN
Status: DISCONTINUED | OUTPATIENT
Start: 2019-11-09 | End: 2019-11-19 | Stop reason: HOSPADM

## 2019-11-09 RX ORDER — ACETAMINOPHEN 650 MG/1
650 SUPPOSITORY RECTAL EVERY 4 HOURS PRN
Status: DISCONTINUED | OUTPATIENT
Start: 2019-11-09 | End: 2019-11-19 | Stop reason: HOSPADM

## 2019-11-09 RX ORDER — ATORVASTATIN CALCIUM 40 MG/1
40 TABLET, FILM COATED ORAL NIGHTLY
Status: DISCONTINUED | OUTPATIENT
Start: 2019-11-09 | End: 2019-11-10

## 2019-11-09 RX ORDER — HEPARIN SODIUM 1000 [USP'U]/ML
2000 INJECTION, SOLUTION INTRAVENOUS; SUBCUTANEOUS PRN
Status: DISCONTINUED | OUTPATIENT
Start: 2019-11-09 | End: 2019-11-14

## 2019-11-09 RX ORDER — POTASSIUM CHLORIDE 20 MEQ/1
40 TABLET, EXTENDED RELEASE ORAL PRN
Status: DISCONTINUED | OUTPATIENT
Start: 2019-11-09 | End: 2019-11-19 | Stop reason: HOSPADM

## 2019-11-09 RX ORDER — ASPIRIN 81 MG/1
81 TABLET, CHEWABLE ORAL DAILY
Status: DISCONTINUED | OUTPATIENT
Start: 2019-11-09 | End: 2019-11-10

## 2019-11-09 RX ADMIN — POTASSIUM CHLORIDE 10 MEQ: 7.46 INJECTION, SOLUTION INTRAVENOUS at 12:57

## 2019-11-09 RX ADMIN — HEPARIN SODIUM AND DEXTROSE 12 UNITS/KG/HR: 10000; 5 INJECTION INTRAVENOUS at 21:15

## 2019-11-09 RX ADMIN — Medication 1500 MG: at 11:19

## 2019-11-09 RX ADMIN — SODIUM CHLORIDE, PRESERVATIVE FREE 10 ML: 5 INJECTION INTRAVENOUS at 21:15

## 2019-11-09 RX ADMIN — DESMOPRESSIN ACETATE 40 MG: 0.2 TABLET ORAL at 21:14

## 2019-11-09 RX ADMIN — ASPIRIN 81 MG: 81 TABLET, CHEWABLE ORAL at 12:57

## 2019-11-09 ASSESSMENT — ENCOUNTER SYMPTOMS
COLOR CHANGE: 0
APNEA: 0
EYE ITCHING: 0
ABDOMINAL PAIN: 0

## 2019-11-09 ASSESSMENT — PAIN SCALES - GENERAL
PAINLEVEL_OUTOF10: 5
PAINLEVEL_OUTOF10: 6

## 2019-11-09 ASSESSMENT — PAIN DESCRIPTION - PAIN TYPE: TYPE: CHRONIC PAIN

## 2019-11-09 ASSESSMENT — PAIN DESCRIPTION - ORIENTATION: ORIENTATION: LOWER

## 2019-11-09 ASSESSMENT — PAIN DESCRIPTION - LOCATION: LOCATION: BACK

## 2019-11-10 ENCOUNTER — APPOINTMENT (OUTPATIENT)
Dept: GENERAL RADIOLOGY | Age: 84
DRG: 040 | End: 2019-11-10
Payer: MEDICARE

## 2019-11-10 ENCOUNTER — APPOINTMENT (OUTPATIENT)
Dept: CT IMAGING | Age: 84
DRG: 040 | End: 2019-11-10
Payer: MEDICARE

## 2019-11-10 LAB
ABSOLUTE EOS #: 0.22 K/UL (ref 0–0.44)
ABSOLUTE IMMATURE GRANULOCYTE: 0.03 K/UL (ref 0–0.3)
ABSOLUTE LYMPH #: 0.79 K/UL (ref 1.1–3.7)
ABSOLUTE MONO #: 0.59 K/UL (ref 0.1–1.2)
ANION GAP SERPL CALCULATED.3IONS-SCNC: 11 MMOL/L (ref 9–17)
BASOPHILS # BLD: 0 % (ref 0–2)
BASOPHILS ABSOLUTE: <0.03 K/UL (ref 0–0.2)
BUN BLDV-MCNC: 7 MG/DL (ref 8–23)
BUN/CREAT BLD: ABNORMAL (ref 9–20)
CALCIUM SERPL-MCNC: 8.2 MG/DL (ref 8.6–10.4)
CHLORIDE BLD-SCNC: 95 MMOL/L (ref 98–107)
CO2: 32 MMOL/L (ref 20–31)
CREAT SERPL-MCNC: 0.44 MG/DL (ref 0.5–0.9)
DIFFERENTIAL TYPE: ABNORMAL
EOSINOPHILS RELATIVE PERCENT: 4 % (ref 1–4)
ESTIMATED AVERAGE GLUCOSE: 111 MG/DL
GFR AFRICAN AMERICAN: >60 ML/MIN
GFR NON-AFRICAN AMERICAN: >60 ML/MIN
GFR SERPL CREATININE-BSD FRML MDRD: ABNORMAL ML/MIN/{1.73_M2}
GFR SERPL CREATININE-BSD FRML MDRD: ABNORMAL ML/MIN/{1.73_M2}
GLUCOSE BLD-MCNC: 112 MG/DL (ref 70–99)
HBA1C MFR BLD: 5.5 % (ref 4–6)
HCT VFR BLD CALC: 29.9 % (ref 36.3–47.1)
HEMOGLOBIN: 9.3 G/DL (ref 11.9–15.1)
IMMATURE GRANULOCYTES: 1 %
LYMPHOCYTES # BLD: 13 % (ref 24–43)
MAGNESIUM: 1.7 MG/DL (ref 1.6–2.6)
MAGNESIUM: 1.8 MG/DL (ref 1.6–2.6)
MCH RBC QN AUTO: 28 PG (ref 25.2–33.5)
MCHC RBC AUTO-ENTMCNC: 31.1 G/DL (ref 28.4–34.8)
MCV RBC AUTO: 90.1 FL (ref 82.6–102.9)
MONOCYTES # BLD: 10 % (ref 3–12)
NRBC AUTOMATED: 0 PER 100 WBC
PARTIAL THROMBOPLASTIN TIME: 26.8 SEC (ref 20.5–30.5)
PARTIAL THROMBOPLASTIN TIME: 40.5 SEC (ref 20.5–30.5)
PARTIAL THROMBOPLASTIN TIME: 50.4 SEC (ref 20.5–30.5)
PARTIAL THROMBOPLASTIN TIME: 65.6 SEC (ref 20.5–30.5)
PDW BLD-RTO: 16.5 % (ref 11.8–14.4)
PLATELET # BLD: 272 K/UL (ref 138–453)
PLATELET ESTIMATE: ABNORMAL
PMV BLD AUTO: 9.3 FL (ref 8.1–13.5)
POTASSIUM SERPL-SCNC: 2.9 MMOL/L (ref 3.7–5.3)
POTASSIUM SERPL-SCNC: 3.2 MMOL/L (ref 3.7–5.3)
RBC # BLD: 3.32 M/UL (ref 3.95–5.11)
RBC # BLD: ABNORMAL 10*6/UL
SEG NEUTROPHILS: 72 % (ref 36–65)
SEGMENTED NEUTROPHILS ABSOLUTE COUNT: 4.57 K/UL (ref 1.5–8.1)
SODIUM BLD-SCNC: 138 MMOL/L (ref 135–144)
WBC # BLD: 6.2 K/UL (ref 3.5–11.3)
WBC # BLD: ABNORMAL 10*3/UL

## 2019-11-10 PROCEDURE — 6370000000 HC RX 637 (ALT 250 FOR IP): Performed by: STUDENT IN AN ORGANIZED HEALTH CARE EDUCATION/TRAINING PROGRAM

## 2019-11-10 PROCEDURE — 6360000002 HC RX W HCPCS: Performed by: STUDENT IN AN ORGANIZED HEALTH CARE EDUCATION/TRAINING PROGRAM

## 2019-11-10 PROCEDURE — 85025 COMPLETE CBC W/AUTO DIFF WBC: CPT

## 2019-11-10 PROCEDURE — 80048 BASIC METABOLIC PNL TOTAL CA: CPT

## 2019-11-10 PROCEDURE — 87040 BLOOD CULTURE FOR BACTERIA: CPT

## 2019-11-10 PROCEDURE — 36415 COLL VENOUS BLD VENIPUNCTURE: CPT

## 2019-11-10 PROCEDURE — 84132 ASSAY OF SERUM POTASSIUM: CPT

## 2019-11-10 PROCEDURE — 85730 THROMBOPLASTIN TIME PARTIAL: CPT

## 2019-11-10 PROCEDURE — 2060000000 HC ICU INTERMEDIATE R&B

## 2019-11-10 PROCEDURE — 82436 ASSAY OF URINE CHLORIDE: CPT

## 2019-11-10 PROCEDURE — 2580000003 HC RX 258: Performed by: STUDENT IN AN ORGANIZED HEALTH CARE EDUCATION/TRAINING PROGRAM

## 2019-11-10 PROCEDURE — 83735 ASSAY OF MAGNESIUM: CPT

## 2019-11-10 PROCEDURE — 70450 CT HEAD/BRAIN W/O DYE: CPT

## 2019-11-10 PROCEDURE — 71045 X-RAY EXAM CHEST 1 VIEW: CPT

## 2019-11-10 PROCEDURE — 83036 HEMOGLOBIN GLYCOSYLATED A1C: CPT

## 2019-11-10 PROCEDURE — 99233 SBSQ HOSP IP/OBS HIGH 50: CPT | Performed by: PSYCHIATRY & NEUROLOGY

## 2019-11-10 RX ORDER — TRAMADOL HYDROCHLORIDE 50 MG/1
50 TABLET ORAL EVERY 8 HOURS PRN
Status: DISCONTINUED | OUTPATIENT
Start: 2019-11-10 | End: 2019-11-19 | Stop reason: HOSPADM

## 2019-11-10 RX ORDER — ATORVASTATIN CALCIUM 10 MG/1
10 TABLET, FILM COATED ORAL NIGHTLY
Status: DISCONTINUED | OUTPATIENT
Start: 2019-11-10 | End: 2019-11-19 | Stop reason: HOSPADM

## 2019-11-10 RX ADMIN — SODIUM CHLORIDE, PRESERVATIVE FREE 10 ML: 5 INJECTION INTRAVENOUS at 21:14

## 2019-11-10 RX ADMIN — Medication 1500 MG: at 11:55

## 2019-11-10 RX ADMIN — TRAMADOL HYDROCHLORIDE 50 MG: 50 TABLET, COATED ORAL at 09:01

## 2019-11-10 RX ADMIN — HEPARIN SODIUM AND DEXTROSE 18 UNITS/KG/HR: 10000; 5 INJECTION INTRAVENOUS at 15:49

## 2019-11-10 RX ADMIN — TRAMADOL HYDROCHLORIDE 50 MG: 50 TABLET, COATED ORAL at 21:13

## 2019-11-10 RX ADMIN — HEPARIN SODIUM AND DEXTROSE 18 UNITS/KG/HR: 10000; 5 INJECTION INTRAVENOUS at 12:08

## 2019-11-10 RX ADMIN — POTASSIUM CHLORIDE 10 MEQ: 7.46 INJECTION, SOLUTION INTRAVENOUS at 06:12

## 2019-11-10 RX ADMIN — ATORVASTATIN CALCIUM 10 MG: 10 TABLET, FILM COATED ORAL at 21:43

## 2019-11-10 RX ADMIN — ASPIRIN 81 MG: 81 TABLET, CHEWABLE ORAL at 09:01

## 2019-11-10 ASSESSMENT — PAIN SCALES - GENERAL
PAINLEVEL_OUTOF10: 2
PAINLEVEL_OUTOF10: 5
PAINLEVEL_OUTOF10: 7

## 2019-11-10 ASSESSMENT — PAIN SCALES - WONG BAKER
WONGBAKER_NUMERICALRESPONSE: 2
WONGBAKER_NUMERICALRESPONSE: 6

## 2019-11-10 ASSESSMENT — PAIN DESCRIPTION - ORIENTATION: ORIENTATION: LOWER

## 2019-11-10 ASSESSMENT — PAIN DESCRIPTION - LOCATION: LOCATION: BACK

## 2019-11-10 ASSESSMENT — PAIN DESCRIPTION - PAIN TYPE: TYPE: CHRONIC PAIN;ACUTE PAIN

## 2019-11-11 ENCOUNTER — APPOINTMENT (OUTPATIENT)
Dept: CARDIAC CATH/INVASIVE PROCEDURES | Age: 84
DRG: 040 | End: 2019-11-11
Payer: MEDICARE

## 2019-11-11 ENCOUNTER — APPOINTMENT (OUTPATIENT)
Dept: MRI IMAGING | Age: 84
DRG: 040 | End: 2019-11-11
Payer: MEDICARE

## 2019-11-11 DIAGNOSIS — N20.0 KIDNEY STONE: Primary | ICD-10-CM

## 2019-11-11 LAB
ABSOLUTE EOS #: 0.34 K/UL (ref 0–0.44)
ABSOLUTE IMMATURE GRANULOCYTE: 0.03 K/UL (ref 0–0.3)
ABSOLUTE LYMPH #: 0.92 K/UL (ref 1.1–3.7)
ABSOLUTE MONO #: 0.68 K/UL (ref 0.1–1.2)
ANION GAP SERPL CALCULATED.3IONS-SCNC: 8 MMOL/L (ref 9–17)
BASOPHILS # BLD: 1 % (ref 0–2)
BASOPHILS ABSOLUTE: 0.04 K/UL (ref 0–0.2)
BUN BLDV-MCNC: 7 MG/DL (ref 8–23)
BUN/CREAT BLD: ABNORMAL (ref 9–20)
CALCIUM SERPL-MCNC: 8.2 MG/DL (ref 8.6–10.4)
CHLORIDE BLD-SCNC: 96 MMOL/L (ref 98–107)
CO2: 33 MMOL/L (ref 20–31)
CREAT SERPL-MCNC: 0.44 MG/DL (ref 0.5–0.9)
DIFFERENTIAL TYPE: ABNORMAL
EKG ATRIAL RATE: 70 BPM
EKG P AXIS: 56 DEGREES
EKG P-R INTERVAL: 170 MS
EKG Q-T INTERVAL: 534 MS
EKG QRS DURATION: 192 MS
EKG QTC CALCULATION (BAZETT): 576 MS
EKG R AXIS: -74 DEGREES
EKG T AXIS: 78 DEGREES
EKG VENTRICULAR RATE: 70 BPM
EOSINOPHILS RELATIVE PERCENT: 5 % (ref 1–4)
GFR AFRICAN AMERICAN: >60 ML/MIN
GFR NON-AFRICAN AMERICAN: >60 ML/MIN
GFR SERPL CREATININE-BSD FRML MDRD: ABNORMAL ML/MIN/{1.73_M2}
GFR SERPL CREATININE-BSD FRML MDRD: ABNORMAL ML/MIN/{1.73_M2}
GLUCOSE BLD-MCNC: 100 MG/DL (ref 70–99)
HCT VFR BLD CALC: 29.7 % (ref 36.3–47.1)
HEMOGLOBIN: 9 G/DL (ref 11.9–15.1)
IMMATURE GRANULOCYTES: 1 %
LV EF: 55 %
LVEF MODALITY: NORMAL
LYMPHOCYTES # BLD: 14 % (ref 24–43)
MAGNESIUM: 1.8 MG/DL (ref 1.6–2.6)
MCH RBC QN AUTO: 28 PG (ref 25.2–33.5)
MCHC RBC AUTO-ENTMCNC: 30.3 G/DL (ref 28.4–34.8)
MCV RBC AUTO: 92.2 FL (ref 82.6–102.9)
MONOCYTES # BLD: 11 % (ref 3–12)
NRBC AUTOMATED: 0 PER 100 WBC
PARTIAL THROMBOPLASTIN TIME: 65.4 SEC (ref 20.5–30.5)
PARTIAL THROMBOPLASTIN TIME: 70.5 SEC (ref 20.5–30.5)
PARTIAL THROMBOPLASTIN TIME: 75.8 SEC (ref 20.5–30.5)
PDW BLD-RTO: 16.5 % (ref 11.8–14.4)
PLATELET # BLD: 282 K/UL (ref 138–453)
PLATELET ESTIMATE: ABNORMAL
PMV BLD AUTO: 9.6 FL (ref 8.1–13.5)
POTASSIUM SERPL-SCNC: 3 MMOL/L (ref 3.7–5.3)
RBC # BLD: 3.22 M/UL (ref 3.95–5.11)
RBC # BLD: ABNORMAL 10*6/UL
SEDIMENTATION RATE, ERYTHROCYTE: 33 MM (ref 0–20)
SEG NEUTROPHILS: 68 % (ref 36–65)
SEGMENTED NEUTROPHILS ABSOLUTE COUNT: 4.41 K/UL (ref 1.5–8.1)
SODIUM BLD-SCNC: 137 MMOL/L (ref 135–144)
TOTAL CK: 49 U/L (ref 26–192)
WBC # BLD: 6.4 K/UL (ref 3.5–11.3)
WBC # BLD: ABNORMAL 10*3/UL

## 2019-11-11 PROCEDURE — 93312 ECHO TRANSESOPHAGEAL: CPT

## 2019-11-11 PROCEDURE — 99213 OFFICE O/P EST LOW 20 MIN: CPT

## 2019-11-11 PROCEDURE — 36415 COLL VENOUS BLD VENIPUNCTURE: CPT

## 2019-11-11 PROCEDURE — 6360000002 HC RX W HCPCS: Performed by: STUDENT IN AN ORGANIZED HEALTH CARE EDUCATION/TRAINING PROGRAM

## 2019-11-11 PROCEDURE — 83735 ASSAY OF MAGNESIUM: CPT

## 2019-11-11 PROCEDURE — B246ZZ4 ULTRASONOGRAPHY OF RIGHT AND LEFT HEART, TRANSESOPHAGEAL: ICD-10-PCS | Performed by: INTERNAL MEDICINE

## 2019-11-11 PROCEDURE — 85651 RBC SED RATE NONAUTOMATED: CPT

## 2019-11-11 PROCEDURE — 2580000003 HC RX 258: Performed by: STUDENT IN AN ORGANIZED HEALTH CARE EDUCATION/TRAINING PROGRAM

## 2019-11-11 PROCEDURE — 2060000000 HC ICU INTERMEDIATE R&B

## 2019-11-11 PROCEDURE — 70551 MRI BRAIN STEM W/O DYE: CPT

## 2019-11-11 PROCEDURE — 85730 THROMBOPLASTIN TIME PARTIAL: CPT

## 2019-11-11 PROCEDURE — 6370000000 HC RX 637 (ALT 250 FOR IP): Performed by: STUDENT IN AN ORGANIZED HEALTH CARE EDUCATION/TRAINING PROGRAM

## 2019-11-11 PROCEDURE — 92526 ORAL FUNCTION THERAPY: CPT

## 2019-11-11 PROCEDURE — 80048 BASIC METABOLIC PNL TOTAL CA: CPT

## 2019-11-11 PROCEDURE — 82550 ASSAY OF CK (CPK): CPT

## 2019-11-11 PROCEDURE — 85025 COMPLETE CBC W/AUTO DIFF WBC: CPT

## 2019-11-11 PROCEDURE — 97530 THERAPEUTIC ACTIVITIES: CPT

## 2019-11-11 PROCEDURE — 97163 PT EVAL HIGH COMPLEX 45 MIN: CPT

## 2019-11-11 PROCEDURE — 93325 DOPPLER ECHO COLOR FLOW MAPG: CPT

## 2019-11-11 PROCEDURE — 99233 SBSQ HOSP IP/OBS HIGH 50: CPT | Performed by: STUDENT IN AN ORGANIZED HEALTH CARE EDUCATION/TRAINING PROGRAM

## 2019-11-11 PROCEDURE — 2580000003 HC RX 258: Performed by: INTERNAL MEDICINE

## 2019-11-11 RX ORDER — SODIUM CHLORIDE 0.9 % (FLUSH) 0.9 %
10 SYRINGE (ML) INJECTION PRN
Status: DISCONTINUED | OUTPATIENT
Start: 2019-11-11 | End: 2019-11-19 | Stop reason: HOSPADM

## 2019-11-11 RX ORDER — SODIUM CHLORIDE 0.9 % (FLUSH) 0.9 %
10 SYRINGE (ML) INJECTION EVERY 12 HOURS SCHEDULED
Status: DISCONTINUED | OUTPATIENT
Start: 2019-11-11 | End: 2019-11-17

## 2019-11-11 RX ORDER — FUROSEMIDE 10 MG/ML
20 INJECTION INTRAMUSCULAR; INTRAVENOUS 2 TIMES DAILY
Status: DISCONTINUED | OUTPATIENT
Start: 2019-11-11 | End: 2019-11-13

## 2019-11-11 RX ORDER — SODIUM CHLORIDE 9 MG/ML
INJECTION, SOLUTION INTRAVENOUS CONTINUOUS
Status: DISCONTINUED | OUTPATIENT
Start: 2019-11-11 | End: 2019-11-19 | Stop reason: HOSPADM

## 2019-11-11 RX ORDER — MAGNESIUM SULFATE 1 G/100ML
1 INJECTION INTRAVENOUS
Status: COMPLETED | OUTPATIENT
Start: 2019-11-11 | End: 2019-11-11

## 2019-11-11 RX ORDER — MAGNESIUM SULFATE 1 G/100ML
1 INJECTION INTRAVENOUS
Status: DISCONTINUED | OUTPATIENT
Start: 2019-11-11 | End: 2019-11-11

## 2019-11-11 RX ORDER — POTASSIUM CHLORIDE 20 MEQ/1
40 TABLET, EXTENDED RELEASE ORAL ONCE
Status: COMPLETED | OUTPATIENT
Start: 2019-11-11 | End: 2019-11-11

## 2019-11-11 RX ADMIN — ATORVASTATIN CALCIUM 10 MG: 10 TABLET, FILM COATED ORAL at 19:49

## 2019-11-11 RX ADMIN — HEPARIN SODIUM AND DEXTROSE 18 UNITS/KG/HR: 10000; 5 INJECTION INTRAVENOUS at 11:18

## 2019-11-11 RX ADMIN — POTASSIUM CHLORIDE 40 MEQ: 1500 TABLET, EXTENDED RELEASE ORAL at 09:11

## 2019-11-11 RX ADMIN — TRAMADOL HYDROCHLORIDE 50 MG: 50 TABLET, COATED ORAL at 19:48

## 2019-11-11 RX ADMIN — Medication 1500 MG: at 11:26

## 2019-11-11 RX ADMIN — MAGNESIUM SULFATE HEPTAHYDRATE 1 G: 1 INJECTION, SOLUTION INTRAVENOUS at 16:13

## 2019-11-11 RX ADMIN — MAGNESIUM SULFATE HEPTAHYDRATE 1 G: 1 INJECTION, SOLUTION INTRAVENOUS at 17:10

## 2019-11-11 RX ADMIN — SODIUM CHLORIDE: 9 INJECTION, SOLUTION INTRAVENOUS at 15:21

## 2019-11-11 ASSESSMENT — PAIN SCALES - GENERAL: PAINLEVEL_OUTOF10: 4

## 2019-11-12 ENCOUNTER — APPOINTMENT (OUTPATIENT)
Dept: MRI IMAGING | Age: 84
DRG: 040 | End: 2019-11-12
Payer: MEDICARE

## 2019-11-12 ENCOUNTER — APPOINTMENT (OUTPATIENT)
Dept: GENERAL RADIOLOGY | Age: 84
DRG: 040 | End: 2019-11-12
Payer: MEDICARE

## 2019-11-12 LAB
ABSOLUTE EOS #: 0.24 K/UL (ref 0–0.44)
ABSOLUTE IMMATURE GRANULOCYTE: 0.03 K/UL (ref 0–0.3)
ABSOLUTE LYMPH #: 0.72 K/UL (ref 1.1–3.7)
ABSOLUTE MONO #: 0.57 K/UL (ref 0.1–1.2)
ANION GAP SERPL CALCULATED.3IONS-SCNC: 9 MMOL/L (ref 9–17)
BASOPHILS # BLD: 1 % (ref 0–2)
BASOPHILS ABSOLUTE: 0.03 K/UL (ref 0–0.2)
BUN BLDV-MCNC: 6 MG/DL (ref 8–23)
BUN/CREAT BLD: ABNORMAL (ref 9–20)
CALCIUM SERPL-MCNC: 8 MG/DL (ref 8.6–10.4)
CHLORIDE BLD-SCNC: 97 MMOL/L (ref 98–107)
CO2: 30 MMOL/L (ref 20–31)
CREAT SERPL-MCNC: 0.48 MG/DL (ref 0.5–0.9)
DIFFERENTIAL TYPE: ABNORMAL
EOSINOPHILS RELATIVE PERCENT: 4 % (ref 1–4)
GFR AFRICAN AMERICAN: >60 ML/MIN
GFR NON-AFRICAN AMERICAN: >60 ML/MIN
GFR SERPL CREATININE-BSD FRML MDRD: ABNORMAL ML/MIN/{1.73_M2}
GFR SERPL CREATININE-BSD FRML MDRD: ABNORMAL ML/MIN/{1.73_M2}
GLUCOSE BLD-MCNC: 102 MG/DL (ref 70–99)
HCT VFR BLD CALC: 31.4 % (ref 36.3–47.1)
HEMOGLOBIN: 9.4 G/DL (ref 11.9–15.1)
IMMATURE GRANULOCYTES: 1 %
LYMPHOCYTES # BLD: 12 % (ref 24–43)
MAGNESIUM: 2.1 MG/DL (ref 1.6–2.6)
MCH RBC QN AUTO: 27.6 PG (ref 25.2–33.5)
MCHC RBC AUTO-ENTMCNC: 29.9 G/DL (ref 28.4–34.8)
MCV RBC AUTO: 92.4 FL (ref 82.6–102.9)
MONOCYTES # BLD: 9 % (ref 3–12)
NRBC AUTOMATED: 0 PER 100 WBC
PARTIAL THROMBOPLASTIN TIME: 49.9 SEC (ref 20.5–30.5)
PARTIAL THROMBOPLASTIN TIME: 61.4 SEC (ref 20.5–30.5)
PARTIAL THROMBOPLASTIN TIME: 61.7 SEC (ref 20.5–30.5)
PARTIAL THROMBOPLASTIN TIME: 64.5 SEC (ref 20.5–30.5)
PDW BLD-RTO: 16.3 % (ref 11.8–14.4)
PLATELET # BLD: 313 K/UL (ref 138–453)
PLATELET ESTIMATE: ABNORMAL
PMV BLD AUTO: 9.4 FL (ref 8.1–13.5)
POTASSIUM SERPL-SCNC: 3.3 MMOL/L (ref 3.7–5.3)
RBC # BLD: 3.4 M/UL (ref 3.95–5.11)
RBC # BLD: ABNORMAL 10*6/UL
SEG NEUTROPHILS: 73 % (ref 36–65)
SEGMENTED NEUTROPHILS ABSOLUTE COUNT: 4.63 K/UL (ref 1.5–8.1)
SODIUM BLD-SCNC: 136 MMOL/L (ref 135–144)
VANCOMYCIN TROUGH DATE LAST DOSE: NORMAL
VANCOMYCIN TROUGH DOSE AMOUNT: NORMAL
VANCOMYCIN TROUGH TIME LAST DOSE: NORMAL
VANCOMYCIN TROUGH: 16.5 UG/ML (ref 10–20)
WBC # BLD: 6.2 K/UL (ref 3.5–11.3)
WBC # BLD: ABNORMAL 10*3/UL

## 2019-11-12 PROCEDURE — 6370000000 HC RX 637 (ALT 250 FOR IP): Performed by: STUDENT IN AN ORGANIZED HEALTH CARE EDUCATION/TRAINING PROGRAM

## 2019-11-12 PROCEDURE — 51701 INSERT BLADDER CATHETER: CPT

## 2019-11-12 PROCEDURE — 83735 ASSAY OF MAGNESIUM: CPT

## 2019-11-12 PROCEDURE — 2700000000 HC OXYGEN THERAPY PER DAY

## 2019-11-12 PROCEDURE — 2580000003 HC RX 258: Performed by: INTERNAL MEDICINE

## 2019-11-12 PROCEDURE — 72158 MRI LUMBAR SPINE W/O & W/DYE: CPT

## 2019-11-12 PROCEDURE — 51798 US URINE CAPACITY MEASURE: CPT

## 2019-11-12 PROCEDURE — 99233 SBSQ HOSP IP/OBS HIGH 50: CPT | Performed by: INTERNAL MEDICINE

## 2019-11-12 PROCEDURE — 72156 MRI NECK SPINE W/O & W/DYE: CPT

## 2019-11-12 PROCEDURE — 80202 ASSAY OF VANCOMYCIN: CPT

## 2019-11-12 PROCEDURE — 6360000004 HC RX CONTRAST MEDICATION: Performed by: STUDENT IN AN ORGANIZED HEALTH CARE EDUCATION/TRAINING PROGRAM

## 2019-11-12 PROCEDURE — 85730 THROMBOPLASTIN TIME PARTIAL: CPT

## 2019-11-12 PROCEDURE — 99232 SBSQ HOSP IP/OBS MODERATE 35: CPT | Performed by: STUDENT IN AN ORGANIZED HEALTH CARE EDUCATION/TRAINING PROGRAM

## 2019-11-12 PROCEDURE — 85025 COMPLETE CBC W/AUTO DIFF WBC: CPT

## 2019-11-12 PROCEDURE — 94761 N-INVAS EAR/PLS OXIMETRY MLT: CPT

## 2019-11-12 PROCEDURE — 6360000002 HC RX W HCPCS: Performed by: STUDENT IN AN ORGANIZED HEALTH CARE EDUCATION/TRAINING PROGRAM

## 2019-11-12 PROCEDURE — 36415 COLL VENOUS BLD VENIPUNCTURE: CPT

## 2019-11-12 PROCEDURE — 93970 EXTREMITY STUDY: CPT

## 2019-11-12 PROCEDURE — 80048 BASIC METABOLIC PNL TOTAL CA: CPT

## 2019-11-12 PROCEDURE — 73110 X-RAY EXAM OF WRIST: CPT

## 2019-11-12 PROCEDURE — A9576 INJ PROHANCE MULTIPACK: HCPCS | Performed by: STUDENT IN AN ORGANIZED HEALTH CARE EDUCATION/TRAINING PROGRAM

## 2019-11-12 PROCEDURE — 2580000003 HC RX 258: Performed by: STUDENT IN AN ORGANIZED HEALTH CARE EDUCATION/TRAINING PROGRAM

## 2019-11-12 PROCEDURE — 2060000000 HC ICU INTERMEDIATE R&B

## 2019-11-12 RX ORDER — POTASSIUM CHLORIDE 20 MEQ/1
20 TABLET, EXTENDED RELEASE ORAL ONCE
Status: DISCONTINUED | OUTPATIENT
Start: 2019-11-12 | End: 2019-11-19 | Stop reason: HOSPADM

## 2019-11-12 RX ORDER — SODIUM CHLORIDE 0.9 % (FLUSH) 0.9 %
10 SYRINGE (ML) INJECTION PRN
Status: DISCONTINUED | OUTPATIENT
Start: 2019-11-12 | End: 2019-11-19 | Stop reason: HOSPADM

## 2019-11-12 RX ADMIN — Medication 1500 MG: at 14:08

## 2019-11-12 RX ADMIN — ATORVASTATIN CALCIUM 10 MG: 10 TABLET, FILM COATED ORAL at 20:39

## 2019-11-12 RX ADMIN — SODIUM CHLORIDE, PRESERVATIVE FREE 10 ML: 5 INJECTION INTRAVENOUS at 09:15

## 2019-11-12 RX ADMIN — GADOTERIDOL 14 ML: 279.3 INJECTION, SOLUTION INTRAVENOUS at 15:48

## 2019-11-12 RX ADMIN — TRAMADOL HYDROCHLORIDE 50 MG: 50 TABLET, COATED ORAL at 20:39

## 2019-11-12 RX ADMIN — POTASSIUM BICARBONATE 40 MEQ: 782 TABLET, EFFERVESCENT ORAL at 09:16

## 2019-11-12 RX ADMIN — HEPARIN SODIUM AND DEXTROSE 16 UNITS/KG/HR: 10000; 5 INJECTION INTRAVENOUS at 05:30

## 2019-11-12 ASSESSMENT — PAIN SCALES - GENERAL
PAINLEVEL_OUTOF10: 5
PAINLEVEL_OUTOF10: 4

## 2019-11-12 ASSESSMENT — ENCOUNTER SYMPTOMS
ABDOMINAL PAIN: 0
EYE ITCHING: 0
APNEA: 0
COLOR CHANGE: 0

## 2019-11-12 ASSESSMENT — PAIN DESCRIPTION - LOCATION: LOCATION: BUTTOCKS

## 2019-11-13 LAB
ABSOLUTE EOS #: 0.22 K/UL (ref 0–0.44)
ABSOLUTE IMMATURE GRANULOCYTE: 0.04 K/UL (ref 0–0.3)
ABSOLUTE LYMPH #: 0.8 K/UL (ref 1.1–3.7)
ABSOLUTE MONO #: 0.51 K/UL (ref 0.1–1.2)
ANION GAP SERPL CALCULATED.3IONS-SCNC: 11 MMOL/L (ref 9–17)
BASOPHILS # BLD: 0 % (ref 0–2)
BASOPHILS ABSOLUTE: <0.03 K/UL (ref 0–0.2)
BUN BLDV-MCNC: 6 MG/DL (ref 8–23)
BUN/CREAT BLD: ABNORMAL (ref 9–20)
CALCIUM SERPL-MCNC: 8 MG/DL (ref 8.6–10.4)
CHLORIDE BLD-SCNC: 98 MMOL/L (ref 98–107)
CO2: 29 MMOL/L (ref 20–31)
CREAT SERPL-MCNC: 0.42 MG/DL (ref 0.5–0.9)
DIFFERENTIAL TYPE: ABNORMAL
EOSINOPHILS RELATIVE PERCENT: 4 % (ref 1–4)
GFR AFRICAN AMERICAN: >60 ML/MIN
GFR NON-AFRICAN AMERICAN: >60 ML/MIN
GFR SERPL CREATININE-BSD FRML MDRD: ABNORMAL ML/MIN/{1.73_M2}
GFR SERPL CREATININE-BSD FRML MDRD: ABNORMAL ML/MIN/{1.73_M2}
GLUCOSE BLD-MCNC: 94 MG/DL (ref 70–99)
HCT VFR BLD CALC: 29.1 % (ref 36.3–47.1)
HEMOGLOBIN: 9 G/DL (ref 11.9–15.1)
IMMATURE GRANULOCYTES: 1 %
LYMPHOCYTES # BLD: 15 % (ref 24–43)
MCH RBC QN AUTO: 28 PG (ref 25.2–33.5)
MCHC RBC AUTO-ENTMCNC: 30.9 G/DL (ref 28.4–34.8)
MCV RBC AUTO: 90.7 FL (ref 82.6–102.9)
MONOCYTES # BLD: 10 % (ref 3–12)
NRBC AUTOMATED: 0 PER 100 WBC
PARTIAL THROMBOPLASTIN TIME: 46.9 SEC (ref 20.5–30.5)
PARTIAL THROMBOPLASTIN TIME: 57.9 SEC (ref 20.5–30.5)
PARTIAL THROMBOPLASTIN TIME: 63.6 SEC (ref 20.5–30.5)
PDW BLD-RTO: 16.2 % (ref 11.8–14.4)
PLATELET # BLD: 308 K/UL (ref 138–453)
PLATELET ESTIMATE: ABNORMAL
PMV BLD AUTO: 9.5 FL (ref 8.1–13.5)
POTASSIUM SERPL-SCNC: 3.8 MMOL/L (ref 3.7–5.3)
RBC # BLD: 3.21 M/UL (ref 3.95–5.11)
RBC # BLD: ABNORMAL 10*6/UL
SEG NEUTROPHILS: 70 % (ref 36–65)
SEGMENTED NEUTROPHILS ABSOLUTE COUNT: 3.6 K/UL (ref 1.5–8.1)
SODIUM BLD-SCNC: 138 MMOL/L (ref 135–144)
WBC # BLD: 5.2 K/UL (ref 3.5–11.3)
WBC # BLD: ABNORMAL 10*3/UL

## 2019-11-13 PROCEDURE — 85730 THROMBOPLASTIN TIME PARTIAL: CPT

## 2019-11-13 PROCEDURE — 99223 1ST HOSP IP/OBS HIGH 75: CPT | Performed by: NEUROLOGICAL SURGERY

## 2019-11-13 PROCEDURE — 99232 SBSQ HOSP IP/OBS MODERATE 35: CPT | Performed by: STUDENT IN AN ORGANIZED HEALTH CARE EDUCATION/TRAINING PROGRAM

## 2019-11-13 PROCEDURE — 6360000002 HC RX W HCPCS: Performed by: INTERNAL MEDICINE

## 2019-11-13 PROCEDURE — 2580000003 HC RX 258: Performed by: INTERNAL MEDICINE

## 2019-11-13 PROCEDURE — 92526 ORAL FUNCTION THERAPY: CPT

## 2019-11-13 PROCEDURE — 6370000000 HC RX 637 (ALT 250 FOR IP): Performed by: STUDENT IN AN ORGANIZED HEALTH CARE EDUCATION/TRAINING PROGRAM

## 2019-11-13 PROCEDURE — 99232 SBSQ HOSP IP/OBS MODERATE 35: CPT | Performed by: INTERNAL MEDICINE

## 2019-11-13 PROCEDURE — 2580000003 HC RX 258: Performed by: STUDENT IN AN ORGANIZED HEALTH CARE EDUCATION/TRAINING PROGRAM

## 2019-11-13 PROCEDURE — 6360000002 HC RX W HCPCS: Performed by: STUDENT IN AN ORGANIZED HEALTH CARE EDUCATION/TRAINING PROGRAM

## 2019-11-13 PROCEDURE — 2060000000 HC ICU INTERMEDIATE R&B

## 2019-11-13 PROCEDURE — 97530 THERAPEUTIC ACTIVITIES: CPT

## 2019-11-13 PROCEDURE — 97535 SELF CARE MNGMENT TRAINING: CPT

## 2019-11-13 PROCEDURE — 51798 US URINE CAPACITY MEASURE: CPT

## 2019-11-13 PROCEDURE — 97110 THERAPEUTIC EXERCISES: CPT

## 2019-11-13 PROCEDURE — 80048 BASIC METABOLIC PNL TOTAL CA: CPT

## 2019-11-13 PROCEDURE — 85025 COMPLETE CBC W/AUTO DIFF WBC: CPT

## 2019-11-13 PROCEDURE — 51701 INSERT BLADDER CATHETER: CPT

## 2019-11-13 PROCEDURE — 36415 COLL VENOUS BLD VENIPUNCTURE: CPT

## 2019-11-13 PROCEDURE — 97166 OT EVAL MOD COMPLEX 45 MIN: CPT

## 2019-11-13 RX ORDER — AMIODARONE HYDROCHLORIDE 200 MG/1
200 TABLET ORAL DAILY
Status: DISCONTINUED | OUTPATIENT
Start: 2019-11-13 | End: 2019-11-19 | Stop reason: HOSPADM

## 2019-11-13 RX ORDER — FUROSEMIDE 10 MG/ML
20 INJECTION INTRAMUSCULAR; INTRAVENOUS DAILY
Status: DISCONTINUED | OUTPATIENT
Start: 2019-11-13 | End: 2019-11-19 | Stop reason: HOSPADM

## 2019-11-13 RX ADMIN — METOPROLOL TARTRATE 25 MG: 25 TABLET ORAL at 09:36

## 2019-11-13 RX ADMIN — HEPARIN SODIUM AND DEXTROSE 18 UNITS/KG/HR: 10000; 5 INJECTION INTRAVENOUS at 23:02

## 2019-11-13 RX ADMIN — Medication 1500 MG: at 10:23

## 2019-11-13 RX ADMIN — HEPARIN SODIUM AND DEXTROSE 16 UNITS/KG/HR: 10000; 5 INJECTION INTRAVENOUS at 00:17

## 2019-11-13 RX ADMIN — ATORVASTATIN CALCIUM 10 MG: 10 TABLET, FILM COATED ORAL at 20:47

## 2019-11-13 RX ADMIN — AMIODARONE HYDROCHLORIDE 200 MG: 200 TABLET ORAL at 13:16

## 2019-11-13 RX ADMIN — METOPROLOL TARTRATE 25 MG: 25 TABLET ORAL at 20:47

## 2019-11-13 RX ADMIN — FUROSEMIDE 20 MG: 10 INJECTION, SOLUTION INTRAMUSCULAR; INTRAVENOUS at 09:47

## 2019-11-13 RX ADMIN — SODIUM CHLORIDE: 9 INJECTION, SOLUTION INTRAVENOUS at 09:54

## 2019-11-13 RX ADMIN — TRAMADOL HYDROCHLORIDE 50 MG: 50 TABLET, COATED ORAL at 09:10

## 2019-11-13 ASSESSMENT — PAIN DESCRIPTION - LOCATION
LOCATION: BUTTOCKS;BACK
LOCATION: BUTTOCKS;BACK

## 2019-11-13 ASSESSMENT — ENCOUNTER SYMPTOMS
APNEA: 0
EYE PAIN: 0
EYE ITCHING: 0
COLOR CHANGE: 0
ABDOMINAL PAIN: 0

## 2019-11-13 ASSESSMENT — PAIN SCALES - GENERAL
PAINLEVEL_OUTOF10: 0
PAINLEVEL_OUTOF10: 5
PAINLEVEL_OUTOF10: 2
PAINLEVEL_OUTOF10: 0
PAINLEVEL_OUTOF10: 3
PAINLEVEL_OUTOF10: 0
PAINLEVEL_OUTOF10: 2

## 2019-11-13 ASSESSMENT — PAIN DESCRIPTION - PAIN TYPE
TYPE: CHRONIC PAIN
TYPE: CHRONIC PAIN

## 2019-11-13 ASSESSMENT — PAIN DESCRIPTION - ORIENTATION
ORIENTATION: LOWER
ORIENTATION: LOWER

## 2019-11-14 ENCOUNTER — APPOINTMENT (OUTPATIENT)
Dept: CARDIAC CATH/INVASIVE PROCEDURES | Age: 84
DRG: 040 | End: 2019-11-14
Payer: MEDICARE

## 2019-11-14 ENCOUNTER — APPOINTMENT (OUTPATIENT)
Dept: GENERAL RADIOLOGY | Age: 84
DRG: 040 | End: 2019-11-14
Payer: MEDICARE

## 2019-11-14 ENCOUNTER — ANESTHESIA (OUTPATIENT)
Dept: CARDIAC CATH/INVASIVE PROCEDURES | Age: 84
DRG: 040 | End: 2019-11-14
Payer: MEDICARE

## 2019-11-14 ENCOUNTER — ANESTHESIA EVENT (OUTPATIENT)
Dept: CARDIAC CATH/INVASIVE PROCEDURES | Age: 84
DRG: 040 | End: 2019-11-14
Payer: MEDICARE

## 2019-11-14 VITALS
SYSTOLIC BLOOD PRESSURE: 132 MMHG | TEMPERATURE: 97 F | RESPIRATION RATE: 16 BRPM | DIASTOLIC BLOOD PRESSURE: 62 MMHG | OXYGEN SATURATION: 100 %

## 2019-11-14 LAB
ABSOLUTE EOS #: 0.23 K/UL (ref 0–0.44)
ABSOLUTE IMMATURE GRANULOCYTE: 0.03 K/UL (ref 0–0.3)
ABSOLUTE LYMPH #: 1.01 K/UL (ref 1.1–3.7)
ABSOLUTE MONO #: 0.62 K/UL (ref 0.1–1.2)
ANION GAP SERPL CALCULATED.3IONS-SCNC: 10 MMOL/L (ref 9–17)
BASOPHILS # BLD: 1 % (ref 0–2)
BASOPHILS ABSOLUTE: 0.03 K/UL (ref 0–0.2)
BUN BLDV-MCNC: 6 MG/DL (ref 8–23)
BUN/CREAT BLD: ABNORMAL (ref 9–20)
CALCIUM SERPL-MCNC: 8.1 MG/DL (ref 8.6–10.4)
CHLORIDE BLD-SCNC: 99 MMOL/L (ref 98–107)
CHLORIDE, UR: 89 MMOL/L
CO2: 28 MMOL/L (ref 20–31)
CREAT SERPL-MCNC: 0.39 MG/DL (ref 0.5–0.9)
DIFFERENTIAL TYPE: ABNORMAL
EOSINOPHILS RELATIVE PERCENT: 4 % (ref 1–4)
GFR AFRICAN AMERICAN: >60 ML/MIN
GFR NON-AFRICAN AMERICAN: >60 ML/MIN
GFR SERPL CREATININE-BSD FRML MDRD: ABNORMAL ML/MIN/{1.73_M2}
GFR SERPL CREATININE-BSD FRML MDRD: ABNORMAL ML/MIN/{1.73_M2}
GLUCOSE BLD-MCNC: 109 MG/DL (ref 70–99)
HCT VFR BLD CALC: 30.9 % (ref 36.3–47.1)
HEMOGLOBIN: 9.6 G/DL (ref 11.9–15.1)
IMMATURE GRANULOCYTES: 1 %
LYMPHOCYTES # BLD: 15 % (ref 24–43)
MAGNESIUM: 1.8 MG/DL (ref 1.6–2.6)
MCH RBC QN AUTO: 27.9 PG (ref 25.2–33.5)
MCHC RBC AUTO-ENTMCNC: 31.1 G/DL (ref 28.4–34.8)
MCV RBC AUTO: 89.8 FL (ref 82.6–102.9)
MONOCYTES # BLD: 10 % (ref 3–12)
NRBC AUTOMATED: 0 PER 100 WBC
PARTIAL THROMBOPLASTIN TIME: 20.6 SEC (ref 20.5–30.5)
PARTIAL THROMBOPLASTIN TIME: 27.1 SEC (ref 20.5–30.5)
PARTIAL THROMBOPLASTIN TIME: 64.9 SEC (ref 20.5–30.5)
PARTIAL THROMBOPLASTIN TIME: 75.7 SEC (ref 20.5–30.5)
PDW BLD-RTO: 16.4 % (ref 11.8–14.4)
PLATELET # BLD: 333 K/UL (ref 138–453)
PLATELET ESTIMATE: ABNORMAL
PMV BLD AUTO: 9.8 FL (ref 8.1–13.5)
POTASSIUM SERPL-SCNC: 3.5 MMOL/L (ref 3.7–5.3)
RBC # BLD: 3.44 M/UL (ref 3.95–5.11)
RBC # BLD: ABNORMAL 10*6/UL
SEG NEUTROPHILS: 69 % (ref 36–65)
SEGMENTED NEUTROPHILS ABSOLUTE COUNT: 4.62 K/UL (ref 1.5–8.1)
SODIUM BLD-SCNC: 137 MMOL/L (ref 135–144)
VANCOMYCIN TROUGH DATE LAST DOSE: NORMAL
VANCOMYCIN TROUGH DOSE AMOUNT: NORMAL
VANCOMYCIN TROUGH TIME LAST DOSE: NORMAL
VANCOMYCIN TROUGH: 13.1 UG/ML (ref 10–20)
WBC # BLD: 6.5 K/UL (ref 3.5–11.3)
WBC # BLD: ABNORMAL 10*3/UL

## 2019-11-14 PROCEDURE — 4A02XFZ MEASUREMENT OF CARDIAC RHYTHM, EXTERNAL APPROACH: ICD-10-PCS | Performed by: INTERNAL MEDICINE

## 2019-11-14 PROCEDURE — 80202 ASSAY OF VANCOMYCIN: CPT

## 2019-11-14 PROCEDURE — C1769 GUIDE WIRE: HCPCS

## 2019-11-14 PROCEDURE — 6360000002 HC RX W HCPCS: Performed by: STUDENT IN AN ORGANIZED HEALTH CARE EDUCATION/TRAINING PROGRAM

## 2019-11-14 PROCEDURE — 2580000003 HC RX 258: Performed by: STUDENT IN AN ORGANIZED HEALTH CARE EDUCATION/TRAINING PROGRAM

## 2019-11-14 PROCEDURE — 6370000000 HC RX 637 (ALT 250 FOR IP): Performed by: STUDENT IN AN ORGANIZED HEALTH CARE EDUCATION/TRAINING PROGRAM

## 2019-11-14 PROCEDURE — 85025 COMPLETE CBC W/AUTO DIFF WBC: CPT

## 2019-11-14 PROCEDURE — 2500000003 HC RX 250 WO HCPCS: Performed by: INTERNAL MEDICINE

## 2019-11-14 PROCEDURE — 87077 CULTURE AEROBIC IDENTIFY: CPT

## 2019-11-14 PROCEDURE — 2580000003 HC RX 258: Performed by: INTERNAL MEDICINE

## 2019-11-14 PROCEDURE — 3700000000 HC ANESTHESIA ATTENDED CARE

## 2019-11-14 PROCEDURE — 85730 THROMBOPLASTIN TIME PARTIAL: CPT

## 2019-11-14 PROCEDURE — 2709999900 HC NON-CHARGEABLE SUPPLY

## 2019-11-14 PROCEDURE — 51701 INSERT BLADDER CATHETER: CPT

## 2019-11-14 PROCEDURE — 51798 US URINE CAPACITY MEASURE: CPT

## 2019-11-14 PROCEDURE — 87186 SC STD MICRODIL/AGAR DIL: CPT

## 2019-11-14 PROCEDURE — 4A02X4Z MEASUREMENT OF CARDIAC ELECTRICAL ACTIVITY, EXTERNAL APPROACH: ICD-10-PCS | Performed by: INTERNAL MEDICINE

## 2019-11-14 PROCEDURE — 83735 ASSAY OF MAGNESIUM: CPT

## 2019-11-14 PROCEDURE — C2628 CATHETER, OCCLUSION: HCPCS

## 2019-11-14 PROCEDURE — 87205 SMEAR GRAM STAIN: CPT

## 2019-11-14 PROCEDURE — 6360000002 HC RX W HCPCS: Performed by: NURSE ANESTHETIST, CERTIFIED REGISTERED

## 2019-11-14 PROCEDURE — 87086 URINE CULTURE/COLONY COUNT: CPT

## 2019-11-14 PROCEDURE — 33233 REMOVAL OF PM GENERATOR: CPT | Performed by: INTERNAL MEDICINE

## 2019-11-14 PROCEDURE — 87075 CULTR BACTERIA EXCEPT BLOOD: CPT

## 2019-11-14 PROCEDURE — 2000000000 HC ICU R&B

## 2019-11-14 PROCEDURE — 80048 BASIC METABOLIC PNL TOTAL CA: CPT

## 2019-11-14 PROCEDURE — 2500000003 HC RX 250 WO HCPCS

## 2019-11-14 PROCEDURE — 99232 SBSQ HOSP IP/OBS MODERATE 35: CPT | Performed by: STUDENT IN AN ORGANIZED HEALTH CARE EDUCATION/TRAINING PROGRAM

## 2019-11-14 PROCEDURE — 99232 SBSQ HOSP IP/OBS MODERATE 35: CPT | Performed by: INTERNAL MEDICINE

## 2019-11-14 PROCEDURE — 71045 X-RAY EXAM CHEST 1 VIEW: CPT

## 2019-11-14 PROCEDURE — 2720000010 HC SURG SUPPLY STERILE

## 2019-11-14 PROCEDURE — 6360000004 HC RX CONTRAST MEDICATION

## 2019-11-14 PROCEDURE — 5A1223Z PERFORMANCE OF CARDIAC PACING, CONTINUOUS: ICD-10-PCS | Performed by: INTERNAL MEDICINE

## 2019-11-14 PROCEDURE — C1894 INTRO/SHEATH, NON-LASER: HCPCS

## 2019-11-14 PROCEDURE — 3700000001 HC ADD 15 MINUTES (ANESTHESIA)

## 2019-11-14 PROCEDURE — 0JPT0PZ REMOVAL OF CARDIAC RHYTHM RELATED DEVICE FROM TRUNK SUBCUTANEOUS TISSUE AND FASCIA, OPEN APPROACH: ICD-10-PCS | Performed by: INTERNAL MEDICINE

## 2019-11-14 PROCEDURE — 87070 CULTURE OTHR SPECIMN AEROBIC: CPT

## 2019-11-14 PROCEDURE — 33235 REMOVAL PACEMAKER ELECTRODE: CPT | Performed by: INTERNAL MEDICINE

## 2019-11-14 PROCEDURE — 2500000003 HC RX 250 WO HCPCS: Performed by: NURSE ANESTHETIST, CERTIFIED REGISTERED

## 2019-11-14 PROCEDURE — 02HK3JZ INSERTION OF PACEMAKER LEAD INTO RIGHT VENTRICLE, PERCUTANEOUS APPROACH: ICD-10-PCS | Performed by: INTERNAL MEDICINE

## 2019-11-14 PROCEDURE — 02PA3MZ REMOVAL OF CARDIAC LEAD FROM HEART, PERCUTANEOUS APPROACH: ICD-10-PCS | Performed by: INTERNAL MEDICINE

## 2019-11-14 PROCEDURE — 36415 COLL VENOUS BLD VENIPUNCTURE: CPT

## 2019-11-14 PROCEDURE — C1898 LEAD, PMKR, OTHER THAN TRANS: HCPCS

## 2019-11-14 RX ORDER — SODIUM CHLORIDE 0.9 % (FLUSH) 0.9 %
10 SYRINGE (ML) INJECTION PRN
Status: DISCONTINUED | OUTPATIENT
Start: 2019-11-14 | End: 2019-11-19 | Stop reason: HOSPADM

## 2019-11-14 RX ORDER — PROPOFOL 10 MG/ML
INJECTION, EMULSION INTRAVENOUS CONTINUOUS PRN
Status: DISCONTINUED | OUTPATIENT
Start: 2019-11-14 | End: 2019-11-14 | Stop reason: SDUPTHER

## 2019-11-14 RX ORDER — SODIUM CHLORIDE 0.9 % (FLUSH) 0.9 %
10 SYRINGE (ML) INJECTION EVERY 12 HOURS SCHEDULED
Status: DISCONTINUED | OUTPATIENT
Start: 2019-11-14 | End: 2019-11-17

## 2019-11-14 RX ORDER — ACETAMINOPHEN 325 MG/1
650 TABLET ORAL EVERY 4 HOURS PRN
Status: DISCONTINUED | OUTPATIENT
Start: 2019-11-14 | End: 2019-11-19 | Stop reason: HOSPADM

## 2019-11-14 RX ORDER — FENTANYL CITRATE 50 UG/ML
INJECTION, SOLUTION INTRAMUSCULAR; INTRAVENOUS PRN
Status: DISCONTINUED | OUTPATIENT
Start: 2019-11-14 | End: 2019-11-14 | Stop reason: SDUPTHER

## 2019-11-14 RX ORDER — MAGNESIUM SULFATE 1 G/100ML
1 INJECTION INTRAVENOUS
Status: COMPLETED | OUTPATIENT
Start: 2019-11-14 | End: 2019-11-14

## 2019-11-14 RX ORDER — SODIUM CHLORIDE 9 MG/ML
INJECTION, SOLUTION INTRAVENOUS CONTINUOUS
Status: CANCELLED | OUTPATIENT
Start: 2019-11-14

## 2019-11-14 RX ORDER — LIDOCAINE HYDROCHLORIDE 10 MG/ML
INJECTION, SOLUTION EPIDURAL; INFILTRATION; INTRACAUDAL; PERINEURAL PRN
Status: DISCONTINUED | OUTPATIENT
Start: 2019-11-14 | End: 2019-11-14 | Stop reason: SDUPTHER

## 2019-11-14 RX ADMIN — BACITRACIN 50000 UNITS: 50000 INJECTION, POWDER, FOR SOLUTION INTRAMUSCULAR at 16:15

## 2019-11-14 RX ADMIN — FENTANYL CITRATE 25 MCG: 50 INJECTION INTRAMUSCULAR; INTRAVENOUS at 15:45

## 2019-11-14 RX ADMIN — METOPROLOL TARTRATE 25 MG: 25 TABLET ORAL at 08:49

## 2019-11-14 RX ADMIN — MAGNESIUM SULFATE HEPTAHYDRATE 1 G: 1 INJECTION, SOLUTION INTRAVENOUS at 08:47

## 2019-11-14 RX ADMIN — METOPROLOL TARTRATE 25 MG: 25 TABLET ORAL at 21:17

## 2019-11-14 RX ADMIN — ATORVASTATIN CALCIUM 10 MG: 10 TABLET, FILM COATED ORAL at 21:17

## 2019-11-14 RX ADMIN — ACETAMINOPHEN 650 MG: 325 TABLET ORAL at 18:40

## 2019-11-14 RX ADMIN — FUROSEMIDE 20 MG: 10 INJECTION, SOLUTION INTRAMUSCULAR; INTRAVENOUS at 08:49

## 2019-11-14 RX ADMIN — LIDOCAINE HYDROCHLORIDE 50 MG: 10 INJECTION, SOLUTION EPIDURAL; INFILTRATION; INTRACAUDAL at 15:45

## 2019-11-14 RX ADMIN — Medication 1500 MG: at 12:02

## 2019-11-14 RX ADMIN — PROPOFOL 100 MCG/KG/MIN: 10 INJECTION, EMULSION INTRAVENOUS at 15:45

## 2019-11-14 RX ADMIN — MAGNESIUM SULFATE HEPTAHYDRATE 1 G: 1 INJECTION, SOLUTION INTRAVENOUS at 09:57

## 2019-11-14 RX ADMIN — AMIODARONE HYDROCHLORIDE 200 MG: 200 TABLET ORAL at 08:49

## 2019-11-14 RX ADMIN — SODIUM CHLORIDE: 9 INJECTION, SOLUTION INTRAVENOUS at 22:44

## 2019-11-14 RX ADMIN — Medication 10 ML: at 21:17

## 2019-11-14 ASSESSMENT — PULMONARY FUNCTION TESTS
PIF_VALUE: 0

## 2019-11-14 ASSESSMENT — ENCOUNTER SYMPTOMS
EYE REDNESS: 0
SHORTNESS OF BREATH: 1

## 2019-11-14 ASSESSMENT — PAIN SCALES - GENERAL: PAINLEVEL_OUTOF10: 10

## 2019-11-14 ASSESSMENT — LIFESTYLE VARIABLES: SMOKING_STATUS: 0

## 2019-11-15 ENCOUNTER — APPOINTMENT (OUTPATIENT)
Dept: CT IMAGING | Age: 84
DRG: 040 | End: 2019-11-15
Payer: MEDICARE

## 2019-11-15 LAB
ABSOLUTE EOS #: 0.11 K/UL (ref 0–0.44)
ABSOLUTE IMMATURE GRANULOCYTE: 0.03 K/UL (ref 0–0.3)
ABSOLUTE LYMPH #: 1.04 K/UL (ref 1.1–3.7)
ABSOLUTE MONO #: 0.67 K/UL (ref 0.1–1.2)
ANION GAP SERPL CALCULATED.3IONS-SCNC: 12 MMOL/L (ref 9–17)
BASOPHILS # BLD: 0 % (ref 0–2)
BASOPHILS ABSOLUTE: <0.03 K/UL (ref 0–0.2)
BUN BLDV-MCNC: 10 MG/DL (ref 8–23)
BUN/CREAT BLD: ABNORMAL (ref 9–20)
CALCIUM SERPL-MCNC: 8 MG/DL (ref 8.6–10.4)
CHLORIDE BLD-SCNC: 100 MMOL/L (ref 98–107)
CO2: 26 MMOL/L (ref 20–31)
CREAT SERPL-MCNC: 0.52 MG/DL (ref 0.5–0.9)
DIFFERENTIAL TYPE: ABNORMAL
EOSINOPHILS RELATIVE PERCENT: 2 % (ref 1–4)
GFR AFRICAN AMERICAN: >60 ML/MIN
GFR NON-AFRICAN AMERICAN: >60 ML/MIN
GFR SERPL CREATININE-BSD FRML MDRD: ABNORMAL ML/MIN/{1.73_M2}
GFR SERPL CREATININE-BSD FRML MDRD: ABNORMAL ML/MIN/{1.73_M2}
GLUCOSE BLD-MCNC: 107 MG/DL (ref 70–99)
HCT VFR BLD CALC: 28.2 % (ref 36.3–47.1)
HEMOGLOBIN: 8.9 G/DL (ref 11.9–15.1)
IMMATURE GRANULOCYTES: 0 %
LYMPHOCYTES # BLD: 15 % (ref 24–43)
MAGNESIUM: 1.9 MG/DL (ref 1.6–2.6)
MCH RBC QN AUTO: 28.2 PG (ref 25.2–33.5)
MCHC RBC AUTO-ENTMCNC: 31.6 G/DL (ref 28.4–34.8)
MCV RBC AUTO: 89.2 FL (ref 82.6–102.9)
MONOCYTES # BLD: 10 % (ref 3–12)
NRBC AUTOMATED: 0 PER 100 WBC
PARTIAL THROMBOPLASTIN TIME: 23.3 SEC (ref 20.5–30.5)
PARTIAL THROMBOPLASTIN TIME: 23.3 SEC (ref 20.5–30.5)
PARTIAL THROMBOPLASTIN TIME: 33.1 SEC (ref 20.5–30.5)
PDW BLD-RTO: 16.3 % (ref 11.8–14.4)
PLATELET # BLD: 291 K/UL (ref 138–453)
PLATELET ESTIMATE: ABNORMAL
PMV BLD AUTO: 9.9 FL (ref 8.1–13.5)
POTASSIUM SERPL-SCNC: 3.4 MMOL/L (ref 3.7–5.3)
RBC # BLD: 3.16 M/UL (ref 3.95–5.11)
RBC # BLD: ABNORMAL 10*6/UL
SEG NEUTROPHILS: 73 % (ref 36–65)
SEGMENTED NEUTROPHILS ABSOLUTE COUNT: 5.05 K/UL (ref 1.5–8.1)
SODIUM BLD-SCNC: 138 MMOL/L (ref 135–144)
WBC # BLD: 6.9 K/UL (ref 3.5–11.3)
WBC # BLD: ABNORMAL 10*3/UL

## 2019-11-15 PROCEDURE — 6370000000 HC RX 637 (ALT 250 FOR IP): Performed by: STUDENT IN AN ORGANIZED HEALTH CARE EDUCATION/TRAINING PROGRAM

## 2019-11-15 PROCEDURE — 6360000002 HC RX W HCPCS: Performed by: STUDENT IN AN ORGANIZED HEALTH CARE EDUCATION/TRAINING PROGRAM

## 2019-11-15 PROCEDURE — 2580000003 HC RX 258: Performed by: STUDENT IN AN ORGANIZED HEALTH CARE EDUCATION/TRAINING PROGRAM

## 2019-11-15 PROCEDURE — 85730 THROMBOPLASTIN TIME PARTIAL: CPT

## 2019-11-15 PROCEDURE — 99222 1ST HOSP IP/OBS MODERATE 55: CPT | Performed by: FAMILY MEDICINE

## 2019-11-15 PROCEDURE — 2060000000 HC ICU INTERMEDIATE R&B

## 2019-11-15 PROCEDURE — 80048 BASIC METABOLIC PNL TOTAL CA: CPT

## 2019-11-15 PROCEDURE — 97110 THERAPEUTIC EXERCISES: CPT

## 2019-11-15 PROCEDURE — 92526 ORAL FUNCTION THERAPY: CPT

## 2019-11-15 PROCEDURE — 2580000003 HC RX 258: Performed by: INTERNAL MEDICINE

## 2019-11-15 PROCEDURE — 36415 COLL VENOUS BLD VENIPUNCTURE: CPT

## 2019-11-15 PROCEDURE — 83735 ASSAY OF MAGNESIUM: CPT

## 2019-11-15 PROCEDURE — 2000000000 HC ICU R&B

## 2019-11-15 PROCEDURE — 97535 SELF CARE MNGMENT TRAINING: CPT

## 2019-11-15 PROCEDURE — 70450 CT HEAD/BRAIN W/O DYE: CPT

## 2019-11-15 PROCEDURE — 99232 SBSQ HOSP IP/OBS MODERATE 35: CPT | Performed by: INTERNAL MEDICINE

## 2019-11-15 PROCEDURE — 99212 OFFICE O/P EST SF 10 MIN: CPT

## 2019-11-15 PROCEDURE — 85025 COMPLETE CBC W/AUTO DIFF WBC: CPT

## 2019-11-15 PROCEDURE — 99232 SBSQ HOSP IP/OBS MODERATE 35: CPT | Performed by: STUDENT IN AN ORGANIZED HEALTH CARE EDUCATION/TRAINING PROGRAM

## 2019-11-15 RX ORDER — HEPARIN SODIUM 10000 [USP'U]/100ML
12 INJECTION, SOLUTION INTRAVENOUS CONTINUOUS
Status: DISCONTINUED | OUTPATIENT
Start: 2019-11-15 | End: 2019-11-18

## 2019-11-15 RX ADMIN — SODIUM CHLORIDE, PRESERVATIVE FREE 10 ML: 5 INJECTION INTRAVENOUS at 11:08

## 2019-11-15 RX ADMIN — CEFTRIAXONE SODIUM 1 G: 1 INJECTION, POWDER, FOR SOLUTION INTRAMUSCULAR; INTRAVENOUS at 11:00

## 2019-11-15 RX ADMIN — POTASSIUM CHLORIDE 40 MEQ: 1500 TABLET, EXTENDED RELEASE ORAL at 06:17

## 2019-11-15 RX ADMIN — METOPROLOL TARTRATE 25 MG: 25 TABLET ORAL at 20:50

## 2019-11-15 RX ADMIN — Medication 10 ML: at 11:06

## 2019-11-15 RX ADMIN — FUROSEMIDE 20 MG: 10 INJECTION, SOLUTION INTRAMUSCULAR; INTRAVENOUS at 11:06

## 2019-11-15 RX ADMIN — SODIUM CHLORIDE: 9 INJECTION, SOLUTION INTRAVENOUS at 10:30

## 2019-11-15 RX ADMIN — METOPROLOL TARTRATE 25 MG: 25 TABLET ORAL at 11:05

## 2019-11-15 RX ADMIN — AMIODARONE HYDROCHLORIDE 200 MG: 200 TABLET ORAL at 11:05

## 2019-11-15 RX ADMIN — ATORVASTATIN CALCIUM 10 MG: 10 TABLET, FILM COATED ORAL at 20:50

## 2019-11-15 RX ADMIN — Medication 1500 MG: at 13:26

## 2019-11-15 RX ADMIN — HEPARIN SODIUM 12 UNITS/KG/HR: 10000 INJECTION, SOLUTION INTRAVENOUS at 13:47

## 2019-11-15 ASSESSMENT — ENCOUNTER SYMPTOMS
EYE REDNESS: 0
EYE ITCHING: 0
COUGH: 0
COLOR CHANGE: 0
PHOTOPHOBIA: 0
EYE PAIN: 0
ABDOMINAL PAIN: 0
APNEA: 0

## 2019-11-16 LAB
ABSOLUTE EOS #: 0.19 K/UL (ref 0–0.44)
ABSOLUTE IMMATURE GRANULOCYTE: 0.05 K/UL (ref 0–0.3)
ABSOLUTE LYMPH #: 1.18 K/UL (ref 1.1–3.7)
ABSOLUTE MONO #: 0.75 K/UL (ref 0.1–1.2)
ANION GAP SERPL CALCULATED.3IONS-SCNC: 9 MMOL/L (ref 9–17)
BASOPHILS # BLD: 0 % (ref 0–2)
BASOPHILS ABSOLUTE: <0.03 K/UL (ref 0–0.2)
BUN BLDV-MCNC: 9 MG/DL (ref 8–23)
BUN/CREAT BLD: ABNORMAL (ref 9–20)
CALCIUM SERPL-MCNC: 8 MG/DL (ref 8.6–10.4)
CHLORIDE BLD-SCNC: 102 MMOL/L (ref 98–107)
CO2: 27 MMOL/L (ref 20–31)
CREAT SERPL-MCNC: 0.47 MG/DL (ref 0.5–0.9)
CULTURE: ABNORMAL
CULTURE: NORMAL
CULTURE: NORMAL
DIFFERENTIAL TYPE: ABNORMAL
EOSINOPHILS RELATIVE PERCENT: 3 % (ref 1–4)
GFR AFRICAN AMERICAN: >60 ML/MIN
GFR NON-AFRICAN AMERICAN: >60 ML/MIN
GFR SERPL CREATININE-BSD FRML MDRD: ABNORMAL ML/MIN/{1.73_M2}
GFR SERPL CREATININE-BSD FRML MDRD: ABNORMAL ML/MIN/{1.73_M2}
GLUCOSE BLD-MCNC: 115 MG/DL (ref 70–99)
HCT VFR BLD CALC: 26.1 % (ref 36.3–47.1)
HEMOGLOBIN: 8.2 G/DL (ref 11.9–15.1)
IMMATURE GRANULOCYTES: 1 %
LYMPHOCYTES # BLD: 17 % (ref 24–43)
Lab: ABNORMAL
Lab: NORMAL
Lab: NORMAL
MAGNESIUM: 1.7 MG/DL (ref 1.6–2.6)
MCH RBC QN AUTO: 28.1 PG (ref 25.2–33.5)
MCHC RBC AUTO-ENTMCNC: 31.4 G/DL (ref 28.4–34.8)
MCV RBC AUTO: 89.4 FL (ref 82.6–102.9)
MONOCYTES # BLD: 11 % (ref 3–12)
NRBC AUTOMATED: 0 PER 100 WBC
PARTIAL THROMBOPLASTIN TIME: 38.2 SEC (ref 20.5–30.5)
PARTIAL THROMBOPLASTIN TIME: 41.3 SEC (ref 20.5–30.5)
PARTIAL THROMBOPLASTIN TIME: 41.9 SEC (ref 20.5–30.5)
PDW BLD-RTO: 16.7 % (ref 11.8–14.4)
PLATELET # BLD: 281 K/UL (ref 138–453)
PLATELET ESTIMATE: ABNORMAL
PMV BLD AUTO: 9.9 FL (ref 8.1–13.5)
POTASSIUM SERPL-SCNC: 3.2 MMOL/L (ref 3.7–5.3)
RBC # BLD: 2.92 M/UL (ref 3.95–5.11)
RBC # BLD: ABNORMAL 10*6/UL
SEG NEUTROPHILS: 68 % (ref 36–65)
SEGMENTED NEUTROPHILS ABSOLUTE COUNT: 4.75 K/UL (ref 1.5–8.1)
SODIUM BLD-SCNC: 138 MMOL/L (ref 135–144)
SPECIMEN DESCRIPTION: ABNORMAL
SPECIMEN DESCRIPTION: NORMAL
SPECIMEN DESCRIPTION: NORMAL
WBC # BLD: 6.9 K/UL (ref 3.5–11.3)
WBC # BLD: ABNORMAL 10*3/UL

## 2019-11-16 PROCEDURE — 6370000000 HC RX 637 (ALT 250 FOR IP): Performed by: STUDENT IN AN ORGANIZED HEALTH CARE EDUCATION/TRAINING PROGRAM

## 2019-11-16 PROCEDURE — 94761 N-INVAS EAR/PLS OXIMETRY MLT: CPT

## 2019-11-16 PROCEDURE — 6360000002 HC RX W HCPCS: Performed by: STUDENT IN AN ORGANIZED HEALTH CARE EDUCATION/TRAINING PROGRAM

## 2019-11-16 PROCEDURE — 6370000000 HC RX 637 (ALT 250 FOR IP): Performed by: INTERNAL MEDICINE

## 2019-11-16 PROCEDURE — 2580000003 HC RX 258: Performed by: STUDENT IN AN ORGANIZED HEALTH CARE EDUCATION/TRAINING PROGRAM

## 2019-11-16 PROCEDURE — 36415 COLL VENOUS BLD VENIPUNCTURE: CPT

## 2019-11-16 PROCEDURE — 99232 SBSQ HOSP IP/OBS MODERATE 35: CPT | Performed by: INTERNAL MEDICINE

## 2019-11-16 PROCEDURE — 85025 COMPLETE CBC W/AUTO DIFF WBC: CPT

## 2019-11-16 PROCEDURE — 2060000000 HC ICU INTERMEDIATE R&B

## 2019-11-16 PROCEDURE — 97110 THERAPEUTIC EXERCISES: CPT

## 2019-11-16 PROCEDURE — 97164 PT RE-EVAL EST PLAN CARE: CPT

## 2019-11-16 PROCEDURE — 97530 THERAPEUTIC ACTIVITIES: CPT

## 2019-11-16 PROCEDURE — 85730 THROMBOPLASTIN TIME PARTIAL: CPT

## 2019-11-16 PROCEDURE — 83735 ASSAY OF MAGNESIUM: CPT

## 2019-11-16 PROCEDURE — 80048 BASIC METABOLIC PNL TOTAL CA: CPT

## 2019-11-16 RX ORDER — MAGNESIUM SULFATE 1 G/100ML
1 INJECTION INTRAVENOUS ONCE
Status: COMPLETED | OUTPATIENT
Start: 2019-11-16 | End: 2019-11-16

## 2019-11-16 RX ORDER — TRAMADOL HYDROCHLORIDE 50 MG/1
50 TABLET ORAL EVERY 8 HOURS PRN
Qty: 20 TABLET | Refills: 0 | Status: CANCELLED | OUTPATIENT
Start: 2019-11-16 | End: 2019-11-23

## 2019-11-16 RX ADMIN — POTASSIUM CHLORIDE 10 MEQ: 7.46 INJECTION, SOLUTION INTRAVENOUS at 10:45

## 2019-11-16 RX ADMIN — Medication 1500 MG: at 12:38

## 2019-11-16 RX ADMIN — AMIODARONE HYDROCHLORIDE 200 MG: 200 TABLET ORAL at 09:28

## 2019-11-16 RX ADMIN — MAGNESIUM HYDROXIDE 30 ML: 400 SUSPENSION ORAL at 10:50

## 2019-11-16 RX ADMIN — ATORVASTATIN CALCIUM 10 MG: 10 TABLET, FILM COATED ORAL at 21:11

## 2019-11-16 RX ADMIN — POTASSIUM CHLORIDE 20 MEQ: 7.46 INJECTION, SOLUTION INTRAVENOUS at 05:54

## 2019-11-16 RX ADMIN — METOPROLOL TARTRATE 25 MG: 25 TABLET ORAL at 21:10

## 2019-11-16 RX ADMIN — CEFTRIAXONE SODIUM 1 G: 1 INJECTION, POWDER, FOR SOLUTION INTRAMUSCULAR; INTRAVENOUS at 09:26

## 2019-11-16 RX ADMIN — MAGNESIUM SULFATE HEPTAHYDRATE 1 G: 1 INJECTION, SOLUTION INTRAVENOUS at 09:26

## 2019-11-16 RX ADMIN — FUROSEMIDE 20 MG: 10 INJECTION, SOLUTION INTRAMUSCULAR; INTRAVENOUS at 09:28

## 2019-11-16 RX ADMIN — POTASSIUM CHLORIDE 10 MEQ: 7.46 INJECTION, SOLUTION INTRAVENOUS at 09:27

## 2019-11-16 RX ADMIN — HEPARIN SODIUM 16 UNITS/KG/HR: 10000 INJECTION, SOLUTION INTRAVENOUS at 12:47

## 2019-11-16 RX ADMIN — METOPROLOL TARTRATE 25 MG: 25 TABLET ORAL at 09:27

## 2019-11-16 ASSESSMENT — ENCOUNTER SYMPTOMS
EYE ITCHING: 0
EYE REDNESS: 0
PHOTOPHOBIA: 0
ABDOMINAL PAIN: 0
COUGH: 0
EYE PAIN: 0
COLOR CHANGE: 0
APNEA: 0

## 2019-11-17 LAB
ABSOLUTE EOS #: 0.23 K/UL (ref 0–0.44)
ABSOLUTE IMMATURE GRANULOCYTE: 0.04 K/UL (ref 0–0.3)
ABSOLUTE LYMPH #: 1.12 K/UL (ref 1.1–3.7)
ABSOLUTE MONO #: 0.72 K/UL (ref 0.1–1.2)
ANION GAP SERPL CALCULATED.3IONS-SCNC: 9 MMOL/L (ref 9–17)
BASOPHILS # BLD: 0 % (ref 0–2)
BASOPHILS ABSOLUTE: <0.03 K/UL (ref 0–0.2)
BUN BLDV-MCNC: 9 MG/DL (ref 8–23)
BUN/CREAT BLD: ABNORMAL (ref 9–20)
CALCIUM SERPL-MCNC: 7.9 MG/DL (ref 8.6–10.4)
CHLORIDE BLD-SCNC: 100 MMOL/L (ref 98–107)
CO2: 26 MMOL/L (ref 20–31)
CREAT SERPL-MCNC: 0.45 MG/DL (ref 0.5–0.9)
DIFFERENTIAL TYPE: ABNORMAL
EOSINOPHILS RELATIVE PERCENT: 4 % (ref 1–4)
FERRITIN: 320 UG/L (ref 13–150)
GFR AFRICAN AMERICAN: >60 ML/MIN
GFR NON-AFRICAN AMERICAN: >60 ML/MIN
GFR SERPL CREATININE-BSD FRML MDRD: ABNORMAL ML/MIN/{1.73_M2}
GFR SERPL CREATININE-BSD FRML MDRD: ABNORMAL ML/MIN/{1.73_M2}
GLUCOSE BLD-MCNC: 105 MG/DL (ref 70–99)
HCT VFR BLD CALC: 25 % (ref 36.3–47.1)
HCT VFR BLD CALC: 25.6 % (ref 36.3–47.1)
HEMOGLOBIN: 7.6 G/DL (ref 11.9–15.1)
HEMOGLOBIN: 7.7 G/DL (ref 11.9–15.1)
IMMATURE GRANULOCYTES: 1 %
IRON SATURATION: 35 % (ref 20–55)
IRON: 54 UG/DL (ref 37–145)
LYMPHOCYTES # BLD: 17 % (ref 24–43)
MAGNESIUM: 1.9 MG/DL (ref 1.6–2.6)
MCH RBC QN AUTO: 27.5 PG (ref 25.2–33.5)
MCHC RBC AUTO-ENTMCNC: 30.1 G/DL (ref 28.4–34.8)
MCV RBC AUTO: 91.4 FL (ref 82.6–102.9)
MONOCYTES # BLD: 11 % (ref 3–12)
NRBC AUTOMATED: 0 PER 100 WBC
PARTIAL THROMBOPLASTIN TIME: 60.8 SEC (ref 20.5–30.5)
PARTIAL THROMBOPLASTIN TIME: 63.1 SEC (ref 20.5–30.5)
PARTIAL THROMBOPLASTIN TIME: 73.2 SEC (ref 20.5–30.5)
PDW BLD-RTO: 17 % (ref 11.8–14.4)
PLATELET # BLD: 260 K/UL (ref 138–453)
PLATELET ESTIMATE: ABNORMAL
PMV BLD AUTO: 10.3 FL (ref 8.1–13.5)
POTASSIUM SERPL-SCNC: 3.3 MMOL/L (ref 3.7–5.3)
RBC # BLD: 2.8 M/UL (ref 3.95–5.11)
RBC # BLD: ABNORMAL 10*6/UL
SEG NEUTROPHILS: 67 % (ref 36–65)
SEGMENTED NEUTROPHILS ABSOLUTE COUNT: 4.35 K/UL (ref 1.5–8.1)
SODIUM BLD-SCNC: 135 MMOL/L (ref 135–144)
TOTAL IRON BINDING CAPACITY: 153 UG/DL (ref 250–450)
TRANSFERRIN: 127 MG/DL (ref 200–360)
UNSATURATED IRON BINDING CAPACITY: 99 UG/DL (ref 112–347)
WBC # BLD: 6.5 K/UL (ref 3.5–11.3)
WBC # BLD: ABNORMAL 10*3/UL

## 2019-11-17 PROCEDURE — 85018 HEMOGLOBIN: CPT

## 2019-11-17 PROCEDURE — 2580000003 HC RX 258: Performed by: STUDENT IN AN ORGANIZED HEALTH CARE EDUCATION/TRAINING PROGRAM

## 2019-11-17 PROCEDURE — 6370000000 HC RX 637 (ALT 250 FOR IP): Performed by: STUDENT IN AN ORGANIZED HEALTH CARE EDUCATION/TRAINING PROGRAM

## 2019-11-17 PROCEDURE — 83540 ASSAY OF IRON: CPT

## 2019-11-17 PROCEDURE — 6360000002 HC RX W HCPCS: Performed by: STUDENT IN AN ORGANIZED HEALTH CARE EDUCATION/TRAINING PROGRAM

## 2019-11-17 PROCEDURE — 36415 COLL VENOUS BLD VENIPUNCTURE: CPT

## 2019-11-17 PROCEDURE — 85025 COMPLETE CBC W/AUTO DIFF WBC: CPT

## 2019-11-17 PROCEDURE — 80048 BASIC METABOLIC PNL TOTAL CA: CPT

## 2019-11-17 PROCEDURE — 85730 THROMBOPLASTIN TIME PARTIAL: CPT

## 2019-11-17 PROCEDURE — 85014 HEMATOCRIT: CPT

## 2019-11-17 PROCEDURE — C9113 INJ PANTOPRAZOLE SODIUM, VIA: HCPCS | Performed by: STUDENT IN AN ORGANIZED HEALTH CARE EDUCATION/TRAINING PROGRAM

## 2019-11-17 PROCEDURE — 99232 SBSQ HOSP IP/OBS MODERATE 35: CPT | Performed by: INTERNAL MEDICINE

## 2019-11-17 PROCEDURE — 82728 ASSAY OF FERRITIN: CPT

## 2019-11-17 PROCEDURE — 94761 N-INVAS EAR/PLS OXIMETRY MLT: CPT

## 2019-11-17 PROCEDURE — 99233 SBSQ HOSP IP/OBS HIGH 50: CPT | Performed by: STUDENT IN AN ORGANIZED HEALTH CARE EDUCATION/TRAINING PROGRAM

## 2019-11-17 PROCEDURE — 83735 ASSAY OF MAGNESIUM: CPT

## 2019-11-17 PROCEDURE — 84466 ASSAY OF TRANSFERRIN: CPT

## 2019-11-17 PROCEDURE — 2580000003 HC RX 258: Performed by: INTERNAL MEDICINE

## 2019-11-17 PROCEDURE — 2060000000 HC ICU INTERMEDIATE R&B

## 2019-11-17 PROCEDURE — 83550 IRON BINDING TEST: CPT

## 2019-11-17 RX ORDER — CEFUROXIME AXETIL 250 MG/1
250 TABLET ORAL EVERY 12 HOURS SCHEDULED
Qty: 16 TABLET | Refills: 0 | Status: SHIPPED | OUTPATIENT
Start: 2019-11-17 | End: 2019-11-19 | Stop reason: HOSPADM

## 2019-11-17 RX ORDER — SODIUM CHLORIDE 9 MG/ML
10 INJECTION INTRAVENOUS DAILY
Status: DISCONTINUED | OUTPATIENT
Start: 2019-11-17 | End: 2019-11-19 | Stop reason: HOSPADM

## 2019-11-17 RX ORDER — PANTOPRAZOLE SODIUM 40 MG/10ML
40 INJECTION, POWDER, LYOPHILIZED, FOR SOLUTION INTRAVENOUS DAILY
Status: DISCONTINUED | OUTPATIENT
Start: 2019-11-17 | End: 2019-11-19 | Stop reason: HOSPADM

## 2019-11-17 RX ORDER — CEFUROXIME AXETIL 250 MG/1
250 TABLET ORAL EVERY 12 HOURS SCHEDULED
Status: DISCONTINUED | OUTPATIENT
Start: 2019-11-17 | End: 2019-11-17

## 2019-11-17 RX ORDER — MAGNESIUM SULFATE 1 G/100ML
1 INJECTION INTRAVENOUS ONCE
Status: COMPLETED | OUTPATIENT
Start: 2019-11-17 | End: 2019-11-17

## 2019-11-17 RX ADMIN — TRAMADOL HYDROCHLORIDE 50 MG: 50 TABLET, COATED ORAL at 23:41

## 2019-11-17 RX ADMIN — METOPROLOL TARTRATE 25 MG: 25 TABLET ORAL at 21:10

## 2019-11-17 RX ADMIN — HEPARIN SODIUM 18 UNITS/KG/HR: 10000 INJECTION, SOLUTION INTRAVENOUS at 09:15

## 2019-11-17 RX ADMIN — ACETAMINOPHEN 650 MG: 325 TABLET ORAL at 18:19

## 2019-11-17 RX ADMIN — MAGNESIUM SULFATE HEPTAHYDRATE 1 G: 1 INJECTION, SOLUTION INTRAVENOUS at 09:15

## 2019-11-17 RX ADMIN — METOPROLOL TARTRATE 25 MG: 25 TABLET ORAL at 09:15

## 2019-11-17 RX ADMIN — PANTOPRAZOLE SODIUM 40 MG: 40 INJECTION, POWDER, FOR SOLUTION INTRAVENOUS at 15:01

## 2019-11-17 RX ADMIN — FUROSEMIDE 20 MG: 10 INJECTION, SOLUTION INTRAMUSCULAR; INTRAVENOUS at 09:15

## 2019-11-17 RX ADMIN — CEFUROXIME AXETIL 250 MG: 250 TABLET, FILM COATED ORAL at 09:16

## 2019-11-17 RX ADMIN — Medication 1500 MG: at 11:46

## 2019-11-17 RX ADMIN — AMIODARONE HYDROCHLORIDE 200 MG: 200 TABLET ORAL at 09:15

## 2019-11-17 RX ADMIN — SODIUM CHLORIDE 10 ML: 9 INJECTION INTRAMUSCULAR; INTRAVENOUS; SUBCUTANEOUS at 15:02

## 2019-11-17 RX ADMIN — SODIUM CHLORIDE: 9 INJECTION, SOLUTION INTRAVENOUS at 02:00

## 2019-11-17 RX ADMIN — ATORVASTATIN CALCIUM 10 MG: 10 TABLET, FILM COATED ORAL at 21:10

## 2019-11-17 ASSESSMENT — ENCOUNTER SYMPTOMS
COUGH: 0
EYE REDNESS: 0
COLOR CHANGE: 0
EYE ITCHING: 0
APNEA: 0
EYE PAIN: 0
ABDOMINAL PAIN: 0
PHOTOPHOBIA: 0

## 2019-11-17 ASSESSMENT — PAIN SCALES - GENERAL
PAINLEVEL_OUTOF10: 0
PAINLEVEL_OUTOF10: 6
PAINLEVEL_OUTOF10: 3

## 2019-11-18 ENCOUNTER — APPOINTMENT (OUTPATIENT)
Dept: GENERAL RADIOLOGY | Age: 84
DRG: 040 | End: 2019-11-18
Payer: MEDICARE

## 2019-11-18 DIAGNOSIS — S52.532D CLOSED COLLES' FRACTURE OF LEFT RADIUS WITH ROUTINE HEALING: Primary | ICD-10-CM

## 2019-11-18 LAB
ABSOLUTE EOS #: 0.26 K/UL (ref 0–0.44)
ABSOLUTE IMMATURE GRANULOCYTE: 0.04 K/UL (ref 0–0.3)
ABSOLUTE LYMPH #: 1.01 K/UL (ref 1.1–3.7)
ABSOLUTE MONO #: 0.61 K/UL (ref 0.1–1.2)
ANION GAP SERPL CALCULATED.3IONS-SCNC: 11 MMOL/L (ref 9–17)
BASOPHILS # BLD: 1 % (ref 0–2)
BASOPHILS ABSOLUTE: 0.03 K/UL (ref 0–0.2)
BUN BLDV-MCNC: 8 MG/DL (ref 8–23)
BUN/CREAT BLD: ABNORMAL (ref 9–20)
CALCIUM SERPL-MCNC: 7.8 MG/DL (ref 8.6–10.4)
CHLORIDE BLD-SCNC: 104 MMOL/L (ref 98–107)
CO2: 25 MMOL/L (ref 20–31)
CREAT SERPL-MCNC: 0.48 MG/DL (ref 0.5–0.9)
DIFFERENTIAL TYPE: ABNORMAL
EOSINOPHILS RELATIVE PERCENT: 4 % (ref 1–4)
GFR AFRICAN AMERICAN: >60 ML/MIN
GFR NON-AFRICAN AMERICAN: >60 ML/MIN
GFR SERPL CREATININE-BSD FRML MDRD: ABNORMAL ML/MIN/{1.73_M2}
GFR SERPL CREATININE-BSD FRML MDRD: ABNORMAL ML/MIN/{1.73_M2}
GLUCOSE BLD-MCNC: 111 MG/DL (ref 70–99)
HCT VFR BLD CALC: 25.4 % (ref 36.3–47.1)
HEMOGLOBIN: 7.7 G/DL (ref 11.9–15.1)
IMMATURE GRANULOCYTES: 1 %
LYMPHOCYTES # BLD: 17 % (ref 24–43)
MAGNESIUM: 1.9 MG/DL (ref 1.6–2.6)
MCH RBC QN AUTO: 27.8 PG (ref 25.2–33.5)
MCHC RBC AUTO-ENTMCNC: 30.3 G/DL (ref 28.4–34.8)
MCV RBC AUTO: 91.7 FL (ref 82.6–102.9)
MONOCYTES # BLD: 10 % (ref 3–12)
NRBC AUTOMATED: 0 PER 100 WBC
PARTIAL THROMBOPLASTIN TIME: 62 SEC (ref 20.5–30.5)
PDW BLD-RTO: 17.1 % (ref 11.8–14.4)
PLATELET # BLD: 243 K/UL (ref 138–453)
PLATELET ESTIMATE: ABNORMAL
PMV BLD AUTO: 9.7 FL (ref 8.1–13.5)
POTASSIUM SERPL-SCNC: 3.2 MMOL/L (ref 3.7–5.3)
RBC # BLD: 2.77 M/UL (ref 3.95–5.11)
RBC # BLD: ABNORMAL 10*6/UL
SEG NEUTROPHILS: 67 % (ref 36–65)
SEGMENTED NEUTROPHILS ABSOLUTE COUNT: 3.93 K/UL (ref 1.5–8.1)
SODIUM BLD-SCNC: 140 MMOL/L (ref 135–144)
WBC # BLD: 5.9 K/UL (ref 3.5–11.3)
WBC # BLD: ABNORMAL 10*3/UL

## 2019-11-18 PROCEDURE — 6360000002 HC RX W HCPCS: Performed by: STUDENT IN AN ORGANIZED HEALTH CARE EDUCATION/TRAINING PROGRAM

## 2019-11-18 PROCEDURE — 85025 COMPLETE CBC W/AUTO DIFF WBC: CPT

## 2019-11-18 PROCEDURE — 83735 ASSAY OF MAGNESIUM: CPT

## 2019-11-18 PROCEDURE — 92611 MOTION FLUOROSCOPY/SWALLOW: CPT

## 2019-11-18 PROCEDURE — 74230 X-RAY XM SWLNG FUNCJ C+: CPT

## 2019-11-18 PROCEDURE — C9113 INJ PANTOPRAZOLE SODIUM, VIA: HCPCS | Performed by: STUDENT IN AN ORGANIZED HEALTH CARE EDUCATION/TRAINING PROGRAM

## 2019-11-18 PROCEDURE — 97530 THERAPEUTIC ACTIVITIES: CPT

## 2019-11-18 PROCEDURE — 6370000000 HC RX 637 (ALT 250 FOR IP): Performed by: STUDENT IN AN ORGANIZED HEALTH CARE EDUCATION/TRAINING PROGRAM

## 2019-11-18 PROCEDURE — 36415 COLL VENOUS BLD VENIPUNCTURE: CPT

## 2019-11-18 PROCEDURE — 99232 SBSQ HOSP IP/OBS MODERATE 35: CPT | Performed by: INTERNAL MEDICINE

## 2019-11-18 PROCEDURE — 2580000003 HC RX 258: Performed by: STUDENT IN AN ORGANIZED HEALTH CARE EDUCATION/TRAINING PROGRAM

## 2019-11-18 PROCEDURE — 92526 ORAL FUNCTION THERAPY: CPT

## 2019-11-18 PROCEDURE — 80048 BASIC METABOLIC PNL TOTAL CA: CPT

## 2019-11-18 PROCEDURE — 85730 THROMBOPLASTIN TIME PARTIAL: CPT

## 2019-11-18 PROCEDURE — 2060000000 HC ICU INTERMEDIATE R&B

## 2019-11-18 RX ORDER — MIDAZOLAM HYDROCHLORIDE 1 MG/ML
2 INJECTION INTRAMUSCULAR; INTRAVENOUS ONCE
Status: DISCONTINUED | OUTPATIENT
Start: 2019-11-18 | End: 2019-11-18

## 2019-11-18 RX ADMIN — AMIODARONE HYDROCHLORIDE 200 MG: 200 TABLET ORAL at 08:41

## 2019-11-18 RX ADMIN — APIXABAN 2.5 MG: 2.5 TABLET, FILM COATED ORAL at 21:06

## 2019-11-18 RX ADMIN — HEPARIN SODIUM 16 UNITS/KG/HR: 10000 INJECTION, SOLUTION INTRAVENOUS at 06:23

## 2019-11-18 RX ADMIN — POTASSIUM CHLORIDE 20 MEQ: 7.46 INJECTION, SOLUTION INTRAVENOUS at 06:15

## 2019-11-18 RX ADMIN — APIXABAN 2.5 MG: 2.5 TABLET, FILM COATED ORAL at 08:41

## 2019-11-18 RX ADMIN — ATORVASTATIN CALCIUM 10 MG: 10 TABLET, FILM COATED ORAL at 21:06

## 2019-11-18 RX ADMIN — FUROSEMIDE 20 MG: 10 INJECTION, SOLUTION INTRAMUSCULAR; INTRAVENOUS at 08:41

## 2019-11-18 RX ADMIN — ACETAMINOPHEN 650 MG: 325 TABLET ORAL at 19:40

## 2019-11-18 RX ADMIN — Medication 1500 MG: at 12:00

## 2019-11-18 RX ADMIN — SODIUM CHLORIDE 10 ML: 9 INJECTION INTRAMUSCULAR; INTRAVENOUS; SUBCUTANEOUS at 08:41

## 2019-11-18 RX ADMIN — METOPROLOL TARTRATE 25 MG: 25 TABLET ORAL at 08:41

## 2019-11-18 RX ADMIN — SODIUM CHLORIDE, PRESERVATIVE FREE 10 ML: 5 INJECTION INTRAVENOUS at 21:07

## 2019-11-18 RX ADMIN — PANTOPRAZOLE SODIUM 40 MG: 40 INJECTION, POWDER, FOR SOLUTION INTRAVENOUS at 08:41

## 2019-11-18 RX ADMIN — POTASSIUM CHLORIDE 40 MEQ: 1500 TABLET, EXTENDED RELEASE ORAL at 08:42

## 2019-11-18 ASSESSMENT — ENCOUNTER SYMPTOMS
CHOKING: 0
PHOTOPHOBIA: 0
APNEA: 0
EYE REDNESS: 0
COUGH: 0
ABDOMINAL PAIN: 0
EYE PAIN: 0
EYE ITCHING: 0
COLOR CHANGE: 0

## 2019-11-18 ASSESSMENT — PAIN DESCRIPTION - PAIN TYPE: TYPE: CHRONIC PAIN

## 2019-11-18 ASSESSMENT — PAIN DESCRIPTION - ORIENTATION: ORIENTATION: LOWER

## 2019-11-18 ASSESSMENT — PAIN SCALES - GENERAL
PAINLEVEL_OUTOF10: 3
PAINLEVEL_OUTOF10: 3
PAINLEVEL_OUTOF10: 1

## 2019-11-18 ASSESSMENT — PAIN DESCRIPTION - LOCATION: LOCATION: BACK

## 2019-11-19 VITALS
TEMPERATURE: 98.4 F | RESPIRATION RATE: 20 BRPM | HEART RATE: 61 BPM | HEIGHT: 63 IN | DIASTOLIC BLOOD PRESSURE: 66 MMHG | WEIGHT: 145.94 LBS | SYSTOLIC BLOOD PRESSURE: 128 MMHG | BODY MASS INDEX: 25.86 KG/M2 | OXYGEN SATURATION: 89 %

## 2019-11-19 LAB
ABSOLUTE EOS #: 0.12 K/UL (ref 0–0.44)
ABSOLUTE IMMATURE GRANULOCYTE: 0.05 K/UL (ref 0–0.3)
ABSOLUTE LYMPH #: 0.85 K/UL (ref 1.1–3.7)
ABSOLUTE MONO #: 0.6 K/UL (ref 0.1–1.2)
ANION GAP SERPL CALCULATED.3IONS-SCNC: 10 MMOL/L (ref 9–17)
BASOPHILS # BLD: 0 % (ref 0–2)
BASOPHILS ABSOLUTE: 0.03 K/UL (ref 0–0.2)
BUN BLDV-MCNC: 10 MG/DL (ref 8–23)
BUN/CREAT BLD: ABNORMAL (ref 9–20)
CALCIUM SERPL-MCNC: 8.2 MG/DL (ref 8.6–10.4)
CHLORIDE BLD-SCNC: 105 MMOL/L (ref 98–107)
CO2: 24 MMOL/L (ref 20–31)
CREAT SERPL-MCNC: 0.57 MG/DL (ref 0.5–0.9)
CULTURE: NORMAL
CULTURE: NORMAL
DIFFERENTIAL TYPE: ABNORMAL
DIRECT EXAM: NORMAL
EOSINOPHILS RELATIVE PERCENT: 2 % (ref 1–4)
GFR AFRICAN AMERICAN: >60 ML/MIN
GFR NON-AFRICAN AMERICAN: >60 ML/MIN
GFR SERPL CREATININE-BSD FRML MDRD: ABNORMAL ML/MIN/{1.73_M2}
GFR SERPL CREATININE-BSD FRML MDRD: ABNORMAL ML/MIN/{1.73_M2}
GLUCOSE BLD-MCNC: 110 MG/DL (ref 70–99)
HCT VFR BLD CALC: 26.7 % (ref 36.3–47.1)
HEMOGLOBIN: 7.9 G/DL (ref 11.9–15.1)
IMMATURE GRANULOCYTES: 1 %
LYMPHOCYTES # BLD: 13 % (ref 24–43)
Lab: NORMAL
Lab: NORMAL
MCH RBC QN AUTO: 28 PG (ref 25.2–33.5)
MCHC RBC AUTO-ENTMCNC: 29.6 G/DL (ref 28.4–34.8)
MCV RBC AUTO: 94.7 FL (ref 82.6–102.9)
MONOCYTES # BLD: 9 % (ref 3–12)
NRBC AUTOMATED: 0 PER 100 WBC
PDW BLD-RTO: 17.5 % (ref 11.8–14.4)
PLATELET # BLD: 248 K/UL (ref 138–453)
PLATELET ESTIMATE: ABNORMAL
PMV BLD AUTO: 10.1 FL (ref 8.1–13.5)
POTASSIUM SERPL-SCNC: 3.6 MMOL/L (ref 3.7–5.3)
RBC # BLD: 2.82 M/UL (ref 3.95–5.11)
RBC # BLD: ABNORMAL 10*6/UL
SEG NEUTROPHILS: 76 % (ref 36–65)
SEGMENTED NEUTROPHILS ABSOLUTE COUNT: 5.14 K/UL (ref 1.5–8.1)
SODIUM BLD-SCNC: 139 MMOL/L (ref 135–144)
SPECIMEN DESCRIPTION: NORMAL
SPECIMEN DESCRIPTION: NORMAL
WBC # BLD: 6.8 K/UL (ref 3.5–11.3)
WBC # BLD: ABNORMAL 10*3/UL

## 2019-11-19 PROCEDURE — 6370000000 HC RX 637 (ALT 250 FOR IP): Performed by: STUDENT IN AN ORGANIZED HEALTH CARE EDUCATION/TRAINING PROGRAM

## 2019-11-19 PROCEDURE — 36415 COLL VENOUS BLD VENIPUNCTURE: CPT

## 2019-11-19 PROCEDURE — 6360000002 HC RX W HCPCS: Performed by: STUDENT IN AN ORGANIZED HEALTH CARE EDUCATION/TRAINING PROGRAM

## 2019-11-19 PROCEDURE — 92526 ORAL FUNCTION THERAPY: CPT

## 2019-11-19 PROCEDURE — 97535 SELF CARE MNGMENT TRAINING: CPT

## 2019-11-19 PROCEDURE — C9113 INJ PANTOPRAZOLE SODIUM, VIA: HCPCS | Performed by: STUDENT IN AN ORGANIZED HEALTH CARE EDUCATION/TRAINING PROGRAM

## 2019-11-19 PROCEDURE — 2580000003 HC RX 258: Performed by: STUDENT IN AN ORGANIZED HEALTH CARE EDUCATION/TRAINING PROGRAM

## 2019-11-19 PROCEDURE — 97530 THERAPEUTIC ACTIVITIES: CPT

## 2019-11-19 PROCEDURE — 99232 SBSQ HOSP IP/OBS MODERATE 35: CPT | Performed by: INTERNAL MEDICINE

## 2019-11-19 PROCEDURE — 94761 N-INVAS EAR/PLS OXIMETRY MLT: CPT

## 2019-11-19 PROCEDURE — 97110 THERAPEUTIC EXERCISES: CPT

## 2019-11-19 PROCEDURE — 85025 COMPLETE CBC W/AUTO DIFF WBC: CPT

## 2019-11-19 PROCEDURE — 80048 BASIC METABOLIC PNL TOTAL CA: CPT

## 2019-11-19 RX ORDER — CEPHALEXIN 500 MG/1
500 CAPSULE ORAL 4 TIMES DAILY
Qty: 16 CAPSULE | Refills: 0 | Status: SHIPPED | OUTPATIENT
Start: 2019-11-19 | End: 2019-12-16

## 2019-11-19 RX ADMIN — FUROSEMIDE 20 MG: 10 INJECTION, SOLUTION INTRAMUSCULAR; INTRAVENOUS at 08:47

## 2019-11-19 RX ADMIN — AMIODARONE HYDROCHLORIDE 200 MG: 200 TABLET ORAL at 08:47

## 2019-11-19 RX ADMIN — Medication 1500 MG: at 12:21

## 2019-11-19 RX ADMIN — APIXABAN 2.5 MG: 2.5 TABLET, FILM COATED ORAL at 08:47

## 2019-11-19 RX ADMIN — SODIUM CHLORIDE, PRESERVATIVE FREE 10 ML: 5 INJECTION INTRAVENOUS at 08:48

## 2019-11-19 RX ADMIN — METOPROLOL TARTRATE 25 MG: 25 TABLET ORAL at 08:47

## 2019-11-19 RX ADMIN — PANTOPRAZOLE SODIUM 40 MG: 40 INJECTION, POWDER, FOR SOLUTION INTRAVENOUS at 08:47

## 2019-11-19 RX ADMIN — SODIUM CHLORIDE 10 ML: 9 INJECTION INTRAMUSCULAR; INTRAVENOUS; SUBCUTANEOUS at 08:47

## 2019-11-19 ASSESSMENT — ENCOUNTER SYMPTOMS
COUGH: 0
APNEA: 0
EYE PAIN: 0
ABDOMINAL PAIN: 0
COLOR CHANGE: 0
EYE ITCHING: 0
CHOKING: 0
PHOTOPHOBIA: 0

## 2019-12-02 DIAGNOSIS — S52.532D CLOSED COLLES' FRACTURE OF LEFT RADIUS WITH ROUTINE HEALING: Primary | ICD-10-CM

## 2019-12-03 ENCOUNTER — OFFICE VISIT (OUTPATIENT)
Dept: ORTHOPEDIC SURGERY | Age: 84
End: 2019-12-03

## 2019-12-03 DIAGNOSIS — S52.532D CLOSED COLLES' FRACTURE OF LEFT RADIUS WITH ROUTINE HEALING: Primary | ICD-10-CM

## 2019-12-03 PROCEDURE — 99024 POSTOP FOLLOW-UP VISIT: CPT | Performed by: ORTHOPAEDIC SURGERY

## 2019-12-11 ENCOUNTER — HOSPITAL ENCOUNTER (OUTPATIENT)
Dept: CT IMAGING | Age: 84
Discharge: HOME | End: 2019-12-13
Payer: COMMERCIAL

## 2019-12-11 DIAGNOSIS — Z86.711 PERSONAL HISTORY OF PULMONARY EMBOLISM: ICD-10-CM

## 2019-12-11 PROCEDURE — 71260 CT THORAX DX C+: CPT

## 2019-12-11 PROCEDURE — 6360000004 HC RX CONTRAST MEDICATION: Performed by: FAMILY MEDICINE

## 2019-12-11 RX ADMIN — IOHEXOL 75 ML: 350 INJECTION, SOLUTION INTRAVENOUS at 10:42

## 2019-12-16 ENCOUNTER — HOSPITAL ENCOUNTER (OUTPATIENT)
Dept: CARDIAC CATH/INVASIVE PROCEDURES | Age: 84
Discharge: HOME | End: 2019-12-16
Payer: COMMERCIAL

## 2019-12-16 ENCOUNTER — HOSPITAL ENCOUNTER (OUTPATIENT)
Dept: GENERAL RADIOLOGY | Age: 84
Discharge: HOME | End: 2019-12-18
Payer: COMMERCIAL

## 2019-12-16 VITALS
DIASTOLIC BLOOD PRESSURE: 49 MMHG | WEIGHT: 138 LBS | HEART RATE: 60 BPM | HEIGHT: 63 IN | OXYGEN SATURATION: 99 % | BODY MASS INDEX: 24.45 KG/M2 | SYSTOLIC BLOOD PRESSURE: 100 MMHG | TEMPERATURE: 97.1 F | RESPIRATION RATE: 24 BRPM

## 2019-12-16 LAB
EKG ATRIAL RATE: 73 BPM
EKG P AXIS: 20 DEGREES
EKG P-R INTERVAL: 166 MS
EKG Q-T INTERVAL: 502 MS
EKG QRS DURATION: 190 MS
EKG QTC CALCULATION (BAZETT): 553 MS
EKG R AXIS: -73 DEGREES
EKG T AXIS: 72 DEGREES
EKG VENTRICULAR RATE: 73 BPM
LV EF: 55 %
LVEF MODALITY: NORMAL

## 2019-12-16 PROCEDURE — 7100000001 HC PACU RECOVERY - ADDTL 15 MIN

## 2019-12-16 PROCEDURE — 93312 ECHO TRANSESOPHAGEAL: CPT

## 2019-12-16 PROCEDURE — 2709999900 HC NON-CHARGEABLE SUPPLY

## 2019-12-16 PROCEDURE — 7100000000 HC PACU RECOVERY - FIRST 15 MIN

## 2019-12-16 PROCEDURE — 71045 X-RAY EXAM CHEST 1 VIEW: CPT

## 2019-12-16 PROCEDURE — 93320 DOPPLER ECHO COMPLETE: CPT

## 2019-12-16 PROCEDURE — 2500000003 HC RX 250 WO HCPCS

## 2019-12-16 PROCEDURE — C1786 PMKR, SINGLE, RATE-RESP: HCPCS

## 2019-12-16 PROCEDURE — 33207 INSERT HEART PM VENTRICULAR: CPT | Performed by: INTERNAL MEDICINE

## 2019-12-16 PROCEDURE — C1781 MESH (IMPLANTABLE): HCPCS

## 2019-12-16 PROCEDURE — C1894 INTRO/SHEATH, NON-LASER: HCPCS

## 2019-12-16 PROCEDURE — 2580000003 HC RX 258

## 2019-12-16 PROCEDURE — 93325 DOPPLER ECHO COLOR FLOW MAPG: CPT

## 2019-12-16 PROCEDURE — C1898 LEAD, PMKR, OTHER THAN TRANS: HCPCS

## 2019-12-16 RX ORDER — LISINOPRIL 2.5 MG/1
2.5 TABLET ORAL NIGHTLY
Status: ON HOLD | COMMUNITY
End: 2020-07-29 | Stop reason: HOSPADM

## 2019-12-16 RX ORDER — SODIUM CHLORIDE 0.9 % (FLUSH) 0.9 %
10 SYRINGE (ML) INJECTION PRN
Status: CANCELLED | OUTPATIENT
Start: 2019-12-16

## 2019-12-16 RX ORDER — SODIUM CHLORIDE 9 MG/ML
INJECTION, SOLUTION INTRAVENOUS CONTINUOUS
Status: DISCONTINUED | OUTPATIENT
Start: 2019-12-16 | End: 2019-12-17 | Stop reason: HOSPADM

## 2019-12-16 RX ORDER — ACETAMINOPHEN 325 MG/1
650 TABLET ORAL EVERY 4 HOURS PRN
Status: DISCONTINUED | OUTPATIENT
Start: 2019-12-16 | End: 2019-12-17 | Stop reason: HOSPADM

## 2019-12-16 RX ORDER — NITROGLYCERIN 0.4 MG/1
0.4 TABLET SUBLINGUAL EVERY 5 MIN PRN
COMMUNITY
End: 2020-03-09

## 2019-12-16 RX ORDER — VANCOMYCIN HYDROCHLORIDE 1 G/200ML
1000 INJECTION, SOLUTION INTRAVENOUS ONCE
Status: DISCONTINUED | OUTPATIENT
Start: 2019-12-16 | End: 2019-12-17 | Stop reason: HOSPADM

## 2019-12-16 RX ORDER — SODIUM CHLORIDE 0.9 % (FLUSH) 0.9 %
10 SYRINGE (ML) INJECTION EVERY 12 HOURS SCHEDULED
Status: CANCELLED | OUTPATIENT
Start: 2019-12-16

## 2019-12-16 RX ORDER — SENNA AND DOCUSATE SODIUM 50; 8.6 MG/1; MG/1
2 TABLET, FILM COATED ORAL 2 TIMES DAILY
COMMUNITY
End: 2020-02-03

## 2019-12-16 RX ORDER — LACTULOSE 10 G/15ML
10 SOLUTION ORAL 2 TIMES DAILY
COMMUNITY

## 2019-12-27 ENCOUNTER — HOSPITAL ENCOUNTER (OUTPATIENT)
Age: 84
Setting detail: SPECIMEN
Discharge: HOME | End: 2019-12-27
Payer: MEDICARE

## 2019-12-27 PROCEDURE — 87070 CULTURE OTHR SPECIMN AEROBIC: CPT

## 2019-12-27 PROCEDURE — 87205 SMEAR GRAM STAIN: CPT

## 2019-12-28 LAB
CULTURE: ABNORMAL
DIRECT EXAM: ABNORMAL
DIRECT EXAM: ABNORMAL
Lab: ABNORMAL
SPECIMEN DESCRIPTION: ABNORMAL

## 2020-01-08 ENCOUNTER — HOSPITAL ENCOUNTER (OUTPATIENT)
Age: 85
Setting detail: SPECIMEN
Discharge: HOME | End: 2020-01-08
Payer: MEDICARE

## 2020-01-08 LAB
ANION GAP SERPL CALCULATED.3IONS-SCNC: 11 MMOL/L (ref 9–17)
BUN BLDV-MCNC: 41 MG/DL (ref 8–23)
CHLORIDE BLD-SCNC: 97 MMOL/L (ref 98–107)
CO2: 23 MMOL/L (ref 20–31)
CREAT SERPL-MCNC: 1.07 MG/DL (ref 0.5–0.9)
GFR AFRICAN AMERICAN: 59 ML/MIN
GFR NON-AFRICAN AMERICAN: 48 ML/MIN
GFR SERPL CREATININE-BSD FRML MDRD: ABNORMAL ML/MIN/{1.73_M2}
GFR SERPL CREATININE-BSD FRML MDRD: ABNORMAL ML/MIN/{1.73_M2}
POTASSIUM SERPL-SCNC: 5.7 MMOL/L (ref 3.7–5.3)
SODIUM BLD-SCNC: 131 MMOL/L (ref 135–144)

## 2020-01-08 PROCEDURE — 82565 ASSAY OF CREATININE: CPT

## 2020-01-08 PROCEDURE — 84520 ASSAY OF UREA NITROGEN: CPT

## 2020-01-08 PROCEDURE — P9603 ONE-WAY ALLOW PRORATED MILES: HCPCS

## 2020-01-08 PROCEDURE — 80051 ELECTROLYTE PANEL: CPT

## 2020-01-08 PROCEDURE — 36415 COLL VENOUS BLD VENIPUNCTURE: CPT

## 2020-01-13 ENCOUNTER — HOSPITAL ENCOUNTER (OUTPATIENT)
Age: 85
Setting detail: SPECIMEN
Discharge: HOME | End: 2020-01-13
Payer: MEDICARE

## 2020-01-13 ENCOUNTER — HOSPITAL ENCOUNTER (OUTPATIENT)
Dept: WOUND CARE | Age: 85
Discharge: HOME | End: 2020-01-13
Payer: MEDICARE

## 2020-01-13 VITALS
HEIGHT: 63 IN | SYSTOLIC BLOOD PRESSURE: 92 MMHG | HEART RATE: 64 BPM | BODY MASS INDEX: 24.45 KG/M2 | DIASTOLIC BLOOD PRESSURE: 52 MMHG | WEIGHT: 138 LBS | TEMPERATURE: 96.6 F | RESPIRATION RATE: 20 BRPM

## 2020-01-13 PROBLEM — L89.154 PRESSURE ULCER OF COCCYGEAL REGION, STAGE 4 (HCC): Status: ACTIVE | Noted: 2020-01-13

## 2020-01-13 PROBLEM — S52.532D CLOSED COLLES' FRACTURE OF LEFT RADIUS WITH ROUTINE HEALING: Chronic | Status: ACTIVE | Noted: 2019-10-17

## 2020-01-13 LAB
ALBUMIN SERPL-MCNC: 1.7 G/DL (ref 3.5–5.2)
ALBUMIN/GLOBULIN RATIO: ABNORMAL (ref 1–2.5)
ALP BLD-CCNC: 109 U/L (ref 35–104)
ALT SERPL-CCNC: 29 U/L (ref 5–33)
ANION GAP SERPL CALCULATED.3IONS-SCNC: 11 MMOL/L (ref 9–17)
AST SERPL-CCNC: 33 U/L
BILIRUB SERPL-MCNC: 0.32 MG/DL (ref 0.3–1.2)
BUN BLDV-MCNC: 47 MG/DL (ref 8–23)
BUN/CREAT BLD: 49 (ref 9–20)
CALCIUM SERPL-MCNC: 8.3 MG/DL (ref 8.6–10.4)
CHLORIDE BLD-SCNC: 101 MMOL/L (ref 98–107)
CO2: 21 MMOL/L (ref 20–31)
CREAT SERPL-MCNC: 0.96 MG/DL (ref 0.5–0.9)
GFR AFRICAN AMERICAN: >60 ML/MIN
GFR NON-AFRICAN AMERICAN: 55 ML/MIN
GFR SERPL CREATININE-BSD FRML MDRD: ABNORMAL ML/MIN/{1.73_M2}
GFR SERPL CREATININE-BSD FRML MDRD: ABNORMAL ML/MIN/{1.73_M2}
GLUCOSE BLD-MCNC: 77 MG/DL (ref 70–99)
HCT VFR BLD CALC: 29.3 % (ref 36.3–47.1)
HEMOGLOBIN: 8.9 G/DL (ref 11.9–15.1)
MCH RBC QN AUTO: 25.7 PG (ref 25.2–33.5)
MCHC RBC AUTO-ENTMCNC: 30.4 G/DL (ref 28.4–34.8)
MCV RBC AUTO: 84.7 FL (ref 82.6–102.9)
NRBC AUTOMATED: 0 PER 100 WBC
PDW BLD-RTO: 16.8 % (ref 11.8–14.4)
PLATELET # BLD: 261 K/UL (ref 138–453)
PMV BLD AUTO: 10.2 FL (ref 8.1–13.5)
POTASSIUM SERPL-SCNC: 5.5 MMOL/L (ref 3.7–5.3)
RBC # BLD: 3.46 M/UL (ref 3.95–5.11)
SODIUM BLD-SCNC: 133 MMOL/L (ref 135–144)
TOTAL PROTEIN: 4.9 G/DL (ref 6.4–8.3)
WBC # BLD: 9.9 K/UL (ref 3.5–11.3)

## 2020-01-13 PROCEDURE — 87205 SMEAR GRAM STAIN: CPT

## 2020-01-13 PROCEDURE — 87070 CULTURE OTHR SPECIMN AEROBIC: CPT

## 2020-01-13 PROCEDURE — 11046 DBRDMT MUSC&/FSCA EA ADDL: CPT | Performed by: NURSE PRACTITIONER

## 2020-01-13 PROCEDURE — 99213 OFFICE O/P EST LOW 20 MIN: CPT

## 2020-01-13 PROCEDURE — 11043 DBRDMT MUSC&/FSCA 1ST 20/<: CPT | Performed by: NURSE PRACTITIONER

## 2020-01-13 PROCEDURE — P9603 ONE-WAY ALLOW PRORATED MILES: HCPCS

## 2020-01-13 PROCEDURE — 87075 CULTR BACTERIA EXCEPT BLOOD: CPT

## 2020-01-13 PROCEDURE — 36415 COLL VENOUS BLD VENIPUNCTURE: CPT

## 2020-01-13 PROCEDURE — 80053 COMPREHEN METABOLIC PANEL: CPT

## 2020-01-13 PROCEDURE — 11046 DBRDMT MUSC&/FSCA EA ADDL: CPT

## 2020-01-13 PROCEDURE — 85027 COMPLETE CBC AUTOMATED: CPT

## 2020-01-13 PROCEDURE — 6370000000 HC RX 637 (ALT 250 FOR IP): Performed by: NURSE PRACTITIONER

## 2020-01-13 PROCEDURE — 87176 TISSUE HOMOGENIZATION CULTR: CPT

## 2020-01-13 PROCEDURE — 87185 SC STD ENZYME DETCJ PER NZM: CPT

## 2020-01-13 PROCEDURE — 11043 DBRDMT MUSC&/FSCA 1ST 20/<: CPT

## 2020-01-13 PROCEDURE — 99203 OFFICE O/P NEW LOW 30 MIN: CPT | Performed by: NURSE PRACTITIONER

## 2020-01-13 RX ORDER — CEPHALEXIN 500 MG/1
500 CAPSULE ORAL 3 TIMES DAILY
COMMUNITY
End: 2020-02-03

## 2020-01-13 RX ORDER — LIDOCAINE HYDROCHLORIDE 40 MG/ML
SOLUTION TOPICAL ONCE
Status: COMPLETED | OUTPATIENT
Start: 2020-01-13 | End: 2020-01-13

## 2020-01-13 RX ADMIN — LIDOCAINE HYDROCHLORIDE 30 ML: 40 SOLUTION TOPICAL at 14:09

## 2020-01-13 ASSESSMENT — PAIN SCALES - GENERAL: PAINLEVEL_OUTOF10: 0

## 2020-01-13 NOTE — PROGRESS NOTES
SARA    CATARACT REMOVAL WITH IMPLANT Bilateral     Left 1/6/1998, right 8/2/1996    COLONOSCOPY      DILATION AND CURETTAGE OF UTERUS N/A 3/12/2019    DILATATION AND CURETTAGE, HYSTEROSCOPY, MYOSURE performed by Nevaeh Chavarria MD at [facility]  08/18/2016    medtronic advisa MRI DR model # A2DR01  av lead 8048-36 rv lead 7001-55  - mri compatible    [place] GI ENDOSCOPIC ULTRASOUND N/A 9/12/2018    ENDOSCOPIC ULTRASOUND, PATHOLOGY REQUESTED, PAT NOTIFIED performed by Anna Elder MD at [facility]      as a child    TOTAL KNEE ARTHROPLASTY Right 9/5/2017    KNEE TOTAL ARTHROPLASTY RIGHT WITH BIOMET AND GPS PRODUCT APPLICATION performed by Rima Dempsey MD at [facility] [address] TRANSESOPHAGEAL ECHOCARDIOGRAM N/A 10/9/2019    TRANSESOPHAGEAL ECHOCARDIOGRAM WITH BUBBLE STUDY performed by Kamar Moise DO at [address] [place] [address] TRANSESOPHAGEAL ECHOCARDIOGRAM  12/16/2019    UPPER GASTROINTESTINAL ENDOSCOPY  06/29/2017    polyp removed from small intestine near stomach, benign.   done at [address] [place] [address]  9/12/2018    EGD BIOPSY performed by Anna Elder MD at [facility]       FAMILY HISTORY    Family History   Problem Relation Age of Onset    Diabetes Sister     Heart Attack Sister     Osteoporosis Sister     Heart Disease Mother     Dementia Mother     Tuberculosis Father        SOCIAL HISTORY    Social History     Tobacco Use    Smoking status: Never Smoker    Smokeless tobacco: Never Used   Substance Use Topics    Alcohol use: No    Drug use: No       ALLERGIES    Allergies   Allergen Reactions    Sulfa Antibiotics Rash       MEDICATIONS    Current Outpatient Medications on File Prior to Encounter   Medication Sig Dispense Refill    apixaban (ELIQUIS) 2.5 MG TABS tablet Take 2.5 mg by mouth 2 times daily      cephALEXin (KEFLEX) 500 MG capsule Take 500 mg by mouth 3 times daily      Calcium Carbonate-Vitamin D (CALCIUM 500 + D PO) Take 1 tablet by mouth daily CALCIUM 500MG+VIT D 200IU      sodium hypochlorite (DAKINS) 0.125 % SOLN Irrigate with as directed 2 times daily COCCYX INJURY      lisinopril (PRINIVIL;ZESTRIL) 2.5 MG tablet Take 2.5 mg by mouth nightly      Magnesium Oxide (MAG- PO) Take 400 mg by mouth 2 times daily      lactulose (CHRONULAC) 10 GM/15ML solution Take 10 g by mouth 2 times daily      potassium chloride (MICRO-K) 10 MEQ extended release capsule Take 30 mEq by mouth 2 times daily      furosemide (LASIX) 20 MG tablet Take 1 tablet by mouth 2 times daily 60 tablet 3    aspirin 81 MG chewable tablet Take 1 tablet by mouth daily 30 tablet 3    guaiFENesin (MUCINEX) 600 MG extended release tablet Take 1 tablet by mouth 2 times daily 60 tablet 0    amiodarone (CORDARONE) 200 MG tablet Take 200 mg by mouth daily      polyethylene glycol (MIRALAX) powder Take 17 g by mouth daily as needed      metoprolol tartrate (LOPRESSOR) 25 MG tablet Take 1 tablet by mouth 2 times daily 60 tablet 3    rOPINIRole (REQUIP) 0.25 MG tablet Take 0.25 mg by mouth nightly       atorvastatin (LIPITOR) 10 MG tablet Take 10 mg by mouth daily       latanoprost (XALATAN) 0.005 % ophthalmic solution Place 1 drop into both eyes nightly       Acetaminophen (TYLENOL ARTHRITIS PAIN PO) Take 2 tablets by mouth every 6 hours as needed       sennosides-docusate sodium (SENOKOT-S) 8.6-50 MG tablet Take 2 tablets by mouth 2 times daily      Vancomycin HCl in Dextrose (VANCOCIN HCL IV) Infuse 750 mg intravenously VANCO 750  ML NS ONCE DAILY      nitroGLYCERIN (NITROSTAT) 0.4 MG SL tablet Place 0.4 mg under the tongue every 5 minutes as needed for Chest pain up to max of 3 total doses. If no relief after 1 dose, call 911. No current facility-administered medications on file prior to encounter. REVIEW OF SYSTEMS    Pertinent items are noted in HPI.     Objective:      BP (!) 92/52   Pulse 64   Temp 96.6 °F (35.9 °C) (Tympanic)   Resp 20   Ht 5' 3\" (1.6 m)   Wt 138 lb (62.6 kg)   BMI 24.45 kg/m²     Wt Readings from Last 3 Encounters:   01/13/20 138 lb (62.6 kg)   12/16/19 138 lb (62.6 kg)   11/19/19 145 lb 15.1 oz (66.2 kg)       PHYSICAL EXAM    General Appearance: alert and oriented to person, place and time, well developed and well- nourished, in no acute distress  Skin: warm and dry, no rash or erythema, Stage IV coccyx pressure ulcer   Head: normocephalic and atraumatic  Eyes:extraocular eye movements intact, conjunctivae normal  Pulmonary/Chest: clear to auscultation bilaterally- no wheezes, rales or rhonchi, normal air movement, no respiratory distress  Cardiovascular: normal rate, regular rhythm, normal S1 and S2  Abdomen: soft, non-tender, non-distended, normal bowel sounds      Assessment:     Problem List Items Addressed This Visit     Pressure ulcer of coccygeal region, stage 4 ([address]      Other Visit Diagnoses     Pressure ulcer of coccygeal region, stage IV ([address]    -  Primary    Relevant Orders    Albumin    Prealbumin    Protein, Total           Procedure Note  Indications:  Based on my examination of this patient's wound(s)/ulcer(s) today, debridement is required to promote healing and evaluate the wound base. Performed by: ALEXSANDRA Esposito - CNP    Consent obtained:  Yes    Time out taken:  Yes    Pain Control: Anesthetic  Anesthetic: 4% Lidocaine Liquid Topical       Debridement:Excisional Debridement    Using curette the wound(s)/ulcer(s) was/were sharply debrided down through and including the removal of muscle/fascia.         Devitalized Tissue Debrided:  fibrin, biofilm and slough    Pre Debridement Measurements:  Are located in the [facility]  Documentation Flow Sheet    Wound/Ulcer #: 1    Post Debridement Measurements:  Wound/Ulcer Descriptions are Pre Debridement except measurements:    Wound 01/13/20 Coccyx wound #1 (Active)   Wound Image   1/13/2020  2:07 PM   Wound 0.125% Dakins moist to moist dressing     Frequency:  2 x per day     Additional Orders: Increase protein to diet (meat, cheese, eggs, fish, peanut butter, nuts and beans)  Multivitamin daily  May be up for meals 2hrs at a time then she is to lay down  Nutritional labs please send results to wound care  ROHO cushion when she is up in a chair  Specialty mattress  HYPERBARIC TREATMENT-                TREATMENT #     Your next appointment with [facility] is in 1 week                                                                                                   ROOM TYPE   [] CHAIR     [] BED   [] EITHER        TIME [] 45 MIN     [] 60 MIN     (Please note your next appointment above and if you are unable to keep, kindly give a 24 hour notice. Thank you.)     If you experience any of the following, please call the [facility] during business hours:  [phone]     * Increase in Pain  * Temperature over 101  * Increase in drainage from your wound  * Drainage with a foul odor  * Bleeding  * Increase in swelling  * Need for compression bandage changes due to slippage, breakthrough drainage. If you need medical attention outside of the business hours of the [facility] please contact your PCP or go to the nearest emergency room. The information contained in the After Visit Summary has been reviewed with me, the patient and/or responsible adult, by my health care provider(s). I had the opportunity to ask questions regarding this information.  I have elected to receive;      []After Visit Summary  [x]Comprehensive Discharge Instruction      Patient signature______________________________________Date:________    Electronically signed by Dennis Parker RN on 1/13/2020 at 1:28 PM  Electronically signed by ALEXSANDRA Nair CNP on 1/13/2020 at 2:32 PM          Electronically signed by ALEXSANDRA Nair CNP on 1/13/2020 at 2:33 PM

## 2020-01-13 NOTE — DISCHARGE INSTR - COC
Continuity of Care Form    Patient Name: Janet Dugan   :  1930  MRN:  [id number]    Admit date:  2020  Discharge date:  ***    Code Status Order: Prior   Advance Directives:   [facility] Documentation     Date/Time Healthcare Directive Type of Healthcare Directive Copy in [address] Agent's Name Healthcare Agent's Phone Number    [phone]  Yes, patient has an advance directive for healthcare treatment  Durable power of  for health care  Yes, copy in chart  Adult Children  --  --          Admitting Physician:  No admitting provider for patient encounter. PCP: Stepan James MD    Discharging Nurse: [facility] Unit/Room#: No information available for this encounter.   Discharging Unit Phone Number: ***    Emergency Contact:   Extended Emergency Contact Information  Primary Emergency Contact: [address] Phone: [phone]  Work Phone: [phone]  Mobile Phone: [phone]  Relation: Child  Secondary Emergency Contact: Kajal Lainez  Address: [address] Phone: [phone]  Mobile Phone: [phone]  Relation: Child    Past Surgical History:  Past Surgical History:   Procedure Laterality Date    CARDIAC PACEMAKER PLACEMENT      REMOVAL 2019 WITH TEMPORARY PACEMAKER PLACED 2019    CARDIAC PACEMAKER PLACEMENT  2019    DR Romy Anderson    CATARACT REMOVAL WITH IMPLANT Bilateral     Left 1998, right 1996    COLONOSCOPY      DILATION AND CURETTAGE OF UTERUS N/A 3/12/2019    DILATATION AND CURETTAGE, HYSTEROSCOPY, MYOSURE performed by Nevaeh Chavarria MD at [address] [place] [address]  2016    medtronic advisa MRI DR model # A2DR01  av lead [id number] rv lead 1482-82  - mri compatible    [facility] ENDOSCOPIC ULTRASOUND N/A 2018    ENDOSCOPIC ULTRASOUND, PATHOLOGY REQUESTED, PAT NOTIFIED performed by Renetta WEST of left radius with routine healing S52.532D    Acute ischemic right MCA stroke ([facility] [address] I63.511    Pulmonary embolism ([facility]) I26.99    Dysarthria R47.1    Multifocal pneumonia J18.9    Pacemaker infection ([facility] [address] T82. 7XXA    Closed fracture of lumbar vertebra with spinal cord injury ([facility] [address] S34.109A, S32.008A    Pressure ulcer of coccygeal region, stage 4 ([facility]) L89.154       Isolation/Infection:   Isolation          No Isolation        Patient Infection Status     Infection Onset Added Last Indicated Last Indicated By Review Planned Expiration Resolved Resolved By    [facility] 10/04/19 10/06/19 10/07/19 CULTURE BLOOD #2              Nurse Assessment:  Last Vital Signs: BP (!) 92/52   Pulse 64   Temp 96.6 °F (35.9 °C) (Tympanic)   Resp 20   Ht 5' 3\" (1.6 m)   Wt 138 lb (62.6 kg)   BMI 24.45 kg/m²     Last documented pain score (0-10 scale): Pain Level: 0  Last Weight:   Wt Readings from Last 1 Encounters:   20 138 lb (62.6 kg)     Mental Status:  {IP PT MENTAL STATUS:73971}    IV Access:  { [facility]IS CAR[facility] IV ACCESS:565601745}    Nursing Mobility/ADLs:  Walking   {[facility] DME EQLR:065544119}  Transfer  {[facility] DME ALNA:240511279}  Bathing  {[facility] DME YEFO:980675886}  Dressing  {[facility] DME BZPQ:808530495}  Toileting  {[facility] DME SYOR:361816217}  Feeding  {[facility] DME KUCF:981966176}  Med Admin  {[facility] DME MYB}  Med Delivery   { LUIS CARL[facility] MED Delivery:554487145}    Wound Care Documentation and Therapy:  Wound 20 Coccyx wound #1 (Active)   Wound Image   2020  2:07 PM   Wound Pressure Stage  4 2020  2:07 PM   Dressing Status New drainage; Old drainage 2020  2:07 PM   Dressing Changed Changed/New 2020  2:07 PM   Wound Length (cm) 4.7 cm 2020  2:07 PM   Wound Width (cm) 5.2 cm 2020  2:07 PM   Wound Depth (cm) 4.3 cm 2020  2:07 PM   Wound Surface Area (cm^2) 24.44 cm^2 2020  2:07 PM   Wound Volume (cm^3) 105.09 cm^3 2020  2:07 PM   Post-Procedure Length (cm) 4.7 cm 2020  2:07 PM

## 2020-01-14 ENCOUNTER — OFFICE VISIT (OUTPATIENT)
Dept: ORTHOPEDIC SURGERY | Age: 85
End: 2020-01-14
Payer: MEDICARE

## 2020-01-14 ENCOUNTER — HOSPITAL ENCOUNTER (OUTPATIENT)
Age: 85
Setting detail: SPECIMEN
Discharge: HOME | End: 2020-01-14
Payer: MEDICARE

## 2020-01-14 VITALS — WEIGHT: 138 LBS | BODY MASS INDEX: 24.45 KG/M2 | HEIGHT: 63 IN

## 2020-01-14 PROBLEM — M81.0 AGE-RELATED OSTEOPOROSIS WITHOUT CURRENT PATHOLOGICAL FRACTURE: Status: ACTIVE | Noted: 2020-01-14

## 2020-01-14 PROBLEM — S32.030D: Status: ACTIVE | Noted: 2020-01-14

## 2020-01-14 PROBLEM — S32.010S CLOSED COMPRESSION FRACTURE OF L1 LUMBAR VERTEBRA, SEQUELA: Status: ACTIVE | Noted: 2020-01-14

## 2020-01-14 LAB
ALBUMIN SERPL-MCNC: 1.9 G/DL (ref 3.5–5.2)
PREALBUMIN: 7.2 MG/DL (ref 20–40)
TOTAL PROTEIN: 5 G/DL (ref 6.4–8.3)

## 2020-01-14 PROCEDURE — G8427 DOCREV CUR MEDS BY ELIG CLIN: HCPCS | Performed by: ORTHOPAEDIC SURGERY

## 2020-01-14 PROCEDURE — 99213 OFFICE O/P EST LOW 20 MIN: CPT | Performed by: ORTHOPAEDIC SURGERY

## 2020-01-14 PROCEDURE — G8484 FLU IMMUNIZE NO ADMIN: HCPCS | Performed by: ORTHOPAEDIC SURGERY

## 2020-01-14 PROCEDURE — 4040F PNEUMOC VAC/ADMIN/RCVD: CPT | Performed by: ORTHOPAEDIC SURGERY

## 2020-01-14 PROCEDURE — 1123F ACP DISCUSS/DSCN MKR DOCD: CPT | Performed by: ORTHOPAEDIC SURGERY

## 2020-01-14 PROCEDURE — 1036F TOBACCO NON-USER: CPT | Performed by: ORTHOPAEDIC SURGERY

## 2020-01-14 PROCEDURE — 1090F PRES/ABSN URINE INCON ASSESS: CPT | Performed by: ORTHOPAEDIC SURGERY

## 2020-01-14 PROCEDURE — 82040 ASSAY OF SERUM ALBUMIN: CPT

## 2020-01-14 PROCEDURE — G8420 CALC BMI NORM PARAMETERS: HCPCS | Performed by: ORTHOPAEDIC SURGERY

## 2020-01-14 PROCEDURE — 84134 ASSAY OF PREALBUMIN: CPT

## 2020-01-14 PROCEDURE — P9603 ONE-WAY ALLOW PRORATED MILES: HCPCS

## 2020-01-14 PROCEDURE — 36415 COLL VENOUS BLD VENIPUNCTURE: CPT

## 2020-01-14 PROCEDURE — 84155 ASSAY OF PROTEIN SERUM: CPT

## 2020-01-14 NOTE — PROGRESS NOTES
Patient ID: Celestine Up is a 80 y.o. female    Chief Compliant:  Chief Complaint   Patient presents with    Follow-up     low back pain         History of Present Illness: This is a 80 y.o. female who presents to the clinic today for evaluation of low back pain. Patient has closed left distal radius fracture that is being followed by Dr. Laura Castillo. She does report low back pain however she does have a pressure ulcer reported by her son. Patient does appear to have most of her pain come from the pressure ulcer. She has a history of L3 compression fracture. She is status post L1 kyphoplasty. Review of Systems   Constitutional: Negative for fever, sweats, weight loss, recent injury, or recent illness  Neurological: Negative for  headaches, numbness, or focal weakness. Integumentary: Negative for abrasion, laceration, rash, ecchymosis. Musculoskeletal: Positive for Follow-up (low back pain )         Physical Exam:  Constitutional: Patient is oriented to person, place, and time. Patient appears well-developed and well nourished. HENT: Negative otherwise noted  Head: Normocephalic and Atraumatic  Nose: Normal  Eyes: Conjunctivae and EOM are normal  Neck: Normal range of motion Neck supple. Respiratory/Cardio: Effort normal. No respiratory distress. Musculoskeletal: Wheelchair bound with difficulty leaning forward. Neurological: Patient is alert and oriented to person, place, and time. Normal strenght. No sensory deficit. Skin: Skin is warm and dry  Psychiatric: Behavior is normal. Thought content normal.  Nursing note and vitals reviewed.        Past History:    Current Outpatient Medications:     apixaban (ELIQUIS) 2.5 MG TABS tablet, Take 2.5 mg by mouth 2 times daily, Disp: , Rfl:     cephALEXin (KEFLEX) 500 MG capsule, Take 500 mg by mouth 3 times daily, Disp: , Rfl:     Calcium Carbonate-Vitamin D (CALCIUM 500 + D PO), Take 1 tablet by mouth daily CALCIUM 500MG+VIT D 200IU, Disp: , Rfl:     sodium hypochlorite (DAKINS) 0.125 % SOLN, Irrigate with as directed 2 times daily COCCYX INJURY, Disp: , Rfl:     sennosides-docusate sodium (SENOKOT-S) 8.6-50 MG tablet, Take 2 tablets by mouth 2 times daily, Disp: , Rfl:     lisinopril (PRINIVIL;ZESTRIL) 2.5 MG tablet, Take 2.5 mg by mouth nightly, Disp: , Rfl:     Magnesium Oxide (MAG- PO), Take 400 mg by mouth 2 times daily, Disp: , Rfl:     lactulose (CHRONULAC) 10 GM/15ML solution, Take 10 g by mouth 2 times daily, Disp: , Rfl:     Vancomycin HCl in Dextrose (VANCOCIN HCL IV), Infuse 750 mg intravenously VANCO 750  ML NS ONCE DAILY, Disp: , Rfl:     nitroGLYCERIN (NITROSTAT) 0.4 MG SL tablet, Place 0.4 mg under the tongue every 5 minutes as needed for Chest pain up to max of 3 total doses.  If no relief after 1 dose, call 911., Disp: , Rfl:     potassium chloride (MICRO-K) 10 MEQ extended release capsule, Take 30 mEq by mouth 2 times daily, Disp: , Rfl:     furosemide (LASIX) 20 MG tablet, Take 1 tablet by mouth 2 times daily, Disp: 60 tablet, Rfl: 3    aspirin 81 MG chewable tablet, Take 1 tablet by mouth daily, Disp: 30 tablet, Rfl: 3    guaiFENesin (MUCINEX) 600 MG extended release tablet, Take 1 tablet by mouth 2 times daily, Disp: 60 tablet, Rfl: 0    amiodarone (CORDARONE) 200 MG tablet, Take 200 mg by mouth daily, Disp: , Rfl:     polyethylene glycol (MIRALAX) powder, Take 17 g by mouth daily as needed, Disp: , Rfl:     metoprolol tartrate (LOPRESSOR) 25 MG tablet, Take 1 tablet by mouth 2 times daily, Disp: 60 tablet, Rfl: 3    rOPINIRole (REQUIP) 0.25 MG tablet, Take 0.25 mg by mouth nightly , Disp: , Rfl:     atorvastatin (LIPITOR) 10 MG tablet, Take 10 mg by mouth daily , Disp: , Rfl:     latanoprost (XALATAN) 0.005 % ophthalmic solution, Place 1 drop into both eyes nightly , Disp: , Rfl:     Acetaminophen (TYLENOL ARTHRITIS PAIN PO), Take 2 tablets by mouth every 6 hours as needed , Disp: , Rfl:   Allergies   Allergen Reactions    Sulfa Antibiotics Rash     Social History     Socioeconomic History    Marital status:       Spouse name: Not on file    Number of children: Not on file    Years of education: Not on file    Highest education level: Not on file   Occupational History    Not on file   Social Needs    Financial resource strain: Not on file    Food insecurity:     Worry: Not on file     Inability: Not on file    Transportation needs:     Medical: Not on file     Non-medical: Not on file   Tobacco Use    Smoking status: Never Smoker    Smokeless tobacco: Never Used   Substance and Sexual Activity    Alcohol use: No    Drug use: No    Sexual activity: Not on file   Lifestyle    Physical activity:     Days per week: Not on file     Minutes per session: Not on file    Stress: Not on file   Relationships    Social connections:     Talks on phone: Not on file     Gets together: Not on file     Attends Yazdanism service: Not on file     Active member of club or organization: Not on file     Attends meetings of clubs or organizations: Not on file     Relationship status: Not on file    Intimate partner violence:     Fear of current or ex partner: Not on file     Emotionally abused: Not on file     Physically abused: Not on file     Forced sexual activity: Not on file   Other Topics Concern    Not on file   Social History Narrative    Not on file     Past Medical History:   Diagnosis Date    Arthritis     Atrial fibrillation ([facility] [address] 8/21/2016    CHF (congestive heart failure) ([facility] [address] [facility]     Complete heart block ([facility] [address] 8/18/2016    Constipation     CVA (cerebral vascular accident) ([facility] [address] 11/08/2019    ACUTE ISCHEMIC    GERD (gastroesophageal reflux disease)     Glaucoma     mild, on eye drops    Hearing loss     bilateral hearing aids    Hiatal hernia     Hx of blood clots 07/2016    pulmonary emboli bilateral lungs after Pacemaker inserted    Hyperlipidemia     MRSA (methicillin resistant lateral lumbar spine obtained reviewed compared with prior MRI patient with history of distant L1 kyphoplasty L3 minimal compression fracture in November no change in current x-ray      Assessment and Plan:  1. Closed wedge compression fracture of third lumbar vertebra with routine healing, subsequent encounter    2. Closed compression fracture of L1 lumbar vertebra, sequela    3. Age-related osteoporosis without current pathological fracture        This is a 80 y.o. female who presents to the clinic today for evaluation of low back pain. Patient does appear to have most of her pain coming from her pressure ulcer. Her imaging appears to be unchanged from prior as she does have a healing L3 compression fracture. I spoke with patient and her son about conservative measures in regards to her low back and treatments of her healing compression fracture. Follow up as needed. Scribe Attestation:  By signing my name below, Alethea Kebede, attest that this documentation has been prepared under the direction and in the presence of Dr. Benjamín Beasley. Electronically signed: Lana Narvaez, 1/14/20     Please note that this chart was generated using voice recognition Dragon dictation software. Although every effort was made to ensure the accuracy of this automated transcription, some errors in transcription may have occurred.

## 2020-01-16 ENCOUNTER — HOSPITAL ENCOUNTER (OUTPATIENT)
Age: 85
Setting detail: SPECIMEN
Discharge: HOME | End: 2020-01-16
Payer: MEDICARE

## 2020-01-16 LAB
ALBUMIN SERPL-MCNC: 1.7 G/DL (ref 3.5–5.2)
ALBUMIN/GLOBULIN RATIO: ABNORMAL (ref 1–2.5)
ALP BLD-CCNC: 97 U/L (ref 35–104)
ALT SERPL-CCNC: 29 U/L (ref 5–33)
ANION GAP SERPL CALCULATED.3IONS-SCNC: 9 MMOL/L (ref 9–17)
AST SERPL-CCNC: 28 U/L
BILIRUB SERPL-MCNC: 0.28 MG/DL (ref 0.3–1.2)
BUN BLDV-MCNC: 59 MG/DL (ref 8–23)
BUN/CREAT BLD: 36 (ref 9–20)
CALCIUM SERPL-MCNC: 8.2 MG/DL (ref 8.6–10.4)
CHLORIDE BLD-SCNC: 103 MMOL/L (ref 98–107)
CO2: 22 MMOL/L (ref 20–31)
CREAT SERPL-MCNC: 1.62 MG/DL (ref 0.5–0.9)
GFR AFRICAN AMERICAN: 36 ML/MIN
GFR NON-AFRICAN AMERICAN: 30 ML/MIN
GFR SERPL CREATININE-BSD FRML MDRD: ABNORMAL ML/MIN/{1.73_M2}
GFR SERPL CREATININE-BSD FRML MDRD: ABNORMAL ML/MIN/{1.73_M2}
GLUCOSE BLD-MCNC: 83 MG/DL (ref 70–99)
HCT VFR BLD CALC: 41.4 % (ref 36.3–47.1)
HEMOGLOBIN: 12.3 G/DL (ref 11.9–15.1)
MCH RBC QN AUTO: 25.8 PG (ref 25.2–33.5)
MCHC RBC AUTO-ENTMCNC: 29.7 G/DL (ref 28.4–34.8)
MCV RBC AUTO: 86.8 FL (ref 82.6–102.9)
NRBC AUTOMATED: ABNORMAL PER 100 WBC
PDW BLD-RTO: 17.6 % (ref 11.8–14.4)
PLATELET # BLD: 177 K/UL (ref 138–453)
PMV BLD AUTO: 10 FL (ref 8.1–13.5)
POTASSIUM SERPL-SCNC: 5.6 MMOL/L (ref 3.7–5.3)
RBC # BLD: 4.77 M/UL (ref 3.95–5.11)
SODIUM BLD-SCNC: 134 MMOL/L (ref 135–144)
TOTAL PROTEIN: 4.5 G/DL (ref 6.4–8.3)
WBC # BLD: 6.7 K/UL (ref 3.5–11.3)

## 2020-01-16 PROCEDURE — 36415 COLL VENOUS BLD VENIPUNCTURE: CPT

## 2020-01-16 PROCEDURE — 85027 COMPLETE CBC AUTOMATED: CPT

## 2020-01-16 PROCEDURE — 80053 COMPREHEN METABOLIC PANEL: CPT

## 2020-01-16 PROCEDURE — P9603 ONE-WAY ALLOW PRORATED MILES: HCPCS

## 2020-01-17 NOTE — PROGRESS NOTES
with as directed 2 times daily COCCYX INJURY      sennosides-docusate sodium (SENOKOT-S) 8.6-50 MG tablet Take 2 tablets by mouth 2 times daily      lisinopril (PRINIVIL;ZESTRIL) 2.5 MG tablet Take 2.5 mg by mouth nightly      Magnesium Oxide (MAG- PO) Take 400 mg by mouth 2 times daily      lactulose (CHRONULAC) 10 GM/15ML solution Take 10 g by mouth 2 times daily      Vancomycin HCl in Dextrose (VANCOCIN HCL IV) Infuse 750 mg intravenously VANCO 750  ML NS ONCE DAILY      nitroGLYCERIN (NITROSTAT) 0.4 MG SL tablet Place 0.4 mg under the tongue every 5 minutes as needed for Chest pain up to max of 3 total doses. If no relief after 1 dose, call 911.  potassium chloride (MICRO-K) 10 MEQ extended release capsule Take 30 mEq by mouth 2 times daily      furosemide (LASIX) 20 MG tablet Take 1 tablet by mouth 2 times daily 60 tablet 3    aspirin 81 MG chewable tablet Take 1 tablet by mouth daily 30 tablet 3    guaiFENesin (MUCINEX) 600 MG extended release tablet Take 1 tablet by mouth 2 times daily 60 tablet 0    amiodarone (CORDARONE) 200 MG tablet Take 200 mg by mouth daily      polyethylene glycol (MIRALAX) powder Take 17 g by mouth daily as needed      metoprolol tartrate (LOPRESSOR) 25 MG tablet Take 1 tablet by mouth 2 times daily 60 tablet 3    rOPINIRole (REQUIP) 0.25 MG tablet Take 0.25 mg by mouth nightly       atorvastatin (LIPITOR) 10 MG tablet Take 10 mg by mouth daily       latanoprost (XALATAN) 0.005 % ophthalmic solution Place 1 drop into both eyes nightly       Acetaminophen (TYLENOL ARTHRITIS PAIN PO) Take 2 tablets by mouth every 6 hours as needed        No current facility-administered medications for this visit. Allergies  Allergies have been reviewed. Elia Sawant is allergic to sulfa antibiotics. Social History  Elia Sawant  reports that she has never smoked. She has never used smokeless tobacco. She reports that she does not drink alcohol or use drugs.     Family

## 2020-01-20 ENCOUNTER — HOSPITAL ENCOUNTER (OUTPATIENT)
Age: 85
Setting detail: SPECIMEN
Discharge: HOME | End: 2020-01-20
Payer: MEDICARE

## 2020-01-20 LAB
ALBUMIN SERPL-MCNC: 1.9 G/DL (ref 3.5–5.2)
ALBUMIN/GLOBULIN RATIO: ABNORMAL (ref 1–2.5)
ALP BLD-CCNC: 101 U/L (ref 35–104)
ALT SERPL-CCNC: 29 U/L (ref 5–33)
ANION GAP SERPL CALCULATED.3IONS-SCNC: 9 MMOL/L (ref 9–17)
AST SERPL-CCNC: 27 U/L
BILIRUB SERPL-MCNC: 0.17 MG/DL (ref 0.3–1.2)
BUN BLDV-MCNC: 46 MG/DL (ref 8–23)
BUN/CREAT BLD: 44 (ref 9–20)
CALCIUM SERPL-MCNC: 8.5 MG/DL (ref 8.6–10.4)
CHLORIDE BLD-SCNC: 107 MMOL/L (ref 98–107)
CO2: 21 MMOL/L (ref 20–31)
CREAT SERPL-MCNC: 1.05 MG/DL (ref 0.5–0.9)
GFR AFRICAN AMERICAN: 60 ML/MIN
GFR NON-AFRICAN AMERICAN: 49 ML/MIN
GFR SERPL CREATININE-BSD FRML MDRD: ABNORMAL ML/MIN/{1.73_M2}
GFR SERPL CREATININE-BSD FRML MDRD: ABNORMAL ML/MIN/{1.73_M2}
GLUCOSE BLD-MCNC: 88 MG/DL (ref 70–99)
HCT VFR BLD CALC: 26.3 % (ref 36.3–47.1)
HEMOGLOBIN: 7.7 G/DL (ref 11.9–15.1)
MCH RBC QN AUTO: 25.5 PG (ref 25.2–33.5)
MCHC RBC AUTO-ENTMCNC: 29.3 G/DL (ref 28.4–34.8)
MCV RBC AUTO: 87.1 FL (ref 82.6–102.9)
NRBC AUTOMATED: 0 PER 100 WBC
PDW BLD-RTO: 17.2 % (ref 11.8–14.4)
PLATELET # BLD: 321 K/UL (ref 138–453)
PMV BLD AUTO: 10.4 FL (ref 8.1–13.5)
POTASSIUM SERPL-SCNC: 5.8 MMOL/L (ref 3.7–5.3)
RBC # BLD: 3.02 M/UL (ref 3.95–5.11)
SODIUM BLD-SCNC: 137 MMOL/L (ref 135–144)
TOTAL PROTEIN: 4.9 G/DL (ref 6.4–8.3)
WBC # BLD: 8.2 K/UL (ref 3.5–11.3)

## 2020-01-20 PROCEDURE — 80053 COMPREHEN METABOLIC PANEL: CPT

## 2020-01-20 PROCEDURE — 36415 COLL VENOUS BLD VENIPUNCTURE: CPT

## 2020-01-20 PROCEDURE — 85027 COMPLETE CBC AUTOMATED: CPT

## 2020-01-20 PROCEDURE — P9603 ONE-WAY ALLOW PRORATED MILES: HCPCS

## 2020-01-22 ENCOUNTER — FOLLOWUP TELEPHONE ENCOUNTER (OUTPATIENT)
Dept: WOUND CARE | Age: 85
End: 2020-01-22

## 2020-01-22 ENCOUNTER — HOSPITAL ENCOUNTER (OUTPATIENT)
Dept: WOUND CARE | Age: 85
Discharge: HOME | End: 2020-01-22
Payer: MEDICARE

## 2020-01-22 NOTE — PROGRESS NOTES
Patient no called no showed for wound care apt today. Called [facility], spoke with pt's RN. The transportation was never scheduled for her apt today. Patient rescheduled for 1/23/20 0645.

## 2020-01-23 ENCOUNTER — HOSPITAL ENCOUNTER (OUTPATIENT)
Dept: WOUND CARE | Age: 85
Discharge: HOME | End: 2020-01-23
Payer: MEDICARE

## 2020-01-23 VITALS
SYSTOLIC BLOOD PRESSURE: 107 MMHG | RESPIRATION RATE: 18 BRPM | DIASTOLIC BLOOD PRESSURE: 60 MMHG | TEMPERATURE: 97.1 F | HEART RATE: 61 BPM

## 2020-01-23 PROCEDURE — 11042 DBRDMT SUBQ TIS 1ST 20SQCM/<: CPT

## 2020-01-23 PROCEDURE — 11042 DBRDMT SUBQ TIS 1ST 20SQCM/<: CPT | Performed by: SURGERY

## 2020-01-23 RX ORDER — LIDOCAINE HYDROCHLORIDE 20 MG/ML
JELLY TOPICAL ONCE
Status: DISCONTINUED | OUTPATIENT
Start: 2020-01-23 | End: 2020-01-24 | Stop reason: HOSPADM

## 2020-01-23 NOTE — PROGRESS NOTES
REMOVAL WITH IMPLANT Bilateral     Left 1/6/1998, right 8/2/1996    COLONOSCOPY      DILATION AND CURETTAGE OF UTERUS N/A 3/12/2019    DILATATION AND CURETTAGE, HYSTEROSCOPY, MYOSURE performed by Monika Grullon MD at [address] [facility]  08/18/2016    medtronic advisa MRI DR model # A2DR01  av lead 8585-01 rv lead 7930-98  - mri compatible    [facility] GI ENDOSCOPIC ULTRASOUND N/A 9/12/2018    ENDOSCOPIC ULTRASOUND, PATHOLOGY REQUESTED, PAT NOTIFIED performed by Marvin Kramer MD at [facility]      as a child    TOTAL KNEE ARTHROPLASTY Right 9/5/2017    KNEE TOTAL ARTHROPLASTY RIGHT WITH BIOMET AND GPS PRODUCT APPLICATION performed by Jl Hyde MD at [facility] [address] TRANSESOPHAGEAL ECHOCARDIOGRAM N/A 10/9/2019    TRANSESOPHAGEAL ECHOCARDIOGRAM WITH BUBBLE STUDY performed by Violet Grove DO at [address] [place] [address] TRANSESOPHAGEAL ECHOCARDIOGRAM  12/16/2019    UPPER GASTROINTESTINAL ENDOSCOPY  06/29/2017    polyp removed from small intestine near stomach, benign.   done at [address] [facility]  9/12/2018    EGD BIOPSY performed by Marvin Kramer MD at [facility]       FAMILY HISTORY    Family History   Problem Relation Age of Onset    Diabetes Sister     Heart Attack Sister     Osteoporosis Sister     Heart Disease Mother     Dementia Mother     Tuberculosis Father        SOCIAL HISTORY    Social History     Tobacco Use    Smoking status: Never Smoker    Smokeless tobacco: Never Used   Substance Use Topics    Alcohol use: No    Drug use: No       ALLERGIES    Allergies   Allergen Reactions    Sulfa Antibiotics Rash       MEDICATIONS    Current Outpatient Medications on File Prior to Encounter   Medication Sig Dispense Refill    apixaban (ELIQUIS) 2.5 MG TABS tablet Take 2.5 mg by mouth 2 times daily      cephALEXin (KEFLEX) 500 MG capsule Take 500 mg by mouth 3 times daily      Calcium Carbonate-Vitamin D (CALCIUM 500 + D PO) Take 1 tablet by mouth daily CALCIUM 500MG+VIT D 200IU      sodium hypochlorite (DAKINS) 0.125 % SOLN Irrigate with as directed 2 times daily COCCYX INJURY      sennosides-docusate sodium (SENOKOT-S) 8.6-50 MG tablet Take 2 tablets by mouth 2 times daily      lisinopril (PRINIVIL;ZESTRIL) 2.5 MG tablet Take 2.5 mg by mouth nightly      Magnesium Oxide (MAG- PO) Take 400 mg by mouth 2 times daily      lactulose (CHRONULAC) 10 GM/15ML solution Take 10 g by mouth 2 times daily      Vancomycin HCl in Dextrose (VANCOCIN HCL IV) Infuse 750 mg intravenously VANCO 750  ML NS ONCE DAILY      nitroGLYCERIN (NITROSTAT) 0.4 MG SL tablet Place 0.4 mg under the tongue every 5 minutes as needed for Chest pain up to max of 3 total doses. If no relief after 1 dose, call 911.  potassium chloride (MICRO-K) 10 MEQ extended release capsule Take 30 mEq by mouth 2 times daily      furosemide (LASIX) 20 MG tablet Take 1 tablet by mouth 2 times daily 60 tablet 3    aspirin 81 MG chewable tablet Take 1 tablet by mouth daily 30 tablet 3    guaiFENesin (MUCINEX) 600 MG extended release tablet Take 1 tablet by mouth 2 times daily 60 tablet 0    amiodarone (CORDARONE) 200 MG tablet Take 200 mg by mouth daily      polyethylene glycol (MIRALAX) powder Take 17 g by mouth daily as needed      metoprolol tartrate (LOPRESSOR) 25 MG tablet Take 1 tablet by mouth 2 times daily 60 tablet 3    rOPINIRole (REQUIP) 0.25 MG tablet Take 0.25 mg by mouth nightly       atorvastatin (LIPITOR) 10 MG tablet Take 10 mg by mouth daily       latanoprost (XALATAN) 0.005 % ophthalmic solution Place 1 drop into both eyes nightly       Acetaminophen (TYLENOL ARTHRITIS PAIN PO) Take 2 tablets by mouth every 6 hours as needed        No current facility-administered medications on file prior to encounter. REVIEW OF SYSTEMS    Pertinent items are noted in HPI.     Objective:      /60   Pulse 61   Temp 97.1 °F (36.2 °C)   Resp 18     Wt Readings from Last 3 Encounters:   01/14/20 138 lb (62.6 kg)   01/13/20 138 lb (62.6 kg)   12/16/19 138 lb (62.6 kg)       PHYSICAL EXAM   in no acute distress  In the sacral region there is a large deep stage IV pressure ulcer. There are patchy areas of fibrin slough as well as a few areas of necrotic fibrotic tissue. This extends into the deep subcutaneous tissue with the fat layer exposed. Assessment:        Problem List Items Addressed This Visit     None           Procedure Note  Indications:  Based on my examination of this patient's wound(s)/ulcer(s) today, debridement is required to promote healing and evaluate the wound base. Performed by: Rogelio Stein MD    Consent obtained:  Yes    Time out taken:  Yes    Pain Control: Anesthetic  Anesthetic: 2% Lidocaine Gel Topical       Debridement: Excisional Debridement    Using curette, scissors and forceps the wound(s)/ulcer(s) was/were sharply debrided down through and including the removal of subcutaneous tissue.         Devitalized Tissue Debrided:  fibrin, slough and necrotic/eschar    Pre Debridement Measurements:  Are located in the [place]  Documentation Flow Sheet    Wound/Ulcer #: 1    Post Debridement Measurements:  Wound/Ulcer Descriptions are Pre Debridement except measurements:    Wound 01/13/20 Coccyx wound #1 (Active)   Wound Image   1/13/2020  2:07 PM   Wound Pressure Stage  4 1/23/2020 10:20 AM   Dressing Status Old drainage;New drainage 1/23/2020 10:20 AM   Dressing Changed Changed/New 1/13/2020  2:52 PM   Dressing/Treatment Moist to moist 1/13/2020  2:52 PM   Wound Length (cm) 5.5 cm 1/23/2020 10:20 AM   Wound Width (cm) 3.5 cm 1/23/2020 10:20 AM   Wound Depth (cm) 3.7 cm 1/23/2020 10:20 AM   Wound Surface Area (cm^2) 19.25 cm^2 1/23/2020 10:20 AM   Change in Wound Size % (l*w) 21.24 1/23/2020 10:20 AM   Wound Volume (cm^3) 71.22 cm^3 1/23/2020 10:20 AM   Wound Healing % 32 1/23/2020 10:20 AM   Post-Procedure Length (cm) 5.5 cm 1/23/2020 10:20 AM   Post-Procedure Width (cm) 3.5 cm 1/23/2020 10:20 AM   Post-Procedure Depth (cm) 3.7 cm 1/23/2020 10:20 AM   Post-Procedure Surface Area (cm^2) 19.25 cm^2 1/23/2020 10:20 AM   Post-Procedure Volume (cm^3) 71.22 cm^3 1/23/2020 10:20 AM   Undermining Starts ___ O'Clock 7:00 1/23/2020 10:20 AM   Undermining Ends___ O'Clock 5:00 1/23/2020 10:20 AM   Undermining Maxium Distance (cm) 6cm @ 11:30 1/23/2020 10:20 AM   Wound Assessment Drainage;Red;Yellow 1/23/2020 10:20 AM   Drainage Amount Large 1/23/2020 10:20 AM   Drainage Description Purulent 1/23/2020 10:20 AM   Odor Mild 1/23/2020 10:20 AM   Margins Epibole (rolled edges) 1/23/2020 10:20 AM   Exposed structure Bone 1/23/2020 10:20 AM   Radha-wound Assessment Clean;Dry; Intact 1/23/2020 10:20 AM   Red%Wound Bed 80 1/23/2020 10:20 AM   Yellow%Wound Bed 20 1/23/2020 10:20 AM   Culture Taken Yes 1/13/2020  2:07 PM   Number of days: 9     Incision 12/16/19 Chest Right;Upper (Active)   Number of days: 37       Percent of Wound(s)/Ulcer(s) Debrided: 100%    Total Surface Area Debrided:  20 sq cm     Diabetic/Pressure/Non Pressure Ulcers only:  Ulcer: Pressure ulcer, Stage 4     Estimated Blood Loss:  Minimal    Hemostasis Achieved:  by pressure    Procedural Pain:  1  / 10     Post Procedural Pain:  0 / 10     Response to treatment:  Well tolerated by patient. Plan: At this time we will continue with current cares. Did discuss more or less palliative cares versus aggressive wound cares likely with reinitiation of VAC therapy. I did explain that her nutritional status would need to be addressed if aggressive treatment would be initiated with placement of a temporary feeding tube. Follow-up will be in 2 weeks. Treatment Note please see attached Discharge Instructions    Written patient dismissal instructions given to patient and signed by patient or POA.          Discharge Instructions         [facility] [place] and [address] [facility]

## 2020-01-28 ENCOUNTER — HOSPITAL ENCOUNTER (OUTPATIENT)
Age: 85
Setting detail: SPECIMEN
Discharge: HOME | End: 2020-01-28
Payer: MEDICARE

## 2020-01-28 LAB
-: NORMAL
ALBUMIN SERPL-MCNC: 1.8 G/DL (ref 3.5–5.2)
ALBUMIN/GLOBULIN RATIO: ABNORMAL (ref 1–2.5)
ALP BLD-CCNC: 88 U/L (ref 35–104)
ALT SERPL-CCNC: 24 U/L (ref 5–33)
ANION GAP SERPL CALCULATED.3IONS-SCNC: 10 MMOL/L (ref 9–17)
AST SERPL-CCNC: 26 U/L
BILIRUB SERPL-MCNC: 0.16 MG/DL (ref 0.3–1.2)
BUN BLDV-MCNC: 26 MG/DL (ref 8–23)
BUN/CREAT BLD: 39 (ref 9–20)
CALCIUM SERPL-MCNC: 8.1 MG/DL (ref 8.6–10.4)
CHLORIDE BLD-SCNC: 103 MMOL/L (ref 98–107)
CO2: 21 MMOL/L (ref 20–31)
CREAT SERPL-MCNC: 0.66 MG/DL (ref 0.5–0.9)
GFR AFRICAN AMERICAN: >60 ML/MIN
GFR NON-AFRICAN AMERICAN: >60 ML/MIN
GFR SERPL CREATININE-BSD FRML MDRD: ABNORMAL ML/MIN/{1.73_M2}
GFR SERPL CREATININE-BSD FRML MDRD: ABNORMAL ML/MIN/{1.73_M2}
GLUCOSE BLD-MCNC: 102 MG/DL (ref 70–99)
HCT VFR BLD CALC: 30.3 % (ref 36.3–47.1)
HEMOGLOBIN: 8.7 G/DL (ref 11.9–15.1)
MCH RBC QN AUTO: 26 PG (ref 25.2–33.5)
MCHC RBC AUTO-ENTMCNC: 28.7 G/DL (ref 28.4–34.8)
MCV RBC AUTO: 90.4 FL (ref 82.6–102.9)
NRBC AUTOMATED: ABNORMAL PER 100 WBC
PDW BLD-RTO: 18.7 % (ref 11.8–14.4)
PLATELET # BLD: 364 K/UL (ref 138–453)
PMV BLD AUTO: 9.8 FL (ref 8.1–13.5)
POTASSIUM SERPL-SCNC: 5.5 MMOL/L (ref 3.7–5.3)
RBC # BLD: 3.35 M/UL (ref 3.95–5.11)
REASON FOR REJECTION: NORMAL
SODIUM BLD-SCNC: 134 MMOL/L (ref 135–144)
TOTAL PROTEIN: 4.5 G/DL (ref 6.4–8.3)
WBC # BLD: 8.4 K/UL (ref 3.5–11.3)
ZZ NTE CLEAN UP: ORDERED TEST: NORMAL
ZZ NTE WITH NAME CLEAN UP: SPECIMEN SOURCE: NORMAL

## 2020-01-28 PROCEDURE — P9603 ONE-WAY ALLOW PRORATED MILES: HCPCS

## 2020-01-28 PROCEDURE — 36415 COLL VENOUS BLD VENIPUNCTURE: CPT

## 2020-01-28 PROCEDURE — 85027 COMPLETE CBC AUTOMATED: CPT

## 2020-01-28 PROCEDURE — 80053 COMPREHEN METABOLIC PANEL: CPT

## 2020-02-03 ENCOUNTER — HOSPITAL ENCOUNTER (OUTPATIENT)
Dept: WOUND CARE | Age: 85
Discharge: HOME | End: 2020-02-03
Payer: MEDICARE

## 2020-02-03 VITALS
HEART RATE: 60 BPM | WEIGHT: 126.5 LBS | HEIGHT: 63 IN | BODY MASS INDEX: 22.41 KG/M2 | TEMPERATURE: 97.2 F | SYSTOLIC BLOOD PRESSURE: 112 MMHG | RESPIRATION RATE: 18 BRPM | DIASTOLIC BLOOD PRESSURE: 62 MMHG

## 2020-02-03 PROCEDURE — 11045 DBRDMT SUBQ TISS EACH ADDL: CPT | Performed by: NURSE PRACTITIONER

## 2020-02-03 PROCEDURE — 11042 DBRDMT SUBQ TIS 1ST 20SQCM/<: CPT

## 2020-02-03 PROCEDURE — 11045 DBRDMT SUBQ TISS EACH ADDL: CPT

## 2020-02-03 PROCEDURE — 11042 DBRDMT SUBQ TIS 1ST 20SQCM/<: CPT | Performed by: NURSE PRACTITIONER

## 2020-02-03 PROCEDURE — 6370000000 HC RX 637 (ALT 250 FOR IP): Performed by: NURSE PRACTITIONER

## 2020-02-03 RX ORDER — LIDOCAINE HYDROCHLORIDE 40 MG/ML
SOLUTION TOPICAL ONCE
Status: COMPLETED | OUTPATIENT
Start: 2020-02-03 | End: 2020-02-03

## 2020-02-03 RX ORDER — TRAMADOL HYDROCHLORIDE 50 MG/1
50 TABLET ORAL 2 TIMES DAILY
Status: ON HOLD | COMMUNITY
End: 2020-07-29 | Stop reason: SDUPTHER

## 2020-02-03 RX ADMIN — LIDOCAINE HYDROCHLORIDE: 40 SOLUTION TOPICAL at 13:40

## 2020-02-03 NOTE — PROGRESS NOTES
CATARACT REMOVAL WITH IMPLANT Bilateral     Left 1/6/1998, right 8/2/1996    COLONOSCOPY      DILATION AND CURETTAGE OF UTERUS N/A 3/12/2019    DILATATION AND CURETTAGE, HYSTEROSCOPY, MYOSURE performed by Jeramy Gilbert MD at [facility]  08/18/2016    medtronic advisa MRI DR model # A2DR01  av lead 3401-29 rv lead [id number]  - mri compatible    DE GI ENDOSCOPIC ULTRASOUND N/A 9/12/2018    ENDOSCOPIC ULTRASOUND, PATHOLOGY REQUESTED, PAT NOTIFIED performed by Waldemar Coleman MD at [facility]      as a child    TOTAL KNEE ARTHROPLASTY Right 9/5/2017    KNEE TOTAL ARTHROPLASTY RIGHT WITH BIOMET AND GPS PRODUCT APPLICATION performed by Ubaldo Crocker MD at [facility] TRANSESOPHAGEAL ECHOCARDIOGRAM N/A 10/9/2019    TRANSESOPHAGEAL ECHOCARDIOGRAM WITH BUBBLE STUDY performed by Michael Fish DO at [facility] TRANSESOPHAGEAL ECHOCARDIOGRAM  12/16/2019    UPPER GASTROINTESTINAL ENDOSCOPY  06/29/2017    polyp removed from small intestine near stomach, benign. done at [address] [place] [address]  9/12/2018    EGD BIOPSY performed by Waldemar Coleman MD at [facility]       FAMILY HISTORY    Family History   Problem Relation Age of Onset    Diabetes Sister     Heart Attack Sister     Osteoporosis Sister     Heart Disease Mother     Dementia Mother     Tuberculosis Father        SOCIAL HISTORY    Social History     Tobacco Use    Smoking status: Never Smoker    Smokeless tobacco: Never Used   Substance Use Topics    Alcohol use: No    Drug use: No       ALLERGIES    Allergies   Allergen Reactions    Sulfa Antibiotics Rash       MEDICATIONS    Current Outpatient Medications on File Prior to Encounter   Medication Sig Dispense Refill    traMADol (ULTRAM) 50 MG tablet Take 50 mg by mouth 2 times daily.       apixaban (ELIQUIS) 2.5 MG TABS tablet Take 2.5 mg by mouth 2 times daily      Calcium Carbonate-Vitamin D (CALCIUM 500 + D cm 2/3/2020  1:24 PM   Post-Procedure Width (cm) 6.2 cm 2/3/2020  1:24 PM   Post-Procedure Depth (cm) 4.5 cm 2/3/2020  1:24 PM   Post-Procedure Surface Area (cm^2) 27.28 cm^2 2/3/2020  1:24 PM   Post-Procedure Volume (cm^3) 122.76 cm^3 2/3/2020  1:24 PM   Undermining Starts ___ O'Clock 7:00 2/3/2020  1:24 PM   Undermining Ends___ O'Clock 5:00 2/3/2020  1:24 PM   Undermining Maxium Distance (cm) 4.6cm at 1:00 2/3/2020  1:24 PM   Wound Assessment Drainage;Red;Yellow 2/3/2020  1:24 PM   Drainage Amount Large 2/3/2020  1:24 PM   Drainage Description Serosanguinous 2/3/2020  1:24 PM   Odor Mild 2/3/2020  1:24 PM   Margins Epibole (rolled edges) 2/3/2020  1:24 PM   Exposed structure Bone 2/3/2020  1:24 PM   Radha-wound Assessment Intact 2/3/2020  1:24 PM   Red%Wound Bed 90 2/3/2020  1:24 PM   Yellow%Wound Bed 10 2/3/2020  1:24 PM   Culture Taken Yes 1/13/2020  2:07 PM   Number of days: 20     Incision 12/16/19 Chest Right;Upper (Active)   Number of days: 49       Percent of Wound(s)/Ulcer(s) Debrided: 100%    Total Surface Area Debrided:  27.78 sq cm       Diabetic/Pressure/Non Pressure Ulcers only:  Ulcer: Pressure ulcer, Stage 4      Estimated Blood Loss:  None    Hemostasis Achieved:  by pressure    Procedural Pain:  0  / 10     Post Procedural Pain:  0 / 10     Response to treatment:  Well tolerated by patient. Plan: Will check with facility about wound vac therapy - availability of supplies and nursing for dressing changes     Treatment Note please see attached Discharge Instructions    Written patient dismissal instructions given to patient and signed by patient or POA.          Discharge Instructions         [facility] [place] and HYPERBARIC TREATMENT  CENTER                                 Visit Rahel Instructions / Physician Orders  DATE: 02/03/2020     Home Care: [facility]     SUPPLIES ORDERED THRU: SNF     Wound Location:  Coccyx     Cleanse with: Liquid antibacterial soap and water, rinse well      Dressing Orders:  0.125% Dakins moist to moist dressing     Frequency:  Twice daily     Additional Orders: Increase protein to diet (meat, cheese, eggs, fish, peanut butter, nuts and beans)  Multivitamin daily  May be up for meals- only about 30 minutes at a time, no more than 2 hours at a time  ROHO cushion when she is up in a chair  Apply specialty mattress to bed     HYPERBARIC TREATMENT-                TREATMENT #     Your next appointment with [facility] is in 2 weeks     ROOM TYPE   []? ? CHAIR     []? ? BED   []? ? EITHER        TIME []?? 45 MIN     []? ? 60 MIN     (Please note your next appointment above and if you are unable to keep, kindly give a 24 hour notice. Thank you.)     If you experience any of the following, please call the [facility] during business hours:  [phone]     * Increase in Pain  * Temperature over 101  * Increase in drainage from your wound  * Drainage with a foul odor  * Bleeding  * Increase in swelling  * Need for compression bandage changes due to slippage, breakthrough drainage.     If you need medical attention outside of the business hours of the [facility] please contact your PCP or go to the nearest emergency room.     The information contained in the After Visit Summary has been reviewed with me, the patient and/or responsible adult, by my health care provider(s). I had the opportunity to ask questions regarding this information. I have elected to receive;      []? ? After Visit Summary  [x]? ? Comprehensive Discharge Instruction        Patient signature______________________________________Date:________  Electronically signed by Reese Cope RN on 2/3/2020 at 1:56 PM          Electronically signed by ALEXSANDRA Whitehead - CNP on 2/3/2020 at 2:04 PM

## 2020-02-05 ENCOUNTER — HOSPITAL ENCOUNTER (OUTPATIENT)
Age: 85
Setting detail: SPECIMEN
Discharge: HOME | End: 2020-02-05
Payer: MEDICARE

## 2020-02-05 LAB
ANION GAP SERPL CALCULATED.3IONS-SCNC: 8 MMOL/L (ref 9–17)
BUN BLDV-MCNC: 19 MG/DL (ref 8–23)
BUN/CREAT BLD: 35 (ref 9–20)
CALCIUM SERPL-MCNC: 8.2 MG/DL (ref 8.6–10.4)
CHLORIDE BLD-SCNC: 97 MMOL/L (ref 98–107)
CO2: 26 MMOL/L (ref 20–31)
CREAT SERPL-MCNC: 0.54 MG/DL (ref 0.5–0.9)
GFR AFRICAN AMERICAN: >60 ML/MIN
GFR NON-AFRICAN AMERICAN: >60 ML/MIN
GFR SERPL CREATININE-BSD FRML MDRD: ABNORMAL ML/MIN/{1.73_M2}
GFR SERPL CREATININE-BSD FRML MDRD: ABNORMAL ML/MIN/{1.73_M2}
GLUCOSE BLD-MCNC: 87 MG/DL (ref 70–99)
HCT VFR BLD CALC: 29.5 % (ref 36.3–47.1)
HEMOGLOBIN: 8.8 G/DL (ref 11.9–15.1)
MCH RBC QN AUTO: 26.3 PG (ref 25.2–33.5)
MCHC RBC AUTO-ENTMCNC: 29.8 G/DL (ref 28.4–34.8)
MCV RBC AUTO: 88.3 FL (ref 82.6–102.9)
NRBC AUTOMATED: ABNORMAL PER 100 WBC
PDW BLD-RTO: 19.9 % (ref 11.8–14.4)
PLATELET # BLD: 329 K/UL (ref 138–453)
PMV BLD AUTO: 10.4 FL (ref 8.1–13.5)
POTASSIUM SERPL-SCNC: 4.3 MMOL/L (ref 3.7–5.3)
RBC # BLD: 3.34 M/UL (ref 3.95–5.11)
SODIUM BLD-SCNC: 131 MMOL/L (ref 135–144)
WBC # BLD: 5.1 K/UL (ref 3.5–11.3)

## 2020-02-05 PROCEDURE — 36415 COLL VENOUS BLD VENIPUNCTURE: CPT

## 2020-02-05 PROCEDURE — 85027 COMPLETE CBC AUTOMATED: CPT

## 2020-02-05 PROCEDURE — P9603 ONE-WAY ALLOW PRORATED MILES: HCPCS

## 2020-02-05 PROCEDURE — 80048 BASIC METABOLIC PNL TOTAL CA: CPT

## 2020-02-07 ENCOUNTER — HOSPITAL ENCOUNTER (OUTPATIENT)
Age: 85
Setting detail: SPECIMEN
Discharge: HOME | End: 2020-02-07
Payer: MEDICARE

## 2020-02-07 LAB

## 2020-02-07 PROCEDURE — 87086 URINE CULTURE/COLONY COUNT: CPT

## 2020-02-07 PROCEDURE — 81001 URINALYSIS AUTO W/SCOPE: CPT

## 2020-02-07 PROCEDURE — 87186 SC STD MICRODIL/AGAR DIL: CPT

## 2020-02-07 PROCEDURE — 87077 CULTURE AEROBIC IDENTIFY: CPT

## 2020-02-09 LAB
CULTURE: ABNORMAL
CULTURE: ABNORMAL
Lab: ABNORMAL
SPECIMEN DESCRIPTION: ABNORMAL

## 2020-02-12 ENCOUNTER — HOSPITAL ENCOUNTER (OUTPATIENT)
Age: 85
Setting detail: SPECIMEN
Discharge: HOME | End: 2020-02-12
Payer: MEDICARE

## 2020-02-12 LAB
ANION GAP SERPL CALCULATED.3IONS-SCNC: 9 MMOL/L (ref 9–17)
BUN BLDV-MCNC: 11 MG/DL (ref 8–23)
BUN/CREAT BLD: ABNORMAL (ref 9–20)
CALCIUM SERPL-MCNC: 8.4 MG/DL (ref 8.6–10.4)
CHLORIDE BLD-SCNC: 100 MMOL/L (ref 98–107)
CO2: 25 MMOL/L (ref 20–31)
CREAT SERPL-MCNC: 0.62 MG/DL (ref 0.5–0.9)
GFR AFRICAN AMERICAN: >60 ML/MIN
GFR NON-AFRICAN AMERICAN: >60 ML/MIN
GFR SERPL CREATININE-BSD FRML MDRD: ABNORMAL ML/MIN/{1.73_M2}
GFR SERPL CREATININE-BSD FRML MDRD: ABNORMAL ML/MIN/{1.73_M2}
GLUCOSE BLD-MCNC: 80 MG/DL (ref 70–99)
POTASSIUM SERPL-SCNC: 4.5 MMOL/L (ref 3.7–5.3)
SODIUM BLD-SCNC: 134 MMOL/L (ref 135–144)

## 2020-02-12 PROCEDURE — P9603 ONE-WAY ALLOW PRORATED MILES: HCPCS

## 2020-02-12 PROCEDURE — 80048 BASIC METABOLIC PNL TOTAL CA: CPT

## 2020-02-12 PROCEDURE — 36415 COLL VENOUS BLD VENIPUNCTURE: CPT

## 2020-02-17 ENCOUNTER — HOSPITAL ENCOUNTER (OUTPATIENT)
Dept: WOUND CARE | Age: 85
Discharge: HOME | End: 2020-02-17
Payer: MEDICARE

## 2020-02-17 VITALS
SYSTOLIC BLOOD PRESSURE: 102 MMHG | RESPIRATION RATE: 18 BRPM | TEMPERATURE: 97.5 F | HEART RATE: 60 BPM | WEIGHT: 125 LBS | DIASTOLIC BLOOD PRESSURE: 58 MMHG | BODY MASS INDEX: 22.15 KG/M2 | HEIGHT: 63 IN

## 2020-02-17 PROBLEM — I10 ESSENTIAL (PRIMARY) HYPERTENSION: Status: ACTIVE | Noted: 2017-06-29

## 2020-02-17 PROBLEM — M51.36 DDD (DEGENERATIVE DISC DISEASE), LUMBAR: Chronic | Status: ACTIVE | Noted: 2019-08-20

## 2020-02-17 PROBLEM — Z98.890 STATUS POST KYPHOPLASTY: Status: ACTIVE | Noted: 2019-08-20

## 2020-02-17 PROBLEM — Z79.01 ANTICOAGULATED ON COUMADIN: Status: ACTIVE | Noted: 2019-08-20

## 2020-02-17 PROBLEM — K31.7 GASTRIC POLYP: Status: ACTIVE | Noted: 2020-02-17

## 2020-02-17 PROBLEM — M80.00XA AGE-RELATED OSTEOPOROSIS WITH CURRENT PATHOLOGICAL FRACTURE: Status: ACTIVE | Noted: 2019-08-20

## 2020-02-17 PROBLEM — G25.81 RESTLESS LEGS SYNDROME: Status: ACTIVE | Noted: 2020-02-17

## 2020-02-17 PROBLEM — F41.1 GENERALIZED ANXIETY DISORDER: Status: ACTIVE | Noted: 2020-02-17

## 2020-02-17 PROBLEM — K21.00 GASTROESOPHAGEAL REFLUX DISEASE WITH ESOPHAGITIS: Chronic | Status: ACTIVE | Noted: 2020-02-17

## 2020-02-17 PROBLEM — M47.812 CERVICAL SPONDYLOSIS WITHOUT MYELOPATHY: Status: ACTIVE | Noted: 2020-02-17

## 2020-02-17 PROBLEM — R13.19 ESOPHAGEAL DYSPHAGIA: Status: ACTIVE | Noted: 2020-02-17

## 2020-02-17 PROBLEM — I10 ESSENTIAL (PRIMARY) HYPERTENSION: Chronic | Status: ACTIVE | Noted: 2017-06-29

## 2020-02-17 PROBLEM — F41.1 GENERALIZED ANXIETY DISORDER: Chronic | Status: ACTIVE | Noted: 2020-02-17

## 2020-02-17 PROBLEM — K59.01 SLOW TRANSIT CONSTIPATION: Status: ACTIVE | Noted: 2020-02-17

## 2020-02-17 PROBLEM — J43.2 CENTRILOBULAR EMPHYSEMA (HCC): Status: ACTIVE | Noted: 2020-02-17

## 2020-02-17 PROBLEM — M48.062 SPINAL STENOSIS OF LUMBAR REGION WITH NEUROGENIC CLAUDICATION: Status: ACTIVE | Noted: 2019-08-20

## 2020-02-17 PROBLEM — E55.9 VITAMIN D DEFICIENCY: Status: ACTIVE | Noted: 2020-02-17

## 2020-02-17 PROBLEM — J43.2 CENTRILOBULAR EMPHYSEMA (HCC): Chronic | Status: ACTIVE | Noted: 2020-02-17

## 2020-02-17 PROBLEM — M51.36 DDD (DEGENERATIVE DISC DISEASE), LUMBAR: Status: ACTIVE | Noted: 2019-08-20

## 2020-02-17 PROBLEM — I26.99 PULMONARY EMBOLISM (HCC): Chronic | Status: ACTIVE | Noted: 2019-11-09

## 2020-02-17 PROBLEM — Z86.711 HISTORY OF PULMONARY EMBOLISM: Status: ACTIVE | Noted: 2020-02-17

## 2020-02-17 PROBLEM — Z95.0 PRESENCE OF CARDIAC PACEMAKER: Status: ACTIVE | Noted: 2017-06-29

## 2020-02-17 PROBLEM — K21.00 GASTROESOPHAGEAL REFLUX DISEASE WITH ESOPHAGITIS: Status: ACTIVE | Noted: 2020-02-17

## 2020-02-17 PROBLEM — H90.8 MIXED CONDUCTIVE AND SENSORINEURAL HEARING LOSS: Status: ACTIVE | Noted: 2020-02-17

## 2020-02-17 PROBLEM — M48.062 SPINAL STENOSIS OF LUMBAR REGION WITH NEUROGENIC CLAUDICATION: Chronic | Status: ACTIVE | Noted: 2019-08-20

## 2020-02-17 PROBLEM — E43 SEVERE PROTEIN-CALORIE MALNUTRITION (HCC): Status: ACTIVE | Noted: 2020-02-17

## 2020-02-17 PROBLEM — I80.9 THROMBOPHLEBITIS: Status: ACTIVE | Noted: 2020-02-17

## 2020-02-17 PROBLEM — G25.81 RESTLESS LEGS SYNDROME: Chronic | Status: ACTIVE | Noted: 2020-02-17

## 2020-02-17 PROBLEM — H90.8 MIXED CONDUCTIVE AND SENSORINEURAL HEARING LOSS: Chronic | Status: ACTIVE | Noted: 2020-02-17

## 2020-02-17 PROBLEM — R13.19 ESOPHAGEAL DYSPHAGIA: Chronic | Status: ACTIVE | Noted: 2020-02-17

## 2020-02-17 PROCEDURE — 6370000000 HC RX 637 (ALT 250 FOR IP): Performed by: NURSE PRACTITIONER

## 2020-02-17 PROCEDURE — 11046 DBRDMT MUSC&/FSCA EA ADDL: CPT | Performed by: NURSE PRACTITIONER

## 2020-02-17 PROCEDURE — 11043 DBRDMT MUSC&/FSCA 1ST 20/<: CPT

## 2020-02-17 PROCEDURE — 11043 DBRDMT MUSC&/FSCA 1ST 20/<: CPT | Performed by: NURSE PRACTITIONER

## 2020-02-17 PROCEDURE — 11046 DBRDMT MUSC&/FSCA EA ADDL: CPT

## 2020-02-17 RX ORDER — LIDOCAINE HYDROCHLORIDE 40 MG/ML
SOLUTION TOPICAL ONCE
Status: COMPLETED | OUTPATIENT
Start: 2020-02-17 | End: 2020-02-17

## 2020-02-17 RX ADMIN — LIDOCAINE HYDROCHLORIDE 10 ML: 40 SOLUTION TOPICAL at 13:09

## 2020-02-17 ASSESSMENT — PAIN SCALES - GENERAL: PAINLEVEL_OUTOF10: 0

## 2020-02-17 NOTE — PROGRESS NOTES
[facility]   Progress Note and Procedure Note      Domenic Sanchez  MEDICAL RECORD NUMBER:  [id number]  AGE: 80 y.o. GENDER: female  : 1930  EPISODE DATE:  2020    Subjective:     Chief Complaint   Patient presents with    Wound Check         HISTORY of PRESENT ILLNESS HPI     Domenic Sanchez is a 80 y.o. female who presents today for wound/ulcer evaluation. History of Wound Context: stage IV sacral pressure ulcer. We did confirm that facility is able do wound vac changes and has supplies just need orders to do so. She is eating more protein. Daughter is with her today. Daughter tells me that she is currently being treated for pneumonia and UTI.    Wound/Ulcer Pain Timing/Severity: none  Quality of pain: N/A  Severity:  0 / 10   Modifying Factors: None  Associated Signs/Symptoms: none    Ulcer Identification:  Ulcer Type: pressure  Contributing Factors: chronic pressure, decreased mobility and malnutrition    Wound: N/A        PAST MEDICAL HISTORY        Diagnosis Date    Arthritis     Atrial fibrillation ([address] 2016    CH[facility]congest[facility] failure) [facility]     Complete heart block ([address] 2016    Constipation     CVA (cerebral vascular accident) ([address] 2019    ACUTE ISCHEMIC    GERD (gastroesophageal reflux disease)     Glaucoma     mild, on eye drops    Hearing loss     bilateral hearing aids    Hiatal hernia     Hx of blood clots 2016    pulmonary emboli bilateral lungs after Pacemaker inserted    Hyperlipidemia     MRSA (methicillin resistant Staphylococcus aureus) infection 2019    MV ENDOCARDITIS    Osteopenia     Pacemaker 2016    Duo-chamber pacemaker in left upper chest;  DR. Trejo Counts    Restless leg syndrome     Right knee DJD     Wears glasses     Wears hearing aid in both ears        PAST SURGICAL HISTORY    Past Surgical History:   Procedure Laterality Date    CARDIAC PACEMAKER PLACEMENT      REMOVAL 2019 WITH MG TABS tablet Take 2.5 mg by mouth 2 times daily      Calcium Carbonate-Vitamin D (CALCIUM 500 + D PO) Take 1 tablet by mouth daily CALCIUM 500MG+VIT D 200IU      sodium hypochlorite (DAKINS) 0.125 % SOLN Irrigate with as directed 2 times daily COCCYX INJURY      lisinopril (PRINIVIL;ZESTRIL) 2.5 MG tablet Take 2.5 mg by mouth nightly      Magnesium Oxide (MAG- PO) Take 400 mg by mouth 2 times daily      lactulose (CHRONULAC) 10 GM/15ML solution Take 10 g by mouth 2 times daily      nitroGLYCERIN (NITROSTAT) 0.4 MG SL tablet Place 0.4 mg under the tongue every 5 minutes as needed for Chest pain up to max of 3 total doses. If no relief after 1 dose, call 911.  potassium chloride (MICRO-K) 10 MEQ extended release capsule Take 30 mEq by mouth daily       furosemide (LASIX) 20 MG tablet Take 1 tablet by mouth 2 times daily (Patient taking differently: Take 20 mg by mouth daily ) 60 tablet 3    aspirin 81 MG chewable tablet Take 1 tablet by mouth daily 30 tablet 3    guaiFENesin (MUCINEX) 600 MG extended release tablet Take 1 tablet by mouth 2 times daily 60 tablet 0    amiodarone (CORDARONE) 200 MG tablet Take 200 mg by mouth daily      polyethylene glycol (MIRALAX) powder Take 17 g by mouth daily as needed      metoprolol tartrate (LOPRESSOR) 25 MG tablet Take 1 tablet by mouth 2 times daily 60 tablet 3    rOPINIRole (REQUIP) 0.25 MG tablet Take 0.25 mg by mouth nightly       atorvastatin (LIPITOR) 10 MG tablet Take 10 mg by mouth daily       latanoprost (XALATAN) 0.005 % ophthalmic solution Place 1 drop into both eyes nightly       Acetaminophen (TYLENOL ARTHRITIS PAIN PO) Take 2 tablets by mouth every 6 hours as needed        No current facility-administered medications on file prior to encounter. REVIEW OF SYSTEMS    Pertinent items are noted in HPI.     Objective:      BP (!) 102/58   Pulse 60   Temp 97.5 °F (36.4 °C) (Tympanic)   Resp 18   Ht 5' 3\" (1.6 m)   Wt 125 lb (56.7 kg)   BMI 22.14 kg/m²     Wt Readings from Last 3 Encounters:   02/17/20 125 lb (56.7 kg)   02/03/20 126 lb 8 oz (57.4 kg)   01/14/20 138 lb (62.6 kg)       PHYSICAL EXAM    General Appearance: alert and oriented to person, place and time, well-developed and well-nourished, in no acute distress  Skin: warm and dry, no rash or erythema, stage IV sacral pressure ulcer   Head: normocephalic and atraumatic  Eyes: pupils equal, round, and conjunctivae normal  Pulmonary/Chest: normal air movement, no respiratory distress, harsh cough today   Musculoskeletal: no joint swelling, deformity or tenderness  Neurologic: coordination normal and speech normal      Assessment:     Problem List Items Addressed This Visit     Pressure ulcer of coccygeal region, stage IV ([address] - Primary    Severe protein-calorie malnutrition ([address]           Procedure Note  Indications:  Based on my examination of this patient's wound(s)/ulcer(s) today, debridement is required to promote healing and evaluate the wound base. Performed by: ALEXSANDRA Jones - CNP    Consent obtained:  Yes    Time out taken:  Yes    Pain Control: Anesthetic  Anesthetic: 4% Lidocaine Liquid Topical       Debridement:Excisional Debridement    Using curette the wound(s)/ulcer(s) was/were sharply debrided down through and including the removal of muscle/ fascia.         Devitalized Tissue Debrided:  fibrin, biofilm and slough    Pre Debridement Measurements:  Are located in the [place]  Documentation Flow Sheet    Wound/Ulcer #: 1    Post Debridement Measurements:  Wound/Ulcer Descriptions are Pre Debridement except measurements:    Wound 01/13/20 Coccyx wound #1 (Active)   Wound Image   2/17/2020  1:03 PM   Wound Pressure Stage  4 2/17/2020  1:03 PM   Dressing Status Old drainage;New drainage 2/17/2020  1:03 PM   Dressing Changed Changed/New 2/17/2020  1:03 PM   Dressing/Treatment Other (comment) 2/3/2020  2:21 PM   Wound Cleansed Rinsed/Irrigated with saline

## 2020-02-21 ENCOUNTER — HOSPITAL ENCOUNTER (OUTPATIENT)
Age: 85
Setting detail: SPECIMEN
Discharge: HOME | End: 2020-02-21
Payer: MEDICARE

## 2020-02-21 LAB
ALBUMIN SERPL-MCNC: 2 G/DL (ref 3.5–5.2)
ALBUMIN/GLOBULIN RATIO: 0.7 (ref 1–2.5)
ALP BLD-CCNC: 89 U/L (ref 35–104)
ALT SERPL-CCNC: 21 U/L (ref 5–33)
ANION GAP SERPL CALCULATED.3IONS-SCNC: 9 MMOL/L (ref 9–17)
AST SERPL-CCNC: 31 U/L
BILIRUB SERPL-MCNC: 0.19 MG/DL (ref 0.3–1.2)
BUN BLDV-MCNC: 10 MG/DL (ref 8–23)
BUN/CREAT BLD: ABNORMAL (ref 9–20)
CALCIUM SERPL-MCNC: 8.5 MG/DL (ref 8.6–10.4)
CHLORIDE BLD-SCNC: 101 MMOL/L (ref 98–107)
CO2: 24 MMOL/L (ref 20–31)
CREAT SERPL-MCNC: 0.47 MG/DL (ref 0.5–0.9)
GFR AFRICAN AMERICAN: >60 ML/MIN
GFR NON-AFRICAN AMERICAN: >60 ML/MIN
GFR SERPL CREATININE-BSD FRML MDRD: ABNORMAL ML/MIN/{1.73_M2}
GFR SERPL CREATININE-BSD FRML MDRD: ABNORMAL ML/MIN/{1.73_M2}
GLUCOSE BLD-MCNC: 91 MG/DL (ref 70–99)
HCT VFR BLD CALC: 29.6 % (ref 36.3–47.1)
HEMOGLOBIN: 8.7 G/DL (ref 11.9–15.1)
MCH RBC QN AUTO: 26.5 PG (ref 25.2–33.5)
MCHC RBC AUTO-ENTMCNC: 29.4 G/DL (ref 28.4–34.8)
MCV RBC AUTO: 90.2 FL (ref 82.6–102.9)
NRBC AUTOMATED: 0 PER 100 WBC
PDW BLD-RTO: 20 % (ref 11.8–14.4)
PLATELET # BLD: 305 K/UL (ref 138–453)
PMV BLD AUTO: 10.6 FL (ref 8.1–13.5)
POTASSIUM SERPL-SCNC: 4.3 MMOL/L (ref 3.7–5.3)
PREALBUMIN: 13 MG/DL (ref 20–40)
RBC # BLD: 3.28 M/UL (ref 3.95–5.11)
SODIUM BLD-SCNC: 134 MMOL/L (ref 135–144)
TOTAL PROTEIN: 4.8 G/DL (ref 6.4–8.3)
WBC # BLD: 5.7 K/UL (ref 3.5–11.3)

## 2020-02-21 PROCEDURE — 85027 COMPLETE CBC AUTOMATED: CPT

## 2020-02-21 PROCEDURE — 80053 COMPREHEN METABOLIC PANEL: CPT

## 2020-02-21 PROCEDURE — 84134 ASSAY OF PREALBUMIN: CPT

## 2020-02-21 PROCEDURE — P9603 ONE-WAY ALLOW PRORATED MILES: HCPCS

## 2020-02-24 ENCOUNTER — HOSPITAL ENCOUNTER (OUTPATIENT)
Dept: WOUND CARE | Age: 85
Discharge: HOME | End: 2020-02-24
Payer: MEDICARE

## 2020-02-24 VITALS
RESPIRATION RATE: 18 BRPM | TEMPERATURE: 98.4 F | WEIGHT: 125 LBS | HEIGHT: 63 IN | DIASTOLIC BLOOD PRESSURE: 68 MMHG | SYSTOLIC BLOOD PRESSURE: 104 MMHG | BODY MASS INDEX: 22.15 KG/M2 | HEART RATE: 61 BPM

## 2020-02-24 PROCEDURE — 11043 DBRDMT MUSC&/FSCA 1ST 20/<: CPT

## 2020-02-24 PROCEDURE — 11043 DBRDMT MUSC&/FSCA 1ST 20/<: CPT | Performed by: NURSE PRACTITIONER

## 2020-02-24 PROCEDURE — 11046 DBRDMT MUSC&/FSCA EA ADDL: CPT

## 2020-02-24 PROCEDURE — 6370000000 HC RX 637 (ALT 250 FOR IP): Performed by: NURSE PRACTITIONER

## 2020-02-24 PROCEDURE — 11046 DBRDMT MUSC&/FSCA EA ADDL: CPT | Performed by: NURSE PRACTITIONER

## 2020-02-24 RX ORDER — LIDOCAINE HYDROCHLORIDE 40 MG/ML
SOLUTION TOPICAL ONCE
Status: COMPLETED | OUTPATIENT
Start: 2020-02-24 | End: 2020-02-24

## 2020-02-24 RX ADMIN — LIDOCAINE HYDROCHLORIDE 10 ML: 40 SOLUTION TOPICAL at 13:30

## 2020-02-24 ASSESSMENT — PAIN SCALES - GENERAL: PAINLEVEL_OUTOF10: 0

## 2020-02-24 NOTE — PROGRESS NOTES
[facility]   Progress Note and Procedure Note      Janet Dugan  MEDICAL RECORD NUMBER:  [id number]  AGE: 80 y.o. GENDER: female  : 1930  EPISODE DATE:  2020    Subjective:     Chief Complaint   Patient presents with    Wound Check         HISTORY of PRESENT ILLNESS HPI     Janet Dugan is a 80 y.o. female who presents today for wound/ulcer evaluation.    History of Wound Context: stage IV sacral pressure ulcer   Wound/Ulcer Pain Timing/Severity: none  Quality of pain: N/A  Severity:  0 / 10   Modifying Factors: None  Associated Signs/Symptoms: none    Ulcer Identification:  Ulcer Type: pressure  Contributing Factors: chronic pressure, decreased mobility and malnutrition    Wound: N/A        PAST MEDICAL HISTORY        Diagnosis Date    Arthritis     Atrial fibrillation ([address] 2016    [facility]     Complete heart block [facility]    Constipation     CVA (cerebral vascular accident) ([facility] 2019    ACUTE ISCHEMIC    GERD (gastroesophageal reflux disease)     Glaucoma     mild, on eye drops    Hearing loss     bilateral hearing aids    Hiatal hernia     Hx of blood clots 2016    pulmonary emboli bilateral lungs after Pacemaker inserted    Hyperlipidemia     MRSA (methicillin resistant Staphylococcus aureus) infection 2019    MV ENDOCARDITIS    Osteopenia     Pacemaker 2016    Duo-chamber pacemaker in left upper chest;  DR. Pedro Ojeda    Restless leg syndrome     Right knee DJD     Wears glasses     Wears hearing aid in both ears        PAST SURGICAL HISTORY    Past Surgical History:   Procedure Laterality Date    CARDIAC PACEMAKER PLACEMENT      REMOVAL 2019 WITH TEMPORARY PACEMAKER PLACED 2019    CARDIAC PACEMAKER PLACEMENT  2019    DR Romy Anderson    CATARACT REMOVAL WITH IMPLANT Bilateral     Left 1998, right 1996    COLONOSCOPY      DILATION AND CURETTAGE OF UTERUS N/A 3/12/2019 daily COCCYX INJURY      lisinopril (PRINIVIL;ZESTRIL) 2.5 MG tablet Take 2.5 mg by mouth nightly      Magnesium Oxide (MAG- PO) Take 400 mg by mouth 2 times daily      lactulose (CHRONULAC) 10 GM/15ML solution Take 10 g by mouth 2 times daily      nitroGLYCERIN (NITROSTAT) 0.4 MG SL tablet Place 0.4 mg under the tongue every 5 minutes as needed for Chest pain up to max of 3 total doses. If no relief after 1 dose, call 911.  potassium chloride (MICRO-K) 10 MEQ extended release capsule Take 30 mEq by mouth daily       furosemide (LASIX) 20 MG tablet Take 1 tablet by mouth 2 times daily (Patient taking differently: Take 20 mg by mouth daily ) 60 tablet 3    aspirin 81 MG chewable tablet Take 1 tablet by mouth daily 30 tablet 3    guaiFENesin (MUCINEX) 600 MG extended release tablet Take 1 tablet by mouth 2 times daily 60 tablet 0    amiodarone (CORDARONE) 200 MG tablet Take 200 mg by mouth daily      polyethylene glycol (MIRALAX) powder Take 17 g by mouth daily as needed      metoprolol tartrate (LOPRESSOR) 25 MG tablet Take 1 tablet by mouth 2 times daily 60 tablet 3    rOPINIRole (REQUIP) 0.25 MG tablet Take 0.25 mg by mouth nightly       atorvastatin (LIPITOR) 10 MG tablet Take 10 mg by mouth daily       latanoprost (XALATAN) 0.005 % ophthalmic solution Place 1 drop into both eyes nightly       Acetaminophen (TYLENOL ARTHRITIS PAIN PO) Take 2 tablets by mouth every 6 hours as needed        No current facility-administered medications on file prior to encounter. REVIEW OF SYSTEMS    Pertinent items are noted in HPI.     Objective:      /68   Pulse 61   Temp 98.4 °F (36.9 °C) (Tympanic)   Resp 18   Ht 5' 3\" (1.6 m)   Wt 125 lb (56.7 kg)   BMI 22.14 kg/m²     Wt Readings from Last 3 Encounters:   02/24/20 125 lb (56.7 kg)   02/17/20 125 lb (56.7 kg)   02/03/20 126 lb 8 oz (57.4 kg)       PHYSICAL EXAM    General Appearance: alert and oriented to person, place and time, well Volume (cm^3) 56.25 cm^3 2/24/2020  1:20 PM   Wound Healing % 46 2/24/2020  1:20 PM   Post-Procedure Length (cm) 4.5 cm 2/24/2020  1:20 PM   Post-Procedure Width (cm) 5 cm 2/24/2020  1:20 PM   Post-Procedure Depth (cm) 2.5 cm 2/24/2020  1:20 PM   Post-Procedure Surface Area (cm^2) 22.5 cm^2 2/24/2020  1:20 PM   Post-Procedure Volume (cm^3) 56.25 cm^3 2/24/2020  1:20 PM   Undermining Starts ___ O'Clock 0700 2/24/2020  1:20 PM   Undermining Ends___ O'Clock 0400 2/24/2020  1:20 PM   Undermining Maxium Distance (cm) 5.7 @ 1200 2/24/2020  1:20 PM   Wound Assessment Drainage;Red;Yellow 2/24/2020  1:20 PM   Drainage Amount Large 2/24/2020  1:20 PM   Drainage Description Serosanguinous 2/24/2020  1:20 PM   Odor Mild 2/24/2020  1:20 PM   Margins Epibole (rolled edges); Defined edges 2/24/2020  1:20 PM   Exposed structure Bone 2/24/2020  1:20 PM   Radha-wound Assessment Excoriated 2/24/2020  1:20 PM   Red%Wound Bed 90 2/24/2020  1:20 PM   Yellow%Wound Bed 10 2/24/2020  1:20 PM   Culture Taken Yes 1/13/2020  2:07 PM   Number of days: 42     Incision 12/16/19 Chest Right;Upper (Active)   Number of days: 70       Percent of Wound(s)/Ulcer(s) Debrided: 100%    Total Surface Area Debrided:  22.5 sq cm       Diabetic/Pressure/Non Pressure Ulcers only:  Ulcer: Pressure ulcer, Stage 4      Estimated Blood Loss:  Minimal    Hemostasis Achieved:  by pressure    Procedural Pain:  0  / 10     Post Procedural Pain:  0 / 10     Response to treatment:  Well tolerated by patient. Plan:     Treatment Note please see attached Discharge Instructions    Written patient dismissal instructions given to patient and signed by patient or POA.          Discharge Instructions         [address] [place] [address] and [facility] TREATMENT  CENTER                                 Visit Rahel Instructions / Physician Orders  DATE: 02/17/2020     Home Care: [facility]     SUPPLIES ORDERED THRU: SNF     Wound Location:  Coccyx     Cleanse with: Liquid antibacterial soap and water, rinse well      Dressing Orders:  Start KCI wound vac( ecf to apply today)  Drape around wound with kci film. Place WHITE  foam over exposed bone then fill with black foam. Apply Positive bolster around wound. Bridge to hip ( make sure there is KCI film under black foam of bridge) set at 150mmhg continuous pressure.   Please make sure that Acra style cannister is sent to next visit at wound care. This is why vac was not reapplied  Frequency: Change MWF send supplies to wound care we will apply the day she comes.     Additional Orders: Increase protein to diet (meat, cheese, eggs, fish, peanut butter, nuts and beans)  Multivitamin daily  May be up for meals- only about 30 minutes at a time, no more than 2 hours at a time  ROHO cushion when she is up in a chair  Apply specialty mattress to bed  Dietician to follow for Low prealbumin and large draining wound     HYPERBARIC TREATMENT-                TREATMENT #     Your next appointment with [facility] is in 1 week     ROOM TYPE   []???? CHAIR     []???? BED   []???? EITHER        TIME []???? 45 MIN     []???? 60 MIN     (Please note your next appointment above and if you are unable to keep, kindly give a 24 hour notice. Thank you.)     If you experience any of the following, please call the [facility] during business hours:  [phone]     * Increase in Pain  * Temperature over 101  * Increase in drainage from your wound  * Drainage with a foul odor  * Bleeding  * Increase in swelling  * Need for compression bandage changes due to slippage, breakthrough drainage.     If you need medical attention outside of the business hours of the [employer] [facility] please contact your PCP or go to the nearest emergency room.     The information contained in the After Visit Summary has been reviewed with me, the patient and/or responsible adult, by my health care provider(s).  I had the opportunity to ask questions regarding this information. I have elected to receive;      []?? ?? After Visit Summary  [x]????Comprehensive Discharge Instruction        Patient signature______________________________________Date:________  Electronically signed by Paulino Deleon RN on 2/24/2020 at 1:12 PM          Electronically signed by ALEXSANDRA Jimenez CNP on 2/24/2020 at 2:12 PM

## 2020-02-24 NOTE — DISCHARGE INSTR - COC
Continuity of Care Form    Patient Name: Kimberlee Hyatt   :  1930  MRN:  [id number]    Admit date:  2020  Discharge date:  ***    Code Status Order: Prior   Advance Directives:   [facility] Documentation     Date/Time Healthcare Directive Type of Healthcare Directive Copy in [address] Agent's Name Healthcare Agent's Phone Number    [phone]  Yes, patient has an advance directive for healthcare treatment  Durable power of  for health care  Yes, copy in chart  Adult Children  --  --          Admitting Physician:  No admitting provider for patient encounter. PCP: Chato Mathew MD    Discharging Nurse: [facility] Unit/Room#: No information available for this encounter.   Discharging Unit Phone Number: ***    Emergency Contact:   Extended Emergency Contact Information  Primary Emergency Contact: Anderson [address] Phone: [phone]  Work Phone: [phone]  Mobile Phone: [phone]  Relation: Child  Secondary Emergency Contact: Kajal Lainez  Address: [address] Phone: [phone]  Mobile Phone: [phone]  Relation: Child    Past Surgical History:  Past Surgical History:   Procedure Laterality Date    CARDIAC PACEMAKER PLACEMENT      REMOVAL 2019 WITH TEMPORARY PACEMAKER PLACED 2019    CARDIAC PACEMAKER PLACEMENT  2019    DR Dakotah Armstrong    CATARACT REMOVAL WITH IMPLANT Bilateral     Left 1998, right 1996    COLONOSCOPY      DILATION AND CURETTAGE OF UTERUS N/A 3/12/2019    DILATATION AND CURETTAGE, HYSTEROSCOPY, MYOSURE performed by Iman Aguilar MD at [address] [place] [address]  2016    medtronic advisa MRI  model # [id number]  av lead [id number] rv lead [id number]  - mri compatible    [place] GI ENDOSCOPIC ULTRASOUND N/A 2018    ENDOSCOPIC ULTRASOUND, PATHOLOGY REQUESTED, PAT NOTIFIED performed by Valentina WEST  Bacteremia R78.81    Right arm cellulitis L03. 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 7XXA    Closed fracture of lumbar vertebra with spinal cord injury ([address] S34.109A, S32.008A    Pressure ulcer of coccygeal region, stage IV ([facility]) L89.154    Wedge compression fracture of third lumbar vertebra with routine healing S32.030D    Closed compression fracture of L1 lumbar vertebra, sequela S32.010S    Age-related osteoporosis without current pathological fracture M81.0    Vitamin D deficiency E55.9    Status post kyphoplasty Z98.890    Spinal stenosis of lumbar region with neurogenic claudication M48.062    Slow transit constipation K59.01    Restless legs syndrome G25.81    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    History of pulmonary embolism Z86.711    Gastric polyp K31.7    Gastroesophageal reflux disease with esophagitis K21.0    Generalized anxiety disorder F41.1    Essential (primary) hypertension I10    Thrombophlebitis I80.9    Age-related osteoporosis with current pathological fracture M80.00XA    Anticoagulated on Coumadin Z79.01    Centrilobular emphysema ([facility]) J43.2    Cervical spondylosis without myelopathy M47.812    DDD (degenerative disc disease), lumbar M51.36    Esophageal dysphagia R13.10    Presence of cardiac pacemaker Z95.0    Severe protein-calorie malnutrition ([facility]) E43       Isolation/Infection:   Isolation          No Isolation        Patient Infection Status     Infection Onset Added Last Indicated Last Indicated By Review Planned Expiration Resolved Resolved By    MDRO (multi-drug resistant organism)  02/11/20 02/11/20 Paige Felix RN        E. Coli - urine 2/2020        ESBL (Extended Spectrum Beta Lactamase) 02/07/20 02/09/20 02/07/20 Urine culture via catheter        Proteus - Urine 2/2020        MRSA 10/04/19 10/06/19 10/07/19 CULTURE BLOOD #2        Blood - 10/2019          Nurse Assessment:  Last Vital Signs: /68   Pulse 61   Temp 98.4 °F (36.9 °C) (Tympanic)   Resp 18   Ht 5' 3\" (1.6 m)   Wt 125 lb (56.7 kg)   BMI 22.14 kg/m²     Last documented pain score (0-10 scale): Pain Level: 0  Last Weight:   Wt Readings from Last 1 Encounters:   02/24/20 125 lb (56.7 kg)     Mental Status:  {IP PT MENTAL STATUS:20030}    IV Access:  { LUIS CARLOS IV ACCESS:387002228}    Nursing Mobility/ADLs:  Walking   {CHP DME DDAN:899550200}  Transfer  {CHP DME RXVU:414979123}  Bathing  {CHP DME PMYS:881876854}  Dressing  {CHP DME FDPQ:819856512}  Toileting  {CHP DME GSLI:483070051}  Feeding  {CHP DME BYDX:240607485}  Med Admin  {CHP DME LJTC:504070629}  Med Delivery   { LUIS CARLOS MED Delivery:519500985}    Wound Care Documentation and Therapy:  Wound 01/13/20 Coccyx wound #1 (Active)   Wound Image   2/17/2020  1:03 PM   Wound Pressure Stage  4 2/24/2020  1:20 PM   Dressing Status New drainage; Old drainage 2/24/2020  1:20 PM   Dressing Changed Changed/New 2/24/2020  1:20 PM   Wound Cleansed Rinsed/Irrigated with saline 2/24/2020  1:20 PM   Wound Length (cm) 4.5 cm 2/24/2020  1:20 PM   Wound Width (cm) 5 cm 2/24/2020  1:20 PM   Wound Depth (cm) 2.5 cm 2/24/2020  1:20 PM   Wound Surface Area (cm^2) 22.5 cm^2 2/24/2020  1:20 PM   Change in Wound Size % (l*w) 7.94 2/24/2020  1:20 PM   Wound Volume (cm^3) 56.25 cm^3 2/24/2020  1:20 PM   Wound Healing % 46 2/24/2020  1:20 PM   Post-Procedure Length (cm) 4.5 cm 2/24/2020  1:20 PM   Post-Procedure Width (cm) 5 cm 2/24/2020  1:20 PM   Post-Procedure Depth (cm) 2.5 cm 2/24/2020  1:20 PM   Post-Procedure Surface Area (cm^2) 22.5 cm^2 2/24/2020  1:20 PM Post-Procedure Volume (cm^3) 56.25 cm^3 2020  1:20 PM   Undermining Starts ___ O'Clock 0700 2020  1:20 PM   Undermining Ends___ O'Clock 0400 2020  1:20 PM   Undermining Maxium Distance (cm) 5.7 @ 1200 2020  1:20 PM   Wound Assessment Drainage;Red;Yellow 2020  1:20 PM   Drainage Amount Large 2020  1:20 PM   Drainage Description Serosanguinous 2020  1:20 PM   Odor Mild 2020  1:20 PM   Margins Epibole (rolled edges); Defined edges 2020  1:20 PM   Exposed structure Bone 2020  1:20 PM   Radha-wound Assessment Excoriated 2020  1:20 PM   Red%Wound Bed 90 2020  1:20 PM   Yellow%Wound Bed 10 2020  1:20 PM   Culture Taken Yes 2020  2:07 PM   Number of days: 42        Elimination:  Continence:   · Bowel: {YES / LA:99890}  · Bladder: {YES / RU:48633}  Urinary Catheter: {Urinary Catheter:551135040}   Colostomy/Ileostomy/Ileal Conduit: {YES / QP:98264}       Date of Last BM: ***  No intake or output data in the 24 hours ending 20 1416  No intake/output data recorded.     Safety Concerns:     [address] Safety Concerns:778102618}    Impairments/Disabilities:      [address] Impairments/Disabilities:364251154}    Nutrition Therapy:  Current Nutrition Therapy:   [address] Diet List:309751525}    Routes of Feeding: {CHP DME Other Feedings:223580152}  Liquids: {Slp liquid thickness:55171}  Daily Fluid Restriction: {CHP DME Yes amt example:423097454}  Last Modified Barium Swallow with Video (Video Swallowing Test): {Done Not Done IZRL:571419328}    Treatments at the Time of Hospital Discharge:   Respiratory Treatments: ***  Oxygen Therapy:  {Therapy; copd oxygen:07122}  Ventilator:    {MH CC Vent ZKFD:896345177}    Rehab Therapies: {THERAPEUTIC INTERVENTION:6184620221}  Weight Bearing Status/Restrictions: [address] Weight Bearin}  Other Medical Equipment (for information only, NOT a DME order):  {EQUIPMENT:908228313}  Other Treatments: ***    Patient's personal belongings (please select all that are sent with patient):  {CHP DME Belongings:996582476}    RN SIGNATURE:  {Esignature:641266491}    CASE MANAGEMENT/SOCIAL WORK SECTION    Inpatient Status Date: ***    Readmission Risk Assessment Score:  Readmission Risk              Risk of Unplanned Readmission:        0           Discharging to Facility/ Agency   · Name:   · Address:  · Phone:  · Fax:    Dialysis Facility (if applicable)   · Name:  · Address:  · Dialysis Schedule:  · Phone:  · Fax:    / signature: {Esignature:954131510}    PHYSICIAN SECTION    Prognosis: {Prognosis:0349648650}    Condition at Discharge: [address] [place] [address] Patient Condition:063915286}    Rehab Potential (if transferring to Rehab): {Prognosis:8160755843}    Recommended Labs or Other Treatments After Discharge: ***    Physician Certification: I certify the above information and transfer of Kimberlee Barrier  is necessary for the continuing treatment of the diagnosis listed and that she requires {Admit to Appropriate Level of Care:56005} for {GREATER/LESS:402087153} 30 days.      Update Admission H&P: {CHP DME Changes in VTQR:676160193}    PHYSICIAN SIGNATURE:  {Esignature:421496646}

## 2020-02-27 ENCOUNTER — HOSPITAL ENCOUNTER (OUTPATIENT)
Dept: GENERAL RADIOLOGY | Age: 85
Discharge: HOME | End: 2020-02-29
Payer: COMMERCIAL

## 2020-02-27 PROCEDURE — 74230 X-RAY XM SWLNG FUNCJ C+: CPT

## 2020-02-27 PROCEDURE — 92611 MOTION FLUOROSCOPY/SWALLOW: CPT

## 2020-02-27 NOTE — PROCEDURES
[address] and is working [facility] to address dysphagia. Pt currently getting over pneumonia and has frequent cough. Pt on honey thick liquids and mechanical soft solids. Pt's daughter reports pt coughing frequently throughout day, including during meals. MBS recommended to r/o aspiration/penetration c current diet and determine if diet downgrade necessary. Onset of problem:    Pt currently getting over pneumonia and has frequent cough. Pt currently on honey thick liquids and mechanical soft solids. Pt's daughter reporting pt coughing frequently throughout day, including during meals. MBS recommended to r/o aspiration/penetration c current diet and determine if diet downgrade necessary at this time. Behavior/Cognition/Vision/Hearing:  Behavior/Cognition: Alert; Cooperative  Vision: Impaired  Vision Exceptions: Wears glasses at all times  Hearing: Exceptions to [facility]  Hearing Exceptions: Hard of hearing/hearing concerns    Impressions:   Patient Position: Lateral     Consistencies Administered: Dysphagia Soft and Bite-Sized (Dysphagia III); Dysphagia Pureed (Dysphagia I); Honey teaspoon;Nectar cup              Oral Phase: Premature vallecular spillage c all consistencies tested. Prolonged mastication c soft solids. Pharyngeal: Min amount vallecular residue c all consistencies tested. Nectar thick liquids:  Deep penetration. Honey thick liquids:  No aspiration/penetration. Dysphagia Outcome Severity Scale: Level 3: Moderate dysphagia- Total assisstance, supervision or strategies.  Two or more diet consistencies restricted  Penetration-Aspiration Scale (PAS): 3 - Material enters the airway, remains above the vocal folds, and is not ejected from airway    Recommended Diet:  Solid consistency: Dysphagia Minced and Moist (Dysphagia II)  Liquid consistency: Moderately Thick (Honey)            Safe Swallow Protocol:     Compensatory Swallowing Strategies: Small bites/sips;Eat/Feed slowly;Upright as possible for all oral intake              Recommendations/Treatment  Requires SLP Intervention: Yes                  Recommended Exercises:    Therapeutic Interventions: Laryngeal exercises;Diet tolerance monitoring; Tongue base strengthening;Oral motor exercises         Education: Recommendations were reviewed with pt and pt's daughter following this exam.   Patient Education: Yes  Patient Education Response: Demonstrated understanding               Oral Preparation / Oral Phase  Oral Phase: Impaired        Pharyngeal Phase  Pharyngeal Phase: Impaired      Esophageal Phase  Esophageal Screen: WNL        Pain   Patient Currently in Pain: No         Therapy Time:   Individual Concurrent Group Co-treatment   Time In 1405         Time Out 1415         Minutes 10                   Constance MEDEIROS-SLP, 2/27/2020, 2:35 PM

## 2020-03-02 ENCOUNTER — HOSPITAL ENCOUNTER (OUTPATIENT)
Dept: WOUND CARE | Age: 85
Discharge: HOME | End: 2020-03-02
Payer: MEDICARE

## 2020-03-02 VITALS
DIASTOLIC BLOOD PRESSURE: 62 MMHG | HEART RATE: 64 BPM | HEIGHT: 63 IN | TEMPERATURE: 98.3 F | BODY MASS INDEX: 22.15 KG/M2 | WEIGHT: 125 LBS | RESPIRATION RATE: 18 BRPM | SYSTOLIC BLOOD PRESSURE: 140 MMHG

## 2020-03-02 PROCEDURE — 97605 NEG PRS WND THER DME<=50SQCM: CPT

## 2020-03-02 PROCEDURE — 11043 DBRDMT MUSC&/FSCA 1ST 20/<: CPT

## 2020-03-02 PROCEDURE — 6370000000 HC RX 637 (ALT 250 FOR IP): Performed by: NURSE PRACTITIONER

## 2020-03-02 PROCEDURE — 11046 DBRDMT MUSC&/FSCA EA ADDL: CPT | Performed by: NURSE PRACTITIONER

## 2020-03-02 PROCEDURE — 11043 DBRDMT MUSC&/FSCA 1ST 20/<: CPT | Performed by: NURSE PRACTITIONER

## 2020-03-02 RX ORDER — LIDOCAINE HYDROCHLORIDE 40 MG/ML
SOLUTION TOPICAL ONCE
Status: COMPLETED | OUTPATIENT
Start: 2020-03-02 | End: 2020-03-02

## 2020-03-02 RX ADMIN — LIDOCAINE HYDROCHLORIDE 15 ML: 40 SOLUTION TOPICAL at 13:51

## 2020-03-02 ASSESSMENT — PAIN DESCRIPTION - DESCRIPTORS: DESCRIPTORS: BURNING

## 2020-03-02 ASSESSMENT — PAIN SCALES - GENERAL: PAINLEVEL_OUTOF10: 7

## 2020-03-02 ASSESSMENT — PAIN DESCRIPTION - ONSET: ONSET: ON-GOING

## 2020-03-02 ASSESSMENT — PAIN DESCRIPTION - LOCATION: LOCATION: COCCYX

## 2020-03-02 ASSESSMENT — PAIN DESCRIPTION - FREQUENCY: FREQUENCY: CONTINUOUS

## 2020-03-02 ASSESSMENT — PAIN DESCRIPTION - PAIN TYPE: TYPE: CHRONIC PAIN

## 2020-03-02 ASSESSMENT — PAIN DESCRIPTION - PROGRESSION: CLINICAL_PROGRESSION: NOT CHANGED

## 2020-03-02 NOTE — PROGRESS NOTES
[address]   Progress Note and Procedure Note      [address] [facility] RECORD NUMBER:  [id number]  AGE: 80 y.o. GENDER: female  : 1930  EPISODE DATE:  3/2/2020    Subjective:     Chief Complaint   Patient presents with    Wound Check         HISTORY of PRESENT ILLNESS HPI     Alondra Groves is a 80 y.o. female who presents today for wound/ulcer evaluation.    History of Wound Context: stage IV sacral pressure ulcer   Wound/Ulcer Pain Timing/Severity: none  Quality of pain: N/A  Severity:  0 / 10   Modifying Factors: None  Associated Signs/Symptoms: none    Ulcer Identification:  Ulcer Type: pressure  Contributing Factors: chronic pressure, decreased mobility and malnutrition    Wound: N/A        PAST MEDICAL HISTORY        Diagnosis Date    Arthritis     Atrial fibrillation ([address] 2016    CHF (congestive heart failure) ([facility]     [facility] heart block [facility]    Constipation     CVA (cerebral vascular accident) ([facility] 2019    ACUTE ISCHEMIC    GERD (gastroesophageal reflux disease)     Glaucoma     mild, on eye drops    Hearing loss     bilateral hearing aids    Hiatal hernia     Hx of blood clots 2016    pulmonary emboli bilateral lungs after Pacemaker inserted    Hyperlipidemia     MRSA (methicillin resistant Staphylococcus aureus) infection 2019    MV ENDOCARDITIS    Osteopenia     Pacemaker 2016    Duo-chamber pacemaker in left upper chest;  DR. Sven Rebolledo    Restless leg syndrome     Right knee DJD     Wears glasses     Wears hearing aid in both ears        PAST SURGICAL HISTORY    Past Surgical History:   Procedure Laterality Date    CARDIAC PACEMAKER PLACEMENT      REMOVAL 2019 WITH TEMPORARY PACEMAKER PLACED 2019    CARDIAC PACEMAKER PLACEMENT  2019    DR Kwabena Costa    CATARACT REMOVAL WITH IMPLANT Bilateral     Left 1998, right 1996    COLONOSCOPY      DILATION AND CURETTAGE OF UTERUS N/A 3/12/2019 daily COCCYX INJURY      lisinopril (PRINIVIL;ZESTRIL) 2.5 MG tablet Take 2.5 mg by mouth nightly      Magnesium Oxide (MAG- PO) Take 400 mg by mouth 2 times daily      lactulose (CHRONULAC) 10 GM/15ML solution Take 10 g by mouth 2 times daily      nitroGLYCERIN (NITROSTAT) 0.4 MG SL tablet Place 0.4 mg under the tongue every 5 minutes as needed for Chest pain up to max of 3 total doses. If no relief after 1 dose, call 911.  potassium chloride (MICRO-K) 10 MEQ extended release capsule Take 30 mEq by mouth daily       furosemide (LASIX) 20 MG tablet Take 1 tablet by mouth 2 times daily (Patient taking differently: Take 20 mg by mouth daily ) 60 tablet 3    aspirin 81 MG chewable tablet Take 1 tablet by mouth daily 30 tablet 3    guaiFENesin (MUCINEX) 600 MG extended release tablet Take 1 tablet by mouth 2 times daily 60 tablet 0    amiodarone (CORDARONE) 200 MG tablet Take 200 mg by mouth daily      polyethylene glycol (MIRALAX) powder Take 17 g by mouth daily as needed      metoprolol tartrate (LOPRESSOR) 25 MG tablet Take 1 tablet by mouth 2 times daily 60 tablet 3    rOPINIRole (REQUIP) 0.25 MG tablet Take 0.25 mg by mouth nightly       atorvastatin (LIPITOR) 10 MG tablet Take 10 mg by mouth daily       latanoprost (XALATAN) 0.005 % ophthalmic solution Place 1 drop into both eyes nightly       Acetaminophen (TYLENOL ARTHRITIS PAIN PO) Take 2 tablets by mouth every 6 hours as needed        No current facility-administered medications on file prior to encounter. REVIEW OF SYSTEMS    Pertinent items are noted in HPI.     Objective:      BP (!) 140/62   Pulse 64   Temp 98.3 °F (36.8 °C) (Tympanic)   Resp 18   Ht 5' 3\" (1.6 m)   Wt 125 lb (56.7 kg)   BMI 22.14 kg/m²     Wt Readings from Last 3 Encounters:   03/02/20 125 lb (56.7 kg)   02/24/20 125 lb (56.7 kg)   02/17/20 125 lb (56.7 kg)       PHYSICAL EXAM    General Appearance: alert and oriented to person, place and time, well developed and well- nourished, in no acute distress  Skin: warm and dry, no rash or erythema, stage IV sacral pressure ulcer   Head: normocephalic and atraumatic  Eyes: pupils equal, round, and conjunctivae normal  Pulmonary/Chest: normal air movement, no respiratory distress  Musculoskeletal: no joint swelling, deformity or tenderness  Neurologic: coordination and speech normal      Assessment:     Problem List Items Addressed This Visit     Pressure ulcer of coccygeal region, stage IV (HCC)    Severe protein-calorie malnutrition ([address] - Primary           Procedure Note  Indications:  Based on my examination of this patient's wound(s)/ulcer(s) today, debridement is required to promote healing and evaluate the wound base. Performed by: ALEXSANDRA Lujan - CNP    Consent obtained:  Yes    Time out taken:  Yes    Pain Control: Anesthetic  Anesthetic: 4% Lidocaine Liquid Topical       Debridement:Excisional Debridement    Using curette the wound(s)/ulcer(s) was/were sharply debrided down through and including the removal of muscle/fascia. Devitalized Tissue Debrided:  fibrin, biofilm and slough    Pre Debridement Measurements:  Are located in the [place]  Documentation Flow Sheet    Wound/Ulcer #: 1    Post Debridement Measurements:  Wound/Ulcer Descriptions are Pre Debridement except measurements:    Wound 01/13/20 Coccyx wound #1 (Active)   Wound Image   2/17/2020  1:03 PM   Wound Pressure Stage  4 3/2/2020  1:34 PM   Dressing Status New drainage; Old drainage 3/2/2020  1:34 PM   Dressing Changed Changed/New 3/2/2020  1:34 PM   Wound Cleansed Rinsed/Irrigated with saline 3/2/2020  1:34 PM   Wound Length (cm) 4.4 cm 3/2/2020  1:34 PM   Wound Width (cm) 5.7 cm 3/2/2020  1:34 PM   Wound Depth (cm) 2.4 cm 3/2/2020  1:34 PM   Wound Surface Area (cm^2) 25.08 cm^2 3/2/2020  1:34 PM   Change in Wound Size % (l*w) -2.62 3/2/2020  1:34 PM   Wound Volume (cm^3) 60.19 cm^3 3/2/2020  1:34 PM   Wound Healing % 43 regarding this information. I have elected to receive;      []??? ? ? After Visit Summary  [x]??? ? ? Comprehensive Discharge Instruction        Patient signature______________________________________Date:________    Electronically signed by ALEXSANDRA Gil CNP on 3/2/2020 at 2:07 PM          Electronically signed by ALEXSANDRA Gil CNP on 3/2/2020 at 2:10 PM

## 2020-03-02 NOTE — PROGRESS NOTES
Negative Pressure    NAME:  Genet Vazquez  YOB: 1930  MEDICAL RECORD NUMBER:  [id number]  DATE:  3/2/2020     Applied Negative Pressure to coccyx wound(s)/ulcer(s).  [x] Applied skin barrier prep to gurwinder-wound.  [x] Cut strips of plastic drape to picture frame wound so that gurwinder-wound is     covered with the drape.  [x] If bridging dressing to less prominent site, cover any intact skin that will come in contact with the Negative Pressure Therapy sponge, gauze or channel drain with plastic drape. The sponge should never touch intact skin.  [x] Cut sponge, gauze or channel drain to size which will fit into the wound/ulcer bed without being forced.  [x] Be sure the sponge is large enough to hold the entire round plastic flange which is attached to the tubing. Never allow flange to be larger than the sponge or it will produce suction damaging intact skin.  Total number of individual pieces of foam used within the wound bed: 1     [x] If bridging the dressing away from the primary site, be sure the bridge leads to a piece of sponge large enough to hold the entire flange without allowing any of the flange to overlap onto intact skin.  [x] Covered sponge, gauze or channel drain with plastic drape.  [x] Cut a hole in this plastic drape directly over the sponge the same size as the plastic drain tubing.  [x] Removed plastic liner from flange and apply it directly over the hole you cut.  [x] Removed the plastic cover from the flange.  [x] Attached the tubing to the wound/ulcer Negative Pressure Therapy and turn it on to be sure a vacuum is created and that there are no leaks.  [x] If air leaks occur, use plastic drape to patch them.  [x] Secured Negative Pressure Therapy dressing with ace wrap loosely if located on an extremity. Maintain tubing outside of ace wrap. Tubing must not exert pressure on intact skin.     Applied per  Guidelines      Electronically signed by Ligia Florentino RN on 3/2/2020 at 2:43 PM

## 2020-03-06 ENCOUNTER — HOSPITAL ENCOUNTER (OUTPATIENT)
Age: 85
Setting detail: SPECIMEN
Discharge: HOME | End: 2020-03-06
Payer: MEDICARE

## 2020-03-06 LAB
HCT VFR BLD CALC: 33.3 % (ref 36.3–47.1)
HEMOGLOBIN: 10.1 G/DL (ref 11.9–15.1)
MCH RBC QN AUTO: 27.2 PG (ref 25.2–33.5)
MCHC RBC AUTO-ENTMCNC: 30.3 G/DL (ref 28.4–34.8)
MCV RBC AUTO: 89.8 FL (ref 82.6–102.9)
NRBC AUTOMATED: 0 PER 100 WBC
PDW BLD-RTO: 18.3 % (ref 11.8–14.4)
PLATELET # BLD: 379 K/UL (ref 138–453)
PMV BLD AUTO: 10.3 FL (ref 8.1–13.5)
RBC # BLD: 3.71 M/UL (ref 3.95–5.11)
WBC # BLD: 8 K/UL (ref 3.5–11.3)

## 2020-03-06 PROCEDURE — 85027 COMPLETE CBC AUTOMATED: CPT

## 2020-03-06 PROCEDURE — 36415 COLL VENOUS BLD VENIPUNCTURE: CPT

## 2020-03-06 PROCEDURE — P9603 ONE-WAY ALLOW PRORATED MILES: HCPCS

## 2020-03-09 ENCOUNTER — TELEPHONE (OUTPATIENT)
Dept: OBGYN CLINIC | Age: 85
End: 2020-03-09

## 2020-03-09 ENCOUNTER — HOSPITAL ENCOUNTER (OUTPATIENT)
Dept: WOUND CARE | Age: 85
Discharge: HOME | End: 2020-03-09
Payer: MEDICARE

## 2020-03-09 ENCOUNTER — HOSPITAL ENCOUNTER (OUTPATIENT)
Age: 85
Setting detail: SPECIMEN
Discharge: HOME | End: 2020-03-09
Payer: MEDICARE

## 2020-03-09 VITALS
SYSTOLIC BLOOD PRESSURE: 104 MMHG | HEART RATE: 64 BPM | BODY MASS INDEX: 22.15 KG/M2 | RESPIRATION RATE: 16 BRPM | HEIGHT: 63 IN | TEMPERATURE: 98.2 F | WEIGHT: 125 LBS | DIASTOLIC BLOOD PRESSURE: 66 MMHG

## 2020-03-09 LAB
ALBUMIN SERPL-MCNC: 2.7 G/DL (ref 3.5–5.2)
ALBUMIN/GLOBULIN RATIO: 0.7 (ref 1–2.5)
ALP BLD-CCNC: 88 U/L (ref 35–104)
ALT SERPL-CCNC: 14 U/L (ref 5–33)
ANION GAP SERPL CALCULATED.3IONS-SCNC: 18 MMOL/L (ref 9–17)
AST SERPL-CCNC: 21 U/L
BILIRUB SERPL-MCNC: 0.22 MG/DL (ref 0.3–1.2)
BUN BLDV-MCNC: 23 MG/DL (ref 8–23)
BUN/CREAT BLD: ABNORMAL (ref 9–20)
CALCIUM SERPL-MCNC: 9.1 MG/DL (ref 8.6–10.4)
CHLORIDE BLD-SCNC: 97 MMOL/L (ref 98–107)
CO2: 21 MMOL/L (ref 20–31)
CREAT SERPL-MCNC: 0.66 MG/DL (ref 0.5–0.9)
GFR AFRICAN AMERICAN: >60 ML/MIN
GFR NON-AFRICAN AMERICAN: >60 ML/MIN
GFR SERPL CREATININE-BSD FRML MDRD: ABNORMAL ML/MIN/{1.73_M2}
GFR SERPL CREATININE-BSD FRML MDRD: ABNORMAL ML/MIN/{1.73_M2}
GLUCOSE BLD-MCNC: 133 MG/DL (ref 70–99)
HCT VFR BLD CALC: 35.2 % (ref 36.3–47.1)
HEMOGLOBIN: 10.5 G/DL (ref 11.9–15.1)
MCH RBC QN AUTO: 26.9 PG (ref 25.2–33.5)
MCHC RBC AUTO-ENTMCNC: 29.8 G/DL (ref 28.4–34.8)
MCV RBC AUTO: 90 FL (ref 82.6–102.9)
NRBC AUTOMATED: 0 PER 100 WBC
PDW BLD-RTO: 18.1 % (ref 11.8–14.4)
PLATELET # BLD: 423 K/UL (ref 138–453)
PMV BLD AUTO: 10.6 FL (ref 8.1–13.5)
POTASSIUM SERPL-SCNC: 4 MMOL/L (ref 3.7–5.3)
RBC # BLD: 3.91 M/UL (ref 3.95–5.11)
SODIUM BLD-SCNC: 136 MMOL/L (ref 135–144)
TOTAL PROTEIN: 6.7 G/DL (ref 6.4–8.3)
WBC # BLD: 9.7 K/UL (ref 3.5–11.3)

## 2020-03-09 PROCEDURE — 80053 COMPREHEN METABOLIC PANEL: CPT

## 2020-03-09 PROCEDURE — 11043 DBRDMT MUSC&/FSCA 1ST 20/<: CPT | Performed by: NURSE PRACTITIONER

## 2020-03-09 PROCEDURE — 11046 DBRDMT MUSC&/FSCA EA ADDL: CPT

## 2020-03-09 PROCEDURE — 11043 DBRDMT MUSC&/FSCA 1ST 20/<: CPT

## 2020-03-09 PROCEDURE — 11046 DBRDMT MUSC&/FSCA EA ADDL: CPT | Performed by: NURSE PRACTITIONER

## 2020-03-09 PROCEDURE — 97605 NEG PRS WND THER DME<=50SQCM: CPT

## 2020-03-09 PROCEDURE — 85027 COMPLETE CBC AUTOMATED: CPT

## 2020-03-09 PROCEDURE — 6370000000 HC RX 637 (ALT 250 FOR IP): Performed by: NURSE PRACTITIONER

## 2020-03-09 PROCEDURE — P9603 ONE-WAY ALLOW PRORATED MILES: HCPCS

## 2020-03-09 PROCEDURE — 36415 COLL VENOUS BLD VENIPUNCTURE: CPT

## 2020-03-09 RX ORDER — LIDOCAINE HYDROCHLORIDE 40 MG/ML
SOLUTION TOPICAL ONCE
Status: COMPLETED | OUTPATIENT
Start: 2020-03-09 | End: 2020-03-09

## 2020-03-09 RX ORDER — M-VIT,TX,IRON,MINS/CALC/FOLIC 27MG-0.4MG
1 TABLET ORAL DAILY
COMMUNITY

## 2020-03-09 RX ORDER — HYDROPHILIC CREAM
PASTE (GRAM) TOPICAL 2 TIMES DAILY
COMMUNITY
End: 2020-06-30

## 2020-03-09 RX ORDER — IPRATROPIUM BROMIDE AND ALBUTEROL SULFATE 2.5; .5 MG/3ML; MG/3ML
1 SOLUTION RESPIRATORY (INHALATION) EVERY 4 HOURS PRN
COMMUNITY

## 2020-03-09 RX ORDER — DIPHENHYDRAMINE HCL 50 MG
50 CAPSULE ORAL EVERY 4 HOURS PRN
COMMUNITY

## 2020-03-09 RX ADMIN — LIDOCAINE HYDROCHLORIDE 10 ML: 40 SOLUTION TOPICAL at 13:27

## 2020-03-09 ASSESSMENT — PAIN SCALES - GENERAL: PAINLEVEL_OUTOF10: 0

## 2020-03-09 NOTE — TELEPHONE ENCOUNTER
Nursing home called back and said tomorrow morning will not work. They scheduled on 3/19 at 10:15a with you.

## 2020-03-09 NOTE — PROGRESS NOTES
3/12/2019    DILATATION AND CURETTAGE, HYSTEROSCOPY, MYOSURE performed by Slime Mckeon MD at [facility]  08/18/2016    medtronic advisa MRI DR model # A2DR01  av lead [id number] rv lead [id number]  - mri compatible    DE GI ENDOSCOPIC ULTRASOUND N/A 9/12/2018    ENDOSCOPIC ULTRASOUND, PATHOLOGY REQUESTED, PAT NOTIFIED performed by Markell Sibley MD at [facility]      as a child    TOTAL KNEE ARTHROPLASTY Right 9/5/2017    KNEE TOTAL ARTHROPLASTY RIGHT WITH BIOMET AND GPS PRODUCT APPLICATION performed by Clinton Valentin MD at [facility] TRANSESOPHAGEAL ECHOCARDIOGRAM N/A 10/9/2019    TRANSESOPHAGEAL ECHOCARDIOGRAM WITH BUBBLE STUDY performed by Dino Karimi DO at [facility] TRANSESOPHAGEAL ECHOCARDIOGRAM  12/16/2019    UPPER GASTROINTESTINAL ENDOSCOPY  06/29/2017    polyp removed from small intestine near stomach, benign.   done at [facility]  9/12/2018    EGD BIOPSY performed by Markell Sibley MD at [facility]       FAMILY HISTORY    Family History   Problem Relation Age of Onset    Diabetes Sister     Heart Attack Sister     Osteoporosis Sister     Heart Disease Mother     Dementia Mother     Tuberculosis Father        SOCIAL HISTORY    Social History     Tobacco Use    Smoking status: Never Smoker    Smokeless tobacco: Never Used   Substance Use Topics    Alcohol use: No    Drug use: No       ALLERGIES    Allergies   Allergen Reactions    Sulfa Antibiotics Rash       MEDICATIONS    Current Outpatient Medications on File Prior to Encounter   Medication Sig Dispense Refill    Nutritional Supplements (BOOST PLUS PO) Take 237 mLs by mouth 3 times daily      diphenhydrAMINE (BENADRYL) 50 MG capsule Take 50 mg by mouth every 4 hours as needed for Itching      ipratropium-albuterol (DUONEB) 0.5-2.5 (3) MG/3ML SOLN nebulizer solution Take 1 vial by nebulization every 4 hours as needed for Shortness of Breath      Multiple Vitamins-Minerals (THERAPEUTIC MULTIVITAMIN-MINERALS) tablet Take 1 tablet by mouth daily      PROTEIN PO Take 30 mLs by mouth 3 times daily      zinc oxide (TRIAD HYDROPHILIC) PSTE paste Apply topically 2 times daily      traMADol (ULTRAM) 50 MG tablet Take 50 mg by mouth 2 times daily.  apixaban (ELIQUIS) 2.5 MG TABS tablet Take 2.5 mg by mouth 2 times daily      Calcium Carbonate-Vitamin D (CALCIUM 500 + D PO) Take 1 tablet by mouth daily CALCIUM 500MG+VIT D 200IU      lisinopril (PRINIVIL;ZESTRIL) 2.5 MG tablet Take 2.5 mg by mouth nightly      Magnesium Oxide (MAG- PO) Take 400 mg by mouth 2 times daily      lactulose (CHRONULAC) 10 GM/15ML solution Take 10 g by mouth 2 times daily      potassium chloride (MICRO-K) 10 MEQ extended release capsule Take 30 mEq by mouth daily       furosemide (LASIX) 20 MG tablet Take 1 tablet by mouth 2 times daily (Patient taking differently: Take 20 mg by mouth daily ) 60 tablet 3    aspirin 81 MG chewable tablet Take 1 tablet by mouth daily 30 tablet 3    guaiFENesin (MUCINEX) 600 MG extended release tablet Take 1 tablet by mouth 2 times daily 60 tablet 0    amiodarone (CORDARONE) 200 MG tablet Take 200 mg by mouth daily      polyethylene glycol (MIRALAX) powder Take 17 g by mouth daily as needed      metoprolol tartrate (LOPRESSOR) 25 MG tablet Take 1 tablet by mouth 2 times daily 60 tablet 3    rOPINIRole (REQUIP) 0.25 MG tablet Take 0.25 mg by mouth nightly       atorvastatin (LIPITOR) 10 MG tablet Take 10 mg by mouth daily       latanoprost (XALATAN) 0.005 % ophthalmic solution Place 1 drop into both eyes nightly       Acetaminophen (TYLENOL ARTHRITIS PAIN PO) Take 2 tablets by mouth every 6 hours as needed        No current facility-administered medications on file prior to encounter. REVIEW OF SYSTEMS    Pertinent items are noted in HPI.     Objective:      /66   Pulse 64   Temp 98.2 °F (36.8 °C) (Tympanic) (cm) 4 cm 3/9/2020  1:25 PM   Wound Width (cm) 5.1 cm 3/9/2020  1:25 PM   Wound Depth (cm) 3.5 cm 3/9/2020  1:25 PM   Wound Surface Area (cm^2) 20.4 cm^2 3/9/2020  1:25 PM   Change in Wound Size % (l*w) 16.53 3/9/2020  1:25 PM   Wound Volume (cm^3) 71.4 cm^3 3/9/2020  1:25 PM   Wound Healing % 32 3/9/2020  1:25 PM   Post-Procedure Length (cm) 4 cm 3/9/2020  1:25 PM   Post-Procedure Width (cm) 5.1 cm 3/9/2020  1:25 PM   Post-Procedure Depth (cm) 3.5 cm 3/9/2020  1:25 PM   Post-Procedure Surface Area (cm^2) 20.4 cm^2 3/9/2020  1:25 PM   Post-Procedure Volume (cm^3) 71.4 cm^3 3/9/2020  1:25 PM   Undermining Starts ___ O'Clock 7 3/9/2020  1:25 PM   Undermining Ends___ O'Clock 5 3/9/2020  1:25 PM   Undermining Maxium Distance (cm) [email or web address] 3/[email or web address]/2020  1:25 PM   Wound Assessment Drainage;Red;Pink;Yellow 3/9/2020  1:25 PM   Drainage Amount Large 3/9/2020  1:25 PM   Drainage Description Serosanguinous 3/9/2020  1:25 PM   Odor Mild 3/9/2020  1:25 PM   Margins Epibole (rolled edges) 3/9/2020  1:25 PM   Exposed structure Bone 3/9/2020  1:25 PM   Radha-wound Assessment Denuded 3/9/2020  1:25 PM   Yaurel%Wound Bed 30 3/9/2020  1:25 PM   Red%Wound Bed 30 3/9/2020  1:25 PM   Yellow%Wound Bed 40 3/9/2020  1:25 PM   Culture Taken Yes 1/13/2020  2:07 PM   Number of days: 55     Incision 12/16/19 Chest Right;Upper (Active)   Number of days: 83       Percent of Wound(s)/Ulcer(s) Debrided: 100%    Total Surface Area Debrided:  20.4 sq cm       Diabetic/Pressure/Non Pressure Ulcers only:  Ulcer: Pressure ulcer, Stage 4      Estimated Blood Loss:  Minimal    Hemostasis Achieved:  by pressure    Procedural Pain:  0  / 10     Post Procedural Pain:  0 / 10     Response to treatment:  Well tolerated by patient. Plan:     Treatment Note please see attached Discharge Instructions    Written patient dismissal instructions given to patient and signed by patient or POA.          Discharge Instructions         [address] [place] [address] and [facility] TREATMENT  CENTER                                 Visit Rahel Instructions / Physician Orders  DATE: 03/9/2020     Home Care: [address] [facility]     SUPPLIES ORDERED THRU: SNF     Wound Location:  Coccyx     Cleanse with: Liquid antibacterial soap and water, rinse well      Dressing Orders:  Start KCI wound vac( ecf to apply today)  Skin Prep around wound and where drape is to be applied- Drape around wound with kci film. PLEASE ALTERNATE AREAS WHERE APPLIED SO THAT IRRATATION CAN HEAL- Fill with black foam. Apply Positive bolster around wound.  Bridge to hip ( make sure there is KCI film under black foam of bridge) set at 150mmhg continuous pressure. mepilex border to skin tear on left buttock   Please make sure that Empire style cannister  And foam is sent to next visit at wound care. This is why vac was not reapplied  Frequency: Change MWF send supplies to wound care we will apply the day she comes.     Additional Orders: Increase protein to diet (meat, cheese, eggs, fish, peanut butter, nuts and beans)  Multivitamin daily  May be up for meals- only about 30 minutes at a time, no more than 2 hours at a time  GENNYUMM cushion when she is up in a chair  Apply specialty mattress to bed  Dietician to follow for Low prealbumin and large draining wound     HYPERBARIC TREATMENT-                TREATMENT #     Your next appointment with [facility] is in 1 week     ROOM TYPE   []?????? CHAIR     [x]?????? BED   []?????? EITHER        TIME []?????? 45 MIN     []?????? 60 MIN     (Please note your next appointment above and if you are unable to keep, kindly give a 24 hour notice.  Thank you.)     If you experience any of the following, please call the [employer] during business hours:  [phone]     * Increase in Pain  * Temperature over 101  * Increase in drainage from your wound  * Drainage with a foul odor  * Bleeding  * Increase in swelling  * Need for compression bandage changes due to

## 2020-03-09 NOTE — PLAN OF CARE
Problem: Pain:  Goal: Pain level will decrease  Description: Pain level will decrease  Outcome: Ongoing  Goal: Control of acute pain  Description: Control of acute pain  Outcome: Ongoing  Goal: Control of chronic pain  Description: Control of chronic pain  Outcome: Ongoing

## 2020-03-09 NOTE — TELEPHONE ENCOUNTER
Spoke to nursing home that patient is currently in to see if she can come in at [address] [place] [address] with Jason Ortiz [address]. Raymundo Iris had said that she may need an u/s and if we should order that now before her appt tomorrow.

## 2020-03-19 ENCOUNTER — TELEPHONE (OUTPATIENT)
Dept: WOUND CARE | Age: 85
End: 2020-03-19

## 2020-03-19 NOTE — TELEPHONE ENCOUNTER
Patient's [facility] wound care appointment for 3-26-20 was canceled due to the covid19 emergency, patient was rescheduled for two weeks.

## 2020-03-30 ENCOUNTER — HOSPITAL ENCOUNTER (OUTPATIENT)
Age: 85
Setting detail: SPECIMEN
Discharge: HOME | End: 2020-03-30
Payer: MEDICARE

## 2020-03-30 LAB
ALBUMIN SERPL-MCNC: 2.7 G/DL (ref 3.5–5.2)
ALBUMIN/GLOBULIN RATIO: 0.8 (ref 1–2.5)
ALP BLD-CCNC: 78 U/L (ref 35–104)
ALT SERPL-CCNC: 16 U/L (ref 5–33)
ANION GAP SERPL CALCULATED.3IONS-SCNC: 14 MMOL/L (ref 9–17)
AST SERPL-CCNC: 24 U/L
BILIRUB SERPL-MCNC: 0.3 MG/DL (ref 0.3–1.2)
BUN BLDV-MCNC: 12 MG/DL (ref 8–23)
BUN/CREAT BLD: ABNORMAL (ref 9–20)
CALCIUM SERPL-MCNC: 8.8 MG/DL (ref 8.6–10.4)
CHLORIDE BLD-SCNC: 99 MMOL/L (ref 98–107)
CO2: 24 MMOL/L (ref 20–31)
CREAT SERPL-MCNC: 0.53 MG/DL (ref 0.5–0.9)
GFR AFRICAN AMERICAN: >60 ML/MIN
GFR NON-AFRICAN AMERICAN: >60 ML/MIN
GFR SERPL CREATININE-BSD FRML MDRD: ABNORMAL ML/MIN/{1.73_M2}
GFR SERPL CREATININE-BSD FRML MDRD: ABNORMAL ML/MIN/{1.73_M2}
GLUCOSE BLD-MCNC: 102 MG/DL (ref 70–99)
HCT VFR BLD CALC: 36.2 % (ref 36.3–47.1)
HEMOGLOBIN: 11.2 G/DL (ref 11.9–15.1)
MCH RBC QN AUTO: 27.2 PG (ref 25.2–33.5)
MCHC RBC AUTO-ENTMCNC: 30.9 G/DL (ref 28.4–34.8)
MCV RBC AUTO: 87.9 FL (ref 82.6–102.9)
NRBC AUTOMATED: 0 PER 100 WBC
PDW BLD-RTO: 16.6 % (ref 11.8–14.4)
PLATELET # BLD: 373 K/UL (ref 138–453)
PMV BLD AUTO: 10.5 FL (ref 8.1–13.5)
POTASSIUM SERPL-SCNC: 3.6 MMOL/L (ref 3.7–5.3)
RBC # BLD: 4.12 M/UL (ref 3.95–5.11)
SODIUM BLD-SCNC: 137 MMOL/L (ref 135–144)
TOTAL PROTEIN: 6.2 G/DL (ref 6.4–8.3)
WBC # BLD: 7.4 K/UL (ref 3.5–11.3)

## 2020-03-30 PROCEDURE — P9603 ONE-WAY ALLOW PRORATED MILES: HCPCS

## 2020-03-30 PROCEDURE — 36415 COLL VENOUS BLD VENIPUNCTURE: CPT

## 2020-03-30 PROCEDURE — 80053 COMPREHEN METABOLIC PANEL: CPT

## 2020-03-30 PROCEDURE — 85027 COMPLETE CBC AUTOMATED: CPT

## 2020-04-09 ENCOUNTER — TELEPHONE (OUTPATIENT)
Dept: WOUND CARE | Age: 85
End: 2020-04-09

## 2020-04-10 ENCOUNTER — TELEPHONE (OUTPATIENT)
Dept: WOUND CARE | Age: 85
End: 2020-04-10

## 2020-04-13 ENCOUNTER — HOSPITAL ENCOUNTER (OUTPATIENT)
Age: 85
Setting detail: SPECIMEN
Discharge: HOME | End: 2020-04-13
Payer: MEDICARE

## 2020-04-13 LAB
ALBUMIN SERPL-MCNC: 2.7 G/DL (ref 3.5–5.2)
ALBUMIN/GLOBULIN RATIO: 0.8 (ref 1–2.5)
ALP BLD-CCNC: 82 U/L (ref 35–104)
ALT SERPL-CCNC: 14 U/L (ref 5–33)
ANION GAP SERPL CALCULATED.3IONS-SCNC: 12 MMOL/L (ref 9–17)
AST SERPL-CCNC: 23 U/L
BILIRUB SERPL-MCNC: 0.31 MG/DL (ref 0.3–1.2)
BUN BLDV-MCNC: 15 MG/DL (ref 8–23)
BUN/CREAT BLD: ABNORMAL (ref 9–20)
CALCIUM SERPL-MCNC: 9.1 MG/DL (ref 8.6–10.4)
CHLORIDE BLD-SCNC: 96 MMOL/L (ref 98–107)
CO2: 26 MMOL/L (ref 20–31)
CREAT SERPL-MCNC: 0.66 MG/DL (ref 0.5–0.9)
GFR AFRICAN AMERICAN: >60 ML/MIN
GFR NON-AFRICAN AMERICAN: >60 ML/MIN
GFR SERPL CREATININE-BSD FRML MDRD: ABNORMAL ML/MIN/{1.73_M2}
GFR SERPL CREATININE-BSD FRML MDRD: ABNORMAL ML/MIN/{1.73_M2}
GLUCOSE BLD-MCNC: 101 MG/DL (ref 70–99)
HCT VFR BLD CALC: 37.5 % (ref 36.3–47.1)
HEMOGLOBIN: 11.6 G/DL (ref 11.9–15.1)
MCH RBC QN AUTO: 26.9 PG (ref 25.2–33.5)
MCHC RBC AUTO-ENTMCNC: 30.9 G/DL (ref 28.4–34.8)
MCV RBC AUTO: 87 FL (ref 82.6–102.9)
NRBC AUTOMATED: 0 PER 100 WBC
PDW BLD-RTO: 15.8 % (ref 11.8–14.4)
PLATELET # BLD: 345 K/UL (ref 138–453)
PMV BLD AUTO: 10 FL (ref 8.1–13.5)
POTASSIUM SERPL-SCNC: 4.4 MMOL/L (ref 3.7–5.3)
RBC # BLD: 4.31 M/UL (ref 3.95–5.11)
SODIUM BLD-SCNC: 134 MMOL/L (ref 135–144)
TOTAL PROTEIN: 6.3 G/DL (ref 6.4–8.3)
WBC # BLD: 6.6 K/UL (ref 3.5–11.3)

## 2020-04-13 PROCEDURE — 36415 COLL VENOUS BLD VENIPUNCTURE: CPT

## 2020-04-13 PROCEDURE — 85027 COMPLETE CBC AUTOMATED: CPT

## 2020-04-13 PROCEDURE — P9603 ONE-WAY ALLOW PRORATED MILES: HCPCS

## 2020-04-13 PROCEDURE — 80053 COMPREHEN METABOLIC PANEL: CPT

## 2020-04-20 ENCOUNTER — HOSPITAL ENCOUNTER (OUTPATIENT)
Age: 85
Setting detail: SPECIMEN
Discharge: HOME | End: 2020-04-20
Payer: MEDICARE

## 2020-04-20 LAB
ALBUMIN SERPL-MCNC: 2.3 G/DL (ref 3.5–5.2)
ALBUMIN/GLOBULIN RATIO: 0.7 (ref 1–2.5)
ALP BLD-CCNC: 86 U/L (ref 35–104)
ALT SERPL-CCNC: 15 U/L (ref 5–33)
ANION GAP SERPL CALCULATED.3IONS-SCNC: 11 MMOL/L (ref 9–17)
AST SERPL-CCNC: 26 U/L
BILIRUB SERPL-MCNC: 0.26 MG/DL (ref 0.3–1.2)
BUN BLDV-MCNC: 19 MG/DL (ref 8–23)
BUN/CREAT BLD: ABNORMAL (ref 9–20)
CALCIUM SERPL-MCNC: 8.7 MG/DL (ref 8.6–10.4)
CHLORIDE BLD-SCNC: 95 MMOL/L (ref 98–107)
CO2: 27 MMOL/L (ref 20–31)
CREAT SERPL-MCNC: 0.59 MG/DL (ref 0.5–0.9)
GFR AFRICAN AMERICAN: >60 ML/MIN
GFR NON-AFRICAN AMERICAN: >60 ML/MIN
GFR SERPL CREATININE-BSD FRML MDRD: ABNORMAL ML/MIN/{1.73_M2}
GFR SERPL CREATININE-BSD FRML MDRD: ABNORMAL ML/MIN/{1.73_M2}
GLUCOSE BLD-MCNC: 82 MG/DL (ref 70–99)
HCT VFR BLD CALC: 37.4 % (ref 36.3–47.1)
HEMOGLOBIN: 11.5 G/DL (ref 11.9–15.1)
MCH RBC QN AUTO: 26.4 PG (ref 25.2–33.5)
MCHC RBC AUTO-ENTMCNC: 30.7 G/DL (ref 28.4–34.8)
MCV RBC AUTO: 85.8 FL (ref 82.6–102.9)
NRBC AUTOMATED: 0 PER 100 WBC
PDW BLD-RTO: 15.6 % (ref 11.8–14.4)
PLATELET # BLD: 336 K/UL (ref 138–453)
PMV BLD AUTO: 10.7 FL (ref 8.1–13.5)
POTASSIUM SERPL-SCNC: 4 MMOL/L (ref 3.7–5.3)
RBC # BLD: 4.36 M/UL (ref 3.95–5.11)
SODIUM BLD-SCNC: 133 MMOL/L (ref 135–144)
TOTAL PROTEIN: 5.8 G/DL (ref 6.4–8.3)
WBC # BLD: 6.2 K/UL (ref 3.5–11.3)

## 2020-04-20 PROCEDURE — P9603 ONE-WAY ALLOW PRORATED MILES: HCPCS

## 2020-04-20 PROCEDURE — 80053 COMPREHEN METABOLIC PANEL: CPT

## 2020-04-20 PROCEDURE — 85027 COMPLETE CBC AUTOMATED: CPT

## 2020-04-20 PROCEDURE — 36415 COLL VENOUS BLD VENIPUNCTURE: CPT

## 2020-04-26 PROCEDURE — 87086 URINE CULTURE/COLONY COUNT: CPT

## 2020-04-26 PROCEDURE — 81001 URINALYSIS AUTO W/SCOPE: CPT

## 2020-04-26 PROCEDURE — 87184 SC STD DISK METHOD PER PLATE: CPT

## 2020-04-26 PROCEDURE — 87077 CULTURE AEROBIC IDENTIFY: CPT

## 2020-04-26 PROCEDURE — 87186 SC STD MICRODIL/AGAR DIL: CPT

## 2020-04-27 ENCOUNTER — HOSPITAL ENCOUNTER (OUTPATIENT)
Age: 85
Setting detail: SPECIMEN
Discharge: HOME | End: 2020-04-27
Payer: MEDICARE

## 2020-04-27 LAB
-: ABNORMAL
AMORPHOUS: ABNORMAL
BACTERIA: ABNORMAL
BILIRUBIN URINE: ABNORMAL
CASTS UA: ABNORMAL /LPF (ref 0–8)
COLOR: ABNORMAL
COMMENT UA: ABNORMAL
CRYSTALS, UA: ABNORMAL /HPF
EPITHELIAL CELLS UA: ABNORMAL /HPF (ref 0–5)
GLUCOSE URINE: NEGATIVE
KETONES, URINE: NEGATIVE
LEUKOCYTE ESTERASE, URINE: ABNORMAL
MUCUS: ABNORMAL
NITRITE, URINE: POSITIVE
OTHER OBSERVATIONS UA: ABNORMAL
PH UA: 5.5 (ref 5–8)
PROTEIN UA: ABNORMAL
RBC UA: ABNORMAL /HPF (ref 0–4)
RENAL EPITHELIAL, UA: ABNORMAL /HPF
SPECIFIC GRAVITY UA: 1.02 (ref 1–1.03)
TRICHOMONAS: ABNORMAL
TURBIDITY: ABNORMAL
URINE HGB: ABNORMAL
UROBILINOGEN, URINE: NORMAL
WBC UA: ABNORMAL /HPF (ref 0–5)
YEAST: ABNORMAL

## 2020-04-30 LAB
CULTURE: ABNORMAL
CULTURE: ABNORMAL
Lab: ABNORMAL
SPECIMEN DESCRIPTION: ABNORMAL

## 2020-05-20 PROCEDURE — 87181 SC STD AGAR DILUTION PER AGT: CPT

## 2020-05-20 PROCEDURE — 87077 CULTURE AEROBIC IDENTIFY: CPT

## 2020-05-20 PROCEDURE — 81001 URINALYSIS AUTO W/SCOPE: CPT

## 2020-05-20 PROCEDURE — 87186 SC STD MICRODIL/AGAR DIL: CPT

## 2020-05-20 PROCEDURE — 87086 URINE CULTURE/COLONY COUNT: CPT

## 2020-05-21 ENCOUNTER — HOSPITAL ENCOUNTER (OUTPATIENT)
Age: 85
Setting detail: SPECIMEN
Discharge: HOME | End: 2020-05-21
Payer: MEDICARE

## 2020-05-21 LAB
-: ABNORMAL
AMORPHOUS: ABNORMAL
BACTERIA: ABNORMAL
BILIRUBIN URINE: NEGATIVE
CASTS UA: ABNORMAL /LPF (ref 0–8)
COLOR: ABNORMAL
COMMENT UA: ABNORMAL
CRYSTALS, UA: ABNORMAL /HPF
EPITHELIAL CELLS UA: ABNORMAL /HPF (ref 0–5)
GLUCOSE URINE: NEGATIVE
KETONES, URINE: NEGATIVE
LEUKOCYTE ESTERASE, URINE: ABNORMAL
MUCUS: ABNORMAL
NITRITE, URINE: POSITIVE
OTHER OBSERVATIONS UA: ABNORMAL
PH UA: 7.5 (ref 5–8)
PROTEIN UA: ABNORMAL
RBC UA: ABNORMAL /HPF (ref 0–4)
RENAL EPITHELIAL, UA: ABNORMAL /HPF
SPECIFIC GRAVITY UA: 1.02 (ref 1–1.03)
TRICHOMONAS: ABNORMAL
TURBIDITY: ABNORMAL
URINE HGB: ABNORMAL
UROBILINOGEN, URINE: NORMAL
WBC UA: ABNORMAL /HPF (ref 0–5)
YEAST: ABNORMAL

## 2020-05-26 LAB
CULTURE: ABNORMAL
CULTURE: ABNORMAL
Lab: ABNORMAL
SPECIMEN DESCRIPTION: ABNORMAL

## 2020-05-28 ENCOUNTER — TELEPHONE (OUTPATIENT)
Dept: WOUND CARE | Age: 85
End: 2020-05-28

## 2020-05-29 ENCOUNTER — HOSPITAL ENCOUNTER (OUTPATIENT)
Age: 85
Setting detail: SPECIMEN
Discharge: HOME | End: 2020-05-29
Payer: MEDICARE

## 2020-05-29 LAB
ALBUMIN SERPL-MCNC: 2.3 G/DL (ref 3.5–5.2)
ALBUMIN/GLOBULIN RATIO: 0.7 (ref 1–2.5)
ALP BLD-CCNC: 150 U/L (ref 35–104)
ALT SERPL-CCNC: 61 U/L (ref 5–33)
ANION GAP SERPL CALCULATED.3IONS-SCNC: 13 MMOL/L (ref 9–17)
AST SERPL-CCNC: 65 U/L
BILIRUB SERPL-MCNC: 0.48 MG/DL (ref 0.3–1.2)
BUN BLDV-MCNC: 24 MG/DL (ref 8–23)
BUN/CREAT BLD: ABNORMAL (ref 9–20)
CALCIUM SERPL-MCNC: 8.5 MG/DL (ref 8.6–10.4)
CHLORIDE BLD-SCNC: 101 MMOL/L (ref 98–107)
CO2: 19 MMOL/L (ref 20–31)
CREAT SERPL-MCNC: 0.64 MG/DL (ref 0.5–0.9)
GFR AFRICAN AMERICAN: >60 ML/MIN
GFR NON-AFRICAN AMERICAN: >60 ML/MIN
GFR SERPL CREATININE-BSD FRML MDRD: ABNORMAL ML/MIN/{1.73_M2}
GFR SERPL CREATININE-BSD FRML MDRD: ABNORMAL ML/MIN/{1.73_M2}
GLUCOSE BLD-MCNC: 120 MG/DL (ref 70–99)
HCT VFR BLD CALC: 42.8 % (ref 36.3–47.1)
HEMOGLOBIN: 13.1 G/DL (ref 11.9–15.1)
MCH RBC QN AUTO: 27 PG (ref 25.2–33.5)
MCHC RBC AUTO-ENTMCNC: 30.6 G/DL (ref 28.4–34.8)
MCV RBC AUTO: 88.1 FL (ref 82.6–102.9)
NRBC AUTOMATED: 0 PER 100 WBC
PDW BLD-RTO: 17.5 % (ref 11.8–14.4)
PLATELET # BLD: 306 K/UL (ref 138–453)
PMV BLD AUTO: 10.2 FL (ref 8.1–13.5)
POTASSIUM SERPL-SCNC: 4.5 MMOL/L (ref 3.7–5.3)
RBC # BLD: 4.86 M/UL (ref 3.95–5.11)
SODIUM BLD-SCNC: 133 MMOL/L (ref 135–144)
TOTAL PROTEIN: 5.8 G/DL (ref 6.4–8.3)
WBC # BLD: 8.1 K/UL (ref 3.5–11.3)

## 2020-05-29 PROCEDURE — P9603 ONE-WAY ALLOW PRORATED MILES: HCPCS

## 2020-05-29 PROCEDURE — 80053 COMPREHEN METABOLIC PANEL: CPT

## 2020-05-29 PROCEDURE — 36415 COLL VENOUS BLD VENIPUNCTURE: CPT

## 2020-05-29 PROCEDURE — 85027 COMPLETE CBC AUTOMATED: CPT

## 2020-06-02 ENCOUNTER — HOSPITAL ENCOUNTER (OUTPATIENT)
Age: 85
Setting detail: SPECIMEN
Discharge: HOME | End: 2020-06-02
Payer: MEDICARE

## 2020-06-02 LAB — PREALBUMIN: 9 MG/DL (ref 20–40)

## 2020-06-02 PROCEDURE — 36415 COLL VENOUS BLD VENIPUNCTURE: CPT

## 2020-06-02 PROCEDURE — P9603 ONE-WAY ALLOW PRORATED MILES: HCPCS

## 2020-06-02 PROCEDURE — 84134 ASSAY OF PREALBUMIN: CPT

## 2020-06-10 ENCOUNTER — HOSPITAL ENCOUNTER (OUTPATIENT)
Age: 85
Setting detail: SPECIMEN
Discharge: HOME | End: 2020-06-10
Payer: MEDICARE

## 2020-06-10 LAB
ALBUMIN SERPL-MCNC: 2.1 G/DL (ref 3.5–5.2)
ALBUMIN/GLOBULIN RATIO: 0.7 (ref 1–2.5)
ALP BLD-CCNC: 93 U/L (ref 35–104)
ALT SERPL-CCNC: 40 U/L (ref 5–33)
ANION GAP SERPL CALCULATED.3IONS-SCNC: 12 MMOL/L (ref 9–17)
AST SERPL-CCNC: 59 U/L
BILIRUB SERPL-MCNC: 0.47 MG/DL (ref 0.3–1.2)
BUN BLDV-MCNC: 20 MG/DL (ref 8–23)
BUN/CREAT BLD: ABNORMAL (ref 9–20)
CALCIUM SERPL-MCNC: 8.2 MG/DL (ref 8.6–10.4)
CHLORIDE BLD-SCNC: 102 MMOL/L (ref 98–107)
CO2: 23 MMOL/L (ref 20–31)
CREAT SERPL-MCNC: 0.43 MG/DL (ref 0.5–0.9)
GFR AFRICAN AMERICAN: >60 ML/MIN
GFR NON-AFRICAN AMERICAN: >60 ML/MIN
GFR SERPL CREATININE-BSD FRML MDRD: ABNORMAL ML/MIN/{1.73_M2}
GFR SERPL CREATININE-BSD FRML MDRD: ABNORMAL ML/MIN/{1.73_M2}
GLUCOSE BLD-MCNC: 108 MG/DL (ref 70–99)
POTASSIUM SERPL-SCNC: 4.7 MMOL/L (ref 3.7–5.3)
SODIUM BLD-SCNC: 137 MMOL/L (ref 135–144)
TOTAL PROTEIN: 5.1 G/DL (ref 6.4–8.3)

## 2020-06-10 PROCEDURE — P9603 ONE-WAY ALLOW PRORATED MILES: HCPCS

## 2020-06-10 PROCEDURE — 80053 COMPREHEN METABOLIC PANEL: CPT

## 2020-06-10 PROCEDURE — 36415 COLL VENOUS BLD VENIPUNCTURE: CPT

## 2020-06-16 ENCOUNTER — HOSPITAL ENCOUNTER (OUTPATIENT)
Age: 85
Setting detail: SPECIMEN
Discharge: HOME | End: 2020-06-16
Payer: MEDICARE

## 2020-06-16 LAB
ABSOLUTE EOS #: 0.26 K/UL (ref 0–0.44)
ABSOLUTE IMMATURE GRANULOCYTE: 0.07 K/UL (ref 0–0.3)
ABSOLUTE LYMPH #: 1.89 K/UL (ref 1.1–3.7)
ABSOLUTE MONO #: 0.78 K/UL (ref 0.1–1.2)
ALBUMIN SERPL-MCNC: 1.8 G/DL (ref 3.5–5.2)
ALBUMIN/GLOBULIN RATIO: 0.7 (ref 1–2.5)
ALP BLD-CCNC: 103 U/L (ref 35–104)
ALT SERPL-CCNC: 38 U/L (ref 5–33)
ANION GAP SERPL CALCULATED.3IONS-SCNC: 12 MMOL/L (ref 9–17)
AST SERPL-CCNC: 43 U/L
BASOPHILS # BLD: 1 % (ref 0–2)
BASOPHILS ABSOLUTE: 0.07 K/UL (ref 0–0.2)
BILIRUB SERPL-MCNC: 0.4 MG/DL (ref 0.3–1.2)
BUN BLDV-MCNC: 16 MG/DL (ref 8–23)
BUN/CREAT BLD: ABNORMAL (ref 9–20)
CALCIUM SERPL-MCNC: 8.1 MG/DL (ref 8.6–10.4)
CHLORIDE BLD-SCNC: 101 MMOL/L (ref 98–107)
CO2: 26 MMOL/L (ref 20–31)
CREAT SERPL-MCNC: 0.49 MG/DL (ref 0.5–0.9)
DIFFERENTIAL TYPE: ABNORMAL
EOSINOPHILS RELATIVE PERCENT: 4 % (ref 1–4)
GFR AFRICAN AMERICAN: >60 ML/MIN
GFR NON-AFRICAN AMERICAN: >60 ML/MIN
GFR SERPL CREATININE-BSD FRML MDRD: ABNORMAL ML/MIN/{1.73_M2}
GFR SERPL CREATININE-BSD FRML MDRD: ABNORMAL ML/MIN/{1.73_M2}
GLUCOSE BLD-MCNC: 56 MG/DL (ref 70–99)
HCT VFR BLD CALC: 28.8 % (ref 36.3–47.1)
HEMOGLOBIN: 8.8 G/DL (ref 11.9–15.1)
IMMATURE GRANULOCYTES: 1 %
LYMPHOCYTES # BLD: 29 % (ref 24–43)
MCH RBC QN AUTO: 27.9 PG (ref 25.2–33.5)
MCHC RBC AUTO-ENTMCNC: 30.6 G/DL (ref 28.4–34.8)
MCV RBC AUTO: 91.4 FL (ref 82.6–102.9)
MONOCYTES # BLD: 12 % (ref 3–12)
MORPHOLOGY: ABNORMAL
NRBC AUTOMATED: 0 PER 100 WBC
PDW BLD-RTO: 21.3 % (ref 11.8–14.4)
PLATELET # BLD: 250 K/UL (ref 138–453)
PLATELET ESTIMATE: ABNORMAL
PMV BLD AUTO: 11.5 FL (ref 8.1–13.5)
POTASSIUM SERPL-SCNC: 4.9 MMOL/L (ref 3.7–5.3)
RBC # BLD: 3.15 M/UL (ref 3.95–5.11)
RBC # BLD: ABNORMAL 10*6/UL
SEG NEUTROPHILS: 53 % (ref 36–65)
SEGMENTED NEUTROPHILS ABSOLUTE COUNT: 3.43 K/UL (ref 1.5–8.1)
SODIUM BLD-SCNC: 139 MMOL/L (ref 135–144)
TOTAL PROTEIN: 4.5 G/DL (ref 6.4–8.3)
WBC # BLD: 6.5 K/UL (ref 3.5–11.3)
WBC # BLD: ABNORMAL 10*3/UL

## 2020-06-16 PROCEDURE — 85025 COMPLETE CBC W/AUTO DIFF WBC: CPT

## 2020-06-16 PROCEDURE — P9603 ONE-WAY ALLOW PRORATED MILES: HCPCS

## 2020-06-16 PROCEDURE — 80053 COMPREHEN METABOLIC PANEL: CPT

## 2020-06-16 PROCEDURE — 36415 COLL VENOUS BLD VENIPUNCTURE: CPT

## 2020-06-30 ENCOUNTER — HOSPITAL ENCOUNTER (OUTPATIENT)
Dept: WOUND CARE | Age: 85
Discharge: HOME | End: 2020-06-30
Payer: MEDICARE

## 2020-06-30 VITALS
TEMPERATURE: 97.4 F | HEIGHT: 63 IN | DIASTOLIC BLOOD PRESSURE: 64 MMHG | HEART RATE: 60 BPM | SYSTOLIC BLOOD PRESSURE: 103 MMHG | RESPIRATION RATE: 16 BRPM | BODY MASS INDEX: 22.15 KG/M2 | WEIGHT: 125 LBS

## 2020-06-30 PROBLEM — S41.112A SKIN TEAR OF LEFT UPPER EXTREMITY: Status: ACTIVE | Noted: 2020-06-30

## 2020-06-30 PROBLEM — E43 SEVERE PROTEIN-CALORIE MALNUTRITION (HCC): Chronic | Status: ACTIVE | Noted: 2020-02-17

## 2020-06-30 PROCEDURE — 11042 DBRDMT SUBQ TIS 1ST 20SQCM/<: CPT | Performed by: NURSE PRACTITIONER

## 2020-06-30 PROCEDURE — 11042 DBRDMT SUBQ TIS 1ST 20SQCM/<: CPT

## 2020-06-30 RX ORDER — LIDOCAINE 50 MG/G
OINTMENT TOPICAL ONCE
Status: CANCELLED | OUTPATIENT
Start: 2020-06-30

## 2020-06-30 RX ORDER — LIDOCAINE 40 MG/G
CREAM TOPICAL ONCE
Status: CANCELLED | OUTPATIENT
Start: 2020-06-30

## 2020-06-30 RX ORDER — LIDOCAINE HYDROCHLORIDE 40 MG/ML
SOLUTION TOPICAL ONCE
Status: CANCELLED | OUTPATIENT
Start: 2020-06-30

## 2020-06-30 RX ORDER — LIDOCAINE HYDROCHLORIDE 40 MG/ML
SOLUTION TOPICAL ONCE
Status: DISCONTINUED | OUTPATIENT
Start: 2020-06-30 | End: 2020-07-01 | Stop reason: HOSPADM

## 2020-06-30 ASSESSMENT — PAIN DESCRIPTION - DESCRIPTORS: DESCRIPTORS: ACHING;BURNING

## 2020-06-30 ASSESSMENT — PAIN DESCRIPTION - ORIENTATION: ORIENTATION: MID

## 2020-06-30 ASSESSMENT — PAIN DESCRIPTION - FREQUENCY: FREQUENCY: CONTINUOUS

## 2020-06-30 ASSESSMENT — PAIN DESCRIPTION - LOCATION: LOCATION: COCCYX

## 2020-06-30 ASSESSMENT — PAIN SCALES - GENERAL: PAINLEVEL_OUTOF10: 4

## 2020-06-30 ASSESSMENT — PAIN DESCRIPTION - ONSET: ONSET: ON-GOING

## 2020-06-30 ASSESSMENT — PAIN DESCRIPTION - PAIN TYPE: TYPE: CHRONIC PAIN

## 2020-06-30 NOTE — PROGRESS NOTES
[address]   Progress Note and Procedure Note      [address] [facility] RECORD NUMBER:  [id number]  AGE: 80 y.o. GENDER: female  : 1930  EPISODE DATE:  2020    Subjective:     Chief Complaint   Patient presents with    Wound Check     coccyx and left forarm         HISTORY of PRESENT ILLNESS HPI     John Chery is a 80 y.o. female who presents today for wound/ulcer evaluation. History of Wound Context: stage IV pressure ulcer to coccyx   New skin tear to left forearm   Has not been seen here since 3/9/2020 due to COVID-19.  Has lost weight and nutrition has declined since last visit to wound clinic   Daughter gave verbal permission over phone for her to be seen today   Wound/Ulcer Pain Timing/Severity: none  Quality of pain: N/A  Severity:  0 / 10   Modifying Factors: None  Associated Signs/Symptoms: none    Ulcer Identification:  Ulcer Type: pressure  Contributing Factors: chronic pressure, decreased mobility and malnutrition    Wound: N/A        PAST MEDICAL HISTORY        Diagnosis Date    Arthritis     Atrial fibrillation ([address] 2016    [facility]est[facility]     Complete heart block ([address] 2016    Constipation     CVA (cerebral vascular accident) ([address] 2019    ACUTE ISCHEMIC    GERD (gastroesophageal reflux disease)     Glaucoma     mild, on eye drops    Hearing loss     bilateral hearing aids    Hiatal hernia     Hx of blood clots 2016    pulmonary emboli bilateral lungs after Pacemaker inserted    Hyperlipidemia     MRSA (methicillin resistant Staphylococcus aureus) infection 2019    MV ENDOCARDITIS    Osteopenia     Pacemaker 2016    Duo-chamber pacemaker in left upper chest;  DR. Mona Cheek    Restless leg syndrome     Right knee DJD     Wears glasses     Wears hearing aid in both ears        PAST SURGICAL HISTORY    Past Surgical History:   Procedure Laterality Date   [address] REMOVAL 11- WITH TEMPORARY PACEMAKER PLACED 11-    CARDIAC PACEMAKER PLACEMENT  12/16/2019    [facility]    CATARACT REMOVAL WITH IMPLANT Bilateral     Left 1/6/1998, right 8/2/1996    COLONOSCOPY      DILATION AND CURETTAGE OF UTERUS N/A 3/12/2019    DILATATION AND CURETTAGE, HYSTEROSCOPY, MYOSURE performed by Dorian Tavera MD at [facility]  08/18/2016    medtronic advisa MRI DR model # A2DR01  av lead 2620-15 rv lead [id number]  - mri compatible    MA GI ENDOSCOPIC ULTRASOUND N/A 9/12/2018    ENDOSCOPIC ULTRASOUND, PATHOLOGY REQUESTED, PAT NOTIFIED performed by Jose Elias Mazariegos MD at [facility]      as a child    TOTAL KNEE ARTHROPLASTY Right 9/5/2017    KNEE TOTAL ARTHROPLASTY RIGHT WITH BIOMET AND GPS PRODUCT APPLICATION performed by Vincent Ashby MD at [address] [place] [address] TRANSESOPHAGEAL ECHOCARDIOGRAM N/A 10/9/2019    TRANSESOPHAGEAL ECHOCARDIOGRAM WITH BUBBLE STUDY performed by Dino Karimi DO at [address] [place] [address] TRANSESOPHAGEAL ECHOCARDIOGRAM  12/16/2019    UPPER GASTROINTESTINAL ENDOSCOPY  06/29/2017    polyp removed from small intestine near stomach, benign.   done at [facility]  9/12/2018    EGD BIOPSY performed by Jose Elias Mazariegos MD at [facility]       FAMILY HISTORY    Family History   Problem Relation Age of Onset    Diabetes Sister     Heart Attack Sister     Osteoporosis Sister     Heart Disease Mother     Dementia Mother     Tuberculosis Father        SOCIAL HISTORY    Social History     Tobacco Use    Smoking status: Never Smoker    Smokeless tobacco: Never Used   Substance Use Topics    Alcohol use: No    Drug use: No       ALLERGIES    Allergies   Allergen Reactions    Sulfa Antibiotics Rash       MEDICATIONS    Current Outpatient Medications on File Prior to Encounter   Medication Sig Dispense Refill    Nutritional Supplements (BOOST PLUS PO) Take 237 mLs by mouth 3 times daily      diphenhydrAMINE (BENADRYL) 50 MG capsule Take 50 mg by mouth every 4 hours as needed for Itching      ipratropium-albuterol (DUONEB) 0.5-2.5 (3) MG/3ML SOLN nebulizer solution Take 1 vial by nebulization every 4 hours as needed for Shortness of Breath      Multiple Vitamins-Minerals (THERAPEUTIC MULTIVITAMIN-MINERALS) tablet Take 1 tablet by mouth daily      PROTEIN PO Take 30 mLs by mouth 3 times daily      traMADol (ULTRAM) 50 MG tablet Take 50 mg by mouth 2 times daily.       apixaban (ELIQUIS) 2.5 MG TABS tablet Take 2.5 mg by mouth 2 times daily      Calcium Carbonate-Vitamin D (CALCIUM 500 + D PO) Take 1 tablet by mouth daily CALCIUM 500MG+VIT D 200IU      lisinopril (PRINIVIL;ZESTRIL) 2.5 MG tablet Take 2.5 mg by mouth nightly      Magnesium Oxide (MAG- PO) Take 400 mg by mouth 2 times daily      lactulose (CHRONULAC) 10 GM/15ML solution Take 10 g by mouth 2 times daily      potassium chloride (MICRO-K) 10 MEQ extended release capsule Take 30 mEq by mouth daily       furosemide (LASIX) 20 MG tablet Take 1 tablet by mouth 2 times daily (Patient taking differently: Take 20 mg by mouth daily ) 60 tablet 3    aspirin 81 MG chewable tablet Take 1 tablet by mouth daily 30 tablet 3    guaiFENesin (MUCINEX) 600 MG extended release tablet Take 1 tablet by mouth 2 times daily 60 tablet 0    amiodarone (CORDARONE) 200 MG tablet Take 200 mg by mouth daily      polyethylene glycol (MIRALAX) powder Take 17 g by mouth daily as needed      metoprolol tartrate (LOPRESSOR) 25 MG tablet Take 1 tablet by mouth 2 times daily 60 tablet 3    rOPINIRole (REQUIP) 0.25 MG tablet Take 0.25 mg by mouth nightly       atorvastatin (LIPITOR) 10 MG tablet Take 10 mg by mouth daily       latanoprost (XALATAN) 0.005 % ophthalmic solution Place 1 drop into both eyes nightly       Acetaminophen (TYLENOL ARTHRITIS PAIN PO) Take 2 tablets by mouth every 6 hours as needed        No current facility-administered medications on file prior to encounter. REVIEW OF SYSTEMS    Pertinent items are noted in HPI. Objective:      /64   Pulse 60   Temp 97.4 °F (36.3 °C) (Tympanic)   Resp 16   Ht 5' 3\" (1.6 m)   Wt 125 lb (56.7 kg)   BMI 22.14 kg/m²     Wt Readings from Last 3 Encounters:   06/30/20 125 lb (56.7 kg)   03/09/20 125 lb (56.7 kg)   03/02/20 125 lb (56.7 kg)       PHYSICAL EXAM    General Appearance: alert and oriented to person, place and time, well-developed and emaciated, in no acute distress  Skin: warm and dry, no rash or erythema, stage IV sacral pressure ulcer   Head: normocephalic and atraumatic  Eyes: pupils equal, round, and conjunctivae normal  Pulmonary/Chest: normal air movement, no respiratory distress  Musculoskeletal: no joint swelling, deformity or tenderness  Neurologic: coordination normal and speech normal      Assessment:     Problem List Items Addressed This Visit     Pressure ulcer of coccygeal region, stage IV (HCC) - Primary    Relevant Medications    lidocaine (XYLOCAINE) 4 % external solution (Start on 6/30/2020  3:15 PM)    Other Relevant Orders    Supply: Wound Cleanser    Supply: Wound Dressings    Supply: Cover and Secure    Neg. Pressure Wound Therapy    Skin tear of left upper extremity    Relevant Medications    lidocaine (XYLOCAINE) 4 % external solution (Start on 6/30/2020  3:15 PM)    Other Relevant Orders    Supply: Wound Cleanser    Supply: Wound Dressings    Supply: Cover and Secure    Neg. Pressure Wound Therapy    Severe protein-calorie malnutrition (HCC) (Chronic)    Relevant Medications    lidocaine (XYLOCAINE) 4 % external solution (Start on 6/30/2020  3:15 PM)    Other Relevant Orders    Supply: Wound Cleanser    Supply: Wound Dressings    Supply: Cover and Secure    Neg.  Pressure Wound Therapy           Procedure Note  Indications:  Based on my examination of this patient's wound(s)/ulcer(s) today, debridement is required to promote healing and evaluate the wound base. Performed by: ALEXSANDRA Carpenter CNP    Consent obtained:  Yes    Time out taken:  Yes    Pain Control: Anesthetic  Anesthetic: 4% Lidocaine Liquid Topical       Debridement:Excisional Debridement    Using curette the wound(s)/ulcer(s) was/were sharply debrided down through and including the removal of subcutaneous tissue. Devitalized Tissue Debrided:  fibrin, biofilm and slough    Pre Debridement Measurements:  Are located in the [facility]  Documentation Flow Sheet    Wound/Ulcer #: 1    Post Debridement Measurements:  Wound/Ulcer Descriptions are Pre Debridement except measurements:    Wound 06/30/20 Coccyx Mid wound #1 coccyx (Active)   Wound Image   6/30/2020  2:23 PM   Wound Pressure Stage  4 6/30/2020  2:23 PM   Dressing Status New drainage; Old drainage; Changed 6/30/2020  2:23 PM   Dressing Changed Changed/New 6/30/2020  2:23 PM   Wound Cleansed Rinsed/Irrigated with saline 6/30/2020  2:23 PM   Wound Length (cm) 3 cm 6/30/2020  2:23 PM   Wound Width (cm) 3.5 cm 6/30/2020  2:23 PM   Wound Depth (cm) 2.5 cm 6/30/2020  2:23 PM   Wound Surface Area (cm^2) 10.5 cm^2 6/30/2020  2:23 PM   Wound Volume (cm^3) 26.25 cm^3 6/30/2020  2:23 PM   Post-Procedure Length (cm) 3 cm 6/30/2020  2:23 PM   Post-Procedure Width (cm) 3.5 cm 6/30/2020  2:23 PM   Post-Procedure Depth (cm) 2.5 cm 6/30/2020  2:23 PM   Post-Procedure Surface Area (cm^2) 10.5 cm^2 6/30/2020  2:23 PM   Post-Procedure Volume (cm^3) 26.25 cm^3 6/30/2020  2:23 PM   Tunneling Position ___ O'Clock 12 6/30/2020  2:23 PM   Undermining Starts ___ O'Clock 3 6/30/2020  2:23 PM   Undermining Ends___ O'Clock 6 6/30/2020  2:23 PM   Undermining Maxium Distance (cm) [email or web address] o clock 6/30/2020  2:23 PM   Drainage Amount Moderate 6/30/2020  2:23 PM   Drainage Description Serosanguinous; Yellow 6/30/2020  2:23 PM   Radha-wound Assessment Pink 6/30/2020  2:23 PM   Red%Wound Bed 90 6/30/2020  2:23 PM   Yellow%Wound Bed 10 6/30/2020  2:23 PM   Number Coccyx-calcium alginate and abd pad secure with mefix tape change daily                                    Left arm- mepilex border change every three days                     Frequency:     Additional Orders: Increase protein to diet (meat, cheese, eggs, fish, peanut butter, nuts and beans)  Multivitamin daily  May be up for meals- only about 30 minutes at a time, no more than 2 hours at a time  ROHO cushion when she is up in a chair  Apply specialty mattress to bed  Dietician to follow for Low prealbumin and large draining wound   Please inform family of next visit and have them come to wound care. HYPERBARIC TREATMENT-                [id number] #     Your next appointment with [address] [place] [address] is in 2  Weeks  With Carl TYPE   []??????? CHAIR     [x]??????? BED   []??????? EITHER        TIME []??????? 45 MIN     []??????? 60 MIN     (Please note your next appointment above and if you are unable to keep, kindly give a 24 hour notice. Thank you.)     If you experience any of the following, please call the [address] [place] [address] during business hours:  [phone]     * Increase in Pain  * Temperature over 101  * Increase in drainage from your wound  * Drainage with a foul odor  * Bleeding  * Increase in swelling  * Need for compression bandage changes due to slippage, breakthrough drainage.     If you need medical attention outside of the business hours of the [address] [place] [address] please contact your PCP or go to the nearest emergency room.     The information contained in the After Visit Summary has been reviewed with me, the patient and/or responsible adult, by my health care provider(s). I had the opportunity to ask questions regarding this information. I have elected to receive;      []????? ?? After Visit Summary  [x]??????? Comprehensive Discharge Instruction        Patient signature______________________________________Date:________  Electronically signed by Caro Greene RN on 6/30/2020 at 2:48 PM  Electronically signed by ALEXSANDRA Vick CNP on 6/30/2020 at 2:48 PM          Electronically signed by ALEXSANDRA Vick CNP on 6/30/2020 at 2:53 PM

## 2020-06-30 NOTE — PLAN OF CARE
Problem: Wound:  Goal: Will show signs of wound healing; wound closure and no evidence of infection  Description: Will show signs of wound healing; wound closure and no evidence of infection  Outcome: Ongoing     Problem: Pressure Ulcer:  Goal: Signs of wound healing will improve  Description: Signs of wound healing will improve  Outcome: Ongoing     Problem: Falls - Risk of:  Goal: Will remain free from falls  Description: Will remain free from falls  Outcome: Ongoing

## 2020-07-02 ENCOUNTER — HOSPITAL ENCOUNTER (OUTPATIENT)
Dept: PREADMISSION TESTING | Age: 85
Discharge: HOME | End: 2020-07-06
Payer: MEDICARE

## 2020-07-02 LAB
-: ABNORMAL
AMORPHOUS: ABNORMAL
BACTERIA: ABNORMAL
BILIRUBIN URINE: NEGATIVE
CASTS UA: ABNORMAL /LPF (ref 0–8)
COLOR: ABNORMAL
COMMENT UA: ABNORMAL
CRYSTALS, UA: ABNORMAL /HPF
EPITHELIAL CELLS UA: ABNORMAL /HPF (ref 0–5)
GLUCOSE URINE: NEGATIVE
KETONES, URINE: NEGATIVE
LEUKOCYTE ESTERASE, URINE: ABNORMAL
MUCUS: ABNORMAL
NITRITE, URINE: POSITIVE
OTHER OBSERVATIONS UA: ABNORMAL
PH UA: 7 (ref 5–8)
PROTEIN UA: ABNORMAL
RBC UA: ABNORMAL /HPF (ref 0–4)
RENAL EPITHELIAL, UA: ABNORMAL /HPF
SPECIFIC GRAVITY UA: 1.01 (ref 1–1.03)
TRICHOMONAS: ABNORMAL
TURBIDITY: ABNORMAL
URINE HGB: ABNORMAL
UROBILINOGEN, URINE: NORMAL
WBC UA: ABNORMAL /HPF (ref 0–5)
YEAST: ABNORMAL

## 2020-07-02 PROCEDURE — G0145 SCR C/V CYTO,THINLAYER,RESCR: HCPCS

## 2020-07-02 PROCEDURE — 81003 URINALYSIS AUTO W/O SCOPE: CPT

## 2020-07-02 PROCEDURE — 87186 SC STD MICRODIL/AGAR DIL: CPT

## 2020-07-02 PROCEDURE — 87086 URINE CULTURE/COLONY COUNT: CPT

## 2020-07-02 PROCEDURE — 81001 URINALYSIS AUTO W/SCOPE: CPT

## 2020-07-02 PROCEDURE — 87077 CULTURE AEROBIC IDENTIFY: CPT

## 2020-07-02 NOTE — CONSULTS
[address] [facility] 30                                  CONSULTATION    PATIENT NAME: TOYA VU                        :        1930  MED REC NO:   [id number]                             ROOM:  ACCOUNT NO:   [de-identified]                           ADMIT DATE: 2020  PROVIDER:     Rajeev Horner    CONSULT DATE:  2020    INDICATION FOR CONSULTATION:  Recurrent urinary tract infections and  vaginal bleeding. HISTORY OF CHIEF COMPLAINT:  This is an 70-year-old   multiparous female with 3 vaginal deliveries, presenting per referral  from Dr. Mayra Delgado, who is her physician at her place of residence,  The [address] [place] [address] of [address] [place] [address]. She presents today with her nursing aide  Lily, and her daughter did arrive later after the exam, Francisca Mays. The nursing aide presented records that were completed by the  daughter. She had been under the care of Dr. Daquan Sandoval. She has had  intermittent 2 urinary tract infections. She is incontinent of urine  and feces. I do not have any urine cultures or treatments provided by  the nursing home recently. The patient is a DNR-CC as far as socially. She is currently not on any antibiotics. She does wear diapers and  recently was treated for a sacral decubitus with a wound VAC, which  seems to be resolving and healing. In regards to vaginal bleeding, she  has not had a gynecologic exam in many years. She has no history of  abnormal Pap smears or STI exposure. There does not seem to be any  suspicion of abuse at her home. She denies any bloating or pain in the  vagina. No prolapse as mentioned by the nurse aide. The patient denies  any smoking or working in the [employer]. The patient denies any  frequency or urgency or painful voiding. She denies any retention.   She  does have a significant history of having multiple sclerosis and is on  aspirin and Eliquis. ALLERGIES:  To SULFA ANTIBIOTICS. SIGNIFICANT MEDICAL/SURGICAL HISTORY:  Involves multiple sclerosis,  emphysema, GERD, pericardial effusion, ARDS in the past with hypoxia. She has arthritis, has COPD, anxiety. She has had a stroke, high  cholesterol, and coronary artery disease. She had a right knee  replacement in 2014 and pacemaker placement. MEDICATIONS:  She is on amiodarone 200 mg a day; aspirin 81 mg a day;  atorvastatin 10 mg a day; Boost shakes; Eliquis 2.5 mg two times a day;  Lasix 20 mg once a day; guaifenesin, 12 hours, 600 mg two times a day;  ipratropium/albuterol solution 0.5/2.5 mg/3 mL, 3 mL inhaled orally  every 4 hours; lactulose 10 gm/15 mL solution twice a day; latanoprost  solution 0.005% for glaucoma in each eye; lisinopril 2.5 mg by mouth at  bedtime; magnesium oxide 400 mg two times a day for supplement;  meropenem solution 1 gm IV every 8 hours for UTI until 06/11, this was a  past antibiotic; metoprolol 25 mg two times a day; mirtazapine 15 mg at  bedtime for depression; and a multivitamin. SOCIAL HISTORY:  Socially, she does live an extended care facility with  limited visiting per COVID by her power of , who is her  daughter. No other risk factors. FAMILY HISTORY:  Not significant. REVIEW OF SYSTEMS:  14-point review of systems is positive for vaginal  bleeding, recurrent UTIs, urinary leakage, fecal incontinence. PHYSICAL EXAMINATION:  VITAL SIGNS:  Stable. The patient is afebrile. GENERAL:  She is alert only to place. She is slow to speak and somewhat  disoriented. She will answers questions appropriately and friendly,  though. HEENT:  EOMI, PERRLA. CARDIOVASCULAR:  Heart rate, there is paced rhythm. No significant  murmurs. LUNGS:  Decreased breath sounds with some end-expiratory wheezes. ABDOMEN:  Soft and nontender. No masses. No organomegaly. No rebound,  rigidity, or guarding.   EXTREMITIES:  No clubbing, claudication, or education. Level-5 visit. Jessa Born    D: 07/02/2020 12:17:15       T: 07/02/2020 14:31:29     [email or web address]  Job#: [id number]     Doc#: [id number]    CC:   Zeyad Ennis

## 2020-07-04 LAB
CULTURE: ABNORMAL
Lab: ABNORMAL
SPECIMEN DESCRIPTION: ABNORMAL

## 2020-07-06 LAB — CYTOLOGY REPORT: NORMAL

## 2020-07-14 ENCOUNTER — HOSPITAL ENCOUNTER (OUTPATIENT)
Age: 85
Setting detail: SPECIMEN
Discharge: HOME | End: 2020-07-14
Payer: MEDICARE

## 2020-07-14 LAB — PREALBUMIN: 10.8 MG/DL (ref 20–40)

## 2020-07-14 PROCEDURE — 36415 COLL VENOUS BLD VENIPUNCTURE: CPT

## 2020-07-14 PROCEDURE — P9603 ONE-WAY ALLOW PRORATED MILES: HCPCS

## 2020-07-14 PROCEDURE — 84134 ASSAY OF PREALBUMIN: CPT

## 2020-07-15 ENCOUNTER — HOSPITAL ENCOUNTER (OUTPATIENT)
Dept: WOUND CARE | Age: 85
Discharge: HOME | End: 2020-07-15
Payer: MEDICARE

## 2020-07-15 VITALS
TEMPERATURE: 97.1 F | HEART RATE: 61 BPM | BODY MASS INDEX: 20.38 KG/M2 | DIASTOLIC BLOOD PRESSURE: 62 MMHG | WEIGHT: 115 LBS | RESPIRATION RATE: 16 BRPM | SYSTOLIC BLOOD PRESSURE: 98 MMHG | HEIGHT: 63 IN

## 2020-07-15 PROBLEM — L89.154 PRESSURE ULCER OF COCCYGEAL REGION, STAGE IV (HCC): Chronic | Status: ACTIVE | Noted: 2020-01-13

## 2020-07-15 PROCEDURE — 6370000000 HC RX 637 (ALT 250 FOR IP): Performed by: SURGERY

## 2020-07-15 PROCEDURE — 11042 DBRDMT SUBQ TIS 1ST 20SQCM/<: CPT

## 2020-07-15 RX ORDER — WITCH HAZEL 50 %
PADS, MEDICATED (EA) TOPICAL 2 TIMES DAILY
COMMUNITY

## 2020-07-15 RX ORDER — LIDOCAINE HYDROCHLORIDE 40 MG/ML
SOLUTION TOPICAL ONCE
Status: COMPLETED | OUTPATIENT
Start: 2020-07-15 | End: 2020-07-15

## 2020-07-15 RX ADMIN — LIDOCAINE HYDROCHLORIDE 15 ML: 40 SOLUTION TOPICAL at 10:46

## 2020-07-15 ASSESSMENT — PAIN DESCRIPTION - PAIN TYPE: TYPE: CHRONIC PAIN

## 2020-07-15 ASSESSMENT — PAIN DESCRIPTION - LOCATION: LOCATION: COCCYX

## 2020-07-15 ASSESSMENT — PAIN DESCRIPTION - DESCRIPTORS: DESCRIPTORS: OTHER (COMMENT)

## 2020-07-15 ASSESSMENT — PAIN DESCRIPTION - ORIENTATION: ORIENTATION: MID

## 2020-07-15 ASSESSMENT — PAIN SCALES - WONG BAKER: WONGBAKER_NUMERICALRESPONSE: 6

## 2020-07-15 NOTE — DISCHARGE INSTR - COC
Continuity of Care Form    Patient Name: Wilbur Whatley   :  1930  MRN:  [id number]    Admit date:  7/15/2020  Discharge date:  ***    Code Status Order: Prior   Advance Directives:   [facility] Documentation     Date/Time Healthcare Directive Type of Healthcare Directive Copy in [address] Agent's Name Healthcare Agent's Phone Number    07/15/20 1037  Yes, patient has an advance directive for healthcare treatment  Durable power of  for health care  Yes, copy in chart  --  --  --          Admitting Physician:  No admitting provider for patient encounter. PCP: Avel Lerma MD    Discharging Nurse: [facility] Unit/Room#: No information available for this encounter.   Discharging Unit Phone Number: ***    Emergency Contact:   Extended Emergency Contact Information  Primary Emergency Contact: Enmanuel [address] Phone: [phone]  Work Phone: [phone]  Mobile Phone: [phone]  Relation: Child  Secondary Emergency Contact: Kajal Lainez  Address: [address] Phone: [phone]  Mobile Phone: [phone]  Relation: Child    Past Surgical History:  Past Surgical History:   Procedure Laterality Date    CARDIAC PACEMAKER PLACEMENT      REMOVAL 2019 WITH TEMPORARY PACEMAKER PLACED 2019    CARDIAC PACEMAKER PLACEMENT  2019    DR Bentley Purchase    CATARACT REMOVAL WITH IMPLANT Bilateral     Left 1998, right 1996    COLONOSCOPY      DILATION AND CURETTAGE OF UTERUS N/A 3/12/2019    DILATATION AND CURETTAGE, HYSTEROSCOPY, MYOSURE performed by Kimmy Nguyen MD at [address] [place] [address]  2016    medtronic advisa MRI DR model # [id number]  av lead [id number] rv lead 9323-39  - mri compatible    MD GI ENDOSCOPIC ULTRASOUND N/A 2018    ENDOSCOPIC ULTRASOUND, PATHOLOGY REQUESTED, PAT NOTIFIED performed by Katy Robison MD at [facility]      as a child    TOTAL KNEE ARTHROPLASTY Right 9/5/2017    KNEE TOTAL ARTHROPLASTY RIGHT WITH BIOMET AND GPS PRODUCT APPLICATION performed by Nicho Up MD at [address] TRANSESOPHAGEAL ECHOCARDIOGRAM N/A 10/9/2019    TRANSESOPHAGEAL ECHOCARDIOGRAM WITH BUBBLE STUDY performed by Candelaria Isaacs DO at [facility] TRANSESOPHAGEAL ECHOCARDIOGRAM  12/16/2019    UPPER GASTROINTESTINAL ENDOSCOPY  06/29/2017    polyp removed from small intestine near stomach, benign. done at [facility]  9/12/2018    EGD BIOPSY performed by Benita Burnette MD at [facility]       Immunization History:   Immunization History   Administered Date(s) Administered    Influenza A (A2I3-30) Vaccine PF IM 01/29/2010    Influenza Vaccine, unspecified formulation 10/14/2015, 10/03/2018    Pneumococcal Conjugate 13-valent (Jesse Deweese) 10/14/2015    Pneumococcal Polysaccharide ([id number]) 10/01/1999    Zoster Live (Zostavax) 12/23/2015       Active Problems:  Patient Active Problem List   Diagnosis Code    Arthritis M19.90    Osteopenia M85.80    Complete heart block (HCC) I44.2    Syncope and collapse R55    Hyperlipidemia E78.5    Contusion of scalp S00. 03XA    Chest pain in adult R07.9    Pericardial effusion I31.3    Paroxysmal atrial fibrillation (HCC) I48.0    Sinus arrest I45.5    Knee effusion, right M25.461    Shortness of breath R06.02    Pulmonary embolism with acute cor pulmonale (HCC) I26.09    Pleural effusion J90    Sick sinus syndrome (HCC) I49.5    Hemorrhagic pericardial effusion I31.2    Status post total right knee replacement Z96.651    Subtherapeutic international normalized ratio (INR) R79.1    Acute congestive heart failure (HCC) I50.9    Thickened endometrium R93.89    Acute respiratory failure with hypoxia (HCC) J96.01    Head injury without concussion or intracranial hemorrhage S09.90XA    Bacteremia R78.81    Right arm cellulitis L03. 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 7XXA    Closed fracture of lumbar vertebra with spinal cord injury ([address] S34.109A, S32.008A    Pressure ulcer of coccygeal region, stage IV ([facility]) L89.154    Wedge compression fracture of third lumbar vertebra with routine healing S32.030D    Closed compression fracture of L1 lumbar vertebra, sequela S32.010S    Age-related osteoporosis without current pathological fracture M81.0    Vitamin D deficiency E55.9    Status post kyphoplasty Z98.890    Spinal stenosis of lumbar region with neurogenic claudication M48.062    Slow transit constipation K59.01    Restless legs syndrome G25.81    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    History of pulmonary embolism Z86.711    Gastric polyp K31.7    Gastroesophageal reflux disease with esophagitis K21.0    Generalized anxiety disorder F41.1    Essential (primary) hypertension I10    Thrombophlebitis I80.9    Age-related osteoporosis with current pathological fracture M80.00XA    Anticoagulated on Coumadin Z79.01    Centrilobular emphysema ([facility]) J43.2    Cervical spondylosis without myelopathy M47.812    DDD (degenerative disc disease), lumbar M51.36    Esophageal dysphagia R13.10    Presence of cardiac pacemaker Z95.0    Severe protein-calorie malnutrition ([facility]) E43    Skin tear of left upper extremity S41.112A       Isolation/Infection:   Isolation          No Isolation        Patient Infection Status     Infection Onset Added Last Indicated Last Indicated By Review Planned Expiration Resolved Resolved By    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 Coli - urine 2/2020        ESBL (Extended Spectrum Beta Lactamase) 02/07/20 02/09/20 07/02/20 Culture, Urine        Klebsiella - Urine 4/2020  Proteus - Urine 7/2020        MRSA 10/04/19 10/06/19 10/07/19 CULTURE BLOOD #2        Blood - 10/2019          Nurse Assessment:  Last Vital Signs: BP 98/62   Pulse 61   Temp 97.1 °F (36.2 °C) (Tympanic)   Resp 16   Ht 5' 3\" (1.6 m)   Wt 115 lb (52.2 kg)   BMI 20.37 kg/m²     Last documented pain score (0-10 scale):    Last Weight:   Wt Readings from Last 1 Encounters:   07/15/20 115 lb (52.2 kg)     Mental Status:  {IP PT MENTAL STATUS:00029}    IV Access:  {[facility] IV ACCESS:819072428}    Nursing Mobility/ADLs:  Walking   {P DME JIDF:182066328}  Transfer  {P DME SNBP:132341387}  Bathing  {CHP DME RVBO:783678506}  Dressing  {CHP DME AHPW:262922816}  Toileting  {CHP DME TONC:336008643}  Feeding  {P DME TBSC:822741979}  Med Admin  {P DME MDEM:721015233}  Med Delivery   {[facility] MED Delivery:927545675}    Wound Care Documentation and Therapy:  Wound 06/30/20 Coccyx Mid wound #1 coccyx (Active)   Wound Image   06/30/20 1423   Wound Pressure Stage  4 07/15/20 1033   Dressing Status New drainage; Old drainage; Changed 07/15/20 1033   Dressing Changed Changed/New 07/15/20 1033   Wound Cleansed Rinsed/Irrigated with saline 07/15/20 1033   Wound Length (cm) 3 cm 07/15/20 1033   Wound Width (cm) 3.3 cm 07/15/20 1033   Wound Depth (cm) 1.8 cm 07/15/20 1033   Wound Surface Area (cm^2) 9.9 cm^2 07/15/20 1033   Change in Wound Size % (l*w) 5.71 07/15/20 1033   Wound Volume (cm^3) 17.82 cm^3 07/15/20 1033   Wound Healing % 32 07/15/20 1033   Post-Procedure Length (cm) 3.5 cm 07/15/20 1033   Post-Procedure Width (cm) 2 cm 07/15/20 1033   Post-Procedure Depth (cm) 2.2 cm 07/15/20 1033   Post-Procedure Surface Area (cm^2) 7 cm^2 07/15/20 1033   Post-Procedure Volume (cm^3) 15.4 cm^3 07/15/20 1033   Tunneling Position ___ O'Clock 12 06/30/20 1423   Undermining Starts ___ O'Clock 1000 07/15/20 1033   Undermining Ends___ O'Clock 0200 07/15/20 1033   Undermining Maxium Distance (cm) 2.1 cm @ 1200 07/15/20 1033   Wound Assessment Drainage;Pink;Red 07/15/20 1033   Drainage Amount Moderate 07/15/20 1033   Drainage Description Serosanguinous; Yellow 07/15/20 1033   Radha-wound Assessment Pink 07/15/20 1033   [place]%Wound Bed 50 07/15/20 1033   Red%Wound Bed 50 07/15/20 1033   Yellow%Wound Bed 0 07/15/20 1033   Number of days: 14       Wound 06/30/20 Arm Left wound #2 left lower arm (Active)   Wound Image   06/30/20 1423   Wound Traumatic 07/15/20 1033   Dressing Status New drainage; Old drainage; Changed 07/15/20 1033   Dressing Changed Changed/New 07/15/20 1033   Wound Cleansed Rinsed/Irrigated with saline 07/15/20 1033   Wound Length (cm) 1.8 cm 07/15/20 1033   Wound Width (cm) 0.6 cm 07/15/20 1033   Wound Depth (cm) 0.1 cm 07/15/20 1033   Wound Surface Area (cm^2) 1.08 cm^2 07/15/20 1033   Change in Wound Size % (l*w) 63.88 07/15/20 1033   Wound Volume (cm^3) 0.11 cm^3 07/15/20 1033   Wound Healing % 63 07/15/20 1033   Post-Procedure Length (cm) 1.8 cm 07/15/20 1033   Post-Procedure Width (cm) 0.6 cm 07/15/20 1033   Post-Procedure Depth (cm) 0.1 cm 07/15/20 1033   Post-Procedure Surface Area (cm^2) 1.08 cm^2 07/15/20 1033   Post-Procedure Volume (cm^3) 0.11 cm^3 07/15/20 1033   Wound Assessment Drainage; Red 07/15/20 1033   Drainage Amount Moderate 07/15/20 1033   Odor None 07/15/20 1033   Margins Defined edges 07/15/20 1033   Radha-wound Assessment Clean 07/15/20 1033   Red%Wound Bed 100 07/15/20 1033   Number of days: 14        Elimination:  Continence:   · Bowel: {YES / QH:68819}  · Bladder: {YES / PM:57371}  Urinary Catheter: {Urinary Catheter:817729019}   Colostomy/Ileostomy/Ileal Conduit: {YES / RC:68215}       Date of Last BM: ***  No intake or output data in the 24 hours ending 07/15/20 1111  No intake/output data recorded.     Safety Concerns:     [facility] Safety Concerns:508999170}    Impairments/Disabilities:      [facility] Impairments/Disabilities:096388137}    Nutrition Therapy:  Current Nutrition Therapy:   [facility] Diet List:695980344}    Routes of Feeding: {CHP DME Other Feedings:148647202}  Liquids: {Slp liquid thickness:11734}  Daily Fluid Restriction: {CHP DME Yes amt example:150438309}  Last Modified Barium Swallow with Video (Video Swallowing Test): {Done Not Done BJEA:396947907}    Treatments at the Time of Hospital Discharge:   Respiratory Treatments: ***  Oxygen Therapy:  {Therapy; copd oxygen:02240}  Ventilator:    {MH CC Vent EHXB:761957069}    Rehab Therapies: {THERAPEUTIC INTERVENTION:4896832945}  Weight Bearing Status/Restrictions: [address] [place] [address] Weight Bearin}  Other Medical Equipment (for information only, NOT a DME order):  {EQUIPMENT:582509012}  Other Treatments: ***    Patient's personal belongings (please select all that are sent with patient):  {[facility] DME Belongings:259361986}    RN SIGNATURE:  {Esignature:847553237}    CASE MANAGEMENT/SOCIAL WORK SECTION    Inpatient Status Date: ***    Readmission Risk Assessment Score:  Readmission Risk              Risk of Unplanned Readmission:        0           Discharging to Facility/ Agency   · Name:   · Address:  · Phone:  · Fax:    Dialysis Facility (if applicable)   · Name:  · Address:  · Dialysis Schedule:  · Phone:  · Fax:    / signature: {Esignature:783614028}    PHYSICIAN SECTION    Prognosis: {Prognosis:7687855970}    Condition at Discharge: [address] Patient Condition:106211438}    Rehab Potential (if transferring to Rehab): {Prognosis:8909269806}    Recommended Labs or Other Treatments After Discharge: ***    Physician Certification: I certify the above information and transfer of Renu Matter  is necessary for the continuing treatment of the diagnosis listed and that she requires {Admit to Appropriate Level of Care:36502} for {GREATER/LESS:301589398} 30 days.      Update Admission H&P: {CHP DME Changes in OHYSF:543765580}    PHYSICIAN SIGNATURE:  {Esignature:463663661}

## 2020-07-15 NOTE — PROGRESS NOTES
[address]   Progress Note and Procedure Note      [id number] [facility] RECORD NUMBER:  [id number]  AGE: 80 y.o. GENDER: female  : 1930  EPISODE DATE:  7/15/2020    Subjective:     Chief Complaint   Patient presents with    Wound Check         HISTORY of PRESENT ILLNESS HPI     John Chery is a 80 y.o. female who presents today for wound/ulcer evaluation.    History of Wound Context: She is an 80year old  female who has a stage 4 decubitus ulcer to the coccyx and a skin tear on the left arm  Wound/Ulcer Pain Timing/Severity: intermittent  Quality of pain: aching  Severity:  1 / 10   Modifying Factors: Pain worsens with debridement  Associated Signs/Symptoms: erythema    Ulcer Identification:  Ulcer Type: pressure  Contributing Factors: chronic pressure, decreased mobility, incontinence of stool and incontinence of urine    Wound: N/A        PAST MEDICAL HISTORY        Diagnosis Date    Arthritis     Atrial fibrillation ([address] 2016    [facility] failure) [facility]     Complete heart block [facility] 2016    Constipation     CVA (cerebral vascular accident) ([facility] 2019    ACUTE ISCHEMIC    GERD (gastroesophageal reflux disease)     Glaucoma     mild, on eye drops    Hearing loss     bilateral hearing aids    Hiatal hernia     Hx of blood clots 2016    pulmonary emboli bilateral lungs after Pacemaker inserted    Hyperlipidemia     MRSA (methicillin resistant Staphylococcus aureus) infection 2019    MV ENDOCARDITIS    Osteopenia     Pacemaker 2016    Duo-chamber pacemaker in left upper chest;  DR. Mona Cheek    Restless leg syndrome     Right knee DJD     Wears glasses     Wears hearing aid in both ears        PAST SURGICAL HISTORY    Past Surgical History:   Procedure Laterality Date    CARDIAC PACEMAKER PLACEMENT      REMOVAL 2019 WITH TEMPORARY PACEMAKER PLACED 2019    CARDIAC PACEMAKER PLACEMENT  2019 tablet Take 200 mg by mouth daily      Nutritional Supplements (BOOST PLUS PO) Take 237 mLs by mouth 3 times daily      diphenhydrAMINE (BENADRYL) 50 MG capsule Take 50 mg by mouth every 4 hours as needed for Itching      ipratropium-albuterol (DUONEB) 0.5-2.5 (3) MG/3ML SOLN nebulizer solution Take 1 vial by nebulization every 4 hours as needed for Shortness of Breath      Multiple Vitamins-Minerals (THERAPEUTIC MULTIVITAMIN-MINERALS) tablet Take 1 tablet by mouth daily      PROTEIN PO Take 30 mLs by mouth 3 times daily      traMADol (ULTRAM) 50 MG tablet Take 50 mg by mouth 2 times daily.       Calcium Carbonate-Vitamin D (CALCIUM 500 + D PO) Take 1 tablet by mouth daily CALCIUM 500MG+VIT D 200IU      lisinopril (PRINIVIL;ZESTRIL) 2.5 MG tablet Take 2.5 mg by mouth nightly      Magnesium Oxide (MAG- PO) Take 400 mg by mouth 2 times daily      lactulose (CHRONULAC) 10 GM/15ML solution Take 10 g by mouth 2 times daily      potassium chloride (MICRO-K) 10 MEQ extended release capsule Take 30 mEq by mouth daily       furosemide (LASIX) 20 MG tablet Take 1 tablet by mouth 2 times daily (Patient taking differently: Take 20 mg by mouth daily ) 60 tablet 3    aspirin 81 MG chewable tablet Take 1 tablet by mouth daily 30 tablet 3    guaiFENesin (MUCINEX) 600 MG extended release tablet Take 1 tablet by mouth 2 times daily 60 tablet 0    polyethylene glycol (MIRALAX) powder Take 17 g by mouth daily as needed      metoprolol tartrate (LOPRESSOR) 25 MG tablet Take 1 tablet by mouth 2 times daily 60 tablet 3    rOPINIRole (REQUIP) 0.25 MG tablet Take 0.25 mg by mouth nightly       atorvastatin (LIPITOR) 10 MG tablet Take 10 mg by mouth daily       latanoprost (XALATAN) 0.005 % ophthalmic solution Place 1 drop into both eyes nightly       Acetaminophen (TYLENOL ARTHRITIS PAIN PO) Take 2 tablets by mouth every 6 hours as needed        No current facility-administered medications on file prior to encounter. REVIEW OF SYSTEMS    Pertinent items are noted in HPI. Objective:      BP 98/62   Pulse 61   Temp 97.1 °F (36.2 °C) (Tympanic)   Resp 16   Ht 5' 3\" (1.6 m)   Wt 115 lb (52.2 kg)   BMI 20.37 kg/m²     Wt Readings from Last 3 Encounters:   07/15/20 115 lb (52.2 kg)   06/30/20 125 lb (56.7 kg)   03/09/20 125 lb (56.7 kg)       PHYSICAL EXAM    General Appearance: alert and oriented to person, place and time, well developed and well- nourished, in no acute distress  Skin: warm and dry, no rash or erythema  Head: normocephalic and atraumatic  Pulmonary/Chest: clear to auscultation bilaterally- no wheezes, rales or rhonchi, normal air movement, no respiratory distress  Cardiovascular: normal rate, regular rhythm, normal S1 and S2, no murmurs, rubs, clicks, or gallops, distal pulses intact, no carotid bruits  Abdomen: soft, non-tender, non-distended, normal bowel sounds, no masses or organomegaly  Extremities: no cyanosis, clubbing or edema  Musculoskeletal: normal range of motion, no joint swelling, deformity or tenderness  Neurologic: reflexes normal and symmetric, no cranial nerve deficit, gait, coordination and speech normal      Assessment:     Problem List Items Addressed This Visit     Pressure ulcer of coccygeal region, stage IV (HCC) (Chronic)    Severe protein-calorie malnutrition (HCC) - Primary (Chronic)           Procedure Note  Indications:  Based on my examination of this patient's wound(s)/ulcer(s) today, debridement is required to promote healing and evaluate the wound base. Performed by: Aida Patton MD    Consent obtained:  Yes    Time out taken:  Yes    Pain Control: Anesthetic  Anesthetic: 4% Lidocaine Liquid Topical       Debridement:Excisional Debridement    Using curette the wound(s)/ulcer(s) was/were sharply debrided down through and including the removal of subcutaneous tissue.         Devitalized Tissue Debrided:  fibrin, biofilm and slough    Pre Debridement Measurements:  Are located in the [facility]  Documentation Flow Sheet    Wound/Ulcer #: 2    Post Debridement Measurements:  Wound/Ulcer Descriptions are Pre Debridement except measurements:    Wound 06/30/20 Coccyx Mid wound #1 coccyx (Active)   Wound Image   06/30/20 1423   Wound Pressure Stage  4 07/15/20 1033   Dressing Status New drainage; Old drainage; Changed 07/15/20 1033   Dressing Changed Changed/New 07/15/20 1033   Wound Cleansed Rinsed/Irrigated with saline 07/15/20 1033   Wound Length (cm) 3 cm 07/15/20 1033   Wound Width (cm) 3.3 cm 07/15/20 1033   Wound Depth (cm) 1.8 cm 07/15/20 1033   Wound Surface Area (cm^2) 9.9 cm^2 07/15/20 1033   Change in Wound Size % (l*w) 5.71 07/15/20 1033   Wound Volume (cm^3) 17.82 cm^3 07/15/20 1033   Wound Healing % 32 07/15/20 1033   Post-Procedure Length (cm) 3.5 cm 07/15/20 1033   Post-Procedure Width (cm) 2 cm 07/15/20 1033   Post-Procedure Depth (cm) 2.2 cm 07/15/20 1033   Post-Procedure Surface Area (cm^2) 7 cm^2 07/15/20 1033   Post-Procedure Volume (cm^3) 15.4 cm^3 07/15/20 1033   Tunneling Position ___ O'Clock 12 06/30/20 1423   Undermining Starts ___ O'Clock 1000 07/15/20 1033   Undermining Ends___ O'Clock 0200 07/15/20 1033   Undermining Maxium Distance (cm) 2.1 cm @ 1200 07/15/20 1033   Wound Assessment Drainage;Pink;Red 07/15/20 1033   Drainage Amount Moderate 07/15/20 1033   Drainage Description Serosanguinous; Yellow 07/15/20 1033   Radha-wound Assessment Pink 07/15/20 1033   Osmond%Wound Bed 50 07/15/20 1033   Red%Wound Bed 50 07/15/20 1033   Yellow%Wound Bed 0 07/15/20 1033   Number of days: 14       Wound 06/30/20 Arm Left wound #2 left lower arm (Active)   Wound Image   06/30/20 1423   Wound Traumatic 07/15/20 1033   Dressing Status New drainage; Old drainage; Changed 07/15/20 1033   Dressing Changed Changed/New 07/15/20 1033   Wound Cleansed Rinsed/Irrigated with saline 07/15/20 1033   Wound Length (cm) 1.8 cm 07/15/20 1033   Wound Width (cm) 0.6 cm 07/15/20 1033   Wound Depth (cm) 0.1 cm 07/15/20 1033   Wound Surface Area (cm^2) 1.08 cm^2 07/15/20 1033   Change in Wound Size % (l*w) 63.88 07/15/20 1033   Wound Volume (cm^3) 0.11 cm^3 07/15/20 1033   Wound Healing % 63 07/15/20 1033   Post-Procedure Length (cm) 1.8 cm 07/15/20 1033   Post-Procedure Width (cm) 0.6 cm 07/15/20 1033   Post-Procedure Depth (cm) 0.1 cm 07/15/20 1033   Post-Procedure Surface Area (cm^2) 1.08 cm^2 07/15/20 1033   Post-Procedure Volume (cm^3) 0.11 cm^3 07/15/20 1033   Wound Assessment Drainage; Red 07/15/20 1033   Drainage Amount Moderate 07/15/20 1033   Odor None 07/15/20 1033   Margins Defined edges 07/15/20 1033   Radha-wound Assessment Clean 07/15/20 1033   Red%Wound Bed 100 07/15/20 1033   Number of days: 14          Percent of Wound(s)/Ulcer(s) Debrided: 100%    Total Surface Area Debrided:  8.08 sq cm       Diabetic/Pressure/Non Pressure Ulcers only:  Ulcer: Non-Pressure ulcer, muscle necrosis      Estimated Blood Loss:  Minimal    Hemostasis Achieved:  by pressure    Procedural Pain:  2  / 10     Post Procedural Pain:  2 / 10     Response to treatment:  Well tolerated by patient. Plan:     Treatment Note please see attached Discharge Instructions    Written patient dismissal instructions given to patient and signed by patient or POA. Discharge Instructions         [facility] [place] and HYPERBARIC TREATMENT  CENTER                                 Visit Rahel Instructions / Physician Orders  DATE: 07/15/2020     Home Care: [facility]     SUPPLIES ORDERED THRU: SNF     Wound Location:  Coccyx, left arm                                  Cleanse with: Liquid antibacterial soap and water, rinse well      Dressing Orders:   Coccyx: Skin prep and drape to periwound.  Fill wound with black foam, cover with drape, and apply KCI vac (do not substitute) at 150mmhg continuous (NS wet to dry applied today in wound care)                                    Left arm: Fibracol to wound, cover with mepilex border                      Frequency: Coccyx: Change three times a week (M,W,F)                       Left arm: Change three times a week     Additional Orders: Increase protein to diet (meat, cheese, eggs, fish, peanut butter, nuts and beans)  Multivitamin daily  May be up for meals- only about 30 minutes at a time, no more than 2 hours at a time  ROHO cushion when she is up in a chair  Apply specialty mattress to bed  Please inform family of next visit and have them come to wound care  Dietician to follow for low prealbumin and large draining wound- pt needs increase in protein in diet  Spoke with family about possible feeding tube    HYPERBARIC TREATMENT-                TREATMENT #     Your next appointment with [facility] is in 2 weeks     ROOM TYPE   []???????? CHAIR     [x]???????? BED   []???????? EITHER        TIME []???????? 45 MIN     []???????? 60 MIN     (Please note your next appointment above and if you are unable to keep, kindly give a 24 hour notice. Thank you.)     If you experience any of the following, please call the [facility] during business hours:  [phone]     * Increase in Pain  * Temperature over 101  * Increase in drainage from your wound  * Drainage with a foul odor  * Bleeding  * Increase in swelling  * Need for compression bandage changes due to slippage, breakthrough drainage.     If you need medical attention outside of the business hours of the [facility] please contact your PCP or go to the nearest emergency room.     The information contained in the After Visit Summary has been reviewed with me, the patient and/or responsible adult, by my health care provider(s). I had the opportunity to ask questions regarding this information. I have elected to receive;      []?????? ?? After Visit Summary  [x]???????? Comprehensive Discharge Instruction        Patient

## 2020-07-16 ENCOUNTER — HOSPITAL ENCOUNTER (OUTPATIENT)
Age: 85
Setting detail: SPECIMEN
Discharge: HOME | End: 2020-07-16
Payer: MEDICARE

## 2020-07-18 ENCOUNTER — HOSPITAL ENCOUNTER (OUTPATIENT)
Age: 85
Setting detail: SPECIMEN
Discharge: HOME | End: 2020-07-18
Payer: MEDICARE

## 2020-07-18 LAB
BUN BLDV-MCNC: 20 MG/DL (ref 8–23)
CREAT SERPL-MCNC: 0.62 MG/DL (ref 0.5–0.9)
GFR AFRICAN AMERICAN: >60 ML/MIN
GFR NON-AFRICAN AMERICAN: >60 ML/MIN
GFR SERPL CREATININE-BSD FRML MDRD: NORMAL ML/MIN/{1.73_M2}
GFR SERPL CREATININE-BSD FRML MDRD: NORMAL ML/MIN/{1.73_M2}

## 2020-07-18 PROCEDURE — 84520 ASSAY OF UREA NITROGEN: CPT

## 2020-07-18 PROCEDURE — 82565 ASSAY OF CREATININE: CPT

## 2020-07-18 PROCEDURE — P9603 ONE-WAY ALLOW PRORATED MILES: HCPCS

## 2020-07-18 PROCEDURE — 36415 COLL VENOUS BLD VENIPUNCTURE: CPT

## 2020-07-20 ENCOUNTER — HOSPITAL ENCOUNTER (OUTPATIENT)
Age: 85
Discharge: HOME | End: 2020-07-20
Payer: MEDICARE

## 2020-07-20 ENCOUNTER — HOSPITAL ENCOUNTER (OUTPATIENT)
Dept: CT IMAGING | Age: 85
Discharge: HOME | End: 2020-07-22
Payer: MEDICARE

## 2020-07-20 PROCEDURE — 74178 CT ABD&PLV WO CNTR FLWD CNTR: CPT

## 2020-07-20 PROCEDURE — 6360000004 HC RX CONTRAST MEDICATION: Performed by: OBSTETRICS & GYNECOLOGY

## 2020-07-20 PROCEDURE — 2580000003 HC RX 258: Performed by: OBSTETRICS & GYNECOLOGY

## 2020-07-20 RX ORDER — SODIUM CHLORIDE 0.9 % (FLUSH) 0.9 %
10 SYRINGE (ML) INJECTION PRN
Status: DISCONTINUED | OUTPATIENT
Start: 2020-07-20 | End: 2020-07-23 | Stop reason: HOSPADM

## 2020-07-20 RX ORDER — 0.9 % SODIUM CHLORIDE 0.9 %
80 INTRAVENOUS SOLUTION INTRAVENOUS ONCE
Status: COMPLETED | OUTPATIENT
Start: 2020-07-20 | End: 2020-07-20

## 2020-07-20 RX ADMIN — SODIUM CHLORIDE 80 ML: 9 INJECTION, SOLUTION INTRAVENOUS at 16:39

## 2020-07-20 RX ADMIN — IOVERSOL 120 ML: 741 INJECTION INTRA-ARTERIAL; INTRAVENOUS at 16:39

## 2020-07-20 RX ADMIN — Medication 10 ML: at 16:40

## 2020-07-21 ENCOUNTER — HOSPITAL ENCOUNTER (OUTPATIENT)
Age: 85
Setting detail: SPECIMEN
Discharge: HOME | End: 2020-07-21
Payer: MEDICARE

## 2020-07-21 LAB
-: ABNORMAL
AMORPHOUS: ABNORMAL
BACTERIA: ABNORMAL
BILIRUBIN URINE: ABNORMAL
CASTS UA: ABNORMAL /LPF (ref 0–2)
CASTS UA: ABNORMAL /LPF (ref 0–2)
COLOR: ABNORMAL
COMMENT UA: ABNORMAL
CRYSTALS, UA: ABNORMAL /HPF
EPITHELIAL CELLS UA: ABNORMAL /HPF (ref 0–5)
GLUCOSE URINE: NEGATIVE
KETONES, URINE: NEGATIVE
LEUKOCYTE ESTERASE, URINE: ABNORMAL
MUCUS: ABNORMAL
NITRITE, URINE: POSITIVE
OTHER OBSERVATIONS UA: ABNORMAL
PH UA: 5 (ref 5–8)
PROTEIN UA: ABNORMAL
RBC UA: ABNORMAL /HPF (ref 0–2)
RENAL EPITHELIAL, UA: ABNORMAL /HPF
SPECIFIC GRAVITY UA: 1.07 (ref 1–1.03)
TRICHOMONAS: ABNORMAL
TURBIDITY: ABNORMAL
URINE HGB: ABNORMAL
UROBILINOGEN, URINE: NORMAL
WBC UA: ABNORMAL /HPF (ref 0–5)
YEAST: ABNORMAL

## 2020-07-21 PROCEDURE — 86403 PARTICLE AGGLUT ANTBDY SCRN: CPT

## 2020-07-21 PROCEDURE — 87186 SC STD MICRODIL/AGAR DIL: CPT

## 2020-07-21 PROCEDURE — 81001 URINALYSIS AUTO W/SCOPE: CPT

## 2020-07-21 PROCEDURE — 87086 URINE CULTURE/COLONY COUNT: CPT

## 2020-07-23 ENCOUNTER — APPOINTMENT (OUTPATIENT)
Dept: CT IMAGING | Age: 85
DRG: 871 | End: 2020-07-23
Payer: MEDICARE

## 2020-07-23 ENCOUNTER — APPOINTMENT (OUTPATIENT)
Dept: ULTRASOUND IMAGING | Age: 85
DRG: 871 | End: 2020-07-23
Payer: MEDICARE

## 2020-07-23 ENCOUNTER — HOSPITAL ENCOUNTER (INPATIENT)
Age: 85
LOS: 6 days | Discharge: NURSING, REHAB OR LONG-TERM CARE | DRG: 871 | End: 2020-07-29
Attending: EMERGENCY MEDICINE | Admitting: INTERNAL MEDICINE
Payer: MEDICARE

## 2020-07-23 ENCOUNTER — APPOINTMENT (OUTPATIENT)
Dept: GENERAL RADIOLOGY | Age: 85
DRG: 871 | End: 2020-07-23
Payer: MEDICARE

## 2020-07-23 PROBLEM — I95.9 HYPOTENSION: Status: ACTIVE | Noted: 2020-07-23

## 2020-07-23 PROBLEM — I50.32 CHRONIC DIASTOLIC HEART FAILURE (HCC): Status: ACTIVE | Noted: 2020-07-23

## 2020-07-23 PROBLEM — D64.9 ANEMIA: Status: ACTIVE | Noted: 2020-07-23

## 2020-07-23 PROBLEM — E16.2 HYPOGLYCEMIA: Status: ACTIVE | Noted: 2020-07-23

## 2020-07-23 PROBLEM — N20.0 LEFT RENAL STONE: Status: ACTIVE | Noted: 2020-07-23

## 2020-07-23 PROBLEM — G45.9 TIA (TRANSIENT ISCHEMIC ATTACK): Status: ACTIVE | Noted: 2020-07-23

## 2020-07-23 PROBLEM — I48.91 ATRIAL FIBRILLATION (HCC): Status: ACTIVE | Noted: 2020-07-23

## 2020-07-23 LAB
% CKMB: 8 % (ref 0–3)
-: ABNORMAL
ABSOLUTE EOS #: 0.24 K/UL (ref 0–0.44)
ABSOLUTE IMMATURE GRANULOCYTE: <0.03 K/UL (ref 0–0.3)
ABSOLUTE LYMPH #: 1.25 K/UL (ref 1.1–3.7)
ABSOLUTE MONO #: 0.49 K/UL (ref 0.1–1.2)
ALLEN TEST: NORMAL
AMORPHOUS: ABNORMAL
ANION GAP SERPL CALCULATED.3IONS-SCNC: 9 MMOL/L (ref 9–17)
ANION GAP: 6 MMOL/L (ref 7–16)
BACTERIA: ABNORMAL
BASOPHILS # BLD: 1 % (ref 0–2)
BASOPHILS ABSOLUTE: 0.03 K/UL (ref 0–0.2)
BILIRUBIN URINE: NEGATIVE
BNP INTERPRETATION: ABNORMAL
BUN BLDV-MCNC: 37 MG/DL (ref 8–23)
BUN/CREAT BLD: ABNORMAL (ref 9–20)
CALCIUM SERPL-MCNC: 8 MG/DL (ref 8.6–10.4)
CASTS UA: ABNORMAL /LPF (ref 0–2)
CHLORIDE BLD-SCNC: 107 MMOL/L (ref 98–107)
CHOLESTEROL/HDL RATIO: 2
CHOLESTEROL: 102 MG/DL
CHP ED QC CHECK: NORMAL
CK MB: 5.5 NG/ML
CKMB INTERPRETATION: ABNORMAL
CO2: 24 MMOL/L (ref 20–31)
COLOR: ABNORMAL
CREAT SERPL-MCNC: 1.4 MG/DL (ref 0.5–0.9)
CRYSTALS, UA: ABNORMAL /HPF
DIFFERENTIAL TYPE: ABNORMAL
EOSINOPHILS RELATIVE PERCENT: 5 % (ref 1–4)
EPITHELIAL CELLS UA: ABNORMAL /HPF (ref 0–5)
ESTIMATED AVERAGE GLUCOSE: 94 MG/DL
FERRITIN: 275 UG/L (ref 13–150)
FIO2: NORMAL
FOLATE: 9.3 NG/ML
GFR AFRICAN AMERICAN: 43 ML/MIN
GFR NON-AFRICAN AMERICAN: 30 ML/MIN
GFR NON-AFRICAN AMERICAN: 35 ML/MIN
GFR SERPL CREATININE-BSD FRML MDRD: 36 ML/MIN
GFR SERPL CREATININE-BSD FRML MDRD: ABNORMAL ML/MIN/{1.73_M2}
GLUCOSE BLD-MCNC: 69 MG/DL
GLUCOSE BLD-MCNC: 69 MG/DL (ref 74–100)
GLUCOSE BLD-MCNC: 84 MG/DL (ref 70–99)
GLUCOSE URINE: NEGATIVE
HBA1C MFR BLD: 4.9 % (ref 4–6)
HCO3 VENOUS: 25.4 MMOL/L (ref 22–29)
HCT VFR BLD CALC: 29.4 % (ref 36.3–47.1)
HDLC SERPL-MCNC: 52 MG/DL
HEMOGLOBIN: 8.8 G/DL (ref 11.9–15.1)
IMMATURE GRANULOCYTES: 0 %
INR BLD: 1.1
IRON SATURATION: 25 % (ref 20–55)
IRON: 43 UG/DL (ref 37–145)
KETONES, URINE: NEGATIVE
LACTIC ACID, SEPSIS WHOLE BLOOD: 0.6 MMOL/L (ref 0.5–1.9)
LACTIC ACID, SEPSIS WHOLE BLOOD: 1 MMOL/L (ref 0.5–1.9)
LACTIC ACID, SEPSIS: NORMAL MMOL/L (ref 0.5–1.9)
LACTIC ACID, SEPSIS: NORMAL MMOL/L (ref 0.5–1.9)
LDL CHOLESTEROL: 34 MG/DL (ref 0–130)
LEUKOCYTE ESTERASE, URINE: ABNORMAL
LYMPHOCYTES # BLD: 24 % (ref 24–43)
MCH RBC QN AUTO: 28.4 PG (ref 25.2–33.5)
MCHC RBC AUTO-ENTMCNC: 29.9 G/DL (ref 28.4–34.8)
MCV RBC AUTO: 94.8 FL (ref 82.6–102.9)
MODE: NORMAL
MONOCYTES # BLD: 10 % (ref 3–12)
MUCUS: ABNORMAL
MYOGLOBIN: 71 NG/ML (ref 25–58)
NEGATIVE BASE EXCESS, VEN: NORMAL (ref 0–2)
NITRITE, URINE: NEGATIVE
NRBC AUTOMATED: 0 PER 100 WBC
O2 DEVICE/FLOW/%: NORMAL
O2 SAT, VEN: 67 % (ref 60–85)
OTHER OBSERVATIONS UA: ABNORMAL
PARTIAL THROMBOPLASTIN TIME: 26.3 SEC (ref 20.5–30.5)
PATIENT TEMP: NORMAL
PCO2, VEN: 41 MM HG (ref 41–51)
PDW BLD-RTO: 18.7 % (ref 11.8–14.4)
PH UA: 5.5 (ref 5–8)
PH VENOUS: 7.4 (ref 7.32–7.43)
PLATELET # BLD: 329 K/UL (ref 138–453)
PLATELET ESTIMATE: ABNORMAL
PMV BLD AUTO: 10.6 FL (ref 8.1–13.5)
PO2, VEN: 34.9 MM HG (ref 30–50)
POC CHLORIDE: 111 MMOL/L (ref 98–107)
POC CREATININE: 1.6 MG/DL (ref 0.6–1.4)
POC CREATININE: 1.62 MG/DL (ref 0.51–1.19)
POC HEMATOCRIT: 26 % (ref 36–46)
POC HEMOGLOBIN: 8.8 G/DL (ref 12–16)
POC IONIZED CALCIUM: 1.12 MMOL/L (ref 1.15–1.33)
POC LACTIC ACID: 0.51 MMOL/L (ref 0.56–1.39)
POC PCO2 TEMP: NORMAL MM HG
POC PH TEMP: NORMAL
POC PO2 TEMP: NORMAL MM HG
POC POTASSIUM: 4.8 MMOL/L (ref 3.5–4.5)
POC SODIUM: 142 MMOL/L (ref 138–146)
POSITIVE BASE EXCESS, VEN: 0 (ref 0–3)
POTASSIUM SERPL-SCNC: 4.9 MMOL/L (ref 3.7–5.3)
PRO-BNP: 4300 PG/ML
PROTEIN UA: ABNORMAL
PROTHROMBIN TIME: 11.3 SEC (ref 9–12)
RBC # BLD: 3.1 M/UL (ref 3.95–5.11)
RBC # BLD: ABNORMAL 10*6/UL
RBC UA: ABNORMAL /HPF (ref 0–2)
RENAL EPITHELIAL, UA: ABNORMAL /HPF
SAMPLE SITE: NORMAL
SEG NEUTROPHILS: 60 % (ref 36–65)
SEGMENTED NEUTROPHILS ABSOLUTE COUNT: 3.11 K/UL (ref 1.5–8.1)
SODIUM BLD-SCNC: 140 MMOL/L (ref 135–144)
SPECIFIC GRAVITY UA: 1.03 (ref 1–1.03)
TOTAL CK: 69 U/L (ref 26–192)
TOTAL CO2, VENOUS: 27 MMOL/L (ref 23–30)
TOTAL IRON BINDING CAPACITY: 171 UG/DL (ref 250–450)
TRICHOMONAS: ABNORMAL
TRIGL SERPL-MCNC: 78 MG/DL
TROPONIN INTERP: ABNORMAL
TROPONIN T: ABNORMAL NG/ML
TROPONIN, HIGH SENSITIVITY: 106 NG/L (ref 0–14)
TROPONIN, HIGH SENSITIVITY: 121 NG/L (ref 0–14)
TROPONIN, HIGH SENSITIVITY: 155 NG/L (ref 0–14)
TROPONIN, HIGH SENSITIVITY: 99 NG/L (ref 0–14)
TURBIDITY: ABNORMAL
UNSATURATED IRON BINDING CAPACITY: 128 UG/DL (ref 112–347)
URINE HGB: ABNORMAL
UROBILINOGEN, URINE: NORMAL
VITAMIN B-12: 927 PG/ML (ref 232–1245)
VLDLC SERPL CALC-MCNC: NORMAL MG/DL (ref 1–30)
WBC # BLD: 5.1 K/UL (ref 3.5–11.3)
WBC # BLD: ABNORMAL 10*3/UL
WBC UA: ABNORMAL /HPF (ref 0–5)
YEAST: ABNORMAL

## 2020-07-23 PROCEDURE — 82746 ASSAY OF FOLIC ACID SERUM: CPT

## 2020-07-23 PROCEDURE — 81001 URINALYSIS AUTO W/SCOPE: CPT

## 2020-07-23 PROCEDURE — 83550 IRON BINDING TEST: CPT

## 2020-07-23 PROCEDURE — 82330 ASSAY OF CALCIUM: CPT

## 2020-07-23 PROCEDURE — 2060000000 HC ICU INTERMEDIATE R&B

## 2020-07-23 PROCEDURE — 6360000002 HC RX W HCPCS: Performed by: STUDENT IN AN ORGANIZED HEALTH CARE EDUCATION/TRAINING PROGRAM

## 2020-07-23 PROCEDURE — 83874 ASSAY OF MYOGLOBIN: CPT

## 2020-07-23 PROCEDURE — 85025 COMPLETE CBC W/AUTO DIFF WBC: CPT

## 2020-07-23 PROCEDURE — 82553 CREATINE MB FRACTION: CPT

## 2020-07-23 PROCEDURE — 82550 ASSAY OF CK (CPK): CPT

## 2020-07-23 PROCEDURE — 76775 US EXAM ABDO BACK WALL LIM: CPT

## 2020-07-23 PROCEDURE — 84484 ASSAY OF TROPONIN QUANT: CPT

## 2020-07-23 PROCEDURE — 82803 BLOOD GASES ANY COMBINATION: CPT

## 2020-07-23 PROCEDURE — 70450 CT HEAD/BRAIN W/O DYE: CPT

## 2020-07-23 PROCEDURE — 99213 OFFICE O/P EST LOW 20 MIN: CPT

## 2020-07-23 PROCEDURE — 96374 THER/PROPH/DIAG INJ IV PUSH: CPT

## 2020-07-23 PROCEDURE — 83605 ASSAY OF LACTIC ACID: CPT

## 2020-07-23 PROCEDURE — 80061 LIPID PANEL: CPT

## 2020-07-23 PROCEDURE — 6370000000 HC RX 637 (ALT 250 FOR IP): Performed by: NURSE PRACTITIONER

## 2020-07-23 PROCEDURE — 82435 ASSAY OF BLOOD CHLORIDE: CPT

## 2020-07-23 PROCEDURE — 2580000003 HC RX 258: Performed by: NURSE PRACTITIONER

## 2020-07-23 PROCEDURE — 80048 BASIC METABOLIC PNL TOTAL CA: CPT

## 2020-07-23 PROCEDURE — 36415 COLL VENOUS BLD VENIPUNCTURE: CPT

## 2020-07-23 PROCEDURE — 85610 PROTHROMBIN TIME: CPT

## 2020-07-23 PROCEDURE — 82565 ASSAY OF CREATININE: CPT

## 2020-07-23 PROCEDURE — 71045 X-RAY EXAM CHEST 1 VIEW: CPT

## 2020-07-23 PROCEDURE — 84295 ASSAY OF SERUM SODIUM: CPT

## 2020-07-23 PROCEDURE — 93005 ELECTROCARDIOGRAM TRACING: CPT | Performed by: STUDENT IN AN ORGANIZED HEALTH CARE EDUCATION/TRAINING PROGRAM

## 2020-07-23 PROCEDURE — 85730 THROMBOPLASTIN TIME PARTIAL: CPT

## 2020-07-23 PROCEDURE — 6360000002 HC RX W HCPCS: Performed by: NURSE PRACTITIONER

## 2020-07-23 PROCEDURE — 82947 ASSAY GLUCOSE BLOOD QUANT: CPT

## 2020-07-23 PROCEDURE — 84132 ASSAY OF SERUM POTASSIUM: CPT

## 2020-07-23 PROCEDURE — 83540 ASSAY OF IRON: CPT

## 2020-07-23 PROCEDURE — 2580000003 HC RX 258: Performed by: STUDENT IN AN ORGANIZED HEALTH CARE EDUCATION/TRAINING PROGRAM

## 2020-07-23 PROCEDURE — 82728 ASSAY OF FERRITIN: CPT

## 2020-07-23 PROCEDURE — 99222 1ST HOSP IP/OBS MODERATE 55: CPT | Performed by: INTERNAL MEDICINE

## 2020-07-23 PROCEDURE — 82607 VITAMIN B-12: CPT

## 2020-07-23 PROCEDURE — 99285 EMERGENCY DEPT VISIT HI MDM: CPT

## 2020-07-23 PROCEDURE — 83036 HEMOGLOBIN GLYCOSYLATED A1C: CPT

## 2020-07-23 PROCEDURE — 99222 1ST HOSP IP/OBS MODERATE 55: CPT | Performed by: PSYCHIATRY & NEUROLOGY

## 2020-07-23 PROCEDURE — 99222 1ST HOSP IP/OBS MODERATE 55: CPT | Performed by: NURSE PRACTITIONER

## 2020-07-23 PROCEDURE — 85014 HEMATOCRIT: CPT

## 2020-07-23 PROCEDURE — 83880 ASSAY OF NATRIURETIC PEPTIDE: CPT

## 2020-07-23 RX ORDER — WITCH HAZEL 50 %
1 PADS, MEDICATED (EA) TOPICAL 2 TIMES DAILY
Status: DISCONTINUED | OUTPATIENT
Start: 2020-07-23 | End: 2020-07-23

## 2020-07-23 RX ORDER — ATORVASTATIN CALCIUM 40 MG/1
40 TABLET, FILM COATED ORAL NIGHTLY
Status: DISCONTINUED | OUTPATIENT
Start: 2020-07-23 | End: 2020-07-29 | Stop reason: HOSPADM

## 2020-07-23 RX ORDER — 0.9 % SODIUM CHLORIDE 0.9 %
250 INTRAVENOUS SOLUTION INTRAVENOUS ONCE
Status: COMPLETED | OUTPATIENT
Start: 2020-07-23 | End: 2020-07-23

## 2020-07-23 RX ORDER — PROMETHAZINE HYDROCHLORIDE 25 MG/1
12.5 TABLET ORAL EVERY 6 HOURS PRN
Status: DISCONTINUED | OUTPATIENT
Start: 2020-07-23 | End: 2020-07-29 | Stop reason: HOSPADM

## 2020-07-23 RX ORDER — LACTOBACILLUS RHAMNOSUS GG 10B CELL
1 CAPSULE ORAL
Status: DISCONTINUED | OUTPATIENT
Start: 2020-07-24 | End: 2020-07-29 | Stop reason: HOSPADM

## 2020-07-23 RX ORDER — ROPINIROLE 0.25 MG/1
0.25 TABLET, FILM COATED ORAL NIGHTLY
Status: DISCONTINUED | OUTPATIENT
Start: 2020-07-23 | End: 2020-07-29 | Stop reason: HOSPADM

## 2020-07-23 RX ORDER — ACETAMINOPHEN 325 MG/1
650 TABLET ORAL EVERY 6 HOURS PRN
Status: DISCONTINUED | OUTPATIENT
Start: 2020-07-23 | End: 2020-07-29 | Stop reason: HOSPADM

## 2020-07-23 RX ORDER — POTASSIUM CHLORIDE 7.45 MG/ML
10 INJECTION INTRAVENOUS PRN
Status: DISCONTINUED | OUTPATIENT
Start: 2020-07-23 | End: 2020-07-23

## 2020-07-23 RX ORDER — LACTULOSE 10 G/15ML
10 SOLUTION ORAL 2 TIMES DAILY
Status: DISCONTINUED | OUTPATIENT
Start: 2020-07-23 | End: 2020-07-29 | Stop reason: HOSPADM

## 2020-07-23 RX ORDER — AMIODARONE HYDROCHLORIDE 200 MG/1
200 TABLET ORAL DAILY
Status: DISCONTINUED | OUTPATIENT
Start: 2020-07-23 | End: 2020-07-29 | Stop reason: HOSPADM

## 2020-07-23 RX ORDER — ONDANSETRON 2 MG/ML
4 INJECTION INTRAMUSCULAR; INTRAVENOUS EVERY 6 HOURS PRN
Status: DISCONTINUED | OUTPATIENT
Start: 2020-07-23 | End: 2020-07-29 | Stop reason: HOSPADM

## 2020-07-23 RX ORDER — ASPIRIN 81 MG/1
81 TABLET, CHEWABLE ORAL DAILY
Status: DISCONTINUED | OUTPATIENT
Start: 2020-07-23 | End: 2020-07-29 | Stop reason: HOSPADM

## 2020-07-23 RX ORDER — ACETAMINOPHEN 650 MG/1
650 SUPPOSITORY RECTAL EVERY 6 HOURS PRN
Status: DISCONTINUED | OUTPATIENT
Start: 2020-07-23 | End: 2020-07-29 | Stop reason: HOSPADM

## 2020-07-23 RX ORDER — 0.9 % SODIUM CHLORIDE 0.9 %
1000 INTRAVENOUS SOLUTION INTRAVENOUS ONCE
Status: COMPLETED | OUTPATIENT
Start: 2020-07-23 | End: 2020-07-23

## 2020-07-23 RX ORDER — POTASSIUM CHLORIDE 20 MEQ/1
40 TABLET, EXTENDED RELEASE ORAL PRN
Status: DISCONTINUED | OUTPATIENT
Start: 2020-07-23 | End: 2020-07-23

## 2020-07-23 RX ORDER — IPRATROPIUM BROMIDE AND ALBUTEROL SULFATE 2.5; .5 MG/3ML; MG/3ML
1 SOLUTION RESPIRATORY (INHALATION) EVERY 4 HOURS PRN
Status: DISCONTINUED | OUTPATIENT
Start: 2020-07-23 | End: 2020-07-29 | Stop reason: HOSPADM

## 2020-07-23 RX ORDER — MAGNESIUM SULFATE 1 G/100ML
1 INJECTION INTRAVENOUS PRN
Status: DISCONTINUED | OUTPATIENT
Start: 2020-07-23 | End: 2020-07-23

## 2020-07-23 RX ORDER — POTASSIUM CHLORIDE 750 MG/1
30 CAPSULE, EXTENDED RELEASE ORAL DAILY
Status: DISCONTINUED | OUTPATIENT
Start: 2020-07-23 | End: 2020-07-24

## 2020-07-23 RX ORDER — M-VIT,TX,IRON,MINS/CALC/FOLIC 27MG-0.4MG
1 TABLET ORAL DAILY
Status: DISCONTINUED | OUTPATIENT
Start: 2020-07-23 | End: 2020-07-29 | Stop reason: HOSPADM

## 2020-07-23 RX ORDER — LATANOPROST 50 UG/ML
1 SOLUTION/ DROPS OPHTHALMIC NIGHTLY
Status: DISCONTINUED | OUTPATIENT
Start: 2020-07-23 | End: 2020-07-29 | Stop reason: HOSPADM

## 2020-07-23 RX ORDER — NITROGLYCERIN 0.4 MG/1
0.4 TABLET SUBLINGUAL EVERY 5 MIN PRN
Status: DISCONTINUED | OUTPATIENT
Start: 2020-07-23 | End: 2020-07-29 | Stop reason: HOSPADM

## 2020-07-23 RX ORDER — MIDODRINE HYDROCHLORIDE 5 MG/1
5 TABLET ORAL
Status: DISCONTINUED | OUTPATIENT
Start: 2020-07-23 | End: 2020-07-24

## 2020-07-23 RX ORDER — NICOTINE 21 MG/24HR
1 PATCH, TRANSDERMAL 24 HOURS TRANSDERMAL DAILY PRN
Status: DISCONTINUED | OUTPATIENT
Start: 2020-07-23 | End: 2020-07-24

## 2020-07-23 RX ORDER — SODIUM CHLORIDE 9 MG/ML
INJECTION, SOLUTION INTRAVENOUS CONTINUOUS
Status: DISCONTINUED | OUTPATIENT
Start: 2020-07-23 | End: 2020-07-26

## 2020-07-23 RX ADMIN — SODIUM CHLORIDE 250 ML: 0.9 INJECTION, SOLUTION INTRAVENOUS at 10:40

## 2020-07-23 RX ADMIN — SODIUM CHLORIDE: 9 INJECTION, SOLUTION INTRAVENOUS at 15:12

## 2020-07-23 RX ADMIN — CEFTRIAXONE SODIUM 1 G: 1 INJECTION, POWDER, FOR SOLUTION INTRAMUSCULAR; INTRAVENOUS at 15:32

## 2020-07-23 RX ADMIN — CEFTRIAXONE SODIUM 1 G: 1 INJECTION, POWDER, FOR SOLUTION INTRAMUSCULAR; INTRAVENOUS at 10:17

## 2020-07-23 RX ADMIN — SODIUM CHLORIDE 1000 ML: 9 INJECTION, SOLUTION INTRAVENOUS at 09:00

## 2020-07-23 RX ADMIN — POTASSIUM CHLORIDE 30 MEQ: 10 CAPSULE, COATED, EXTENDED RELEASE ORAL at 15:31

## 2020-07-23 RX ADMIN — MULTIPLE VITAMINS W/ MINERALS TAB 1 TABLET: TAB at 15:31

## 2020-07-23 RX ADMIN — LACTULOSE 10 G: 10 SOLUTION ORAL at 15:32

## 2020-07-23 RX ADMIN — AMIODARONE HYDROCHLORIDE 200 MG: 200 TABLET ORAL at 15:32

## 2020-07-23 RX ADMIN — MIDODRINE HYDROCHLORIDE 5 MG: 5 TABLET ORAL at 16:53

## 2020-07-23 RX ADMIN — ASPIRIN 81 MG: 81 TABLET, CHEWABLE ORAL at 15:31

## 2020-07-23 RX ADMIN — APIXABAN 2.5 MG: 2.5 TABLET, FILM COATED ORAL at 15:31

## 2020-07-23 ASSESSMENT — ENCOUNTER SYMPTOMS
SINUS PAIN: 0
VOMITING: 0
SINUS PRESSURE: 0
ABDOMINAL DISTENTION: 0
DIARRHEA: 0
BACK PAIN: 1
ABDOMINAL PAIN: 0
BACK PAIN: 0
STRIDOR: 0
CONSTIPATION: 0
SHORTNESS OF BREATH: 0

## 2020-07-23 ASSESSMENT — PAIN SCALES - GENERAL: PAINLEVEL_OUTOF10: 0

## 2020-07-23 NOTE — PROGRESS NOTES
PHARMACY NOTE:    The electrolyte replacement protocol for potassium/magnesium has been discontinued per P&T guidelines because the patient has reduced renal function (CrCl < 30 mL/min). The patient's most recent potassium & magnesium levels are:  Recent Labs     07/23/20  0848   K 4.9     Estimated Creatinine Clearance: 22 mL/min (A) (based on SCr of 1.4 mg/dL (H)). For patients with decreased renal function (below 30ml/min) needing potassium/magnesium supplementation, please order individual bolus doses with appropriate monitoring. Please contact the inpatient pharmacy with any concerns. Thank you.   Aleena aSul, PharmD 7/23/2020 2:29 PM

## 2020-07-23 NOTE — PROGRESS NOTES
[facility] Nurse  Consult Note       NAME:  Callie Altman  MEDICAL RECORD NUMBER:  [id number]  AGE: 80 y.o.    GENDER: female  : 1930  TODAY'S DATE:  2020    Subjective:     Reason for WOCN Evaluation and Assessment: coccyx pressure injury      Callie Altman is a 80 y.o. female referred by:   [x] Physician  [] Nursing  [] Other:     Wound Identification:  Wound Type: pressure  Contributing Factors: chronic pressure, decreased mobility, shear force, malnutrition, incontinence of stool and incontinence of urine        PAST MEDICAL HISTORY        Diagnosis Date    Arthritis     Atrial fibrillation ([address] 2016    [facility]     Complete heart block ([address] 2016    Constipation     CVA (cerebral vascular accident) ([address] 2019    ACUTE ISCHEMIC    GERD (gastroesophageal reflux disease)     Glaucoma     mild, on eye drops    Hearing loss     bilateral hearing aids    Hiatal hernia     Hx of blood clots 2016    pulmonary emboli bilateral lungs after Pacemaker inserted    Hyperlipidemia     MRSA (methicillin resistant Staphylococcus aureus) infection 2019    MV ENDOCARDITIS    Osteopenia     Pacemaker 2016    Duo-chamber pacemaker in left upper chest;  DR. Sandra Santiago    Restless leg syndrome     Right knee DJD     Wears glasses     Wears hearing aid in both ears        PAST SURGICAL HISTORY    Past Surgical History:   Procedure Laterality Date    CARDIAC PACEMAKER PLACEMENT      REMOVAL 2019 WITH TEMPORARY PACEMAKER PLACED 2019    CARDIAC PACEMAKER PLACEMENT  2019    DR Rusty Morrison    CATARACT REMOVAL WITH IMPLANT Bilateral     Left 1998, right 1996    COLONOSCOPY      DILATION AND CURETTAGE OF UTERUS N/A 3/12/2019    DILATATION AND CURETTAGE, HYSTEROSCOPY, MYOSURE performed by Vito Albarado MD at [address] [place] [address]  2016    medtronic advisa MRI DR model # M6CK24  av lead 8546-44 rv lead 1648-78  - mri compatible    CO GI ENDOSCOPIC ULTRASOUND N/A 9/12/2018    ENDOSCOPIC ULTRASOUND, PATHOLOGY REQUESTED, PAT NOTIFIED [facility]      as a child    TOTAL KNEE ARTHROPLASTY Right 9/5/2017    KNEE TOTAL ARTHROPLASTY RIGHT WITH BIOMET AND GPS PRODUCT APPLICATION performed by José Antonio Dumont MD at [address] TRANSESOPHAGEAL ECHOCARDIOGRAM N/A 10/9/2019    TRANSESOPHAGEAL ECHOCARDIOGRAM WITH BUBBLE STUDY performed by Car Crook DO at [facility] [address] TRANSESOPHAGEAL ECHOCARDIOGRAM  12/16/2019    UPPER GASTROINTESTINAL ENDOSCOPY  06/29/2017    polyp removed from small intestine near stomach, benign.   done at [facility]  9/12/2018    EGD BIOPSY performed by Yolanda Keating MD at [facility]       FAMILY HISTORY    Family History   Problem Relation Age of Onset    Diabetes Sister     Heart Attack Sister     Osteoporosis Sister     Heart Disease Mother     Dementia Mother     Tuberculosis Father        SOCIAL HISTORY    Social History     Tobacco Use    Smoking status: Never Smoker    Smokeless tobacco: Never Used   Substance Use Topics    Alcohol use: No    Drug use: No       ALLERGIES    Allergies   Allergen Reactions    Sulfa Antibiotics Rash           Objective:      BP (!) 84/30   Pulse 60   Temp 97.8 °F (36.6 °C) (Oral)   Resp 9   Ht 5' 3\" (1.6 m)   Wt 115 lb (52.2 kg)   SpO2 100%   BMI 20.37 kg/m²   Yony Risk Score: Yony Scale Score: 12    LABS    CBC:   Lab Results   Component Value Date    WBC 5.1 07/23/2020    RBC 3.10 07/23/2020    HGB 8.8 07/23/2020     CMP:  Albumin:    Lab Results   Component Value Date    LABALBU 1.8 06/16/2020    LABALBU 4.2 08/29/2011     PT/INR:    Lab Results   Component Value Date    PROTIME 11.3 07/23/2020    PROTIME 12.0 03/12/2019    INR 1.1 07/23/2020     HgBA1c:    Lab Results   Component Value Date    LABA1C 4.9 07/23/2020 osteoporosis with current pathological fracture    Anticoagulated on Coumadin    Centrilobular emphysema (HCC)    Cervical spondylosis without myelopathy    DDD (degenerative disc disease), lumbar    Esophageal dysphagia    Presence of cardiac pacemaker    Severe protein-calorie malnutrition (HCC)    Skin tear of left upper extremity    TIA (transient ischemic attack)    Hypotension    Left renal stone    Chronic diastolic heart failure (HCC)    Atrial fibrillation ([address]    Hypoglycemia    Anemia       Measurements:     07/23/20 1517   Wound 06/30/20 Coccyx Mid wound #1 coccyx   Date First Assessed/Time First Assessed: 06/30/20 1423   Present on Hospital Admission: Yes  Wound Approximate Age at First Assessment (Weeks): (c) 12 weeks  Primary Wound Type: Pressure Injury  Location: Coccyx  Wound Location Orientation: Mid  Wound. .. Wound Image    Wound Pressure Stage  4   Dressing Status Changed   Dressing Changed Changed/New   Dressing/Treatment Alginate with Ag;ABD; Medipore   Wound Cleansed Rinsed/Irrigated with saline   Dressing Change Due 07/24/20   Wound Length (cm) 3.8 cm   Wound Width (cm) 3.1 cm   Wound Depth (cm) 1.3 cm   Wound Surface Area (cm^2) 11.78 cm^2   Change in Wound Size % (l*w) -12.19   Wound Volume (cm^3) 15.31 cm^3   Wound Healing % 42   Undermining Starts ___ O'Clock 11   Undermining Ends___ O'Clock 1   Undermining Maxium Distance (cm) 2.7 @12   Wound Assessment Clean;Pink;Red  (smooth non-granular)   Radha-wound Assessment Maceration;Denuded  (denided at 7 o'clock)   Wound 06/30/20 Arm Left wound #2 left lower arm   Date First Assessed/Time First Assessed: 06/30/20 1426   Present on Hospital Admission: Yes  Wound Approximate Age at First Assessment (Weeks): (c)   Primary Wound Type: Traumatic  Location: Arm  Wound Location Orientation: Left  Wound Description (Co... Dressing Status Clean;Dry; Intact   Dressing/Treatment Foam            Response to treatment:  Well tolerated by

## 2020-07-23 NOTE — CARE COORDINATION
Case Management Initial Discharge Plan  Ruth Garcia,             Met with:sarah and pts daughtert to discuss discharge plans. Information verified: address, contacts, phone number, , insurance Yes    Emergency Contact/Next of Kin name & number: Simi Ozuna daughter [phone] son ambar [phone]    PCP: Olman Kirk MD  Date of last visit: since being at [facility] pt has been seen by facility physician    Insurance Provider: medicare, [facility]    Discharge Planning    Living Arrangements:  Other (Comment)   Support Systems:  Family Members    Home has 1 stories  0 stairs to climb to get into front door, 0stairs to climb to reach second floor  Location of bedroom/bathroom in home main    Patient able to perform ADL's:Assisted    Current Services (outpatient & in home) Lives in [place]  DME equipment: wheel chair  DME provider:     Receiving oral anticoagulation therapy? Yes    If indicated:   Physician managing anticoagulation treatment: Pts daughter is unsure if Dr Devaughn Aiken or facility physician is managing  Where does patient obtain lab work for ATC treatment? At facility       Potential Assistance Needed:       Patient agreeable to home care: No  [place] of choice provided:  n/a    Prior SNF/Rehab Placement and Facility: Lives at [facility] to SNF/Rehab: Yes  [place] of choice provided: n/a see notes     Evaluation: yes    Expected Discharge date:       Patient expects to be discharged to: Follow Up Appointment: Best Day/ Time:      Transportation provider: ambulance  Transportation arrangements needed for discharge: Yes    Readmission Risk              Risk of Unplanned Readmission:        22             Does patient have a readmission risk score greater than 14?: Not calculated  If yes, follow-up appointment must be made within 7 days of discharge.      Goals of Care: Pts daughter hoping for her to be more alert and get answers to what happened      Discharge Plan: Writer met with pts daughter at bedside, concerns with care at [facility] were discussed, family is undecided if they want pt moved they will discuss and let us know.  pcp is established at facility,          Electronically signed by Luiz Montgomery RN on 7/23/20 at 11:30 AM EDT

## 2020-07-23 NOTE — CONSULTS
Nephrology Consult Note    Reason for Consult: Elevated creatinine   requesting Physician: Internal medicine    Chief Complaint: Came in with facial droop   history Obtained From: Patient's daughter      History of Present Illness: This is a 80 y.o. female who is a resident of nursing home. Her CODE STATUS is DNR CC. She has a long complicated medical history which include longstanding hypertension, atrial fibrillation, history of complete heart block, and most recently recurrent urinary tract infection. She also had a history of nephrolithiasis. She is under the care of Zoe Clark as her urologist.  Patient has a urinary tract infection 2 weeks ago. She was treated with antibiotic and the name of the antibiotic was Invanz. She also underwent CT urogram on 7/20/2020. Patient was at nursing home. This morning nursing staff noted that patient has facial droop as well as dysarthria. The symptoms brought her to the hospital.  In hospital initial evaluation showed elevated creatinine of 1.4. CT scan was negative for bleed. Stroke was called. And CT scan with contrast was ordered. Patient had CT urogram 3 days ago. Her baseline creatinine is 0.5 mg/dL. Nephrology was consulted for clearance and acute renal failure. Patient also has a significant sacral bedsore, and also urinary symptoms.   Patient was not on ACE or angiotensin receptor blocker  CT urogram does not show any obstructive uropathy      Past Medical History:        Diagnosis Date    Arthritis     Atrial fibrillation ([address] 8/21/2016    CHF (congestive heart failure) ([facility])     Complete heart block ([address] 8/18/2016    Constipation     CVA (cerebral vascular accident) ([address] 11/08/2019    ACUTE ISCHEMIC    GERD (gastroesophageal reflux disease)     Glaucoma     mild, on eye drops    Hearing loss     bilateral hearing aids    Hiatal hernia     Hx of blood clots 07/2016    pulmonary emboli bilateral lungs after Pacemaker inserted    Hyperlipidemia     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 Monika Verma    Restless leg syndrome     Right knee DJD     Wears glasses     Wears hearing aid in both ears        Past Surgical History:        Procedure Laterality Date    CARDIAC PACEMAKER PLACEMENT      REMOVAL 11- WITH TEMPORARY PACEMAKER PLACED 11-    CARDIAC PACEMAKER PLACEMENT  12/16/2019    DR Parker Dupont    CATARACT REMOVAL WITH IMPLANT Bilateral     Left 1/6/1998, right 8/2/1996    COLONOSCOPY      DILATION AND CURETTAGE OF UTERUS N/A 3/12/2019    DILATATION AND CURETTAGE, HYSTEROSCOPY, MYOSURE performed by Farhan James MD at [facility]  08/18/2016    medtronic advisa MRI DR model # A2DR01  av lead 3622-84 rv lead [id number]  - mri compatible    ME GI ENDOSCOPIC ULTRASOUND N/A 9/12/2018    ENDOSCOPIC ULTRASOUND, PATHOLOGY REQUESTED, PAT NOTIFIED performed by Augie Gold MD at [facility]      as a child    TOTAL KNEE ARTHROPLASTY Right 9/5/2017    KNEE TOTAL ARTHROPLASTY RIGHT WITH BIOMET AND GPS PRODUCT APPLICATION performed by Nilam Day MD at [address] [place] [address] TRANSESOPHAGEAL ECHOCARDIOGRAM N/A 10/9/2019    TRANSESOPHAGEAL ECHOCARDIOGRAM WITH BUBBLE STUDY performed by Dino Karimi DO at [address] [place] [address] TRANSESOPHAGEAL ECHOCARDIOGRAM  12/16/2019    UPPER GASTROINTESTINAL ENDOSCOPY  06/29/2017    polyp removed from small intestine near stomach, benign.   done at [address]  9/12/2018    EGD BIOPSY performed by Augie Gold MD at [facility]       Current Medications:    amiodarone (CORDARONE) tablet 200 mg, Daily  apixaban (ELIQUIS) tablet 2.5 mg, BID  aspirin chewable tablet 81 mg, Daily  ipratropium-albuterol (DUONEB) nebulizer solution 3 mL, Q4H PRN  lactulose (CHRONULAC) 10 GM/15ML solution 10 g, BID  latanoprost (XALATAN) 0.005 % ophthalmic solution 1 drop, Nightly  therapeutic multivitamin-minerals 1 tablet, Daily  potassium chloride (MICRO-K) extended release capsule 30 mEq, Daily  rOPINIRole (REQUIP) tablet 0.25 mg, Nightly  acetaminophen (TYLENOL) tablet 650 mg, Q6H PRN    Or  acetaminophen (TYLENOL) suppository 650 mg, Q6H PRN  acetaminophen (TYLENOL) tablet 650 mg, Q6H PRN    Or  acetaminophen (TYLENOL) suppository 650 mg, Q6H PRN  promethazine (PHENERGAN) tablet 12.5 mg, Q6H PRN    Or  ondansetron (ZOFRAN) injection 4 mg, Q6H PRN  nicotine (NICODERM CQ) 21 MG/24HR 1 patch, Daily PRN  atorvastatin (LIPITOR) tablet 40 mg, Nightly  nitroGLYCERIN (NITROSTAT) SL tablet 0.4 mg, Q5 Min PRN  0.9 % sodium chloride infusion, Continuous  cefTRIAXone (ROCEPHIN) 1 g IVPB in 50 mL D5W minibag, Q24H  midodrine (PROAMATINE) tablet 5 mg, TID WC  [START ON 7/24/2020] lactobacillus (CULTURELLE) capsule 1 capsule, Daily with breakfast        Allergies:  Sulfa antibiotics    Social History:   Social History     Socioeconomic History    Marital status:       Spouse name: Not on file    Number of children: Not on file    Years of education: Not on file    Highest education level: Not on file   Occupational History    Not on file   Social Needs    Financial resource strain: Not on file    Food insecurity     Worry: Not on file     Inability: Not on file    Transportation needs     Medical: Not on file     Non-medical: Not on file   Tobacco Use    Smoking status: Never Smoker    Smokeless tobacco: Never Used   Substance and Sexual Activity    Alcohol use: No    Drug use: No    Sexual activity: Not on file   Lifestyle    Physical activity     Days per week: Not on file     Minutes per session: Not on file    Stress: Not on file   Relationships    Social connections     Talks on phone: Not on file     Gets together: Not on file     Attends Zoroastrian service: Not on file     Active member of club or organization: Not on file     Attends meetings of clubs or organizations: Not on file     Relationship status: Not on file    Intimate partner violence     Fear of current or ex partner: Not on file     Emotionally abused: Not on file     Physically abused: Not on file     Forced sexual activity: Not on file   Other Topics Concern    Not on file   Social History Narrative    Not on file       Family History:   Family History   Problem Relation Age of Onset    Diabetes Sister     Heart Attack Sister     Osteoporosis Sister     Heart Disease Mother     Dementia Mother     Tuberculosis Father        Review of Systems:    Constitutional: No fever or chills  HEENT:  Denies headache or blurring of vision   cardiac:  No chest pain or dyspnea. Chest:   No cough or wheezing  Abdomen:  No abdominal pain, nausea or vomiting. Neuro:  No focal weakness, abnormal movements orseizure like activity. Skin:   Patient has decubitus ulcer in sacral area  :   No hematuria, no pyuria, no dysuria, no flank pain. Extremities:  No increase in leg swelling      Objective:  CURRENT TEMPERATURE:  Temp: 97.8 °F (36.6 °C)  MAXIMUM TEMPERATURE OVER 24HRS:  Temp (24hrs), Av.6 °F (36.4 °C), Min:97.5 °F (36.4 °C), Max:97.8 °F (36.6 °C)    CURRENT RESPIRATORY RATE:  Resp: 9  CURRENT PULSE:  Pulse: 60  CURRENT BLOOD PRESSURE:  BP: (!) 84/30  24HR BLOOD PRESSURE RANGE:  Systolic (77MFJ), KAC:29 , Min:65 , JBY:47   ; Diastolic (16OWF), SJI:96, Min:30, Max:67    24HR INTAKE/OUTPUT:  No intake or output data in the 24 hours ending 20 1605  Patient Vitals for the past 96 hrs (Last 3 readings):   Weight   20 0833 115 lb (52.2 kg)     Physical Exam:  General appearance: Alert and awake x3. Skin: Warm to touch and no erythema  Eyes: Pupils reactive to light  Neck: No carotid bruit   pulmonary: No crackles or wheezes.   Cardiovascular: S1 and S2 audible no S3  Abdomen: soft nontender, bowel sounds present, no organomegaly,  no ascites  Extremities: No edema   labs:   CBC:  Recent Labs     07/23/20  0848   WBC 5.1   RBC 3.10*   HGB 8.8*   HCT 29.4*   MCV 94.8   MCH 28.4   MCHC 29.9   RDW 18.7*      MPV 10.6      BMP:   Recent Labs     07/23/20  0812 07/23/20  0836 07/23/20  0841 07/23/20  0848   NA  --   --   --  140   K  --   --   --  4.9   CL  --   --   --  107   CO2  --   --   --  24   BUN  --   --   --  37*   CREATININE 1.6* 1.62*  --  1.40*   GLUCOSE  --   --  69 84   CALCIUM  --   --   --  8.0*    Results for TOYA VU (MRN [id number]) as of 7/23/2020 16:02   Ref. Range 6/16/2020 06:49 7/18/2020 10:50 7/23/2020 08:48   Creatinine Latest Ref Range: 0.50 - 0.90 mg/dL 0.49 (L) 0.62 1.40 (H)   Urinalysis:  U/A:   Lab Results   Component Value Date    NITRU NEGATIVE 07/23/2020    COLORU DARK YELLOW 07/23/2020    PHUR 5.5 07/23/2020    WBCUA TOO NUMEROUS TO COUNT 07/23/2020    RBCUA TOO NUMEROUS TO COUNT 07/23/2020    MUCUS NOT REPORTED 07/23/2020    TRICHOMONAS NOT REPORTED 07/23/2020    YEAST NOT REPORTED 07/23/2020    BACTERIA FEW 07/23/2020    CLARITYU clear5 10/16/2018    SPECGRAV 1.026 07/23/2020    LEUKOCYTESUR LARGE 07/23/2020    UROBILINOGEN Normal 07/23/2020    BILIRUBINUR NEGATIVE 07/23/2020    BLOODU + 10/16/2018    GLUCOSEU NEGATIVE 07/23/2020    [address] NEGATIVE 07/23/2020    AMORPHOUS NOT REPORTED 07/23/2020         Radiology:  Reviewed as available. Assessment:  1. Acute kidney injury most likely due to contrast nephropathy. Patient underwent CT urogram 3 days ago. Prior to the procedure her creatinine was 0.6 mg/dL. 2.  Recurrent urinary tract infection due to multi resistant organism. Patient was recently treated with Invanz  3. Sacral decubitus ulcer  4. Recent TIA, seen and evaluated by neurology. All of her symptoms has been completely resolved. CT scan with contrast recommended by neurology.   Considering the fact that she is already in renal failure due to the exposure to contrast other than exposure will put her further aggravation of renal injury. Patient will be high risk for dialysis in the event of another exposure to contrast.  All the situation explained to her her daughter. I paged neurology to discuss the case as well as to educate family to help them in decision-making process. I also talked to internal medicine and condition/situation explained. After understanding all the risk and if it is absolutely necessary and family is agreeable for CT scan, it can be performed under the cover of IV fluid and Mucomyst.  As I stated earlier patient will be extremely high risk for renal failure and need for dialysis with another exposure to contrast.  5.  Recurrent urinary tract infection with VRE  Plan:  1. Continue normal saline  2. Consider infectious disease involvement due to VRE urinary tract infection  3. CBC and BMP in a.m. Thank you for the consultation. Please do not hesitate to call with questions.     Electronically signed by Britt Jensen MD on 7/23/2020 at 4:05 PM

## 2020-07-23 NOTE — ED NOTES
Pt reports to the ED via EMS with c/o Stroke. Per [facility] they said [address] is 0500, when they saw her again around [address] she had slurred speech and Lt sided facial droop, when ems arrived all the symptoms have resolved, pt has a hx of strokes and has normal bilateral leg weakness, pt does take a blood thinner. Pt is A&Ox4, RR even and non labored.      Rick Gomez RN  07/23/20 [id number]
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
## 2020-07-23 NOTE — ED PROVIDER NOTES
[facility]     Emergency Department     Faculty Attestation    I performed a history and physical examination of the patient and discussed management with the resident. I reviewed the residents note and agree with the documented findings and plan of care. Any areas of disagreement are noted on the chart. I was personally present for the key portions of any procedures. I have documented in the chart those procedures where I was not present during the key portions. I have reviewed the emergency nurses triage note. I agree with the chief complaint, past medical history, past surgical history, allergies, medications, social and family history as documented unless otherwise noted below. For Physician Assistant/ Nurse Practitioner cases/documentation I have personally evaluated this patient and have completed at least one if not all key elements of the E/M (history, physical exam, and MDM). Additional findings are as noted. I have personally seen and evaluated the patient. I find the patient's history and physical exam are consistent with the NP/PA documentation. I agree with the care provided, treatment rendered, disposition and follow-up plan. Patient is a limited historian who is brought in as a possible stroke with left facial droop minimal facial droop noted no other acute neurologic findings noted on exam although a stroke alert was called upon arrival.  The patient is noted to be moderately hypotensive here without other complaints      Critical Care     Pravin Woo M.D.   Attending Emergency  Physician              Yane Chaudhry MD  07/23/20 2888

## 2020-07-23 NOTE — PROCEDURES
CARDIAC ULTRASOUND:  A limited, bedside ultrasound of the IVC was performed. The medical necessity was to evaluate for a fluid responsiveness. The structures studied were the IVC exiting the atrium. FINDINGS:  Collapsible IVC on inspiration was identified.   The study was technically adequate

## 2020-07-23 NOTE — CARE COORDINATION
Received call from Tammy Gillespie at [place] at [address] [place] [address] f/u on referral. Informed pt is admitted as inpatient and family to determine if they want to have pt return to facility. If pt will be returning under skilled needs they will need to be informed. Pt is a LTC pt at facility at this time and on a bed hold.

## 2020-07-23 NOTE — H&P
[facility]    HISTORY AND PHYSICAL EXAMINATION            Date:   7/23/2020  Patient name:  Cristofer Munoz  Date of admission:  7/23/2020  8:25 AM  MRN:   [id number]  Account:  [de-identified]  YOB: 1930  PCP:    Dewey Snow MD  Room:   15/15  Code Status:    Prior    Chief Complaint:     Chief Complaint   Patient presents with    Cerebrovascular Accident     per ems R [address] is 0500, Lt sided sided facial droop and grugled speech. all symptoms resolved when EMS arrived. Hx of strokes        History Obtained From:     patient, electronic medical record, daughter    History of Present Illness:     Cristofer Munoz is a 80 y.o. Non-/non  female who presents with Cerebrovascular Accident (per ems [address] is 0500, Lt sided sided facial droop and grugled speech. all symptoms resolved when EMS arrived. Hx of strokes )   and is admitted to the hospital for the management of TIA (transient ischemic attack). This is an 49-year-old female resident of [facility] who presented to the emergency department for left-sided facial droop. Per EMS and sending facility patient awoke at 5 AM this morning and encountered nursing staff around [phone] with significant left-sided facial drooping and dysarthria. She has significant past medical history including previous ischemic CVA in 2019, atrial fibrillation on amiodarone and Eliquis, status post pacemaker placement, diastolic CHF, hyperlipidemia, history of MRSA endocarditis mitral valve, degenerative disc disease with compression fracture. Significant to note is a recent evaluation and CT cystogram demonstrating a 1.1 cm stone in her left renal pole as well as possible hemorrhage in her bladder. She was started on empiric UTI treatment    Stroke alert was initiated in the emergency department. CT head negative for acute process or hemorrhage. CTA head and neck still pending.   Chest x-ray was negative for acute process. Patient was hypotensive, hypoglycemic, and dehydrated on presentation reflective of labs. Per patient's daughter she was taking Invanz at the [facility]    On assessment patient is sleeping but arousable. She denies any chest pain, shortness of breath, nausea, vomiting, diarrhea, constipation, fever, chills admits urinary symptoms with hematuria. Left-sided deficit has fully resolved at time of assessment. She continues to be hypotensive, digits exhibiting significant discoloration bilaterally but sensation is intact and hand  strength is normal and equal.  CODE STATUS addressed with patient and daughter in room.   Patient is a DNR CCA no intubation no chest compressions    Past Medical History:     Past Medical History:   Diagnosis Date    Arthritis     Atrial fibrillation ([facility] [address] 8/21/2016    CH[facility]congestive heart failure) ([facility]     Complete heart block ([address] 8/18/2016    Constipation     CVA (cerebral vascular accident) ([facility] [address] 11/08/2019    ACUTE ISCHEMIC    GERD (gastroesophageal reflux disease)     Glaucoma     mild, on eye drops    Hearing loss     bilateral hearing aids    Hiatal hernia     Hx of blood clots 07/2016    pulmonary emboli bilateral lungs after Pacemaker inserted    Hyperlipidemia     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 Kimmy Martines    Restless leg syndrome     Right knee DJD     Wears glasses     Wears hearing aid in both ears         Past Surgical History:     Past Surgical History:   Procedure Laterality Date    CARDIAC PACEMAKER PLACEMENT      REMOVAL 11- WITH TEMPORARY PACEMAKER PLACED 11-    CARDIAC PACEMAKER PLACEMENT  12/16/2019    DR Mustafa Hair    CATARACT REMOVAL WITH IMPLANT Bilateral     Left 1/6/1998, right 8/2/1996    COLONOSCOPY      DILATION AND CURETTAGE OF UTERUS N/A 3/12/2019 DILATATION AND CURETTAGE, HYSTEROSCOPY, MYOSURE performed by Philly Lara MD at [facility]  08/18/2016    Viralyticstronic advisa MRI DR model # [id number]  av lead [id number] rv lead [id number]  - mri compatible    MD GI ENDOSCOPIC ULTRASOUND N/A 9/12/2018    ENDOSCOPIC ULTRASOUND, PATHOLOGY REQUESTED, PAT NOTIFIED performed by Gabo Mccracken MD at [facility]      as a child    TOTAL KNEE ARTHROPLASTY Right 9/5/2017    KNEE TOTAL ARTHROPLASTY RIGHT WITH BIOMET AND GPS PRODUCT APPLICATION performed by Anika Fernando MD at [facility] TRANSESOPHAGEAL ECHOCARDIOGRAM N/A 10/9/2019    TRANSESOPHAGEAL ECHOCARDIOGRAM WITH BUBBLE STUDY performed by Dino Karimi DO at [facility] TRANSESOPHAGEAL ECHOCARDIOGRAM  12/16/2019    UPPER GASTROINTESTINAL ENDOSCOPY  06/29/2017    polyp removed from small intestine near stomach, benign. done at [facility]  9/12/2018    EGD BIOPSY performed by Gabo Mccracken MD at [facility] Endoscopy        Medications Prior to Admission:     Prior to Admission medications    Medication Sig Start Date End Date Taking? Authorizing Provider   Lactobacillus (ACIDOPHILUS) TABS Take by mouth 2 times daily    Historical Provider, MD   Nutritional Supplements (BOOST PLUS PO) Take 237 mLs by mouth 3 times daily    Historical Provider, MD   diphenhydrAMINE (BENADRYL) 50 MG capsule Take 50 mg by mouth every 4 hours as needed for Itching    Historical Provider, MD   ipratropium-albuterol (DUONEB) 0.5-2.5 (3) MG/3ML SOLN nebulizer solution Take 1 vial by nebulization every 4 hours as needed for Shortness of Breath    Historical Provider, MD   Multiple Vitamins-Minerals (THERAPEUTIC MULTIVITAMIN-MINERALS) tablet Take 1 tablet by mouth daily    Historical Provider, MD   PROTEIN PO Take 30 mLs by mouth 3 times daily    Historical Provider, MD   traMADol (ULTRAM) 50 MG tablet Take 50 mg by mouth 2 times daily.     Historical Provider, MD   apixaban (ELIQUIS) 2.5 MG TABS tablet Take 2.5 mg by mouth 2 times daily    Historical Provider, MD   Calcium Carbonate-Vitamin D (CALCIUM 500 + D PO) Take 1 tablet by mouth daily CALCIUM 500MG+VIT D 200IU    Historical Provider, MD   lisinopril (PRINIVIL;ZESTRIL) 2.5 MG tablet Take 2.5 mg by mouth nightly    Historical Provider, MD   Magnesium Oxide (MAG- PO) Take 400 mg by mouth 2 times daily    Historical Provider, MD   lactulose (CHRONULAC) 10 GM/15ML solution Take 10 g by mouth 2 times daily    Historical Provider, MD   potassium chloride (MICRO-K) 10 MEQ extended release capsule Take 30 mEq by mouth daily     Historical Provider, MD   furosemide (LASIX) 20 MG tablet Take 1 tablet by mouth 2 times daily  Patient taking differently: Take 20 mg by mouth daily  10/18/19   Massimo Stewart MD   aspirin 81 MG chewable tablet Take 1 tablet by mouth daily 10/19/19   Miguel Graf MD   guaiFENesin (MUCINEX) 600 MG extended release tablet Take 1 tablet by mouth 2 times daily 4/19/19   Juan Bhatt MD   amiodarone (CORDARONE) 200 MG tablet Take 200 mg by mouth daily    Historical Provider, MD   polyethylene glycol (MIRALAX) powder Take 17 g by mouth daily as needed    Historical Provider, MD   metoprolol tartrate (LOPRESSOR) 25 MG tablet Take 1 tablet by mouth 2 times daily 9/17/16   Tera Arora DO   rOPINIRole (REQUIP) 0.25 MG tablet Take 0.25 mg by mouth nightly  5/9/16   Historical Provider, MD   atorvastatin (LIPITOR) 10 MG tablet Take 10 mg by mouth daily  10/2/13   Historical Provider, MD   latanoprost (XALATAN) 0.005 % ophthalmic solution Place 1 drop into both eyes nightly  11/14/13   Historical Provider, MD   Acetaminophen (TYLENOL ARTHRITIS PAIN PO) Take 2 tablets by mouth every 6 hours as needed     Historical Provider, MD        Allergies:     Sulfa antibiotics    Social History:     Tobacco:    reports that she has never smoked.  She has never used smokeless tobacco.  Alcohol:      reports no history of alcohol use. Drug Use:  reports no history of drug use. Family History:     Family History   Problem Relation Age of Onset    Diabetes Sister     Heart Attack Sister     Osteoporosis Sister     Heart Disease Mother     Dementia Mother     Tuberculosis Father        Review of Systems:     Positive and Negative as described in HPI. Review of Systems   Constitutional: Positive for fatigue. Negative for activity change, appetite change and fever. HENT: Negative for congestion, sinus pressure and sinus pain. Eyes: Negative for visual disturbance. Respiratory: Negative for shortness of breath and stridor. Cardiovascular: Negative for chest pain, palpitations and leg swelling. Gastrointestinal: Negative for abdominal distention, abdominal pain, constipation, diarrhea and vomiting. Genitourinary: Positive for dysuria, frequency, hematuria and urgency. Musculoskeletal: Negative for back pain and neck pain. Skin: Negative for rash and wound. Neurological: Positive for facial asymmetry (Left-sided). Hematological: Negative for adenopathy. Psychiatric/Behavioral: Positive for confusion. Physical Exam:   BP 98/63   Pulse 61   Temp 97.5 °F (36.4 °C) (Oral)   Resp 15   Ht 5' 3\" (1.6 m)   Wt 115 lb (52.2 kg)   SpO2 100%   BMI 20.37 kg/m²   Temp (24hrs), Av.5 °F (36.4 °C), Min:97.5 °F (36.4 °C), Max:97.5 °F (36.4 °C)    Recent Labs     20  0836   POCGLU 69*     No intake or output data in the 24 hours ending 20 0949    Physical Exam  Vitals signs and nursing note reviewed. Constitutional:       General: She is not in acute distress. Appearance: Normal appearance. HENT:      Head: Normocephalic and atraumatic. Ears:      Comments: [place]     Mouth/Throat:      Mouth: Mucous membranes are dry. Eyes:      Extraocular Movements: Extraocular movements intact.       Pupils: Pupils are equal, round, and reactive to Creatinine W/GFR Point of Care    Collection Time: 07/23/20  8:36 AM   Result Value Ref Range    POC Creatinine 1.62 (H) 0.51 - 1.19 mg/dL    GFR Comment 36 (L) >60 mL/min    GFR Non-African American 30 (L) >60 mL/min    GFR Comment         SODIUM (POC)    Collection Time: 07/23/20  8:36 AM   Result Value Ref Range    POC Sodium 142 138 - 146 mmol/L   POTASSIUM (POC)    Collection Time: 07/23/20  8:36 AM   Result Value Ref Range    POC Potassium 4.8 (H) 3.5 - 4.5 mmol/L   CHLORIDE (POC)    Collection Time: 07/23/20  8:36 AM   Result Value Ref Range    POC Chloride 111 (H) 98 - 107 mmol/L   CALCIUM, IONIC (POC)    Collection Time: 07/23/20  8:36 AM   Result Value Ref Range    POC Ionized Calcium 1.12 (L) 1.15 - 1.33 mmol/L   Lactic Acid, POC    Collection Time: 07/23/20  8:36 AM   Result Value Ref Range    POC Lactic Acid 0.51 (L) 0.56 - 1.39 mmol/L   POCT Glucose    Collection Time: 07/23/20  8:36 AM   Result Value Ref Range    POC Glucose 69 (L) 74 - 100 mg/dL   Anion Gap (Calc) POC    Collection Time: 07/23/20  8:36 AM   Result Value Ref Range    Anion Gap 6 (L) 7 - 16 mmol/L   POCT Glucose    Collection Time: 07/23/20  8:41 AM   Result Value Ref Range    Glucose 69 mg/dL    QC OK?  OK    STROKE PANEL    Collection Time: 07/23/20  8:48 AM   Result Value Ref Range    Glucose 84 70 - 99 mg/dL    BUN 37 (H) 8 - 23 mg/dL    CREATININE 1.40 (H) 0.50 - 0.90 mg/dL    Bun/Cre Ratio NOT REPORTED 9 - 20    Calcium 8.0 (L) 8.6 - 10.4 mg/dL    Sodium 140 135 - 144 mmol/L    Potassium 4.9 3.7 - 5.3 mmol/L    Chloride 107 98 - 107 mmol/L    CO2 24 20 - 31 mmol/L    Anion Gap 9 9 - 17 mmol/L    GFR Non-African American 35 (L) >60 mL/min    GFR  43 (L) >60 mL/min    GFR Comment          GFR Staging NOT REPORTED     WBC 5.1 3.5 - 11.3 k/uL    RBC 3.10 (L) 3.95 - 5.11 m/uL    Hemoglobin 8.8 (L) 11.9 - 15.1 g/dL    Hematocrit 29.4 (L) 36.3 - 47.1 %    MCV 94.8 82.6 - 102.9 fL    MCH 28.4 25.2 - 33.5 pg    MCHC 29.9 28.4 - 34.8 g/dL    RDW 18.7 (H) 11.8 - 14.4 %    Platelets 587 015 - 557 k/uL    MPV 10.6 8.1 - 13.5 fL    NRBC Automated 0.0 0.0 per 100 WBC    Total CK 69 26 - 192 U/L    CK-MB 5.5 (H) <5.4 ng/mL    % CKMB 8.0 (H) 0.0 - 3.0 %    CKMB Interpretation COMPATIBLE WITH CARDIAC MUSCLE ORIGIN     Differential Type NOT REPORTED     Seg Neutrophils 60 36 - 65 %    Lymphocytes 24 24 - 43 %    Monocytes 10 3 - 12 %    Eosinophils % 5 (H) 1 - 4 %    Basophils 1 0 - 2 %    Immature Granulocytes 0 0 %    Segs Absolute 3.11 1.50 - 8.10 k/uL    Absolute Lymph # 1.25 1.10 - 3.70 k/uL    Absolute Mono # 0.49 0.10 - 1.20 k/uL    Absolute Eos # 0.24 0.00 - 0.44 k/uL    Basophils Absolute 0.03 0.00 - 0.20 k/uL    Absolute Immature Granulocyte <0.03 0.00 - 0.30 k/uL    WBC Morphology NOT REPORTED     RBC Morphology ANISOCYTOSIS PRESENT     Platelet Estimate NOT REPORTED     Myoglobin 71 (H) 25 - 58 ng/mL    Protime 11.3 9.0 - 12.0 sec    INR 1.1     PTT 26.3 20.5 - 30.5 sec    Troponin, High Sensitivity 155 (HH) 0 - 14 ng/L    Troponin T NOT REPORTED <0.03 ng/mL    Troponin Interp NOT REPORTED    Lactate, Sepsis    Collection Time: 07/23/20  8:48 AM   Result Value Ref Range    Lactic Acid, Sepsis NOT REPORTED 0.5 - 1.9 mmol/L    Lactic Acid, Sepsis, Whole Blood 1.0 0.5 - 1.9 mmol/L   Urinalysis with Microscopic    Collection Time: 07/23/20  9:05 AM   Result Value Ref Range    Color, UA DARK YELLOW (A) YELLOW    Turbidity UA TURBID (A) CLEAR    Glucose, Ur NEGATIVE NEGATIVE    Bilirubin Urine NEGATIVE NEGATIVE    Ketones, Urine NEGATIVE NEGATIVE    Specific Gravity, UA 1.026 1.005 - 1.030    Urine Hgb LARGE (A) NEGATIVE    pH, UA 5.5 5.0 - 8.0    Protein, UA 2+ (A) NEGATIVE    Urobilinogen, Urine Normal Normal    Nitrite, Urine NEGATIVE NEGATIVE    Leukocyte Esterase, Urine LARGE (A) NEGATIVE    -          WBC, UA TOO NUMEROUS TO COUNT 0 - 5 /HPF    RBC, UA TOO NUMEROUS TO COUNT 0 - 2 /HPF    Casts UA NOT REPORTED 0 - 2 /LPF Crystals, UA NOT REPORTED None /HPF    Epithelial Cells UA 5 TO 10 0 - 5 /HPF    Renal Epithelial, UA NOT REPORTED 0 /HPF    Bacteria, UA FEW (A) None    Mucus, UA NOT REPORTED None    Trichomonas, UA NOT REPORTED None    Amorphous, UA NOT REPORTED None    Other Observations UA NOT REPORTED NOT REQ. Yeast, UA NOT REPORTED None       Imaging/Diagnostics:  Ct Head Wo Contrast    Result Date: 7/23/2020  1. No acute intracranial process identified. 2. Stable diffuse cerebral volume loss and moderate chronic small vessel ischemic changes. The findings were sent to the Radiology Results [address] at 8:07 am on 7/23/2020to be communicated to a licensed caregiver. Ct Urogram    Result Date: 7/20/2020  1. Layering hyperdensity within the urinary bladder could represent debris or mild hemorrhage. No abnormal enhancement seen. 2. No suspicious renal lesion. Bilateral renal cysts measure up to 6 cm. 3. A 1.1 cm stone within the left renal pelvis with additional tiny left renal stones. No obstructive uropathy. 4. Compression deformity of L2 with 30% vertebral body height loss is new from January. Stable L3 compression fracture. Assessment :      Hospital Problems           Last Modified POA    * (Principal) TIA (transient ischemic attack) 7/23/2020 Yes    Hyperlipidemia (Chronic) 7/23/2020 Yes    Pressure ulcer of coccygeal region, stage IV (HCC) (Chronic) 7/23/2020 Yes    Restless legs syndrome (Chronic) 7/23/2020 Yes    Presence of cardiac pacemaker 7/23/2020 Yes    Severe protein-calorie malnutrition ([address] (Chronic) 7/23/2020 Yes    Hypotension 7/23/2020 Yes    Left renal stone 7/23/2020 Yes    Chronic diastolic heart failure ([address] 7/23/2020 Yes    Atrial fibrillation ([address] 7/23/2020 Yes    Hypoglycemia 7/23/2020 Yes    Anemia 7/23/2020 Yes          Plan:     Patient status inpatient in the Progressive Unit/Step down    1. TIA: Most likely secondary to hypoperfusion.   Continue follow-up studies per

## 2020-07-23 NOTE — DISCHARGE INSTR - COC
TRANSESOPHAGEAL ECHOCARDIOGRAM  12/16/2019    UPPER GASTROINTESTINAL ENDOSCOPY  06/29/2017    polyp removed from small intestine near stomach, benign. done at [facility]  9/12/2018    EGD BIOPSY performed by Gabo Mccracken MD at [facility]       Immunization History:   Immunization History   Administered Date(s) Administered    Influenza A ([id number]) Vaccine PF IM 01/29/2010    Influenza Vaccine, unspecified formulation 10/14/2015, 10/03/2018    Pneumococcal Conjugate 13-valent (Tara Schanz) 10/14/2015    Pneumococcal Polysaccharide ([id number]) 10/01/1999    Zoster Live (Zostavax) 12/23/2015       Active Problems:  Patient Active Problem List   Diagnosis Code    Arthritis M19.90    Osteopenia M85.80    Complete heart block (HCC) I44.2    Syncope and collapse R55    Hyperlipidemia E78.5    Contusion of scalp S00. 03XA    Chest pain in adult R07.9    Pericardial effusion I31.3    Paroxysmal atrial fibrillation ([facility]) I48.0    Sinus arrest I45.5    Knee effusion, right M25.461    Shortness of breath R06.02    Pulmonary embolism with acute cor pulmonale ([facility]) I26.09    Pleural effusion J90    Sick sinus syndrome ([facility]) I49.5    Hemorrhagic pericardial effusion I31.2    Status post total right knee replacement Z96.651    Subtherapeutic international normalized ratio (INR) R79.1    Acute congestive heart failure (HCC) I50.9    Thickened endometrium R93.89    Acute respiratory failure with hypoxia (HCC) J96.01    Head injury without concussion or intracranial hemorrhage S09.90XA    Bacteremia R78.81    Right arm cellulitis L03. 80    MRSA bacteremia R78.81    Endocarditis of mitral valve I05.8    Cerebral septic emboli (HCC) I76, I66.9    Cerebrovascular accident (CVA) ([facility]) I63.9    Closed Colles' fracture of left radius with routine healing S52.532D    Acute ischemic right MCA stroke ([facility]) I63.511    Pulmonary embolism ([facility]) I26.99    Dysarthria R47.1    Multifocal pneumonia J18.9    Pacemaker infection ([facility] [address] T82. 7XXA    Closed fracture of lumbar vertebra with spinal cord injury ([address] S34.109A, S32.008A    Pressure ulcer of coccygeal region, stage IV (HCC) L89.154    Wedge compression fracture of third lumbar vertebra with routine healing S32.030D    Closed compression fracture of L1 lumbar vertebra, sequela S32.010S    Age-related osteoporosis without current pathological fracture M81.0    Vitamin D deficiency E55.9    Status post kyphoplasty Z98.890    Spinal stenosis of lumbar region with neurogenic claudication M48.062    Slow transit constipation K59.01    Restless legs syndrome G25.81    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    History of pulmonary embolism Z86.711    Gastric polyp K31.7    Gastroesophageal reflux disease with esophagitis K21.0    Generalized anxiety disorder F41.1    Essential (primary) hypertension I10    Thrombophlebitis I80.9    Age-related osteoporosis with current pathological fracture M80.00XA    Anticoagulated on Coumadin Z79.01    Centrilobular emphysema (HCC) J43.2    Cervical spondylosis without myelopathy M47.812    DDD (degenerative disc disease), lumbar M51.36    Esophageal dysphagia R13.10    Presence of cardiac pacemaker Z95.0    Severe protein-calorie malnutrition (HCC) E43    Skin tear of left upper extremity S41.112A    TIA (transient ischemic attack) G45.9    Hypotension I95.9    Left renal stone N20.0    Chronic diastolic heart failure (HCC) I50.32    Atrial fibrillation (HCC) I48.91    Hypoglycemia E16.2    Anemia D64.9       Isolation/Infection:   Isolation          No Isolation        Patient Infection Status     Infection Onset Added Last Indicated Last Indicated By Review Planned Expiration Resolved Resolved By    VRE 05/

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
## 2020-07-23 NOTE — ED NOTES
Report given to RN taking care of pt, no further questions with understanding of pt.       Megha Santiago RN  07/23/20 [id number]

## 2020-07-23 NOTE — CONSULTS
Department of Endovascular Neurosurgery                                         Resident Consult Note    Reason for Consult:  Stroke consult, TIA  Requesting Physician:  Tracy Mason MD  Endovascular Neurosurgeon:   []Dr. Ash Gaxiola  [x]Dr. Angel Nicholson    History Obtained From:  patient, electronic medical record    CHIEF COMPLAINT:       Slurred speech, left facial droop, hypotensive    HISTORY OF PRESENT ILLNESS:       The patient is a 80 y.o. 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 As per nursing home they checked on patient at [facility] and was within her usual health and then at 6:45AM they notice some left facial droop and slurred speech. When EMS was called symptoms resolved. When EMS came slurred speech came back and then mobile stroke unit came and symptoms resolved. Found to have hypotension in the 70-90's. Got Head CT WO on MSU and was found with no acute intracranial pathology. Transfer to [address] for evaluation. Ems was told patient have UTI and currently being treated and is on Eliquis for her Atrial Fibrillation and being compliant. At [address] patient with no slurred speech and some mild left nasolabial droop, BP in the 70's. Stroke team called.      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 She has atrial fibrillation (Not on [facility]) and head CT showed new hypodensities on right cerebellum and well as right basal ganglia and deep white matter. Concern for septic emboli. Patient was treated with Abx discharged on Aspirin     November 2019:  Evaluated by neurology for dysarthria, left facial droop in the setting of 1 month diagnosis of Endocarditis. Found with right M2/3 occlusion on CTA H/N, Bilateral PE on CT Chest, right acute/subacute lower extremity DVT. Patient was treated with heparin and then switch to Eliquis when it was cleared by ID and cardiology.     PAST MEDICAL HISTORY :       Past Medical History:        Diagnosis Date    Arthritis     Atrial fibrillation ([address] 8/21/2016    CH[facility]est[facility] heart failure[facility]     Complete heart block ([address] 8/18/2016    Constipation     CVA (cerebral vascular accident) ([address] 11/08/2019    ACUTE ISCHEMIC    GERD (gastroesophageal reflux disease)     Glaucoma     mild, on eye drops    Hearing loss     bilateral hearing aids    Hiatal hernia     Hx of blood clots 07/2016    pulmonary emboli bilateral lungs after Pacemaker inserted    Hyperlipidemia     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 Skye Chambers    Restless leg syndrome     Right knee DJD     Wears glasses     Wears hearing aid in both ears        Past Surgical History:        Procedure Laterality Date    CARDIAC PACEMAKER PLACEMENT      REMOVAL 11- WITH TEMPORARY PACEMAKER PLACED 11-    CARDIAC PACEMAKER PLACEMENT  12/16/2019    DR Salud Garcia    CATARACT REMOVAL WITH IMPLANT Bilateral     Left 1/6/1998, right 8/2/1996    COLONOSCOPY      DILATION AND CURETTAGE OF UTERUS N/A 3/12/2019    DILATATION AND CURETTAGE, HYSTEROSCOPY, MYOSURE performed by Luz Adorno MD at [address] [place] [address]  08/18/2016    [employer] advisa MRI DR model # [id number]  av lead [id number] rv lead [id number]  - mri compatible    [place] GI ENDOSCOPIC ULTRASOUND N/A 9/12/2018    ENDOSCOPIC ULTRASOUND, PATHOLOGY REQUESTED, PAT NOTIFIED performed by Neri Bueno MD at [facility]      as a child    TOTAL KNEE ARTHROPLASTY Right 9/5/2017    KNEE TOTAL ARTHROPLASTY RIGHT WITH BIOMET AND GPS PRODUCT APPLICATION performed by Mikey Cedeno MD at [address] [place] [address] TRANSESOPHAGEAL ECHOCARDIOGRAM N/A 10/9/2019    TRANSESOPHAGEAL ECHOCARDIOGRAM WITH BUBBLE STUDY performed by Jhon Ortega DO at [address] [place] [address] TRANSESOPHAGEAL ECHOCARDIOGRAM  12/16/2019    UPPER GASTROINTESTINAL ENDOSCOPY  06/29/2017    polyp removed from small intestine near stomach, benign. done at [facility]  9/12/2018    EGD BIOPSY performed by Neri Bueno MD at [facility]       Social History:   Social History     Socioeconomic History    Marital status:       Spouse name: Not on file    Number of children: Not on file    Years of education: Not on file    Highest education level: Not on file   Occupational History    Not on file   Social Needs    Financial resource strain: Not on file    Food insecurity     Worry: Not on file     Inability: Not on file    Transportation needs     Medical: Not on file     Non-medical: Not on file   Tobacco Use    Smoking status: Never Smoker    Smokeless tobacco: Never Used   Substance and Sexual Activity    Alcohol use: No    Drug use: No    Sexual activity: Not on file   Lifestyle    Physical activity     Days per week: Not on file     Minutes per session: Not on file    Stress: Not on file   Relationships    Social connections     Talks on phone: Not on file     Gets together: Not on file     Attends Tenriism service: Not on file     Active member of club or organization: Not on file     Attends meetings of clubs or organizations: Not on file     Relationship status: Not on file    Intimate partner violence     Fear of current or ex partner: Not on file     Emotionally abused: Not on file     Physically abused: Not on file     Forced sexual activity: Not on file   Other Topics Concern    Not on file   Social History Narrative    Not on file       Family History:       Problem Relation Age of Onset    Diabetes Sister     Heart Attack Sister     Osteoporosis Sister     Heart Disease Mother     Dementia Mother     Tuberculosis Father        Allergies:  Sulfa antibiotics    Home Medications:  Prior to Admission medications    Medication Sig Start Date End Date Taking? Authorizing Provider   Lactobacillus (ACIDOPHILUS) TABS Take by mouth 2 times daily    Historical Provider, MD   Nutritional Supplements (BOOST PLUS PO) Take 237 mLs by mouth 3 times daily    Historical Provider, MD   diphenhydrAMINE (BENADRYL) 50 MG capsule Take 50 mg by mouth every 4 hours as needed for Itching    Historical Provider, MD   ipratropium-albuterol (DUONEB) 0.5-2.5 (3) MG/3ML SOLN nebulizer solution Take 1 vial by nebulization every 4 hours as needed for Shortness of Breath    Historical Provider, MD   Multiple Vitamins-Minerals (THERAPEUTIC MULTIVITAMIN-MINERALS) tablet Take 1 tablet by mouth daily    Historical Provider, MD   PROTEIN PO Take 30 mLs by mouth 3 times daily    Historical Provider, MD   traMADol (ULTRAM) 50 MG tablet Take 50 mg by mouth 2 times daily.     Historical Provider, MD   apixaban (ELIQUIS) 2.5 MG TABS tablet Take 2.5 mg by mouth 2 times daily    Historical Provider, MD   Calcium Carbonate-Vitamin D (CALCIUM 500 + D PO) Take 1 tablet by mouth daily CALCIUM 500MG+VIT D 200IU    Historical Provider, MD   lisinopril (PRINIVIL;ZESTRIL) 2.5 MG tablet Take 2.5 mg by mouth nightly    Historical Provider, MD   Magnesium Oxide (MAG- PO) Take 400 mg by mouth 2 times daily    Historical Provider, MD   lactulose (CHRONULAC) 10 GM/15ML solution Take 10 g by mouth 2 times daily    Historical Provider, MD   potassium chloride (MICRO-K) 10 MEQ extended release capsule Take 30 mEq by mouth daily     Historical Provider, MD   furosemide (LASIX) 20 MG tablet Take 1 tablet by mouth 2 times daily  Patient taking differently: Take 20 mg by mouth daily  10/18/19   Mora Guerrero MD   aspirin 81 MG chewable tablet Take 1 tablet by mouth daily 10/19/19   Hardik Witt MD   guaiFENesin (MUCINEX) 600 MG extended release tablet Take 1 tablet by mouth 2 times daily 4/19/19   Thai Lunsford MD   amiodarone (CORDARONE) 200 MG tablet Take 200 mg by mouth daily    Historical Provider, MD   polyethylene glycol (MIRALAX) powder Take 17 g by mouth daily as needed    Historical Provider, MD   metoprolol tartrate (LOPRESSOR) 25 MG tablet Take 1 tablet by mouth 2 times daily 9/17/16   Jonathon Ferrer DO   rOPINIRole (REQUIP) 0.25 MG tablet Take 0.25 mg by mouth nightly  5/9/16   Historical Provider, MD   atorvastatin (LIPITOR) 10 MG tablet Take 10 mg by mouth daily  10/2/13   Historical Provider, MD   latanoprost (XALATAN) 0.005 % ophthalmic solution Place 1 drop into both eyes nightly  11/14/13   Historical Provider, MD   Acetaminophen (TYLENOL ARTHRITIS PAIN PO) Take 2 tablets by mouth every 6 hours as needed     Historical Provider, MD       REVIEW OF SYSTEMS:       CONSTITUTIONAL: negative for fatigue and malaise   EYES: negative for double vision and photophobia    HEENT: negative for tinnitus and sore throat   RESPIRATORY: negative for cough, shortness of breath   CARDIOVASCULAR: negative for chest pain, palpitations   GASTROINTESTINAL: negative for nausea, vomiting   GENITOURINARY: negative for incontinence   MUSCULOSKELETAL: negative for neck or back pain   NEUROLOGICAL: negative for seizures, headache, aphasia, numbness  Positive for dysarthria, weakness   PSYCHIATRIC: negative for fatigue     PHYSICAL EXAM:       BP (!) 73/56   Pulse 63   Temp 97.5 °F (36.4 °C) (Oral)   Resp 18   Ht Review:    1.) CT Head without contrast:  Impression    1. No acute intracranial process identified. 2. Stable diffuse cerebral volume loss and moderate chronic small vessel    ischemic changes. The findings were sent to the Radiology Results [address] at 8:07    am on 7/23/2020to be communicated to a licensed caregiver.           ASSESSMENT AND PLAN:     Assessment                 80 y.o. female who presents with sudden onset of intermittent slurred speech and left facial droop along with infection and hypotension. // TIA vs infectious process vs neurologic symptoms 2ry to hypotension //   Patient with slurred speech and facial droop on nursing home intermittently getting better. Head CT was unremarkable for new pathology. Found in ED with Hypotension (70-90's), UTI, MIKE, increased troponin. Unable t get CTA due to creatinine (1.4) and GFR (< 35). Will recommend CTA H/N when possible and OK with IM/Nephrology. Recommend to start Aspirin 81mg PO D and defer to IM/Cardiology when to restart again anticoagulation (Hx of previous endocarditis and need to make sure is not present since anticoagulation can cause hemorrhage). // Metabolic Encephalopathy //   Patient with some confusion in the setting on Hypotension, UTI, MIKE. Will recommend management by IM/cardiolgoy/Nephrology for resolution. Recommend general neurology consult for continuation of plan and new recommendations.    // MIKE/CKD //   Patient with Creatinine at 1.6 as per POC and 1.4 on lab. Previous creatinine levels at 0.4-0.6. GFR 30-35. Possible damage due to hypotension. IM will be consulted for evaluation and possible nephrology     // increased troponin //   Troponin on 155. Will be evaluated by IM and cardiology    1. Last Known Well (date and time): 5AM 7/23/2020    2.  Candidate for IV tPA therapy     Yes []     No  [x] due to the following exclusion criteria: NIHSS 0-1 and getting better     Contraindications for IV tPA:   ? Ghada Marshall since if there is a stroke it will not be large. Previous history of Endocarditis (oct/Nov 2019) so need evaluation in case patient is having endocarditis again and could have hemorrhagic transformation if new embolic process occurred. - Neurology Consult     - Folic acid 1mg BID    - Continue Atorvastatin 10mg nightly     - Fasting Lipid panel    - TSH    - HgbA1c lab    - PT, OT, Speech eval     - Hydrate    - Telemetry     - Neuro checks per protocol   - We recommend SBP > 100-110 & < 180   - Blood glucose goal less than 180   - Please avoid dextrose containing solutions    Additional recommendations may follow    Please contact [facility] with any changes in patients neurologic status. Thank you for your consult.        Clifton Feng MD  Adult General Neurology Resident PGY-4  7/23/2020 at 8:50 AM

## 2020-07-23 NOTE — ED NOTES
.    7/23/2020 9:25 AM    Patient: Jorge Vicente, 80 y.o., female Race:Cauc  Patient Address: [facility] [address]  Incident Address:  [x] [address] [facility], [address] [place] [address]     [x] [facility] [place]  [] [facility]  [] [facility] ORTHOPEDIC AND SPINE [facility]   [] [facility]  [] [facility]  Patient Phone #: [phone]   Insurance: Payor: Cory Dugger /  /  /   Micah Sandoval:  []  Yes   [x]  No  Emergency Contact: Extended Emergency Contact Information  Primary Emergency Contact: [address] Phone: [phone]  Work Phone: [phone]  Mobile Phone: [phone]  Relation: Child  Secondary Emergency Contact: Kajal Lainez  Address: [address], [facility] [address] Phone: [phone]  Mobile Phone: [phone]  Relation: Child  MRN: [id number]  [facility]# [id number]  YOB: 1930  Primary Care Physician: Mirlande Hannon MD  Advance Directive: [x] Full Code   []DNR-CC   []DNR-CCA    MSU Crew: Alanna Peña - Paramedic, GREY Torres RN    Pt Transported To:  [x] [facility]  [] [facility]  [] [facility]  [] [facility]  [] [facility]  [] [facility]  [facility] [] [facility]     Was patient transported to closest facility? []Yes  [x]No (if No specify reason)   [x]   Patient/family request    []   Divert to specialist       Response Code   [] 2  [x] 3  []   Change  [] 2  [] 3   Transport Code   [x] 2  [] 3  []   Change  [] 2  [] 3     Mileage:  [address]   Total Miles 9.0     Call Received 7/23/2020 0719   Dispatched 7/23/2020 0720   Enroute 7/23/2020 0722   Arrived Scene 7/23/2020 0730   At Patient 7/23/2020 0734   Decision to Scan 7/23/2020 0736   Scan 7/23/2020 8032   Departed Scene 7/23/2020 [address] 7/23/2020 Scott Martinez 3028 7/23/2020 [phone]   In Service 7/23/2020 0841         Allergies  [] Updated/Reviewed by MSU  [x] Historical Data Only  [] Unavailable  is allergic to sulfa antibiotics.   Medications  [] Updated/Reviewed by administration    NIHSS       Time 0804      1a. LOC - Arousal Status 0      1b. LOC - Questions 2      1c. LOC - Commands 0      2. Eye Movements (gaze) 0      3. Visual Fields 0      4. Facial Palsy 0      5a. Motor Left Arm 1      5b. Motor Right Arm 1      6a. Motor Left Leg 3      6b. Motor Right Leg 3      7. Limb Ataxia 1      8. Sensory 0      9. Language/Aphasia 0      10. Dysarthria 0      11. Neglect 0      TOTAL 11          Research:    RACE Score: 0              Time: 1311  StrokeVAN Score: Neg  Time: 7749    Time Last Known Well: 0500 hrs    Narrative:    History:    Dispatched to possible CVA per [facility]. Arrived to find Maura Bello, 80 y.o., female c/o None voiced. Report received from [facility] staff nurse at patients side.  at patient side via telemedicine unit @ 0742 hrs. According to nursing staff the patient was checked @ 0500 hrs and was normal.  Checked @ 0645 hrs and they stated she had \"garbled speech and some left facial droop which resolved quickly and then recheck at 0700 hs with return of facial droop and garbled speech. On EMS arrival patient continued with slight garbled speech and left facial droop which resolved by MSU arrival.  No history of seizure disorder and no seizure activity noted. History as indicated in chart with current UTI and being treated with antibiotics. Physical:    Neuro: Awake, alert and orientated x 2 with inability to give correct age or current month of year. RACE, VAN and NIH scores as indicated. Noted weakness to both arms but able to raise up and hold with some drift bilaterally and with minimal movement of both legs which is normal for patient. Resp: lungs clear to auscultation bilaterally, normal effort  Cardiac: regular rate, with ECG showing a ventricular paced rhythm. 12 LD ECG not captured. Muscular/skeletal/peripheral vascular: No edema observed. Noted bruises to both arms with PICC line in place to right arm.   Nail polish to all fingers both hands and unable to capture SpO2. Abd/GI/: abd flat, soft, non tender. Skin: normal color, warm, dry   In no acute distress. Patient received the following medications prior to MSU arrival: None  Patient has the following lines prior to MSU arrival: Apparent PICC line in place to right antecubital area. Patient has the following airway placed prior to MSU arrival: None, maintains own airway. Patient was transferred to cot via 2 person assist and secured with straps x3. Zoll ECG, SpO2, and NIBP applied and monitored throughout encounter. HOB maintained at 30 degrees. Patient was transferred to ambulance, cot secured in scan position. Non contrast CT scan performed. After scan cot secured in transport position. IV tPA inclusion/exclusion criteria reviewed with patient and physician. Candidate for IV tPA therapy? [] Yes   [x] No - due to the following exclusion criteria:    [] ICH   [x] Taking anticoagulant -   [] Beyond last time known well window  [x] At or returned to baseline   [] Marked improvement of symptoms  [] No disabling symptoms  [] Other -     Patient is being transported to [facility] ED via patient's family request and ok's by [facility] . During transport patient rests comfortably. Cabin temp maintained at 70-72 °F throughout transport. Patient was transferred to bed 15 via 4 person sheet lift. . Patient care handoff completed with ANCA Sanz and Dr. Dima Hobson at bedside.        Imaging:  Images were obtained onboard the MSU including:  [x] CT brain without contrast - see results below:      Ct Head Wo Contrast    Result Date: 7/23/2020  EXAMINATION: CT OF THE HEAD WITHOUT CONTRAST  7/23/2020 7:46 am TECHNIQUE: CT of the head was performed without the administration of intravenous contrast. Dose modulation, iterative reconstruction, and/or weight based adjustment of the mA/kV was utilized to reduce the radiation dose to as low as reasonably achievable. COMPARISON: 11/15/2019 HISTORY: ORDERING SYSTEM PROVIDED HISTORY: Stroke TECHNOLOGIST PROVIDED HISTORY: Stroke FINDINGS: BRAIN/VENTRICLES: There is no acute infarct or acute intracranial hemorrhage present. There is no mass effect or midline shift present. There is diffuse cerebral volume loss. Periventricular hypoattenuation is present. There is no abnormal extra-axial fluid collection. ORBITS: Limited evaluation of the orbits is unremarkable. SINUSES: The paranasal sinuses and mastoid air cells are clear. SOFT TISSUES/SKULL:  No lytic or blastic osseous lesions are identified. 1. No acute intracranial process identified. 2. Stable diffuse cerebral volume loss and moderate chronic small vessel ischemic changes. The findings were sent to the Radiology Results [address] at 8:07 am on 7/23/2020to be communicated to a licensed caregiver. IV LINES   Time Size Site # Attempts Performed By   Faraz Dempsey Right Arm Unk Unk                     Total Amount of IV Fluids Infused: 400 ccs    MEDS / ELECTRICAL RX   Time Therapy Dosage Route Response Performed By   4218 IV fluid 250 cc plus PICC line Slt improved B/P LS/MSU crew                                               TPA Administration   Time Bolus Dose Infusion Dose   N/A              [] tPA dosing double checked by:       ACUTE STROKE DYSPHAGIA SCREEN              YES NO   1 GCS less than 13?         2 Facial Asymmetry or Weakness? 3 Tongue Asymmetry or Weakness? 4 Palatal Asymmetry or Weakness? 5 Signs of aspiration during 3oz water test?*                 If answers to questions 1-4 are NO proceed to 3oz water test               *Administer 3oz of water for sequential drinks, note any throat clearing, cough, or change in vocal quality immediately after and 1 minute following the swallow.                If answers to questions 1-5 are NO proceed with ordered PO meds       POC LABS      TIME 0812     Test (reference range)      FSBG ()      PT (11-13.5)      INR (0.8-1.1)      Na (138-146)      K (3.5-4.9)      Cl ()      iCa (1.2-1.32)      TCO2 (24-29)      Glu ()      BUN (8.0-26)      Crea (0.6-1.3) 1.6     Hct (38-51)      Hb (12.0-17)      AnGap 10.0-20)                Assistance:   [x]    [address]    []    Police    [x]    Other EMS (See Below)    [facility] Hospital Notification:    [address] -  [x] J[address] Dean [address]  [x] [facility]  [] [facility]  [] [facility]  [] [facility] [] [facility] [] [facility] [] [facility]  [] [facility]     [x]    Med Channel: [address] [place] [address] St 3 @ 2421. Jaspreet Dean 83 ED @ 5355.   MENA RN    []    Cell Phone     Med Control Orders Received:   [] No  [x]  Yes:  Dr. Anisa Bear via telemedicine  Med Control Physician (if on line medical direction received)  -        Hospital Team Alert:   []    Trauma Alert    []    STEMI Alert         [x]    Stroke Alert    []    RACE Alert      Description Of Valuables: glasses and hearing aids x 2    Patient Valuables:   []    With Patient    []    Not Received    [x]    ER / Lab     Call Outcome:   [x]    Transport to Facility    []    Care Transferred to [facility]    []    Cancelled    []    Patient Refusal    []                           Mobile Stroke Unit    Electronically signed by Heather Parker, RN on 7/23/20 at 9:25 AM EDT     Shelby Holland RN  07/23/20 2891

## 2020-07-23 NOTE — PROGRESS NOTES
[facility]  Documentation Note                Admission Dx: TIA (transient ischemic attack) [G45.9]    Past Medical History:   has a past medical history of Arthritis, Atrial fibrillation ([address] CHF (congestive heart failure) ([address] Complete heart block ([address] Constipation, CVA (cerebral vascular accident) ([address] GERD (gastroesophageal reflux disease), Glaucoma, Hearing loss, Hiatal hernia, Hx of blood clots, Hyperlipidemia, MRSA (methicillin resistant Staphylococcus aureus) infection, Osteopenia, Pacemaker, Restless leg syndrome, Right knee DJD, Wears glasses, and Wears hearing aid in both ears. Previous Testing:     PPM 12/16/19: Medtronic device placed by Dr. Truong Hurtado. PPM EXTRACTION 11/14/19: Done by Dr. Truong Hurtado due to infection. SOCRATES 11/11/19: EF 55%, anterior leaflet vegetation noted. PPM lead seems to have echogenic density likely vegetation. ECHO 5/24/19: EF 50%, inferior wall hypokinesia, reduced LV diastolic compliance, mild AI/MR, moderate TR.     STRESS 12/27/17: No ischemia/infarct. EF 74%. PERICARDIOCENTESIS 9/8/16: 450 mls of blood fluid drained. Previous office/hospital visit:   Dr. Leidy Adler 2/6/2020:   1. Syncope associated with 15 second asystole, August 2016.  2. Implantation of 4 [facility] DR CHATTERJEE pacemaker, on 08/18/16, at [facility]. Medtronic 5076 atrial and ventricular leads were used. Pt had pacemaker and lead removal for endocarditis in late 2019, followed by re-implantation of a single chamber pacemker from the right side on 12/16/19, using Medtronic [place] XT ST MRI  3. History of paroxysmal atrial fibrillation, on amiodarone since August 2016.  4. Readmission early September 2016 with bilateral pulmonary emboli. 5. Patient developed hemorrhagic pericardial tamponade on anticoagulation during September hospitalization requiring pericardiocentesis with no reaccumulation after 48 hours of pericardial drainage.  The patient was restarted on Coumadin with no evidence of recurrent pericardial effusion. 6. History of hyperlipidemia. 7. Patient is allergic to penicillin and sulfa. 8. Chronic anticoagulation previous on warfarin, switched to Eliquis August 2019  9. Mechanical fall July 2019, with L1 compression fracture requiring kyphoplasty. Pt also developed a large right gluteal and piriformis hematoma with piriformis syndrome (sciatica). Anticoagulation was temporarily held, with warfarin then switched to Eliquis. 10. 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 Plan --  Mrs. Ashley Dalal continues to do very well at this point with good suppression of paroxysmal atrial fibrillation on amiodarone without side effects or toxicities, and with normally functioning pacemaker on evaluation today. She has had good wound healing now over 2 months after PPM implantation, with no evidence of recurrent endocarditis. Her blood pressure remains under good control on her current regimen, including Lasix and metoprolol, and we will continue Eliquis for prophylaxis of thromboembolism. She is making a steady recovery after recent CVA and endocarditis. We will maintain current pacemaker settings, keeping a lower rate of 60 and plan to see her back for next pacemaker evaluation in 6 months.     Jennifer Mckay [facility]

## 2020-07-23 NOTE — PROCEDURES
Pt's new mri pacemaker/aicd is mri conditional. Faxed our cardiology form to [facility]. When that gets back we will set up medtronic for tomorrow sometime. 7/24. We still need mri checklist and also will need an ALCS rn to accompany pt to mri at that time for a 10 minute mri. Thanks. Please call mri with any questions.

## 2020-07-24 PROBLEM — J44.9 COPD WITHOUT EXACERBATION (HCC): Status: ACTIVE | Noted: 2020-07-24

## 2020-07-24 PROBLEM — Z86.73 HISTORY OF CEREBRAL INFARCTION: Status: ACTIVE | Noted: 2020-07-24

## 2020-07-24 PROBLEM — N39.0 SEPSIS DUE TO URINARY TRACT INFECTION (HCC): Status: ACTIVE | Noted: 2020-07-24

## 2020-07-24 PROBLEM — A49.02 MRSA INFECTION: Status: ACTIVE | Noted: 2020-07-24

## 2020-07-24 PROBLEM — A41.9 SEPSIS DUE TO URINARY TRACT INFECTION (HCC): Status: ACTIVE | Noted: 2020-07-24

## 2020-07-24 PROBLEM — G93.41 METABOLIC ENCEPHALOPATHY: Status: ACTIVE | Noted: 2020-07-24

## 2020-07-24 PROBLEM — I95.9 SEPSIS ASSOCIATED HYPOTENSION (HCC): Status: ACTIVE | Noted: 2020-07-24

## 2020-07-24 PROBLEM — N39.0 URINARY TRACT INFECTION DUE TO PROTEUS: Status: ACTIVE | Noted: 2020-07-24

## 2020-07-24 PROBLEM — A41.9 SEPSIS ASSOCIATED HYPOTENSION (HCC): Status: ACTIVE | Noted: 2020-07-24

## 2020-07-24 PROBLEM — B96.4 URINARY TRACT INFECTION DUE TO PROTEUS: Status: ACTIVE | Noted: 2020-07-24

## 2020-07-24 PROBLEM — R53.1 WEAKNESS GENERALIZED: Status: ACTIVE | Noted: 2020-07-24

## 2020-07-24 PROBLEM — R77.8 ELEVATED TROPONIN LEVEL NOT DUE TO ACUTE CORONARY SYNDROME: Status: ACTIVE | Noted: 2020-07-24

## 2020-07-24 PROBLEM — N17.9 AKI (ACUTE KIDNEY INJURY) (HCC): Status: ACTIVE | Noted: 2020-07-24

## 2020-07-24 LAB
ALBUMIN SERPL-MCNC: 2.2 G/DL (ref 3.5–5.2)
ALBUMIN/GLOBULIN RATIO: 0.8 (ref 1–2.5)
ALP BLD-CCNC: 99 U/L (ref 35–104)
ALT SERPL-CCNC: 27 U/L (ref 5–33)
ANION GAP SERPL CALCULATED.3IONS-SCNC: 11 MMOL/L (ref 9–17)
AST SERPL-CCNC: 35 U/L
BILIRUB SERPL-MCNC: 0.22 MG/DL (ref 0.3–1.2)
BUN BLDV-MCNC: 25 MG/DL (ref 8–23)
BUN/CREAT BLD: ABNORMAL (ref 9–20)
CALCIUM SERPL-MCNC: 7.7 MG/DL (ref 8.6–10.4)
CHLORIDE BLD-SCNC: 111 MMOL/L (ref 98–107)
CO2: 20 MMOL/L (ref 20–31)
CREAT SERPL-MCNC: 0.97 MG/DL (ref 0.5–0.9)
CULTURE: ABNORMAL
CULTURE: ABNORMAL
EKG ATRIAL RATE: 56 BPM
EKG ATRIAL RATE: 74 BPM
EKG P AXIS: 72 DEGREES
EKG Q-T INTERVAL: 472 MS
EKG Q-T INTERVAL: 480 MS
EKG QRS DURATION: 126 MS
EKG QRS DURATION: 134 MS
EKG QTC CALCULATION (BAZETT): 472 MS
EKG QTC CALCULATION (BAZETT): 480 MS
EKG R AXIS: -69 DEGREES
EKG R AXIS: -73 DEGREES
EKG T AXIS: 83 DEGREES
EKG T AXIS: 94 DEGREES
EKG VENTRICULAR RATE: 60 BPM
EKG VENTRICULAR RATE: 60 BPM
GFR AFRICAN AMERICAN: >60 ML/MIN
GFR NON-AFRICAN AMERICAN: 54 ML/MIN
GFR SERPL CREATININE-BSD FRML MDRD: ABNORMAL ML/MIN/{1.73_M2}
GFR SERPL CREATININE-BSD FRML MDRD: ABNORMAL ML/MIN/{1.73_M2}
GLUCOSE BLD-MCNC: 67 MG/DL (ref 70–99)
HCT VFR BLD CALC: 29.4 % (ref 36.3–47.1)
HEMOGLOBIN: 9.3 G/DL (ref 11.9–15.1)
Lab: ABNORMAL
MCH RBC QN AUTO: 29.6 PG (ref 25.2–33.5)
MCHC RBC AUTO-ENTMCNC: 31.6 G/DL (ref 28.4–34.8)
MCV RBC AUTO: 93.6 FL (ref 82.6–102.9)
NRBC AUTOMATED: 0 PER 100 WBC
PDW BLD-RTO: 18.9 % (ref 11.8–14.4)
PLATELET # BLD: 292 K/UL (ref 138–453)
PMV BLD AUTO: 10.6 FL (ref 8.1–13.5)
POTASSIUM SERPL-SCNC: 5.1 MMOL/L (ref 3.7–5.3)
RBC # BLD: 3.14 M/UL (ref 3.95–5.11)
SODIUM BLD-SCNC: 142 MMOL/L (ref 135–144)
SPECIMEN DESCRIPTION: ABNORMAL
TOTAL PROTEIN: 5.1 G/DL (ref 6.4–8.3)
WBC # BLD: 6.6 K/UL (ref 3.5–11.3)

## 2020-07-24 PROCEDURE — 99222 1ST HOSP IP/OBS MODERATE 55: CPT | Performed by: PSYCHIATRY & NEUROLOGY

## 2020-07-24 PROCEDURE — 99232 SBSQ HOSP IP/OBS MODERATE 35: CPT | Performed by: INTERNAL MEDICINE

## 2020-07-24 PROCEDURE — P9041 ALBUMIN (HUMAN),5%, 50ML: HCPCS | Performed by: INTERNAL MEDICINE

## 2020-07-24 PROCEDURE — 6370000000 HC RX 637 (ALT 250 FOR IP): Performed by: INTERNAL MEDICINE

## 2020-07-24 PROCEDURE — 85027 COMPLETE CBC AUTOMATED: CPT

## 2020-07-24 PROCEDURE — 99222 1ST HOSP IP/OBS MODERATE 55: CPT | Performed by: INTERNAL MEDICINE

## 2020-07-24 PROCEDURE — 93880 EXTRACRANIAL BILAT STUDY: CPT

## 2020-07-24 PROCEDURE — 99233 SBSQ HOSP IP/OBS HIGH 50: CPT | Performed by: INTERNAL MEDICINE

## 2020-07-24 PROCEDURE — 80053 COMPREHEN METABOLIC PANEL: CPT

## 2020-07-24 PROCEDURE — 2060000000 HC ICU INTERMEDIATE R&B

## 2020-07-24 PROCEDURE — 97161 PT EVAL LOW COMPLEX 20 MIN: CPT

## 2020-07-24 PROCEDURE — 6370000000 HC RX 637 (ALT 250 FOR IP): Performed by: NURSE PRACTITIONER

## 2020-07-24 PROCEDURE — 36415 COLL VENOUS BLD VENIPUNCTURE: CPT

## 2020-07-24 PROCEDURE — 95816 EEG AWAKE AND DROWSY: CPT

## 2020-07-24 PROCEDURE — 93005 ELECTROCARDIOGRAM TRACING: CPT | Performed by: NURSE PRACTITIONER

## 2020-07-24 PROCEDURE — 95816 EEG AWAKE AND DROWSY: CPT | Performed by: PSYCHIATRY & NEUROLOGY

## 2020-07-24 PROCEDURE — 87040 BLOOD CULTURE FOR BACTERIA: CPT

## 2020-07-24 PROCEDURE — 97110 THERAPEUTIC EXERCISES: CPT

## 2020-07-24 PROCEDURE — 2580000003 HC RX 258: Performed by: INTERNAL MEDICINE

## 2020-07-24 PROCEDURE — 6360000002 HC RX W HCPCS: Performed by: INTERNAL MEDICINE

## 2020-07-24 RX ORDER — 0.9 % SODIUM CHLORIDE 0.9 %
500 INTRAVENOUS SOLUTION INTRAVENOUS ONCE
Status: COMPLETED | OUTPATIENT
Start: 2020-07-24 | End: 2020-07-24

## 2020-07-24 RX ORDER — MIDODRINE HYDROCHLORIDE 5 MG/1
10 TABLET ORAL
Status: DISCONTINUED | OUTPATIENT
Start: 2020-07-24 | End: 2020-07-29 | Stop reason: HOSPADM

## 2020-07-24 RX ORDER — ALBUMIN, HUMAN INJ 5% 5 %
25 SOLUTION INTRAVENOUS ONCE
Status: COMPLETED | OUTPATIENT
Start: 2020-07-24 | End: 2020-07-25

## 2020-07-24 RX ORDER — VANCOMYCIN HYDROCHLORIDE 1 G/200ML
1000 INJECTION, SOLUTION INTRAVENOUS EVERY 24 HOURS
Status: DISCONTINUED | OUTPATIENT
Start: 2020-07-24 | End: 2020-07-24

## 2020-07-24 RX ORDER — VANCOMYCIN HYDROCHLORIDE 1 G/200ML
1000 INJECTION, SOLUTION INTRAVENOUS ONCE
Status: COMPLETED | OUTPATIENT
Start: 2020-07-24 | End: 2020-07-24

## 2020-07-24 RX ADMIN — VANCOMYCIN HYDROCHLORIDE 1000 MG: 1 INJECTION, SOLUTION INTRAVENOUS at 15:08

## 2020-07-24 RX ADMIN — Medication 1 CAPSULE: at 09:25

## 2020-07-24 RX ADMIN — AMIODARONE HYDROCHLORIDE 200 MG: 200 TABLET ORAL at 09:25

## 2020-07-24 RX ADMIN — ALBUMIN (HUMAN) 25 G: 12.5 INJECTION, SOLUTION INTRAVENOUS at 21:33

## 2020-07-24 RX ADMIN — ASPIRIN 81 MG: 81 TABLET, CHEWABLE ORAL at 09:25

## 2020-07-24 RX ADMIN — APIXABAN 2.5 MG: 2.5 TABLET, FILM COATED ORAL at 09:25

## 2020-07-24 RX ADMIN — SODIUM CHLORIDE 500 ML: 0.9 INJECTION, SOLUTION INTRAVENOUS at 16:21

## 2020-07-24 RX ADMIN — MIDODRINE HYDROCHLORIDE 5 MG: 5 TABLET ORAL at 12:16

## 2020-07-24 RX ADMIN — SODIUM CHLORIDE 500 ML: 0.9 INJECTION, SOLUTION INTRAVENOUS at 13:54

## 2020-07-24 RX ADMIN — MIDODRINE HYDROCHLORIDE 5 MG: 5 TABLET ORAL at 09:25

## 2020-07-24 RX ADMIN — MIDODRINE HYDROCHLORIDE 10 MG: 5 TABLET ORAL at 17:26

## 2020-07-24 RX ADMIN — LACTULOSE 10 G: 10 SOLUTION ORAL at 09:25

## 2020-07-24 RX ADMIN — POTASSIUM CHLORIDE 30 MEQ: 10 CAPSULE, COATED, EXTENDED RELEASE ORAL at 09:25

## 2020-07-24 RX ADMIN — MULTIPLE VITAMINS W/ MINERALS TAB 1 TABLET: TAB at 09:25

## 2020-07-24 ASSESSMENT — ENCOUNTER SYMPTOMS
DIARRHEA: 0
RHINORRHEA: 0
EYE REDNESS: 0
CHOKING: 0
VOICE CHANGE: 0
ABDOMINAL PAIN: 0
NAUSEA: 0
VOMITING: 0
WHEEZING: 0
SHORTNESS OF BREATH: 0
BLOOD IN STOOL: 0
RECTAL PAIN: 0
STRIDOR: 0
APNEA: 0
ABDOMINAL DISTENTION: 0
EYE PAIN: 0
SINUS PRESSURE: 0
SORE THROAT: 0
COLOR CHANGE: 0
PHOTOPHOBIA: 0
COUGH: 0
CHEST TIGHTNESS: 0

## 2020-07-24 NOTE — PROGRESS NOTES
Physical Therapy    Facility/Department: [facility] 4B STEPDOWN  Initial Assessment    NAME: Reginald Lopez  : 1930  MRN: [id number]    Date of Service: 2020  Reginald John Cerebrovascular Accident       per ems ROBERTO Feng 115 is 0500, Lt sided sided facial droop and grugled speech. all symptoms resolved when EMS arrived. Hx of strokes      Discharge Recommendations:  Continue to assess  PT Equipment Recommendations  Equipment Needed: No    Assessment    Pt alert, pleasantly confused, intermittently resistive to ROM, but able to be persuaded to participate. Unable to assess mobility d/t sacral/ischial/coccygeal decubiti. Body structures, Functions, Activity limitations: Decreased functional mobility ; Decreased safe awareness;Decreased cognition;Decreased endurance  Prognosis: Guarded  Decision Making: Low Complexity  PT Education: Goals;PT Role;Plan of Care  Barriers to Learning: cognition  REQUIRES PT FOLLOW UP: Yes  Activity Tolerance  Activity Tolerance: Patient Tolerated treatment well       Patient Diagnosis(es): The primary encounter diagnosis was Acute cystitis with hematuria. Diagnoses of TIA (transient ischemic attack) and Elevated troponin were also pertinent to this visit. has a past medical history of Arthritis, Atrial fibrillation ([address] CHF (congestive heart failure) ([address] Complete heart block ([address] Constipation, CVA (cerebral vascular accident) ([address] GERD (gastroesophageal reflux disease), Glaucoma, Hearing loss, Hiatal hernia, Hx of blood clots, Hyperlipidemia, MRSA (methicillin resistant Staphylococcus aureus) infection, Osteopenia, Pacemaker, Restless leg syndrome, Right knee DJD, Wears glasses, and Wears hearing aid in both ears. has a past surgical history that includes Tonsillectomy and adenoidectomy; Colonoscopy; Pacemaker insertion (2016); Upper gastrointestinal endoscopy (2017); Cataract removal with implant (Bilateral);  Total knee arthroplasty (Right, 2017); pr gi endoscopic ultrasound (N/A, 9/12/2018); Upper gastrointestinal endoscopy (9/12/2018); Dilation and curettage of uterus (N/A, 3/12/2019); transesophageal echocardiogram (N/A, 10/9/2019); Cardiac pacemaker placement; transesophageal echocardiogram (12/16/2019); and Cardiac pacemaker placement (12/16/2019).     Restrictions  Restrictions/Precautions  Restrictions/Precautions: Weight Bearing, General Precautions, Fall Risk, Isolation(pt has sacral and ischial wounds; per wound care note, pt is to stay off her backside)  Required Braces or Orthoses?: No  Upper Extremity Weight Bearing Restrictions  Other: pt is to stay off her backside d/t decubitus ulcers  Vision/Hearing  Vision: (KIRK)  Hearing: Exceptions to [facility]  Hearing Exceptions: Hard of hearing/hearing concerns;Bilateral hearing aid     Subjective  General  Patient assessed for rehabilitation services?: Yes  Response To Previous Treatment: Not applicable  Family / Caregiver Present: No  Follows Commands: Impaired  Other (Comment): pt inconsistently follows commands with tactile cues for ROM--did not follow directions for MMT  Pain Screening  Patient Currently in Pain: (KIRK--pt not answering questions; no indication of pain)  Vital Signs  Patient Currently in Pain: (KIRK--pt not answering questions; no indication of pain)  Pre Treatment Pain Screening  Intervention List: Patient able to continue with treatment    Orientation  Orientation  Overall Orientation Status: Impaired  Orientation Level: Disoriented X4  Social/Functional History  Social/Functional History  Lives With: Other (comment)  Type of Home: Facility  Home Layout: One level  Home Access: Level entry, Elevator  Bathroom Shower/Tub: Tub/Shower unit  Bathroom Toilet: Standard  Bathroom Accessibility: Wheelchair accessible  Home Equipment: Letsgofordinner  ADL Assistance: Needs assistance  Ambulation Assistance: Needs assistance  Transfer Assistance: Needs assistance  Active : No  Objective  PROM RLE (degrees)  RLE PROM: WFL  PROM LLE (degrees)  LLE PROM: Exceptions--WFL except knee flexion to 90 degrees  PROM RUE (degrees)  RUE PROM: WFL  PROM LUE (degrees)  LUE PROM : WFL except L shoulder elevation to ~90 degrees   Strength   Unable to formally assess--pt moves BUEs antigravity and purposefully; resists LE movement  Tone RLE  RLE Tone: Normotonic  Tone LLE  LLE Tone: Normotonic  Sensation  Overall Sensation Status: (KIRK)  Bed mobility  Rolling to Left: Dependent/Total  Supine to Sit: Unable to assess(pt has ischial and sacral pressure wounds--she's to avoid bearing wt)  Sit to Supine: Unable to assess  Scooting: Dependent/Total  Comment: pt positioned on her L side--she was lying on her back upon PT arrival  Transfers  Sit to Stand: Unable to assess  Stand to sit: Unable to assess  Bed to Chair: Unable to assess  Stand Pivot Transfers: Unable to assess  Ambulation  Ambulation?: No  Stairs/Curb  Stairs?: No        Other exercises  Other exercises?: Yes  Other exercises 1: AAROM x 4, 10 reps, all planes; pt occasionally resistive, but able to be redirected     Plan   Plan  Times per week: 2-3 visits weekly  Times per day: Daily  Current Treatment Recommendations: Strengthening, ROM, Functional Mobility Training  Safety Devices  Type of devices: Bed alarm in place, Call light within reach, Patient at risk for falls, Left in bed, Telesitter in use, Nurse notified  Restraints  Initially in place: No    AM-PAC Score     AM-PAC Inpatient Mobility without Stair Climbing Raw Score : 6 (07/24/20 0912)  AM-PAC Inpatient without Stair Climbing T-Scale Score : 26.48 (07/24/20 0912)  Mobility Inpatient CMS 0-100% Score: 92.18 (07/24/20 0912)  Mobility Inpatient without Stair CMS G-Code Modifier : CM (07/24/20 0912)       Goals  Short term goals  Time Frame for Short term goals: 8 visits  Short term goal 1: prevent contractures x 4  Short term goal 2: facilitate active movement x 4  Short term goal 3: mobilize pt when

## 2020-07-24 NOTE — PROGRESS NOTES
Order in to give bolus for low blood pressures. BPs not improving. Writer paged cardiology and was instructed to page primary. Primary paged. New bolus ordered and given. Vitals cycling every 5 minutes to monitor pressures. Will continue to monitor.     Electronically signed by Buckner Leventhal, RN on 7/24/2020 at 4:33 PM

## 2020-07-24 NOTE — CONSULTS
adenoidectomy; Colonoscopy; Pacemaker insertion (08/18/2016); Upper gastrointestinal endoscopy (06/29/2017); Cataract removal with implant (Bilateral); Total knee arthroplasty (Right, 9/5/2017); pr gi endoscopic ultrasound (N/A, 9/12/2018); Upper gastrointestinal endoscopy (9/12/2018); Dilation and curettage of uterus (N/A, 3/12/2019); transesophageal echocardiogram (N/A, 10/9/2019); Cardiac pacemaker placement; transesophageal echocardiogram (12/16/2019); and Cardiac pacemaker placement (12/16/2019). Home Medications:    Prior to Admission medications    Medication Sig Start Date End Date Taking? Authorizing Provider   Lactobacillus (ACIDOPHILUS) TABS Take by mouth 2 times daily    Historical Provider, MD   Nutritional Supplements (BOOST PLUS PO) Take 237 mLs by mouth 3 times daily    Historical Provider, MD   diphenhydrAMINE (BENADRYL) 50 MG capsule Take 50 mg by mouth every 4 hours as needed for Itching    Historical Provider, MD   ipratropium-albuterol (DUONEB) 0.5-2.5 (3) MG/3ML SOLN nebulizer solution Take 1 vial by nebulization every 4 hours as needed for Shortness of Breath    Historical Provider, MD   Multiple Vitamins-Minerals (THERAPEUTIC MULTIVITAMIN-MINERALS) tablet Take 1 tablet by mouth daily    Historical Provider, MD   PROTEIN PO Take 30 mLs by mouth 3 times daily    Historical Provider, MD   traMADol (ULTRAM) 50 MG tablet Take 50 mg by mouth 2 times daily.     Historical Provider, MD   apixaban (ELIQUIS) 2.5 MG TABS tablet Take 2.5 mg by mouth 2 times daily    Historical Provider, MD   Calcium Carbonate-Vitamin D (CALCIUM 500 + D PO) Take 1 tablet by mouth daily CALCIUM 500MG+VIT D 200IU    Historical Provider, MD   lisinopril (PRINIVIL;ZESTRIL) 2.5 MG tablet Take 2.5 mg by mouth nightly    Historical Provider, MD   Magnesium Oxide (MAG- PO) Take 400 mg by mouth 2 times daily    Historical Provider, MD   lactulose (CHRONULAC) 10 GM/15ML solution Take 10 g by mouth 2 times daily Historical Provider, MD   potassium chloride (MICRO-K) 10 MEQ extended release capsule Take 30 mEq by mouth daily     Historical Provider, MD   furosemide (LASIX) 20 MG tablet Take 1 tablet by mouth 2 times daily  Patient taking differently: Take 20 mg by mouth daily  10/18/19   Lorena Torres MD   aspirin 81 MG chewable tablet Take 1 tablet by mouth daily 10/19/19   David Sam MD   guaiFENesin (MUCINEX) 600 MG extended release tablet Take 1 tablet by mouth 2 times daily 4/19/19   Nayeli Barrios MD   amiodarone (CORDARONE) 200 MG tablet Take 200 mg by mouth daily    Historical Provider, MD   polyethylene glycol (MIRALAX) powder Take 17 g by mouth daily as needed    Historical Provider, MD   metoprolol tartrate (LOPRESSOR) 25 MG tablet Take 1 tablet by mouth 2 times daily 9/17/16   Oleksandr Desir DO   rOPINIRole (REQUIP) 0.25 MG tablet Take 0.25 mg by mouth nightly  5/9/16   Historical Provider, MD   atorvastatin (LIPITOR) 10 MG tablet Take 10 mg by mouth daily  10/2/13   Historical Provider, MD   latanoprost (XALATAN) 0.005 % ophthalmic solution Place 1 drop into both eyes nightly  11/14/13   Historical Provider, MD   Acetaminophen (TYLENOL ARTHRITIS PAIN PO) Take 2 tablets by mouth every 6 hours as needed     Historical Provider, MD      Current Facility-Administered Medications: amiodarone (CORDARONE) tablet 200 mg, 200 mg, Oral, Daily  apixaban (ELIQUIS) tablet 2.5 mg, 2.5 mg, Oral, BID  aspirin chewable tablet 81 mg, 81 mg, Oral, Daily  ipratropium-albuterol (DUONEB) nebulizer solution 3 mL, 1 vial, Nebulization, Q4H PRN  lactulose (CHRONULAC) 10 GM/15ML solution 10 g, 10 g, Oral, BID  latanoprost (XALATAN) 0.005 % ophthalmic solution 1 drop, 1 drop, Both Eyes, Nightly  therapeutic multivitamin-minerals 1 tablet, 1 tablet, Oral, Daily  potassium chloride (MICRO-K) extended release capsule 30 mEq, 30 mEq, Oral, Daily  rOPINIRole (REQUIP) tablet 0.25 mg, 0.25 mg, Oral, Nightly  acetaminophen coordination, mood, affect, memory, mentation, behavior. · Psychiatric: No anxiety, or depression. · Endocrine: No temperature intolerance. No excessive thirst, fluid intake, or urination. No tremor. · Hematologic/Lymphatic: No abnormal bruising or bleeding, blood clots or swollen lymph nodes. · Allergic/Immunologic: No nasal congestion or hives. PHYSICAL EXAM:      BP 95/60   Pulse 60   Temp 97.2 °F (36.2 °C) (Axillary)   Resp 20   Ht 5' 3\" (1.6 m)   Wt 108 lb 11 oz (49.3 kg)   SpO2 100%   BMI 19.25 kg/m²    Constitutional and General Appearance: alert, cooperative, no distress and appears stated age  HEENT: PERRL, no cervical lymphadenopathy. No masses palpable. Normal oral mucosa  Respiratory:  · Normal excursion and expansion without use of accessory muscles  · Resp Auscultation: Good respiratory effort. No for increased work of breathing. On auscultation: clear to auscultation bilaterally  Cardiovascular:  · The apical impulse is not displaced  · Heart tones are crisp and normal. regular S1 and S2.  · Jugular venous pulsation Normal  · The carotid upstroke is normal in amplitude and contour without delay or bruit  · Peripheral pulses are symmetrical and full   Abdomen:   · No masses or tenderness  · Bowel sounds present  Extremities:  ·  No Cyanosis or Clubbing  ·  Lower extremity edema: No  ·  Skin: Warm and dry  Neurological:  · Alert and oriented. · Moves all extremities well  · No abnormalities of mood, affect, memory, mentation, or behavior are noted    Labs:     CBC:   Recent Labs     07/23/20  0848 07/24/20  0613   WBC 5.1 6.6   HGB 8.8* 9.3*   HCT 29.4* 29.4*    292     BMP:   Recent Labs     07/23/20  0848 07/24/20  0613    142   K 4.9 5.1   CO2 24 20   BUN 37* 25*   CREATININE 1.40* 0.97*   LABGLOM 35* 54*   GLUCOSE 84 67*     BNP: No results for input(s): BNP in the last 72 hours.   PT/INR:   Recent Labs     07/23/20  0848   PROTIME 11.3   INR 1.1     APTT:  Recent Labs 07/23/20  0848   APTT 26.3     CARDIAC ENZYMES:  Recent Labs     07/23/20  0848   CKTOTAL 69   CKMB 5.5*     FASTING LIPID PANEL:  Lab Results   Component Value Date    HDL 52 07/23/2020    TRIG 78 07/23/2020     LIVER PROFILE:  Recent Labs     07/24/20  0613   AST 35*   ALT 27   LABALBU 2.2*       DATA:    Diagnostics:    EKG: Complete heart block with V paced rhythm. PPM 12/16/19: Medtronic device placed by Dr. Roz Frost.      PPM EXTRACTION 11/14/19: Done by Dr. Roz Frost due to infection.      SOCRATES 11/11/19: EF 55%, anterior leaflet vegetation noted. PPM lead seems to have echogenic density likely vegetation.      ECHO 5/24/19: EF 50%, inferior wall hypokinesia, reduced LV diastolic compliance, mild AI/MR, moderate TR.      STRESS 12/27/17: No ischemia/infarct. EF 74%.      PERICARDIOCENTESIS 9/8/16: 450 mls of blood fluid drained. Severe Mitral Stenosis with Mean Gradient 11, mod TR, mild MR, mild AS, Trivial AI- Echo 9/28/19      IMPRESSION:    1.  New onset left-sided facial droop and dysarthria with negative CT, likely TIA. Patient has a history of CVA in the past.  CT on hold as per nephrology. 2.  New onset troponin elevation which have been downtrending 155-121-10 6/99. No acute EKG needs. Likely secondary to demand ischemia worsened with MIKE. 3. PAF on amiodarone Eliquis CHADsVASc of 7.  4. HTN/HLD  5. 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 Likely source was right arm IV phlebitis and abscess secondary to fall and fracture. Complete heart block with PPM implantation and infection. PPM extraction in 2019 November by Dr. Roz Frost. 6.  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 RECOMMENDATIONS:  1. No acute intervention as per cardiology at this time due to down trending trops and no chest pain/ Follow Echo to rule out new onset cardiac dysfunction. PPM eval.  2. Recommend ID consult for history of VRE UTI.     Please call us with status changes. Discussed with patient and Nurse. Miko Russo MD  [id number]         7/24/2020, 8:07 AM      Attending Physician Statement  I have discussed the care of Wilder Le, including pertinent history and exam findings,  with the cardiology fellow/resident. I have seen and examined the patient and the key elements of all parts of the encounter have been performed by me.          Susana Castillo MD, [facility]

## 2020-07-24 NOTE — PROGRESS NOTES
[address] [place] [facility]ist Progress Note-Internal Medicine. [facility] 4B Stepdown       Patient: Sury Marquez    Unit/Bed: 1532/8821-49    YOB: 1930    MRN: [id number]    Acct: [de-identified]     Admitting Diagnosis:TIA (transient ischemic attack) [G45.9]    Admit Date:  7/23/2020    Hospital Day: 1    Subjective:    Patient sent to the hospital from extended care facility because of weakness and suspected stroke symptoms. CT head was negative for CVA. Confusion likely due to untreated Proteus ESBL and MRSA UTI . Episodes of hypotension today because of infection. Patient Seen, Chart, Labs, Radiology studies, and Consults reviewed. Objective:   /84   Pulse 60   Temp 97.8 °F (36.6 °C) (Axillary)   Resp 13   Ht 5' 3\" (1.6 m)   Wt 108 lb 11 oz (49.3 kg)   SpO2 100%   BMI 19.25 kg/m²       Intake/Output Summary (Last 24 hours) at 7/24/2020 1141  Last data filed at 7/24/2020 7180  Gross per 24 hour   Intake 1157 ml   Output 600 ml   Net 557 ml       Review of Systems   Constitutional: Positive for activity change. Negative for chills, diaphoresis, fatigue, fever and unexpected weight change. HENT: Negative for congestion, drooling, ear discharge, ear pain, hearing loss, nosebleeds, postnasal drip, rhinorrhea, sinus pressure, sneezing, sore throat, tinnitus and voice change. Eyes: Negative for photophobia, pain, redness and visual disturbance. Respiratory: Negative for apnea, cough, choking, chest tightness, shortness of breath, wheezing and stridor. Cardiovascular: Negative for chest pain, palpitations and leg swelling. Gastrointestinal: Negative for abdominal distention, abdominal pain, blood in stool, diarrhea, nausea, rectal pain and vomiting.    Genitourinary: Negative for decreased urine volume, difficulty urinating, dyspareunia, dysuria, enuresis, frequency, hematuria, menstrual problem, pelvic pain, urgency, vaginal discharge and vaginal pain. Musculoskeletal: Negative for arthralgias, gait problem, myalgias and neck pain. Skin: Negative for color change, pallor, rash and wound. Neurological: Positive for weakness. Negative for dizziness, tremors, seizures, syncope, facial asymmetry, speech difficulty, light-headedness, numbness and headaches. Hematological: Negative for adenopathy. Does not bruise/bleed easily. Psychiatric/Behavioral: Positive for confusion. Negative for agitation, behavioral problems, decreased concentration, dysphoric mood, hallucinations, self-injury, sleep disturbance and suicidal ideas. The patient is not nervous/anxious and is not hyperactive. Physical Exam  Vitals signs and nursing note reviewed. Constitutional:       General: She is not in acute distress. Appearance: Normal appearance. She is normal weight. She is not ill-appearing, toxic-appearing or diaphoretic. HENT:      Head: Normocephalic and atraumatic. Nose: Nose normal. No congestion or rhinorrhea. Mouth/Throat:      Mouth: Mucous membranes are moist.      Pharynx: Oropharynx is clear. No oropharyngeal exudate or posterior oropharyngeal erythema. Eyes:      General: No scleral icterus. Right eye: No discharge. Left eye: No discharge. Conjunctiva/sclera: Conjunctivae normal.      Pupils: Pupils are equal, round, and reactive to light. Neck:      Musculoskeletal: Normal range of motion and neck supple. No neck rigidity or muscular tenderness. Vascular: No carotid bruit. Cardiovascular:      Rate and Rhythm: Normal rate and regular rhythm. Pulses: Normal pulses. Heart sounds: Normal heart sounds. No murmur. No friction rub. No gallop. Pulmonary:      Effort: Pulmonary effort is normal. No respiratory distress. Breath sounds: Normal breath sounds. No stridor. No wheezing, rhonchi or rales. Chest:      Chest wall: No tenderness.    Abdominal: General: Abdomen is flat. Bowel sounds are normal. There is no distension. Palpations: Abdomen is soft. There is no mass. Tenderness: There is no right CVA tenderness, left CVA tenderness, guarding or rebound. Hernia: No hernia is present. Genitourinary:     Comments: External Martin catheter  Musculoskeletal: Normal range of motion. General: No swelling, tenderness, deformity or signs of injury. Right lower leg: No edema. Left lower leg: No edema. Lymphadenopathy:      Cervical: No cervical adenopathy. Skin:     General: Skin is warm. Coloration: Skin is pale. Skin is not jaundiced. Findings: No bruising, erythema, lesion or rash. Neurological:      Mental Status: She is alert. She is disoriented. Cranial Nerves: No cranial nerve deficit. Sensory: No sensory deficit. Motor: No weakness.       Coordination: Coordination normal.   Psychiatric:         Mood and Affect: Mood normal.       Medications:    amiodarone  200 mg Oral Daily    apixaban  2.5 mg Oral BID    aspirin  81 mg Oral Daily    lactulose  10 g Oral BID    latanoprost  1 drop Both Eyes Nightly    therapeutic multivitamin-minerals  1 tablet Oral Daily    potassium chloride  30 mEq Oral Daily    rOPINIRole  0.25 mg Oral Nightly    atorvastatin  40 mg Oral Nightly    cefTRIAXone (ROCEPHIN) IV  1 g Intravenous Q24H    midodrine  5 mg Oral TID WC    lactobacillus  1 capsule Oral Daily with breakfast       Continuous Infusions:   sodium chloride 75 mL/hr at 07/23/20 1512       PRN Meds:ipratropium-albuterol, acetaminophen **OR** acetaminophen, acetaminophen **OR** acetaminophen, promethazine **OR** ondansetron, nicotine, nitroGLYCERIN    Data:    CBC:   Recent Labs     07/23/20  0848 07/24/20  0613   WBC 5.1 6.6   RBC 3.10* 3.14*   HGB 8.8* 9.3*   HCT 29.4* 29.4*   MCV 94.8 93.6   RDW 18.7* 18.9*    292       BMP:   Recent Labs     07/23/20  0836 07/23/20  0848 07/24/20  5363 NA  --  140 142   K  --  4.9 5.1   CL  --  107 111*   CO2  --  24 20   BUN  --  37* 25*   CREATININE 1.62* 1.40* 0.97*       PT/INR:  Recent Labs     07/23/20  0848   PROTIME 11.3   INR 1.1       APTT:   Recent Labs     07/23/20  0848   APTT 26.3       FASTING LIPID PANEL:  Lab Results   Component Value Date    CHOL 102 07/23/2020    HDL 52 07/23/2020    TRIG 78 07/23/2020     Results for TYOA VU (MRN [id number]) as of 7/24/2020 11:41   Ref. Range 7/23/2020 08:48 7/23/2020 11:06 7/23/2020 15:06 7/23/2020 17:21   Troponin, High Sensitivity Latest Ref Range: 0 - 14 ng/L 155 (HH) 121 (HH) 106 (HH) 99 (HH)     Results for Dewey VU (MRN [id number]) as of 7/24/2020 11:41   Ref. Range 7/23/2020 15:06   Ferritin Latest Ref Range: 13 - 150 ug/L 275 (H)   Iron Latest Ref Range: 37 - 145 ug/dL 43   Iron Saturation Latest Ref Range: 20 - 55 % 25   UIBC Latest Ref Range: 112 - 347 ug/dL 128   TIBC Latest Ref Range: 250 - 450 ug/dL 171 (L)   Folate Latest Ref Range: >4.8 ng/mL 9.3   Vitamin B-12 Latest Ref Range: 232 - 1245 pg/mL 927       ABG. None. URINALYSIS. Results for Dewey VU (MRN [id number]) as of 7/24/2020 11:41   Ref.  Range 7/23/2020 09:05   Color, UA Latest Ref Range: YELLOW  DARK YELLOW (A)   Turbidity UA Latest Ref Range: CLEAR  TURBID (A)   Glucose, UA Latest Ref Range: NEGATIVE  NEGATIVE   Bilirubin, Urine Latest Ref Range: NEGATIVE  NEGATIVE   Ketones, Urine Latest Ref Range: NEGATIVE  NEGATIVE   Specific Gravity, UA Latest Ref Range: 1.005 - 1.030  1.026   pH, UA Latest Ref Range: 5.0 - 8.0  5.5   Protein, UA Latest Ref Range: NEGATIVE  2+ (A)   Urobilinogen, Urine Latest Ref Range: Normal  Normal   Nitrite, Urine Latest Ref Range: NEGATIVE  NEGATIVE   Leukocyte Esterase, Urine Latest Ref Range: NEGATIVE  LARGE (A)   Casts UA Latest Ref Range: 0 - 2 /LPF NOT REPORTED   Mucus, UA Latest Ref Range: None  NOT REPORTED   WBC, UA Latest Ref Range: 0 - 5 /HPF TOO NUMEROUS TO COUNT   RBC, UA Latest Ref Range: 0 - 2 /HPF TOO NUMEROUS TO COUNT   Epithelial Cells, UA Latest Ref Range: 0 - 5 /HPF 5 TO 10   Renal Epithelial, UA Latest Ref Range: 0 /HPF NOT REPORTED   Bacteria, UA Latest Ref Range: None  FEW (A)   Amorphous, UA Latest Ref Range: None  NOT REPORTED   Yeast, Urine Latest Ref Range: None  NOT REPORTED   Crystals, UA Latest Ref Range: None /HPF NOT REPORTED   Urine Hgb Latest Ref Range: NEGATIVE  LARGE (A)   Trichomonas, UA Latest Ref Range: None  NOT REPORTED   Other Observations UA Latest Ref Range: NOT REQ.  NOT REPORTED       SEROLOGY  Results for Jan VU (MRN [id number]) as of 7/24/2020 11:41   Ref. Range 10/7/2019 16:36   Hep A IgM Latest Ref Range: NONREACTIVE  NONREACTIVE   Hepatitis B Surface Ag Latest Ref Range: NONREACTIVE  NONREACTIVE   Hepatitis C Ab Latest Ref Range: NONREACTIVE  NONREACTIVE   Hep B Core Ab, IgM Latest Ref Range: NONREACTIVE  NONREACTIVE     TUMOR MARKER. None. MICROBIOLOGY   Urine culture-07/21/2020. PROTEUS SPECIES 10 to 50,000 CFU/ML. METHICILLIN RESISTANT STAPHYLOCOCCUS AUREUS >760217 CFU/ML     Urine culture-07/02/2020. PROTEUS MIRABILIS >142100 CFU/ML . THIS ORGANISM IS AN EXTENDED-SPECTRUM BETA-LACTAMASE  AND RESISTANCE TO THERAPY WITH PENICILLINS, CEPHALOSPORINS AND AZTREONAM  IS EXPECTED.      THESE ORGANISMS GENERALLY REMAIN SUSCEPTIBLE TO CARBAPENEMS.      CONSIDER ID CONSULTATION. HISTOPATHOLOGY. None. TOXICOLOGY. None. ENDOSCOPE STUDIES. None. PROCEDURES. None. RADIOLOGY. Chest X-ray -07/23/ 2020. FINDINGS:  A right pacemaker is present. The mediastinal and cardiac contours are stable. The lungs are hyperinflated. There is no focal consolidation, pleural effusion or pneumothorax.     IMPRESSION:  1. No acute cardiopulmonary process identified. 2. COPD. CT head without contrast -07/23/2020. FINDINGS:  BRAIN/VENTRICLES:   There is no acute infarct or acute intracranial hemorrhage present.       There is no mass effect or midline shift present. There is diffuse cerebral volume loss. Periventricular hypoattenuation is present. There is no abnormal extra-axial fluid collection.     ORBITS:   Limited evaluation of the orbits is unremarkable.     SINUSES:   The paranasal sinuses and mastoid air cells are clear.     SOFT TISSUES/SKULL:    No lytic or blastic osseous lesions are identified.     IMPRESSION:  1. No acute intracranial process identified. 2. Stable diffuse cerebral volume loss and moderate chronic small vessel      ischemic changes. The findings were sent to the Radiology Results [address] at 8:07     am on 7/23/2020to be communicated to a licensed caregiver. Renal ultrasound-07/23/2020. FINDINGS:    Kidneys:   The right kidney measures 9.5 cm in length and the left kidney measures 8.9  cm in length.     Kidneys demonstrate normal cortical echogenicity. No hydronephrosis or intrarenal stones. Incidental note is made of a dominant cyst in the upper/mid left kidney. The pedunculated cyst measures 4.9 x 5.7 x 5.2 cm in dimension. No other focal lesions.     Bladder:   Limited views of the urinary bladder are within normal limits. No postvoid images could be acquired as the patient could not void during the exam.     IMPRESSION:  Unremarkable ultrasound of the kidneys aside from a solitary 5 cm left renal cyst.     Unremarkable limited views of the urinary bladder. CT urogram -07/20/2020. FINDINGS:  Image quality degraded by motion artifact.     Lower Chest:   Dependent atelectasis at the lung bases. Severe mitral annular calcification. Coronary artery calcification. Pacemaker leads.     Kidneys and Urinary Tract:   1.1 cm stone in the left renal pelvis. Additional tiny left renal stones. A left renal cyst measures up to 6 cm and there are additional tiny bilateral   renal cysts.       Tiny hyperdense foci in both kidneys may represent hemorrhagic cysts. Symmetric enhancement excretion of the kidneys without hydronephrosis. On noncontrast images, there is mild layering hyperdensity within the left posterior   bladder; no abnormal enhancement is seen on post-contrast images.     Organs:   No calcified gallstones. Stable mild biliary ductal dilatation. Subcentimeter hepatic cysts. Pancreas is atrophic. Pancreas is unremarkable. Stable adrenal thickening.     GI/Bowel:   Stomach and small bowel are unremarkable. Normal appendix. Moderate to large amount of stool throughout the colon.     Pelvis:   Uterus is unremarkable. No lymphadenopathy or free fluid.     Peritoneum/Retroperitoneum:   No lymphadenopathy or ascites. Severe atherosclerotic disease without abdominal aortic aneurysm.     Bones/Soft Tissues:   Marked osteopenia. Status post kyphoplasty at L1. Compression deformity of L2 is new from earlier this year and there is  approximately 30% vertebral body height loss without retropulsion into the  spinal canal.      Unchanged compression deformity of the superior endplate of L3.     IMPRESSION:  1. Layering hyperdensity within the urinary bladder could represent debris or       mild hemorrhage. No abnormal enhancement seen. 2. No suspicious renal lesion. Bilateral renal cysts measure up to 6 cm. 3. A 1.1 cm stone within the left renal pelvis with additional tiny left renal stones. No obstructive uropathy. 4. Compression deformity of L2 with 30% vertebral body height loss is new from January. Stable L3 compression fracture. Echocardiogram-12/16/2019. CONCLUSION:   The study was performed by the attending, fellow, and the sonographer. SOCRATES was performed without complications. Estimated EF 55%. No thrombus or valvular vegetation identified. Severely calcified mitral leaflets. Moderate to sever MR.     Aortic valve calcification with restriction of motion, probable moderate stenosis. Gradients not done. MRI C-spine-11/12/2019. FINDINGS:    BONES/ALIGNMENT:   The vertebral body heights are maintained. There is age-appropriate bone marrow signal.      There is multilevel degenerative disc disease with loss of disc signal.      There is no significant disc space narrowing. There is minimal degenerative anterolisthesis of T1 on T2.     SPINAL CORD:   The spinal cord is normal in caliber and signal.      The visualized intracranial structures are unremarkable.     SOFT TISSUES:   The posterior paraspinal soft tissues are unremarkable. The prevertebral soft tissues are unremarkable.     C2-C3:   There is no significant disc protrusion, spinal canal stenosis or neural foraminal   narrowing.     C3-C4:   There is a mild disc osteophyte complex with uncovertebral and facet hypertrophy. There is no canal stenosis or left foraminal narrowing. There is mild right foraminal narrowing.     C4-C5:   There is a disc osteophyte complex with uncovertebral and facet hypertrophy. There is no canal stenosis or foraminal narrowing.     C5-C6:   There is a disc osteophyte complex with uncovertebral and facet hypertrophy. There is canal stenosis measuring 9 mm in AP dimension. There is mild right and moderate left foraminal narrowing.     C6-C7:   There is a disc osteophyte complex with uncovertebral and facet hypertrophy. There is no canal stenosis or significant foraminal narrowing.     C7-T1:   There is no significant disc protrusion, spinal canal stenosis or neural foraminal narrowing.     IMPRESSION:  Multilevel degenerative disc disease with associated uncovertebral and facet  hypertrophy with mild canal stenosis at C5-6.     Mild right foraminal narrowing at C3-4 as well as mild right and moderate  left foraminal narrowing at C5-6.     MRI lumbar spine without contrast-11/12/2019. FINDINGS:  BONES/ALIGNMENT:   Remote L1 compression fracture with prior vertebroplasty. Mild acute L3 compression fracture with less than 50% loss of height. No retropulsion of fracture fragments. No subluxation. No suspicious bone marrow replacing lesion.     SPINAL CORD:    Normal signal within the conus which terminates at L1-L2.     SOFT TISSUES:   Left renal cyst.  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 Mild increase postcontrast enhancement within the L3 compression fracture.     L1-L2:   No significant central canal stenosis. Mild right foraminal stenosis. No left foraminal stenosis.     L2-L3:   Facet degenerative changes. No significant central canal stenosis. No left foraminal stenosis. Moderate right foraminal stenosis.     L3-L4:   Minimal broad-based disc bulge. Facet degenerative changes. Findings combine to create minimal central canal stenosis, moderate right foraminal  stenosis, and mild left foraminal stenosis.     L4-L5:   Broad-based disc bulge and facet degenerative changes combine to  create mild central canal stenosis. Changes combine to create mild right foraminal stenosis and moderate-severe left foraminal stenosis.     L5-S1:   Broad-based disc bulge and facet degenerative changes do not cause  significant central canal stenosis. Changes combine to create moderate bilateral foraminal stenosis.     IMPRESSION:  Mild acute L3 compression fracture.     Multilevel degenerative changes. Brain MRI without contrast-11/11/2019. IMPRESSION:  Scattered foci of acute/subacute ischemia most pronounced in the right  frontal lobe as described above. This is likely related to an embolic phenomenon. ASSESSMENT AND  PLAN. 1.NEUROVASCULAR. Acute metabolic encephalopathy due to UTI. TIA-resolved. No evidence of CVA on CT head. To consider brain MRI if symptoms do not improve. 2.PULMONARY. COPD w/o exacerbation -PRN nebs. 3.CARDIOVASCULAR. Sepsis associated hypotension-IV fluids and albumin, pressors if no improvement. Diastolic heart dysfunction without CHF exacerbation. Elevated troponin due to NSTEMI versus demand ischemia from sepsis. H/o chronic A. Fib. H/o dual-chamber pacemaker. Cardiology consult. 4.GI.     H/o hiatal hernia-asymptomatic. GERD-PPI    5.RENAL AND ELECTROLYTES. MIKE due to ATN-resolved. Serum creatinine 0.97 from 1.40 yesterday. 6.ID. Proteus mirabilis ESBL and MRSA UTI-IV Meropenem and Vancomycin. ID consult. 7. ENDO.     TSH check. 8.MUSCULOSKELETAL. General body weakness-PT OT when stable. Multilevel cervical, thoracic and lumbar spine DJD/DDD. Vitamin D levels check. 9.HEM-ONC. Normocytic anemia w/ Hb 9.3, MCV 93.6. Serum iron 43, folate 9.3, B12 927.    10.UROLOGY. Left nonobstructing renal calculi. 11.NUTRITION. Protein energy malnutrition-dietitian consult. 12.DISPO. Planned d/c rehab soon.     Electronically signed Lashay Esqueda MD on 7/24/2020 at 11:41 AM    Rounding Hospitalist

## 2020-07-24 NOTE — PROGRESS NOTES
Comprehensive Nutrition Assessment    Type and Reason for Visit:  Initial, Positive Nutrition Screen, Wound(Poor Intakes/Appetite, Wound, Nutritional Assessment for Yony Score)    Nutrition Recommendations/Plan:   - Continue current diet - encourage/monitor intakes as tolerated. - Suggest consulting SLP for a swallow study d/t hx of being on an altered consistency diet for food and liquids. Monitor for SLP recommendations. - Will provide frozen Magic Cups and Ensure Clear Liquid ONS with all meals to boost intakes. Nutrition Assessment:  Pt admitted with CVA. RNC/Consult for poor intakes/appetite and wound. Noted pt with confusion present. Family reports that pt has been losing weight over the past few months at the nursing home and is having decreased PO intakes. Pt had a video swallow study completed on 2/27/20 - passed for a Dysphagia 2 diet and Honey Thick liquids. Son reports pt does not really like the thickened liquids. Family had been bringing in Saida-Mp and protein water for pt to increase her protein intakes. Pt with stage 4 pressure ulcer to her coccyx. Family reports 12 lb weight loss since March. Per EHR review, weight loss of 14% since February. Pt tolerated a Cardiac diet with regular liquids - last night ate nearly 100% of her meals. Family reports pt was tolerating liquids if taking small sips. Discussed with RN about having a swallow study completed. Will provide ONS with meals to boost intakes.     Malnutrition Assessment:  Malnutrition Status:  Severe malnutrition    Context:  Social/Environmental Circumstances     Findings of the 6 clinical characteristics of malnutrition:  Energy Intake:  7 - 50% or less estimated energy requirements for 1 month or longer  Weight Loss:  7 - Greater than 7.5% over 3 months(14% x 5 months)     Body Fat Loss:  1 - Mild body fat loss Orbital   Muscle Mass Loss:  1 - Mild muscle mass loss(Moderate muscle mass loss) Clavicles (pectoralis & deltoids), Calf (gastrocnemius)  Fluid Accumulation:  No significant fluid accumulation     Strength:  Not Performed    Estimated Daily Nutrient Needs:  Energy (kcal):  2409-9186 kcals/day; Weight Used for Energy Requirements:  Admission(x 25-28)     Protein (g):  70-90 gm pro/day; Weight Used for Protein Requirements:  Admission(x 1.5-1.8)            Nutrition Related Findings:  Labs reviewed. Meds reviewed: Lactulose, Probiotics. Wounds:  Stage IV, Pressure Ulcer(to coccyx)       Current Nutrition Therapies:    DIET CARDIAC; No Caffeine  Dietary Nutrition Supplements: Clear Liquid Oral Supplement, Frozen Oral Supplement    Anthropometric Measures:  · Height: 5' 3\" (160 cm)  · Current Body Weight: 108 lb 11.1 oz (49.3 kg)   · Admission Body Weight: 108 lb 11.1 oz (49.3 kg)    · Usual Body Weight: 126 lb (57.2 kg)(2/3/20)     · Ideal Body Weight: 115 lbs; % Ideal Body Weight 94.5 %   · BMI: 19.3  · BMI Categories: Underweight (BMI less than 22) age over 72       Nutrition Diagnosis:   · Severe malnutrition, In context of social or environmental circumstances related to inadequate protein-energy intake as evidenced by poor intake prior to admission, wounds, weight loss, mild loss of subcutaneous fat, moderate muscle loss    Nutrition Interventions:   Food and/or Nutrient Delivery:  Continue Current Diet, Start Oral Nutrition Supplement(Provide frozen Magic Cups and Ensure Clear ONS with meals.)  Nutrition Education/Counseling:  Education not indicated   Coordination of Nutrition Care:  Continued Inpatient Monitoring, Swallow Evaluation, Speech Therapy    Goals: Progressing Towards Goal  Oral intakes to meet at least 50% of estimated nutrition needs.        Nutrition Monitoring and Evaluation:   Behavioral-Environmental Outcomes:  (N/A)   Food/Nutrient Intake Outcomes:  Food and Nutrient Intake, Supplement Intake  Physical Signs/Symptoms Outcomes:  Biochemical Data, Chewing or Swallowing, GI Status, Nutrition Focused Physical Findings, Skin, Weight     Discharge Planning:     Too soon to determine     Electronically signed by Lisa Sorto, VERO, LD on 7/24/20 at 1:12 PM EDT    Contact: [phone]

## 2020-07-24 NOTE — PLAN OF CARE
MRI Follow up and other images    I was contacted by MRI techs and they refer patient has new Pacemaker from before and this one is MRI compatible. PLAN  1.  Get Brain MRI WO  2. Switch CTA H/N to MRA H WO and bilateral carotid dopplers to avoid more contrast as per nephrology note    Mellisa Mcgrath MD [facility]  Adult General Neurology Resident PGY-4  Administrative Chief Resident  7/23/2020 at 6:00PM

## 2020-07-24 NOTE — CONSULTS
[facility]  Initial Consult Note  Date: 7/24/2020    Hospital day :1     Impression:   1. Hypotension-concern for sepsis in a patient with chronic hypotension about 90 systolic  2. Urinary tract infection with MRSA and Proteus mirabilis isolated. 3. The Proteus mirabilis was previously an ESBL producing on culture done 7/2/2020  4. Altered mental status likely toxic metabolic encephalopathy  5. Acute kidney injury-resolving  6. Questionable TIA versus CVA  7. Anemia of chronic disease  8. Stage IV sacrococcygeal pressure ulcer-appears clean    Recommendations   · I agree with the empiric antimicrobial therapy with meropenem  · The patient did receive 1 dose of vancomycin today and will follow a random vancomycin level in the morning  · The sacrococcygeal ulceration is clean and we will continue local wound care  · I will order blood cultures x2 sets although the patient is already received doses of systemic antibiotics at this time. · I will follow her progress and adjust therapy accordingly    Chief complaint/reason for consultation:   ESBL Proteus urinary tract infection    History of Present Illness:   Timmy Zhao is a 80y.o.-year-old female who was initially admitted on 7/23/2020. 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 She is seen and evaluated at bedside and the history is obtained from the son and daughter who were in the room as well as from reviewing the chart. The children report that they have not seen their mother for a few months due to the pandemic. They report that she has a history of recurrent urinary tract infections that have not been responsive to antimicrobial therapy. The patient was recently seen by a uro-gynecologist had a CT urography done with no major findings.   The children report that they were called by the facility reporting that their mother had slurred speech, left-sided facial weakness and that she was more confused than normal.  They brought her into the emergency room for evaluation. The patient was found to be hypotensive with a systolic blood pressure in the 70s to 90s, CT scan done in route was found to have no acute intracranial pathology. The patient was seen by the stroke team and CT of the head without contrast today showed no acute intracranial process. Stable diffuse cerebral volume loss and moderate chronic small vessel ischemic changes. The question was whether this was a TIA versus an infectious process/sepsis  The patient was seen by the nephrology service who felt that she had acute kidney injury related to contrast nephropathy given the recent CT urogram 3 days prior to admission. There has been concern for a urinary tract infection and the patient does have a known sacral decubitus ulcer. 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 The renal parameters show improved creatinine but the patient's mentation has been poor. The patient was started on meropenem today by the primary service and due to the meropenem ordered I was asked to evaluate and help with antibiotic choice. I have personally reviewed the past medical history, past surgical history, medications, social history, and family history, and I have updated the database accordingly.   Past Medical History:     Past Medical History:   Diagnosis Date    Arthritis     Atrial fibrillation ([address] 8/21/2016    [facility]     Complete heart block ([address] 8/18/2016    Constipation     CVA (cerebral vascular accident) ([address] 11/08/2019    ACUTE ISCHEMIC    GERD (gastroesophageal reflux disease)     Glaucoma     mild, on eye drops    Hearing loss     bilateral hearing aids    Hiatal hernia     Hx of blood clots 07/2016    pulmonary emboli bilateral lungs after Pacemaker inserted    Hyperlipidemia     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 Claribel Chambers    Restless leg syndrome     Right knee DJD     Wears glasses     Wears hearing aid in both ears      Past Surgical  History:     Past Surgical History:   Procedure Laterality Date    CARDIAC PACEMAKER PLACEMENT      REMOVAL 11- WITH TEMPORARY PACEMAKER PLACED 11-    CARDIAC PACEMAKER PLACEMENT  12/16/2019    DR Brooke Degroot MEDTRONIC BHARATHI XT SR MRI DEFIB. MRI CONDITIONAL MODEL#[id number]. RV LEAD 3993-31    CATARACT REMOVAL WITH IMPLANT Bilateral     Left 1/6/1998, right 8/2/1996    COLONOSCOPY      DILATION AND CURETTAGE OF UTERUS N/A 3/12/2019    DILATATION AND CURETTAGE, HYSTEROSCOPY, MYOSURE performed by Mayra Torres MD at [address] [place] [address]  08/18/2016    EXPLANTED 2019 medtronic advisa MRI DR model # A2DR01  av lead [id number] rv lead [id number]  - mri compatible    [place] GI ENDOSCOPIC ULTRASOUND N/A 9/12/2018    ENDOSCOPIC ULTRASOUND, PATHOLOGY REQUESTED, PAT NOTIFIED performed by Fayetta Spurling, MD at [facility]      as a child    TOTAL KNEE ARTHROPLASTY Right 9/5/2017    KNEE TOTAL ARTHROPLASTY RIGHT WITH BIOMET AND GPS PRODUCT APPLICATION performed by Kourtney Moctezuma MD at [address] [place] [address] [employer] TRANSESOPHAGEAL ECHOCARDIOGRAM N/A 10/9/2019    TRANSESOPHAGEAL ECHOCARDIOGRAM WITH BUBBLE STUDY performed by Jesenia Anders DO at [address] [place] [address] TRANSESOPHAGEAL ECHOCARDIOGRAM  12/16/2019    UPPER GASTROINTESTINAL ENDOSCOPY  06/29/2017    polyp removed from small intestine near stomach, benign.   done at [facility]  9/12/2018    EGD BIOPSY performed by Fayetta Spurling, MD at [facility]     Medications:      meropenem  1 g Intravenous Q12H    sodium chloride  500 mL Intravenous Once    vancomycin  1,000 mg Intravenous Once    vancomycin (VANCOCIN) intermittent dosing (placeholder)   Other RX Placeholder    amiodarone  200 mg Oral Daily    apixaban  2.5 mg Oral BID    aspirin  81 mg Oral Daily    lactulose  10 g Oral BID    latanoprost  1 drop Both Eyes Nightly    therapeutic multivitamin-minerals  1 tablet Oral Daily    potassium chloride  30 mEq Oral Daily    rOPINIRole  0.25 mg Oral Nightly    atorvastatin  40 mg Oral Nightly    midodrine  5 mg Oral TID WC    lactobacillus  1 capsule Oral Daily with breakfast     Social History:     Social History     Socioeconomic History    Marital status:       Spouse name: Not on file    Number of children: Not on file    Years of education: Not on file    Highest education level: Not on file   Occupational History    Not on file   Social Needs    Financial resource strain: Not on file    Food insecurity     Worry: Not on file     Inability: Not on file    Transportation needs     Medical: Not on file     Non-medical: Not on file   Tobacco Use    Smoking status: Never Smoker    Smokeless tobacco: Never Used   Substance and Sexual Activity    Alcohol use: No    Drug use: No    Sexual activity: Not on file   Lifestyle    Physical activity     Days per week: Not on file     Minutes per session: Not on file    Stress: Not on file   Relationships    Social connections     Talks on phone: Not on file     Gets together: Not on file     Attends [employer]ism service: Not on file     Active member of club or organization: Not on file     Attends meetings of clubs or organizations: Not on file     Relationship status: Not on file    Intimate partner violence     Fear of current or ex partner: Not on file     Emotionally abused: Not on file     Physically abused: Not on file     Forced sexual activity: Not on file Other Topics Concern    Not on file   Social History Narrative    Not on file     Family History:     Family History   Problem Relation Age of Onset    Diabetes Sister     Heart Attack Sister     Osteoporosis Sister     Heart Disease Mother    Vannie Pulling Dementia Mother     Tuberculosis Father       Allergies:   Sulfa antibiotics     Review of Systems:   Review of systems unable to obtain the patient was not vocalizing/verbal at the time of my evaluation. Physical Examination :   BP (!) 86/48   Pulse 60   Temp 97.3 °F (36.3 °C) (Axillary)   Resp 16   Ht 5' 3\" (1.6 m)   Wt 108 lb 11 oz (49.3 kg)   SpO2 100%   BMI 19.25 kg/m²     Temperature Range: Temp: 97.3 °F (36.3 °C) Temp  Av.6 °F (36.4 °C)  Min: 97.1 °F (36.2 °C)  Max: 98 °F (36.7 °C)  General Appearance: alert, frail-appearing and does open her eyes at the time of my evaluation. She does not follow any commands for me. She was actually quite agitated and was trying to prevent me from evaluating her. Head: Normocephalic, without obvious abnormality, atraumatic  Eyes: Pupils equal, round, reactive, to light and accommodation; extraocular movements intact; sclera anicteric; conjunctivae pink  ENT: Dry oral pharyngeal mucosa. Neck: Supple, without lymphadenopathy. Pulmonary/Chest: Clear to auscultation, without wheezes, rales, or rhonchi  Cardiovascular: Regular rate and rhythm without murmurs, rubs, or gallops. Abdomen: soft, non-tender, non-distended, normal bowel sounds, no masses or organomegaly and hernia exam- umbilical hernia  Extremities: No cyanosis, clubbing, edema, or effusions. Neurologic: The patient is awake and is resisting all movements for me was trying to push me away during the evaluation.   Skin: The patient has a sacrococcygeal pressure related ulceration          Medical Decision Making:   I have independently reviewed/ordered the following labs:  CBC with Differential:   Recent Labs     20  0848 20  5428 WBC 5.1 6.6   HGB 8.8* 9.3*   HCT 29.4* 29.4*    292   LYMPHOPCT 24  --    MONOPCT 10  --      BMP:   Recent Labs     07/23/20  0848 07/24/20  0613    142   K 4.9 5.1    111*   CO2 24 20   BUN 37* 25*   CREATININE 1.40* 0.97*     Hepatic Function Panel:   Recent Labs     07/24/20  0613   PROT 5.1*   LABALBU 2.2*   BILITOT 0.22*   ALKPHOS 99   ALT 27   AST 35*     No results found for: CRP  Lab Results   Component Value Date    SEDRATE 33 (H) 11/11/2019       No results for input(s): PROCAL in the last 72 hours.      Lactic Acid, Sepsis, Whole Blood  1.0  0.5 - 1.9 mmol/L  Final  07/23/2020  8:48 AM  [facility]     [place], [place]  DARK YELLOWAbnormal    YELLOW  Final  07/23/2020  9:05 AM  [facility]    Turbidity UA  TURBIDAbnormal    CLEAR  Final  07/23/2020  9:05 AM  [facility]    Glucose, Ur  NEGATIVE  NEGATIVE  Final  07/23/2020  9:05 AM  [facility]    Bilirubin Urine  NEGATIVE  NEGATIVE  Final  07/23/2020  9:05 AM  [facility]    Ketones, Urine  NEGATIVE  NEGATIVE  Final  07/23/2020  9:05 AM  [facility]    Specific Fults, UA  1.026  1.005 - 1.030  Final  07/23/2020  9:05 AM  [facility]    Urine Hgb  LARGEAbnormal    NEGATIVE  Final  07/23/2020  9:05 AM  [facility]    pH, UA  5.5  5.0 - 8.0  Final  07/23/2020  9:05 AM  [facility]    Protein, [place]  2+Abnormal    NEGATIVE  Final  07/23/2020  9:05 AM  [address] Urine  Normal  Normal  Final  07/23/2020  9:05 AM  [facility]    Nitrite, Urine  NEGATIVE  NEGATIVE  Final  07/23/2020  9:05 AM  [facility]    Leukocyte Esterase, Urine  LARGEAbnormal    NEGATIVE  Final  07/23/2020  9:05 AM  [facility]      WBC, UA   0 - 5 /HPF  Final  07/23/2020  9:05 AM  [address] TO COUNT    RBC, UA   0 - 2 /HPF  Final  07/23/2020  9:05 AM  [address] TO COUNT        Ferritin  [address]    13 - 150 ug/L  Final  07/23/2020  3:06 PM  [address]      Troponin, High Sensitivity  [address] Panic    0 - 14 ng/L  Final  07/23/2020  5:21 PM  [address]       Imaging Studies:   CT OF THE HEAD WITHOUT CONTRAST  7/23/2020 7:46 am  FINDINGS:   1. No acute intracranial process identified. 2. Stable diffuse cerebral volume loss and moderate chronic small vessel ischemic changes. The findings were sent to the Radiology Results [address] at 8:07 am on 7/23/2020to be communicated to a licensed caregiver. ONE XRAY VIEW OF THE CHEST 7/23/2020 8:57 am   FINDINGS:   1. No acute cardiopulmonary process identified. 2. COPD. CT UROGRAM 7/20/2020 4:15 pm   FINDINGS:   1. Layering hyperdensity within the urinary bladder could represent debris or mild hemorrhage. No abnormal enhancement seen. 2. No suspicious renal lesion. Bilateral renal cysts measure up to 6 cm. 3. A 1.1 cm stone within the left renal pelvis with additional tiny left renal stones. No obstructive uropathy. 4. Compression deformity of L2 with 30% vertebral body height loss is new from January. Stable L3 compression fracture. ULTRASOUND OF THE KIDNEYS 7/23/2020 5:22 pm   FINDINGS:   Unremarkable ultrasound of the kidneys aside from a solitary 5 cm left renal cyst. Unremarkable limited views of the urinary bladder. Cultures:     CLEAN CATCH URINE    Special Requests  07/21/2020 11:42 AM  [facility]    [id number]    Culture Abnormal    07/21/2020 11:42 AM  [address]    METHICILLIN RESISTANT STAPHYLOCOCCUS AUREUS >[id number] CFU/ML    Culture Abnormal    07/21/2020 11:42 AM  [address] [facility] 10 to 50,000 CFU/ML Susceptibility testing not performed on low colony count organisms.       Susceptibility     Methicillin resistant staphylococcus aureus (1)     Antibiotic  Interpretation  QUEENIE  Status     penicillin  Resistant   Final      >=0.5   RESISTANT    cefoxitin screen   NOT REPORTED  Final     ciprofloxacin    Final      NOT REPORTED    clindamycin    Final      NOT REPORTED    erythromycin    Final      NOT REPORTED    gentamicin  Sensitive   Final      <=0.5   SUSCEPTIBLE    gentamicin  Sensitive  Gentamicin is used only in combination with other active agents that test susceptible. Final     Induced Clind Resist   NOT REPORTED  Final     levofloxacin  Intermediate   Final      4   INTERMEDIATE    linezolid    Final      NOT REPORTED    moxifloxacin    Final      NOT REPORTED    nitrofurantoin  Sensitive   Final      <=16   SUSCEPTIBLE    oxacillin  Resistant   Final      >=4   RESISTANT    Synercid    Final      NOT REPORTED    rifampin    Final      NOT REPORTED    tetracycline  Sensitive   Final      <=1   SUSCEPTIBLE    tigecycline    Final      NOT REPORTED    trimethoprim-sulfamethoxazole  Sensitive   Final      <=10   SUSCEPTIBLE    vancomycin  Sensitive   Final      1   SUSCEPTIBLE      URINE    Special Requests  07/02/2020  7:57 PM  [address]    NOT REPORTED    Culture Abnormal    07/02/2020  7:57 PM  [facility] >136304 CFU/ML THIS ORGANISM IS AN EXTENDED-SPECTRUM BETA-LACTAMASE  AND RESISTANCE TO THERAPY WITH PENICILLINS, CEPHALOSPORINS AND AZTREONAM IS EXPECTED.  THESE ORGANISMS GENERALLY REMAIN SUSCEPTIBLE TO CARBAPENEMS.  CONSIDER ID CONSULTATION.     Proteus mirabilis (1)     Antibiotic  Interpretation  QUEENIE  Status     amikacin    Final      NOT REPORTED    ampicillin  Resistant   Final      >=32   RESISTANT    ampicillin-sulbactam    Final      NOT REPORTED    aztreonam  Resistant   Final      <=1   RESISTANT    ceFAZolin  Resistant   Final      >=64   RESISTANT    ceFAZolin  Resistant  Cefazolin sensitivity results can be used to predict the effectiveness of oral cephalosporins (eg. Cephalexin) in uncomplicated Urinary Tract Infections due to E. coli, K. pneumoniae, and P. mirabilis  Final     cefepime  Resistant   Final      2   RESISTANT    cefTRIAXone  Resistant   Final      8   RESISTANT    ciprofloxacin  Resistant   Final      >=4   RESISTANT    ertapenem    Final      NOT REPORTED    gentamicin  Intermediate   Final      8   INTERMEDIATE    meropenem  Sensitive   Final      <=0.25   SUSCEPTIBLE    nitrofurantoin  Resistant   Final      128   RESISTANT    tigecycline    Final      NOT REPORTED    tobramycin  Resistant   Final      >=16   RESISTANT    trimethoprim-sulfamethoxazole  Resistant   Final      >=320   RESISTANT    piperacillin-tazobactam  Resistant   Final      <=4   RESISTANT            Thank you for allowing us to participate in the care of this patient. Please call with questions. Electronically signed by Chino Antunez MD on 7/24/2020 at 2:48 PM      [facility]  Chino Antunez MD  [employer] messaging  OFFICE: [phone]      This note is created with the assistance of a speech recognition program.  While intending to generate a document that actually reflects the content of the visit, the document can still have some errors including those of syntax and sound a like substitutions which may escape proof reading. In such instances, actual meaning can be extrapolated by contextual diversion.

## 2020-07-24 NOTE — PLAN OF CARE
Nutrition Problem #1: Severe malnutrition, In context of social or environmental circumstances  Intervention: Food and/or Nutrient Delivery: Continue Current Diet, Start Oral Nutrition Supplement(Provide frozen Magic Cups and Ensure Clear ONS with meals.)  Nutritional Goals: Oral intakes to meet at least 50% of estimated nutrition needs.

## 2020-07-24 NOTE — PROGRESS NOTES
Pharmacy Note  Vancomycin Consult    Vanessa Fontanez is a 80 y.o. female started on Vancomycin for MRSA UTI; consult received from Dr. Edith De Guzman to manage therapy. Also receiving the following antibiotics: meropenem. ID is consulted as well.      Patient Active Problem List   Diagnosis    Arthritis    Osteopenia    Complete heart block (HCC)    Syncope and collapse    Hyperlipidemia    Contusion of scalp    Chest pain in adult    Pericardial effusion    Paroxysmal atrial fibrillation (HCC)    Sinus arrest    Knee effusion, right    Shortness of breath    Pulmonary embolism with acute cor pulmonale (HCC)    Pleural effusion    Sick sinus syndrome (HCC)    Hemorrhagic pericardial effusion    Status post total right knee replacement    Subtherapeutic international normalized ratio (INR)    Acute congestive heart failure (HCC)    Thickened endometrium    Acute respiratory failure with hypoxia (HCC)    Head injury without concussion or intracranial hemorrhage    Bacteremia    Right arm cellulitis    MRSA bacteremia    Endocarditis of mitral valve    Cerebral septic emboli ([address]    Cerebrovascular accident (CVA) ([address]    Closed Colles' fracture of left radius with routine healing    Acute ischemic right MCA stroke ([address]    Pulmonary embolism (HCC)    Dysarthria    Multifocal pneumonia    Pacemaker infection ([address]    Closed fracture of lumbar vertebra with spinal cord injury ([address]    Pressure ulcer of coccygeal region, stage IV ([address]    Wedge compression fracture of third lumbar vertebra with routine healing    Closed compression fracture of L1 lumbar vertebra, sequela    Age-related osteoporosis without current pathological fracture    Vitamin D deficiency    Status post kyphoplasty    Spinal stenosis of lumbar region with neurogenic claudication    Slow transit constipation    Restless legs syndrome    Mixed conductive and sensorineural hearing loss    History of pulmonary embolism    Gastric polyp    Gastroesophageal reflux disease with esophagitis    Generalized anxiety disorder    Essential (primary) hypertension    Thrombophlebitis    Age-related osteoporosis with current pathological fracture    Anticoagulated on Coumadin    Centrilobular emphysema (HCC)    Cervical spondylosis without myelopathy    DDD (degenerative disc disease), lumbar    Esophageal dysphagia    Presence of cardiac pacemaker    Severe protein-calorie malnutrition (HCC)    Skin tear of left upper extremity    TIA (transient ischemic attack)    Hypotension    Left renal stone    Chronic diastolic heart failure (HCC)    Atrial fibrillation (HCC)    Hypoglycemia    Anemia    Metabolic encephalopathy    History of cerebral infarction    Acute cystitis with hematuria       Allergies:  Sulfa antibiotics     Temp max: 98    Recent Labs     07/23/20  0848 07/24/20  0613   BUN 37* 25*       Recent Labs     07/23/20  0848 07/24/20  0613   CREATININE 1.40* 0.97*       Recent Labs     07/23/20  0848 07/24/20  0613   WBC 5.1 6.6         Intake/Output Summary (Last 24 hours) at 7/24/2020 1337  Last data filed at 7/24/2020 2918  Gross per 24 hour   Intake 1157 ml   Output 600 ml   Net 557 ml       Culture Date      Source                       Results  7/21                     Urine cx                      MRSA, proteus mirabilis    Ht Readings from Last 1 Encounters:   07/24/20 5' 3\" (1.6 m)        Wt Readings from Last 1 Encounters:   07/24/20 108 lb 11 oz (49.3 kg)         Body mass index is 19.25 kg/m². Estimated Creatinine Clearance: 31 mL/min (A) (based on SCr of 0.97 mg/dL (H)). Goal Trough Level: 10-20 mcg/mL    Assessment/Plan:  Will initiate Vancomycin with a one time loading dose of 1000 mg x1, followed by dosing by levels for now due to MIKE. Will continue to evaluate patient's renal function and adjust vancomycin regimen accordingly. Timing of trough level will be determined based on culture results, renal function, and clinical response.      Thank you for the

## 2020-07-24 NOTE — CONSULTS
[facility]   IN-PATIENT SERVICE      NEUROLOGY CONSULT  NOTE            Date:   7/24/2020  Patient name:  Wilder Le  Date of admission:  7/23/2020  YOB: 1930      Chief Complaint:     Chief Complaint   Patient presents with    Cerebrovascular Accident     per ems [address] [place] [address] is 0500, Lt sided sided facial droop and grugled speech. all symptoms resolved when EMS arrived. Hx of strokes      Reason for Consult:      Left facial droop and Slurred speech      History of Present Illness: The patient is a 80 y.o. female who presents with Cerebrovascular Accident (per ems [address] [place] [address] is 0500, Lt sided sided facial droop and grugled speech. all symptoms resolved when EMS arrived. Hx of strokes )   and she is admitted to the hospital for the management of  UTI and intermittent  Slurred and left facial droop. The patient is a 80 y.o. 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                  As per nursing home they checked on patient at [facility] and was within her usual health and then at 6:45AM they notice some left facial droop and slurred speech. When EMS was called symptoms resolved. When EMS came slurred speech came back and then mobile stroke unit came and symptoms resolved. Found to have hypotension in the 70-90's. Got Head CT WO on [facility] and was found with no acute intracranial pathology. Transfer to [address] for evaluation. Ems was told patient have UTI and currently being treated and is on Eliquis for her Atrial Fibrillation and being compliant.                  At [facility] patient with no slurred speech and some mild left nasolabial droop, BP in the 70's.  Stroke team called.      Meds: Eliquis 2.5mg PO BID (Adjusted), Atorvastatin PACEMAKER PLACED 11-    CARDIAC PACEMAKER PLACEMENT  12/16/2019    DR Isabel Bah [employer]TR[employer] BHARATHI XT SR MRI DEFIB. MRI CONDITIONAL MODEL#[id number]. RV LEAD 2221-58    CATARACT REMOVAL WITH IMPLANT Bilateral     Left 1/6/1998, right 8/2/1996    COLONOSCOPY      DILATION AND CURETTAGE OF UTERUS N/A 3/12/2019    DILATATION AND CURETTAGE, HYSTEROSCOPY, MYOSURE performed by Dorian Tavera MD at [address] [place] [address]  08/18/2016    EXPLANTED 2019 medtronic advisa MRI DR model # A2DR01  av lead [id number] rv lead [id number]  - mri compatible    [place] GI ENDOSCOPIC ULTRASOUND N/A 9/12/2018    ENDOSCOPIC ULTRASOUND, PATHOLOGY REQUESTED, PAT NOTIFIED performed by Jose Elias Mazariegos MD at [facility]      as a child    TOTAL KNEE ARTHROPLASTY Right 9/5/2017    KNEE TOTAL ARTHROPLASTY RIGHT WITH BIOMET AND GPS PRODUCT APPLICATION performed by Maco Salcedo MD at [address] [place] [address] TRANSESOPHAGEAL ECHOCARDIOGRAM N/A 10/9/2019    TRANSESOPHAGEAL ECHOCARDIOGRAM WITH BUBBLE STUDY performed by Andry Varner DO at [address] [place] [address] TRANSESOPHAGEAL ECHOCARDIOGRAM  12/16/2019    UPPER GASTROINTESTINAL ENDOSCOPY  06/29/2017    polyp removed from small intestine near stomach, benign. done at [facility]  9/12/2018    EGD BIOPSY performed by Jose Elias Mazariegos MD at [facility]        Medications Prior to Admission:     Prior to Admission medications    Medication Sig Start Date End Date Taking?  Authorizing Provider   Lactobacillus (ACIDOPHILUS) TABS Take by mouth 2 times daily    Historical Provider, MD   Nutritional Supplements (BOOST PLUS PO) Take 237 mLs by mouth 3 times daily    Historical Provider, MD   diphenhydrAMINE (BENADRYL) 50 MG capsule Take 50 mg by mouth every 4 hours as needed for Itching    Historical Provider, MD   ipratropium-albuterol (DUONEB) 0.5-2.5 (3) MG/3ML SOLN nebulizer solution Take 1 vial by nebulization every 4 hours as needed for Shortness of Breath    Historical Provider, MD   Multiple Vitamins-Minerals (THERAPEUTIC MULTIVITAMIN-MINERALS) tablet Take 1 tablet by mouth daily    Historical Provider, MD   PROTEIN PO Take 30 mLs by mouth 3 times daily    Historical Provider, MD   traMADol (ULTRAM) 50 MG tablet Take 50 mg by mouth 2 times daily.     Historical Provider, MD   apixaban (ELIQUIS) 2.5 MG TABS tablet Take 2.5 mg by mouth 2 times daily    Historical Provider, MD   Calcium Carbonate-Vitamin D (CALCIUM 500 + D PO) Take 1 tablet by mouth daily CALCIUM 500MG+VIT D 200IU    Historical Provider, MD   lisinopril (PRINIVIL;ZESTRIL) 2.5 MG tablet Take 2.5 mg by mouth nightly    Historical Provider, MD   Magnesium Oxide (MAG- PO) Take 400 mg by mouth 2 times daily    Historical Provider, MD   lactulose (CHRONULAC) 10 GM/15ML solution Take 10 g by mouth 2 times daily    Historical Provider, MD   potassium chloride (MICRO-K) 10 MEQ extended release capsule Take 30 mEq by mouth daily     Historical Provider, MD   furosemide (LASIX) 20 MG tablet Take 1 tablet by mouth 2 times daily  Patient taking differently: Take 20 mg by mouth daily  10/18/19   Meño Ulloa MD   aspirin 81 MG chewable tablet Take 1 tablet by mouth daily 10/19/19   Flor Grayson MD   guaiFENesin (MUCINEX) 600 MG extended release tablet Take 1 tablet by mouth 2 times daily 4/19/19   Jory Funes MD   amiodarone (CORDARONE) 200 MG tablet Take 200 mg by mouth daily    Historical Provider, MD   polyethylene glycol (MIRALAX) powder Take 17 g by mouth daily as needed    Historical Provider, MD   metoprolol tartrate (LOPRESSOR) 25 MG tablet Take 1 tablet by mouth 2 times daily 9/17/16   Sanchez Justice DO   rOPINIRole (REQUIP) 0.25 MG tablet Take 0.25 mg by mouth nightly  5/9/16   Historical Provider, MD   atorvastatin (LIPITOR) 10 MG tablet Take 10 mg by mouth daily  10/2/13   Historical Provider, MD   latanoprost (XALATAN) 0.005 % ophthalmic solution Place 1 drop into both eyes nightly  13   Historical Provider, MD   Acetaminophen (TYLENOL ARTHRITIS PAIN PO) Take 2 tablets by mouth every 6 hours as needed     Historical Provider, MD        Allergies:     Sulfa antibiotics    Social History:     Tobacco:    reports that she has never smoked. She has never used smokeless tobacco.  Alcohol:      reports no history of alcohol use. Drug Use:  reports no history of drug use.     Family History:     Family History   Problem Relation Age of Onset    Diabetes Sister     Heart Attack Sister     Osteoporosis Sister     Heart Disease Mother     Dementia Mother     Tuberculosis Father        Review of Systems:       Constitutional Negative for fever and chills   HEENT Negative for ear discharge, ear pain, nosebleed   Eyes Negative for photophobia, pain and discharge   Respiratory Negative for hemoptysis and sputum   Cardiovascular Negative for orthopnea, claudication and PND   Gastrointestinal Negative for abdominal pain, diarrhea, blood in stool   Musculoskeletal Negative for joint pain, negative for myalgia   Skin Negative for rash or itching   hematology Negative for ecchymosis, anemia   Psychiatric Negative for suicidal ideation, anxiety, depression, hallucinations       Physical Exam:   /84   Pulse 60   Temp 97.8 °F (36.6 °C) (Axillary)   Resp 13   Ht 5' 3\" (1.6 m)   Wt 108 lb 11 oz (49.3 kg)   SpO2 100%   BMI 19.25 kg/m²   Temp (24hrs), Av.6 °F (36.4 °C), Min:97.1 °F (36.2 °C), Max:98 °F (36.7 °C)        General examination:      General Appearance: Alert oriented x0,  HEENT: Normocephalic, atraumatic, moist mucus membranes  Neck: supple, no carotid bruits, (-) nuchal rigidity  Lungs:  Respirations unlabored, chest wall no deformity, BS normal  Cardiovascular: normal rate, regular rhythm  Abdomen: Soft, nontender, nondistended, normal bowel sounds  Skin: No gross lesions, rashes, bruising or bleeding on exposed skin area  Extremities:  peripheral pulses palpable, clubbing or edema  Psych: normal affect    NEUROLOGIC EXAMINATION    Mental status   Alert oriented x0, intermittent following command,    Cranial nerves   II - visual fields intact to confrontation; pupils reactive  III, IV, VI - extraocular muscles intact; no TADEO; no nystagmus; no ptosis   V - normal facial sensation                                                               VII - normal facial symmetry                                                             VIII - intact hearing                                                                             IX, X - symmetrical palate elevation                                               XI - symmetrical shoulder shrug                                                       XII - midline tongue without atrophy or fasciculation     Motor function   moving all extremities spontaneously, mild left-sided facial droop probably due to previous stroke       Sensory function Intact to touch, pin, vibration, proprioception throughout     Cerebellar    Not tested    Reflex function 2/4 symmetric throughout . Downgoing plantar response bilaterally.  (-)Virgen's sign bilaterally      Gait                   Not tested              Diagnostics:      Laboratory Testing:  CBC:   Recent Labs     07/23/20  0848 07/24/20  0613   WBC 5.1 6.6   HGB 8.8* 9.3*    292     BMP:    Recent Labs     07/23/20  0836 07/23/20  0841 07/23/20  0848 07/24/20  0613   NA  --   --  140 142   K  --   --  4.9 5.1   CL  --   --  107 111*   CO2  --   --  24 20   BUN  --   --  37* 25*   CREATININE 1.62*  --  1.40* 0.97*   GLUCOSE  --  69 84 67*         Lab Results   Component Value Date    CHOL 102 07/23/2020    LDLCHOLESTEROL 34 07/23/2020    HDL 52 07/23/2020    TRIG 78 07/23/2020    ALT 27 07/24/2020    AST 35 (H) 07/24/2020    TSH 2.46 11/04/2019    INR 1.1 07/23/2020    LABA1C 4.9 07/23/2020    NNTVTOOV21 927 07/23/2020       No results found for: PHENYTOIN, PHENYTOIN, VALPROATE, CBMZ      Imaging/Diagnostics:        EEG: Pending       CT head : 7/23/2020: No acute intracranial process      CTA head and neck : Cannot be done due to MIKE      MRI Brain and has pacemaker      2D ECHO        Impression:                      Plan:     51-year-old female with sudden onset of intermittent slurred speech, left facial droop along with infection and hypotension      DD  stroke recrudescence possibly secondary to infection and hypertension  Metabolic encephalopathy  UTI  Hypertension         CT head without contrast: No acute intracranial normality   Carotid Doppler: Carotids evaluation   EEG to rule out subclinical seizures   MRI brain without contrast: Unable to due to pacemaker   Echo with bubble study: Pending previous history of endocarditis(October and November 2019)   Aspirin 81 mg   Lipitor 40 mg   F/U: TSH/HbA1c: 4.9   LDL: 34   UTI: Rocephin as per primary   MIKE treatment as per primary   Replacement of electrolytes was pending   Rest of the management as per primary      Case was staffed with dr. Tracie Champion,    Further recommendation made to follow-up. Call us for further questions.   Thank you for consult    Electronically signed by Yady Gonzalez MD on 7/24/2020 at 9:17 AM      Sierra Bear MD   PGY-2 Neurology Resident   7/24/2020 at 9:17 AM

## 2020-07-24 NOTE — CARE COORDINATION
Transitional Planning  Received call from patient's son Jorge Bansal ([phone]), states he spoke with his sister and they would like referral to [facility]. Patient has been in SNF since before See and they have been private paying for some time. Unsure when [facility] days are reset. eReferral sent. 1652 - Return call from [facility], states they will be unable to accept patient due to \"behaviors and allegations\" at previous nursing facility.

## 2020-07-24 NOTE — PROGRESS NOTES
Physician Progress Note      Kimberly Burk  CSN #:                  [id number]  :                       1930  ADMIT DATE:       2020 8:25 AM  DISCH DATE:  RESPONDING  PROVIDER #:        Brenda Bush MD          QUERY TEXT:    Pt admitted with TIA. Pt noted to have MCA occlusion, hypotension/dehydration,   UTI. If possible, please document in progress notes and discharge summary if   you are evaluating and /or treating any of the following: The medical record reflects the following:  Risk Factors: hx MCA occlusion/CVA, endocarditis, A fib  Clinical Indicators: admitted with sudden onset of intermittent left facial   droop and slurred speech, hypotension, dehydration, UTI  Treatment: CT scans, neuro, cardiology and ID consults, IV fluids, IV   Rocephin, labs, cultures, pending echo    Call if any questions. Thank you, Omaira Escudero, [facility] [phone]  Options provided:  -- TIA symptoms due to CVA  -- TIA symptoms due to Cerebral Artery Occlusion/Stenosis (without Infarction)  -- TIA symptoms due to arrhythmia  -- TIA symptoms due to Dehydration  -- TIA symptoms due to UTI  -- Other - I will add my own diagnosis  -- Disagree - Clinically unable to determine / Unknown  -- Refer to Clinical Documentation Reviewer    PROVIDER RESPONSE TEXT:    Acute metabolic encephalopathy due to Proteus ESBL and MRSA UTI.     Query created by: Mono Suh on 2020 9:26 AM      Electronically signed by:  Brenda Bush MD 2020 2:31 PM

## 2020-07-24 NOTE — PLAN OF CARE
Problem: Skin Integrity:  Goal: Will show no infection signs and symptoms  Description: Will show no infection signs and symptoms  Outcome: Ongoing  Goal: Absence of new skin breakdown  Description: Absence of new skin breakdown  7/24/2020 1453 by Mihai Jiménez RN  Outcome: Ongoing  Goal: Demonstration of wound healing without infection will improve  Description: Demonstration of wound healing without infection will improve  Outcome: Ongoing  Goal: Complications related to intravenous access or infusion will be avoided or minimized  Description: Complications related to intravenous access or infusion will be avoided or minimized  Outcome: Ongoing     Problem: Falls - Risk of:  Goal: Will remain free from falls  Description: Will remain free from falls  7/24/2020 1453 by Mihai Jiménez RN  Outcome: Met This Shift  7/24/2020 0429 by Candelaria Goldmann, RN  Outcome: Ongoing  Goal: Absence of physical injury  Description: Absence of physical injury  Outcome: Met This Shift     Problem: Nutritional:  Goal: Nutritional status will improve  Description: Nutritional status will improve  Outcome: Ongoing     Problem: Physical Regulation:  Goal: Diagnostic test results will improve  Description: Diagnostic test results will improve  Outcome: Ongoing  Goal: Will remain free from infection  Description: Will remain free from infection  Outcome: Ongoing  Goal: Ability to maintain vital signs within normal range will improve  Description: Ability to maintain vital signs within normal range will improve  Outcome: Ongoing     Problem: Respiratory:  Goal: Ability to maintain normal respiratory secretions will improve  Description: Ability to maintain normal respiratory secretions will improve  Outcome: Ongoing     Problem: HEMODYNAMIC STATUS  Goal: Patient has stable vital signs and fluid balance  7/24/2020 1453 by Mihai Jiménez RN  Outcome: Ongoing  7/24/2020 0429 by Candelaria Goldmann, RN  Outcome: Ongoing     Problem: Nutrition  Goal: Optimal nutrition therapy  7/24/2020 1314 by Karina Ronquillo RD, LD  Outcome: Ongoing  Note: Nutrition Problem #1: Severe malnutrition, In context of social or environmental circumstances  Intervention: Food and/or Nutrient Delivery: Continue Current Diet, Start Oral Nutrition Supplement(Provide frozen Magic Cups and Ensure Clear ONS with meals.)  Nutritional Goals: Oral intakes to meet at least 50% of estimated nutrition needs.

## 2020-07-24 NOTE — PROGRESS NOTES
Pt very confused, refusing all medications. RN notified provider. Electronically signed by Qi Waters RN on 7/23/2020 at 9:02 PM     Pt continues to be confused and agitated, refusing to let RN touch her,stating she will call police. RN spoke with pt family and they stated her confusion has been worse the last couple of nights. RN paged provider to see if patient needs to be on the telemetry monitor or if she wanted to order any IV medications or restraints. RN awaiting response.      Electronically signed by Qi Waters RN on 7/23/2020 at 10:49 PM

## 2020-07-24 NOTE — PROGRESS NOTES
SUBJECTIVE    Patient was seen and examined. Patient was slightly confused. Her blood pressure was in 60s. Patient is receiving IV fluid bolus under the instruction of cardiology. Patient is on 5 mg of ProAmatine  Improvement in creatinine noted and most recent creatinine is 0.9 mg/dL  Infectious diseases on board for urinary tract infection patient has MRSA in her urine  No new neurological changes. Patient did not receive CT scan with contrast.    OBJECTIVE      CURRENT TEMPERATURE:  Temp: 97.3 °F (36.3 °C)  MAXIMUM TEMPERATURE OVER 24HRS:  Temp (24hrs), Av.6 °F (36.4 °C), Min:97.1 °F (36.2 °C), Max:98 °F (36.7 °C)    CURRENT RESPIRATORY RATE:  Resp: 16  CURRENT PULSE:  Pulse: 60  CURRENT BLOOD PRESSURE:  BP: (!) 86/48  24HR BLOOD PRESSURE RANGE:  Systolic (22SXH), VQK:988 , Min:73 , EVB:958   ; Diastolic (45QXT), RAC:48, Min:48, Max:119    24HR INTAKE/OUTPUT:      Intake/Output Summary (Last 24 hours) at 2020 1533  Last data filed at 2020 9877  Gross per 24 hour   Intake 1157 ml   Output 600 ml   Net 557 ml     WEIGHT :  Patient Vitals for the past 96 hrs (Last 3 readings):   Weight   20 0619 108 lb 11 oz (49.3 kg)   20 0833 115 lb (52.2 kg)     PHYSICAL EXAM      GENERAL APPEARANCE: Patient was lying flat quite comfortable able to open her eyes with physical stimulation. SKIN: Warm to touch and no erythema  EYES: Conjunctiva was pink  NECK: No JVD.   PULMONARY: Lateral air entry and clear to auscultation  CADRDIOVASCULAR: 1 and S2 audible no S3  ABDOMEN: soft nontender, bowel sounds present, no organomegaly,  no ascites   EXTREMITIES: No edema  CURRENT MEDICATIONS      meropenem (MERREM) 1 g in sodium chloride 0.9 % 100 mL IVPB (mini-bag), Q12H  0.9 % sodium chloride bolus, Once  vancomycin (VANCOCIN) 1000 mg in dextrose 5% 200 mL IVPB, Once  vancomycin (VANCOCIN) intermittent dosing (placeholder), RX Placeholder  amiodarone (CORDARONE) tablet 200 mg, Daily  apixaban (ELIQUIS) tablet 2.5 mg, BID  aspirin chewable tablet 81 mg, Daily  ipratropium-albuterol (DUONEB) nebulizer solution 3 mL, Q4H PRN  lactulose (CHRONULAC) 10 GM/15ML solution 10 g, BID  latanoprost (XALATAN) 0.005 % ophthalmic solution 1 drop, Nightly  therapeutic multivitamin-minerals 1 tablet, Daily  potassium chloride (MICRO-K) extended release capsule 30 mEq, Daily  rOPINIRole (REQUIP) tablet 0.25 mg, Nightly  acetaminophen (TYLENOL) tablet 650 mg, Q6H PRN    Or  acetaminophen (TYLENOL) suppository 650 mg, Q6H PRN  acetaminophen (TYLENOL) tablet 650 mg, Q6H PRN    Or  acetaminophen (TYLENOL) suppository 650 mg, Q6H PRN  promethazine (PHENERGAN) tablet 12.5 mg, Q6H PRN    Or  ondansetron (ZOFRAN) injection 4 mg, Q6H PRN  atorvastatin (LIPITOR) tablet 40 mg, Nightly  nitroGLYCERIN (NITROSTAT) SL tablet 0.4 mg, Q5 Min PRN  0.9 % sodium chloride infusion, Continuous  midodrine (PROAMATINE) tablet 5 mg, TID WC  lactobacillus (CULTURELLE) capsule 1 capsule, Daily with breakfast          LABS      CBC:   Recent Labs     07/23/20  0848 07/24/20  0613   WBC 5.1 6.6   RBC 3.10* 3.14*   HGB 8.8* 9.3*   HCT 29.4* 29.4*   MCV 94.8 93.6   MCH 28.4 29.6   MCHC 29.9 31.6   RDW 18.7* 18.9*    292   MPV 10.6 10.6      BMP:   Recent Labs     07/23/20  0836 07/23/20  0841 07/23/20  0848 07/24/20  0613   NA  --   --  140 142   K  --   --  4.9 5.1   CL  --   --  107 111*   CO2  --   --  24 20   BUN  --   --  37* 25*   CREATININE 1.62*  --  1.40* 0.97*   GLUCOSE  --  69 84 67*   CALCIUM  --   --  8.0* 7.7*      BNP:No results found for: BNP  PHOSPHORUS:  No results for input(s): PHOS in the last 72 hours. MAGNESIUM: No results for input(s): MG in the last 72 hours.   ALBUMIN:   Recent Labs     07/24/20  0613   LABALBU 2.2*     IRON:    Lab Results   Component Value Date    IRON 43 07/23/2020     IRON SATURATION:    Lab Results   Component Value Date    LABIRON 25 07/23/2020     TIBC:    Lab Results   Component Value Date TIBC 171 07/23/2020     FERRITIN:    Lab Results   Component Value Date    FERRITIN 275 07/23/2020     QUIANA: No results found for: QUIANA    SPEP:   Lab Results   Component Value Date    PROT 5.1 07/24/2020     UPEP: No results found for: TPU   HEPBSAG:  Lab Results   Component Value Date    HEPBSAG NONREACTIVE 10/07/2019     HEPCAB:  Lab Results   Component Value Date    HEPCAB NONREACTIVE 10/07/2019   URINALYSIS:  U/A:   Lab Results   Component Value Date    NITRU NEGATIVE 07/23/2020    COLORU DARK YELLOW 07/23/2020    PHUR 5.5 07/23/2020    WBCUA TOO NUMEROUS TO COUNT 07/23/2020    RBCUA TOO NUMEROUS TO COUNT 07/23/2020    MUCUS NOT REPORTED 07/23/2020    TRICHOMONAS NOT REPORTED 07/23/2020    YEAST NOT REPORTED 07/23/2020    BACTERIA FEW 07/23/2020    CLARITYU clear5 10/16/2018    SPECGRAV 1.026 07/23/2020    LEUKOCYTESUR LARGE 07/23/2020    UROBILINOGEN Normal 07/23/2020    BILIRUBINUR NEGATIVE 07/23/2020    BLOODU + 10/16/2018    GLUCOSEU NEGATIVE 07/23/2020    KETUA NEGATIVE 07/23/2020    AMORPHOUS NOT REPORTED 07/23/2020         RADIOLOGY    Ultrasound of the kidneys unremarkable  ASSESSMENT      1. Acute kidney injury due to CT with contrast.  Renal functions are improving  2. Recent CVA/TIA and neurology is on board. Patient is on Eliquis for atrial fibrillation  3. Recurrent urinary tract infection  4. Anemia of chronic disease  5. Hypotension etiology not clear. Will check cortisol level and increase the dose of ProAmatine    PLAN      1. Increase ProAmatine to 10 mg 3 times daily  2. Agree with fluid bolus  3. If patient remains hypotensive consider IV pressors  4. Antibiotic as per infectious diseases. 5.  Discontinue potassium supplement  Please do not hesitate to call with questions.     Electronically signed by Russ Frank MD on 7/24/2020 at 3:33 PM

## 2020-07-25 PROBLEM — E87.8 ELECTROLYTE IMBALANCE: Status: ACTIVE | Noted: 2020-07-25

## 2020-07-25 LAB
ALBUMIN SERPL-MCNC: 2.2 G/DL (ref 3.5–5.2)
ALBUMIN/GLOBULIN RATIO: 1 (ref 1–2.5)
ALP BLD-CCNC: 80 U/L (ref 35–104)
ALT SERPL-CCNC: 20 U/L (ref 5–33)
ANION GAP SERPL CALCULATED.3IONS-SCNC: 7 MMOL/L (ref 9–17)
AST SERPL-CCNC: 28 U/L
BILIRUB SERPL-MCNC: 0.26 MG/DL (ref 0.3–1.2)
BUN BLDV-MCNC: 14 MG/DL (ref 8–23)
BUN/CREAT BLD: ABNORMAL (ref 9–20)
CALCIUM SERPL-MCNC: 7.7 MG/DL (ref 8.6–10.4)
CHLORIDE BLD-SCNC: 118 MMOL/L (ref 98–107)
CO2: 19 MMOL/L (ref 20–31)
CREAT SERPL-MCNC: 0.55 MG/DL (ref 0.5–0.9)
GFR AFRICAN AMERICAN: >60 ML/MIN
GFR NON-AFRICAN AMERICAN: >60 ML/MIN
GFR SERPL CREATININE-BSD FRML MDRD: ABNORMAL ML/MIN/{1.73_M2}
GFR SERPL CREATININE-BSD FRML MDRD: ABNORMAL ML/MIN/{1.73_M2}
GLUCOSE BLD-MCNC: 80 MG/DL (ref 70–99)
HCT VFR BLD CALC: 24.8 % (ref 36.3–47.1)
HCT VFR BLD CALC: 28.3 % (ref 36.3–47.1)
HEMOGLOBIN: 7.3 G/DL (ref 11.9–15.1)
HEMOGLOBIN: 8.3 G/DL (ref 11.9–15.1)
MAGNESIUM: 2.2 MG/DL (ref 1.6–2.6)
MCH RBC QN AUTO: 29.2 PG (ref 25.2–33.5)
MCHC RBC AUTO-ENTMCNC: 29.4 G/DL (ref 28.4–34.8)
MCV RBC AUTO: 99.2 FL (ref 82.6–102.9)
NRBC AUTOMATED: 0 PER 100 WBC
PDW BLD-RTO: 19.3 % (ref 11.8–14.4)
PHOSPHORUS: 2.1 MG/DL (ref 2.6–4.5)
PLATELET # BLD: 252 K/UL (ref 138–453)
PMV BLD AUTO: 10.7 FL (ref 8.1–13.5)
POTASSIUM SERPL-SCNC: 4.5 MMOL/L (ref 3.7–5.3)
RBC # BLD: 2.5 M/UL (ref 3.95–5.11)
SODIUM BLD-SCNC: 144 MMOL/L (ref 135–144)
THYROXINE, FREE: 1.49 NG/DL (ref 0.93–1.7)
TOTAL PROTEIN: 4.5 G/DL (ref 6.4–8.3)
TSH SERPL DL<=0.05 MIU/L-ACNC: 5.53 MIU/L (ref 0.3–5)
VANCOMYCIN RANDOM DATE LAST DOSE: NORMAL
VANCOMYCIN RANDOM DOSE AMOUNT: NORMAL
VANCOMYCIN RANDOM TIME LAST DOSE: NORMAL
VANCOMYCIN RANDOM: 8.4 UG/ML
VITAMIN D 25-HYDROXY: 35.1 NG/ML (ref 30–100)
WBC # BLD: 5.1 K/UL (ref 3.5–11.3)

## 2020-07-25 PROCEDURE — 80202 ASSAY OF VANCOMYCIN: CPT

## 2020-07-25 PROCEDURE — 6370000000 HC RX 637 (ALT 250 FOR IP): Performed by: NURSE PRACTITIONER

## 2020-07-25 PROCEDURE — 83735 ASSAY OF MAGNESIUM: CPT

## 2020-07-25 PROCEDURE — 85014 HEMATOCRIT: CPT

## 2020-07-25 PROCEDURE — 99232 SBSQ HOSP IP/OBS MODERATE 35: CPT | Performed by: INTERNAL MEDICINE

## 2020-07-25 PROCEDURE — 84439 ASSAY OF FREE THYROXINE: CPT

## 2020-07-25 PROCEDURE — 2580000003 HC RX 258: Performed by: INTERNAL MEDICINE

## 2020-07-25 PROCEDURE — 6370000000 HC RX 637 (ALT 250 FOR IP): Performed by: INTERNAL MEDICINE

## 2020-07-25 PROCEDURE — 6360000002 HC RX W HCPCS: Performed by: INTERNAL MEDICINE

## 2020-07-25 PROCEDURE — 85027 COMPLETE CBC AUTOMATED: CPT

## 2020-07-25 PROCEDURE — 84443 ASSAY THYROID STIM HORMONE: CPT

## 2020-07-25 PROCEDURE — 36415 COLL VENOUS BLD VENIPUNCTURE: CPT

## 2020-07-25 PROCEDURE — 2500000003 HC RX 250 WO HCPCS: Performed by: INTERNAL MEDICINE

## 2020-07-25 PROCEDURE — 2060000000 HC ICU INTERMEDIATE R&B

## 2020-07-25 PROCEDURE — 84100 ASSAY OF PHOSPHORUS: CPT

## 2020-07-25 PROCEDURE — 80053 COMPREHEN METABOLIC PANEL: CPT

## 2020-07-25 PROCEDURE — 82306 VITAMIN D 25 HYDROXY: CPT

## 2020-07-25 PROCEDURE — 85018 HEMOGLOBIN: CPT

## 2020-07-25 PROCEDURE — 51798 US URINE CAPACITY MEASURE: CPT

## 2020-07-25 PROCEDURE — 99232 SBSQ HOSP IP/OBS MODERATE 35: CPT | Performed by: PSYCHIATRY & NEUROLOGY

## 2020-07-25 RX ORDER — VANCOMYCIN HYDROCHLORIDE 1 G/200ML
1000 INJECTION, SOLUTION INTRAVENOUS EVERY 24 HOURS
Status: DISCONTINUED | OUTPATIENT
Start: 2020-07-25 | End: 2020-07-26

## 2020-07-25 RX ADMIN — ROPINIROLE HYDROCHLORIDE 0.25 MG: 0.25 TABLET, FILM COATED ORAL at 00:15

## 2020-07-25 RX ADMIN — MEROPENEM 1 G: 1 INJECTION, POWDER, FOR SOLUTION INTRAVENOUS at 13:07

## 2020-07-25 RX ADMIN — SODIUM PHOSPHATE, MONOBASIC, MONOHYDRATE 30 MMOL: 276; 142 INJECTION, SOLUTION INTRAVENOUS at 18:07

## 2020-07-25 RX ADMIN — LACTULOSE 10 G: 10 SOLUTION ORAL at 08:26

## 2020-07-25 RX ADMIN — APIXABAN 2.5 MG: 2.5 TABLET, FILM COATED ORAL at 08:26

## 2020-07-25 RX ADMIN — MULTIPLE VITAMINS W/ MINERALS TAB 1 TABLET: TAB at 08:26

## 2020-07-25 RX ADMIN — APIXABAN 2.5 MG: 2.5 TABLET, FILM COATED ORAL at 20:47

## 2020-07-25 RX ADMIN — MEROPENEM 1 G: 1 INJECTION, POWDER, FOR SOLUTION INTRAVENOUS at 00:18

## 2020-07-25 RX ADMIN — ASPIRIN 81 MG: 81 TABLET, CHEWABLE ORAL at 08:25

## 2020-07-25 RX ADMIN — APIXABAN 2.5 MG: 2.5 TABLET, FILM COATED ORAL at 00:15

## 2020-07-25 RX ADMIN — LACTULOSE 10 G: 10 SOLUTION ORAL at 20:44

## 2020-07-25 RX ADMIN — Medication 1 CAPSULE: at 08:26

## 2020-07-25 RX ADMIN — ROPINIROLE HYDROCHLORIDE 0.25 MG: 0.25 TABLET, FILM COATED ORAL at 20:47

## 2020-07-25 RX ADMIN — VANCOMYCIN HYDROCHLORIDE 1000 MG: 1 INJECTION, SOLUTION INTRAVENOUS at 16:32

## 2020-07-25 RX ADMIN — DESMOPRESSIN ACETATE 40 MG: 0.2 TABLET ORAL at 20:47

## 2020-07-25 RX ADMIN — LATANOPROST 1 DROP: 50 SOLUTION OPHTHALMIC at 20:47

## 2020-07-25 RX ADMIN — LATANOPROST 1 DROP: 50 SOLUTION OPHTHALMIC at 00:07

## 2020-07-25 RX ADMIN — AMIODARONE HYDROCHLORIDE 200 MG: 200 TABLET ORAL at 08:25

## 2020-07-25 RX ADMIN — ACETAMINOPHEN 650 MG: 325 TABLET ORAL at 12:32

## 2020-07-25 ASSESSMENT — ENCOUNTER SYMPTOMS
VOICE CHANGE: 0
BLOOD IN STOOL: 0
STRIDOR: 0
SORE THROAT: 0
SINUS PRESSURE: 0
VOMITING: 0
CHOKING: 0
RHINORRHEA: 0
EYE PAIN: 0
COLOR CHANGE: 0
WHEEZING: 0
APNEA: 0
NAUSEA: 0
RECTAL PAIN: 0
ABDOMINAL PAIN: 0
ABDOMINAL DISTENTION: 0
PHOTOPHOBIA: 0
CHEST TIGHTNESS: 0
EYE DISCHARGE: 0
COUGH: 0
EYE REDNESS: 0
DIARRHEA: 0
SHORTNESS OF BREATH: 0

## 2020-07-25 ASSESSMENT — PAIN DESCRIPTION - PAIN TYPE: TYPE: ACUTE PAIN

## 2020-07-25 ASSESSMENT — PAIN DESCRIPTION - LOCATION: LOCATION: COCCYX

## 2020-07-25 ASSESSMENT — PAIN DESCRIPTION - DESCRIPTORS: DESCRIPTORS: ACHING

## 2020-07-25 ASSESSMENT — PAIN SCALES - GENERAL
PAINLEVEL_OUTOF10: 0
PAINLEVEL_OUTOF10: 0

## 2020-07-25 ASSESSMENT — PAIN - FUNCTIONAL ASSESSMENT: PAIN_FUNCTIONAL_ASSESSMENT: PREVENTS OR INTERFERES WITH MANY ACTIVE NOT PASSIVE ACTIVITIES

## 2020-07-25 ASSESSMENT — PAIN DESCRIPTION - FREQUENCY: FREQUENCY: CONTINUOUS

## 2020-07-25 ASSESSMENT — PAIN DESCRIPTION - PROGRESSION: CLINICAL_PROGRESSION: NOT CHANGED

## 2020-07-25 ASSESSMENT — PAIN DESCRIPTION - ONSET: ONSET: UNABLE TO TELL

## 2020-07-25 NOTE — PROGRESS NOTES
Active Problem Metabolic encephalopathy. History right cerebral infarction . UTI . Hypotension . Cardiac pacemaker . The condition is carotid ultrasound normal .She is alert not oriented lifting all limbs equally . There is no slurring with  Minor decreased left NLF. Son reports that she does have history of some memory loss but is clearly more confused disoriented from her baseline . 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 Patient later presented with slurring with left facial droop . CTA head and neck with occlusion right M2, M3 . She has atrial fibrillation on eliquis 2.5 mg po qd along with aspirin 81 mg po qd and lipitor 10 mg po qd . Patient is resident of extended care facility noted to have left facial assymmetry along with slurring of speech along with confusion disorientation . Blood pressure in 70 to 90 range found to have UTI with UA showing large leukocyte esterase placed on IV rocephin  Head CT with bilateral chronic periventricular small vessel ischemia .  Creatinine 1.40 with no CTA study done      Past Medical History:   Diagnosis Date    Arthritis     Atrial fibrillation ([address] 8/21/2016    [facility]     Complete heart block ([address] 8/18/2016    Constipation     CVA (cerebral vascular accident) ([address] 11/08/2019    ACUTE ISCHEMIC    GERD (gastroesophageal reflux disease)     Glaucoma     mild, on eye drops    Hearing loss     bilateral hearing aids    Hiatal hernia     Hx of blood clots 07/2016    pulmonary emboli bilateral lungs after Pacemaker inserted    Hyperlipidemia     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 Karlene Halsted    Restless leg syndrome     Right knee DJD     Wears glasses     Wears hearing aid in both ears        Past Surgical History:   Procedure Laterality Date    CARDIAC PACEMAKER PLACEMENT      REMOVAL 11- WITH TEMPORARY PACEMAKER PLACED 11-    CARDIAC PACEMAKER PLACEMENT  12/16/2019    DR Dea Sun MEDTRONIC BHARATHI XT SR MRI DEFIB. MRI CONDITIONAL MODEL#[id number]. RV LEAD 1690-36    CATARACT REMOVAL WITH IMPLANT Bilateral     Left 1/6/1998, right 8/2/1996    COLONOSCOPY      DILATION AND CURETTAGE OF UTERUS N/A 3/12/2019    DILATATION AND CURETTAGE, HYSTEROSCOPY, MYOSURE performed by Charlene Hernandez MD at [address] [place] [address]  08/18/2016    EXPLANTED 2019 medtronic advisa MRI DR model # A2DR01  av lead [id number] rv lead [id number]  - mri compatible    NV GI ENDOSCOPIC ULTRASOUND N/A 9/12/2018    ENDOSCOPIC ULTRASOUND, PATHOLOGY REQUESTED, PAT NOTIFIED performed by Zhao Law MD at [facility]      as a child    TOTAL KNEE ARTHROPLASTY Right 9/5/2017    KNEE TOTAL ARTHROPLASTY RIGHT WITH BIOMET AND GPS PRODUCT APPLICATION performed by Yen Waters MD at [facility] [address] TRANSESOPHAGEAL ECHOCARDIOGRAM N/A 10/9/2019    TRANSESOPHAGEAL ECHOCARDIOGRAM WITH BUBBLE STUDY performed by Brittney Montoya DO at [facility] [address] TRANSESOPHAGEAL ECHOCARDIOGRAM  12/16/2019    UPPER GASTROINTESTINAL ENDOSCOPY  06/29/2017    polyp removed from small intestine near stomach, benign. done at [facility]  9/12/2018    EGD BIOPSY performed by Zhao Law MD at [facility]       Family History   Problem Relation Age of Onset    Diabetes Sister     Heart Attack Sister     Osteoporosis Sister     Heart Disease Mother     Dementia Mother     Tuberculosis Father        Social History     Socioeconomic History    Marital status:       Spouse name: None    Number of children: None    Years of education: None    Highest education level: None   Occupational History  None   Social Needs    Financial resource strain: None    Food insecurity     Worry: None     Inability: None    Transportation needs     Medical: None     Non-medical: None   Tobacco Use    Smoking status: Never Smoker    Smokeless tobacco: Never Used   Substance and Sexual Activity    Alcohol use: No    Drug use: No    Sexual activity: None   Lifestyle    Physical activity     Days per week: None     Minutes per session: None    Stress: None   Relationships    Social connections     Talks on phone: None     Gets together: None     Attends Protestant service: None     Active member of club or organization: None     Attends meetings of clubs or organizations: None     Relationship status: None    Intimate partner violence     Fear of current or ex partner: None     Emotionally abused: None     Physically abused: None     Forced sexual activity: None   Other Topics Concern    None   Social History Narrative    None       Current Facility-Administered Medications   Medication Dose Route Frequency Provider Last Rate Last Dose    vancomycin (VANCOCIN) 1000 mg in dextrose 5% 200 mL IVPB  1,000 mg Intravenous Q24H Bernard Christiansen  mL/hr at 07/25/20 1632 1,000 mg at 07/25/20 1632    meropenem (MERREM) 1 g in sodium chloride 0.9 % 100 mL IVPB (mini-bag)  1 g Intravenous Q12H Bernard Christiansen MD   Stopped at 07/25/20 1632    vancomycin (VANCOCIN) intermittent dosing (placeholder)   Other RX Placeholder Bernard Christiansen MD        midodrine (PROAMATINE) tablet 10 mg  10 mg Oral TID  Ramiro Fields MD   10 mg at 07/24/20 1726    amiodarone (CORDARONE) tablet 200 mg  200 mg Oral Daily ALEXSANDRA Velez - NP   200 mg at 07/25/20 0825    apixaban (ELIQUIS) tablet 2.5 mg  2.5 mg Oral BID ALEXSANDRA Velez - NP   2.5 mg at 07/25/20 3624    aspirin chewable tablet 81 mg  81 mg Oral Daily ALEXSANDRA Velez - NP   81 mg at 07/25/20 0825    ipratropium-albuterol (DUONEB) nebulizer solution 3 mL  1 vial Nebulization Q4H PRN Hang Bitters, APRN - NP        lactulose ([facility]) 10 GM/15ML solution 10 g  10 g Oral BID Hang Bitters, APRN - NP   10 g at 07/25/20 0826    latanoprost (XALATAN) 0.005 % ophthalmic solution 1 drop  1 drop Both Eyes Nightly Hang Bitters, APRN - NP   1 drop at 07/25/20 0007    therapeutic multivitamin-minerals 1 tablet  1 tablet Oral Daily Hang Bitters, APRN - NP   1 tablet at 07/25/20 0826    rOPINIRole (REQUIP) tablet 0.25 mg  0.25 mg Oral Nightly Hang Bitters, APRN - NP   0.25 mg at 07/25/20 0015    acetaminophen (TYLENOL) tablet 650 mg  650 mg Oral Q6H PRN Hang Bitters, APRN - NP        Or   Eyvonne Clonts acetaminophen (TYLENOL) suppository 650 mg  650 mg Rectal Q6H PRN Hang Bitters, APRN - NP        acetaminophen (TYLENOL) tablet 650 mg  650 mg Oral Q6H PRN Hang Bitters, APRN - NP   650 mg at 07/25/20 1232    Or    acetaminophen (TYLENOL) suppository 650 mg  650 mg Rectal Q6H PRN Hang Bitters, APRN - NP        promethazine (PHENERGAN) tablet 12.5 mg  12.5 mg Oral Q6H PRN Hang Bitters, APRN - NP        Or    ondansetron [facility]) injection 4 mg  4 mg Intravenous Q6H PRN Hang Bitters, APRN - NP        atorvastatin (LIPITOR) tablet 40 mg  40 mg Oral Nightly Hang Bitters, APRN - NP        nitroGLYCERIN (NITROSTAT) SL tablet 0.4 mg  0.4 mg Sublingual Q5 Min PRN Hang Bitters, APRN - NP        0.9 % sodium chloride infusion   Intravenous Continuous Marshel Phoenix,  mL/hr at 07/24/20 1619      lactobacillus (CULTURELLE) capsule 1 capsule  1 capsule Oral Daily with breakfast Hang Bitters, APRN - NP   1 capsule at 07/25/20 7031       Allergies   Allergen Reactions    Sulfa Antibiotics Rash       ROS:   Constitutional                  Negative for fever and chills   HEENT                            Negative for ear discharge, ear pain, nosebleed  Eyes                                Negative for photophobia, pain and discharge  Respiratory                      Negative for hemoptysis and sputum  Cardiovascular Negative for orthopnea, claudication and PND  Gastrointestinal               Negative for abdominal pain, diarrhea, blood in stool  Musculoskeletal               Negative for joint pain, negative for myalgia  Skin                                 Negative for rash or itching  Endo/heme/allergies       Negative for polydipsia, environmental allergy  Psychiatric                       Negative for suicidal ideation. Patient is not anxious    Vitals:    07/25/20 1630   BP: (!) 116/51   Pulse:    Resp:    Temp:    SpO2:      Admission weight: 115 lb (52.2 kg)    Neurological Examination  Constitutional .General exam well groomed   Head/ Ears /Nose/Throat/external ear . Normal exam  Neck and thyroid . Normal size. No bruits  Cardiovascular: Auscultation of heart with regular rate and rhythm   Musculoskeletal. Muscle bulk and tone normal                                                           Muscle strength lifting all limbs equally                                                                                 No dysmetria or dysdiadokinesis  No tremor   Normal fine motor  Orientation Alert and oriented x 0   Attention and concentration reduced   Short term memory reduced  Language process and speech normal . No aphasia   Cranial nerve 2 normal acuety and visual fields  Cranial nerve 3, 4 and 6 . Extraocular muscles are intact . Pupils are equal and reactive   Cranial nerve 5 . Intact corneal reflex. Normal facial sensation  Cranial nerve 7 normal exam   Cranial nerve 8. Grossly intact hearing   Cranial nerve 9 and 10. Symmetric palate elevation   Cranial nerve 11 , 5 out of 5 strength   Cranial Nerve 12 midline tongue . No atrophy  Sensation . Normal pinprick and light touch   Deep Tendon Reflexes symmetrical  Plantar response flexor bilaterally    Assessment :    Metabolic encephalopathy. History right cerebral infarction . UTI . Hypotension . Cardiac pacemaker . Possible underlying cognitive disorder Plan:    PT/OT

## 2020-07-25 NOTE — PROGRESS NOTES
[facility]   Progress Note                    Date:   7/25/2020  Patient name:  Isela Osuna  Date of admission:  7/23/2020  8:25 AM  MRN:   [id number]  YOB: 1930  PCP:    Doroteo Maldonado MD    Reason for Admission:  TIA (transient ischemic attack) [G45.9]    Subjective:       Clinical Changes / Abnormalities:    No acute issues overnight  Appears to be lethargic  Denies chest pain, shortness of breath    I/O last 3 completed shifts: In: 2288.7 [I.V.:2288.7]  Out: 800 [Urine:800]  No intake/output data recorded.       In: 1098 [I.V.:1098]  Out: 300 [Urine:300]      Intake/Output Summary (Last 24 hours) at 7/25/2020 1606  Last data filed at 7/25/2020 0645  Gross per 24 hour   Intake 2288.67 ml   Output 800 ml   Net 1488.67 ml         I/O since admission: +2 liters    Medications:   Scheduled Meds:   vancomycin  1,000 mg Intravenous Q24H    meropenem  1 g Intravenous Q12H    vancomycin (VANCOCIN) intermittent dosing (placeholder)   Other RX Placeholder    midodrine  10 mg Oral TID WC    amiodarone  200 mg Oral Daily    apixaban  2.5 mg Oral BID    aspirin  81 mg Oral Daily    lactulose  10 g Oral BID    latanoprost  1 drop Both Eyes Nightly    therapeutic multivitamin-minerals  1 tablet Oral Daily    rOPINIRole  0.25 mg Oral Nightly    atorvastatin  40 mg Oral Nightly    lactobacillus  1 capsule Oral Daily with breakfast     Continuous Infusions:   sodium chloride 100 mL/hr at 07/24/20 1619     CBC:   Recent Labs     07/23/20  0848 07/24/20  0613 07/25/20  0634 07/25/20  1226   WBC 5.1 6.6 5.1  --    HGB 8.8* 9.3* 7.3* 8.3*    292 252  --      BMP:    Recent Labs     07/23/20  0848 07/24/20  0613 07/25/20  0634    142 144   K 4.9 5.1 4.5    111* 118*   CO2 24 20 19*   BUN 37* 25* 14   CREATININE 1.40* 0.97* 0.55   GLUCOSE 84 67* 80     Hepatic:   Recent Labs     07/24/20  0613 07/25/20  0634   AST 35* 28   ALT 27 20   BILITOT 0.22* 0.26*   ALKPHOS 99 80 Troponin: No results for input(s): TROPONINI in the last 72 hours. Recent Labs     07/23/20  1106 07/23/20  1506 07/23/20  1721   TROPONINT NOT REPORTED NOT REPORTED NOT REPORTED     BNP:   Recent Labs     07/23/20  1506   PROBNP 4,300*      No results for input(s): BNP in the last 72 hours. Lipids:   Recent Labs     07/23/20  0848   CHOL 102   HDL 52     INR:   Recent Labs     07/23/20  0848   INR 1.1       Objective:   Vitals: BP (!) 119/59   Pulse 60   Temp 97.6 °F (36.4 °C) (Temporal)   Resp 16   Ht 5' 3\" (1.6 m)   Wt 108 lb 11 oz (49.3 kg)   SpO2 99%   BMI 19.25 kg/m²    Last Recorded Weight:  [unfilled]  General appearance: awake, alert, in no apparent distress. HEENT: Head: Normocephalic, atraumatic without any obvious abnormalities. Neck: JVD absent t  Lungs:good bilateral equal air entry. No adventitious lung sounds auscultated. Heart: regular rate and rhythm, S1, S2 normal,+ systolic murmur, click, rub or gallop. No pain on palpation of the anterior chest.  Abdomen: soft, nontender with bowel sounds present in all 4 quadrants. Extremities: Lower extremity edema is absent / present + bilaterally. Peripheral pulses in b/l LE present  Integumentum:Intact with no rashes noted.       Diagnostic Studies:     EKG:  Atrial sensed, ventricular paced rhythm with underlying complete heart block  ECHO:  SOCRATES  The study was performed by the attending, fellow, and the sonographer. SOCRATES was performed without complications. Estimated EF 55%. No thrombus or valvular vegetation identified. Severely calcified mitral leaflets. Moderate to sever MR. Aortic valve calcification with restriction of motion, probable moderate  stenosis.  Gradients not done.       Patient Active Problem List:     Arthritis     Osteopenia     Complete heart block (HCC)     Syncope and collapse     Hyperlipidemia     Contusion of scalp     Chest pain in adult     Pericardial effusion     Paroxysmal atrial fibrillation (HCC) Sinus arrest     Knee effusion, right     Shortness of breath     Pulmonary embolism with acute cor pulmonale (HCC)     Pleural effusion     Sick sinus syndrome (HCC)     Hemorrhagic pericardial effusion     Status post total right knee replacement     Subtherapeutic international normalized ratio (INR)     Acute congestive heart failure (HCC)     Thickened endometrium     Acute respiratory failure with hypoxia (HCC)     Head injury without concussion or intracranial hemorrhage     Bacteremia     Right arm cellulitis     MRSA bacteremia     Endocarditis of mitral valve     Cerebral septic emboli (HCC)     Cerebrovascular accident (CVA) ([address]     Closed Colles' fracture of left radius with routine healing     Acute ischemic right MCA stroke ([address]     Pulmonary embolism (HCC)     Dysarthria     Multifocal pneumonia     Pacemaker infection ([address]     Closed fracture of lumbar vertebra with spinal cord injury ([address]     Pressure ulcer of coccygeal region, stage IV ([address]     Wedge compression fracture of third lumbar vertebra with routine healing     Closed compression fracture of L1 lumbar vertebra, sequela     Age-related osteoporosis without current pathological fracture     Vitamin D deficiency     Status post kyphoplasty     Spinal stenosis of lumbar region with neurogenic claudication     Slow transit constipation     Restless legs syndrome     Mixed conductive and sensorineural hearing loss     History of pulmonary embolism     Gastric polyp     Gastroesophageal reflux disease with esophagitis     Generalized anxiety disorder     Essential (primary) hypertension     Thrombophlebitis     Age-related osteoporosis with current pathological fracture     Anticoagulated on Coumadin     Centrilobular emphysema (HCC)     Cervical spondylosis without myelopathy     DDD (degenerative disc disease), lumbar     Esophageal dysphagia     Presence of cardiac pacemaker     Severe protein-calorie malnutrition (HCC)     Skin tear of left upper extremity     TIA (transient ischemic attack)     Hypotension     Left renal stone     Chronic diastolic heart failure (HCC)     Atrial fibrillation (HCC)     Hypoglycemia     Anemia     Acute metabolic encephalopathy     History of cerebral infarction     Acute cystitis with hematuria     Urinary tract infection due to Proteus     MRSA infection     Sepsis associated hypotension (HCC)     Weakness generalized     Elevated troponin level not due to acute coronary syndrome     MIKE (acute kidney injury) [facility]     COPD without exacerbation (HCC)     Sepsis due to urinary tract infection ([facility]      Assessment / Acute Cardiac Problems:   1. Troponin elevation-likely type II in the setting of MIKE and sepsis related to UTI  2. 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 Moderate to severe MR  4. Mitral stenosis  5. History of stroke  6. Fibrillation and history of DVT    Plan of Treatment:   1. Troponin elevation in the setting of MIKE and sepsis from UTI  2. Echocardiogram pending  3. Continue amiodarone 200 mg daily  4. Continue Eliquis 2.5 twice daily   5. Home diuretics antihypertensive medications on hold due to sepsis      Emma Flanagan MD  Fellow, [address]        Attending Cardiologist Addendum: I have reviewed and performed the history, physical, subjective, objective, assessment, and plan with the resident/fellow and agree with the note. I performed the history and physical personally. I have made changes to the note above as needed. Thank you for allowing me to participate in the care of this patient, please do not hesitate to call if you have any questions. Jax Edmonds, [address] [facility]  [facility]. [facility]  [phone]

## 2020-07-25 NOTE — PROGRESS NOTES
[facility]ist Progress Note-Internal Medicine. [facility] 4B Stepdown       Patient: Jorge Vicente    Unit/Bed: 3555/1612-82    YOB: 1930    MRN: [id number]    Acct: [de-identified]     Admitting Diagnosis:TIA (transient ischemic attack) [G45.9]    Admit Date:  7/23/2020    Hospital Day: 2    Subjective:    Patient is much better and clinically stable today. Answering questions and she is not confused. Her granddaughter is at the bedside. Started on midodrine yesterday for hypertension. Blood pressure today 135/64 the lowest blood pressure was 61/42 yesterday at 6:15 PM    Hemoglobin 7.3 from 9.3 yesterday. Patient Seen, Chart, Labs, Radiology studies, and Consults reviewed. Objective:   BP (!) 116/51   Pulse 60   Temp 97.6 °F (36.4 °C) (Temporal)   Resp 18   Ht 5' 3\" (1.6 m)   Wt 108 lb 11 oz (49.3 kg)   SpO2 99%   BMI 19.25 kg/m²       Intake/Output Summary (Last 24 hours) at 7/25/2020 1727  Last data filed at 7/25/2020 1714  Gross per 24 hour   Intake 3477.67 ml   Output 1450 ml   Net 2027.67 ml       Review of Systems   Constitutional: Positive for activity change. Negative for appetite change, chills, diaphoresis, fatigue, fever and unexpected weight change. HENT: Negative for congestion, drooling, ear discharge, ear pain, hearing loss, nosebleeds, postnasal drip, rhinorrhea, sinus pressure, sneezing, sore throat, tinnitus and voice change. Eyes: Negative for photophobia, pain, discharge, redness and visual disturbance. Respiratory: Negative for apnea, cough, choking, chest tightness, shortness of breath, wheezing and stridor. Cardiovascular: Negative for chest pain, palpitations and leg swelling. Gastrointestinal: Negative for abdominal distention, abdominal pain, blood in stool, diarrhea, nausea, rectal pain and vomiting.    Endocrine: Negative for heat intolerance, polydipsia, polyphagia and polyuria. Genitourinary: Negative for decreased urine volume, difficulty urinating, dyspareunia, dysuria, enuresis, frequency, hematuria, menstrual problem, pelvic pain, urgency, vaginal discharge and vaginal pain. Musculoskeletal: Negative for arthralgias, gait problem, myalgias and neck pain. Skin: Negative for color change, pallor, rash and wound. Allergic/Immunologic: Negative for food allergies and immunocompromised state. Neurological: Positive for weakness. Negative for dizziness, tremors, seizures, syncope, facial asymmetry, speech difficulty, light-headedness, numbness and headaches. Hematological: Negative for adenopathy. Does not bruise/bleed easily. Psychiatric/Behavioral: Negative for agitation, behavioral problems, confusion, decreased concentration, dysphoric mood, hallucinations, self-injury, sleep disturbance and suicidal ideas. The patient is not nervous/anxious and is not hyperactive. Physical Exam  Vitals signs and nursing note reviewed. Constitutional:       General: She is not in acute distress. Appearance: Normal appearance. She is normal weight. She is not ill-appearing, toxic-appearing or diaphoretic. HENT:      Head: Normocephalic and atraumatic. Nose: Nose normal. No congestion or rhinorrhea. Mouth/Throat:      Mouth: Mucous membranes are moist.      Pharynx: Oropharynx is clear. No oropharyngeal exudate or posterior oropharyngeal erythema. Eyes:      General: No scleral icterus. Right eye: No discharge. Left eye: No discharge. Conjunctiva/sclera: Conjunctivae normal.      Pupils: Pupils are equal, round, and reactive to light. Neck:      Musculoskeletal: Normal range of motion and neck supple. No neck rigidity or muscular tenderness. Vascular: No carotid bruit. Cardiovascular:      Rate and Rhythm: Normal rate and regular rhythm. Pulses: Normal pulses. Heart sounds: Normal heart sounds. No murmur.  No friction rub. No gallop. Pulmonary:      Effort: Pulmonary effort is normal. No respiratory distress. Breath sounds: Normal breath sounds. No stridor. No wheezing, rhonchi or rales. Chest:      Chest wall: No tenderness. Abdominal:      General: Abdomen is flat. Bowel sounds are normal. There is no distension. Palpations: Abdomen is soft. There is no mass. Tenderness: There is no right CVA tenderness, left CVA tenderness, guarding or rebound. Hernia: No hernia is present. Genitourinary:     Comments: External Martin catheter  Musculoskeletal: Normal range of motion. General: No swelling, tenderness, deformity or signs of injury. Right lower leg: No edema. Left lower leg: No edema. Lymphadenopathy:      Cervical: No cervical adenopathy. Skin:     General: Skin is warm. Coloration: Skin is pale. Skin is not jaundiced. Findings: No bruising, erythema, lesion or rash. Neurological:      Mental Status: She is alert. She is disoriented. Cranial Nerves: No cranial nerve deficit. Sensory: No sensory deficit. Motor: No weakness.       Coordination: Coordination normal.   Psychiatric:         Mood and Affect: Mood normal.       Medications:    vancomycin  1,000 mg Intravenous Q24H    sodium phosphate IVPB  30 mmol Intravenous Once    meropenem  1 g Intravenous Q12H    vancomycin (VANCOCIN) intermittent dosing (placeholder)   Other RX Placeholder    midodrine  10 mg Oral TID     amiodarone  200 mg Oral Daily    apixaban  2.5 mg Oral BID    aspirin  81 mg Oral Daily    lactulose  10 g Oral BID    latanoprost  1 drop Both Eyes Nightly    therapeutic multivitamin-minerals  1 tablet Oral Daily    rOPINIRole  0.25 mg Oral Nightly    atorvastatin  40 mg Oral Nightly    lactobacillus  1 capsule Oral Daily with breakfast       Continuous Infusions:   sodium chloride 100 mL/hr at 07/24/20 1619       PRN Meds:ipratropium-albuterol, acetaminophen **OR** acetaminophen, acetaminophen **OR** acetaminophen, promethazine **OR** ondansetron, nitroGLYCERIN    Data:    CBC:   Recent Labs     07/23/20  0848 07/24/20  0613 07/25/20  0634 07/25/20  1226   WBC 5.1 6.6 5.1  --    RBC 3.10* 3.14* 2.50*  --    HGB 8.8* 9.3* 7.3* 8.3*   HCT 29.4* 29.4* 24.8* 28.3*   MCV 94.8 93.6 99.2  --    RDW 18.7* 18.9* 19.3*  --     292 252  --        BMP:   Recent Labs     07/23/20  0848 07/24/20  0613 07/25/20  0634    142 144   K 4.9 5.1 4.5    111* 118*   CO2 24 20 19*   PHOS  --   --  2.1*   BUN 37* 25* 14   CREATININE 1.40* 0.97* 0.55       PT/INR:  Recent Labs     07/23/20  0848   PROTIME 11.3   INR 1.1       APTT:   Recent Labs     07/23/20  0848   APTT 26.3       FASTING LIPID PANEL:  Lab Results   Component Value Date    CHOL 102 07/23/2020    HDL 52 07/23/2020    TRIG 78 07/23/2020     Results for TOYA VU (MRN [id number]) as of 7/25/2020 17:20   Ref. Range 1/8/2019 09:10 11/4/2019 07:16 7/25/2020 06:34   TSH Latest Ref Range: 0.30 - 5.00 mIU/L 3.42 2.46 5.53 (H)     Results for TOYA VU (MRN [id number]) as of 7/25/2020 17:20   Ref. Range 7/30/2018 09:42 1/8/2019 09:10 7/25/2020 06:34   Thyroxine, Free Latest Ref Range: 0.93 - 1.70 ng/dL 1.89 (H) 1.94 (H) 1.49     Results for Sahra VU (MRN [id number]) as of 7/24/2020 11:41   Ref. Range 7/23/2020 08:48 7/23/2020 11:06 7/23/2020 15:06 7/23/2020 17:21   Troponin, High Sensitivity Latest Ref Range: 0 - 14 ng/L 155 (HH) 121 (HH) 106 (HH) 99 (HH)     Results for Sahra VU (MRN [id number]) as of 7/24/2020 11:41   Ref. Range 7/23/2020 15:06   Ferritin Latest Ref Range: 13 - 150 ug/L 275 (H)   Iron Latest Ref Range: 37 - 145 ug/dL 43   Iron Saturation Latest Ref Range: 20 - 55 % 25   UIBC Latest Ref Range: 112 - 347 ug/dL 128   TIBC Latest Ref Range: 250 - 450 ug/dL 171 (L)   Folate Latest Ref Range: >4.8 ng/mL 9.3   Vitamin B-12 Latest Ref Range: 232 - 1245 pg/mL 927     ABG. None. URINALYSIS.   Results for Esequiel VU (MRN [id number]) as of 7/24/2020 11:41   Ref. Range 7/23/2020 09:05   Color, UA Latest Ref Range: YELLOW  DARK YELLOW (A)   Turbidity UA Latest Ref Range: CLEAR  TURBID (A)   Glucose, UA Latest Ref Range: NEGATIVE  NEGATIVE   Bilirubin, Urine Latest Ref Range: NEGATIVE  NEGATIVE   Ketones, Urine Latest Ref Range: NEGATIVE  NEGATIVE   Specific Gravity, UA Latest Ref Range: 1.005 - 1.030  1.026   pH, UA Latest Ref Range: 5.0 - 8.0  5.5   Protein, UA Latest Ref Range: NEGATIVE  2+ (A)   Urobilinogen, Urine Latest Ref Range: Normal  Normal   Nitrite, Urine Latest Ref Range: NEGATIVE  NEGATIVE   Leukocyte Esterase, Urine Latest Ref Range: NEGATIVE  LARGE (A)   Casts UA Latest Ref Range: 0 - 2 /LPF NOT REPORTED   Mucus, UA Latest Ref Range: None  NOT REPORTED   WBC, UA Latest Ref Range: 0 - 5 /HPF TOO NUMEROUS TO COUNT   RBC, UA Latest Ref Range: 0 - 2 /HPF TOO NUMEROUS TO COUNT   Epithelial Cells, UA Latest Ref Range: 0 - 5 /HPF 5 TO 10   Renal Epithelial, UA Latest Ref Range: 0 /HPF NOT REPORTED   Bacteria, UA Latest Ref Range: None  FEW (A)   Amorphous, UA Latest Ref Range: None  NOT REPORTED   Yeast, Urine Latest Ref Range: None  NOT REPORTED   Crystals, UA Latest Ref Range: None /HPF NOT REPORTED   Urine Hgb Latest Ref Range: NEGATIVE  LARGE (A)   Trichomonas, UA Latest Ref Range: None  NOT REPORTED   Other Observations UA Latest Ref Range: NOT REQ.  NOT REPORTED       SEROLOGY  Results for Esequiel VU (MRN [id number]) as of 7/24/2020 11:41   Ref. Range 10/7/2019 16:36   Hep A IgM Latest Ref Range: NONREACTIVE  NONREACTIVE   Hepatitis B Surface Ag Latest Ref Range: NONREACTIVE  NONREACTIVE   Hepatitis C Ab Latest Ref Range: NONREACTIVE  NONREACTIVE   Hep B Core Ab, IgM Latest Ref Range: NONREACTIVE  NONREACTIVE     TUMOR MARKER. None. MICROBIOLOGY   Urine culture-07/21/2020. PROTEUS SPECIES 10 to 50,000 CFU/ML. METHICILLIN RESISTANT STAPHYLOCOCCUS AUREUS >[id number] CFU/ML     Urine culture-07/02/2020. PROTEUS MIRABILIS >[id number] CFU/ML . THIS ORGANISM IS AN EXTENDED-SPECTRUM BETA-LACTAMASE  AND RESISTANCE TO THERAPY WITH PENICILLINS, CEPHALOSPORINS AND AZTREONAM  IS EXPECTED.      THESE ORGANISMS GENERALLY REMAIN SUSCEPTIBLE TO CARBAPENEMS.      CONSIDER ID CONSULTATION. HISTOPATHOLOGY. None. TOXICOLOGY. None. ENDOSCOPE STUDIES. None. PROCEDURES. None. RADIOLOGY. Chest X-ray -07/23/ 2020. FINDINGS:  A right pacemaker is present. The mediastinal and cardiac contours are stable. The lungs are hyperinflated. There is no focal consolidation, pleural effusion or pneumothorax.     IMPRESSION:  1. No acute cardiopulmonary process identified. 2. COPD. CT head without contrast -07/23/2020. FINDINGS:  BRAIN/VENTRICLES:   There is no acute infarct or acute intracranial hemorrhage present. There is no mass effect or midline shift present. There is diffuse cerebral volume loss. Periventricular hypoattenuation is present. There is no abnormal extra-axial fluid collection.     ORBITS:   Limited evaluation of the orbits is unremarkable.     SINUSES:   The paranasal sinuses and mastoid air cells are clear.     SOFT TISSUES/SKULL:    No lytic or blastic osseous lesions are identified.     IMPRESSION:  1. No acute intracranial process identified. 2. Stable diffuse cerebral volume loss and moderate chronic small vessel      ischemic changes. The findings were sent to the Radiology Results [address] at 8:07     am on 7/23/2020to be communicated to a licensed caregiver. Renal ultrasound-07/23/2020. FINDINGS:    Kidneys:   The right kidney measures 9.5 cm in length and the left kidney measures 8.9  cm in length.     Kidneys demonstrate normal cortical echogenicity. No hydronephrosis or intrarenal stones. Incidental note is made of a dominant cyst in the upper/mid left kidney.       The pedunculated cyst measures 4.9 x 5.7 x 5.2 cm in dimension. No other focal lesions.     Bladder:   Limited views of the urinary bladder are within normal limits. No postvoid images could be acquired as the patient could not void during the exam.     IMPRESSION:  Unremarkable ultrasound of the kidneys aside from a solitary 5 cm left renal cyst.     Unremarkable limited views of the urinary bladder. CT urogram -07/20/2020. FINDINGS:  Image quality degraded by motion artifact.     Lower Chest:   Dependent atelectasis at the lung bases. Severe mitral annular calcification. Coronary artery calcification. Pacemaker leads.     Kidneys and Urinary Tract:   1.1 cm stone in the left renal pelvis. Additional tiny left renal stones. A left renal cyst measures up to 6 cm and there are additional tiny bilateral   renal cysts. Tiny hyperdense foci in both kidneys may represent hemorrhagic cysts. Symmetric enhancement excretion of the kidneys without hydronephrosis. On noncontrast images, there is mild layering hyperdensity within the left posterior   bladder; no abnormal enhancement is seen on post-contrast images.     Organs:   No calcified gallstones. Stable mild biliary ductal dilatation. Subcentimeter hepatic cysts. Pancreas is atrophic. Pancreas is unremarkable. Stable adrenal thickening.     GI/Bowel:   Stomach and small bowel are unremarkable. Normal appendix. Moderate to large amount of stool throughout the colon.     Pelvis:   Uterus is unremarkable. No lymphadenopathy or free fluid.     Peritoneum/Retroperitoneum:   No lymphadenopathy or ascites. Severe atherosclerotic disease without abdominal aortic aneurysm.     Bones/Soft Tissues:   Marked osteopenia. Status post kyphoplasty at L1.     Compression deformity of L2 is new from earlier this year and there is  approximately 30% vertebral body height loss without retropulsion into the  spinal canal.      Unchanged facet hypertrophy. There is no canal stenosis or foraminal narrowing.     C5-C6:   There is a disc osteophyte complex with uncovertebral and facet hypertrophy. There is canal stenosis measuring 9 mm in AP dimension. There is mild right and moderate left foraminal narrowing.     C6-C7:   There is a disc osteophyte complex with uncovertebral and facet hypertrophy. There is no canal stenosis or significant foraminal narrowing.     C7-T1:   There is no significant disc protrusion, spinal canal stenosis or neural foraminal narrowing.     IMPRESSION:  Multilevel degenerative disc disease with associated uncovertebral and facet  hypertrophy with mild canal stenosis at C5-6.     Mild right foraminal narrowing at C3-4 as well as mild right and moderate  left foraminal narrowing at C5-6. MRI lumbar spine without contrast-11/12/2019. FINDINGS:  BONES/ALIGNMENT:   Remote L1 compression fracture with prior vertebroplasty. Mild acute L3 compression fracture with less than 50% loss of height. No retropulsion of fracture fragments. No subluxation. No suspicious bone marrow replacing lesion.     SPINAL CORD:    Normal signal within the conus which terminates at L1-L2.     SOFT TISSUES:   Left renal cyst.  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 Mild increase postcontrast enhancement within the L3 compression fracture.     L1-L2:   No significant central canal stenosis. Mild right foraminal stenosis. No left foraminal stenosis.     L2-L3:   Facet degenerative changes. No significant central canal stenosis. No left foraminal stenosis. Moderate right foraminal stenosis.     L3-L4:   Minimal broad-based disc bulge. Facet degenerative changes.       Findings combine to create minimal central canal stenosis, moderate right foraminal  stenosis, and mild left foraminal stenosis.     L4-L5:   Broad-based disc bulge and facet degenerative changes combine to  create mild central canal stenosis. Changes combine to create mild right foraminal stenosis and moderate-severe left foraminal stenosis.     L5-S1:   Broad-based disc bulge and facet degenerative changes do not cause  significant central canal stenosis. Changes combine to create moderate bilateral foraminal stenosis.     IMPRESSION:  Mild acute L3 compression fracture.     Multilevel degenerative changes. Brain MRI without contrast-11/11/2019. IMPRESSION:  Scattered foci of acute/subacute ischemia most pronounced in the right  frontal lobe as described above. This is likely related to an embolic phenomenon. ASSESSMENT AND  PLAN. 1.NEUROVASCULAR. Acute metabolic encephalopathy due to UTI-resolved. TIA-resolved. No evidence of CVA on CT head. No indication for brain MRI. 2.PULMONARY. COPD w/o exacerbation -PRN nebs. 3.CARDIOVASCULAR. Sepsis associated hypotension- improved w/ IVF, albumin and midodrine . Diastolic heart dysfunction w/o CHF exacerbation. Elevated troponin due to NSTEMI versus demand ischemia from sepsis. H/o chronic A. Fib-controlled-On amiodarone     H/o dual-chamber pacemaker. Cardiology following. 4.GI.     H/o hiatal hernia-asymptomatic. GERD-PPI    5.RENAL AND ELECTROLYTES. Resolved MIKE due to ATN-resolved. Serum creatinine 0.55. Low serum phosphate - 2.1. Replete       6. ID. Proteus mirabilis ESBL and MRSA UTI-IV Meropenem and Vancomycin. ID following. 7. ENDO.     TSH 5.53 and free T4 1.49.      8.MUSCULOSKELETAL. General body weakness-PT OT when stable. Multilevel cervical, thoracic and lumbar spine DJD/DDD. Vitamin D levels check. 9.HEM-ONC. Normocytic anemia w/ Hb 9.3, MCV 93.6. Serum iron 43, folate 9.3, B12 927.    10.UROLOGY. Left nonobstructing renal calculi. 11.NUTRITION. Protein energy malnutrition-dietitian consult. 12.DISPO.        Planned d/c rehab soon.    Electronically signed Russell Suarez MD on 7/25/2020 at [address] [place] [address]

## 2020-07-25 NOTE — PLAN OF CARE
infection  Description: Will remain free from infection  Outcome: Met This Shift  Goal: Ability to maintain vital signs within normal range will improve  Description: Ability to maintain vital signs within normal range will improve  Outcome: Met This Shift     Problem: Respiratory:  Goal: Ability to maintain normal respiratory secretions will improve  Description: Ability to maintain normal respiratory secretions will improve  Outcome: Met This Shift     Problem: HEMODYNAMIC STATUS  Goal: Patient has stable vital signs and fluid balance  7/25/2020 1716 by Teresa Walker RN  Outcome: Met This Shift  7/25/2020 0420 by Rajendra Mccoy RN  Outcome: Ongoing     Problem: ACTIVITY INTOLERANCE/IMPAIRED MOBILITY  Goal: Mobility/activity is maintained at optimum level for patient  Outcome: Met This Shift     Problem: COMMUNICATION IMPAIRMENT  Goal: Ability to express needs and understand communication  Outcome: Met This Shift     Problem: Nutrition  Goal: Optimal nutrition therapy  Outcome: Met This Shift     Problem: Pain:  Goal: Pain level will decrease  Description: Pain level will decrease  Outcome: Met This Shift  Goal: Control of acute pain  Description: Control of acute pain  Outcome: Met This Shift  Goal: Control of chronic pain  Description: Control of chronic pain  Outcome: Met This Shift

## 2020-07-25 NOTE — PROGRESS NOTES
SUBJECTIVE      Following for MIKE/ATN secondary to contrast.  Admitted with UTI. Creatinine back to normal  Infectious diseases on board for urinary tract infection patient has MRSA in her urine. On Vanco and random level today was 8.4  Received IVF bolus and albumin yesterday due to low BP, and ProAmatine increased. SBP low 135-694 systolic. Urine output 800. Had an additional 550 this morning. Creatinine down to 0.55      OBJECTIVE      CURRENT TEMPERATURE:  Temp: 96.5 °F (35.8 °C)  MAXIMUM TEMPERATURE OVER 24HRS:  Temp (24hrs), Av.3 °F (36.3 °C), Min:96.5 °F (35.8 °C), Max:97.9 °F (36.6 °C)    CURRENT RESPIRATORY RATE:  Resp: 16  CURRENT PULSE:  Pulse: 60  CURRENT BLOOD PRESSURE:  BP: 116/63  24HR BLOOD PRESSURE RANGE:  Systolic (66QZL), FRF:620 , Min:61 , BFY:581   ; Diastolic (79HAM), MDO:48, Min:26, Max:99    24HR INTAKE/OUTPUT:      Intake/Output Summary (Last 24 hours) at 2020 1228  Last data filed at 2020 0645  Gross per 24 hour   Intake 2288.67 ml   Output 800 ml   Net 1488.67 ml     WEIGHT :  Patient Vitals for the past 96 hrs (Last 3 readings):   Weight   20 0619 108 lb 11 oz (49.3 kg)   20 0833 115 lb (52.2 kg)     PHYSICAL EXAM      GENERAL APPEARANCE: Alert and oriented  SKIN: Warm to touch and no erythema  EYES: Conjunctiva was pink  NECK: No JVD. PULMONARY: Lateral air .  No ralesentry and clear to auscultation  CADRDIOVASCULAR: 1 and S2 ++ murmur  ABDOMEN: soft nontender, bowel sounds present, no organomegaly,  no ascites   EXTREMITIES: No edema    CURRENT MEDICATIONS      vancomycin (VANCOCIN) 1000 mg in dextrose 5% 200 mL IVPB, Q24H  meropenem (MERREM) 1 g in sodium chloride 0.9 % 100 mL IVPB (mini-bag), Q12H  vancomycin (VANCOCIN) intermittent dosing (placeholder), RX Placeholder  midodrine (PROAMATINE) tablet 10 mg, TID WC  amiodarone (CORDARONE) tablet 200 mg, Daily  apixaban (ELIQUIS) tablet 2.5 mg, BID  aspirin chewable tablet 81 mg, Component Value Date    PROT 4.5 07/25/2020     UPEP: No results found for: TPU   HEPBSAG:  Lab Results   Component Value Date    HEPBSAG NONREACTIVE 10/07/2019     HEPCAB:  Lab Results   Component Value Date    HEPCAB NONREACTIVE 10/07/2019   URINALYSIS:  U/A:   Lab Results   Component Value Date    NITRU NEGATIVE 07/23/2020    COLORU DARK YELLOW 07/23/2020    PHUR 5.5 07/23/2020    WBCUA TOO NUMEROUS TO COUNT 07/23/2020    RBCUA TOO NUMEROUS TO COUNT 07/23/2020    MUCUS NOT REPORTED 07/23/2020    TRICHOMONAS NOT REPORTED 07/23/2020    YEAST NOT REPORTED 07/23/2020    BACTERIA FEW 07/23/2020    CLARITYU clear5 10/16/2018    SPECGRAV 1.026 07/23/2020    LEUKOCYTESUR LARGE 07/23/2020    UROBILINOGEN Normal 07/23/2020    BILIRUBINUR NEGATIVE 07/23/2020    BLOODU + 10/16/2018    GLUCOSEU NEGATIVE 07/23/2020    KETUA NEGATIVE 07/23/2020    AMORPHOUS NOT REPORTED 07/23/2020         RADIOLOGY    Ultrasound of the kidneys unremarkable    ASSESSMENT      1. Acute kidney injury/ ATN due to CT with contrast.  Renal functions now back to baseline  2. Recent CVA/TIA and neurology is on board. EEG normal  3. Recurrent urinary tract infection  4. Anemia of chronic disease  5. Hypotension   6. Atrial Fibrillation: On Eliquis and amiodarone    PLAN      1. Continue ProAmatine to 10 mg 3 times daily for SBP <110  2. BMP daily  3. Antibiotic as per infectious diseases. 4.  Nephrology will sign off. Attending Physician Statement  I have discussed the care of Robin Mejia, including pertinent history and exam findings with the NP I have reviewed the key elements of all parts of the encounter with the np. Note was updated and recorded changes were made I have seen and examined the patient with the np. I agree with the assessment and plan and status of the problem list as documented.  Wesley Suh

## 2020-07-25 NOTE — PLAN OF CARE
Problem: Restraint Use - Nonviolent/Non-Self-Destructive Behavior:  Goal: Absence of restraint indications  Description: Absence of restraint indications  Outcome: Met This Shift     Problem: Restraint Use - Nonviolent/Non-Self-Destructive Behavior:  Goal: Absence of restraint-related injury  Description: Absence of restraint-related injury  Outcome: Met This Shift     Problem: Skin Integrity:  Goal: Will show no infection signs and symptoms  Description: Will show no infection signs and symptoms  7/25/2020 0420 by Bebeto Kiser RN  Outcome: Ongoing  7/24/2020 1453 by Lashay Esqueda RN  Outcome: Ongoing  Goal: Absence of new skin breakdown  Description: Absence of new skin breakdown  7/24/2020 1453 by Lashay Esqueda RN  Outcome: Ongoing  Goal: Demonstration of wound healing without infection will improve  Description: Demonstration of wound healing without infection will improve  7/24/2020 1453 by Lashay Esqueda RN  Outcome: Ongoing  Goal: Complications related to intravenous access or infusion will be avoided or minimized  Description: Complications related to intravenous access or infusion will be avoided or minimized  7/24/2020 1453 by Lashay Esqueda RN  Outcome: Ongoing     Problem: Falls - Risk of:  Goal: Will remain free from falls  Description: Will remain free from falls  7/25/2020 0420 by Bebeto Kiser RN  Outcome: Ongoing  7/24/2020 1453 by Lashay Esqueda RN  Outcome: Met This Shift     Problem: Nutritional:  Goal: Nutritional status will improve  Description: Nutritional status will improve  7/24/2020 1453 by Lashay Esqueda RN  Outcome: Ongoing     Problem: Physical Regulation:  Goal: Diagnostic test results will improve  Description: Diagnostic test results will improve  7/24/2020 1453 by Lashay Esqueda RN  Outcome: Ongoing  Goal: Will remain free from infection  Description: Will remain free from infection  7/24/2020 1453 by Lashay Esqueda RN  Outcome: Ongoing  Goal: Ability to maintain vital signs within normal range will improve  Description: Ability to maintain vital signs within normal range will improve  7/24/2020 1453 by Marty Ivy RN  Outcome: Ongoing     Problem: Respiratory:  Goal: Ability to maintain normal respiratory secretions will improve  Description: Ability to maintain normal respiratory secretions will improve  7/24/2020 1453 by Marty Ivy RN  Outcome: Ongoing     Problem: HEMODYNAMIC STATUS  Goal: Patient has stable vital signs and fluid balance  7/25/2020 0420 by Angelito Alvarado RN  Outcome: Ongoing       Problem: COMMUNICATION IMPAIRMENT  Goal: Ability to express needs and understand communication    Problem: HEMODYNAMIC STATUS  Goal: Patient has stable vital signs and fluid balance  7/25/2020 0420 by Angelito Alvarado RN  Outcome: Ongoing     Electronically signed by Baudilio Shea RN on 7/25/2020 at 4:21 AM

## 2020-07-25 NOTE — PROGRESS NOTES
evaluation. The patient was found to be hypotensive with a systolic blood pressure in the 70s to 90s, CT scan done in route was found to have no acute intracranial pathology. The patient was seen by the stroke team and CT of the head without contrast today showed no acute intracranial process. Stable diffuse cerebral volume loss and moderate chronic small vessel ischemic changes. The question was whether this was a TIA versus an infectious process/sepsis  The patient was seen by the nephrology service who felt that she had acute kidney injury related to contrast nephropathy given the recent CT urogram 3 days prior to admission. There has been concern for a urinary tract infection and the patient does have a known sacral decubitus ulcer. 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 The renal parameters show improved creatinine but the patient's mentation has been poor. The patient was started on meropenem today by the primary service and due to the meropenem ordered I was asked to evaluate and help with antibiotic choice. CURRENT EVALUATION :7/25/2020    Afebrile  VS stable  BP in lower range    Patient received albumin and ProAmatine on 7/24/2020 because of low blood pressure. She had developed MIKE with ATN secondary to contrast.  Renal function has improved. She is making sufficient amounts of urine. Creatinine elevation has decreased. She had MRSA in the urine, along with Proteus  Currently receiving 1 g every 24 hours of vancomycin, adjusted for creatinine clearance of about 54 cc/min. She is also receiving treatment with meropenem for a previous ESBL positive Proteus. We will plan to maintain treatment with both antibiotics.       I have personally reviewed the past medical history, past surgical history, medications, social history, and family history, and I have updated the database accordingly. 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 Felisa Cushing    Restless leg syndrome     Right knee DJD     Wears glasses     Wears hearing aid in both ears      Past Surgical  History:     Past Surgical History:   Procedure Laterality Date    CARDIAC PACEMAKER PLACEMENT      REMOVAL 11- WITH TEMPORARY PACEMAKER PLACED 11-    CARDIAC PACEMAKER PLACEMENT  12/16/2019    DR Isabel Bah MEDTRONIC BHARATHI XT SR MRI DEFIB. MRI CONDITIONAL MODEL#[id number].  RV LEAD 4338-44    CATARACT REMOVAL WITH IMPLANT Bilateral     Left 1/6/1998, right 8/2/1996    COLONOSCOPY      DILATION AND CURETTAGE OF UTERUS N/A 3/12/2019    DILATATION AND CURETTAGE, HYSTEROSCOPY, MYOSURE performed by Dorian Tavera MD at [address] [place] [address]  08/18/2016    EXPLANTED 2019 medtronic advisa MRI DR model # A2DR01  av lead [id number] rv lead [id number]  - mri compatible    AR GI ENDOSCOPIC ULTRASOUND N/A 9/12/2018    ENDOSCOPIC ULTRASOUND, PATHOLOGY REQUESTED, PAT NOTIFIED performed by Jose Elias Mazariegos MD at [facility]      as a child    LWRFV MAJA ULPOCXOGPTUY Right 9/5/2017    KNEE TOTAL ARTHROPLASTY RIGHT WITH BIOMET AND GPS PRODUCT APPLICATION performed by Nilam Day MD at [facility] TRANSESOPHAGEAL ECHOCARDIOGRAM N/A 10/9/2019    TRANSESOPHAGEAL ECHOCARDIOGRAM WITH BUBBLE STUDY performed by Adina Mosley DO at [address] [place] [address] TRANSESOPHAGEAL ECHOCARDIOGRAM  12/16/2019    UPPER GASTROINTESTINAL ENDOSCOPY  06/29/2017    polyp removed from small intestine near stomach, benign. done at [facility]  9/12/2018    EGD BIOPSY performed by Augie Gold MD at [facility]     Medications:      vancomycin  1,000 mg Intravenous Q24H    meropenem  1 g Intravenous Q12H    vancomycin (VANCOCIN) intermittent dosing (placeholder)   Other RX Placeholder    midodrine  10 mg Oral TID WC    amiodarone  200 mg Oral Daily    apixaban  2.5 mg Oral BID    aspirin  81 mg Oral Daily    lactulose  10 g Oral BID    latanoprost  1 drop Both Eyes Nightly    therapeutic multivitamin-minerals  1 tablet Oral Daily    rOPINIRole  0.25 mg Oral Nightly    atorvastatin  40 mg Oral Nightly    lactobacillus  1 capsule Oral Daily with breakfast     Social History:     Social History     Socioeconomic History    Marital status:       Spouse name: Not on file    Number of children: Not on file    Years of education: Not on file    Highest education level: Not on file   Occupational History    Not on file   Social Needs    Financial resource strain: Not on file    Food insecurity     Worry: Not on file     Inability: Not on file    Transportation needs     Medical: Not on file     Non-medical: Not on file   Tobacco Use    Smoking status: Never Smoker    Smokeless tobacco: Never Used   Substance and Sexual Activity    Alcohol use: No    Drug use: No    Sexual activity: Not on file   Lifestyle    Physical activity     Days per week: Not on file     Minutes per session: Not on file    Stress: Not on file   Relationships    Social connections     Talks on phone: Not on file     Gets together: Not on file     Attends Jew service: Not on file     Active member of club or organization: Not on file     Attends meetings of clubs or organizations: Not on file     Relationship status: Not on file    Intimate partner violence     Fear of current or ex partner: Not on file     Emotionally abused: Not on file     Physically abused: Not on file     Forced sexual activity: Not on file   Other Topics Concern    Not on file   Social History Narrative    Not on file     Family History:     Family History   Problem Relation Age of Onset    Diabetes Sister     Heart Attack Sister     Osteoporosis Sister     Heart Disease Mother     Dementia Mother     Tuberculosis Father       Allergies:   Sulfa antibiotics     Review of Systems:   Review of systems unable to obtain the patient was not vocalizing/verbal at the time of my evaluation. Physical Examination :   /63   Pulse 60   Temp 96.5 °F (35.8 °C) (Axillary)   Resp 16   Ht 5' 3\" (1.6 m)   Wt 108 lb 11 oz (49.3 kg)   SpO2 100%   BMI 19.25 kg/m²     Temperature Range: Temp: 96.5 °F (35.8 °C) Temp  Av.3 °F (36.3 °C)  Min: 96.5 °F (35.8 °C)  Max: 97.9 °F (36.6 °C)  General Appearance: alert, frail-appearing and does open her eyes at the time of my evaluation. She does not follow any commands for me. She was actually quite agitated and was trying to prevent me from evaluating her. Head: Normocephalic, without obvious abnormality, atraumatic  Eyes: Pupils equal, round, reactive, to light and accommodation; extraocular movements intact; sclera anicteric; conjunctivae pink  ENT: Dry oral pharyngeal mucosa. Neck: Supple, without lymphadenopathy. Pulmonary/Chest: Clear to auscultation, without wheezes, rales, or rhonchi  Cardiovascular: Regular rate and rhythm without murmurs, rubs, or gallops.    Abdomen: soft, non-tender, non-distended, normal bowel sounds, no masses or organomegaly and hernia exam- umbilical hernia  Extremities: No cyanosis, clubbing, edema, or effusions. Neurologic: The patient is awake and is resisting all movements for me was trying to push me away during the evaluation. Skin: The patient has a sacrococcygeal pressure related ulceration          Medical Decision Making:   I have independently reviewed/ordered the following labs:  CBC with Differential:   Recent Labs     07/23/20  0848 07/24/20  0613 07/25/20  0634 07/25/20  1226   WBC 5.1 6.6 5.1  --    HGB 8.8* 9.3* 7.3* 8.3*   HCT 29.4* 29.4* 24.8* 28.3*    292 252  --    LYMPHOPCT 24  --   --   --    MONOPCT 10  --   --   --      BMP:   Recent Labs     07/24/20  0613 07/25/20  0634    144   K 5.1 4.5   * 118*   CO2 20 19*   BUN 25* 14   CREATININE 0.97* 0.55   MG  --  2.2     Hepatic Function Panel:   Recent Labs     07/24/20  0613 07/25/20  0634   PROT 5.1* 4.5*   LABALBU 2.2* 2.2*   BILITOT 0.22* 0.26*   ALKPHOS 99 80   ALT 27 20   AST 35* 28     No results found for: CRP  Lab Results   Component Value Date    SEDRATE 33 (H) 11/11/2019       No results for input(s): PROCAL in the last 72 hours.      Lactic Acid, Sepsis, Whole Blood  1.0  0.5 - 1.9 mmol/L  Final  07/23/2020  8:48 AM  [place], [place]  DARK YELLOWAbnormal    YELLOW  Final  07/23/2020  9:05 AM  [address]    Turbidity UA  TURBIDAbnormal    CLEAR  Final  07/23/2020  9:05 AM  [address]    Glucose, Ur  NEGATIVE  NEGATIVE  Final  07/23/2020  9:05 AM  [address]    Bilirubin Urine  NEGATIVE  NEGATIVE  Final  07/23/2020  9:05 AM  [address]    Ketones, Urine  NEGATIVE  NEGATIVE  Final  07/23/2020  9:05 AM  [address]    Specific [place], UA  1.026  1.005 - 1.030  Final  07/23/2020  9:05 AM  [address]    Urine Hgb  LARGEAbnormal    NEGATIVE  Final  07/23/2020  9:05 AM  [address]    pH, Unremarkable ultrasound of the kidneys aside from a solitary 5 cm left renal cyst. Unremarkable limited views of the urinary bladder. Cultures:     CLEAN CATCH URINE    Special Requests  07/21/2020 11:42 AM  [facility]    [id number]    Culture Abnormal    07/21/2020 11:42 AM  [address]    METHICILLIN RESISTANT STAPHYLOCOCCUS AUREUS >[id number] CFU/ML    Culture Abnormal    07/21/2020 11:42 AM  [address] 10 to 50,000 CFU/ML Susceptibility testing not performed on low colony count organisms. Susceptibility     Methicillin resistant staphylococcus aureus (1)     Antibiotic  Interpretation  QUEENIE  Status     penicillin  Resistant   Final      >=0.5   RESISTANT    cefoxitin screen   NOT REPORTED  Final     ciprofloxacin    Final      NOT REPORTED    clindamycin    Final      NOT REPORTED    erythromycin    Final      NOT REPORTED    gentamicin  Sensitive   Final      <=0.5   SUSCEPTIBLE    gentamicin  Sensitive  Gentamicin is used only in combination with other active agents that test susceptible.   Final     Induced Clind Resist   NOT REPORTED  Final     levofloxacin  Intermediate   Final      4   INTERMEDIATE    linezolid    Final      NOT REPORTED    moxifloxacin    Final      NOT REPORTED    nitrofurantoin  Sensitive   Final      <=16   SUSCEPTIBLE    oxacillin  Resistant   Final      >=4   RESISTANT    Synercid    Final      NOT REPORTED    rifampin    Final      NOT REPORTED    tetracycline  Sensitive   Final      <=1   SUSCEPTIBLE    tigecycline    Final      NOT REPORTED    trimethoprim-sulfamethoxazole  Sensitive   Final      <=10   SUSCEPTIBLE    vancomycin  Sensitive   Final      1   SUSCEPTIBLE      URINE    Special Requests  07/02/2020  7:57 PM  [address]    NOT REPORTED    Culture Abnormal    07/02/2020  7:57 PM  [facility] >[id number] CFU/ML THIS ORGANISM IS AN EXTENDED-SPECTRUM BETA-LACTAMASE  AND RESISTANCE TO THERAPY WITH PENICILLINS, CEPHALOSPORINS AND AZTREONAM IS EXPECTED.  THESE ORGANISMS GENERALLY REMAIN SUSCEPTIBLE TO CARBAPENEMS.  CONSIDER ID CONSULTATION. Proteus mirabilis (1)     Antibiotic  Interpretation  QUEENIE  Status     amikacin    Final      NOT REPORTED    ampicillin  Resistant   Final      >=32   RESISTANT    ampicillin-sulbactam    Final      NOT REPORTED    aztreonam  Resistant   Final      <=1   RESISTANT    ceFAZolin  Resistant   Final      >=64   RESISTANT    ceFAZolin  Resistant  Cefazolin sensitivity results can be used to predict the effectiveness of oral cephalosporins (eg. Cephalexin) in uncomplicated Urinary Tract Infections due to E. coli, K. pneumoniae, and P. mirabilis  Final     cefepime  Resistant   Final      2   RESISTANT    cefTRIAXone  Resistant   Final      8   RESISTANT    ciprofloxacin  Resistant   Final      >=4   RESISTANT    ertapenem    Final      NOT REPORTED    gentamicin  Intermediate   Final      8   INTERMEDIATE    meropenem  Sensitive   Final      <=0.25   SUSCEPTIBLE    nitrofurantoin  Resistant   Final      128   RESISTANT    tigecycline    Final      NOT REPORTED    tobramycin  Resistant   Final      >=16   RESISTANT    trimethoprim-sulfamethoxazole  Resistant   Final      >=320   RESISTANT    piperacillin-tazobactam  Resistant   Final      <=4   RESISTANT            Thank you for allowing us to participate in the care of this patient. Please call with questions.     Electronically signed by Vaibhav Orosco MD on 7/25/2020 at 3:32 PM      [facility]  Latoya Simeon MD  [employer] messaging  OFFICE: [phone]      This note is created with the assistance of a speech recognition program.  While intending to generate a document that actually reflects the content of the visit, the document can still have some errors including those of syntax and sound a like substitutions which may escape proof reading. In such instances, actual meaning can be extrapolated by contextual diversion.

## 2020-07-25 NOTE — PROCEDURES
[address]                  [address] [facility], [place] [address]                          ELECTROENCEPHALOGRAM REPORT    PATIENT NAME: TOYA VU                        :        1930  MED REC NO:   [id number]                             ROOM:       1075  ACCOUNT NO:   [de-identified]                           ADMIT DATE: 2020  PROVIDER:     Pedro Luis De Anda    DATE OF EE2020    ATTENDING OF RECORD:  Karli Portillo DNP    REASON FOR STUDY:  This is an 80year-old patient with confusion,  slurring with facial droop. MEDICATIONS:  Include meropenem, vancomycin, ProAmatine, Cordarone,  Eliquis, Chronulac, Requip. This is a 16-channel EEG with one EKG channel recording performed on a  patient who is described to be awake and drowsy. The patient shows  normal waking rhythms. Background activity consists of well regulated 8  Hz activity in a 40-60 microvolt range, more prominent over the  posterior head area, showing good reactivity to eye opening and closing. Over the anterior head regions, there are 15-20 Hz activity in a 20-30  microvolt range. With drowsiness, there is further intrusion of slower  frequencies in a theta and lesser degree a delta band. This record is  not lateralized or epileptiform. Hyperventilation along with photic stimulation is not performed. IMPRESSION:  This EEG is within normal limits for an awake and drowsy  patient. No lateralized or epileptiform disturbance is seen.         Saeid Lepe    D: 2020 18:39:51       T: 2020 20:01:45     [email or web address]  Job#: [id number]     Doc#: [id number]    CC:

## 2020-07-26 LAB
ALBUMIN SERPL-MCNC: 2.3 G/DL (ref 3.5–5.2)
ALBUMIN/GLOBULIN RATIO: 1 (ref 1–2.5)
ALP BLD-CCNC: 90 U/L (ref 35–104)
ALT SERPL-CCNC: 20 U/L (ref 5–33)
ANION GAP SERPL CALCULATED.3IONS-SCNC: 11 MMOL/L (ref 9–17)
AST SERPL-CCNC: 23 U/L
BILIRUB SERPL-MCNC: 0.25 MG/DL (ref 0.3–1.2)
BUN BLDV-MCNC: 9 MG/DL (ref 8–23)
BUN/CREAT BLD: ABNORMAL (ref 9–20)
CALCIUM SERPL-MCNC: 7.6 MG/DL (ref 8.6–10.4)
CHLORIDE BLD-SCNC: 115 MMOL/L (ref 98–107)
CO2: 20 MMOL/L (ref 20–31)
CREAT SERPL-MCNC: 0.55 MG/DL (ref 0.5–0.9)
GFR AFRICAN AMERICAN: >60 ML/MIN
GFR NON-AFRICAN AMERICAN: >60 ML/MIN
GFR SERPL CREATININE-BSD FRML MDRD: ABNORMAL ML/MIN/{1.73_M2}
GFR SERPL CREATININE-BSD FRML MDRD: ABNORMAL ML/MIN/{1.73_M2}
GLUCOSE BLD-MCNC: 113 MG/DL (ref 70–99)
HCT VFR BLD CALC: 27.9 % (ref 36.3–47.1)
HEMOGLOBIN: 8.6 G/DL (ref 11.9–15.1)
MCH RBC QN AUTO: 29.3 PG (ref 25.2–33.5)
MCHC RBC AUTO-ENTMCNC: 30.8 G/DL (ref 28.4–34.8)
MCV RBC AUTO: 94.9 FL (ref 82.6–102.9)
NRBC AUTOMATED: 0 PER 100 WBC
PDW BLD-RTO: 19.2 % (ref 11.8–14.4)
PLATELET # BLD: 263 K/UL (ref 138–453)
PMV BLD AUTO: 10.8 FL (ref 8.1–13.5)
POTASSIUM SERPL-SCNC: 3.7 MMOL/L (ref 3.7–5.3)
RBC # BLD: 2.94 M/UL (ref 3.95–5.11)
SODIUM BLD-SCNC: 146 MMOL/L (ref 135–144)
TOTAL PROTEIN: 4.7 G/DL (ref 6.4–8.3)
VANCOMYCIN TROUGH DATE LAST DOSE: ABNORMAL
VANCOMYCIN TROUGH DOSE AMOUNT: ABNORMAL
VANCOMYCIN TROUGH TIME LAST DOSE: ABNORMAL
VANCOMYCIN TROUGH: 9.6 UG/ML (ref 10–20)
WBC # BLD: 6.7 K/UL (ref 3.5–11.3)

## 2020-07-26 PROCEDURE — 2580000003 HC RX 258: Performed by: INTERNAL MEDICINE

## 2020-07-26 PROCEDURE — 94761 N-INVAS EAR/PLS OXIMETRY MLT: CPT

## 2020-07-26 PROCEDURE — 85027 COMPLETE CBC AUTOMATED: CPT

## 2020-07-26 PROCEDURE — 80053 COMPREHEN METABOLIC PANEL: CPT

## 2020-07-26 PROCEDURE — 99232 SBSQ HOSP IP/OBS MODERATE 35: CPT | Performed by: PSYCHIATRY & NEUROLOGY

## 2020-07-26 PROCEDURE — 99232 SBSQ HOSP IP/OBS MODERATE 35: CPT | Performed by: INTERNAL MEDICINE

## 2020-07-26 PROCEDURE — 6370000000 HC RX 637 (ALT 250 FOR IP): Performed by: NURSE PRACTITIONER

## 2020-07-26 PROCEDURE — 36415 COLL VENOUS BLD VENIPUNCTURE: CPT

## 2020-07-26 PROCEDURE — 2060000000 HC ICU INTERMEDIATE R&B

## 2020-07-26 PROCEDURE — 6360000002 HC RX W HCPCS: Performed by: INTERNAL MEDICINE

## 2020-07-26 PROCEDURE — 6370000000 HC RX 637 (ALT 250 FOR IP): Performed by: INTERNAL MEDICINE

## 2020-07-26 PROCEDURE — 80202 ASSAY OF VANCOMYCIN: CPT

## 2020-07-26 RX ADMIN — MULTIPLE VITAMINS W/ MINERALS TAB 1 TABLET: TAB at 08:54

## 2020-07-26 RX ADMIN — Medication 1 CAPSULE: at 08:54

## 2020-07-26 RX ADMIN — DESMOPRESSIN ACETATE 40 MG: 0.2 TABLET ORAL at 21:00

## 2020-07-26 RX ADMIN — LATANOPROST 1 DROP: 50 SOLUTION OPHTHALMIC at 21:00

## 2020-07-26 RX ADMIN — ASPIRIN 81 MG: 81 TABLET, CHEWABLE ORAL at 08:54

## 2020-07-26 RX ADMIN — MIDODRINE HYDROCHLORIDE 10 MG: 5 TABLET ORAL at 16:01

## 2020-07-26 RX ADMIN — ROPINIROLE HYDROCHLORIDE 0.25 MG: 0.25 TABLET, FILM COATED ORAL at 21:00

## 2020-07-26 RX ADMIN — AMIODARONE HYDROCHLORIDE 200 MG: 200 TABLET ORAL at 08:54

## 2020-07-26 RX ADMIN — APIXABAN 2.5 MG: 2.5 TABLET, FILM COATED ORAL at 21:00

## 2020-07-26 RX ADMIN — LACTULOSE 10 G: 10 SOLUTION ORAL at 08:54

## 2020-07-26 RX ADMIN — MEROPENEM 1 G: 1 INJECTION, POWDER, FOR SOLUTION INTRAVENOUS at 01:30

## 2020-07-26 RX ADMIN — LACTULOSE 10 G: 10 SOLUTION ORAL at 21:00

## 2020-07-26 RX ADMIN — VANCOMYCIN HYDROCHLORIDE 1250 MG: 10 INJECTION, POWDER, LYOPHILIZED, FOR SOLUTION INTRAVENOUS at 16:39

## 2020-07-26 RX ADMIN — MEROPENEM 1 G: 1 INJECTION, POWDER, FOR SOLUTION INTRAVENOUS at 13:02

## 2020-07-26 RX ADMIN — APIXABAN 2.5 MG: 2.5 TABLET, FILM COATED ORAL at 08:54

## 2020-07-26 RX ADMIN — MIDODRINE HYDROCHLORIDE 10 MG: 5 TABLET ORAL at 11:38

## 2020-07-26 ASSESSMENT — ENCOUNTER SYMPTOMS
APNEA: 0
ABDOMINAL PAIN: 0
PHOTOPHOBIA: 0
COLOR CHANGE: 0
CHOKING: 0
EYE REDNESS: 0
DIARRHEA: 0
SHORTNESS OF BREATH: 0
EYE DISCHARGE: 0
ABDOMINAL DISTENTION: 0
SINUS PRESSURE: 0
VOICE CHANGE: 0
STRIDOR: 0
BLOOD IN STOOL: 0
RECTAL PAIN: 0
VOMITING: 0
RHINORRHEA: 0
COUGH: 0
NAUSEA: 0
CHEST TIGHTNESS: 0
SORE THROAT: 0
WHEEZING: 0
EYE PAIN: 0

## 2020-07-26 ASSESSMENT — PAIN SCALES - GENERAL
PAINLEVEL_OUTOF10: 0
PAINLEVEL_OUTOF10: 0

## 2020-07-26 NOTE — PROGRESS NOTES
Active Problem Metabolic encephalopathy. History right cerebral infarction . UTI . Hypotension . Cardiac pacemaker . The condition is she is alert more oriented x 2 lifting all limbs equally . There is no slurring with minor decreased left NLF. Son reports that she does have history of some memory loss but is clearly more confused disoriented from her baseline . 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 Patient later presented with slurring with left facial droop . CTA head and neck with occlusion right M2, M3 . She has atrial fibrillation on eliquis 2.5 mg po qd along with aspirin 81 mg po qd and lipitor 10 mg po qd . Patient is resident of extended care facility noted to have left facial assymmetry along with slurring of speech along with confusion disorientation . Blood pressure in 70 to 90 range found to have UTI with UA showing large leukocyte esterase. Head CT with bilateral chronic periventricular small vessel ischemia . Creatinine 1.40 with no CTA study done . TSH normal. U54 914, folic acid 9.3 . Carotid ultrasound normal     Past Medical History:   Diagnosis Date    Arthritis     Atrial fibrillation ([address] 8/21/2016    [facility]     Complete heart block ([address] 8/18/2016    Constipation     CVA (cerebral vascular accident) ([address] 11/08/2019    ACUTE ISCHEMIC    GERD (gastroesophageal reflux disease)     Glaucoma     mild, on eye drops    Hearing loss     bilateral hearing aids    Hiatal hernia     Hx of blood clots 07/2016    pulmonary emboli bilateral lungs after Pacemaker inserted    Hyperlipidemia     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 Cotton Huge    Restless leg syndrome     Right knee DJD     Wears glasses     Wears hearing aid in both ears        Past Surgical History:   Procedure Laterality Date    CARDIAC PACEMAKER PLACEMENT      REMOVAL 11- WITH TEMPORARY PACEMAKER PLACED 11-    CARDIAC PACEMAKER PLACEMENT  12/16/2019    DR Prema Morris MEDTR[employer]IC BHARATHI XT SR MRI DEFIB. MRI CONDITIONAL MODEL#W1SR01. RV LEAD 0593-06    CATARACT REMOVAL WITH IMPLANT Bilateral     Left 1/6/1998, right 8/2/1996    COLONOSCOPY      DILATION AND CURETTAGE OF UTERUS N/A 3/12/2019    DILATATION AND CURETTAGE, HYSTEROSCOPY, MYOSURE performed by Christopher Benson MD at [address] [place] [address]  08/18/2016    EXPLANTED 2019 medtronic advisa MRI DR model # A2DR01  av lead [id number] rv lead [id number]  - mri compatible    [place] GI ENDOSCOPIC ULTRASOUND N/A 9/12/2018    ENDOSCOPIC ULTRASOUND, PATHOLOGY REQUESTED, PAT NOTIFIED performed by Vonn Simmonds[facility]      as a child    TOTAL KNEE ARTHROPLASTY Right 9/5/2017    KNEE TOTAL ARTHROPLASTY RIGHT WITH BIOMET AND GPS PRODUCT APPLICATION performed by Mary Leon MD at [address] TRANSESOPHAGEAL ECHOCARDIOGRAM N/A 10/9/2019    TRANSESOPHAGEAL ECHOCARDIOGRAM WITH BUBBLE STUDY performed by Darren Junior DO at [facility] [place] [employer] TRANSESOPHAGEAL ECHOCARDIOGRAM  12/16/2019    UPPER GASTROINTESTINAL ENDOSCOPY  06/29/2017    polyp removed from small intestine near stomach, benign. done at [facility]  9/12/2018    EGD BIOPSY performed by Vonn Simmonds, MD at [facility]       Family History   Problem Relation Age of Onset    Diabetes Sister     Heart Attack Sister     Osteoporosis Sister     Heart Disease Mother     Dementia Mother     Tuberculosis Father        Social History     Socioeconomic History    Marital status:       Spouse name: None    Number of children: None    Years of education: None    Highest education level: None Occupational History    None   Social Needs    Financial resource strain: None    Food insecurity     Worry: None     Inability: None    Transportation needs     Medical: None     Non-medical: None   Tobacco Use    Smoking status: Never Smoker    Smokeless tobacco: Never Used   Substance and Sexual Activity    Alcohol use: No    Drug use: No    Sexual activity: None   Lifestyle    Physical activity     Days per week: None     Minutes per session: None    Stress: None   Relationships    Social connections     Talks on phone: None     Gets together: None     Attends Orthodoxy service: None     Active member of club or organization: None     Attends meetings of clubs or organizations: None     Relationship status: None    Intimate partner violence     Fear of current or ex partner: None     Emotionally abused: None     Physically abused: None     Forced sexual activity: None   Other Topics Concern    None   Social History Narrative    None       Current Facility-Administered Medications   Medication Dose Route Frequency Provider Last Rate Last Dose    vancomycin (VANCOCIN) 1250 mg in dextrose 5 % 250 mL IVPB  1,250 mg Intravenous Q24H Samantha Russ MD   Stopped at 07/26/20 1827    meropenem (MERREM) 1 g in sodium chloride 0.9 % 100 mL IVPB (mini-bag)  1 g Intravenous Q12H Samantha Russ MD   Stopped at 07/26/20 1605    vancomycin (VANCOCIN) intermittent dosing (placeholder)   Other RX Placeholder Samantha Russ MD        midodrine (PROAMATINE) tablet 10 mg  10 mg Oral TID  Alice Walker MD   10 mg at 07/26/20 1601    amiodarone (CORDARONE) tablet 200 mg  200 mg Oral Daily ALEXSANDRA Cartwright NP   200 mg at 07/26/20 0854    apixaban (ELIQUIS) tablet 2.5 mg  2.5 mg Oral BID ALEXSANDRA Cartwright NP   2.5 mg at 07/26/20 0854    aspirin chewable tablet 81 mg  81 mg Oral Daily ALEXSANDRA Cartwright NP   81 mg at 07/26/20 0854    ipratropium-albuterol (DUONEB) nebulizer solution 3 mL  1 vial Nebulization Q4H PRN Vonn April, APRN - NP        lactulose ([facility]) 10 GM/15ML solution 10 g  10 g Oral BID Vonn April, APRN - NP   10 g at 07/26/20 0854    latanoprost (XALATAN) 0.005 % ophthalmic solution 1 drop  1 drop Both Eyes Nightly Vonn April, APRN - NP   1 drop at 07/25/20 2047    therapeutic multivitamin-minerals 1 tablet  1 tablet Oral Daily Vonn April, APRN - NP   1 tablet at 07/26/20 0854    rOPINIRole (REQUIP) tablet 0.25 mg  0.25 mg Oral Nightly Vonn April, APRN - NP   0.25 mg at 07/25/20 2047    acetaminophen (TYLENOL) tablet 650 mg  650 mg Oral Q6H PRN Vonn April, APRN - NP        Or   [place] acetaminophen (TYLENOL) suppository 650 mg  650 mg Rectal Q6H PRN Vonn April, APRN - NP        acetaminophen (TYLENOL) tablet 650 mg  650 mg Oral Q6H PRN Vonn April, APRN - NP   650 mg at 07/25/20 1232    Or    acetaminophen (TYLENOL) suppository 650 mg  650 mg Rectal Q6H PRN Vonn April, APRN - NP        promethazine (PHENERGAN) tablet 12.5 mg  12.5 mg Oral Q6H PRN Vonn April, APRN - NP        Or    [facility]ron [facility]) injection 4 mg  4 mg Intravenous Q6H PRN Vonn April, APRN - NP        atorvastatin (LIPITOR) tablet 40 mg  40 mg Oral Nightly Vonn April, APRN - NP   40 mg at 07/25/20 2047    nitroGLYCERIN (NITROSTAT) SL tablet 0.4 mg  0.4 mg Sublingual Q5 Min PRN Vonn April, APRN - NP        lactobacillus (CULTURELLE) capsule 1 capsule  1 capsule Oral Daily with breakfast Vonn April, APRN - NP   1 capsule at 07/26/20 4671       Allergies   Allergen Reactions    Sulfa Antibiotics Rash       ROS:   Constitutional                  Negative for fever and chills   HEENT                            Negative for ear discharge, ear pain, nosebleed  Eyes                                Negative for photophobia, pain and discharge  Respiratory                      Negative for hemoptysis and sputum  Cardiovascular                Negative for orthopnea, claudication and PND  Gastrointestinal Negative for abdominal pain, diarrhea, blood in stool  Musculoskeletal               Negative for joint pain, negative for myalgia  Skin                                 Negative for rash or itching  Endo/heme/allergies       Negative for polydipsia, environmental allergy  Psychiatric                       Negative for suicidal ideation. Patient is not anxious    Vitals:    07/26/20 1630   BP: 116/85   Pulse: 60   Resp:    Temp:    SpO2: 100%     Admission weight: 115 lb (52.2 kg)    Neurological Examination  Constitutional .General exam well groomed   Head/ Ears /Nose/Throat/external ear . Normal exam  Neck and thyroid . Normal size. No bruits  Cardiovascular: Auscultation of heart with regular rate and rhythm   Musculoskeletal. Muscle bulk and tone normal                                                           Muscle strength lifting all limbs equally                                                                                 No dysmetria or dysdiadokinesis  No tremor   Normal fine motor  Orientation Alert and oriented x 2  Attention and concentration reduced   Short term memory reduced  Language process and speech normal . No aphasia   Cranial nerve 2 normal acuety and visual fields  Cranial nerve 3, 4 and 6 . Extraocular muscles are intact . Pupils are equal and reactive   Cranial nerve 5 . Intact corneal reflex. Normal facial sensation  Cranial nerve 7 normal exam   Cranial nerve 8. Grossly intact hearing   Cranial nerve 9 and 10. Symmetric palate elevation   Cranial nerve 11 , 5 out of 5 strength   Cranial Nerve 12 midline tongue . No atrophy  Sensation . Normal pinprick and light touch   Deep Tendon Reflexes symmetrical  Plantar response flexor bilaterally    Assessment :    Metabolic encephalopathy. History right cerebral infarction . UTI . Hypotension . Cardiac pacemaker . Possible underlying cognitive disorder       Plan:    PT/OT

## 2020-07-26 NOTE — PLAN OF CARE
Problem: Skin Integrity:  Goal: Will show no infection signs and symptoms  Description: Will show no infection signs and symptoms  Outcome: Met This Shift  . Problem: Skin Integrity:  Goal: Absence of new skin breakdown  Description: Absence of new skin breakdown  Outcome: Met This Shift   Patient repositioned every 2 hours and as needed. Wound dressing changed daily. Problem: Falls - Risk of:  Goal: Will remain free from falls  Description: Will remain free from falls  Outcome: Met This Shift   Pt. Bed alarm on at all times and telesitter  at bedside due to patients confusion and fall risk. Problem: Falls - Risk of:  Goal: Absence of physical injury  Description: Absence of physical injury  Outcome: Met This Shift     Problem: Nutritional:  Goal: Nutritional status will improve  Description: Nutritional status will improve  Outcome: Met This Shift   Patient receiving nutritional supplements with every meal. Patients family at bedside for meals and assists patient with eating.

## 2020-07-26 NOTE — PROGRESS NOTES
[facility]   [facility]    NEUROLOGY INPATIENT PROGRESS NOTE    7/26/2020         Subjective: Wilder Le is a  80 y.o. female admitted on 7/23/2020 with TIA (transient ischemic attack) [G45.9]    Briefly, this patient 80 y.o. 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                  As per nursing home they checked on patient at [facility] and was within her usual health and then at 6:45AM they notice some left facial droop and slurred speech. When EMS was called symptoms resolved. When EMS came slurred speech came back and then mobile stroke unit came and symptoms resolved. Found to have hypotension in the 70-90's. Got Head CT WO on [facility] and was found with no acute intracranial pathology. Transfer to [address] for evaluation. EMS was told patient have UTI and currently being treated and is on Eliquis for her Atrial Fibrillation and being complian     Today patient was evaluated at bedside and found alert and oriented x3, no acute distress, no fevers. Patient continued to do better and currently with no hypertensions and getting treated for UTI    No current facility-administered medications on file prior to encounter.       Current Outpatient Medications on File Prior to Encounter   Medication Sig Dispense Refill    Lactobacillus (ACIDOPHILUS) TABS Take by mouth 2 times daily      Nutritional Supplements (BOOST PLUS PO) Take 237 mLs by mouth 3 times daily      diphenhydrAMINE (BENADRYL) 50 MG capsule Take 50 mg by mouth every 4 hours as needed for Itching      ipratropium-albuterol (DUONEB) 0.5-2.5 (3) MG/3ML SOLN nebulizer solution Take 1 vial by nebulization every 4 hours as needed for Shortness of Breath      Multiple Vitamins-Minerals (THERAPEUTIC MULTIVITAMIN-MINERALS) tablet Take 1 tablet by mouth daily      PROTEIN PO Take 30 mLs by mouth 3 times daily      traMADol (ULTRAM) 50 MG tablet Take 50 mg by mouth 2 times daily.  apixaban (ELIQUIS) 2.5 MG TABS tablet Take 2.5 mg by mouth 2 times daily      Calcium Carbonate-Vitamin D (CALCIUM 500 + D PO) Take 1 tablet by mouth daily CALCIUM 500MG+VIT D 200IU      lisinopril (PRINIVIL;ZESTRIL) 2.5 MG tablet Take 2.5 mg by mouth nightly      Magnesium Oxide (MAG- PO) Take 400 mg by mouth 2 times daily      lactulose (CHRONULAC) 10 GM/15ML solution Take 10 g by mouth 2 times daily      potassium chloride (MICRO-K) 10 MEQ extended release capsule Take 30 mEq by mouth daily       furosemide (LASIX) 20 MG tablet Take 1 tablet by mouth 2 times daily (Patient taking differently: Take 20 mg by mouth daily ) 60 tablet 3    aspirin 81 MG chewable tablet Take 1 tablet by mouth daily 30 tablet 3    guaiFENesin (MUCINEX) 600 MG extended release tablet Take 1 tablet by mouth 2 times daily 60 tablet 0    amiodarone (CORDARONE) 200 MG tablet Take 200 mg by mouth daily      polyethylene glycol (MIRALAX) powder Take 17 g by mouth daily as needed      metoprolol tartrate (LOPRESSOR) 25 MG tablet Take 1 tablet by mouth 2 times daily 60 tablet 3    rOPINIRole (REQUIP) 0.25 MG tablet Take 0.25 mg by mouth nightly       atorvastatin (LIPITOR) 10 MG tablet Take 10 mg by mouth daily       latanoprost (XALATAN) 0.005 % ophthalmic solution Place 1 drop into both eyes nightly       Acetaminophen (TYLENOL ARTHRITIS PAIN PO) Take 2 tablets by mouth every 6 hours as needed          Allergies: Reginald Lopez is allergic to sulfa antibiotics.     Past Medical History:   Diagnosis Date    Arthritis     Atrial fibrillation ([facility] 8/21/2016    [facility]est[facility] failure) [facility]     Complete heart block ([facility] 8/18/2016    Constipation     CVA (cerebral vascular accident) ([facility] 11/08/2019    ACUTE ISCHEMIC    GERD (gastroesophageal reflux disease)     Glaucoma     mild, on eye drops    Hearing loss     bilateral hearing aids    Hiatal hernia     Hx of blood clots 07/2016    pulmonary emboli bilateral lungs after Pacemaker inserted    Hyperlipidemia     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 Freda Crenshaw    Restless leg syndrome     Right knee DJD     Wears glasses     Wears hearing aid in both ears        Past Surgical History:   Procedure Laterality Date    CARDIAC PACEMAKER PLACEMENT      REMOVAL 11- WITH TEMPORARY PACEMAKER PLACED 11-    CARDIAC PACEMAKER PLACEMENT  12/16/2019    DR Diana Bartholomew [employer]TR[employer] BHARATHI XT SR MRI DEFIB. MRI CONDITIONAL MODEL#W1SR01. RV LEAD 1847-02    CATARACT REMOVAL WITH IMPLANT Bilateral     Left 1/6/1998, right 8/2/1996    COLONOSCOPY      DILATION AND CURETTAGE OF UTERUS N/A 3/12/2019    DILATATION AND CURETTAGE, HYSTEROSCOPY, MYOSURE performed by Annette Baptiste MD at [address] [place] [address]  08/18/2016    EXPLANTED 2019 medtronic advisa MRI DR model # A2DR01  av lead [id number] rv lead [id number]  - mri compatible    [place] GI ENDOSCOPIC ULTRASOUND N/A 9/12/2018    ENDOSCOPIC ULTRASOUND, PATHOLOGY REQUESTED, PAT NOTIFIED performed by Shabana Linares MD at [facility]      as a child    TOTAL KNEE ARTHROPLASTY Right 9/5/2017    KNEE TOTAL ARTHROPLASTY RIGHT WITH BIOMET AND GPS PRODUCT APPLICATION performed by Daksha Ochoa MD at [address] [place] [address] Drive TRANSESOPHAGEAL ECHOCARDIOGRAM N/A 10/9/2019    TRANSESOPHAGEAL ECHOCARDIOGRAM WITH BUBBLE STUDY performed by Yana Weston DO at [address] [place] [address] TRANSESOPHAGEAL ECHOCARDIOGRAM  12/16/2019    UPPER GASTROINTESTINAL ENDOSCOPY  06/29/2017    polyp removed from small intestine near stomach, benign.   done at [facility] [address] [place]/ AT   HEART  S1 and S2 heard; palpation of pulses: radial pulse    NECK  Supple and no bruits heard   MENTAL STATUS:  Alert, oriented, intact memory, no confusion, normal speech, normal language, no hallucination or delusion   CRANIAL NERVES: II     -      Visual fields intact to confrontation  III,IV,VI -  PERR, EOMs full, no ptosis  V     -     Normal facial sensation   VII    -     very minor decrease left nasolabial fold  VIII   -     Intact hearing   IX,X -     Symmetrical palate  XI    -     Symmetrical shoulder shrug  XII   -     Midline tongue, no atrophy    MOTOR FUNCTION: RUE: Significant for good strength of grade 5/5 in proximal and distal muscle groups   LUE: Significant for good strength of grade 5/5 in proximal and distal muscle groups   RLE: Significant for good strength of grade 5/5 in proximal and distal muscle groups   LLE: Significant for good strength of grade 5/5 in proximal and distal muscle groups      Normal bulk, normal tone and no involuntary movements, no tremor   SENSORY FUNCTION:  Normal touch, normal pin prick   CEREBELLAR FUNCTION:  Intact fine motor control over upper limbs   REFLEX FUNCTION:  Symmetric in upper and lower extremities, no Babinski sign   STATION and GAIT  will work with PT, OT       Data:    Lab Results:   CBC:   Recent Labs     07/23/20  0848 07/24/20  0613 07/25/20  0634 07/25/20  1226   WBC 5.1 6.6 5.1  --    HGB 8.8* 9.3* 7.3* 8.3*   HCT 29.4* 29.4* 24.8* 28.3*    292 252  --      BMP:    Recent Labs     07/23/20  0848 07/24/20  0613 07/25/20  0634    142 144   K 4.9 5.1 4.5    111* 118*   CO2 24 20 19*   BUN 37* 25* 14   CREATININE 1.40* 0.97* 0.55   GLUCOSE 84 67* 80         Lab Results   Component Value Date    CHOL 102 07/23/2020    LDLCHOLESTEROL 34 07/23/2020    HDL 52 07/23/2020    TRIG 78 07/23/2020    ALT 20 07/25/2020    AST 28 07/25/2020    TSH 5.53 (H) 07/25/2020    INR 1.1 07/23/2020    LABA1C 4.9 07/23/2020    [id number] 927 07/23/2020     IMAGING    1.) CT Head without contrast:  Impression    1. No acute intracranial process identified. 2. Stable diffuse cerebral volume loss and moderate chronic small vessel    ischemic changes. The findings were sent to the Radiology Results [address] at 8:07    am on 7/23/2020to be communicated to a licensed caregiver.             2.)  Bilateral carotid Doppler ultrasound  Right:     The internal carotid artery is normal.     Unable to visualize the vertebral artery.          Left:     The internal carotid artery is normal.     Unable to visualize the vertebral artery. 3.) EEG  IMPRESSION:  This EEG is within normal limits for an awake and drowsy  patient. No lateralized or epileptiform disturbance is seen. ASSESSMENT     Case of a 80 y.o. female patient admitted due to sudden onset intermittent slurred speech, facial droop along with infection and hypotension. // Metabolic encephalopathy//   Patient with some confusion in the setting of hypotension, UTI, acute kidney injury. Currently not as hypotensive. Being treated for UTI.  EEG was unremarkable and no abnormality on bilateral carotid Doppler ultrasound. Creatinine 0.55 today. PLAN / RECOMMENDATIONS  1. Continue PT, OT  2. Internal medicine as primary, discharge planning.   Possible rehab    Milady Kim MD [facility]  Adult General Neurology Resident PGY-4  7/26/2020 at 7:36 AM

## 2020-07-26 NOTE — PROGRESS NOTES
[address] [facility] [place]ist Progress Note-Internal Medicine. [facility] 4B Stepdown       Patient: Laureano Guillaume    Unit/Bed: 3367/1590-78    YOB: 1930    MRN: [id number]    Acct: [de-identified]     Admitting Diagnosis:TIA (transient ischemic attack) [G45.9]    Admit Date:  7/23/2020    Hospital Day: 3    Subjective:    Patient is stable. Communicating without difficulties. Improved mental status back to baseline. Patient son is sitting at the bedside. Patient Seen, Chart, Labs, Radiology studies, and Consults reviewed. Objective:   /62   Pulse 60   Temp 98.4 °F (36.9 °C) (Oral)   Resp 15   Ht 5' 3\" (1.6 m)   Wt 108 lb 11 oz (49.3 kg)   SpO2 99%   BMI 19.25 kg/m²       Intake/Output Summary (Last 24 hours) at 7/26/2020 1429  Last data filed at 7/25/2020 1714  Gross per 24 hour   Intake 1189 ml   Output 650 ml   Net 539 ml       Review of Systems   Constitutional: Positive for activity change. Negative for appetite change, chills, diaphoresis, fatigue, fever and unexpected weight change. HENT: Negative for congestion, drooling, ear discharge, ear pain, hearing loss, nosebleeds, postnasal drip, rhinorrhea, sinus pressure, sneezing, sore throat, tinnitus and voice change. Eyes: Negative for photophobia, pain, discharge, redness and visual disturbance. Respiratory: Negative for apnea, cough, choking, chest tightness, shortness of breath, wheezing and stridor. Cardiovascular: Negative for chest pain, palpitations and leg swelling. Gastrointestinal: Negative for abdominal distention, abdominal pain, blood in stool, diarrhea, nausea, rectal pain and vomiting. Endocrine: Negative for heat intolerance, polydipsia, polyphagia and polyuria.    Genitourinary: Negative for decreased urine volume, difficulty urinating, dyspareunia, dysuria, enuresis, frequency, hematuria, menstrual problem, pelvic pain, urgency, vaginal discharge and vaginal pain. Musculoskeletal: Negative for arthralgias, gait problem, myalgias and neck pain. Skin: Negative for color change, pallor, rash and wound. Allergic/Immunologic: Negative for food allergies and immunocompromised state. Neurological: Positive for weakness. Negative for dizziness, tremors, seizures, syncope, facial asymmetry, speech difficulty, light-headedness, numbness and headaches. Hematological: Negative for adenopathy. Does not bruise/bleed easily. Psychiatric/Behavioral: Negative for agitation, behavioral problems, confusion, decreased concentration, dysphoric mood, hallucinations, self-injury, sleep disturbance and suicidal ideas. The patient is not nervous/anxious and is not hyperactive. Physical Exam  Vitals signs and nursing note reviewed. Constitutional:       General: She is not in acute distress. Appearance: Normal appearance. She is normal weight. She is not ill-appearing, toxic-appearing or diaphoretic. HENT:      Head: Normocephalic and atraumatic. Nose: Nose normal. No congestion or rhinorrhea. Mouth/Throat:      Mouth: Mucous membranes are moist.      Pharynx: Oropharynx is clear. No oropharyngeal exudate or posterior oropharyngeal erythema. Eyes:      General: No scleral icterus. Right eye: No discharge. Left eye: No discharge. Extraocular Movements: Extraocular movements intact. Conjunctiva/sclera: Conjunctivae normal.      Pupils: Pupils are equal, round, and reactive to light. Neck:      Musculoskeletal: Normal range of motion and neck supple. No neck rigidity or muscular tenderness. Vascular: No carotid bruit. Cardiovascular:      Rate and Rhythm: Normal rate and regular rhythm. Pulses: Normal pulses. Heart sounds: Normal heart sounds. No murmur. No friction rub. No gallop. Pulmonary:      Effort: Pulmonary effort is normal. No respiratory distress.       Breath sounds: Normal breath sounds. No stridor. No wheezing, rhonchi or rales. Chest:      Chest wall: No tenderness. Abdominal:      General: Abdomen is flat. Bowel sounds are normal. There is no distension. Palpations: Abdomen is soft. There is no mass. Tenderness: There is no right CVA tenderness, left CVA tenderness, guarding or rebound. Hernia: No hernia is present. Genitourinary:     Comments: External Martin catheter  Musculoskeletal: Normal range of motion. General: No swelling, tenderness, deformity or signs of injury. Right lower leg: No edema. Left lower leg: No edema. Lymphadenopathy:      Cervical: No cervical adenopathy. Skin:     General: Skin is warm. Coloration: Skin is pale. Skin is not jaundiced. Findings: No bruising, erythema, lesion or rash. Neurological:      General: No focal deficit present. Mental Status: She is alert and oriented to person, place, and time. Mental status is at baseline. Cranial Nerves: No cranial nerve deficit. Sensory: No sensory deficit. Motor: No weakness. Coordination: Coordination normal.   Psychiatric:         Mood and Affect: Mood normal.         Behavior: Behavior normal.         Thought Content:  Thought content normal.         Judgment: Judgment normal.       Medications:    vancomycin  1,000 mg Intravenous Q24H    meropenem  1 g Intravenous Q12H    vancomycin (VANCOCIN) intermittent dosing (placeholder)   Other RX Placeholder    midodrine  10 mg Oral TID WC    amiodarone  200 mg Oral Daily    apixaban  2.5 mg Oral BID    aspirin  81 mg Oral Daily    lactulose  10 g Oral BID    latanoprost  1 drop Both Eyes Nightly    therapeutic multivitamin-minerals  1 tablet Oral Daily    rOPINIRole  0.25 mg Oral Nightly    atorvastatin  40 mg Oral Nightly    lactobacillus  1 capsule Oral Daily with breakfast       Continuous Infusions:      PRN Meds:ipratropium-albuterol, acetaminophen **OR** acetaminophen, ABG.  None. URINALYSIS. Results for Lydia VU (MRN [id number]) as of 7/24/2020 11:41   Ref. Range 7/23/2020 09:05   Color, UA Latest Ref Range: YELLOW  DARK YELLOW (A)   Turbidity UA Latest Ref Range: CLEAR  TURBID (A)   Glucose, UA Latest Ref Range: NEGATIVE  NEGATIVE   Bilirubin, Urine Latest Ref Range: NEGATIVE  NEGATIVE   Ketones, Urine Latest Ref Range: NEGATIVE  NEGATIVE   Specific Gravity, UA Latest Ref Range: 1.005 - 1.030  1.026   pH, UA Latest Ref Range: 5.0 - 8.0  5.5   Protein, UA Latest Ref Range: NEGATIVE  2+ (A)   Urobilinogen, Urine Latest Ref Range: Normal  Normal   Nitrite, Urine Latest Ref Range: NEGATIVE  NEGATIVE   Leukocyte Esterase, Urine Latest Ref Range: NEGATIVE  LARGE (A)   Casts UA Latest Ref Range: 0 - 2 /LPF NOT REPORTED   Mucus, UA Latest Ref Range: None  NOT REPORTED   WBC, UA Latest Ref Range: 0 - 5 /HPF TOO NUMEROUS TO COUNT   RBC, UA Latest Ref Range: 0 - 2 /HPF TOO NUMEROUS TO COUNT   Epithelial Cells, UA Latest Ref Range: 0 - 5 /HPF 5 TO 10   Renal Epithelial, UA Latest Ref Range: 0 /HPF NOT REPORTED   Bacteria, UA Latest Ref Range: None  FEW (A)   Amorphous, UA Latest Ref Range: None  NOT REPORTED   Yeast, Urine Latest Ref Range: None  NOT REPORTED   Crystals, UA Latest Ref Range: None /HPF NOT REPORTED   Urine Hgb Latest Ref Range: NEGATIVE  LARGE (A)   Trichomonas, UA Latest Ref Range: None  NOT REPORTED   Other Observations UA Latest Ref Range: NOT REQ.  NOT REPORTED       SEROLOGY  Results for Lydia VU (MRN [id number]) as of 7/24/2020 11:41   Ref. Range 10/7/2019 16:36   Hep A IgM Latest Ref Range: NONREACTIVE  NONREACTIVE   Hepatitis B Surface Ag Latest Ref Range: NONREACTIVE  NONREACTIVE   Hepatitis C Ab Latest Ref Range: NONREACTIVE  NONREACTIVE   Hep B Core Ab, IgM Latest Ref Range: NONREACTIVE  NONREACTIVE     TUMOR MARKER. None. MICROBIOLOGY   Urine culture-07/21/2020. PROTEUS SPECIES 10 to 50,000 CFU/ML.     METHICILLIN RESISTANT STAPHYLOCOCCUS AUREUS >[id number] CFU/ML     Urine culture-07/02/2020. PROTEUS MIRABILIS >[id number] CFU/ML . THIS ORGANISM IS AN EXTENDED-SPECTRUM BETA-LACTAMASE  AND RESISTANCE TO THERAPY WITH PENICILLINS, CEPHALOSPORINS AND AZTREONAM  IS EXPECTED.      THESE ORGANISMS GENERALLY REMAIN SUSCEPTIBLE TO CARBAPENEMS.      CONSIDER ID CONSULTATION. HISTOPATHOLOGY. None. TOXICOLOGY. None. ENDOSCOPE STUDIES. None. PROCEDURES. None. RADIOLOGY. Chest X-ray -07/23/ 2020. FINDINGS:  A right pacemaker is present. The mediastinal and cardiac contours are stable. The lungs are hyperinflated. There is no focal consolidation, pleural effusion or pneumothorax.     IMPRESSION:  1. No acute cardiopulmonary process identified. 2. COPD. CT head without contrast -07/23/2020. FINDINGS:  BRAIN/VENTRICLES:   There is no acute infarct or acute intracranial hemorrhage present. There is no mass effect or midline shift present. There is diffuse cerebral volume loss. Periventricular hypoattenuation is present. There is no abnormal extra-axial fluid collection.     ORBITS:   Limited evaluation of the orbits is unremarkable.     SINUSES:   The paranasal sinuses and mastoid air cells are clear.     SOFT TISSUES/SKULL:    No lytic or blastic osseous lesions are identified.     IMPRESSION:  1. No acute intracranial process identified. 2. Stable diffuse cerebral volume loss and moderate chronic small vessel      ischemic changes. The findings were sent to the Radiology Results [id number] at 8:07     am on 7/23/2020to be communicated to a licensed caregiver. Renal ultrasound-07/23/2020. FINDINGS:    Kidneys:   The right kidney measures 9.5 cm in length and the left kidney measures 8.9  cm in length.     Kidneys demonstrate normal cortical echogenicity. No hydronephrosis or intrarenal stones. Incidental note is made of a dominant cyst in the upper/mid left kidney. The pedunculated cyst measures 4.9 x 5.7 x 5.2 cm in dimension. No other focal lesions.     Bladder:   Limited views of the urinary bladder are within normal limits. No postvoid images could be acquired as the patient could not void during the exam.     IMPRESSION:  Unremarkable ultrasound of the kidneys aside from a solitary 5 cm left renal cyst.     Unremarkable limited views of the urinary bladder. CT urogram -07/20/2020. FINDINGS:  Image quality degraded by motion artifact.     Lower Chest:   Dependent atelectasis at the lung bases. Severe mitral annular calcification. Coronary artery calcification. Pacemaker leads.     Kidneys and Urinary Tract:   1.1 cm stone in the left renal pelvis. Additional tiny left renal stones. A left renal cyst measures up to 6 cm and there are additional tiny bilateral   renal cysts. Tiny hyperdense foci in both kidneys may represent hemorrhagic cysts. Symmetric enhancement excretion of the kidneys without hydronephrosis. On noncontrast images, there is mild layering hyperdensity within the left posterior   bladder; no abnormal enhancement is seen on post-contrast images.     Organs:   No calcified gallstones. Stable mild biliary ductal dilatation. Subcentimeter hepatic cysts. Pancreas is atrophic. Pancreas is unremarkable. Stable adrenal thickening.     GI/Bowel:   Stomach and small bowel are unremarkable. Normal appendix. Moderate to large amount of stool throughout the colon.     Pelvis:   Uterus is unremarkable. No lymphadenopathy or free fluid.     Peritoneum/Retroperitoneum:   No lymphadenopathy or ascites. Severe atherosclerotic disease without abdominal aortic aneurysm.     Bones/Soft Tissues:   Marked osteopenia. Status post kyphoplasty at L1.     Compression deformity of L2 is new from earlier this year and there is  approximately 30% vertebral body height loss without retropulsion into the  spinal canal.      Unchanged compression deformity of the superior endplate of L3.     IMPRESSION:  1. Layering hyperdensity within the urinary bladder could represent debris or       mild hemorrhage. No abnormal enhancement seen. 2. No suspicious renal lesion. Bilateral renal cysts measure up to 6 cm. 3. A 1.1 cm stone within the left renal pelvis with additional tiny left renal stones. No obstructive uropathy. 4. Compression deformity of L2 with 30% vertebral body height loss is new from January. Stable L3 compression fracture. Echocardiogram-12/16/2019. CONCLUSION:   The study was performed by the attending, fellow, and the sonographer. SOCRATES was performed without complications. Estimated EF 55%. No thrombus or valvular vegetation identified. Severely calcified mitral leaflets. Moderate to sever MR. Aortic valve calcification with restriction of motion, probable moderate stenosis. Gradients not done. MRI C-spine-11/12/2019. FINDINGS:    BONES/ALIGNMENT:   The vertebral body heights are maintained. There is age-appropriate bone marrow signal.      There is multilevel degenerative disc disease with loss of disc signal.      There is no significant disc space narrowing. There is minimal degenerative anterolisthesis of T1 on T2.     SPINAL CORD:   The spinal cord is normal in caliber and signal.      The visualized intracranial structures are unremarkable.     SOFT TISSUES:   The posterior paraspinal soft tissues are unremarkable. The prevertebral soft tissues are unremarkable.     C2-C3:   There is no significant disc protrusion, spinal canal stenosis or neural foraminal   narrowing.     C3-C4:   There is a mild disc osteophyte complex with uncovertebral and facet hypertrophy. There is no canal stenosis or left foraminal narrowing.       There is mild right foraminal narrowing.     C4-C5:   There is a disc osteophyte complex with uncovertebral and facet hypertrophy. There is no canal stenosis or foraminal narrowing.     C5-C6:   There is a disc osteophyte complex with uncovertebral and facet hypertrophy. There is canal stenosis measuring 9 mm in AP dimension. There is mild right and moderate left foraminal narrowing.     C6-C7:   There is a disc osteophyte complex with uncovertebral and facet hypertrophy. There is no canal stenosis or significant foraminal narrowing.     C7-T1:   There is no significant disc protrusion, spinal canal stenosis or neural foraminal narrowing.     IMPRESSION:  Multilevel degenerative disc disease with associated uncovertebral and facet  hypertrophy with mild canal stenosis at C5-6.     Mild right foraminal narrowing at C3-4 as well as mild right and moderate  left foraminal narrowing at C5-6. MRI lumbar spine without contrast-11/12/2019. FINDINGS:  BONES/ALIGNMENT:   Remote L1 compression fracture with prior vertebroplasty. Mild acute L3 compression fracture with less than 50% loss of height. No retropulsion of fracture fragments. No subluxation. No suspicious bone marrow replacing lesion.     SPINAL CORD:    Normal signal within the conus which terminates at L1-L2.     SOFT TISSUES:   Left renal cyst.  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 Mild increase postcontrast enhancement within the L3 compression fracture.     L1-L2:   No significant central canal stenosis. Mild right foraminal stenosis. No left foraminal stenosis.     L2-L3:   Facet degenerative changes. No significant central canal stenosis. No left foraminal stenosis. Moderate right foraminal stenosis.     L3-L4:   Minimal broad-based disc bulge. Facet degenerative changes.       Findings combine to create minimal central canal stenosis, moderate right foraminal  stenosis, and mild left foraminal stenosis.     L4-L5:   Broad-based disc bulge and facet degenerative changes combine to  create mild central canal stenosis. Changes combine to create mild right foraminal stenosis and moderate-severe left foraminal stenosis.     L5-S1:   Broad-based disc bulge and facet degenerative changes do not cause  significant central canal stenosis. Changes combine to create moderate bilateral foraminal stenosis.     IMPRESSION:  Mild acute L3 compression fracture.     Multilevel degenerative changes. Brain MRI without contrast-11/11/2019. IMPRESSION:  Scattered foci of acute/subacute ischemia most pronounced in the right  frontal lobe as described above. This is likely related to an embolic phenomenon. ASSESSMENT AND  PLAN. 1.NEUROVASCULAR. Acute metabolic encephalopathy due to UTI-resolved. TIA-resolved. No evidence of CVA on CT head. No indication for brain MRI. 2.PULMONARY. COPD w/o exacerbation -PRN nebs. 3.CARDIOVASCULAR. Sepsis associated hypotension- improved w/ IVF, albumin and midodrine . Diastolic heart dysfunction w/o CHF exacerbation. Elevated troponin due to NSTEMI versus demand ischemia from sepsis. H/o chronic A. Fib-controlled-On amiodarone     H/o dual-chamber pacemaker. Cardiology following. 4.GI.     H/o hiatal hernia-asymptomatic. GERD-PPI    5.RENAL AND ELECTROLYTES. Mild acute hypernatremia-serum sodium 146. Resolved MIKE due to ATN-resolved. Serum creatinine 0.55. Low serum phosphate - 2.1. Replete       6. ID. Proteus mirabilis ESBL and MRSA UTI-IV Meropenem and Vancomycin. ID following. 7. ENDO.     TSH 5.53 and free T4 1.49.      8.MUSCULOSKELETAL. General body weakness-PT OT following. Multilevel cervical, thoracic and lumbar spine DJD/DDD. Vitamin D levels 35.1 -low normal.      9.HEM-ONC. Normocytic anemia w/ Hb 9.3, MCV 93.6. Serum iron 43, folate 9.3, B12 927.    10.UROLOGY.        Left nonobstructing renal calculi. 11.NUTRITION. Protein energy malnutrition-dietitian consult. 12.DISPO. Planned d/c rehab soon.     Electronically signed Sandy Hughes MD on 7/26/2020 at 2:29 PM    Rounding Hospitalist

## 2020-07-26 NOTE — PROGRESS NOTES
[facility]  Initial Consult Note  Date: 7/26/2020    Hospital day :3     Impression:   1. Hypotension-concern for sepsis in a patient with chronic hypotension about 90 systolic  2. Urinary tract infection with MRSA and Proteus mirabilis. 3. The Proteus mirabilis was previously an ESBL producing strain, on culture done 7/2/2020  4. Altered mental status likely toxic metabolic encephalopathy  5. Acute kidney injury-resolving  6. Questionable TIA versus CVA  7. Anemia of chronic disease  8. Stage IV sacrococcygeal pressure ulcer-appears clean    Recommendations   · I agree with the empiric antimicrobial therapy with meropenem  · Vancomycin adjusted for renal failure  · The sacrococcygeal ulceration is clean and we will continue local wound care  · I will follow her progress and adjust therapy accordingly    Chief complaint/reason for consultation:   ESBL Proteus urinary tract infection    History of Present Illness:   Robin Mejia is a 80y.o.-year-old female who was initially admitted on 7/23/2020. 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 She is seen and evaluated at bedside and the history is obtained from the son and daughter who were in the room as well as from reviewing the chart. The children report that they have not seen their mother for a few months due to the pandemic. They report that she has a history of recurrent urinary tract infections that have not been responsive to antimicrobial therapy. The patient was recently seen by a uro-gynecologist had a CT urography done with no major findings. The children report that they were called by the facility reporting that their mother had slurred speech, left-sided facial weakness and that she was more confused than normal.  They brought her into the emergency room for evaluation.   The patient was found to be hypotensive with a systolic blood pressure in the 70s to 90s, CT scan done in route was found to have no acute intracranial pathology. The patient was seen by the stroke team and CT of the head without contrast today showed no acute intracranial process. Stable diffuse cerebral volume loss and moderate chronic small vessel ischemic changes. The question was whether this was a TIA versus an infectious process/sepsis  The patient was seen by the nephrology service who felt that she had acute kidney injury related to contrast nephropathy given the recent CT urogram 3 days prior to admission. There has been concern for a urinary tract infection and the patient does have a known sacral decubitus ulcer. 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 The renal parameters show improved creatinine but the patient's mentation has been poor. The patient was started on meropenem today by the primary service and due to the meropenem ordered I was asked to evaluate and help with antibiotic choice. CURRENT EVALUATION :7/26/2020    Afebrile  VS stable  BP in lower range    Patient received albumin and ProAmatine on 7/24/2020 because of low blood pressure. She had developed MIKE with ATN secondary to contrast.  Renal function has improved. She is making sufficient amounts of urine. Creatinine elevation has decreased. She had MRSA in the urine, along with Proteus  Currently receiving 1 g every 24 hours of vancomycin, adjusted for creatinine clearance of about 54 cc/min. She is also receiving treatment with meropenem for a previous ESBL positive Proteus. We will plan to maintain treatment with both antibiotics.       I have personally reviewed the past medical history, past surgical history, medications, social history, and family history, and I have updated the database accordingly. Past Medical History:     Past Medical History:   Diagnosis Date    Arthritis     Atrial fibrillation ([facility] 8/21/2016    CH[facility]congestive heart failure) [facility]     Complete heart block ([facility] 8/18/2016    Constipation     CVA (cerebral vascular accident) ([facility] 11/08/2019    ACUTE ISCHEMIC    GERD (gastroesophageal reflux disease)     Glaucoma     mild, on eye drops    Hearing loss     bilateral hearing aids    Hiatal hernia     Hx of blood clots 07/2016    pulmonary emboli bilateral lungs after Pacemaker inserted    Hyperlipidemia     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 Zarate Members    Restless leg syndrome     Right knee DJD     Wears glasses     Wears hearing aid in both ears      Past Surgical  History:     Past Surgical History:   Procedure Laterality Date    CARDIAC PACEMAKER PLACEMENT      REMOVAL 11- WITH TEMPORARY PACEMAKER PLACED 11-    CARDIAC PACEMAKER PLACEMENT  12/16/2019    DR Lopes [place] MEDTRONIC BHARATHI XT SR MRI DEFIB. MRI CONDITIONAL MODEL#[id number].  RV LEAD 1936-32    CATARACT REMOVAL WITH IMPLANT Bilateral     Left 1/6/1998, right 8/2/1996    COLONOSCOPY      DILATION AND CURETTAGE OF UTERUS N/A 3/12/2019    DILATATION AND CURETTAGE, HYSTEROSCOPY, MYOSURE performed by Maryuri Pantoja MD at [address] [place] [address]  08/18/2016    EXPLANTED 2019 medtronic advisa MRI DR model # A2DR01  av lead [id number] rv lead [id number]  - mri compatible    TX GI ENDOSCOPIC ULTRASOUND N/A 9/12/2018    ENDOSCOPIC ULTRASOUND, PATHOLOGY REQUESTED, PAT NOTIFIED performed by Yuri Lopez MD at [facility]      as a child    TOTAL KNEE ARTHROPLASTY Right 9/5/2017    KNEE TOTAL ARTHROPLASTY RIGHT WITH BIOMET AND GPS PRODUCT APPLICATION performed by Nicole Rebolledo MD at [address] [place] [address] TRANSESOPHAGEAL ECHOCARDIOGRAM N/A 10/9/2019    TRANSESOPHAGEAL ECHOCARDIOGRAM WITH BUBBLE STUDY performed by Joni Cardona DO at [address] [place] [address] TRANSESOPHAGEAL ECHOCARDIOGRAM  12/16/2019    UPPER GASTROINTESTINAL ENDOSCOPY  06/29/2017    polyp removed from small intestine near stomach, benign. done at [address] [facility]  9/12/2018    EGD BIOPSY performed by Damon Adair MD at [facility]     Medications:      vancomycin  1,250 mg Intravenous Q24H    meropenem  1 g Intravenous Q12H    vancomycin (VANCOCIN) intermittent dosing (placeholder)   Other RX Placeholder    midodrine  10 mg Oral TID WC    amiodarone  200 mg Oral Daily    apixaban  2.5 mg Oral BID    aspirin  81 mg Oral Daily    lactulose  10 g Oral BID    latanoprost  1 drop Both Eyes Nightly    therapeutic multivitamin-minerals  1 tablet Oral Daily    rOPINIRole  0.25 mg Oral Nightly    atorvastatin  40 mg Oral Nightly    lactobacillus  1 capsule Oral Daily with breakfast     Social History:     Social History     Socioeconomic History    Marital status:       Spouse name: Not on file    Number of children: Not on file    Years of education: Not on file    Highest education level: Not on file   Occupational History    Not on file   Social Needs    Financial resource strain: Not on file    Food insecurity     Worry: Not on file     Inability: Not on file    Transportation needs     Medical: Not on file     Non-medical: Not on file   Tobacco Use    Smoking status: Never Smoker    Smokeless tobacco: Never Used   Substance and Sexual Activity    Alcohol use: No    Drug use: No    Sexual activity: Not on file   Lifestyle    Physical activity     Days per week: Not on file     Minutes per session: Not on file    Stress: Not on file   Relationships    Social connections     Talks on phone: Not on file     Gets together: Not on file Attends Jewish service: Not on file     Active member of club or organization: Not on file     Attends meetings of clubs or organizations: Not on file     Relationship status: Not on file    Intimate partner violence     Fear of current or ex partner: Not on file     Emotionally abused: Not on file     Physically abused: Not on file     Forced sexual activity: Not on file   Other Topics Concern    Not on file   Social History Narrative    Not on file     Family History:     Family History   Problem Relation Age of Onset    Diabetes Sister     Heart Attack Sister     Osteoporosis Sister     Heart Disease Mother     Dementia Mother     Tuberculosis Father       Allergies:   Sulfa antibiotics     Review of Systems:   Review of systems unable to obtain the patient was not vocalizing/verbal at the time of my evaluation. Physical Examination :   /62   Pulse 60   Temp 98.4 °F (36.9 °C) (Oral)   Resp 15   Ht 5' 3\" (1.6 m)   Wt 108 lb 11 oz (49.3 kg)   SpO2 99%   BMI 19.25 kg/m²     Temperature Range: Temp: 98.4 °F (36.9 °C) Temp  Av.8 °F (36.6 °C)  Min: 97 °F (36.1 °C)  Max: 98.4 °F (36.9 °C)  General Appearance: alert, frail-appearing and does open her eyes at the time of my evaluation. She does not follow any commands for me. She was actually quite agitated and was trying to prevent me from evaluating her. Head: Normocephalic, without obvious abnormality, atraumatic  Eyes: Pupils equal, round, reactive, to light and accommodation; extraocular movements intact; sclera anicteric; conjunctivae pink  ENT: Dry oral pharyngeal mucosa. Neck: Supple, without lymphadenopathy. Pulmonary/Chest: Clear to auscultation, without wheezes, rales, or rhonchi  Cardiovascular: Regular rate and rhythm without murmurs, rubs, or gallops.    Abdomen: soft, non-tender, non-distended, normal bowel sounds, no masses or organomegaly and hernia exam- umbilical hernia  Extremities: No cyanosis, clubbing, Special Requests  07/21/2020 11:42 AM  [facility]    [id number]    Culture Abnormal    07/21/2020 11:42 AM  [facility]    METHICILLIN RESISTANT STAPHYLOCOCCUS AUREUS >[id number] CFU/ML    Culture Abnormal    07/21/2020 11:42 AM  [address] 10 to 50,000 CFU/ML Susceptibility testing not performed on low colony count organisms. Susceptibility     Methicillin resistant staphylococcus aureus (1)     Antibiotic  Interpretation  QUEENIE  Status     penicillin  Resistant   Final      >=0.5   RESISTANT    cefoxitin screen   NOT REPORTED  Final     ciprofloxacin    Final      NOT REPORTED    clindamycin    Final      NOT REPORTED    erythromycin    Final      NOT REPORTED    gentamicin  Sensitive   Final      <=0.5   SUSCEPTIBLE    gentamicin  Sensitive  Gentamicin is used only in combination with other active agents that test susceptible. Final     Induced Clind Resist   NOT REPORTED  Final     levofloxacin  Intermediate   Final      4   INTERMEDIATE    linezolid    Final      NOT REPORTED    moxifloxacin    Final      NOT REPORTED    nitrofurantoin  Sensitive   Final      <=16   SUSCEPTIBLE    oxacillin  Resistant   Final      >=4   RESISTANT    Synercid    Final      NOT REPORTED    rifampin    Final      NOT REPORTED    tetracycline  Sensitive   Final      <=1   SUSCEPTIBLE    tigecycline    Final      NOT REPORTED    trimethoprim-sulfamethoxazole  Sensitive   Final      <=10   SUSCEPTIBLE    vancomycin  Sensitive   Final      1   SUSCEPTIBLE      URINE    Special Requests  07/02/2020  7:57 PM  [facility]    NOT REPORTED    Culture Abnormal    07/02/2020  7:57 PM  [facility] >[id number] CFU/ML THIS ORGANISM IS AN EXTENDED-SPECTRUM BETA-LACTAMASE  AND RESISTANCE TO THERAPY WITH PENICILLINS, CEPHALOSPORINS AND AZTREONAM IS EXPECTED.  THESE ORGANISMS GENERALLY REMAIN SUSCEPTIBLE TO CARBAPENEMS.  CONSIDER ID CONSULTATION. Proteus mirabilis (1)     Antibiotic  Interpretation  QUEENIE  Status     amikacin    Final      NOT REPORTED    ampicillin  Resistant   Final      >=32   RESISTANT    ampicillin-sulbactam    Final      NOT REPORTED    aztreonam  Resistant   Final      <=1   RESISTANT    ceFAZolin  Resistant   Final      >=64   RESISTANT    ceFAZolin  Resistant  Cefazolin sensitivity results can be used to predict the effectiveness of oral cephalosporins (eg. Cephalexin) in uncomplicated Urinary Tract Infections due to E. coli, K. pneumoniae, and P. mirabilis  Final     cefepime  Resistant   Final      2   RESISTANT    cefTRIAXone  Resistant   Final      8   RESISTANT    ciprofloxacin  Resistant   Final      >=4   RESISTANT    ertapenem    Final      NOT REPORTED    gentamicin  Intermediate   Final      8   INTERMEDIATE    meropenem  Sensitive   Final      <=0.25   SUSCEPTIBLE    nitrofurantoin  Resistant   Final      128   RESISTANT    tigecycline    Final      NOT REPORTED    tobramycin  Resistant   Final      >=16   RESISTANT    trimethoprim-sulfamethoxazole  Resistant   Final      >=320   RESISTANT    piperacillin-tazobactam  Resistant   Final      <=4   RESISTANT            Thank you for allowing us to participate in the care of this patient. Please call with questions. Electronically signed by Stephanie Quevedo MD on 7/26/2020 at 3:43 PM      [facility] [address] MD    [employer] messaging  OFFICE: [phone]      This note is created with the assistance of a speech recognition program.  While intending to generate a document that actually reflects the content of the visit, the document can still have some errors including those of syntax and sound a like substitutions which may escape proof reading. In such instances, actual meaning can be extrapolated by contextual diversion.

## 2020-07-27 LAB
ANION GAP SERPL CALCULATED.3IONS-SCNC: 9 MMOL/L (ref 9–17)
BUN BLDV-MCNC: 10 MG/DL (ref 8–23)
BUN/CREAT BLD: ABNORMAL (ref 9–20)
CALCIUM SERPL-MCNC: 7.5 MG/DL (ref 8.6–10.4)
CHLORIDE BLD-SCNC: 117 MMOL/L (ref 98–107)
CO2: 20 MMOL/L (ref 20–31)
CREAT SERPL-MCNC: 0.53 MG/DL (ref 0.5–0.9)
GFR AFRICAN AMERICAN: >60 ML/MIN
GFR NON-AFRICAN AMERICAN: >60 ML/MIN
GFR SERPL CREATININE-BSD FRML MDRD: ABNORMAL ML/MIN/{1.73_M2}
GFR SERPL CREATININE-BSD FRML MDRD: ABNORMAL ML/MIN/{1.73_M2}
GLUCOSE BLD-MCNC: 94 MG/DL (ref 70–99)
MAGNESIUM: 2 MG/DL (ref 1.6–2.6)
PHOSPHORUS: 2.2 MG/DL (ref 2.6–4.5)
POTASSIUM SERPL-SCNC: 4.1 MMOL/L (ref 3.7–5.3)
SODIUM BLD-SCNC: 146 MMOL/L (ref 135–144)

## 2020-07-27 PROCEDURE — 6370000000 HC RX 637 (ALT 250 FOR IP): Performed by: INTERNAL MEDICINE

## 2020-07-27 PROCEDURE — 99232 SBSQ HOSP IP/OBS MODERATE 35: CPT | Performed by: PSYCHIATRY & NEUROLOGY

## 2020-07-27 PROCEDURE — 80048 BASIC METABOLIC PNL TOTAL CA: CPT

## 2020-07-27 PROCEDURE — 84100 ASSAY OF PHOSPHORUS: CPT

## 2020-07-27 PROCEDURE — 83735 ASSAY OF MAGNESIUM: CPT

## 2020-07-27 PROCEDURE — 2060000000 HC ICU INTERMEDIATE R&B

## 2020-07-27 PROCEDURE — 97535 SELF CARE MNGMENT TRAINING: CPT

## 2020-07-27 PROCEDURE — 97166 OT EVAL MOD COMPLEX 45 MIN: CPT

## 2020-07-27 PROCEDURE — 99232 SBSQ HOSP IP/OBS MODERATE 35: CPT | Performed by: INTERNAL MEDICINE

## 2020-07-27 PROCEDURE — 6360000002 HC RX W HCPCS: Performed by: INTERNAL MEDICINE

## 2020-07-27 PROCEDURE — 6370000000 HC RX 637 (ALT 250 FOR IP): Performed by: NURSE PRACTITIONER

## 2020-07-27 PROCEDURE — 36415 COLL VENOUS BLD VENIPUNCTURE: CPT

## 2020-07-27 PROCEDURE — 2580000003 HC RX 258: Performed by: INTERNAL MEDICINE

## 2020-07-27 RX ADMIN — LACTULOSE 10 G: 10 SOLUTION ORAL at 09:34

## 2020-07-27 RX ADMIN — DESMOPRESSIN ACETATE 40 MG: 0.2 TABLET ORAL at 19:50

## 2020-07-27 RX ADMIN — MEROPENEM 1 G: 1 INJECTION, POWDER, FOR SOLUTION INTRAVENOUS at 18:28

## 2020-07-27 RX ADMIN — MULTIPLE VITAMINS W/ MINERALS TAB 1 TABLET: TAB at 09:00

## 2020-07-27 RX ADMIN — APIXABAN 2.5 MG: 2.5 TABLET, FILM COATED ORAL at 19:50

## 2020-07-27 RX ADMIN — MEROPENEM 1 G: 1 INJECTION, POWDER, FOR SOLUTION INTRAVENOUS at 06:30

## 2020-07-27 RX ADMIN — ASPIRIN 81 MG: 81 TABLET, CHEWABLE ORAL at 09:35

## 2020-07-27 RX ADMIN — ROPINIROLE HYDROCHLORIDE 0.25 MG: 0.25 TABLET, FILM COATED ORAL at 19:50

## 2020-07-27 RX ADMIN — VANCOMYCIN HYDROCHLORIDE 1250 MG: 10 INJECTION, POWDER, LYOPHILIZED, FOR SOLUTION INTRAVENOUS at 16:00

## 2020-07-27 RX ADMIN — APIXABAN 2.5 MG: 2.5 TABLET, FILM COATED ORAL at 09:34

## 2020-07-27 RX ADMIN — MIDODRINE HYDROCHLORIDE 10 MG: 5 TABLET ORAL at 17:00

## 2020-07-27 RX ADMIN — LACTULOSE 10 G: 10 SOLUTION ORAL at 19:50

## 2020-07-27 RX ADMIN — MIDODRINE HYDROCHLORIDE 10 MG: 5 TABLET ORAL at 12:44

## 2020-07-27 RX ADMIN — LATANOPROST 1 DROP: 50 SOLUTION OPHTHALMIC at 19:50

## 2020-07-27 RX ADMIN — Medication 1 CAPSULE: at 09:35

## 2020-07-27 RX ADMIN — AMIODARONE HYDROCHLORIDE 200 MG: 200 TABLET ORAL at 09:34

## 2020-07-27 ASSESSMENT — ENCOUNTER SYMPTOMS
BLOOD IN STOOL: 0
VOICE CHANGE: 0
ABDOMINAL PAIN: 0
RHINORRHEA: 0
EYE REDNESS: 0
GASTROINTESTINAL NEGATIVE: 1
ABDOMINAL DISTENTION: 0
DIARRHEA: 0
PHOTOPHOBIA: 0
CHEST TIGHTNESS: 0
STRIDOR: 0
CHOKING: 0
EYE PAIN: 0
EYE DISCHARGE: 0
COLOR CHANGE: 0
WHEEZING: 0
APNEA: 0
RESPIRATORY NEGATIVE: 1
SHORTNESS OF BREATH: 0
RECTAL PAIN: 0
VOMITING: 0
SINUS PRESSURE: 0
COUGH: 0
NAUSEA: 0
SORE THROAT: 0

## 2020-07-27 ASSESSMENT — PAIN SCALES - GENERAL
PAINLEVEL_OUTOF10: 0
PAINLEVEL_OUTOF10: 0

## 2020-07-27 NOTE — PROGRESS NOTES
Comprehensive Nutrition Assessment    Type and Reason for Visit:  Reassess    Nutrition Recommendations/Plan: Will change diet to no added salt and continue supplements on trays. Nutrition Assessment:  Observed pt with breakfast eating around 50%. Pt states she likes the Ensure supplements. Malnutrition Assessment:  Malnutrition Status:  Severe malnutrition    Context:  Social/Environmental Circumstances     Findings of the 6 clinical characteristics of malnutrition:  Energy Intake:  7 - 50% or less estimated energy requirements for 1 month or longer  Weight Loss:  7 - Greater than 7.5% over 3 months(14% x 5 months)     Body Fat Loss:  1 - Mild body fat loss Orbital   Muscle Mass Loss:  1 - Mild muscle mass loss(Moderate muscle mass loss) Clavicles (pectoralis & deltoids), Calf (gastrocnemius)  Fluid Accumulation:  No significant fluid accumulation     Strength:  Not Performed    Estimated Daily Nutrient Needs:  Energy (kcal):  0200-2185 kcals/day; Weight Used for Energy Requirements:  Admission(x 25-28)     Protein (g):  70-90 gm pro/day; Weight Used for Protein Requirements:  Admission(x 1.5-1.8)          Nutrition Related Findings:  Labs reviewed. Meds reviewed: Lactulose, Probiotics.       Wounds:  Stage IV, Pressure Ulcer(to coccyx)       Current Nutrition Therapies:    DIET CARDIAC; No Caffeine  Dietary Nutrition Supplements: Frozen Oral Supplement, Standard High Calorie Oral Supplement    Anthropometric Measures:  · Height: 5' 3\" (160 cm)  · Current Body Weight: 108 lb 11.1 oz (49.3 kg)   · Admission Body Weight: 108 lb 11.1 oz (49.3 kg)    · Usual Body Weight: 126 lb (57.2 kg)(2/3/20)     · Ideal Body Weight: 115 lbs; % Ideal Body Weight 94.5 %   · BMI: 19.3  · BMI Categories: Underweight (BMI less than 22) age over 72       Nutrition Diagnosis:   · Severe malnutrition, In context of social or environmental circumstances related to inadequate protein-energy intake as evidenced by poor intake prior to admission, wounds, weight loss, mild loss of subcutaneous fat, moderate muscle loss    Nutrition Interventions:   Food and/or Nutrient Delivery:  Modify Current Diet, Continue Oral Nutrition Supplement  Nutrition Education/Counseling:  No recommendation at this time   Coordination of Nutrition Care:  No recommendation at this time    Goals:  Oral intakes to meet at least 50% of estimated nutrition needs. Nutrition Monitoring and Evaluation:   Food/Nutrient Intake Outcomes:  Food and Nutrient Intake, Supplement Intake  Physical Signs/Symptoms Outcomes:  Biochemical Data, Skin, GI Status     Discharge Planning:     Too soon to determine     Electronically signed by Lily Marie RD, LD on 7/27/20 at 1:33 PM EDT    Contact: [phone]

## 2020-07-27 NOTE — PROGRESS NOTES
Active Problem Metabolic encephalopathy. History right cerebral infarction . UTI . Hypotension . Cardiac pacemaker . The condition is she is alert more oriented x 2 lifting all limbs equally . There is no slurring with minor decreased left NLF. Son reports that she does have history of some memory loss but is clearly more confused disoriented from her baseline . 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 Patient later presented with slurring with left facial droop . CTA head and neck with occlusion right M2, M3 . She has atrial fibrillation on eliquis 2.5 mg po qd along with aspirin 81 mg po qd and lipitor 10 mg po qd . Patient is resident of extended care facility noted to have left facial assymmetry along with slurring of speech along with confusion disorientation . Blood pressure in 70 to 90 range found to have UTI with UA showing large leukocyte esterase. Head CT with bilateral chronic periventricular small vessel ischemia . Creatinine 1.40 with no CTA study done . TSH normal. A19 284, folic acid 9.3 . Carotid ultrasound normal     Past Medical History:   Diagnosis Date    Arthritis     Atrial fibrillation ([address] 8/21/2016    CH[facility]congest[facility] failure) ([facility])     Complete heart block ([address] 8/18/2016    Constipation     CVA (cerebral vascular accident) ([address] 11/08/2019    ACUTE ISCHEMIC    GERD (gastroesophageal reflux disease)     Glaucoma     mild, on eye drops    Hearing loss     bilateral hearing aids    Hiatal hernia     Hx of blood clots 07/2016    pulmonary emboli bilateral lungs after Pacemaker inserted    Hyperlipidemia     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 Shaun Gupta    Restless leg syndrome     Right knee DJD     Wears glasses     Wears hearing aid in both ears        Past Surgical History:   Procedure Laterality Date    CARDIAC PACEMAKER PLACEMENT      REMOVAL 11- WITH TEMPORARY PACEMAKER PLACED 11-    CARDIAC PACEMAKER PLACEMENT  12/16/2019    DR Fina Martinez [employer] BHARATHI XT SR MRI DEFIB. MRI CONDITIONAL MODEL#[id number]. RV LEAD 0296-92    CATARACT REMOVAL WITH IMPLANT Bilateral     Left 1/6/1998, right 8/2/1996    COLONOSCOPY      DILATION AND CURETTAGE OF UTERUS N/A 3/12/2019    DILATATION AND CURETTAGE, HYSTEROSCOPY, MYOSURE performed by Isatu Low MD at [address] [place] [address]  08/18/2016    EXPLANTED 2019 medtronic advisa MRI DR model # A2DR01  av lead [id number] rv lead [id number]  - mri compatible    SC GI ENDOSCOPIC ULTRASOUND N/A 9/12/2018    ENDOSCOPIC ULTRASOUND, PATHOLOGY REQUESTED, PAT NOTIFIED performed by Terrie Esqueda MD at [facility]      as a child    TOTAL KNEE ARTHROPLASTY Right 9/5/2017    KNEE TOTAL ARTHROPLASTY RIGHT WITH BIOMET AND GPS PRODUCT APPLICATION performed by Ginna Almonte MD at [facility] TRANSESOPHAGEAL ECHOCARDIOGRAM N/A 10/9/2019    TRANSESOPHAGEAL ECHOCARDIOGRAM WITH BUBBLE STUDY performed by Марина Decker DO at [facility] TRANSESOPHAGEAL ECHOCARDIOGRAM  12/16/2019    UPPER GASTROINTESTINAL ENDOSCOPY  06/29/2017    polyp removed from small intestine near stomach, benign. done at [facility]  9/12/2018    EGD BIOPSY performed by Terrie Esqueda MD at [facility]       Family History   Problem Relation Age of Onset    Diabetes Sister     Heart Attack Sister     Osteoporosis Sister     Heart Disease Mother     Dementia Mother     Tuberculosis Father        Social History     Socioeconomic History    Marital status:       Spouse name: None    Number of children: None    Years of education: None    Highest education level: None Occupational History    None   Social Needs    Financial resource strain: None    Food insecurity     Worry: None     Inability: None    Transportation needs     Medical: None     Non-medical: None   Tobacco Use    Smoking status: Never Smoker    Smokeless tobacco: Never Used   Substance and Sexual Activity    Alcohol use: No    Drug use: No    Sexual activity: None   Lifestyle    Physical activity     Days per week: None     Minutes per session: None    Stress: None   Relationships    Social connections     Talks on phone: None     Gets together: None     Attends Congregational service: None     Active member of club or organization: None     Attends meetings of clubs or organizations: None     Relationship status: None    Intimate partner violence     Fear of current or ex partner: None     Emotionally abused: None     Physically abused: None     Forced sexual activity: None   Other Topics Concern    None   Social History Narrative    None       Current Facility-Administered Medications   Medication Dose Route Frequency Provider Last Rate Last Dose    vancomycin (VANCOCIN) 1250 mg in dextrose 5 % 250 mL IVPB  1,250 mg Intravenous Q24H Hu Gordon MD   Stopped at 07/26/20 1827    meropenem (MERREM) 1 g in sodium chloride 0.9 % 100 mL IVPB (mini-bag)  1 g Intravenous Q12H Hu Gordon MD   Stopped at 07/27/20 0930    vancomycin (VANCOCIN) intermittent dosing (placeholder)   Other RX Placeholder Hu Gordon MD        midodrine (PROAMATINE) tablet 10 mg  10 mg Oral TID WC Tate Tucker MD   10 mg at 07/26/20 1601    amiodarone (CORDARONE) tablet 200 mg  200 mg Oral Daily ALEXSANDRA Ugarte NP   200 mg at 07/27/20 0934    apixaban (ELIQUIS) tablet 2.5 mg  2.5 mg Oral BID ALEXSANDRA Ugarte NP   2.5 mg at 07/27/20 0934    aspirin chewable tablet 81 mg  81 mg Oral Daily ALEXSANDRA Ugarte NP   81 mg at 07/27/20 0935    ipratropium-albuterol (DUONEB) nebulizer solution 3 mL  1 vial Nebulization Q4H PRN Janece Getting, APRN - NP        lactulose ([facility]) 10 GM/15ML solution 10 g  10 g Oral BID Janece Getting, APRN - NP   10 g at 07/27/20 0934    latanoprost (XALATAN) 0.005 % ophthalmic solution 1 drop  1 drop Both Eyes Nightly Janece Getting, APRN - NP   1 drop at 07/26/20 2100    therapeutic multivitamin-minerals 1 tablet  1 tablet Oral Daily Janece Getting, APRN - NP   1 tablet at 07/27/20 0900    rOPINIRole (REQUIP) tablet 0.25 mg  0.25 mg Oral Nightly Janece Getting, APRN - NP   0.25 mg at 07/26/20 2100    acetaminophen (TYLENOL) tablet 650 mg  650 mg Oral Q6H PRN Janece Getting, APRN - NP        Or   Jun Naresh acetaminophen (TYLENOL) suppository 650 mg  650 mg Rectal Q6H PRN Janece Getting, APRN - NP        acetaminophen (TYLENOL) tablet 650 mg  650 mg Oral Q6H PRN Janece Getting, APRN - NP   650 mg at 07/25/20 1232    Or    acetaminophen (TYLENOL) suppository 650 mg  650 mg Rectal Q6H PRN Janece Getting, APRN - NP        promethazine (PHENERGAN) tablet 12.5 mg  12.5 mg Oral Q6H PRN Janece Getting, APRN - NP        Or    ondansetron [facility]) injection 4 mg  4 mg Intravenous Q6H PRN Janece Getting, APRN - NP        atorvastatin (LIPITOR) tablet 40 mg  40 mg Oral Nightly Janece Getting, APRN - NP   40 mg at 07/26/20 2100    nitroGLYCERIN (NITROSTAT) SL tablet 0.4 mg  0.4 mg Sublingual Q5 Min PRN Janece Getting, APRN - NP        lactobacillus (CULTURELLE) capsule 1 capsule  1 capsule Oral Daily with breakfast Janece Getting, APRN - NP   1 capsule at 07/27/20 0935       Allergies   Allergen Reactions    Sulfa Antibiotics Rash       ROS:   Constitutional                  Negative for fever and chills   HEENT                            Negative for ear discharge, ear pain, nosebleed  Eyes                                Negative for photophobia, pain and discharge  Respiratory                      Negative for hemoptysis and sputum  Cardiovascular                Negative for orthopnea, claudication and PND  Gastrointestinal Negative for abdominal pain, diarrhea, blood in stool  Musculoskeletal               Negative for joint pain, negative for myalgia  Skin                                 Negative for rash or itching  Endo/heme/allergies       Negative for polydipsia, environmental allergy  Psychiatric                       Negative for suicidal ideation. Patient is not anxious    Vitals:    07/27/20 0800   BP: (!) 121/51   Pulse: 60   Resp: 20   Temp: 97.6 °F (36.4 °C)   SpO2: 99%     Admission weight: 115 lb (52.2 kg)    Neurological Examination  Constitutional .General exam well groomed   Head/ Ears /Nose/Throat/external ear . Normal exam  Neck and thyroid . Normal size. No bruits  Cardiovascular: Auscultation of heart with regular rate and rhythm   Musculoskeletal. Muscle bulk and tone normal                                                           Muscle strength lifting all limbs equally                                                                                 No dysmetria or dysdiadokinesis  No tremor   Normal fine motor  Orientation Alert and oriented x 2  Attention and concentration reduced   Short term memory reduced  Language process and speech normal . No aphasia   Cranial nerve 2 normal acuety and visual fields  Cranial nerve 3, 4 and 6 . Extraocular muscles are intact . Pupils are equal and reactive   Cranial nerve 5 . Intact corneal reflex. Normal facial sensation  Cranial nerve 7 normal exam   Cranial nerve 8. Grossly intact hearing   Cranial nerve 9 and 10. Symmetric palate elevation   Cranial nerve 11 , 5 out of 5 strength   Cranial Nerve 12 midline tongue . No atrophy  Sensation . Normal pinprick and light touch   Deep Tendon Reflexes symmetrical  Plantar response flexor bilaterally    Assessment :    Metabolic encephalopathy. History right cerebral infarction . UTI . Hypotension . Cardiac pacemaker . Possible underlying cognitive disorder       Plan:    PT/OT .  May need SNF

## 2020-07-27 NOTE — CARE COORDINATION
Spoke with daughter she would like [facility] first choice, [facility] second choice, [facility] and [facility] as further choices.  Hue Donato from [place] relates they denied on the 24th, referral sent to [address] [place] [address] and [address] [facility] [address] Day: 4    Reason for Admission: TIA (transient ischemic attack) [G45.9]     Treatment Plan of Care: PT/OT, antx ID following    Tests/Procedures still needed: labs today, carotid dopplers today    Barriers to Discharge: placement    Readmission Risk              Risk of Unplanned Readmission:        32            Patient goals/Treatment Preferences/Transitional Plan:     Referrals Made: SNF referrals made see note    Follow Up needed:

## 2020-07-27 NOTE — PROGRESS NOTES
[facility]  Progress Note    Dianne Villagomez  MRN: [id number]  Date: 7/27/2020    Reason for F/U :   Sepsis, altered mental status    Impression :   1. Hypotension-concern for sepsis in a patient with chronic hypotension about 90 systolic  2. Urinary tract infection with MRSA and Proteus mirabilis isolated. 3. The Proteus mirabilis was previously an ESBL producing on culture done 7/2/2020  4. Altered mental status likely toxic metabolic encephalopathy  5. Acute kidney injury-resolving  6. Questionable TIA versus CVA  7. Anemia of chronic disease  8. Stage IV sacrococcygeal pressure ulcer-appears clean    Recommendations:   · The patient has improved significantly since I saw her on Friday with improved blood pressure and her mentation is markedly better. · Blood cultures have remained negative thus far no acute infection has been found. · The patient continues on antibiotics day #3 and as long as the culture data remains negative I will plan on a short/5-day course of therapy    Infection Control Recommendations:   Universal precautions    Discharge Planning:   Estimated Length of IV antimicrobials: 5 days through 7/29/2020  Patient will need Midline Catheter Insertion/ PICC line Insertion: No  Patient will need: Home IV , [place],  SNF,  LTAC: Undetermined  Patient willneed outpatient wound care: No    MedicalDecision making / Summary of Stay:   Dianne Villagomez is a 80y.o.-year-old female who was initially admitted on 7/23/2020. Donna Romo has a history of CVA, atrial fibrillation, chronic kidney disease, DVT/PE, endocarditis in November 2019. She is seen and evaluated at bedside and the history is obtained from the son and daughter who were in the room as well as from reviewing the chart. The children report that they have not seen their mother for a few months due to the pandemic.   They report that she has a history of recurrent urinary tract infections that have not been responsive to LABALBU 2.2* 2.3*   BILITOT 0.26* 0.25*   ALKPHOS 80 90   ALT 20 20   AST 28 23     Recent Labs     07/26/20  1414   VANCOTROUGH 9.6*      No results found for: CRP  Lab Results   Component Value Date    SEDRATE 33 (H) 11/11/2019       No results for input(s): PROCAL in the last 72 hours. Imaging Studies:   No new imaging    Cultures:     Culture, Blood 1 [[id number]]   Collected: 07/24/20 1903    Order Status: Completed  Specimen: Blood  Updated: 07/27/20 0827     Specimen Description  . BLOOD     Special Requests  R HAND 2.5MLS     Culture  NO GROWTH 3 DAYS    Culture, Blood 1 [[id number]]   Collected: 07/24/20 1853    Order Status: Completed  Specimen: Blood  Updated: 07/27/20 0827     Specimen Description  . BLOOD     Special Requests  L HAND . 5MLS     Culture  NO GROWTH 3 DAYS          Medications:      vancomycin  1,250 mg Intravenous Q24H    meropenem  1 g Intravenous Q12H    vancomycin (VANCOCIN) intermittent dosing (placeholder)   Other RX Placeholder    midodrine  10 mg Oral TID WC    amiodarone  200 mg Oral Daily    apixaban  2.5 mg Oral BID    aspirin  81 mg Oral Daily    lactulose  10 g Oral BID    latanoprost  1 drop Both Eyes Nightly    therapeutic multivitamin-minerals  1 tablet Oral Daily    rOPINIRole  0.25 mg Oral Nightly    atorvastatin  40 mg Oral Nightly    lactobacillus  1 capsule Oral Daily with breakfast           [facility]  Rebeca Infante MD  [employer] messaging  OFFICE: [phone]      Electronically signed by Rebeca Infante MD on 7/27/2020 at 9:44 AM  Thank you for allowing us to participate in the care of this patient. Please call with questions.     This note iscreated with the assistance of a speech recognition program.  While intending to generate a document that actually reflects the content of the visit, the document can still have some errors including those of syntax andsound a like substitutions which may escape proof reading. In such instances, actual meaning can be extrapolated by contextual diversion.
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
## 2020-07-27 NOTE — PROGRESS NOTES
[facility]B [facility]       Patient: Laureano Guillaume    Unit/Bed: 5025/7682-56    YOB: 1930    MRN: [id number]    Acct: [de-identified]     Admitting Diagnosis:TIA (transient ischemic attack) [G45.9]    Admit Date:  7/23/2020    Hospital Day: 4    Subjective:    Patient stable. No new complaints. Feeding herself in bed. Patient signs at the bedside. Patient Seen, Chart, Labs, Radiology studies, and Consults reviewed. Objective:   BP (!) 98/46   Pulse 60   Temp 98 °F (36.7 °C) (Oral)   Resp 18   Ht 5' 3\" (1.6 m)   Wt 108 lb 11 oz (49.3 kg)   SpO2 98%   BMI 19.25 kg/m²       Intake/Output Summary (Last 24 hours) at 7/27/2020 1828  Last data filed at 7/27/2020 1710  Gross per 24 hour   Intake 750 ml   Output 900 ml   Net -150 ml       Review of Systems   Constitutional: Positive for activity change. Negative for appetite change, chills, diaphoresis, fatigue, fever and unexpected weight change. HENT: Negative for congestion, drooling, ear discharge, ear pain, hearing loss, nosebleeds, postnasal drip, rhinorrhea, sinus pressure, sneezing, sore throat, tinnitus and voice change. Eyes: Negative for photophobia, pain, discharge, redness and visual disturbance. Respiratory: Negative for apnea, cough, choking, chest tightness, shortness of breath, wheezing and stridor. Cardiovascular: Negative for chest pain, palpitations and leg swelling. Gastrointestinal: Negative for abdominal distention, abdominal pain, blood in stool, diarrhea, nausea, rectal pain and vomiting. Endocrine: Negative for heat intolerance, polydipsia, polyphagia and polyuria. Genitourinary: Negative for decreased urine volume, difficulty urinating, dyspareunia, dysuria, enuresis, frequency, hematuria, menstrual problem, pelvic pain, urgency, vaginal discharge and vaginal pain. wheezing, rhonchi or rales. Chest:      Chest wall: No tenderness. Abdominal:      General: Abdomen is flat. Bowel sounds are normal. There is no distension. Palpations: Abdomen is soft. There is no mass. Tenderness: There is no right CVA tenderness, left CVA tenderness, guarding or rebound. Hernia: No hernia is present. Genitourinary:     Comments: External Martin catheter  Musculoskeletal: Normal range of motion. General: No swelling, tenderness, deformity or signs of injury. Right lower leg: No edema. Left lower leg: No edema. Lymphadenopathy:      Cervical: No cervical adenopathy. Skin:     General: Skin is warm. Coloration: Skin is pale. Skin is not jaundiced. Findings: No bruising, erythema, lesion or rash. Neurological:      General: No focal deficit present. Mental Status: She is alert and oriented to person, place, and time. Mental status is at baseline. Cranial Nerves: No cranial nerve deficit. Sensory: No sensory deficit. Motor: No weakness. Coordination: Coordination normal.   Psychiatric:         Mood and Affect: Mood normal.         Behavior: Behavior normal.         Thought Content:  Thought content normal.         Judgment: Judgment normal.       Medications:    vancomycin  1,250 mg Intravenous Q24H    meropenem  1 g Intravenous Q12H    vancomycin (VANCOCIN) intermittent dosing (placeholder)   Other RX Placeholder    midodrine  10 mg Oral TID WC    amiodarone  200 mg Oral Daily    apixaban  2.5 mg Oral BID    aspirin  81 mg Oral Daily    lactulose  10 g Oral BID    latanoprost  1 drop Both Eyes Nightly    therapeutic multivitamin-minerals  1 tablet Oral Daily    rOPINIRole  0.25 mg Oral Nightly    atorvastatin  40 mg Oral Nightly    lactobacillus  1 capsule Oral Daily with breakfast       Continuous Infusions:      PRN Meds:ipratropium-albuterol, acetaminophen **OR** acetaminophen, acetaminophen **OR** acetaminophen, promethazine **OR** ondansetron, nitroGLYCERIN    Data:    CBC:   Recent Labs     07/25/20  0634 07/25/20  1226 07/26/20  0945   WBC 5.1  --  6.7   RBC 2.50*  --  2.94*   HGB 7.3* 8.3* 8.6*   HCT 24.8* 28.3* 27.9*   MCV 99.2  --  94.9   RDW 19.3*  --  19.2*     --  263       BMP:   Recent Labs     07/25/20  0634 07/26/20  0945 07/27/20  0645    146* 146*   K 4.5 3.7 4.1   * 115* 117*   CO2 19* 20 20   PHOS 2.1*  --   --    BUN 14 9 10   CREATININE 0.55 0.55 0.53       FASTING LIPID PANEL:  Lab Results   Component Value Date    CHOL 102 07/23/2020    HDL 52 07/23/2020    TRIG 78 07/23/2020     Results for TOYA VU (MRN [id number]) as of 7/26/2020 14:24   Ref. Range 7/25/2020 18:35   Vit D, 25-Hydroxy Latest Ref Range: 30.0 - 100.0 ng/mL 35.1       Results for TOYA VU (MRN [id number]) as of 7/25/2020 17:20   Ref. Range 1/8/2019 09:10 11/4/2019 07:16 7/25/2020 06:34   TSH Latest Ref Range: 0.30 - 5.00 mIU/L 3.42 2.46 5.53 (H)     Results for TOYA VU (MRN [id number]) as of 7/25/2020 17:20   Ref. Range 7/30/2018 09:42 1/8/2019 09:10 7/25/2020 06:34   Thyroxine, Free Latest Ref Range: 0.93 - 1.70 ng/dL 1.89 (H) 1.94 (H) 1.49     Results for Dewey VU (MRN [id number]) as of 7/24/2020 11:41   Ref. Range 7/23/2020 08:48 7/23/2020 11:06 7/23/2020 15:06 7/23/2020 17:21   Troponin, High Sensitivity Latest Ref Range: 0 - 14 ng/L 155 (HH) 121 (HH) 106 (HH) 99 (HH)     Results for Dewey VU (MRN [id number]) as of 7/24/2020 11:41   Ref. Range 7/23/2020 15:06   Ferritin Latest Ref Range: 13 - 150 ug/L 275 (H)   Iron Latest Ref Range: 37 - 145 ug/dL 43   Iron Saturation Latest Ref Range: 20 - 55 % 25   UIBC Latest Ref Range: 112 - 347 ug/dL 128   TIBC Latest Ref Range: 250 - 450 ug/dL 171 (L)   Folate Latest Ref Range: >4.8 ng/mL 9.3   Vitamin B-12 Latest Ref Range: 232 - 1245 pg/mL 927     ABG. None. URINALYSIS. Results for Dewey VU (MRN [id number]) as of 7/24/2020 11:41   Ref.  Range 7/23/2020 09:05   Color, UA Latest Ref Range: YELLOW  DARK YELLOW (A)   Turbidity UA Latest Ref Range: CLEAR  TURBID (A)   Glucose, UA Latest Ref Range: NEGATIVE  NEGATIVE   Bilirubin, Urine Latest Ref Range: NEGATIVE  NEGATIVE   Ketones, Urine Latest Ref Range: NEGATIVE  NEGATIVE   Specific Gravity, UA Latest Ref Range: 1.005 - 1.030  1.026   pH, UA Latest Ref Range: 5.0 - 8.0  5.5   Protein, UA Latest Ref Range: NEGATIVE  2+ (A)   Urobilinogen, Urine Latest Ref Range: Normal  Normal   Nitrite, Urine Latest Ref Range: NEGATIVE  NEGATIVE   Leukocyte Esterase, Urine Latest Ref Range: NEGATIVE  LARGE (A)   Casts UA Latest Ref Range: 0 - 2 /LPF NOT REPORTED   Mucus, UA Latest Ref Range: None  NOT REPORTED   WBC, UA Latest Ref Range: 0 - 5 /HPF TOO NUMEROUS TO COUNT   RBC, UA Latest Ref Range: 0 - 2 /HPF TOO NUMEROUS TO COUNT   Epithelial Cells, UA Latest Ref Range: 0 - 5 /HPF 5 TO 10   Renal Epithelial, UA Latest Ref Range: 0 /HPF NOT REPORTED   Bacteria, UA Latest Ref Range: None  FEW (A)   Amorphous, UA Latest Ref Range: None  NOT REPORTED   Yeast, Urine Latest Ref Range: None  NOT REPORTED   Crystals, UA Latest Ref Range: None /HPF NOT REPORTED   Urine Hgb Latest Ref Range: NEGATIVE  LARGE (A)   Trichomonas, UA Latest Ref Range: None  NOT REPORTED   Other Observations UA Latest Ref Range: NOT REQ.  NOT REPORTED       SEROLOGY  Results for Blake VU (MRN [id number]) as of 7/24/2020 11:41   Ref. Range 10/7/2019 16:36   Hep A IgM Latest Ref Range: NONREACTIVE  NONREACTIVE   Hepatitis B Surface Ag Latest Ref Range: NONREACTIVE  NONREACTIVE   Hepatitis C Ab Latest Ref Range: NONREACTIVE  NONREACTIVE   Hep B Core Ab, IgM Latest Ref Range: NONREACTIVE  NONREACTIVE     TUMOR MARKER. None. MICROBIOLOGY   Urine culture-07/21/2020. PROTEUS SPECIES 10 to 50,000 CFU/ML. METHICILLIN RESISTANT STAPHYLOCOCCUS AUREUS >019328 CFU/ML     Urine culture-07/02/2020. PROTEUS MIRABILIS >348473 CFU/ML .     THIS ORGANISM IS AN lesions.     Bladder:   Limited views of the urinary bladder are within normal limits. No postvoid images could be acquired as the patient could not void during the exam.     IMPRESSION:  Unremarkable ultrasound of the kidneys aside from a solitary 5 cm left renal cyst.     Unremarkable limited views of the urinary bladder. CT urogram -07/20/2020. FINDINGS:  Image quality degraded by motion artifact.     Lower Chest:   Dependent atelectasis at the lung bases. Severe mitral annular calcification. Coronary artery calcification. Pacemaker leads.     Kidneys and Urinary Tract:   1.1 cm stone in the left renal pelvis. Additional tiny left renal stones. A left renal cyst measures up to 6 cm and there are additional tiny bilateral   renal cysts. Tiny hyperdense foci in both kidneys may represent hemorrhagic cysts. Symmetric enhancement excretion of the kidneys without hydronephrosis. On noncontrast images, there is mild layering hyperdensity within the left posterior   bladder; no abnormal enhancement is seen on post-contrast images.     Organs:   No calcified gallstones. Stable mild biliary ductal dilatation. Subcentimeter hepatic cysts. Pancreas is atrophic. Pancreas is unremarkable. Stable adrenal thickening.     GI/Bowel:   Stomach and small bowel are unremarkable. Normal appendix. Moderate to large amount of stool throughout the colon.     Pelvis:   Uterus is unremarkable. No lymphadenopathy or free fluid.     Peritoneum/Retroperitoneum:   No lymphadenopathy or ascites. Severe atherosclerotic disease without abdominal aortic aneurysm.     Bones/Soft Tissues:   Marked osteopenia. Status post kyphoplasty at L1.     Compression deformity of L2 is new from earlier this year and there is  approximately 30% vertebral body height loss without retropulsion into the  spinal canal.      Unchanged compression deformity of the superior endplate of L3.     IMPRESSION:  1. Layering hyperdensity within the urinary bladder could represent debris or       mild hemorrhage. No abnormal enhancement seen. 2. No suspicious renal lesion. Bilateral renal cysts measure up to 6 cm. 3. A 1.1 cm stone within the left renal pelvis with additional tiny left renal stones. No obstructive uropathy. 4. Compression deformity of L2 with 30% vertebral body height loss is new from January. Stable L3 compression fracture. Echocardiogram-12/16/2019. CONCLUSION:   The study was performed by the attending, fellow, and the sonographer. SOCRATES was performed without complications. Estimated EF 55%. No thrombus or valvular vegetation identified. Severely calcified mitral leaflets. Moderate to sever MR. Aortic valve calcification with restriction of motion, probable moderate stenosis. Gradients not done. MRI C-spine-11/12/2019. FINDINGS:    BONES/ALIGNMENT:   The vertebral body heights are maintained. There is age-appropriate bone marrow signal.      There is multilevel degenerative disc disease with loss of disc signal.      There is no significant disc space narrowing. There is minimal degenerative anterolisthesis of T1 on T2.     SPINAL CORD:   The spinal cord is normal in caliber and signal.      The visualized intracranial structures are unremarkable.     SOFT TISSUES:   The posterior paraspinal soft tissues are unremarkable. The prevertebral soft tissues are unremarkable.     C2-C3:   There is no significant disc protrusion, spinal canal stenosis or neural foraminal   narrowing.     C3-C4:   There is a mild disc osteophyte complex with uncovertebral and facet hypertrophy. There is no canal stenosis or left foraminal narrowing. There is mild right foraminal narrowing.     C4-C5:   There is a disc osteophyte complex with uncovertebral and facet hypertrophy.       There is no combine to create mild right foraminal stenosis and moderate-severe left foraminal stenosis.     L5-S1:   Broad-based disc bulge and facet degenerative changes do not cause  significant central canal stenosis. Changes combine to create moderate bilateral foraminal stenosis.     IMPRESSION:  Mild acute L3 compression fracture.     Multilevel degenerative changes. Brain MRI without contrast-11/11/2019. IMPRESSION:  Scattered foci of acute/subacute ischemia most pronounced in the right  frontal lobe as described above. This is likely related to an embolic phenomenon. ASSESSMENT AND  PLAN. 1.NEUROVASCULAR. Acute metabolic encephalopathy due to UTI-resolved. TIA-resolved. No evidence of CVA on CT head. No indication for brain MRI. 2.PULMONARY. COPD w/o exacerbation -PRN nebs. 3.CARDIOVASCULAR. Resolved sepsis associated hypotension. Diastolic heart dysfunction w/o CHF exacerbation-PRN diuretics. Elevated troponin due to NSTEMI versus demand ischemia from sepsis. H/o chronic A. Fib-controlled-On amiodarone     H/o dual-chamber pacemaker. Cardiology following. 4.GI.     H/o hiatal hernia-asymptomatic. GERD-PPI    5.RENAL AND ELECTROLYTES. Mild acute hypernatremia-serum sodium 146. Resolved MIKE due to ATN. -resolved. Serum creatinine 0.53. Low serum phosphate - 2.1. Replete       6. ID. Proteus mirabilis ESBL and MRSA UTI-IV Meropenem and Vancomycin. ID following. 7. ENDO.     TSH 5.53 and free T4 1.49.      8.MUSCULOSKELETAL. General body weakness-PT OT following. Multilevel cervical, thoracic and lumbar spine DJD/DDD. Vitamin D levels 35.1 -low normal.      9.HEM-ONC. Normocytic anemia w/ Hb 9.3, MCV 93.6. Serum iron 43, folate 9.3, B12 927.    10.UROLOGY. Left nonobstructing renal calculi. 11.NUTRITION. Protein energy malnutrition-dietitian consult. 12.DISPO.        Planned d/c rehab soon.    Electronically signed Salty Galo MD on 7/27/2020 at 6:28 PM    [address] [place] [address]

## 2020-07-28 PROBLEM — I35.0 MODERATE AORTIC STENOSIS: Status: ACTIVE | Noted: 2020-07-28

## 2020-07-28 LAB
ANION GAP SERPL CALCULATED.3IONS-SCNC: 8 MMOL/L (ref 9–17)
BUN BLDV-MCNC: 12 MG/DL (ref 8–23)
BUN/CREAT BLD: ABNORMAL (ref 9–20)
CALCIUM SERPL-MCNC: 7.8 MG/DL (ref 8.6–10.4)
CHLORIDE BLD-SCNC: 115 MMOL/L (ref 98–107)
CO2: 21 MMOL/L (ref 20–31)
CREAT SERPL-MCNC: 0.51 MG/DL (ref 0.5–0.9)
GFR AFRICAN AMERICAN: >60 ML/MIN
GFR NON-AFRICAN AMERICAN: >60 ML/MIN
GFR SERPL CREATININE-BSD FRML MDRD: ABNORMAL ML/MIN/{1.73_M2}
GFR SERPL CREATININE-BSD FRML MDRD: ABNORMAL ML/MIN/{1.73_M2}
GLUCOSE BLD-MCNC: 96 MG/DL (ref 70–99)
LV EF: 55 %
LVEF MODALITY: NORMAL
POTASSIUM SERPL-SCNC: 4.1 MMOL/L (ref 3.7–5.3)
SODIUM BLD-SCNC: 144 MMOL/L (ref 135–144)

## 2020-07-28 PROCEDURE — 97530 THERAPEUTIC ACTIVITIES: CPT

## 2020-07-28 PROCEDURE — 6360000002 HC RX W HCPCS: Performed by: INTERNAL MEDICINE

## 2020-07-28 PROCEDURE — 99232 SBSQ HOSP IP/OBS MODERATE 35: CPT | Performed by: INTERNAL MEDICINE

## 2020-07-28 PROCEDURE — 2580000003 HC RX 258: Performed by: INTERNAL MEDICINE

## 2020-07-28 PROCEDURE — 93306 TTE W/DOPPLER COMPLETE: CPT

## 2020-07-28 PROCEDURE — 97164 PT RE-EVAL EST PLAN CARE: CPT

## 2020-07-28 PROCEDURE — 80048 BASIC METABOLIC PNL TOTAL CA: CPT

## 2020-07-28 PROCEDURE — 99212 OFFICE O/P EST SF 10 MIN: CPT

## 2020-07-28 PROCEDURE — 1200000000 HC SEMI PRIVATE

## 2020-07-28 PROCEDURE — 6370000000 HC RX 637 (ALT 250 FOR IP): Performed by: INTERNAL MEDICINE

## 2020-07-28 PROCEDURE — 36415 COLL VENOUS BLD VENIPUNCTURE: CPT

## 2020-07-28 PROCEDURE — 6370000000 HC RX 637 (ALT 250 FOR IP): Performed by: NURSE PRACTITIONER

## 2020-07-28 PROCEDURE — 99232 SBSQ HOSP IP/OBS MODERATE 35: CPT | Performed by: PSYCHIATRY & NEUROLOGY

## 2020-07-28 PROCEDURE — 97535 SELF CARE MNGMENT TRAINING: CPT

## 2020-07-28 RX ADMIN — LACTULOSE 10 G: 10 SOLUTION ORAL at 09:01

## 2020-07-28 RX ADMIN — VANCOMYCIN HYDROCHLORIDE 1250 MG: 10 INJECTION, POWDER, LYOPHILIZED, FOR SOLUTION INTRAVENOUS at 17:22

## 2020-07-28 RX ADMIN — APIXABAN 2.5 MG: 2.5 TABLET, FILM COATED ORAL at 23:09

## 2020-07-28 RX ADMIN — MEROPENEM 1 G: 1 INJECTION, POWDER, FOR SOLUTION INTRAVENOUS at 23:12

## 2020-07-28 RX ADMIN — DESMOPRESSIN ACETATE 40 MG: 0.2 TABLET ORAL at 23:10

## 2020-07-28 RX ADMIN — MULTIPLE VITAMINS W/ MINERALS TAB 1 TABLET: TAB at 09:01

## 2020-07-28 RX ADMIN — LACTULOSE 10 G: 10 SOLUTION ORAL at 23:09

## 2020-07-28 RX ADMIN — APIXABAN 2.5 MG: 2.5 TABLET, FILM COATED ORAL at 09:01

## 2020-07-28 RX ADMIN — AMIODARONE HYDROCHLORIDE 200 MG: 200 TABLET ORAL at 09:01

## 2020-07-28 RX ADMIN — MEROPENEM 1 G: 1 INJECTION, POWDER, FOR SOLUTION INTRAVENOUS at 05:43

## 2020-07-28 RX ADMIN — ASPIRIN 81 MG: 81 TABLET, CHEWABLE ORAL at 09:01

## 2020-07-28 RX ADMIN — ROPINIROLE HYDROCHLORIDE 0.25 MG: 0.25 TABLET, FILM COATED ORAL at 23:11

## 2020-07-28 RX ADMIN — MIDODRINE HYDROCHLORIDE 10 MG: 5 TABLET ORAL at 17:22

## 2020-07-28 RX ADMIN — Medication 1 CAPSULE: at 09:01

## 2020-07-28 RX ADMIN — LATANOPROST 1 DROP: 50 SOLUTION OPHTHALMIC at 23:09

## 2020-07-28 ASSESSMENT — PAIN SCALES - GENERAL
PAINLEVEL_OUTOF10: 0

## 2020-07-28 ASSESSMENT — ENCOUNTER SYMPTOMS
VOMITING: 0
EYE REDNESS: 0
VOICE CHANGE: 0
ABDOMINAL PAIN: 0
CHEST TIGHTNESS: 0
ABDOMINAL DISTENTION: 0
NAUSEA: 0
SORE THROAT: 0
CHOKING: 0
APNEA: 0
EYE PAIN: 0
WHEEZING: 0
RHINORRHEA: 0
SINUS PRESSURE: 0
STRIDOR: 0
EYE DISCHARGE: 0
RESPIRATORY NEGATIVE: 1
BLOOD IN STOOL: 0
SHORTNESS OF BREATH: 0
RECTAL PAIN: 0
COLOR CHANGE: 0
PHOTOPHOBIA: 0
COUGH: 0
GASTROINTESTINAL NEGATIVE: 1
DIARRHEA: 0

## 2020-07-28 NOTE — CONSULTS
[facility] Neurology   IN-PATIENT SERVICE      NEUROLOGY CONSULT PROGRESS  NOTE            Date:   7/28/2020  Patient name:  Ruth Garcia  Date of admission:  7/23/2020  YOB: 1930      Interval History:   Ruth Garcia is a  80 y.o. female admitted on 7/23/2020 with TIA (transient ischemic attack) [G45.9]. This is a follow-up neurology progress note. The patient was seen and examined and the chart was reviewed. History of Present Illness: Active Problem Metabolic encephalopathy. History right cerebral infarction . UTI . Hypotension . Cardiac pacemaker . The condition is she is alert more oriented x 2 lifting all limbs equally . There is no slurring with minor decreased left NLF. Son reports that she does have history of some memory loss but is clearly more confused disoriented from her baseline . 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 Patient later presented with slurring with left facial droop . CTA head and neck with occlusion right M2, M3 . She has atrial fibrillation on eliquis 2.5 mg po qd along with aspirin 81 mg po qd and lipitor 10 mg po qd . Patient is resident of extended care facility noted to have left facial assymmetry along with slurring of speech along with confusion disorientation . Blood pressure in 70 to 90 range found to have UTI with UA showing large leukocyte esterase. Head CT with bilateral chronic periventricular small vessel ischemia . Creatinine 1.40 with no CTA study done . TSH normal. P07 726, folic acid 9.3 . Carotid ultrasound Honor Maty          Past Medical History:     Past Medical History:   Diagnosis Date    Arthritis     Atrial fibrillation ([address] 8/21/2016    [facility]     Complete heart block ([address] 8/18/2016    Constipation     CVA (cerebral vascular stomach, benign. done at [facility]  2018    EGD BIOPSY performed by Ras Armstrong MD at [facility]        Medications during admission:      vancomycin  1,250 mg Intravenous Q24H    meropenem  1 g Intravenous Q12H    vancomycin (VANCOCIN) intermittent dosing (placeholder)   Other RX Placeholder    midodrine  10 mg Oral TID WC    amiodarone  200 mg Oral Daily    apixaban  2.5 mg Oral BID    aspirin  81 mg Oral Daily    lactulose  10 g Oral BID    latanoprost  1 drop Both Eyes Nightly    therapeutic multivitamin-minerals  1 tablet Oral Daily    rOPINIRole  0.25 mg Oral Nightly    atorvastatin  40 mg Oral Nightly    lactobacillus  1 capsule Oral Daily with breakfast         Physical Exam:   BP (!) 113/49   Pulse 60   Temp 98.9 °F (37.2 °C) (Temporal)   Resp 27   Ht 5' 3\" (1.6 m)   Wt 108 lb 11 oz (49.3 kg)   SpO2 97%   BMI 19.25 kg/m²   Temp (24hrs), Av.4 °F (36.9 °C), Min:98 °F (36.7 °C), Max:98.9 °F (37.2 °C)        General examination:      General Appearance:  alert, well appearing, and in no acute distress  HEENT: Normocephalic, atraumatic, moist mucus membranes  Neck: supple, no carotid bruits, (-) nuchal rigidity  Lungs:  Respirations unlabored, chest wall no deformity, BS normal  Cardiovascular: normal rate, regular rhythm  Abdomen: Soft, nontender, nondistended, normal bowel sounds  Skin: No gross lesions, rashes, bruising or bleeding on exposed skin area  Extremities:  peripheral pulses palpable, clubbing or edema  Psych: normal affect      Neurological examination:      Mental status   Alert and oriented x 3; following all commands;   speech is fluent, no dysarthria, aphasia.       Cranial nerves   II - visual fields intact to confrontation; pupils reactive  III, IV, VI - extraocular muscles intact; no TADEO; no nystagmus; no ptosis   V - normal facial sensation                                                               VII - normal facial symmetry                                                             VIII - intact hearing                                                                             IX, X - symmetrical palate elevation                                               XI - symmetrical shoulder shrug                                                       XII - midline tongue without atrophy or fasciculation     Motor function  Strength:   5/5 RUE, 5/5 RLE  5/5 LUE, 5/5  LLE  Normal bulk and tone. Sensory function Intact to touch, pin, vibration, proprioception throughout     Cerebellar Intact finger-nose-finger testing. Intact heel-shin testing. No dysdiadochokinesia present. No tremors                        Reflex function 2/4 symmetric throughout . Downgoing plantar response bilaterally. (-)Virgen's sign bilaterally      Gait                  Normal station and gait. Normal Tandem, tip toes and heel walking             Diagnostics:      Laboratory Testing:  CBC:   Recent Labs     07/25/20  1226 07/26/20  0945   WBC  --  6.7   HGB 8.3* 8.6*   PLT  --  263       BMP:    Recent Labs     07/26/20  0945 07/27/20  0645 07/28/20  0613   * 146* 144   K 3.7 4.1 4.1   * 117* 115*   CO2 20 20 21   BUN 9 10 12   CREATININE 0.55 0.53 0.51   GLUCOSE 113* 94 96         Lab Results   Component Value Date    CHOL 102 07/23/2020    LDLCHOLESTEROL 34 07/23/2020    HDL 52 07/23/2020    TRIG 78 07/23/2020    ALT 20 07/26/2020    AST 23 07/26/2020    TSH 5.53 (H) 07/25/2020    INR 1.1 07/23/2020    LABA1C 4.9 07/23/2020    [id number] 927 07/23/2020         No results found for: PHENYTOIN, PHENYTOIN, VALPROATE, CBMZ        Imaging/Diagnostics:      EEG      CT head 7/23/2020    Impression    1. No acute intracranial process identified. 2. Stable diffuse cerebral volume loss and moderate chronic small vessel    ischemic changes.           CTA head and neck       MRI Brain       2D ECHO:     VL dup carotid: 7/24/2020  Right:     The internal carotid artery is normal.     Unable to visualize the vertebral artery.          Left:     The internal carotid artery is normal.     Unable to visualize the vertebral artery       ECHO: 2019  EF: 55%, shunt neagative        Impression:         Encephalopathy    Cognitive disorder             Plan:     80year old female with hx of right cerebral infraction, UTI, hypotension, cardiac pacemaker, with underlying cognitive disorder     PT  OT  Placement   Rest of the treatment as per the Primary     Patient was staffed with the attending Dr. Didier Boss    Electronically signed by Shelton Ernst MD on 7/28/2020 at 11:40 AM      Fernando Jain MD  PGy-2 Neurology Resident    Neurology [facility]

## 2020-07-28 NOTE — PROGRESS NOTES
[facility]       Patient: Lorena Man    Unit/Bed: 9543/2138-12    YOB: 1930    MRN: [id number]    Acct: [de-identified]     Admitting Diagnosis:TIA (transient ischemic attack) [G45.9]    Admit Date:  7/23/2020    Hospital Day: 5    Subjective:    Patient stable. No new complaints. Feeding herself in bed. Patient signs at the bedside. Patient Seen, Chart, Labs, Radiology studies, and Consults reviewed. Objective:   BP (!) 113/49   Pulse 60   Temp 98.9 °F (37.2 °C) (Temporal)   Resp 27   Ht 5' 3\" (1.6 m)   Wt 108 lb 11 oz (49.3 kg)   SpO2 97%   BMI 19.25 kg/m²       Intake/Output Summary (Last 24 hours) at 7/28/2020 1214  Last data filed at 7/28/2020 0400  Gross per 24 hour   Intake 1326 ml   Output 900 ml   Net 426 ml       Review of Systems   Constitutional: Positive for activity change. Negative for appetite change, chills, diaphoresis, fatigue, fever and unexpected weight change. HENT: Negative for congestion, drooling, ear discharge, ear pain, hearing loss, nosebleeds, postnasal drip, rhinorrhea, sinus pressure, sneezing, sore throat, tinnitus and voice change. Eyes: Negative for photophobia, pain, discharge, redness and visual disturbance. Respiratory: Negative for apnea, cough, choking, chest tightness, shortness of breath, wheezing and stridor. Cardiovascular: Negative for chest pain, palpitations and leg swelling. Gastrointestinal: Negative for abdominal distention, abdominal pain, blood in stool, diarrhea, nausea, rectal pain and vomiting. Endocrine: Negative for heat intolerance, polydipsia, polyphagia and polyuria. Genitourinary: Negative for decreased urine volume, difficulty urinating, dyspareunia, dysuria, enuresis, frequency, hematuria, menstrual problem, pelvic pain, urgency, vaginal discharge and vaginal pain. Musculoskeletal: Negative for arthralgias, gait problem, myalgias and neck pain. Skin: Negative for color change, pallor, rash and wound. Allergic/Immunologic: Negative for food allergies and immunocompromised state. Neurological: Positive for weakness. Negative for dizziness, tremors, seizures, syncope, facial asymmetry, speech difficulty, light-headedness, numbness and headaches. Hematological: Negative for adenopathy. Does not bruise/bleed easily. Psychiatric/Behavioral: Negative for agitation, behavioral problems, confusion, decreased concentration, dysphoric mood, hallucinations, self-injury, sleep disturbance and suicidal ideas. The patient is not nervous/anxious and is not hyperactive. Physical Exam  Vitals signs and nursing note reviewed. Constitutional:       General: She is not in acute distress. Appearance: Normal appearance. She is normal weight. She is not ill-appearing, toxic-appearing or diaphoretic. HENT:      Head: Normocephalic and atraumatic. Nose: Nose normal. No congestion or rhinorrhea. Mouth/Throat:      Mouth: Mucous membranes are moist.      Pharynx: Oropharynx is clear. No oropharyngeal exudate or posterior oropharyngeal erythema. Eyes:      General: No scleral icterus. Right eye: No discharge. Left eye: No discharge. Extraocular Movements: Extraocular movements intact. Conjunctiva/sclera: Conjunctivae normal.      Pupils: Pupils are equal, round, and reactive to light. Neck:      Musculoskeletal: Normal range of motion and neck supple. No neck rigidity or muscular tenderness. Vascular: No carotid bruit. Cardiovascular:      Rate and Rhythm: Normal rate and regular rhythm. Pulses: Normal pulses. Heart sounds: Normal heart sounds. No murmur. No friction rub. No gallop. Pulmonary:      Effort: Pulmonary effort is normal. No respiratory distress. Breath sounds: Normal breath sounds. No stridor.  No acetaminophen, promethazine **OR** ondansetron, nitroGLYCERIN    Data:    CBC:   Recent Labs     07/25/20  1226 07/26/20  0945   WBC  --  6.7   RBC  --  2.94*   HGB 8.3* 8.6*   HCT 28.3* 27.9*   MCV  --  94.9   RDW  --  19.2*   PLT  --  263       BMP:   Recent Labs     07/26/20  0945 07/27/20  0645 07/28/20  0613   * 146* 144   K 3.7 4.1 4.1   * 117* 115*   CO2 20 20 21   PHOS  --  2.2*  --    BUN 9 10 12   CREATININE 0.55 0.53 0.51       FASTING LIPID PANEL:  Lab Results   Component Value Date    CHOL 102 07/23/2020    HDL 52 07/23/2020    TRIG 78 07/23/2020     Results for TOYA VU (MRN [id number]) as of 7/26/2020 14:24   Ref. Range 7/25/2020 18:35   Vit D, 25-Hydroxy Latest Ref Range: 30.0 - 100.0 ng/mL 35.1       Results for TOYA VU (MRN [id number]) as of 7/25/2020 17:20   Ref. Range 1/8/2019 09:10 11/4/2019 07:16 7/25/2020 06:34   TSH Latest Ref Range: 0.30 - 5.00 mIU/L 3.42 2.46 5.53 (H)     Results for TOYA VU (MRN [id number]) as of 7/25/2020 17:20   Ref. Range 7/30/2018 09:42 1/8/2019 09:10 7/25/2020 06:34   Thyroxine, Free Latest Ref Range: 0.93 - 1.70 ng/dL 1.89 (H) 1.94 (H) 1.49     Results for Bob VU (MRN [id number]) as of 7/24/2020 11:41   Ref. Range 7/23/2020 08:48 7/23/2020 11:06 7/23/2020 15:06 7/23/2020 17:21   Troponin, High Sensitivity Latest Ref Range: 0 - 14 ng/L 155 (HH) 121 (HH) 106 (HH) 99 (HH)     Results for Bob VU (MRN [id number]) as of 7/24/2020 11:41   Ref. Range 7/23/2020 15:06   Ferritin Latest Ref Range: 13 - 150 ug/L 275 (H)   Iron Latest Ref Range: 37 - 145 ug/dL 43   Iron Saturation Latest Ref Range: 20 - 55 % 25   UIBC Latest Ref Range: 112 - 347 ug/dL 128   TIBC Latest Ref Range: 250 - 450 ug/dL 171 (L)   Folate Latest Ref Range: >4.8 ng/mL 9.3   Vitamin B-12 Latest Ref Range: 232 - 1245 pg/mL 927     ABG. None. URINALYSIS. Results for Bob VU (MRN [id number]) as of 7/24/2020 11:41   Ref.  Range 7/23/2020 09:05   Color, UA Latest Ref Range: YELLOW DARK YELLOW (A)   Turbidity UA Latest Ref Range: CLEAR  TURBID (A)   Glucose, UA Latest Ref Range: NEGATIVE  NEGATIVE   Bilirubin, Urine Latest Ref Range: NEGATIVE  NEGATIVE   Ketones, Urine Latest Ref Range: NEGATIVE  NEGATIVE   Specific Gravity, UA Latest Ref Range: 1.005 - 1.030  1.026   pH, UA Latest Ref Range: 5.0 - 8.0  5.5   Protein, UA Latest Ref Range: NEGATIVE  2+ (A)   Urobilinogen, Urine Latest Ref Range: Normal  Normal   Nitrite, Urine Latest Ref Range: NEGATIVE  NEGATIVE   Leukocyte Esterase, Urine Latest Ref Range: NEGATIVE  LARGE (A)   Casts UA Latest Ref Range: 0 - 2 /LPF NOT REPORTED   Mucus, UA Latest Ref Range: None  NOT REPORTED   WBC, UA Latest Ref Range: 0 - 5 /HPF TOO NUMEROUS TO COUNT   RBC, UA Latest Ref Range: 0 - 2 /HPF TOO NUMEROUS TO COUNT   Epithelial Cells, UA Latest Ref Range: 0 - 5 /HPF 5 TO 10   Renal Epithelial, UA Latest Ref Range: 0 /HPF NOT REPORTED   Bacteria, UA Latest Ref Range: None  FEW (A)   Amorphous, UA Latest Ref Range: None  NOT REPORTED   Yeast, Urine Latest Ref Range: None  NOT REPORTED   Crystals, UA Latest Ref Range: None /HPF NOT REPORTED   Urine Hgb Latest Ref Range: NEGATIVE  LARGE (A)   Trichomonas, UA Latest Ref Range: None  NOT REPORTED   Other Observations UA Latest Ref Range: NOT REQ.  NOT REPORTED       SEROLOGY  Results for Sam VU (MRN [id number]) as of 7/24/2020 11:41   Ref. Range 10/7/2019 16:36   Hep A IgM Latest Ref Range: NONREACTIVE  NONREACTIVE   Hepatitis B Surface Ag Latest Ref Range: NONREACTIVE  NONREACTIVE   Hepatitis C Ab Latest Ref Range: NONREACTIVE  NONREACTIVE   Hep B Core Ab, IgM Latest Ref Range: NONREACTIVE  NONREACTIVE     TUMOR MARKER. None. MICROBIOLOGY   Urine culture-07/21/2020. PROTEUS SPECIES 10 to 50,000 CFU/ML. METHICILLIN RESISTANT STAPHYLOCOCCUS AUREUS >[id number] CFU/ML     Urine culture-07/02/2020. PROTEUS MIRABILIS >127652 CFU/ML .     THIS ORGANISM IS AN EXTENDED-SPECTRUM BETA-LACTAMASE  AND RESISTANCE TO THERAPY WITH PENICILLINS, CEPHALOSPORINS AND AZTREONAM  IS EXPECTED.      THESE ORGANISMS GENERALLY REMAIN SUSCEPTIBLE TO CARBAPENEMS.      CONSIDER ID CONSULTATION. HISTOPATHOLOGY. None. TOXICOLOGY. None. ENDOSCOPE STUDIES. None. PROCEDURES. None. RADIOLOGY. Echocardiogram-07/28/2020. CONCLUSION:  Pacemaker / ICD lead seen in right ventricle. Negative bubble study, no shunt noted. Pacing lead seen in the right atrium. Moderate mitral stenosis. Mild mitral regurgitation. Mild tricuspid regurgitation. Chest X-ray -07/23/ 2020. FINDINGS:  A right pacemaker is present. The mediastinal and cardiac contours are stable. The lungs are hyperinflated. There is no focal consolidation, pleural effusion or pneumothorax.     IMPRESSION:  1. No acute cardiopulmonary process identified. 2. COPD. CT head without contrast -07/23/2020. FINDINGS:  BRAIN/VENTRICLES:   There is no acute infarct or acute intracranial hemorrhage present. There is no mass effect or midline shift present. There is diffuse cerebral volume loss. Periventricular hypoattenuation is present. There is no abnormal extra-axial fluid collection.     ORBITS:   Limited evaluation of the orbits is unremarkable.     SINUSES:   The paranasal sinuses and mastoid air cells are clear.     SOFT TISSUES/SKULL:    No lytic or blastic osseous lesions are identified.     IMPRESSION:  1. No acute intracranial process identified. 2. Stable diffuse cerebral volume loss and moderate chronic small vessel      ischemic changes. The findings were sent to the Radiology Results [id number] at 8:07     am on 7/23/2020to be communicated to a licensed caregiver. Renal ultrasound-07/23/2020. FINDINGS:    Kidneys:   The right kidney measures 9.5 cm in length and the left kidney measures 8.9  cm in length.     Kidneys demonstrate normal cortical echogenicity.       No hydronephrosis or intrarenal stones. Incidental note is made of a dominant cyst in the upper/mid left kidney. The pedunculated cyst measures 4.9 x 5.7 x 5.2 cm in dimension. No other focal lesions.     Bladder:   Limited views of the urinary bladder are within normal limits. No postvoid images could be acquired as the patient could not void during the exam.     IMPRESSION:  Unremarkable ultrasound of the kidneys aside from a solitary 5 cm left renal cyst.     Unremarkable limited views of the urinary bladder. CT urogram -07/20/2020. FINDINGS:  Image quality degraded by motion artifact.     Lower Chest:   Dependent atelectasis at the lung bases. Severe mitral annular calcification. Coronary artery calcification. Pacemaker leads.     Kidneys and Urinary Tract:   1.1 cm stone in the left renal pelvis. Additional tiny left renal stones. A left renal cyst measures up to 6 cm and there are additional tiny bilateral   renal cysts. Tiny hyperdense foci in both kidneys may represent hemorrhagic cysts. Symmetric enhancement excretion of the kidneys without hydronephrosis. On noncontrast images, there is mild layering hyperdensity within the left posterior   bladder; no abnormal enhancement is seen on post-contrast images.     Organs:   No calcified gallstones. Stable mild biliary ductal dilatation. Subcentimeter hepatic cysts. Pancreas is atrophic. Pancreas is unremarkable. Stable adrenal thickening.     GI/Bowel:   Stomach and small bowel are unremarkable. Normal appendix. Moderate to large amount of stool throughout the colon.     Pelvis:   Uterus is unremarkable. No lymphadenopathy or free fluid.     Peritoneum/Retroperitoneum:   No lymphadenopathy or ascites. Severe atherosclerotic disease without abdominal aortic aneurysm.     Bones/Soft Tissues:   Marked osteopenia. Status post kyphoplasty at L1.     Compression deformity of L2 is new from earlier this year and there is  approximately 30% vertebral body height loss without retropulsion into the  spinal canal.      Unchanged compression deformity of the superior endplate of L3.     IMPRESSION:  1. Layering hyperdensity within the urinary bladder could represent debris or       mild hemorrhage. No abnormal enhancement seen. 2. No suspicious renal lesion. Bilateral renal cysts measure up to 6 cm. 3. A 1.1 cm stone within the left renal pelvis with additional tiny left renal stones. No obstructive uropathy. 4. Compression deformity of L2 with 30% vertebral body height loss is new from January. Stable L3 compression fracture. Echocardiogram-12/16/2019. CONCLUSION:   The study was performed by the attending, fellow, and the sonographer. SOCRATES was performed without complications. Estimated EF 55%. No thrombus or valvular vegetation identified. Severely calcified mitral leaflets. Moderate to sever MR. Aortic valve calcification with restriction of motion, probable moderate stenosis. Gradients not done. MRI C-spine-11/12/2019. FINDINGS:    BONES/ALIGNMENT:   The vertebral body heights are maintained. There is age-appropriate bone marrow signal.      There is multilevel degenerative disc disease with loss of disc signal.      There is no significant disc space narrowing. There is minimal degenerative anterolisthesis of T1 on T2.     SPINAL CORD:   The spinal cord is normal in caliber and signal.      The visualized intracranial structures are unremarkable.     SOFT TISSUES:   The posterior paraspinal soft tissues are unremarkable. The prevertebral soft tissues are unremarkable.     C2-C3:   There is no significant disc protrusion, spinal canal stenosis or neural foraminal   narrowing.     C3-C4:   There is a mild disc osteophyte complex with uncovertebral and facet hypertrophy.       There is no canal stenosis or left foraminal narrowing. There is mild right foraminal narrowing.     C4-C5:   There is a disc osteophyte complex with uncovertebral and facet hypertrophy. There is no canal stenosis or foraminal narrowing.     C5-C6:   There is a disc osteophyte complex with uncovertebral and facet hypertrophy. There is canal stenosis measuring 9 mm in AP dimension. There is mild right and moderate left foraminal narrowing.     C6-C7:   There is a disc osteophyte complex with uncovertebral and facet hypertrophy. There is no canal stenosis or significant foraminal narrowing.     C7-T1:   There is no significant disc protrusion, spinal canal stenosis or neural foraminal narrowing.     IMPRESSION:  Multilevel degenerative disc disease with associated uncovertebral and facet  hypertrophy with mild canal stenosis at C5-6.     Mild right foraminal narrowing at C3-4 as well as mild right and moderate  left foraminal narrowing at C5-6. MRI lumbar spine without contrast-11/12/2019. FINDINGS:  BONES/ALIGNMENT:   Remote L1 compression fracture with prior vertebroplasty. Mild acute L3 compression fracture with less than 50% loss of height. No retropulsion of fracture fragments. No subluxation. No suspicious bone marrow replacing lesion.     SPINAL CORD:    Normal signal within the conus which terminates at L1-L2.     SOFT TISSUES:   Left renal cyst.  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 Mild increase postcontrast enhancement within the L3 compression fracture.     L1-L2:   No significant central canal stenosis. Mild right foraminal stenosis. No left foraminal stenosis.     L2-L3:   Facet degenerative changes. No significant central canal stenosis. No left foraminal stenosis. Moderate right foraminal stenosis.     L3-L4:   Minimal broad-based disc bulge. Facet degenerative changes.       Findings combine to create minimal central canal stenosis, moderate right foraminal  stenosis, and mild left foraminal stenosis.     L4-L5:   Broad-based disc bulge and facet degenerative changes combine to  create mild central canal stenosis. Changes combine to create mild right foraminal stenosis and moderate-severe left foraminal stenosis.     L5-S1:   Broad-based disc bulge and facet degenerative changes do not cause  significant central canal stenosis. Changes combine to create moderate bilateral foraminal stenosis.     IMPRESSION:  Mild acute L3 compression fracture.     Multilevel degenerative changes. Brain MRI without contrast-11/11/2019. IMPRESSION:  Scattered foci of acute/subacute ischemia most pronounced in the right  frontal lobe as described above. This is likely related to an embolic phenomenon. ASSESSMENT AND  PLAN. 1.NEUROVASCULAR. Acute metabolic encephalopathy due to UTI-resolved. TIA-resolved. No evidence of CVA on CT head. No indication for brain MRI. 2.PULMONARY. COPD w/o exacerbation -PRN nebs. 3.CARDIOVASCULAR. Resolved sepsis associated hypotension. Diastolic heart dysfunction w/o CHF exacerbation-PRN diuretics. Elevated troponin due to NSTEMI versus demand ischemia from sepsis. H/o chronic A. Fib-controlled-On amiodarone     H/o dual-chamber pacemaker. Moderate aortic stenosis -asymptomatic. Seen by cardiology. 4.GI.     H/o hiatal hernia-asymptomatic. GERD-PPI    5.RENAL AND ELECTROLYTES. Mild acute hypernatremia-serum sodium 146. Resolved MIKE due to ATN. -resolved. Serum creatinine 0.53. Low serum phosphate - 2.1. Replete       6. ID. Proteus mirabilis ESBL and MRSA UTI-IV Meropenem and Vancomycin-day #4. ID following. 7. ENDO.     TSH 5.53 and free T4 1.49.      8.MUSCULOSKELETAL. General body weakness-PT OT following. Multilevel cervical, thoracic and lumbar spine DJD/DDD. Vitamin D levels 35.1 -low normal.      9.HEM-ONC. Normocytic anemia w/ Hb 9.3, MCV 93.6. Serum iron 43, folate 9.3, B12 927.    10.UROLOGY. Left nonobstructing renal calculi. 11.NUTRITION. Protein energy malnutrition-dietitian consult. 12.DISPO. Planned d/c rehab soon.     Electronically signed Betty Wells MD on 7/28/2020 at 12:14 PM    [address] [place] [address]

## 2020-07-28 NOTE — PROGRESS NOTES
Occupational Therapy  Facility/Department: [facility] 4B STEPDOWN  Daily Treatment Note  NAME: Jason Bowers  : 1930  MRN: [id number]    Date of Service: 2020    Discharge Recommendations:  Patient would benefit from continued therapy after discharge       Assessment   Performance deficits / Impairments: Decreased functional mobility ; Decreased safe awareness;Decreased balance;Decreased ADL status; Decreased posture;Decreased endurance;Decreased strength;Decreased cognition  Treatment Diagnosis: TIA  Prognosis: Fair  Patient Education: OT POC, transfer/walker safety, importance of participation in therapy, bed mobility with fair return. Activity Tolerance  Activity Tolerance: Patient Tolerated treatment well;Patient limited by fatigue  Safety Devices  Safety Devices in place: Yes  Type of devices: Bed alarm in place;Call light within reach; Patient at risk for falls;Gait belt;Left in bed;Nurse notified         Patient Diagnosis(es): The primary encounter diagnosis was Acute cystitis with hematuria. Diagnoses of TIA (transient ischemic attack) and Elevated troponin were also pertinent to this visit. has a past medical history of Arthritis, Atrial fibrillation ([address] CHF (congestive heart failure) ([address] Complete heart block ([address] Constipation, CVA (cerebral vascular accident) ([address] GERD (gastroesophageal reflux disease), Glaucoma, Hearing loss, Hiatal hernia, Hx of blood clots, Hyperlipidemia, MRSA (methicillin resistant Staphylococcus aureus) infection, Osteopenia, Pacemaker, Restless leg syndrome, Right knee DJD, Wears glasses, and Wears hearing aid in both ears. has a past surgical history that includes Tonsillectomy and adenoidectomy; Colonoscopy; Pacemaker insertion (2016); Upper gastrointestinal endoscopy (2017); Cataract removal with implant (Bilateral); Total knee arthroplasty (Right, 2017); pr gi endoscopic ultrasound (N/A, 2018);  Upper gastrointestinal endoscopy (2018); Dilation and curettage of uterus (N/A, 3/12/2019); transesophageal echocardiogram (N/A, 10/9/2019); Cardiac pacemaker placement; transesophageal echocardiogram (12/16/2019); and Cardiac pacemaker placement (12/16/2019). Restrictions  Restrictions/Precautions  Restrictions/Precautions: Fall Risk, Isolation, Contact Precautions(Simultaneous filing. User may not have seen previous data.)  Required Braces or Orthoses?: No(Simultaneous filing. User may not have seen previous data.)  Upper Extremity Weight Bearing Restrictions  Other: pt is to stay off her backside d/t decubitus ulcers  Position Activity Restriction  Other position/activity restrictions: up with assistance, pt with decubitus ulcers- turns every 2 hours  Subjective   General  Patient assessed for rehabilitation services?: Yes  Family / Caregiver Present: Yes(son)      Orientation  Orientation  Overall Orientation Status: Within Functional Limits  Objective    ADL  Feeding: Setup; Increased time to complete;Minimal assistance  Grooming: Setup;Verbal cueing; Increased time to complete;Minimal assistance(Oral care seated in bed with HOB elevated.)  UE Bathing: Setup; Increased time to complete;Minimal assistance(wash face/ hands .)  Additional Comments: Pt completed simple grooming seated in bed with HOB elevated. Pt set up with mirror in tray table to comb hair, pt able to comb front of hair CGA requiring max A to wash and comb the rest of head. Pt fatigued easily. Bed mobility  Supine to Sit: Moderate assistance;2 Person assistance  Sit to Supine: Moderate assistance;2 Person assistance  Scooting: Moderate assistance  Comment: Pt attempting to assist with bed mobility, increased time needed to complete w/verbal cues with fair return. Transfers  Sit to stand: Maximum assistance;2 Person assistance  Stand to sit: Moderate assistance;2 Person assistance  Transfer Comments: Pt attempted standing EOB using RW x2, bri aguilar x2.  Pt unable to complete full extension displaying forward flexion. Pt standing on tip toes with verbal/tactile cues needed to position feet flat on floor with poor return. Cognition  Overall Cognitive Status: Exceptions  Following Commands: Follows one step commands with increased time  Safety Judgement: Decreased awareness of need for safety  Problem Solving: Assistance required to identify errors made;Assistance required to correct errors made  Insights: Decreased awareness of deficits  Initiation: Requires cues for some  Sequencing: Requires cues for some  Plan   Plan  Times per week: 2-4 x week  Goals  Short term goals  Time Frame for Short term goals: Pt will, by discharge:  Short term goal 1: Dem I with feeding with set up  Short term goal 2: Dem min a for UB ADL  Short term goal 3: Dem mod a for bed mobility  Short term goal 4: Be alert and oriented x 2 in 3 tx sessions  Short term goal 5: Dem a 10 min dynamic task to increase activity tolerancewith min a       Therapy Time   Individual Concurrent Group Co-treatment   Time In 1323         Time Out 1424         Minutes 61         Timed Code Treatment Minutes: 38 Minutes                  Co-treat PT     Pt supine in bed upon therapist arrival. Pleasant and agreeable to therapy. See above for LOF for all tasks. Pt retired to supine in bed at end of session with bed alarm activated and call light within reach.     JOSE Zhou/MISTI

## 2020-07-28 NOTE — PROGRESS NOTES
Follow Up      NAME:  [id number] [facility] RECORD NUMBER:  [id number]  AGE: 80 y.o. GENDER: female  : 1930  TODAY'S DATE:  2020    Subjective:     Reason for WOCN Evaluation and Assessment: coccyx pressure injury      Joan Vinson is a 80 y.o. female referred by:   [x]? Physician  []? Nursing  []? Other:      Wound Identification:  Wound Type: pressure  Contributing Factors: chronic pressure, decreased mobility, shear force, malnutrition, incontinence of stool and incontinence of urine    2020: improved gurwinder-wound condition. Wound base with increased red tissue, less yellow. Patient taking the clear liquid supplements. PAST MEDICAL HISTORY        Diagnosis Date    Arthritis     Atrial fibrillation ([address] 2016    CHF (congestive heart failure) ([address]     Complete heart block ([address] 2016    Constipation     CVA (cerebral vascular accident) ([address] 2019    ACUTE ISCHEMIC    GERD (gastroesophageal reflux disease)     Glaucoma     mild, on eye drops    Hearing loss     bilateral hearing aids    Hiatal hernia     Hx of blood clots 2016    pulmonary emboli bilateral lungs after Pacemaker inserted    Hyperlipidemia     MRSA (methicillin resistant Staphylococcus aureus) infection 2019    MV ENDOCARDITIS    Osteopenia     Pacemaker 2016    Duo-chamber pacemaker in left upper chest;  DR. Matt Rascon    Restless leg syndrome     Right knee DJD     Wears glasses     Wears hearing aid in both ears        PAST SURGICAL HISTORY    Past Surgical History:   Procedure Laterality Date    CARDIAC PACEMAKER PLACEMENT      REMOVAL 2019 WITH TEMPORARY PACEMAKER PLACED 2019    CARDIAC PACEMAKER PLACEMENT  2019    DR Shan Sauer MEDTRONIC BHARATHI XT SR MRI DEFIB. MRI CONDITIONAL MODEL#[id number].  RV LEAD 5024-89    CATARACT REMOVAL WITH IMPLANT Bilateral     Left 1998, right 1996    COLONOSCOPY      DILATION AND CURETTAGE OF UTERUS N/A 3/12/2019 DILATATION AND CURETTAGE, HYSTEROSCOPY, MYOSURE performed by Mayra Torres MD at [facility]  08/18/2016    EXPLANTED 2019 medtronic advisa MRI DR model # A2DR01  av lead [id number] rv lead [id number]  - mri compatible    [place] GI ENDOSCOPIC ULTRASOUND N/A 9/12/2018    ENDOSCOPIC ULTRASOUND, PATHOLOGY REQUESTED, PAT NOTIFIED performed by Fayetta Spurling, MD at [facility]      as a child    TOTAL KNEE ARTHROPLASTY Right 9/5/2017    KNEE TOTAL ARTHROPLASTY RIGHT WITH BIOMET AND GPS PRODUCT APPLICATION performed by Kourtney Moctezuma MD at [facility] TRANSESOPHAGEAL ECHOCARDIOGRAM N/A 10/9/2019    TRANSESOPHAGEAL ECHOCARDIOGRAM WITH BUBBLE STUDY performed by Dino Karimi DO at [facility] TRANSESOPHAGEAL ECHOCARDIOGRAM  12/16/2019    UPPER GASTROINTESTINAL ENDOSCOPY  06/29/2017    polyp removed from small intestine near stomach, benign.   done at [facility]  9/12/2018    EGD BIOPSY performed by Fayetta Spurling, MD at [facility]       FAMILY HISTORY    Family History   Problem Relation Age of Onset    Diabetes Sister     Heart Attack Sister     Osteoporosis Sister     Heart Disease Mother     Dementia Mother     Tuberculosis Father        SOCIAL HISTORY    Social History     Tobacco Use    Smoking status: Never Smoker    Smokeless tobacco: Never Used   Substance Use Topics    Alcohol use: No    Drug use: No       ALLERGIES    Allergies   Allergen Reactions    Sulfa Antibiotics Rash           Objective:      BP (!) 113/49   Pulse 60   Temp 98.9 °F (37.2 °C) (Temporal)   Resp 27   Ht 5' 3\" (1.6 m)   Wt 108 lb 11 oz (49.3 kg)   SpO2 97%   BMI 19.25 kg/m²   Yony Risk Score: Yony Scale Score: 14    LABS    CBC:   Lab Results   Component Value Date    WBC 6.7 07/26/2020    RBC 2.94 07/26/2020    HGB 8.6 07/26/2020     CMP:  Albumin:    Lab Results   Component Value Date    LABALBU 2.3 07/26/2020    LABALBU 4.2 08/29/2011     PT/INR:    Lab Results   Component Value Date    PROTIME 11.3 07/23/2020    PROTIME 12.0 03/12/2019    INR 1.1 07/23/2020     HgBA1c:    Lab Results   Component Value Date    LABA1C 4.9 07/23/2020     PTT: No components found for: LABPTT      Assessment:     Patient Active Problem List   Diagnosis    Arthritis    Osteopenia    Complete heart block (HCC)    Syncope and collapse    Hyperlipidemia    Contusion of scalp    Chest pain in adult    Pericardial effusion    Paroxysmal atrial fibrillation (HCC)    Sinus arrest    Knee effusion, right    Shortness of breath    Pulmonary embolism with acute cor pulmonale ([facility])    Pleural effusion    Sick sinus syndrome (HCC)    Hemorrhagic pericardial effusion    Status post total right knee replacement    Subtherapeutic international normalized ratio (INR)    Acute congestive heart failure (HCC)    Thickened endometrium    Acute respiratory failure with hypoxia ([address]    Head injury without concussion or intracranial hemorrhage    Bacteremia    Right arm cellulitis    MRSA bacteremia    Endocarditis of mitral valve    Cerebral septic emboli ([facility])    Cerebrovascular accident (CVA) ([address]    Closed Colles' fracture of left radius with routine healing    Acute ischemic right MCA stroke ([address]    Pulmonary embolism ([address]    Dysarthria    Multifocal pneumonia    Pacemaker infection ([address]    Closed fracture of lumbar vertebra with spinal cord injury ([address]    Pressure ulcer of coccygeal region, stage IV ([facility])    Wedge compression fracture of third lumbar vertebra with routine healing    Closed compression fracture of L1 lumbar vertebra, sequela    Age-related osteoporosis without current pathological fracture    Vitamin D deficiency    Status post kyphoplasty    Spinal stenosis of lumbar region with neurogenic claudication    Slow transit constipation    Restless legs syndrome    Mixed conductive and sensorineural hearing loss  History of pulmonary embolism    Gastric polyp    Gastroesophageal reflux disease with esophagitis    Generalized anxiety disorder    Essential (primary) hypertension    Thrombophlebitis    Age-related osteoporosis with current pathological fracture    Anticoagulated on Coumadin    Centrilobular emphysema (HCC)    Cervical spondylosis without myelopathy    DDD (degenerative disc disease), lumbar    Esophageal dysphagia    Presence of cardiac pacemaker    Severe protein-calorie malnutrition (HCC)    Skin tear of left upper extremity    TIA (transient ischemic attack)    Hypotension    Left renal stone    Chronic diastolic heart failure (HCC)    Atrial fibrillation (HCC)    Hypoglycemia    Anemia    Metabolic encephalopathy    History of cerebral infarction    Acute cystitis with hematuria    Urinary tract infection due to Proteus    MRSA infection    Sepsis associated hypotension (HCC)    Weakness generalized    Elevated troponin level not due to acute coronary syndrome    MIKE (acute kidney injury) ([address]    COPD without exacerbation ([address]    Sepsis due to urinary tract infection ([address]    Electrolyte imbalance       Measurements:     07/28/20 1145   Wound 06/30/20 Coccyx Mid wound #1 coccyx   Date First Assessed/Time First Assessed: 06/30/20 1423   Present on Hospital Admission: Yes  Wound Approximate Age at First Assessment (Weeks): (c) 12 weeks  Primary Wound Type: Pressure Injury  Location: Coccyx  Wound Location Orientation: Mid  Wound. .. Wound Image    Wound Pressure Stage  4   Dressing Status Changed   Dressing Changed Changed/New   Dressing/Treatment Silver Alginate/hydrofiber; Foam   Wound Cleansed Rinsed/Irrigated with saline   Dressing Change Due 07/30/20   Wound Assessment Red;Pink;Yellow   Drainage Amount Moderate   Drainage Description Serosanguinous   Odor None   Radha-wound Assessment Dry; Intact; Clean   Pink%Wound Bed 50   Red%Wound Bed 20   Yellow%Wound Bed 30 Response to treatment:  Well tolerated by patient.               Plan:      Plan of Care: Turn every 2 hours. Side to side only. Keep off of back. Float heels off of bed with pillows under calves  COCCYX: Irrigate the wound with saline. Gently fill the wound with [employer] silver hydrofiber. Cover with foam. Secure with Change every other day. Use lift sling to reposition patient to minimize potential for shear injury.        Routine incontinence care with incontinence barrier cloths and zinc oxide cream.   Apply zinc oxide cream BID and prn incontinence. Moisture wicking under pads vs cloth backed briefs     Specialty Bed Required : Yes: [address] in use   [x]? Low Air Loss   [x]? Pressure Redistribution  []? Fluid Immersion  []? Bariatric  []? Total Pressure Relief  []? Other:      Discharge Plan:  Placement for patient upon discharge: skilled nursing   Hospice Care: No   Patient appropriate for Outpatient [address]: Yes     Patient/Caregiver Teaching:  Healing of the wound difficult in the setting of malnutrition. continue to encourage po intake. []? Indicates understanding                []? Needs reinforcement  []? Unsuccessful                                  [x]? Verbal Understanding  []? Demonstrated understanding        []? No evidence of learning  []?  Refused teaching                           []? N/A         Electronically signed by Darvin Prabhakar RN, Solis Zafar on 7/28/2020 at 12:23 PM

## 2020-07-28 NOTE — FLOWSHEET NOTE
07/28/20 3216   Encounter Summary   Services provided to: Patient   Referral/Consult From: Other disciplines   Continue Visiting   (7/28/2020)   Complexity of Encounter Low   Length of Encounter 15 minutes   Routine   Type Follow up   Assessment Coping   Intervention [place]   Outcome Comfort
NOTED:  No.    REGISTRATION STAFF NOTIFIED? Yes    WHAT IS YOUR SPIRITUAL CARE PLAN FOR THIS PATIENT?:  Chaplains are available for further spiritual and emotional support as requested by the patient.        Electronically signed by Lucius Riley on 7/23/2020 at 12:07 PM.  Brendan Brennan  [phone]

## 2020-07-28 NOTE — PROGRESS NOTES
Physical Therapy    Facility/Department: [facility] 4B STEPDOWN  Re Assessment    NAME: Renu Castellon  : 1930  MRN: [id number]    Date of Service: 2020    Discharge Recommendations:  Patient would benefit from continued therapy after discharge   PT Equipment Recommendations  Equipment Needed: No    Assessment   Body structures, Functions, Activity limitations: Decreased functional mobility ; Decreased safe awareness;Decreased cognition;Decreased endurance;Decreased strength  Assessment: Pt with impaired mobility requiring mod Ax2 for bed and transfers with RW or bri aguilar. Pt with decreased endurance. Pt from a facility and would benefit from further therapy upon her return to a facility at discharge. Prognosis: Fair  Decision Making: Medium Complexity  PT Education: Transfer Training;Functional Mobility Training;Home Exercise Program  Patient Education: Educated pt to perform ankle pumps- was able to recite \"homework\" back to writer at end of session  REQUIRES PT FOLLOW UP: Yes  Activity Tolerance  Activity Tolerance: Patient limited by fatigue;Patient limited by endurance  Activity Tolerance: Limit seated upright time secondary to ulcers on her buttock       Patient Diagnosis(es): The primary encounter diagnosis was Acute cystitis with hematuria. Diagnoses of TIA (transient ischemic attack) and Elevated troponin were also pertinent to this visit. has a past medical history of Arthritis, Atrial fibrillation ([address] CHF (congestive heart failure) ([address] Complete heart block ([address] Constipation, CVA (cerebral vascular accident) ([address] GERD (gastroesophageal reflux disease), Glaucoma, Hearing loss, Hiatal hernia, Hx of blood clots, Hyperlipidemia, MRSA (methicillin resistant Staphylococcus aureus) infection, Osteopenia, Pacemaker, Restless leg syndrome, Right knee DJD, Wears glasses, and Wears hearing aid in both ears.    has a past surgical history that includes Tonsillectomy and adenoidectomy; Colonoscopy; Pacemaker insertion (08/18/2016); Upper gastrointestinal endoscopy (06/29/2017); Cataract removal with implant (Bilateral); Total knee arthroplasty (Right, 9/5/2017); pr gi endoscopic ultrasound (N/A, 9/12/2018); Upper gastrointestinal endoscopy (9/12/2018); Dilation and curettage of uterus (N/A, 3/12/2019); transesophageal echocardiogram (N/A, 10/9/2019); Cardiac pacemaker placement; transesophageal echocardiogram (12/16/2019); and Cardiac pacemaker placement (12/16/2019). Restrictions  Restrictions/Precautions  Restrictions/Precautions: Fall Risk, Isolation, Contact Precautions  Required Braces or Orthoses?: No  Upper Extremity Weight Bearing Restrictions  Other: pt is to stay off her backside d/t decubitus ulcers  Position Activity Restriction  Other position/activity restrictions: up with assistance, pt with decubitus ulcers- turns every 2 hours  Vision/Hearing  Vision: Impaired  Vision Exceptions: Wears glasses at all times  Hearing: Exceptions to [facility] Exceptions: Hard of hearing/hearing concerns;Bilateral hearing aid     Subjective  General  Patient assessed for rehabilitation services?: Yes  Response To Previous Treatment: Patient with no complaints from previous session. Family / Caregiver Present: Yes(son)  Follows Commands: Within Functional Limits  Subjective  Subjective: Pt supine in bed and agreeable to therapy this afternoon. Pt denies any pain at this time. RN agreeable to therapy.   Pain Screening  Patient Currently in Pain: Denies  Vital Signs  Patient Currently in Pain: Denies       Social/Functional History  Social/Functional History  Lives With: Other (comment)  Type of Home: Facility(Pt has resided at [facility] since 10/3/2019)  Home Layout: One level  Home Access: Level entry  Bathroom Shower/Tub: Walk-in shower, Curtain  Bathroom Toilet: Standard  Bathroom Accessibility: Wheelchair accessible  Home Equipment: [address]: Needs assistance  Homemaking Assistance: Needs assistance  Homemaking Responsibilities: No  Ambulation Assistance: Needs assistance  Transfer Assistance: Needs assistance(Per family she was able to transfer to w/c and toilet with assistance typically.)  Active : No  Mode of Transportation: Van(from facility if pt has doctor appts)  Leisure & Hobbies: Watch tv and talk with family    Objective          AROM RLE (degrees)  RLE AROM: WFL  AROM LLE (degrees)  LLE AROM : WFL  Strength RLE  Comment: grossly 3/5  Strength LLE  Comment: grossly 3/5        Bed mobility  Supine to Sit: Moderate assistance;2 Person assistance  Sit to Supine: Moderate assistance;2 Person assistance  Scooting: Moderate assistance  Comment: Increased time, verbal cues required, pt does attempt to assist with mobility  Transfers  Sit to Stand: Moderate Assistance;2 Person Assistance  Stand to sit: Moderate Assistance;2 Person Assistance  Comment: Attempted twice with RW- significant posterior lean and forward flexed posture. Attempted once in bri stedy- pt fatigued quickly. Ambulation  Ambulation?: No  Stairs/Curb  Stairs?: No     Balance  Posture: Fair  Sitting - Static: Fair;-  Sitting - Dynamic: Fair;-  Standing - Static: Poor;-  Standing - Dynamic: Poor;-  Comments: standing balance assessed with RW and bri stedy.  pt able to tolerate 15 minutes total of upright activity  Exercises  Heelslides: x10 jacinto  Hip Abduction: x10 jacinto  Knee Long Arc Quad: x10 jacinto  Knee Short Arc Quad: x10 jacinto  Ankle Pumps: x10 jacinto     Plan   Plan  Times per week: 4-5x  Times per day: Daily  Current Treatment Recommendations: Strengthening, ROM, Functional Mobility Training, Balance Training, Transfer Training, Endurance Training, Home Exercise Program, Safety Education & Training, Patient/Caregiver Education & Training  Safety Devices  Type of devices: Gait belt, Left in bed(left in bed with OT present who was turning pt on her side after completion of

## 2020-07-28 NOTE — PROGRESS NOTES
Active Problem Metabolic encephalopathy. History right cerebral infarction . UTI . Hypotension . Cardiac pacemaker . The condition  Cardiac 2 D echo normal LVF . Moderate mitral stenosis . Mild MR and TR . She is not walking in PT . She is alert more oriented x 2 lifting all limbs equally . There is no slurring with minor decreased left NLF. Son reports that she does have history of some memory loss but is clearly more confused disoriented from her baseline . 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 Patient later presented with slurring with left facial droop . CTA head and neck with occlusion right M2, M3 . She has atrial fibrillation on eliquis 2.5 mg po qd along with aspirin 81 mg po qd and lipitor 10 mg po qd . Patient is resident of extended care facility noted to have left facial assymmetry along with slurring of speech along with confusion disorientation . Blood pressure in 70 to 90 range found to have UTI with UA showing large leukocyte esterase. Head CT with bilateral chronic periventricular small vessel ischemia . Creatinine 1.40 with no CTA study done . TSH normal. S18 398, folic acid 9.3 . Carotid ultrasound normal. Cardiac 2 D echo normal LVF . Moderate mitral stenosis .  Mild MR and TR      Past Medical History:   Diagnosis Date    Arthritis     Atrial fibrillation ([address] 8/21/2016    [facility]est[facility]) ([facility])     Complete heart block ([address] 8/18/2016    Constipation     CVA (cerebral vascular accident) ([address] 11/08/2019    ACUTE ISCHEMIC    GERD (gastroesophageal reflux disease)     Glaucoma     mild, on eye drops    Hearing loss     bilateral hearing aids    Hiatal hernia     Hx of blood clots 07/2016    pulmonary emboli bilateral lungs after Pacemaker inserted    Hyperlipidemia     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 Woody Rubio    Restless leg syndrome     Right knee DJD     Wears glasses     Wears hearing aid in both ears        Past Surgical History:   Procedure Laterality Date    CARDIAC PACEMAKER PLACEMENT      REMOVAL 11- WITH TEMPORARY PACEMAKER PLACED 11-    CARDIAC PACEMAKER PLACEMENT  12/16/2019    DR Tatiana Gee MEDTRONIC BHARATHI XT SR MRI DEFIB. MRI CONDITIONAL MODEL#W1SR01. RV LEAD 1268-16    CATARACT REMOVAL WITH IMPLANT Bilateral     Left 1/6/1998, right 8/2/1996    COLONOSCOPY      DILATION AND CURETTAGE OF UTERUS N/A 3/12/2019    DILATATION AND CURETTAGE, HYSTEROSCOPY, MYOSURE performed by Jocelyn Ellsworth MD at [address] [place] [address]  08/18/2016    EXPLANTED 2019 medtronic advisa MRI DR model # A2DR01  av lead [id number] rv lead [id number]  - mri compatible    [place] GI ENDOSCOPIC ULTRASOUND N/A 9/12/2018    ENDOSCOPIC ULTRASOUND, PATHOLOGY REQUESTED, PAT NOTIFIED performed by Rancho Lim MD at [facility]      as a child    TOTAL KNEE ARTHROPLASTY Right 9/5/2017    KNEE TOTAL ARTHROPLASTY RIGHT WITH BIOMET AND GPS PRODUCT APPLICATION performed by Megan Santos MD at [facility] TRANSESOPHAGEAL ECHOCARDIOGRAM N/A 10/9/2019    TRANSESOPHAGEAL ECHOCARDIOGRAM WITH BUBBLE STUDY performed by Ariana Thomas DO at [facility] TRANSESOPHAGEAL ECHOCARDIOGRAM  12/16/2019    UPPER GASTROINTESTINAL ENDOSCOPY  06/29/2017    polyp removed from small intestine near stomach, benign.   done at [facility]  9/12/2018    EGD BIOPSY performed by Rancho Lim MD at [facility]       Family History   Problem Relation Age of Onset    Diabetes Sister     Heart Attack Sister     Osteoporosis Sister     Heart Disease Mother     Dementia Mother     Tuberculosis Father        Social (DUONEB) nebulizer solution 3 mL  1 vial Nebulization Q4H PRN Janece Getting, APRN - NP        lactulose (CHRONULAC) 10 GM/15ML solution 10 g  10 g Oral BID Janece Getting, APRN - NP   10 g at 07/28/20 0901    latanoprost (XALATAN) 0.005 % ophthalmic solution 1 drop  1 drop Both Eyes Nightly Janece Getting, APRN - NP   1 drop at 07/27/20 1950    therapeutic multivitamin-minerals 1 tablet  1 tablet Oral Daily Janece Getting, APRN - NP   1 tablet at 07/28/20 0901    rOPINIRole (REQUIP) tablet 0.25 mg  0.25 mg Oral Nightly Janece Getting, APRN - NP   0.25 mg at 07/27/20 1950    acetaminophen (TYLENOL) tablet 650 mg  650 mg Oral Q6H PRN Janece Getting, APRN - NP        Or   [facility] acetaminophen (TYLENOL) suppository 650 mg  650 mg Rectal Q6H PRN Janece Getting, APRN - NP        acetaminophen (TYLENOL) tablet 650 mg  650 mg Oral Q6H PRN Janece Getting, APRN - NP   650 mg at 07/25/20 1232    Or    acetaminophen (TYLENOL) suppository 650 mg  650 mg Rectal Q6H PRN Janece Getting, APRN - NP        promethazine (PHENERGAN) tablet 12.5 mg  12.5 mg Oral Q6H PRN Janece Getting, APRN - NP        Or    ondansetron [facility]) injection 4 mg  4 mg Intravenous Q6H PRN Janece Getting, APRN - NP        atorvastatin (LIPITOR) tablet 40 mg  40 mg Oral Nightly Janece Getting, APRN - NP   40 mg at 07/27/20 1950    nitroGLYCERIN (NITROSTAT) SL tablet 0.4 mg  0.4 mg Sublingual Q5 Min PRN Janece Getting, APRN - NP        lactobacillus (CULTURELLE) capsule 1 capsule  1 capsule Oral Daily with breakfast Janece Getting, APRN - NP   1 capsule at 07/28/20 0901       Allergies   Allergen Reactions    Sulfa Antibiotics Rash       ROS:   Constitutional                  Negative for fever and chills   HEENT                            Negative for ear discharge, ear pain, nosebleed  Eyes                                Negative for photophobia, pain and discharge  Respiratory                      Negative for hemoptysis and sputum  Cardiovascular                Negative for orthopnea, claudication and

## 2020-07-28 NOTE — CARE COORDINATION
Returned call to [place] admissions, message left for Obdulia Cedillo on answering machine to return call    Patient/family seen: Yes       Informed patient/family of [facility]PCI-A Medical Bundle Program with potential outreach by either Care Transitions Team or naviHealth Team based on hospital admission and location.        BPCI-A Notification Letter given: Yes         Current discharge plan: referral to [place] [address] [address] [facility] faxed to [place] as requested    [phone] Call from Obdulia Cedillo at [place], they cannot accept patient they feel is long term and no beds avail    TRANSITIONAL CARE PLANNING/ [address] [facility]: 5    Reason for Admission: TIA (transient ischemic attack) [G45.9]     Treatment Plan of Care: pt/ot, wound care, nephro following    Tests/Procedures still needed: labs today    Barriers to Discharge: placement pending    Readmission Risk              Risk of Unplanned Readmission:        32            Patient goals/Treatment Preferences/Transitional Plan: Goal is SNF placement    Referrals Made:     Follow Up needed:

## 2020-07-29 ENCOUNTER — HOSPITAL ENCOUNTER (OUTPATIENT)
Dept: WOUND CARE | Age: 85
Discharge: HOME | End: 2020-07-29

## 2020-07-29 VITALS
DIASTOLIC BLOOD PRESSURE: 53 MMHG | BODY MASS INDEX: 19.49 KG/M2 | HEART RATE: 59 BPM | HEIGHT: 63 IN | WEIGHT: 110 LBS | SYSTOLIC BLOOD PRESSURE: 106 MMHG | RESPIRATION RATE: 12 BRPM | OXYGEN SATURATION: 97 % | TEMPERATURE: 97.3 F

## 2020-07-29 PROBLEM — D64.9 ANEMIA: Status: RESOLVED | Noted: 2020-07-23 | Resolved: 2020-07-29

## 2020-07-29 PROBLEM — I48.91 ATRIAL FIBRILLATION (HCC): Status: RESOLVED | Noted: 2020-07-23 | Resolved: 2020-07-29

## 2020-07-29 PROBLEM — I26.99 PULMONARY EMBOLISM (HCC): Chronic | Status: RESOLVED | Noted: 2019-11-09 | Resolved: 2020-07-29

## 2020-07-29 PROBLEM — E16.2 HYPOGLYCEMIA: Status: RESOLVED | Noted: 2020-07-23 | Resolved: 2020-07-29

## 2020-07-29 PROBLEM — S52.532D CLOSED COLLES' FRACTURE OF LEFT RADIUS WITH ROUTINE HEALING: Chronic | Status: RESOLVED | Noted: 2019-10-17 | Resolved: 2020-07-29

## 2020-07-29 PROBLEM — I95.9 HYPOTENSION: Status: RESOLVED | Noted: 2020-07-23 | Resolved: 2020-07-29

## 2020-07-29 LAB
ANION GAP SERPL CALCULATED.3IONS-SCNC: 10 MMOL/L (ref 9–17)
BUN BLDV-MCNC: 11 MG/DL (ref 8–23)
BUN/CREAT BLD: ABNORMAL (ref 9–20)
CALCIUM SERPL-MCNC: 8.1 MG/DL (ref 8.6–10.4)
CHLORIDE BLD-SCNC: 112 MMOL/L (ref 98–107)
CO2: 22 MMOL/L (ref 20–31)
CREAT SERPL-MCNC: 0.57 MG/DL (ref 0.5–0.9)
GFR AFRICAN AMERICAN: >60 ML/MIN
GFR NON-AFRICAN AMERICAN: >60 ML/MIN
GFR SERPL CREATININE-BSD FRML MDRD: ABNORMAL ML/MIN/{1.73_M2}
GFR SERPL CREATININE-BSD FRML MDRD: ABNORMAL ML/MIN/{1.73_M2}
GLUCOSE BLD-MCNC: 90 MG/DL (ref 70–99)
POTASSIUM SERPL-SCNC: 3.9 MMOL/L (ref 3.7–5.3)
SODIUM BLD-SCNC: 144 MMOL/L (ref 135–144)

## 2020-07-29 PROCEDURE — 6370000000 HC RX 637 (ALT 250 FOR IP): Performed by: INTERNAL MEDICINE

## 2020-07-29 PROCEDURE — 6370000000 HC RX 637 (ALT 250 FOR IP): Performed by: NURSE PRACTITIONER

## 2020-07-29 PROCEDURE — 99239 HOSP IP/OBS DSCHRG MGMT >30: CPT | Performed by: INTERNAL MEDICINE

## 2020-07-29 PROCEDURE — 99232 SBSQ HOSP IP/OBS MODERATE 35: CPT | Performed by: INTERNAL MEDICINE

## 2020-07-29 PROCEDURE — 6360000002 HC RX W HCPCS: Performed by: INTERNAL MEDICINE

## 2020-07-29 PROCEDURE — 99232 SBSQ HOSP IP/OBS MODERATE 35: CPT | Performed by: PSYCHIATRY & NEUROLOGY

## 2020-07-29 PROCEDURE — 80048 BASIC METABOLIC PNL TOTAL CA: CPT

## 2020-07-29 PROCEDURE — 36415 COLL VENOUS BLD VENIPUNCTURE: CPT

## 2020-07-29 PROCEDURE — 2580000003 HC RX 258: Performed by: INTERNAL MEDICINE

## 2020-07-29 RX ORDER — FUROSEMIDE 20 MG/1
20 TABLET ORAL DAILY
Qty: 60 TABLET | Refills: 3 | Status: SHIPPED | OUTPATIENT
Start: 2020-07-29

## 2020-07-29 RX ORDER — TRAMADOL HYDROCHLORIDE 50 MG/1
50 TABLET ORAL 2 TIMES DAILY
Qty: 14 TABLET | Refills: 0 | Status: SHIPPED | OUTPATIENT
Start: 2020-07-29 | End: 2020-08-05

## 2020-07-29 RX ADMIN — ASPIRIN 81 MG: 81 TABLET, CHEWABLE ORAL at 08:14

## 2020-07-29 RX ADMIN — APIXABAN 2.5 MG: 2.5 TABLET, FILM COATED ORAL at 08:30

## 2020-07-29 RX ADMIN — LACTULOSE 10 G: 10 SOLUTION ORAL at 08:20

## 2020-07-29 RX ADMIN — MEROPENEM 1 G: 1 INJECTION, POWDER, FOR SOLUTION INTRAVENOUS at 06:59

## 2020-07-29 RX ADMIN — AMIODARONE HYDROCHLORIDE 200 MG: 200 TABLET ORAL at 08:14

## 2020-07-29 RX ADMIN — MULTIPLE VITAMINS W/ MINERALS TAB 1 TABLET: TAB at 08:14

## 2020-07-29 RX ADMIN — Medication 1 CAPSULE: at 08:14

## 2020-07-29 RX ADMIN — MEROPENEM 1 G: 1 INJECTION, POWDER, FOR SOLUTION INTRAVENOUS at 17:01

## 2020-07-29 RX ADMIN — MIDODRINE HYDROCHLORIDE 10 MG: 5 TABLET ORAL at 08:14

## 2020-07-29 RX ADMIN — MIDODRINE HYDROCHLORIDE 10 MG: 5 TABLET ORAL at 17:01

## 2020-07-29 ASSESSMENT — ENCOUNTER SYMPTOMS
RESPIRATORY NEGATIVE: 1
GASTROINTESTINAL NEGATIVE: 1

## 2020-07-29 NOTE — PROGRESS NOTES
[facility]  Progress Note    Lawanda Peguero  MRN: [id number]  Date: 7/28/2020    Reason for F/U :   Sepsis, altered mental status    Impression :   1. Hypotension-concern for sepsis in a patient with chronic hypotension about 90 systolic  2. Urinary tract infection with MRSA and Proteus mirabilis isolated. 3. The Proteus mirabilis was previously an ESBL producing on culture done 7/2/2020  4. Altered mental status likely toxic metabolic encephalopathy  5. Acute kidney injury-resolving  6. Questionable TIA versus CVA  7. Anemia of chronic disease  8. Stage IV sacrococcygeal pressure ulcer-appears clean    Recommendations:   · Today's day #4 of antimicrobial therapy with meropenem and vancomycin  · The antimicrobial therapy can be stopped after tomorrow's doses. · All culture data has remained negative other than the urine culture data  · Clinically the patient remained stable and has resolved encephalopathy    Infection Control Recommendations:   Universal precautions    Discharge Planning:   Estimated Length of IV antimicrobials: 5 days through 7/29/2020  Patient will need Midline Catheter Insertion/ PICC line Insertion: No  Patient will need: Home IV , [place],  SNF,  LTAC: Undetermined  Patient willneed outpatient wound care: No    MedicalDecision making / Summary of Stay:   Lawanda Peguero is a 80y.o.-year-old female who was initially admitted on 7/23/2020. 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 She is seen and evaluated at bedside and the history is obtained from the son and daughter who were in the room as well as from reviewing the chart. The children report that they have not seen their mother for a few months due to the pandemic. They report that she has a history of recurrent urinary tract infections that have not been responsive to antimicrobial therapy.   The patient was recently seen by a uro-gynecologist had a CT urography done with no major findings. The children report that they were called by the facility reporting that their mother had slurred speech, left-sided facial weakness and that she was more confused than normal.  They brought her into the emergency room for evaluation. The patient was found to be hypotensive with a systolic blood pressure in the 70s to 90s, CT scan done in route was found to have no acute intracranial pathology. The patient was seen by the stroke team and CT of the head without contrast today showed no acute intracranial process. Stable diffuse cerebral volume loss and moderate chronic small vessel ischemic changes. The question was whether this was a TIA versus an infectious process/sepsis  The patient was seen by the nephrology service who felt that she had acute kidney injury related to contrast nephropathy given the recent CT urogram 3 days prior to admission. There has been concern for a urinary tract infection and the patient does have a known sacral decubitus ulcer. 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 The renal parameters show improved creatinine but the patient's mentation has been poor. The patient was started on meropenem today by the primary service and due to the meropenem ordered I was asked to evaluate and help with antibiotic choice.     Current evaluation:2020    /67   Pulse 60   Temp 97.7 °F (36.5 °C) (Temporal)   Resp 30   Ht 5' 3\" (1.6 m)   Wt 108 lb 11 oz (49.3 kg)   SpO2 99%   BMI 19.25 kg/m²     Temperature Range: Temp: 97.7 °F (36.5 °C) Temp  Av.3 °F (36.8 °C)  Min: 97.7 °F (36.5 °C)  Max: 98.9 °F (37.2 °C)  The patient is seen and evaluated at bedside she is awake and alert in no acute distress. No subjective fever or chills. She is reporting that she was able to eat earlier today had an omelette. She does not report any diarrhea or constipation. Review of Systems   Constitutional: Negative. Respiratory: Negative. Cardiovascular: Negative. Gastrointestinal: Negative. Genitourinary: Negative. Musculoskeletal: Negative. Skin: Positive for wound. Neurological: Negative. Psychiatric/Behavioral: Negative. Physical Examination :     Physical Exam  Constitutional:       Appearance: She is well-developed. HENT:      Head: Normocephalic and atraumatic. Nose: Nose normal.      Mouth/Throat:      Mouth: Mucous membranes are moist.      Pharynx: Oropharynx is clear. Eyes:      Pupils: Pupils are equal, round, and reactive to light. Neck:      Musculoskeletal: Normal range of motion and neck supple. Cardiovascular:      Rate and Rhythm: Regular rhythm. Heart sounds: Normal heart sounds. Pulmonary:      Effort: Pulmonary effort is normal.      Breath sounds: Normal breath sounds. Abdominal:      General: Bowel sounds are normal.      Palpations: Abdomen is soft. Skin:     General: Skin is warm and dry. Comments: The sacrococcygeal ulceration dressing was not removed. Neurological:      Mental Status: She is alert and oriented to person, place, and time.          Laboratory data:   I have independently reviewed the followinglabs:  CBC with Differential:   Recent Labs     07/26/20  0945   WBC 6.7   HGB 8.6*   HCT 27.9*        BMP:   Recent Labs     07/27/20  0645 07/28/20  0613   * 144   K 4.1 4.1   * 115*   CO2 20 21   BUN 10 12   CREATININE 0.53 0.51   MG 2.0  --      Hepatic Function Panel:   Recent Labs     07/26/20  0945   PROT 4.7*   LABALBU 2.3*   BILITOT 0.25*   ALKPHOS 90   ALT 20   AST 23     Recent Labs     07/26/20  1414   VANCOTROUGH 9.6*      No results found for: CRP  Lab Results   Component Value Date SEDRATE 33 (H) 11/11/2019       No results for input(s): PROCAL in the last 72 hours. Imaging Studies:   No new imaging    Cultures:     Culture, Blood 1 [[id number]]   Collected: 07/24/20 1903    Order Status: Completed  Specimen: Blood  Updated: 07/28/20 1051     Specimen Description  . BLOOD     Special Requests  R HAND 2.5MLS     Culture  NO GROWTH 4 DAYS    Culture, Blood 1 [[id number]]   Collected: 07/24/20 1853    Order Status: Completed  Specimen: Blood  Updated: 07/28/20 1051     Specimen Description  . BLOOD     Special Requests  L HAND . 5MLS     Culture  NO GROWTH 4 DAYS        Medications:      meropenem  1 g Intravenous Q12H    vancomycin (VANCOCIN) intermittent dosing (placeholder)   Other RX Placeholder    midodrine  10 mg Oral TID WC    amiodarone  200 mg Oral Daily    apixaban  2.5 mg Oral BID    aspirin  81 mg Oral Daily    lactulose  10 g Oral BID    latanoprost  1 drop Both Eyes Nightly    therapeutic multivitamin-minerals  1 tablet Oral Daily    rOPINIRole  0.25 mg Oral Nightly    atorvastatin  40 mg Oral Nightly    lactobacillus  1 capsule Oral Daily with breakfast           [facility]  Cassy Naranjo MD  [employer]  OFFICE: [phone]      Electronically signed by Cassy Naranjo MD on 7/28/2020 at 8:07 PM  Thank you for allowing us to participate in the care of this patient. Please call with questions. This note iscreated with the assistance of a speech recognition program.  While intending to generate a document that actually reflects the content of the visit, the document can still have some errors including those of syntax andsound a like substitutions which may escape proof reading. In such instances, actual meaning can be extrapolated by contextual diversion.

## 2020-07-29 NOTE — PROGRESS NOTES
Report called to facility. Updated on patients condition and that patient would be returning after last ATB. All questions answered.

## 2020-07-29 NOTE — PLAN OF CARE
Problem: Skin Integrity:  Goal: Will show no infection signs and symptoms  Description: Will show no infection signs and symptoms  Outcome: Ongoing

## 2020-07-29 NOTE — DISCHARGE SUMMARY
[facility] Discharge Summary-Internal Medicine. Patient Identification:   Joan Vinson   : 1930  MRN: [id number]   Account: [de-identified]      Patient's PCP: Bala Walter MD    Admit Date: 2020     Discharge Date:   20      Admitting Physician: Herve Trevizo MD     Discharge Physician: Herve Trevizo MD     Discharge Diagnoses:     Active Hospital Problems    Diagnosis Date Noted    Moderate aortic stenosis [I35.0] 2020    Electrolyte imbalance [E87.8]     Metabolic encephalopathy [E34.77] 2020    History of cerebral infarction [Z86.73] 2020    Urinary tract infection due to Proteus [N39.0, B96.4] 2020    MRSA infection [A49.02] 2020    Sepsis associated hypotension (HCC) [A41.9, I95.9] 2020    Weakness generalized [R53.1] 2020    Elevated troponin level not due to acute coronary syndrome [R79.89] 2020    MIKE (acute kidney injury) ([address] [N17.9] 2020    COPD without exacerbation ([address] [J44.9] 2020    Sepsis due to urinary tract infection ([address] [A41.9, N39.0] 2020    TIA (transient ischemic attack) [G45.9] 2020    Left renal stone [N20.0] 2020    Chronic diastolic heart failure ([address] [I50.32] 2020    Severe protein-calorie malnutrition ([address] [E43] 2020    Restless legs syndrome [G25.81] 2020    Centrilobular emphysema ([address] [J43.2] 2020    Pressure ulcer of coccygeal region, stage IV [facility]) [L89.154] 2020    Spinal stenosis of lumbar region with neurogenic claudication [M48.062] 2019    Presence of cardiac pacemaker [Z95.0] 2017    Paroxysmal atrial fibrillation ([address] [I48.0] 2016    Hyperlipidemia [E78.5]        The patient was seen and examined on day of discharge and this discharge summary is in conjunction with any daily progress note from day of discharge. Hospital Course:   Miguel Angel Sales is a 80 y.o. female admitted to [place]  on 7/23/2020 for weakness and altered mental status due to Proteus ESBL and MRSA UTI-resolved . Treated with IV meropenem and vancomycin x5 days. Seen by ID physician. Patient stable for discharge to [facility] facility today. Follow-up with PCP in 1 week. HPI and Physical Exam:    Miguel Angel Sales is a 80 y.o. Non-/non  female who presents with Cerebrovascular Accident (per ems R [address] is 0500, Lt sided sided facial droop and grugled speech. all symptoms resolved when EMS arrived. Hx of strokes )   and is admitted to the hospital for the management of TIA (transient ischemic attack).     This is an 80-year-old female resident of [facility] who presented to the emergency department for left-sided facial droop. Per EMS and sending facility patient awoke at 5 AM this morning and encountered nursing staff around [phone] with significant left-sided facial drooping and dysarthria. She has significant past medical history including previous ischemic CVA in 2019, atrial fibrillation on amiodarone and Eliquis, status post pacemaker placement, diastolic CHF, hyperlipidemia, history of MRSA endocarditis mitral valve, degenerative disc disease with compression fracture. Significant to note is a recent evaluation and CT cystogram demonstrating a 1.1 cm stone in her left renal pole as well as possible hemorrhage in her bladder. She was started on empiric UTI treatment     Stroke alert was initiated in the emergency department. CT head negative for acute process or hemorrhage. CTA head and neck still pending. Chest x-ray was negative for acute process. Patient was hypotensive, hypoglycemic, and dehydrated on presentation reflective of labs. Per patient's daughter she was taking Invanz at the [facility]     On assessment patient is sleeping but arousable.   She denies any chest pain, shortness of breath, nausea, vomiting, diarrhea, constipation, fever, chills admits urinary symptoms with hematuria. Left-sided deficit has fully resolved at time of assessment. She continues to be hypotensive, digits exhibiting significant discoloration bilaterally but sensation is intact and hand  strength is normal and equal.  CODE STATUS addressed with patient and daughter in room. Patient is a DNR CCA no intubation no chest compressions. Vitals:  Vitals:    07/29/20 0600 07/29/20 0724 07/29/20 1108 07/29/20 1610   BP:  (!) 108/59 (!) 109/54 (!) 106/53   Pulse:  58 60 59   Resp:  14 14 12   Temp:  98 °F (36.7 °C) 94.7 °F (34.8 °C) 97.3 °F (36.3 °C)   TempSrc:  Oral Oral Axillary   SpO2:  98% 99% 97%   Weight: 110 lb (49.9 kg)      Height:         Weight: Weight: 110 lb (49.9 kg)     24 hour intake/output:    Intake/Output Summary (Last 24 hours) at 7/29/2020 1717  Last data filed at 7/29/2020 0659  Gross per 24 hour   Intake 555 ml   Output 500 ml   Net 55 ml       Physical Exam  Vitals signs and nursing note reviewed. Constitutional:       General: She is not in acute distress. Appearance: Normal appearance. She is normal weight. She is not ill-appearing, toxic-appearing or diaphoretic. HENT:      Head: Normocephalic and atraumatic. Nose: Nose normal. No congestion or rhinorrhea. Mouth/Throat:      Mouth: Mucous membranes are moist.      Pharynx: Oropharynx is clear. No oropharyngeal exudate or posterior oropharyngeal erythema. Eyes:      General: No scleral icterus. Right eye: No discharge. Left eye: No discharge. Extraocular Movements: Extraocular movements intact. Conjunctiva/sclera: Conjunctivae normal.      Pupils: Pupils are equal, round, and reactive to light. Neck:      Musculoskeletal: Normal range of motion and neck supple. No neck rigidity or muscular tenderness. Vascular: No carotid bruit.    Cardiovascular:      Rate and Rhythm: Normal rate and Leukocyte Esterase, Urine Latest Ref Range: NEGATIVE  LARGE (A)   Casts UA Latest Ref Range: 0 - 2 /LPF NOT REPORTED   Mucus, UA Latest Ref Range: None  NOT REPORTED   WBC, UA Latest Ref Range: 0 - 5 /HPF TOO NUMEROUS TO COUNT   RBC, UA Latest Ref Range: 0 - 2 /HPF TOO NUMEROUS TO COUNT   Epithelial Cells, UA Latest Ref Range: 0 - 5 /HPF 5 TO 10   Renal Epithelial, UA Latest Ref Range: 0 /HPF NOT REPORTED   Bacteria, UA Latest Ref Range: None  FEW (A)   Amorphous, UA Latest Ref Range: None  NOT REPORTED   Yeast, Urine Latest Ref Range: None  NOT REPORTED   Crystals, UA Latest Ref Range: None /HPF NOT REPORTED   Urine Hgb Latest Ref Range: NEGATIVE  LARGE (A)   Trichomonas, UA Latest Ref Range: None  NOT REPORTED   Other Observations UA Latest Ref Range: NOT REQ.  NOT REPORTED         SEROLOGY  Results for TATE, Collette Horner (MRN [id number]) as of 7/24/2020 11:41    Ref. Range 10/7/2019 16:36   Hep A IgM Latest Ref Range: NONREACTIVE  NONREACTIVE   Hepatitis B Surface Ag Latest Ref Range: NONREACTIVE  NONREACTIVE   Hepatitis C Ab Latest Ref Range: NONREACTIVE  NONREACTIVE   Hep B Core Ab, IgM Latest Ref Range: NONREACTIVE  NONREACTIVE      TUMOR MARKER. None.     MICROBIOLOGY   Urine culture-07/21/2020. PROTEUS SPECIES 10 to 50,000 CFU/ML.     METHICILLIN RESISTANT STAPHYLOCOCCUS AUREUS >[id number] CFU/ML      Urine culture-07/02/2020. PROTEUS MIRABILIS >[id number] CFU/ML .     THIS ORGANISM IS AN EXTENDED-SPECTRUM BETA-LACTAMASE  AND RESISTANCE TO THERAPY WITH PENICILLINS, CEPHALOSPORINS AND AZTREONAM  IS EXPECTED.       THESE ORGANISMS GENERALLY REMAIN SUSCEPTIBLE TO CARBAPENEMS.       CONSIDER ID CONSULTATION.     HISTOPATHOLOGY. None.     TOXICOLOGY. None.     ENDOSCOPE STUDIES. None.     PROCEDURES. None.     RADIOLOGY. Echocardiogram-07/28/2020.   CONCLUSION:  Pacemaker / ICD lead seen in right ventricle.     Negative bubble study, no shunt noted.     Pacing lead seen in the right atrium.     Moderate mitral stenosis.     Mild mitral regurgitation.     Mild tricuspid regurgitation.     Chest X-ray -07/23/ 2020. FINDINGS:  A right pacemaker is present.       The mediastinal and cardiac contours are stable.       The lungs are hyperinflated.       There is no focal consolidation, pleural effusion or pneumothorax.     IMPRESSION:  1. No acute cardiopulmonary process identified.     2. COPD.     CT head without contrast -07/23/2020. FINDINGS:  BRAIN/VENTRICLES:   There is no acute infarct or acute intracranial hemorrhage present.       There is no mass effect or midline shift present.      There is diffuse cerebral volume loss.       Periventricular hypoattenuation is present.       There is no abnormal extra-axial fluid collection.     ORBITS:   Limited evaluation of the orbits is unremarkable.     SINUSES:   The paranasal sinuses and mastoid air cells are clear.     SOFT TISSUES/SKULL:    No lytic or blastic osseous lesions are identified.     IMPRESSION:  1. No acute intracranial process identified.     2. Stable diffuse cerebral volume loss and moderate chronic small vessel      ischemic changes.        The findings were sent to the Radiology Results [address] at 8:07     am on 7/23/2020to be communicated to a licensed caregiver.     Renal ultrasound-07/23/2020.   FINDINGS:     Kidneys:   The right kidney measures 9.5 cm in length and the left kidney measures 8.9  cm in length.     Kidneys demonstrate normal cortical echogenicity.       No hydronephrosis or intrarenal stones.       Incidental note is made of a dominant cyst in the upper/mid left kidney.       The pedunculated cyst measures 4.9 x 5.7 x 5.2 cm in dimension.       No other focal lesions.     Bladder:   Limited views of the urinary bladder are within normal limits.       No postvoid images could be acquired as the patient could not void during the exam.     IMPRESSION:  Unremarkable ultrasound of the kidneys aside from a solitary 5 cm left measure up to 6 cm.     3. A 1.1 cm stone within the left renal pelvis with additional tiny left renal stones.             No obstructive uropathy.     4. Compression deformity of L2 with 30% vertebral body height loss is new from January.              Stable L3 compression fracture.     Echocardiogram-12/16/2019. CONCLUSION:   The study was performed by the attending, fellow, and the sonographer.     SOCRATES was performed without complications.     Estimated EF 55%.     No thrombus or valvular vegetation identified.     Severely calcified mitral leaflets.      Moderate to sever MR.     Aortic valve calcification with restriction of motion, probable moderate stenosis.      Gradients not done.     MRI C-spine-11/12/2019.   FINDINGS:     BONES/ALIGNMENT:   The vertebral body heights are maintained.       There is age-appropriate bone marrow signal.       There is multilevel degenerative disc disease with loss of disc signal.       There is no significant disc space narrowing.       There is minimal degenerative anterolisthesis of T1 on T2.     SPINAL CORD:   The spinal cord is normal in caliber and signal.       The visualized intracranial structures are unremarkable.     SOFT TISSUES:   The posterior paraspinal soft tissues are unremarkable.       The prevertebral soft tissues are unremarkable.     C2-C3:   There is no significant disc protrusion, spinal canal stenosis or neural foraminal   narrowing.     C3-C4:   There is a mild disc osteophyte complex with uncovertebral and facet hypertrophy.       There is no canal stenosis or left foraminal narrowing.       There is mild right foraminal narrowing.     C4-C5:   There is a disc osteophyte complex with uncovertebral and facet hypertrophy.       There is no canal stenosis or foraminal narrowing.     C5-C6:   There is a disc osteophyte complex with uncovertebral and facet hypertrophy.       There is canal stenosis measuring 9 mm in AP dimension.       There is mild right and moderate left foraminal narrowing.     C6-C7:   There is a disc osteophyte complex with uncovertebral and facet hypertrophy.       There is no canal stenosis or significant foraminal narrowing.     C7-T1:   There is no significant disc protrusion, spinal canal stenosis or neural foraminal narrowing.     IMPRESSION:  Multilevel degenerative disc disease with associated uncovertebral and facet  hypertrophy with mild canal stenosis at C5-6.     Mild right foraminal narrowing at C3-4 as well as mild right and moderate  left foraminal narrowing at C5-6.     MRI lumbar spine without contrast-11/12/2019. FINDINGS:  BONES/ALIGNMENT:   Remote L1 compression fracture with prior vertebroplasty.     Mild acute L3 compression fracture with less than 50% loss of height.       No retropulsion of fracture fragments.       No subluxation.       No suspicious bone marrow replacing lesion.     SPINAL CORD:    Normal signal within the conus which terminates at L1-L2.     SOFT TISSUES:   Left renal cyst.  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       Mild increase postcontrast enhancement within the L3 compression fracture.     L1-L2:   No significant central canal stenosis.       Mild right foraminal stenosis.     No left foraminal stenosis.     L2-L3:   Facet degenerative changes.       No significant central canal stenosis.     No left foraminal stenosis.       Moderate right foraminal stenosis.     L3-L4:   Minimal broad-based disc bulge.       Facet degenerative changes.       Findings combine to create minimal central canal stenosis, moderate right foraminal  stenosis, and mild left foraminal stenosis.     L4-L5:   Broad-based disc bulge and facet degenerative changes combine to  create mild central canal stenosis.        Changes combine to create mild right foraminal stenosis and moderate-severe left foraminal stenosis.     L5-S1:   Broad-based disc bulge and facet degenerative changes do not cause  significant central canal stenosis.       Changes combine to create moderate bilateral foraminal stenosis.     IMPRESSION:  Mild acute L3 compression fracture.     Multilevel degenerative changes.     Brain MRI without contrast-11/11/2019. IMPRESSION:  Scattered foci of acute/subacute ischemia most pronounced in the right  frontal lobe as described above.       This is likely related to an embolic phenomenon. Consults:     IP CONSULT TO NEUROLOGY  IP CONSULT TO CARDIOLOGY  IP CONSULT TO HOSPITALIST  IP CONSULT TO NEPHROLOGY  IP CONSULT TO DIETITIAN  PHARMACY TO DOSE VANCOMYCIN  IP CONSULT TO INFECTIOUS DISEASES  IP CONSULT TO DIETITIAN    Disposition:    [] Home       [] TCU       [] Rehab       [] Psych       [] SNF       [] [place]       [] Other-    Condition at Discharge: Stable    Code Status:  DNR-CCA     Patient Instructions:    Discharge lab work: Activity: activity as tolerated  Diet: DIET CARDIAC; No Caffeine  Dietary Nutrition Supplements: Frozen Oral Supplement, Standard High Calorie Oral Supplement      Follow-up visits:   Fabiano Batres MD  [address]  [facility] [address]               Discharge Medications:      Kaylee Coyle   [place] Medication Instructions AWV:[id number]    Printed on:07/29/20 2266   Medication Information                      Acetaminophen (TYLENOL ARTHRITIS PAIN PO)  Take 2 tablets by mouth every 6 hours as needed              amiodarone (CORDARONE) 200 MG tablet  Take 200 mg by mouth daily             apixaban (ELIQUIS) 2.5 MG TABS tablet  Take 2.5 mg by mouth 2 times daily             aspirin 81 MG chewable tablet  Take 1 tablet by mouth daily             atorvastatin (LIPITOR) 10 MG tablet  Take 10 mg by mouth daily              Calcium Carbonate-Vitamin D (CALCIUM 500 + D PO)  Take 1 tablet by mouth daily CALCIUM 500MG+VIT D 200IU             diphenhydrAMINE (BENADRYL) 50 MG capsule  Take 50 mg by mouth every 4 hours as needed for Itching furosemide (LASIX) 20 MG tablet  Take 1 tablet by mouth daily             guaiFENesin (MUCINEX) 600 MG extended release tablet  Take 1 tablet by mouth 2 times daily             ipratropium-albuterol (DUONEB) 0.5-2.5 (3) MG/3ML SOLN nebulizer solution  Take 1 vial by nebulization every 4 hours as needed for Shortness of Breath             Lactobacillus (ACIDOPHILUS) TABS  Take by mouth 2 times daily             lactulose (CHRONULAC) 10 GM/15ML solution  Take 10 g by mouth 2 times daily             latanoprost (XALATAN) 0.005 % ophthalmic solution  Place 1 drop into both eyes nightly              Magnesium Oxide (MAG- PO)  Take 400 mg by mouth 2 times daily             metoprolol tartrate (LOPRESSOR) 25 MG tablet  Take 1 tablet by mouth 2 times daily             Multiple Vitamins-Minerals (THERAPEUTIC MULTIVITAMIN-MINERALS) tablet  Take 1 tablet by mouth daily             Nutritional Supplements (BOOST PLUS PO)  Take 237 mLs by mouth 3 times daily             polyethylene glycol (MIRALAX) powder  Take 17 g by mouth daily as needed             potassium chloride (MICRO-K) 10 MEQ extended release capsule  Take 30 mEq by mouth daily              PROTEIN PO  Take 30 mLs by mouth 3 times daily             rOPINIRole (REQUIP) 0.25 MG tablet  Take 0.25 mg by mouth nightly              traMADol (ULTRAM) 50 MG tablet  Take 1 tablet by mouth 2 times daily for 7 days. Time Spent on discharge is more than 30 minutes in the examination, evaluation, counseling and review of medications and discharge plan. Signed: Thank you Avel Lerma MD for the opportunity to be involved in this patient's care.     Electronically signed by Tandy Litten, MD on 7/29/2020 at 5:17 PM

## 2020-07-29 NOTE — CARE COORDINATION
Patient/family seen: Yes       Informed patient/family of BPCI-A Medical Bundle Program with potential outreach by either Care Transitions Team or naviHealth Team based on hospital admission and location.        BPCI-A Notification Letter given: Yes, given to [facility] in pt's room         Current discharge plan:   Return to [address]

## 2020-07-29 NOTE — CARE COORDINATION
20 nHpredict has been uploaded to chart and can be viewed in the media tab     nH Predict Outcome report for Luigi Nelson ,  1930 . Predict is recommending SNF with expected length of stay of 17.3 days, and projected CG hours of 4.5   post stay. However, Predict is suggesting to consider her prior living setting intermediate, as the same discharge site.          Jaylin Sal, Shamir Ugalde RN  Centralized Care Coordinator  M: [phone]        Geovanna Somers is [employer]  News, insights and analysis from the experts transforming health care  [facility] [employer]ights

## 2020-07-29 NOTE — PROGRESS NOTES
Active Problem Metabolic encephalopathy. History right cerebral infarction . UTI . Hypotension . Cardiac pacemaker . The condition she is not walking in PT pending SNF placement . She is alert oriented x 3 lifting all limbs equally . There is no slurring with minor decreased left NLF. Son reports that she does have history of some memory loss but is clearly more confused disoriented from her baseline . 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 Patient later presented with slurring with left facial droop . CTA head and neck with occlusion right M2, M3 . She has atrial fibrillation on eliquis 2.5 mg po qd along with aspirin 81 mg po qd and lipitor 10 mg po qd . Patient is resident of extended care facility noted to have left facial assymmetry along with slurring of speech along with confusion disorientation . Blood pressure in 70 to 90 range found to have UTI with UA showing large leukocyte esterase. Head CT with bilateral chronic periventricular small vessel ischemia . Creatinine 1.40 with no CTA study done . TSH normal. V92 [id number], folic acid 9.3 . Carotid ultrasound normal. Cardiac 2 D echo normal LVF . Moderate mitral stenosis .  Mild MR and TR      Past Medical History:   Diagnosis Date    Arthritis     Atrial fibrillation ([address] 8/21/2016    CHF [facility]congestive heart failure) ([facility]     Complete heart block ([address] 8/18/2016    Constipation     CVA (cerebral vascular accident) ([address] 11/08/2019    ACUTE ISCHEMIC    GERD (gastroesophageal reflux disease)     Glaucoma     mild, on eye drops    Hearing loss     bilateral hearing aids    Hiatal hernia     Hx of blood clots 07/2016    pulmonary emboli bilateral lungs after Pacemaker inserted    Hyperlipidemia     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 Woody Rubio    Restless leg syndrome     Right knee DJD     Wears glasses     Wears hearing aid in both ears        Past Surgical History:   Procedure Laterality Date    CARDIAC PACEMAKER PLACEMENT      REMOVAL 11- WITH TEMPORARY PACEMAKER PLACED 11-    CARDIAC PACEMAKER PLACEMENT  12/16/2019    DR Tatiana Gee MED[employer] BHARATHI XT SR MRI DEFIB. MRI CONDITIONAL MODEL#[id number]. RV LEAD 7886-01    CATARACT REMOVAL WITH IMPLANT Bilateral     Left 1/6/1998, right 8/2/1996    COLONOSCOPY      DILATION AND CURETTAGE OF UTERUS N/A 3/12/2019    DILATATION AND CURETTAGE, HYSTEROSCOPY, MYOSURE performed by Jocelyn Ellsworth MD at [address] [place] [address]  08/18/2016    EXPLANTED 2019 medtronic advisa MRI DR model # A2DR01  av lead [id number] rv lead [id number]  - mri compatible    IN GI ENDOSCOPIC ULTRASOUND N/A 9/12/2018    ENDOSCOPIC ULTRASOUND, PATHOLOGY REQUESTED, PAT NOTIFIED performed by Rancho Lim MD at [facility]      as a child    TOTAL KNEE ARTHROPLASTY Right 9/5/2017    KNEE TOTAL ARTHROPLASTY RIGHT WITH BIOMET AND GPS PRODUCT APPLICATION performed by Megan aSntos MD at [address] TRANSESOPHAGEAL ECHOCARDIOGRAM N/A 10/9/2019    TRANSESOPHAGEAL ECHOCARDIOGRAM WITH BUBBLE STUDY performed by Ariana Thomas DO at [address] TRANSESOPHAGEAL ECHOCARDIOGRAM  12/16/2019    UPPER GASTROINTESTINAL ENDOSCOPY  06/29/2017    polyp removed from small intestine near stomach, benign. done at [facility]  9/12/2018    EGD BIOPSY performed by Rancho Lim MD at [facility]       Family History   Problem Relation Age of Onset    Diabetes Sister     Heart Attack Sister     Osteoporosis Sister     Heart Disease Mother     Dementia Mother     Tuberculosis Father        Social History     Socioeconomic History    Marital status:   Spouse name: None    Number of children: None    Years of education: None    Highest education level: None   Occupational History    None   Social Needs    Financial resource strain: None    Food insecurity     Worry: None     Inability: None    Transportation needs     Medical: None     Non-medical: None   Tobacco Use    Smoking status: Never Smoker    Smokeless tobacco: Never Used   Substance and Sexual Activity    Alcohol use: No    Drug use: No    Sexual activity: None   Lifestyle    Physical activity     Days per week: None     Minutes per session: None    Stress: None   Relationships    Social connections     Talks on phone: None     Gets together: None     Attends Sikhism service: None     Active member of club or organization: None     Attends meetings of clubs or organizations: None     Relationship status: None    Intimate partner violence     Fear of current or ex partner: None     Emotionally abused: None     Physically abused: None     Forced sexual activity: None   Other Topics Concern    None   Social History Narrative    None       Current Facility-Administered Medications   Medication Dose Route Frequency Provider Last Rate Last Dose    meropenem (MERREM) 1 g in sodium chloride 0.9 % 100 mL IVPB (mini-bag)  1 g Intravenous Q12H Raffi Petit MD 33.3 mL/hr at 07/29/20 1701 1 g at 07/29/20 1701    vancomycin (VANCOCIN) intermittent dosing (placeholder)   Other RX Placeholder Raffi Petit MD        midodrine (PROAMATINE) tablet 10 mg  10 mg Oral TID  Kandice Torres MD   10 mg at 07/29/20 1701    amiodarone (CORDARONE) tablet 200 mg  200 mg Oral Daily ALEXSANDRA Mcgill - NP   200 mg at 07/29/20 1830    apixaban (ELIQUIS) tablet 2.5 mg  2.5 mg Oral BID ALEXSANDRA Mcgill - NP   2.5 mg at 07/29/20 0830    aspirin chewable tablet 81 mg  81 mg Oral Daily ALEXSANDRA Mcgill - NP   81 mg at 07/29/20 0814    ipratropium-albuterol (DUONEB) nebulizer solution 3 mL  1 vial Nebulization Q4H PRN Yefri Cluck, APRN - NP        lactulose ([facility]) 10 GM/15ML solution 10 g  10 g Oral BID Yefri Cluck, APRN - NP   10 g at 07/29/20 0820    latanoprost (XALATAN) 0.005 % ophthalmic solution 1 drop  1 drop Both Eyes Nightly Yefri Cluck, APRN - NP   1 drop at 07/28/20 2309    therapeutic multivitamin-minerals 1 tablet  1 tablet Oral Daily Yefri Cluck, APRN - NP   1 tablet at 07/29/20 0814    rOPINIRole (REQUIP) tablet 0.25 mg  0.25 mg Oral Nightly Yefri Cluck, APRN - NP   0.25 mg at 07/28/20 2311    acetaminophen (TYLENOL) tablet 650 mg  650 mg Oral Q6H PRN Yefri Cluck, APRN - NP        Or   [facility] acetaminophen (TYLENOL) suppository 650 mg  650 mg Rectal Q6H PRN Yefri Cluck, APRN - NP        acetaminophen (TYLENOL) tablet 650 mg  650 mg Oral Q6H PRN Yefri Cluck, APRN - NP   650 mg at 07/25/20 1232    Or    acetaminophen (TYLENOL) suppository 650 mg  650 mg Rectal Q6H PRN Yefri Cluck, APRN - NP        promethazine (PHENERGAN) tablet 12.5 mg  12.5 mg Oral Q6H PRN Yefri Cluck, APRN - NP        Or    ondansetron [facility]) injection 4 mg  4 mg Intravenous Q6H PRN Yefri Cluck, APRN - NP        atorvastatin (LIPITOR) tablet 40 mg  40 mg Oral Nightly Yefri Cluck, APRN - NP   40 mg at 07/28/20 2310    nitroGLYCERIN (NITROSTAT) SL tablet 0.4 mg  0.4 mg Sublingual Q5 Min PRN Yefri Cluck, APRN - NP        lactobacillus (CULTURELLE) capsule 1 capsule  1 capsule Oral Daily with breakfast Yefri Cluck, APRN - NP   1 capsule at 07/29/20 4835       Allergies   Allergen Reactions    Sulfa Antibiotics Rash       ROS:   Constitutional                  Negative for fever and chills   HEENT                            Negative for ear discharge, ear pain, nosebleed  Eyes                                Negative for photophobia, pain and discharge  Respiratory                      Negative for hemoptysis and sputum  Cardiovascular                Negative for orthopnea, claudication and PND  Gastrointestinal Negative for abdominal pain, diarrhea, blood in stool  Musculoskeletal               Negative for joint pain, negative for myalgia  Skin                                 Negative for rash or itching  Endo/heme/allergies       Negative for polydipsia, environmental allergy  Psychiatric                       Negative for suicidal ideation. Patient is not anxious    Vitals:    07/29/20 1610   BP: (!) 106/53   Pulse: 59   Resp: 12   Temp: 97.3 °F (36.3 °C)   SpO2: 97%     Admission weight: 115 lb (52.2 kg)    Neurological Examination  Constitutional .General exam well groomed   Head/ Ears /Nose/Throat/external ear . Normal exam  Neck and thyroid . Normal size. No bruits  Cardiovascular: Auscultation of heart with regular rate and rhythm   Musculoskeletal. Muscle bulk and tone normal                                                           Muscle strength lifting all limbs equally                                                                                 No dysmetria or dysdiadokinesis  No tremor   Normal fine motor  Orientation Alert and oriented x 2  Attention and concentration reduced   Short term memory reduced  Language process and speech normal . No aphasia   Cranial nerve 2 normal acuety and visual fields  Cranial nerve 3, 4 and 6 . Extraocular muscles are intact . Pupils are equal and reactive   Cranial nerve 5 . Intact corneal reflex. Normal facial sensation  Cranial nerve 7 normal exam   Cranial nerve 8. Grossly intact hearing   Cranial nerve 9 and 10. Symmetric palate elevation   Cranial nerve 11 , 5 out of 5 strength   Cranial Nerve 12 midline tongue . No atrophy  Sensation . Normal pinprick and light touch   Deep Tendon Reflexes symmetrical  Plantar response flexor bilaterally    Assessment :    Metabolic encephalopathy. History right cerebral infarction . UTI . Hypotension . Cardiac pacemaker . Possible underlying cognitive disorder       Plan:    SNF placement

## 2020-07-29 NOTE — PROGRESS NOTES
uro-gynecologist had a CT urography done with no major findings. The children report that they were called by the facility reporting that their mother had slurred speech, left-sided facial weakness and that she was more confused than normal.  They brought her into the emergency room for evaluation. The patient was found to be hypotensive with a systolic blood pressure in the 70s to 90s, CT scan done in route was found to have no acute intracranial pathology. The patient was seen by the stroke team and CT of the head without contrast today showed no acute intracranial process. Stable diffuse cerebral volume loss and moderate chronic small vessel ischemic changes. The question was whether this was a TIA versus an infectious process/sepsis  The patient was seen by the nephrology service who felt that she had acute kidney injury related to contrast nephropathy given the recent CT urogram 3 days prior to admission. There has been concern for a urinary tract infection and the patient does have a known sacral decubitus ulcer. 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 The renal parameters show improved creatinine but the patient's mentation has been poor. The patient was started on meropenem today by the primary service and due to the meropenem ordered I was asked to evaluate and help with antibiotic choice.     Current evaluation:2020    BP (!) 109/54   Pulse 60   Temp 94.7 °F (34.8 °C) (Oral)   Resp 14   Ht 5' 3\" (1.6 m)   Wt 110 lb (49.9 kg)   SpO2 99%   BMI 19.49 kg/m²     Temperature Range: Temp: 94.7 °F (34.8 °C) Temp  Av °F (36.1 °C)  Min: 94.7 °F (34.8 °C)  Max: 98 °F (36.7 °C)  The patient is seen and evaluated at bedside she is awake and alert in no acute distress. She reports that she is doing well has been eating okay and does not have any new issues or concerns. Her daughter is at bedside at the time of my evaluation today. Review of Systems   Constitutional: Negative. Respiratory: Negative. Cardiovascular: Negative. Gastrointestinal: Negative. Genitourinary: Negative. Musculoskeletal: Negative. Skin: Positive for wound. Neurological: Negative. Psychiatric/Behavioral: Negative. Physical Examination :     Physical Exam  Constitutional:       Appearance: She is well-developed. HENT:      Head: Normocephalic and atraumatic. Nose: Nose normal.      Mouth/Throat:      Mouth: Mucous membranes are moist.      Pharynx: Oropharynx is clear. Eyes:      Pupils: Pupils are equal, round, and reactive to light. Neck:      Musculoskeletal: Normal range of motion and neck supple. Cardiovascular:      Rate and Rhythm: Regular rhythm. Heart sounds: Normal heart sounds. Pulmonary:      Effort: Pulmonary effort is normal.      Breath sounds: Normal breath sounds. Abdominal:      General: Bowel sounds are normal.      Palpations: Abdomen is soft. Skin:     General: Skin is warm and dry. Comments: The sacrococcygeal ulceration dressing was not removed. Neurological:      Mental Status: She is alert and oriented to person, place, and time. Laboratory data:   I have independently reviewed the followinglabs:  CBC with Differential:   No results for input(s): WBC, HGB, HCT, PLT, SEGSPCT, BANDSPCT, LYMPHOPCT, MONOPCT, EOSPCT in the last 72 hours. BMP:   Recent Labs     07/27/20  0645 07/28/20  0613 07/29/20  0600   * 144 144   K 4.1 4.1 3.9   * 115* 112*   CO2 20 21 22   BUN 10 12 11   CREATININE 0.53 0.51 0.57   MG 2.0  --   --      Hepatic Function Panel:   No results for input(s): PROT, LABALBU, BILIDIR, IBILI, BILITOT, ALKPHOS, ALT, AST in the last 72 hours.   No results for

## 2020-07-30 NOTE — PROGRESS NOTES
Patient dced to [facility] via [facility] with all belongings. All questions answered.   Tien Lancaster  9:19 PM

## 2020-08-05 ENCOUNTER — HOSPITAL ENCOUNTER (OUTPATIENT)
Age: 85
Setting detail: SPECIMEN
Discharge: HOME | End: 2020-08-05
Payer: MEDICARE

## 2020-08-05 ENCOUNTER — HOSPITAL ENCOUNTER (OUTPATIENT)
Dept: WOUND CARE | Age: 85
Discharge: HOME | End: 2020-08-05
Payer: MEDICARE

## 2020-08-05 LAB
ALBUMIN SERPL-MCNC: 2.4 G/DL (ref 3.5–5.2)
ALBUMIN/GLOBULIN RATIO: 0.8 (ref 1–2.5)
ALP BLD-CCNC: 98 U/L (ref 35–104)
ALT SERPL-CCNC: 15 U/L (ref 5–33)
ANION GAP SERPL CALCULATED.3IONS-SCNC: 13 MMOL/L (ref 9–17)
AST SERPL-CCNC: 24 U/L
BILIRUB SERPL-MCNC: 0.31 MG/DL (ref 0.3–1.2)
BUN BLDV-MCNC: 18 MG/DL (ref 8–23)
BUN/CREAT BLD: ABNORMAL (ref 9–20)
CALCIUM SERPL-MCNC: 8.7 MG/DL (ref 8.6–10.4)
CHLORIDE BLD-SCNC: 104 MMOL/L (ref 98–107)
CO2: 25 MMOL/L (ref 20–31)
CREAT SERPL-MCNC: 0.39 MG/DL (ref 0.5–0.9)
GFR AFRICAN AMERICAN: >60 ML/MIN
GFR NON-AFRICAN AMERICAN: >60 ML/MIN
GFR SERPL CREATININE-BSD FRML MDRD: ABNORMAL ML/MIN/{1.73_M2}
GFR SERPL CREATININE-BSD FRML MDRD: ABNORMAL ML/MIN/{1.73_M2}
GLUCOSE BLD-MCNC: 101 MG/DL (ref 70–99)
HCT VFR BLD CALC: 35.4 % (ref 36.3–47.1)
HEMOGLOBIN: 10.7 G/DL (ref 11.9–15.1)
MCH RBC QN AUTO: 29.2 PG (ref 25.2–33.5)
MCHC RBC AUTO-ENTMCNC: 30.2 G/DL (ref 28.4–34.8)
MCV RBC AUTO: 96.7 FL (ref 82.6–102.9)
NRBC AUTOMATED: 0 PER 100 WBC
PDW BLD-RTO: 17.4 % (ref 11.8–14.4)
PLATELET # BLD: 449 K/UL (ref 138–453)
PMV BLD AUTO: 11 FL (ref 8.1–13.5)
POTASSIUM SERPL-SCNC: 4.2 MMOL/L (ref 3.7–5.3)
RBC # BLD: 3.66 M/UL (ref 3.95–5.11)
SODIUM BLD-SCNC: 142 MMOL/L (ref 135–144)
TOTAL PROTEIN: 5.4 G/DL (ref 6.4–8.3)
WBC # BLD: 11.1 K/UL (ref 3.5–11.3)

## 2020-08-05 PROCEDURE — 85027 COMPLETE CBC AUTOMATED: CPT

## 2020-08-05 PROCEDURE — 80053 COMPREHEN METABOLIC PANEL: CPT

## 2020-08-05 PROCEDURE — P9603 ONE-WAY ALLOW PRORATED MILES: HCPCS

## 2020-08-05 PROCEDURE — 36415 COLL VENOUS BLD VENIPUNCTURE: CPT

## 2020-08-12 ENCOUNTER — HOSPITAL ENCOUNTER (OUTPATIENT)
Age: 85
Setting detail: SPECIMEN
Discharge: HOME | End: 2020-08-12
Payer: MEDICARE

## 2020-08-12 ENCOUNTER — HOSPITAL ENCOUNTER (OUTPATIENT)
Dept: WOUND CARE | Age: 85
Discharge: HOME | End: 2020-08-12
Payer: MEDICARE

## 2020-08-12 VITALS
DIASTOLIC BLOOD PRESSURE: 51 MMHG | TEMPERATURE: 96.8 F | WEIGHT: 110 LBS | BODY MASS INDEX: 19.49 KG/M2 | HEIGHT: 63 IN | RESPIRATION RATE: 14 BRPM | HEART RATE: 60 BPM | SYSTOLIC BLOOD PRESSURE: 89 MMHG

## 2020-08-12 LAB — PREALBUMIN: 8.3 MG/DL (ref 20–40)

## 2020-08-12 PROCEDURE — 97605 NEG PRS WND THER DME<=50SQCM: CPT

## 2020-08-12 PROCEDURE — 84134 ASSAY OF PREALBUMIN: CPT

## 2020-08-12 PROCEDURE — P9603 ONE-WAY ALLOW PRORATED MILES: HCPCS

## 2020-08-12 PROCEDURE — 36415 COLL VENOUS BLD VENIPUNCTURE: CPT

## 2020-08-12 PROCEDURE — 11043 DBRDMT MUSC&/FSCA 1ST 20/<: CPT

## 2020-08-12 PROCEDURE — 6370000000 HC RX 637 (ALT 250 FOR IP): Performed by: NURSE PRACTITIONER

## 2020-08-12 RX ORDER — LIDOCAINE HYDROCHLORIDE 40 MG/ML
SOLUTION TOPICAL ONCE
Status: COMPLETED | OUTPATIENT
Start: 2020-08-12 | End: 2020-08-12

## 2020-08-12 RX ORDER — LIDOCAINE 40 MG/G
CREAM TOPICAL ONCE
Status: CANCELLED | OUTPATIENT
Start: 2020-08-12

## 2020-08-12 RX ORDER — LIDOCAINE HYDROCHLORIDE 40 MG/ML
SOLUTION TOPICAL ONCE
Status: CANCELLED | OUTPATIENT
Start: 2020-08-12

## 2020-08-12 RX ORDER — MIRTAZAPINE 15 MG/1
15 TABLET, FILM COATED ORAL NIGHTLY
COMMUNITY

## 2020-08-12 RX ORDER — LIDOCAINE 50 MG/G
OINTMENT TOPICAL ONCE
Status: CANCELLED | OUTPATIENT
Start: 2020-08-12

## 2020-08-12 RX ADMIN — LIDOCAINE HYDROCHLORIDE 10 ML: 40 SOLUTION TOPICAL at 11:32

## 2020-08-12 ASSESSMENT — PAIN SCALES - GENERAL: PAINLEVEL_OUTOF10: 0

## 2020-08-12 NOTE — PROGRESS NOTES
Bilateral     Left 1/6/1998, right 8/2/1996    COLONOSCOPY      DILATION AND CURETTAGE OF UTERUS N/A 3/12/2019    DILATATION AND CURETTAGE, HYSTEROSCOPY, MYOSURE performed by Isatu Low MD at [address] [place] [address]  08/18/2016    EXPLANTED 2019 medtronic advisa MRI DR model # A2DR01  av lead [id number] rv lead [id number]  - mri compatible    VT GI ENDOSCOPIC ULTRASOUND N/A 9/12/2018    ENDOSCOPIC ULTRASOUND, PATHOLOGY REQUESTED, PAT NOTIFIED performed by Terrie Esqueda MD at [facility]      as a child    TOTAL KNEE ARTHROPLASTY Right 9/5/2017    KNEE TOTAL ARTHROPLASTY RIGHT WITH BIOMET AND GPS PRODUCT APPLICATION performed by Ginna Almonte MD at [facility] TRANSESOPHAGEAL ECHOCARDIOGRAM N/A 10/9/2019    TRANSESOPHAGEAL ECHOCARDIOGRAM WITH BUBBLE STUDY performed by Марина Decker DO at [facility] TRANSESOPHAGEAL ECHOCARDIOGRAM  12/16/2019    UPPER GASTROINTESTINAL ENDOSCOPY  06/29/2017    polyp removed from small intestine near stomach, benign.   done at [address] [place] [address]  9/12/2018    EGD BIOPSY performed by Terrie Esqueda MD at [facility]       FAMILY HISTORY    Family History   Problem Relation Age of Onset    Diabetes Sister     Heart Attack Sister     Osteoporosis Sister     Heart Disease Mother     Dementia Mother     Tuberculosis Father        SOCIAL HISTORY    Social History     Tobacco Use    Smoking status: Never Smoker    Smokeless tobacco: Never Used   Substance Use Topics    Alcohol use: No    Drug use: No       ALLERGIES    Allergies   Allergen Reactions    Sulfa Antibiotics Rash       MEDICATIONS    Current Outpatient Medications on File Prior to Encounter   Medication Sig Dispense Refill    mirtazapine (REMERON) 15 MG tablet Take 15 mg by mouth nightly      furosemide (LASIX) 20 MG tablet Take 1 tablet by mouth daily 60 tablet 3    Lactobacillus (ACIDOPHILUS) TABS Take by mouth 2 times daily  Nutritional Supplements (BOOST PLUS PO) Take 237 mLs by mouth 3 times daily      ipratropium-albuterol (DUONEB) 0.5-2.5 (3) MG/3ML SOLN nebulizer solution Take 1 vial by nebulization every 4 hours as needed for Shortness of Breath      Multiple Vitamins-Minerals (THERAPEUTIC MULTIVITAMIN-MINERALS) tablet Take 1 tablet by mouth daily      PROTEIN PO Take 30 mLs by mouth 3 times daily      apixaban (ELIQUIS) 2.5 MG TABS tablet Take 2.5 mg by mouth 2 times daily      Calcium Carbonate-Vitamin D (CALCIUM 500 + D PO) Take 1 tablet by mouth daily CALCIUM 500MG+VIT D 200IU      Magnesium Oxide (MAG- PO) Take 400 mg by mouth 2 times daily      lactulose (CHRONULAC) 10 GM/15ML solution Take 10 g by mouth 2 times daily      potassium chloride (MICRO-K) 10 MEQ extended release capsule Take 30 mEq by mouth daily       aspirin 81 MG chewable tablet Take 1 tablet by mouth daily 30 tablet 3    guaiFENesin (MUCINEX) 600 MG extended release tablet Take 1 tablet by mouth 2 times daily 60 tablet 0    amiodarone (CORDARONE) 200 MG tablet Take 200 mg by mouth daily      polyethylene glycol (MIRALAX) powder Take 17 g by mouth daily as needed      metoprolol tartrate (LOPRESSOR) 25 MG tablet Take 1 tablet by mouth 2 times daily 60 tablet 3    rOPINIRole (REQUIP) 0.25 MG tablet Take 0.25 mg by mouth nightly       atorvastatin (LIPITOR) 10 MG tablet Take 10 mg by mouth daily       latanoprost (XALATAN) 0.005 % ophthalmic solution Place 1 drop into both eyes nightly       Acetaminophen (TYLENOL ARTHRITIS PAIN PO) Take 2 tablets by mouth every 6 hours as needed       diphenhydrAMINE (BENADRYL) 50 MG capsule Take 50 mg by mouth every 4 hours as needed for Itching       No current facility-administered medications on file prior to encounter. REVIEW OF SYSTEMS    Pertinent items are noted in HPI.     Objective:      BP (!) 89/51   Pulse 60   Temp 96.8 °F (36 °C) (Tympanic)   Resp 14   Ht 5' 3\" (1.6 m)   Wt 110 lb (49.9 kg)   BMI 19.49 kg/m²     Wt Readings from Last 3 Encounters:   08/12/20 110 lb (49.9 kg)   07/29/20 110 lb (49.9 kg)   07/15/20 115 lb (52.2 kg)       PHYSICAL EXAM    General Appearance: alert and oriented to person, place and time, well developed and well- nourished, in no acute distress  Skin: warm and dry, no rash or erythema  Head: normocephalic and atraumatic  Eyes: pupils equal, round, and reactive to light, extraocular eye movements intact, conjunctivae normal  ENT: tympanic membrane, external ear and ear canal normal bilaterally, nose without deformity, nasal mucosa and turbinates normal without polyps  Neck: supple and non-tender without mass, no thyromegaly or thyroid nodules, no cervical lymphadenopathy  Pulmonary/Chest: clear to auscultation bilaterally- no wheezes, rales or rhonchi, normal air movement, no respiratory distress  Cardiovascular: normal rate, regular rhythm, normal S1 and S2, no murmurs, rubs, clicks, or gallops, distal pulses intact, no carotid bruits  Abdomen: soft, non-tender, non-distended, normal bowel sounds, no masses or organomegaly  Extremities: no cyanosis, clubbing or edema  Musculoskeletal: normal range of motion, no joint swelling, deformity or tenderness  Neurologic: reflexes normal and symmetric, no cranial nerve deficit, gait, coordination and speech normal      Assessment:     Problem List Items Addressed This Visit     Pressure ulcer of coccygeal region, stage IV (HCC) (Chronic)    Relevant Orders    Supply: Wound Cleanser    Severe protein-calorie malnutrition (HCC) (Chronic)    Relevant Orders    Supply: Wound Cleanser    Skin tear of left upper extremity - Primary    Relevant Orders    Supply: Wound Cleanser           Procedure Note  Indications:  Based on my examination of this patient's wound(s)/ulcer(s) today, debridement is required to promote healing and evaluate the wound base.     Performed by: Demetrio Harkins MD    Consent obtained:  Yes    Time out taken: Yes    Pain Control: Anesthetic  Anesthetic: 4% Lidocaine Liquid Topical       Debridement:Excisional Debridement    Using curette the wound(s)/ulcer(s) was/were sharply debrided down through and including the removal of muscle/fascia. Devitalized Tissue Debrided:  biofilm and slough    Pre Debridement Measurements:  Are located in the [place]  Documentation Flow Sheet    Wound/Ulcer #: 1    Post Debridement Measurements:  Wound/Ulcer Descriptions are Pre Debridement except measurements:    Wound 06/30/20 Coccyx Mid wound #1 coccyx (Active)   Wound Image   08/12/20 1126   Wound Pressure Stage  4 08/12/20 1126   Dressing Status New drainage; Old drainage 08/12/20 1126   Dressing Changed Changed/New 08/12/20 1126   Dressing/Treatment Alginate with Ag;ABD; Medipore 07/28/20 1600   Wound Cleansed Soap and water 08/12/20 1126   Dressing Change Due 07/28/20 07/28/20 1600   Wound Length (cm) 2.5 cm 08/12/20 1126   Wound Width (cm) 3 cm 08/12/20 1126   Wound Depth (cm) 2 cm 08/12/20 1126   Wound Surface Area (cm^2) 7.5 cm^2 08/12/20 1126   Change in Wound Size % (l*w) 28.57 08/12/20 1126   Wound Volume (cm^3) 15 cm^3 08/12/20 1126   Wound Healing % 43 08/12/20 1126   Post-Procedure Length (cm) 2.5 cm 08/12/20 1126   Post-Procedure Width (cm) 3 cm 08/12/20 1126   Post-Procedure Depth (cm) 2 cm 08/12/20 1126   Post-Procedure Surface Area (cm^2) 7.5 cm^2 08/12/20 1126   Post-Procedure Volume (cm^3) 15 cm^3 08/12/20 1126   Tunneling Position ___ O'Clock 12 06/30/20 1423   Undermining Starts ___ O'Clock 12 08/12/20 1126   Undermining Ends___ O'Clock 0600 08/12/20 1126   Undermining Maxium Distance (cm) 3.0@ 01 08/12/20 1126   Wound Assessment Drainage;Red;Granulation tissue 08/12/20 1126   Drainage Amount Moderate 08/12/20 1126   Drainage Description Serosanguinous 08/12/20 1126   Odor None 08/12/20 1126   Margins Defined edges 08/12/20 1126   Radha-wound Assessment Other (Comment) 07/28/20 2000   Pink%Wound Bed 50 07/28/20 1145   Red%Wound Bed 100 08/12/20 1126   Yellow%Wound Bed 30 07/28/20 1145   Number of days: 42       Wound 08/12/20 Arm Lateral;Upper;Left #3 (Active)   Wound Image   08/12/20 1126   Wound Skin Tear 08/12/20 1126   Offloading for Diabetic Foot Ulcers No 08/12/20 1126   Dressing Status New drainage; Old drainage 08/12/20 1126   Dressing Changed Changed/New 08/12/20 1126   Wound Cleansed Rinsed/Irrigated with saline 08/12/20 1126   Wound Length (cm) 0.7 cm 08/12/20 1126   Wound Width (cm) 1 cm 08/12/20 1126   Wound Depth (cm) 0.1 cm 08/12/20 1126   Wound Surface Area (cm^2) 0.7 cm^2 08/12/20 1126   Wound Volume (cm^3) 0.07 cm^3 08/12/20 1126   Post-Procedure Length (cm) 0.7 cm 08/12/20 1126   Post-Procedure Width (cm) 1 cm 08/12/20 1126   Post-Procedure Depth (cm) 0.1 cm 08/12/20 1126   Post-Procedure Surface Area (cm^2) 0.7 cm^2 08/12/20 1126   Post-Procedure Volume (cm^3) 0.07 cm^3 08/12/20 1126   Distance Tunneling (cm) 0.1 cm 08/12/20 1126   Wound Assessment Drainage;Fragile; Yellow; Red 08/12/20 1126   Drainage Amount Moderate 08/12/20 1126   Drainage Description Serosanguinous 08/12/20 1126   Odor None 08/12/20 1126   Margins Defined edges 08/12/20 1126   Radha-wound Assessment Fragile 08/12/20 1126   Red%Wound Bed 80 08/12/20 1126   Yellow%Wound Bed 20 08/12/20 1126   Number of days: 0          Percent of Wound(s)/Ulcer(s) Debrided: 100%    Total Surface Area Debrided:  0.7 sq cm       Diabetic/Pressure/Non Pressure Ulcers only:  Ulcer: Diabetic ulcer, muscle necrosis      Estimated Blood Loss:  Minimal    Hemostasis Achieved:  by pressure    Procedural Pain:  2  / 10     Post Procedural Pain:  2 / 10     Response to treatment:  Well tolerated by patient. Plan:     Treatment Note please see attached Discharge Instructions    Written patient dismissal instructions given to patient and signed by patient or POA.          Discharge Instructions         [facility]  Visit Rahel Instructions / Physician Orders  DATE: 08/5/2020     Home Care: [address] [facility]     SUPPLIES ORDERED THRU: SNF     Wound Location:  Coccyx, left arm                                  Cleanse with: Liquid antibacterial soap and water, rinse well      Dressing Orders:   Coccyx: Skin prep and drape to periwound. Fill wound with black foam, cover with drape, and apply KCI vac (do not substitute) at 150mmhg continuous (NS wet to dry applied today in wound care)                                    Left arm: Fibracol to wound, cover with mepilex border                       Frequency: Coccyx: Change three times a week (M,W,F)                       Left arm: Change three times a week     Additional Orders: Increase protein to diet (meat, cheese, eggs, fish, peanut butter, nuts and beans)  Multivitamin daily  May be up for meals- only about 30 minutes at a time, no more than 2 hours at a time  ROHO cushion when she is up in a chair  Apply specialty mattress to bed  Please inform family of next visit and have them come to wound care  Dietician to follow for low prealbumin and large draining wound- pt needs increase in protein in diet  Spoke with family about possible feeding tube     HYPERBARIC TREATMENT-                TREATMENT #     Your next appointment with [facility] is in 2 weeks     ROOM TYPE   []?????????? CHAIR     [x]?????????? BED   []?????????? EITHER        TIME []?????????? 45 MIN     []?????????? 60 MIN     (Please note your next appointment above and if you are unable to keep, kindly give a 24 hour notice.  Thank you.)     If you experience any of the following, please call the [facility] during business hours:  [phone]     * Increase in Pain  * Temperature over 101  * Increase in drainage from your wound  * Drainage with a foul odor  * Bleeding  * Increase in swelling  * Need for compression bandage changes due to slippage, breakthrough drainage.     If you need medical attention outside of the business hours of the [address] please contact your PCP or go to the nearest emergency room.     The information contained in the After Visit Summary has been reviewed with me, the patient and/or responsible adult, by my health care provider(s). I had the opportunity to ask questions regarding this information. I have elected to receive;      []???????? ?? After Visit Summary  [x]?????????? Comprehensive Discharge Instruction        Patient signature______________________________________Date:________  Electronically signed by Adrianna Correa RN on 8/12/2020 at 12:04 PM          Electronically signed by Kymberly Perales MD on 8/12/2020 at 12:04 PM

## 2020-08-12 NOTE — DISCHARGE INSTR - COC
respiratory failure with hypoxia ([facility]) J96.01    Head injury without concussion or intracranial hemorrhage S09.90XA    Bacteremia R78.81    Right arm cellulitis L03. 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    Closed fracture of lumbar vertebra with spinal cord injury ([address] S34.109A, S32.008A    Pressure ulcer of coccygeal region, stage IV ([facility]) L89.154    Wedge compression fracture of third lumbar vertebra with routine healing S32.030D    Closed compression fracture of L1 lumbar vertebra, sequela S32.010S    Age-related osteoporosis without current pathological fracture M81.0    Vitamin D deficiency E55.9    Status post kyphoplasty Z98.890    Spinal stenosis of lumbar region with neurogenic claudication M48.062    Slow transit constipation K59.01    Restless legs syndrome G25.81    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    History of pulmonary embolism Z86.711    Gastric polyp K31.7    Gastroesophageal reflux disease with esophagitis K21.0    Generalized anxiety disorder F41.1    Essential (primary) hypertension I10    Thrombophlebitis I80.9    Age-related osteoporosis with current pathological fracture M80.00XA    Anticoagulated on Coumadin Z79.01    Centrilobular emphysema ([facility]) J43.2    Cervical spondylosis without myelopathy M47.812    DDD (degenerative disc disease), lumbar M51.36    Esophageal dysphagia R13.10    Presence of cardiac pacemaker Z95.0    Severe protein-calorie malnutrition ([facility]) E43    Skin tear of left upper extremity S41.112A    TIA (transient ischemic attack) G45.9    Left renal stone N20.0    Chronic diastolic heart failure ([facility]) N93.24    Metabolic encephalopathy I54.27    History of cerebral infarction Z86.73    Urinary tract infection due to Proteus N39.0, B96.4    MRSA infection A49.02    Sepsis associated hypotension (HCC) A41.9, I95.9    Weakness generalized R53.1    Elevated troponin level not due to acute coronary syndrome R79.89    MIKE (acute kidney injury) ([facility] N17.9    COPD without exacerbation (HCC) J44.9    Sepsis due to urinary tract infection (HCC) A41.9, N39.0    Electrolyte imbalance E87.8    Moderate aortic stenosis I35.0       Isolation/Infection:   Isolation          No Isolation        Patient Infection Status     Infection Onset Added Last Indicated Last Indicated By Review Planned Expiration Resolved Resolved By    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 Coli - urine 2/2020        ESBL (Extended Spectrum Beta Lactamase) 02/07/20 02/09/20 07/02/20 Culture, Urine        Klebsiella - Urine 4/2020  Proteus - Urine 7/2020        MRSA 10/04/19 10/06/19 07/21/20 Culture, Urine        Urine - 7/2020              Nurse Assessment:  Last Vital Signs: BP (!) 89/51   Pulse 60   Temp 96.8 °F (36 °C) (Tympanic)   Resp 14   Ht 5' 3\" (1.6 m)   Wt 110 lb (49.9 kg)   BMI 19.49 kg/m²     Last documented pain score (0-10 scale): Pain Level: 0  Last Weight:   Wt Readings from Last 1 Encounters:   08/12/20 110 lb (49.9 kg)     Mental Status:  {IP PT MENTAL STATUS:63117}    IV Access:  { [facility]IS CAR[facility] IV ACCESS:537997471}    Nursing Mobility/ADLs:  Walking   {[facility] POQT:401302991}  Transfer  {[facility] EJRX:597983646}  Bathing  {[facility] NBMI:619754182}  Dressing  {[facility] MESN:444061361}  Toileting  {[facility] SDTH:267964048}  Feeding  {[facility] PZFO:838021127}  Med Admin  {[facility] SWVM:027275783}  Med Delivery   {[facility] MED Delivery:798588799}    Wound Care Documentation and Therapy:  Wound 06/30/20 Coccyx Mid wound #1 coccyx (Active)   Wound Image   08/12/20 1126   Wound Pressure Stage  4 08/12/20 1126   Dressing Status New drainage; Old drainage 08/12/20 1126 Dressing Changed Changed/New 08/12/20 1126   Dressing/Treatment Alginate with Ag;ABD; Medipore 07/28/20 1600   Wound Cleansed Soap and water 08/12/20 1126   Dressing Change Due 07/28/20 07/28/20 1600   Wound Length (cm) 2.5 cm 08/12/20 1126   Wound Width (cm) 3 cm 08/12/20 1126   Wound Depth (cm) 2 cm 08/12/20 1126   Wound Surface Area (cm^2) 7.5 cm^2 08/12/20 1126   Change in Wound Size % (l*w) 28.57 08/12/20 1126   Wound Volume (cm^3) 15 cm^3 08/12/20 1126   Wound Healing % 43 08/12/20 1126   Post-Procedure Length (cm) 2.5 cm 08/12/20 1126   Post-Procedure Width (cm) 3 cm 08/12/20 1126   Post-Procedure Depth (cm) 2 cm 08/12/20 1126   Post-Procedure Surface Area (cm^2) 7.5 cm^2 08/12/20 1126   Post-Procedure Volume (cm^3) 15 cm^3 08/12/20 1126   Tunneling Position ___ O'Clock 12 06/30/20 1423   Undermining Starts ___ O'Clock 12 08/12/20 1126   Undermining Ends___ O'Clock 0600 08/12/20 1126   Undermining Maxium Distance (cm) 3.0@ 01 08/12/20 1126   Wound Assessment Drainage;Red;Granulation tissue 08/12/20 1126   Drainage Amount Moderate 08/12/20 1126   Drainage Description Serosanguinous 08/12/20 1126   Odor None 08/12/20 1126   Margins Defined edges 08/12/20 1126   Radha-wound Assessment Other (Comment) 07/28/20 2000   [place]%Wound Bed 50 07/28/20 1145   Red%Wound Bed 100 08/12/20 1126   Yellow%Wound Bed 30 07/28/20 1145   Number of days: 42       Wound 08/12/20 Arm Lateral;Upper;Left #3 (Active)   Wound Image   08/12/20 1126   Wound Skin Tear 08/12/20 1126   Offloading for Diabetic Foot Ulcers No 08/12/20 1126   Dressing Status New drainage; Old drainage 08/12/20 1126   Dressing Changed Changed/New 08/12/20 1126   Wound Cleansed Rinsed/Irrigated with saline 08/12/20 1126   Wound Length (cm) 0.7 cm 08/12/20 1126   Wound Width (cm) 1 cm 08/12/20 1126   Wound Depth (cm) 0.1 cm 08/12/20 1126   Wound Surface Area (cm^2) 0.7 cm^2 08/12/20 1126   Wound Volume (cm^3) 0.07 cm^3 08/12/20 1126   Post-Procedure Length (cm) 0.7 SIGNATURE:  {Esignature:138448997}    CASE MANAGEMENT/SOCIAL WORK SECTION    Inpatient Status Date: ***    Readmission Risk Assessment Score:  Readmission Risk              Risk of Unplanned Readmission:        0           Discharging to Facility/ Agency   · Name:   · Address:  · Phone:  · Fax:    Dialysis Facility (if applicable)   · Name:  · Address:  · Dialysis Schedule:  · Phone:  · Fax:    / signature: {Esignature:446809500}    PHYSICIAN SECTION    Prognosis: {Prognosis:6892471337}    Condition at Discharge: [address] [place] [address] Patient Condition:329899430}    Rehab Potential (if transferring to Rehab): {Prognosis:3122335952}    Recommended Labs or Other Treatments After Discharge: ***    Physician Certification: I certify the above information and transfer of Jorge Vicente  is necessary for the continuing treatment of the diagnosis listed and that she requires {Admit to Appropriate Level of Care:93089} for {GREATER/LESS:659978524} 30 days.      Update Admission H&P: {CHP DME Changes in [facility]:440688372}    PHYSICIAN SIGNATURE:  {Esignature:863187668}

## 2021-04-26 NOTE — CARE COORDINATION
[address] [facility] ([facility]) Follow Up Call  Qualifying Diagnosis of Pneumonia. 6/5/2019  Patient Name:  Glenys Singh   YOB: 1930  Discharge Date:  4/19/19  RARS:  Readmission Risk Score: 14    PCP:  Franko Doe MD    Assessment:     Olinda Kirk of Breath: denies,\" only time I get SOB is when I walk too far\".  Cough Frequency, Productive/Color: denies   Appetite:  \"too good\"   Sleep pattern: OK   Thinking clearly: OK   Tired/weakness: denies   Fever, Chills Sweating:  denies   Headaches: denies  [address],  Active: Discharged From Services 6/3/19. V/S WNL's   Medication Needs/Questions: denies   Transportation Need:  family   Live Alone: son lives with Obdulia Buitrago, son has a full time job   Ability to [employer], Bathe: ok   Do you feel like you have everything you need to stay well at home? Jose Luis Simmons"   Follow Up Appointment: completed, Coumadin Management through Dr. Soniya Delcid. [facility] for lab draws.  Teaching:  Call physician's office with any needs, concerns. Agreeable to continued communication per [facility] program 90 day follow up. Follow Up Concerns: N/A    No future appointments.     Care Transitions will continue to follow per [facility] Program.  Arnold Tobias RN, [facility] Office Visit    12/10/2020  [address] [facility] [place]     Arie Montes MD  Orthopedic Surgery  Primary osteoarthritis of left knee +1 more  Dx  Left Knee - Follow-up ; Referred by Manuel Reno MD  Reason for Visit   Progress Notes    Procedure Orders   1  Large joint arthrocentesis: L knee [[id number]] ordered by Arie Montes MD   Expand All Collapse All    Assessment:  1  Primary osteoarthritis of left knee  ferrous sulfate 324 (65 Fe) mg     folic acid (FOLVITE) 1 mg tablet     enoxaparin (LOVENOX) 40 mg/0 4 mL     ascorbic acid (VITAMIN C) 500 mg tablet   2  Chronic pain of left knee  ferrous sulfate 324 (65 Fe) mg     folic acid (FOLVITE) 1 mg tablet     enoxaparin (LOVENOX) 40 mg/0 4 mL     ascorbic acid (VITAMIN C) 500 mg tablet         Plan:  The patient was aspirated of 29ml fluid provided with left steroid injection  The patient tolerated the procedure well        The patient will be scheduled for left total knee arthroplasty  We feel the patient will find significant relief per current symptoms, findings on imaging and exam   Risks, benefits, precautions and expectations were discussed  Risks include blood loss, potential infection and blood clots  Preoperative vitamins were prescribed  The patient should follow up after surgery              To do next visit:  Return for post-op visit      The above stated was discussed in layman's terms and the patient expressed understanding  All questions were answered to the patient's satisfaction               Scribe Attestation    I,:  Therese Whyte am acting as a scribe while in the presence of the attending physician :       I,:  Arie Montes MD personally performed the services described in this documentation    as scribed in my presence  :                 Subjective:   Morena Lino is a 72 y o  female who presents for follow up of left knee  She is s/p left knee Euflexxa injections with no benefit, 9/9/2020    Today she complains of significant left knee pain  Her pain effects her sleep and daily activities  Most activity aggravates while rest can alleviate  She does use Naproxen 500mg with some benefit    She has had steroid injections in past   She anticipates left TKA in 2020           Review of systems negative unless otherwise specified in HPI     Medical History        Past Medical History:   Diagnosis Date    Arthritis      BPPV (benign paroxysmal positional vertigo)      Bronchitis      Disease of thyroid gland       HYPO    Diverticulitis      GERD (gastroesophageal reflux disease)      Glaucoma (increased eye pressure)       2 para 2      IBS (irritable bowel syndrome)      Insomnia      PONV (postoperative nausea and vomiting)             Surgical History         Past Surgical History:   Procedure Laterality Date    CHOLECYSTECTOMY        COLONOSCOPY   2019    DILATION AND CURETTAGE OF UTERUS        FL GUIDED NEEDLE PLAC BX/ASP/INJ   2018    FL GUIDED NEEDLE PLAC BX/ASP/INJ   2019    FL GUIDED NEEDLE PLAC BX/ASP/INJ   2020    FL GUIDED NEEDLE PLAC BX/ASP/INJ   10/8/2020    HYSTERECTOMY   2006    JOINT REPLACEMENT        LAPAROSCOPIC COLON RESECTION   2019    MAMMO (HISTORICAL) Bilateral 2018    PARTIAL HYSTERECTOMY        OK REVISE KNEE JOINT REPLACE,ALL PARTS Right 2018     Procedure: ARTHROPLASTY KNEE TOTAL REVISION;  Surgeon: Gustavo Guerrero MD;  [facility];  Service: Orthopedics    TONSILLECTOMY        TRABECULOPLASTY, LASER SELECTIVE Left 2019    VARICOSE VEIN SURGERY                     Family History   Problem Relation Age of Onset    Pancreatic cancer Mother 72    Diabetes type II Mother      Heart disease Father      No Known Problems Sister      Breast cancer Paternal Grandmother 80    Cancer Maternal Grandfather      Lung disease Paternal Grandfather      No Known Problems Daughter      No Known Problems Maternal Grandmother      No Known Problems Daughter      No Known Problems Maternal Aunt      No Known Problems Maternal Aunt      No Known Problems Maternal Aunt      No Known Problems Maternal Aunt           Social History            Occupational History    Not on file   Tobacco Use    Smoking status: Never Smoker    Smokeless tobacco: Never Used   Substance and Sexual Activity    Alcohol use:  Yes       Frequency: Monthly or less       Comment: social     Drug use: No    Sexual activity: Yes       Birth control/protection: Surgical            Current Outpatient Medications:     Acetaminophen (TYLENOL ARTHRITIS PAIN PO), Take 1,200 mg by mouth 2 (two) times a day, Disp: , Rfl:     Biotin 86056 MCG TABS, , Disp: , Rfl:     Black Elderberry (Sambucus Elderberry) 50 MG/5ML SYRP, Take by mouth daily, Disp: , Rfl:     Calcium-Vitamin D-Vitamin K (VIACTIV PO), Take 1 tablet by mouth daily, Disp: , Rfl:     cholecalciferol (VITAMIN D3) 1,000 units tablet, Take 4,000 Units by mouth daily, Disp: , Rfl:     diclofenac sodium (VOLTAREN) 1 %, Apply 4 g topically 4 (four) times a day, Disp: 1 Tube, Rfl: 3    DiphenhydrAMINE HCl (BENADRYL PO), Take by mouth as needed, Disp: , Rfl:     levothyroxine 112 mcg tablet, Take 112 mcg by mouth daily, Disp: , Rfl: 1    LUMIGAN 0 01 % ophthalmic drops, INSTILL 1 DROP IN EACH EYE EVERY DAY AT BEDTIME, Disp: , Rfl: 1    meclizine (ANTIVERT) 12 5 MG tablet, Take by mouth every 6 (six) hours, Disp: , Rfl:     naproxen (NAPROSYN) 500 mg tablet, Take 1 tablet (500 mg total) by mouth 2 (two) times a day with meals, Disp: 180 tablet, Rfl: 3    Probiotic Product (PROBIOTIC DAILY PO), Take by mouth, Disp: , Rfl:     Tetrahydrozoline HCl (EYE DROPS REGULAR OP), Apply to eye, Disp: , Rfl:      Current Facility-Administered Medications:     cephalexin (KEFLEX) capsule 2,000 mg, 2,000 mg, Oral, 60 Min Pre-Op, Darrel Kwan MD          Allergies   Allergen Reactions    Codeine GI Intolerance    Morphine Itching                   Vitals:     12/10/20 1300   BP: 133/81   Pulse: 103         Objective:  Physical exam  · General: Awake, Alert, Oriented  · Eyes: Pupils equal, round and reactive to light  · Heart: regular rate and rhythm  · Lungs: No audible wheezing  · Abdomen: soft                     Ortho Exam   Left knee:  TTP medial joint line  No erythema or ecchymosis  Modest effusion   No swelling  Normal strength  Good ROM   Calf compartments soft and supple  Sensation intact  Toes are warm sensate and mobile           Diagnostics, reviewed and taken today if performed as documented:     None performed      Procedures, if performed today:     Large joint arthrocentesis: L knee  Universal Protocol:  Consent: Verbal consent obtained  Risks and benefits: risks, benefits and alternatives were discussed  Consent given by: patient  Time out: Immediately prior to procedure a "time out" was called to verify the correct patient, procedure, equipment, support staff and site/side marked as required    Timeout called at: 12/10/2020 1:28 PM   Patient understanding: patient states understanding of the procedure being performed  Site marked: the operative site was marked  Patient identity confirmed: verbally with patient     Supporting Documentation  Indications: pain   Procedure Details  Location: knee - L knee  Preparation: Patient was prepped and draped in the usual sterile fashion  Needle size: 22 G  Ultrasound guidance: no  Approach: anterolateral  Medications administered: 12 mg betamethasone acetate-betamethasone sodium phosphate 6 (3-3) mg/mL; 2 mL bupivacaine 0 25 %; 2 mL lidocaine 1 %; 4 mL lidocaine 1 %     Aspirate amount: 29 mL  Aspirate: clear and yellow     Patient tolerance: patient tolerated the procedure well with no immediate complications  Dressing:  Sterile dressing applied                   Portions of the record may have been created with voice recognition software   Occasional wrong word or "sound a like" substitutions may have occurred due to the inherent limitations of voice recognition software   Read the chart carefully and recognize, using context, where substitutions have occurred          Instructions       Return for post-op visit  After Visit Summary (Automatic SnapShot taken 12/10/2020)  Additional Documentation    Vitals:    /81   Pulse 103   Ht 5' 2" (1 575 m)   Wt 61 7 kg (136 lb)   LMP  (LMP Unknown)   BMI 24 87 kg/m²   BSA 1 62 m²   Flowsheets:    Covid Symptom Screening      SmartForms:     [facility] PRE-CHARTING Thaddeus Montanez PCMH/PCSP WRAP UP REQUIREMENTS ADVANCED     [facility] SCRIBE ATTESTATION      (2 more)   Encounter Info:    Billing Info,   History,   Allergies,   Detailed Report      Orders Placed       Labs     Comprehensive metabolic panel     APTT     C-reactive protein     CBC and differential     Protime-INR     Type and screen     APTT     C-reactive protein     CBC and differential     Comprehensive metabolic panel     Hemoglobin A1C W/EAG Estimation     Hemoglobin A1C W/EAG Estimation     PAT Covid Screening     Protime-INR     Type and screen     Blaine Marin  (13 more)     Other Orders     Large joint arthrocentesis: L knee     Ambulatory referral to Family Practice Pending Review     Ambulatory referral to Physical Therapy Closed     Ambulatory referral to Family Practice Pending Review     Ambulatory referral to Physical Therapy Closed     Case request operating room: ARTHROPLASTY KNEE TOTAL Once     Blaine Marin  (4 more)     All Encounter Results   Medication Changes       Ascorbic Acid 500 mg Oral Daily, Start 30 days prior to surgery     Enoxaparin Sodium 40 mg Subcutaneous Daily     Ferrous Sulfate 324 mg Oral 2 times daily before meals, Start 30 days prior to surgery     Folic Acid 1 mg Oral Daily, Start 30 days prior to surgery     Medication List   Medications Administered     Betamethasone Sod Phos & Acet 12 mg   Bupivacaine HCl 2 mL   Lidocaine HCl 2 mL   Lidocaine HCl 4 mL  Visit Diagnoses       Primary osteoarthritis of left knee     Chronic pain of left knee     Problem List

## (undated) DEVICE — SHEET BD X PROTECT-A-BED

## (undated) DEVICE — DEVICE TISS REM DIA3MM L25.25IN ENDOSCP F/ IU POLYPS

## (undated) DEVICE — STERILE SURGICAL LUBRICANT, METAL TUBE: Brand: SURGILUBE

## (undated) DEVICE — Z DISCONTINUED PER MEDLINE USE 2741943 DRESSING AQUACEL 10 IN ALG W9XL25CM SIL CVR WTRPRF VIR BACT BARR ANTIMIC

## (undated) DEVICE — SOLUTION IV 1000ML 0.9% SOD CHL PH 5 INJ USP VIAFLX PLAS

## (undated) DEVICE — GARMENT,MEDLINE,DVT,INT,CALF,MED, GEN2: Brand: MEDLINE

## (undated) DEVICE — YANKAUER,BULB TIP,W/O VENT,RIGID,STERILE: Brand: MEDLINE

## (undated) DEVICE — SYRINGE, LUER LOCK, 10ML: Brand: MEDLINE

## (undated) DEVICE — MEDI-VAC YANKAUER SUCTION HANDLE W/BULBOUS TIP: Brand: CARDINAL HEALTH

## (undated) DEVICE — Device: Brand: LEVEL 1

## (undated) DEVICE — STERILE PATIENT PROTECTIVE PAD FOR IMP® KNEE POSITIONERS & COHESIVE WRAP (10 / CASE): Brand: DE MAYO KNEE POSITIONER®

## (undated) DEVICE — DUP USE 310781 BLADE SAW SAG 29MMX83.7MMX1.32MM

## (undated) DEVICE — SOLUTION IV IRRIG POUR BRL 0.9% SODIUM CHL 2F7124

## (undated) DEVICE — Z INACTIVE USE 2660664 SOLUTION IRRIG 3000ML 0.9% SOD CHL USP UROMATIC PLAS CONT

## (undated) DEVICE — DUAL CUT SAGITTAL BLADE

## (undated) DEVICE — GLOVE ORANGE PI 7   MSG9070

## (undated) DEVICE — GLOVE SURG SZ 6 THK91MIL LTX FREE SYN POLYISOPRENE ANTI

## (undated) DEVICE — SUTURE VCRL + SZ 2 L27IN ABSRB UD L40MM CP 1/2 CIR REV CUT VCP195H

## (undated) DEVICE — DRAPE EQUIP STAND XL MAYO CVR

## (undated) DEVICE — MEDI-VAC NON-CONDUCTIVE SUCTION TUBING: Brand: CARDINAL HEALTH

## (undated) DEVICE — SOLUTION SURG PREP POV IOD 7.5% 4 OZ

## (undated) DEVICE — STRAP POS MP 30X3 IN HK LOOP CLOSURE FOAM DISP

## (undated) DEVICE — GLOVE SURG SZ 85 STD WHT LTX SYN POLYMER BEAD REINF ANTI RL

## (undated) DEVICE — SVMMC GYN MIN PK

## (undated) DEVICE — SUTURE VCRL + SZ 2-0 L27IN ABSRB UD CP-1 1/2 CIR REV CUT VCP266H

## (undated) DEVICE — 1016 S-DRAPE IRRIG POUCH 10/BOX: Brand: STERI-DRAPE™

## (undated) DEVICE — KIT SEP W/ BLD DRAW TB SYR NDL TRNQT PD

## (undated) DEVICE — PREP SOL PVP IODINE 4%  4 OZ/BTL

## (undated) DEVICE — SOLUTION IV 100ML 0.9% SOD CHL PLAS CONT USP VIAFLX 1 PER

## (undated) DEVICE — 1200CC GUARDIAN II: Brand: GUARDIAN

## (undated) DEVICE — SET HNDPC W COAX BNE CLN TIP SUCT TB BTTRY PWR DISPOSABLE

## (undated) DEVICE — Device

## (undated) DEVICE — FLUID MGMT SYS FLUENT KIT 6/PK

## (undated) DEVICE — 4-PORT MANIFOLD: Brand: NEPTUNE 2

## (undated) DEVICE — GLOVE ORANGE PI 7 1/2   MSG9075

## (undated) DEVICE — KIT AUTOTRNS APPL AERO 2 SET SYR 2 TIP FOR PLT SEP SYS GPS

## (undated) DEVICE — GLOVE SURG SZ 8 L12IN FNGR THK13MIL BRN LTX SYN POLYMER W

## (undated) DEVICE — CEMENT MIXING SYSTEM WITH FEMORAL BREAKWAY NOZZLE: Brand: REVOLUTION

## (undated) DEVICE — FORCEP BX MESH TOOTH MIC 2.8 MMX240 CM NDL STRL RADIAL JAW 4

## (undated) DEVICE — 3M™ WARMING BLANKET, UPPER BODY, 10 PER CASE, 42268: Brand: BAIR HUGGER™

## (undated) DEVICE — BITEBLOCK 54FR W/ DENT RIM BLOX

## (undated) DEVICE — DRAPE,REIN 53X77,STERILE: Brand: MEDLINE

## (undated) DEVICE — SOLUTION IV 1000ML LAC RINGERS PH 6.5 INJ USP VIAFLX PLAS

## (undated) DEVICE — COOLER THER 20-31IN L CRYO COMB W/ PD KNEE TB GRAV FLO

## (undated) DEVICE — SUTURE VCRL SZ 0 L36IN ABSRB UD L36MM CT-1 1/2 CIR J946H

## (undated) DEVICE — SURGICAL PROCEDURE PACK PLAS C

## (undated) DEVICE — SET ENDOSCP SEAL HYSTEROSCOPE RIG OUTFLO CHN DISP MYOSURE

## (undated) DEVICE — TUBE ET DIA7.5MM ORAL NSL CUF MURPHY EYE HI LO RADPQ LN

## (undated) DEVICE — SOLUTION IV IRRIG WATER 1000ML POUR BRL 2F7114

## (undated) DEVICE — TUBING, SUCTION, 3/16" X 10', STRAIGHT: Brand: MEDLINE

## (undated) DEVICE — CHLORAPREP 26ML ORANGE